# Patient Record
Sex: FEMALE | Race: BLACK OR AFRICAN AMERICAN | Employment: OTHER
[De-identification: names, ages, dates, MRNs, and addresses within clinical notes are randomized per-mention and may not be internally consistent; named-entity substitution may affect disease eponyms.]

---

## 2017-01-17 ENCOUNTER — OFFICE VISIT (OUTPATIENT)
Dept: INTERNAL MEDICINE | Facility: CLINIC | Age: 62
End: 2017-01-17

## 2017-01-17 VITALS
DIASTOLIC BLOOD PRESSURE: 90 MMHG | RESPIRATION RATE: 12 BRPM | SYSTOLIC BLOOD PRESSURE: 139 MMHG | HEIGHT: 67 IN | HEART RATE: 116 BPM | WEIGHT: 98.8 LBS | BODY MASS INDEX: 15.51 KG/M2 | OXYGEN SATURATION: 97 %

## 2017-01-17 DIAGNOSIS — E11.9 TYPE 2 DIABETES MELLITUS WITHOUT COMPLICATION, WITH LONG-TERM CURRENT USE OF INSULIN (HCC): Primary | ICD-10-CM

## 2017-01-17 DIAGNOSIS — Z12.39 BREAST CANCER SCREENING: ICD-10-CM

## 2017-01-17 DIAGNOSIS — Z12.31 ENCOUNTER FOR SCREENING MAMMOGRAM FOR MALIGNANT NEOPLASM OF BREAST: ICD-10-CM

## 2017-01-17 DIAGNOSIS — Z79.4 TYPE 2 DIABETES MELLITUS WITHOUT COMPLICATION, WITH LONG-TERM CURRENT USE OF INSULIN (HCC): Primary | ICD-10-CM

## 2017-01-17 DIAGNOSIS — Z00.00 HEALTH CARE MAINTENANCE: ICD-10-CM

## 2017-01-17 DIAGNOSIS — K86.0 ALCOHOL-INDUCED CHRONIC PANCREATITIS (HCC): ICD-10-CM

## 2017-01-17 DIAGNOSIS — K86.89 PANCREATIC INSUFFICIENCY: ICD-10-CM

## 2017-01-17 LAB — HBA1C MFR BLD: 10.7 %

## 2017-01-17 PROCEDURE — 99203 OFFICE O/P NEW LOW 30 MIN: CPT | Performed by: INTERNAL MEDICINE

## 2017-01-17 PROCEDURE — 83036 HEMOGLOBIN GLYCOSYLATED A1C: CPT | Performed by: INTERNAL MEDICINE

## 2017-01-17 RX ORDER — MAGNESIUM OXIDE 400 MG/1
400 TABLET ORAL DAILY
Qty: 30 TABLET | Refills: 1 | Status: CANCELLED | OUTPATIENT
Start: 2017-01-17

## 2017-01-17 RX ORDER — MIRTAZAPINE 30 MG/1
15 TABLET, FILM COATED ORAL NIGHTLY
Qty: 30 TABLET | Refills: 5 | Status: SHIPPED | OUTPATIENT
Start: 2017-01-17 | End: 2017-06-08 | Stop reason: SDUPTHER

## 2017-01-17 RX ORDER — CITALOPRAM 20 MG/1
20 TABLET ORAL DAILY
Qty: 30 TABLET | Refills: 1 | Status: CANCELLED | OUTPATIENT
Start: 2017-01-17

## 2017-01-17 RX ORDER — GLIMEPIRIDE 2 MG/1
2 TABLET ORAL DAILY
Qty: 30 TABLET | Refills: 5 | Status: ON HOLD | OUTPATIENT
Start: 2017-01-17 | End: 2017-01-20 | Stop reason: HOSPADM

## 2017-01-17 RX ORDER — ALBUTEROL SULFATE 90 UG/1
2 AEROSOL, METERED RESPIRATORY (INHALATION) EVERY 6 HOURS PRN
Qty: 1 INHALER | Refills: 5 | Status: SHIPPED | OUTPATIENT
Start: 2017-01-17 | End: 2017-11-03 | Stop reason: SDUPTHER

## 2017-01-17 RX ORDER — POTASSIUM CHLORIDE 750 MG/1
10 CAPSULE, EXTENDED RELEASE ORAL 2 TIMES DAILY
Qty: 30 CAPSULE | Refills: 5 | Status: ON HOLD | OUTPATIENT
Start: 2017-01-17 | End: 2017-01-20 | Stop reason: HOSPADM

## 2017-01-17 RX ORDER — TIZANIDINE 2 MG/1
2 TABLET ORAL EVERY 6 HOURS PRN
Qty: 30 TABLET | Refills: 0 | Status: CANCELLED | OUTPATIENT
Start: 2017-01-17

## 2017-01-17 RX ORDER — OMEPRAZOLE 20 MG/1
20 CAPSULE, DELAYED RELEASE ORAL 2 TIMES DAILY
Qty: 30 CAPSULE | Refills: 5 | Status: SHIPPED | OUTPATIENT
Start: 2017-01-17 | End: 2017-06-08 | Stop reason: SDUPTHER

## 2017-01-17 RX ORDER — SODIUM BICARBONATE 650 MG/1
650 TABLET ORAL 3 TIMES DAILY
Qty: 90 TABLET | Refills: 5 | Status: SHIPPED | OUTPATIENT
Start: 2017-01-17 | End: 2017-09-12 | Stop reason: SDUPTHER

## 2017-01-17 ASSESSMENT — ENCOUNTER SYMPTOMS
DIFFICULTY BREATHING: 1
ABDOMINAL PAIN: 1

## 2017-01-17 ASSESSMENT — COPD QUESTIONNAIRES: COPD: 1

## 2017-01-23 ENCOUNTER — TELEPHONE (OUTPATIENT)
Dept: INTERNAL MEDICINE | Facility: CLINIC | Age: 62
End: 2017-01-23

## 2017-01-24 ENCOUNTER — TELEPHONE (OUTPATIENT)
Dept: INTERNAL MEDICINE | Facility: CLINIC | Age: 62
End: 2017-01-24

## 2017-01-30 ENCOUNTER — OFFICE VISIT (OUTPATIENT)
Dept: INTERNAL MEDICINE | Facility: CLINIC | Age: 62
End: 2017-01-30

## 2017-01-30 VITALS
SYSTOLIC BLOOD PRESSURE: 122 MMHG | DIASTOLIC BLOOD PRESSURE: 81 MMHG | WEIGHT: 102.4 LBS | OXYGEN SATURATION: 98 % | RESPIRATION RATE: 12 BRPM | HEIGHT: 67 IN | HEART RATE: 101 BPM | BODY MASS INDEX: 16.07 KG/M2

## 2017-01-30 DIAGNOSIS — Z79.4 TYPE 2 DIABETES MELLITUS WITHOUT COMPLICATION, WITH LONG-TERM CURRENT USE OF INSULIN (HCC): Primary | ICD-10-CM

## 2017-01-30 DIAGNOSIS — M54.2 CHRONIC NECK PAIN: ICD-10-CM

## 2017-01-30 DIAGNOSIS — G89.29 CHRONIC NECK PAIN: ICD-10-CM

## 2017-01-30 DIAGNOSIS — E11.9 TYPE 2 DIABETES MELLITUS WITHOUT COMPLICATION, WITH LONG-TERM CURRENT USE OF INSULIN (HCC): Primary | ICD-10-CM

## 2017-01-30 DIAGNOSIS — G40.909 NONINTRACTABLE EPILEPSY WITHOUT STATUS EPILEPTICUS, UNSPECIFIED EPILEPSY TYPE (HCC): ICD-10-CM

## 2017-01-30 LAB — GLUCOSE BLD-MCNC: 147 MG/DL

## 2017-01-30 PROCEDURE — 99214 OFFICE O/P EST MOD 30 MIN: CPT | Performed by: INTERNAL MEDICINE

## 2017-01-30 PROCEDURE — 82962 GLUCOSE BLOOD TEST: CPT | Performed by: INTERNAL MEDICINE

## 2017-01-30 ASSESSMENT — PATIENT HEALTH QUESTIONNAIRE - PHQ9
SUM OF ALL RESPONSES TO PHQ QUESTIONS 1-9: 2
1. LITTLE INTEREST OR PLEASURE IN DOING THINGS: 1
2. FEELING DOWN, DEPRESSED OR HOPELESS: 1
SUM OF ALL RESPONSES TO PHQ9 QUESTIONS 1 & 2: 2

## 2017-02-09 ENCOUNTER — OFFICE VISIT (OUTPATIENT)
Dept: NEUROLOGY | Facility: CLINIC | Age: 62
End: 2017-02-09

## 2017-02-09 VITALS
SYSTOLIC BLOOD PRESSURE: 102 MMHG | DIASTOLIC BLOOD PRESSURE: 78 MMHG | WEIGHT: 102 LBS | HEART RATE: 76 BPM | BODY MASS INDEX: 16.01 KG/M2 | HEIGHT: 67 IN

## 2017-02-09 DIAGNOSIS — E11.42 TYPE 2 DIABETES MELLITUS WITH DIABETIC POLYNEUROPATHY, WITH LONG-TERM CURRENT USE OF INSULIN (HCC): ICD-10-CM

## 2017-02-09 DIAGNOSIS — Z79.4 TYPE 2 DIABETES MELLITUS WITH DIABETIC POLYNEUROPATHY, WITH LONG-TERM CURRENT USE OF INSULIN (HCC): ICD-10-CM

## 2017-02-09 DIAGNOSIS — G57.93 NEUROPATHIC PAIN OF BOTH LEGS: Primary | ICD-10-CM

## 2017-02-09 DIAGNOSIS — G40.909 SEIZURE DISORDER, SECONDARY (HCC): ICD-10-CM

## 2017-02-09 PROCEDURE — 99214 OFFICE O/P EST MOD 30 MIN: CPT | Performed by: PSYCHIATRY & NEUROLOGY

## 2017-02-09 RX ORDER — GABAPENTIN 100 MG/1
CAPSULE ORAL
Qty: 60 CAPSULE | Refills: 1 | Status: SHIPPED | OUTPATIENT
Start: 2017-02-09 | End: 2017-04-06 | Stop reason: SDUPTHER

## 2017-02-20 ENCOUNTER — OFFICE VISIT (OUTPATIENT)
Dept: GASTROENTEROLOGY | Facility: CLINIC | Age: 62
End: 2017-02-20

## 2017-02-20 VITALS
DIASTOLIC BLOOD PRESSURE: 82 MMHG | TEMPERATURE: 97.3 F | BODY MASS INDEX: 17.27 KG/M2 | HEIGHT: 67 IN | RESPIRATION RATE: 14 BRPM | HEART RATE: 89 BPM | SYSTOLIC BLOOD PRESSURE: 153 MMHG | OXYGEN SATURATION: 97 % | WEIGHT: 110 LBS

## 2017-02-20 DIAGNOSIS — R97.8 ELEVATED CA 19-9 LEVEL: ICD-10-CM

## 2017-02-20 DIAGNOSIS — K86.89 PANCREATIC CALCIFICATION: ICD-10-CM

## 2017-02-20 DIAGNOSIS — R10.84 GENERALIZED ABDOMINAL PAIN: ICD-10-CM

## 2017-02-20 DIAGNOSIS — K86.89 PANCREATIC INSUFFICIENCY: ICD-10-CM

## 2017-02-20 DIAGNOSIS — K86.0 ALCOHOL-INDUCED CHRONIC PANCREATITIS (HCC): Primary | ICD-10-CM

## 2017-02-20 DIAGNOSIS — R79.89 ELEVATED LFTS: ICD-10-CM

## 2017-02-20 PROCEDURE — 99214 OFFICE O/P EST MOD 30 MIN: CPT | Performed by: INTERNAL MEDICINE

## 2017-02-20 ASSESSMENT — ENCOUNTER SYMPTOMS
NAUSEA: 0
COUGH: 1
VOMITING: 0
ABDOMINAL PAIN: 0
RECTAL PAIN: 0
ANAL BLEEDING: 0
CONSTIPATION: 0
WHEEZING: 1
DIARRHEA: 0
BLOOD IN STOOL: 0
ALLERGIC/IMMUNOLOGIC NEGATIVE: 1
BACK PAIN: 1
ABDOMINAL DISTENTION: 1
SINUS PRESSURE: 1

## 2017-03-06 RX ORDER — LEVETIRACETAM 500 MG/1
500 TABLET ORAL 2 TIMES DAILY
Qty: 60 TABLET | Refills: 5 | Status: SHIPPED | OUTPATIENT
Start: 2017-03-06 | End: 2018-05-15 | Stop reason: ALTCHOICE

## 2017-04-06 DIAGNOSIS — E11.42 TYPE 2 DIABETES MELLITUS WITH DIABETIC POLYNEUROPATHY, WITH LONG-TERM CURRENT USE OF INSULIN (HCC): ICD-10-CM

## 2017-04-06 DIAGNOSIS — Z79.4 TYPE 2 DIABETES MELLITUS WITH DIABETIC POLYNEUROPATHY, WITH LONG-TERM CURRENT USE OF INSULIN (HCC): ICD-10-CM

## 2017-04-06 DIAGNOSIS — G57.93 NEUROPATHIC PAIN OF BOTH LEGS: ICD-10-CM

## 2017-04-06 RX ORDER — GABAPENTIN 100 MG/1
CAPSULE ORAL
Qty: 60 CAPSULE | Refills: 1 | Status: SHIPPED | OUTPATIENT
Start: 2017-04-06 | End: 2017-06-01 | Stop reason: DRUGHIGH

## 2017-04-12 ENCOUNTER — TELEPHONE (OUTPATIENT)
Dept: GASTROENTEROLOGY | Age: 62
End: 2017-04-12

## 2017-04-27 ENCOUNTER — OFFICE VISIT (OUTPATIENT)
Dept: INTERNAL MEDICINE | Age: 62
End: 2017-04-27
Payer: MEDICARE

## 2017-04-27 VITALS
OXYGEN SATURATION: 99 % | HEIGHT: 67 IN | DIASTOLIC BLOOD PRESSURE: 89 MMHG | SYSTOLIC BLOOD PRESSURE: 136 MMHG | WEIGHT: 104.2 LBS | BODY MASS INDEX: 16.35 KG/M2 | RESPIRATION RATE: 12 BRPM | HEART RATE: 95 BPM

## 2017-04-27 DIAGNOSIS — Z12.31 ENCOUNTER FOR SCREENING MAMMOGRAM FOR MALIGNANT NEOPLASM OF BREAST: ICD-10-CM

## 2017-04-27 DIAGNOSIS — M25.531 BILATERAL WRIST PAIN: ICD-10-CM

## 2017-04-27 DIAGNOSIS — Z79.4 TYPE 2 DIABETES MELLITUS WITHOUT COMPLICATION, WITH LONG-TERM CURRENT USE OF INSULIN (HCC): Primary | ICD-10-CM

## 2017-04-27 DIAGNOSIS — K86.0 ALCOHOL-INDUCED CHRONIC PANCREATITIS (HCC): ICD-10-CM

## 2017-04-27 DIAGNOSIS — Z12.39 BREAST CANCER SCREENING: ICD-10-CM

## 2017-04-27 DIAGNOSIS — G56.03 BILATERAL CARPAL TUNNEL SYNDROME: ICD-10-CM

## 2017-04-27 DIAGNOSIS — M25.532 BILATERAL WRIST PAIN: ICD-10-CM

## 2017-04-27 DIAGNOSIS — E11.9 TYPE 2 DIABETES MELLITUS WITHOUT COMPLICATION, WITH LONG-TERM CURRENT USE OF INSULIN (HCC): Primary | ICD-10-CM

## 2017-04-27 DIAGNOSIS — Z23 NEED FOR PNEUMOCOCCAL VACCINE: ICD-10-CM

## 2017-04-27 PROBLEM — I10 ESSENTIAL (PRIMARY) HYPERTENSION: Status: ACTIVE | Noted: 2017-02-08

## 2017-04-27 PROCEDURE — 99214 OFFICE O/P EST MOD 30 MIN: CPT | Performed by: INTERNAL MEDICINE

## 2017-04-27 PROCEDURE — 90670 PCV13 VACCINE IM: CPT | Performed by: INTERNAL MEDICINE

## 2017-04-27 PROCEDURE — 83036 HEMOGLOBIN GLYCOSYLATED A1C: CPT | Performed by: INTERNAL MEDICINE

## 2017-04-27 PROCEDURE — G0009 ADMIN PNEUMOCOCCAL VACCINE: HCPCS | Performed by: INTERNAL MEDICINE

## 2017-04-27 RX ORDER — AZITHROMYCIN 250 MG/1
TABLET, FILM COATED ORAL
Qty: 1 PACKET | Refills: 0 | Status: SHIPPED | OUTPATIENT
Start: 2017-04-27 | End: 2017-06-01 | Stop reason: ALTCHOICE

## 2017-04-27 ASSESSMENT — ENCOUNTER SYMPTOMS
ABDOMINAL PAIN: 1
SINUS COMPLAINT: 1
SINUS PRESSURE: 1
COUGH: 1
HEARTBURN: 1

## 2017-04-27 ASSESSMENT — PATIENT HEALTH QUESTIONNAIRE - PHQ9
SUM OF ALL RESPONSES TO PHQ9 QUESTIONS 1 & 2: 0
2. FEELING DOWN, DEPRESSED OR HOPELESS: 0
1. LITTLE INTEREST OR PLEASURE IN DOING THINGS: 0
SUM OF ALL RESPONSES TO PHQ QUESTIONS 1-9: 0

## 2017-04-28 DIAGNOSIS — G57.93 NEUROPATHIC PAIN OF BOTH LEGS: ICD-10-CM

## 2017-04-28 DIAGNOSIS — G89.29 CHRONIC NECK PAIN: Primary | ICD-10-CM

## 2017-04-28 DIAGNOSIS — M54.2 CHRONIC NECK PAIN: Primary | ICD-10-CM

## 2017-04-28 LAB — HBA1C MFR BLD: 9.1 %

## 2017-05-03 ENCOUNTER — OFFICE VISIT (OUTPATIENT)
Dept: NEUROLOGY | Age: 62
End: 2017-05-03
Payer: MEDICARE

## 2017-05-03 ENCOUNTER — HOSPITAL ENCOUNTER (OUTPATIENT)
Dept: GENERAL RADIOLOGY | Age: 62
Discharge: HOME | End: 2017-05-03
Payer: MEDICARE

## 2017-05-03 ENCOUNTER — HOSPITAL ENCOUNTER (OUTPATIENT)
Age: 62
Discharge: HOME | End: 2017-05-03
Payer: MEDICARE

## 2017-05-03 VITALS
HEIGHT: 67 IN | DIASTOLIC BLOOD PRESSURE: 62 MMHG | BODY MASS INDEX: 17.42 KG/M2 | HEART RATE: 100 BPM | WEIGHT: 111 LBS | SYSTOLIC BLOOD PRESSURE: 96 MMHG

## 2017-05-03 DIAGNOSIS — M25.532 BILATERAL WRIST PAIN: ICD-10-CM

## 2017-05-03 DIAGNOSIS — Z79.4 TYPE 2 DIABETES MELLITUS WITH DIABETIC POLYNEUROPATHY, WITH LONG-TERM CURRENT USE OF INSULIN (HCC): ICD-10-CM

## 2017-05-03 DIAGNOSIS — M25.531 BILATERAL WRIST PAIN: ICD-10-CM

## 2017-05-03 DIAGNOSIS — M79.2 NEUROPATHIC PAIN: Primary | ICD-10-CM

## 2017-05-03 DIAGNOSIS — E11.42 TYPE 2 DIABETES MELLITUS WITH DIABETIC POLYNEUROPATHY, WITH LONG-TERM CURRENT USE OF INSULIN (HCC): ICD-10-CM

## 2017-05-03 PROCEDURE — 99214 OFFICE O/P EST MOD 30 MIN: CPT | Performed by: PSYCHIATRY & NEUROLOGY

## 2017-05-03 PROCEDURE — 73110 X-RAY EXAM OF WRIST: CPT

## 2017-05-03 RX ORDER — GABAPENTIN 300 MG/1
CAPSULE ORAL
Qty: 60 CAPSULE | Refills: 0 | Status: SHIPPED | OUTPATIENT
Start: 2017-05-03 | End: 2017-06-08 | Stop reason: SDUPTHER

## 2017-05-08 ENCOUNTER — HOSPITAL ENCOUNTER (OUTPATIENT)
Age: 62
Discharge: HOME | End: 2017-05-08
Payer: MEDICARE

## 2017-05-08 DIAGNOSIS — R97.8 ELEVATED CA 19-9 LEVEL: ICD-10-CM

## 2017-05-08 DIAGNOSIS — K86.89 PANCREATIC INSUFFICIENCY: ICD-10-CM

## 2017-05-08 DIAGNOSIS — E11.9 TYPE 2 DIABETES MELLITUS WITHOUT COMPLICATION, WITH LONG-TERM CURRENT USE OF INSULIN (HCC): ICD-10-CM

## 2017-05-08 DIAGNOSIS — Z79.4 TYPE 2 DIABETES MELLITUS WITHOUT COMPLICATION, WITH LONG-TERM CURRENT USE OF INSULIN (HCC): ICD-10-CM

## 2017-05-08 DIAGNOSIS — R79.89 ELEVATED LFTS: ICD-10-CM

## 2017-05-08 DIAGNOSIS — K86.89 PANCREATIC CALCIFICATION: ICD-10-CM

## 2017-05-08 LAB
ALBUMIN SERPL-MCNC: 4.6 G/DL (ref 3.5–5.2)
ALBUMIN/GLOBULIN RATIO: 1.3 (ref 1–2.5)
ALP BLD-CCNC: 117 U/L (ref 35–104)
ALT SERPL-CCNC: 12 U/L (ref 5–33)
AMYLASE: 54 U/L (ref 28–100)
AST SERPL-CCNC: 22 U/L
BILIRUB SERPL-MCNC: 0.49 MG/DL (ref 0.3–1.2)
BILIRUBIN DIRECT: 0.13 MG/DL
BILIRUBIN, INDIRECT: 0.36 MG/DL (ref 0–1)
CA 19-9: 1 U/ML (ref 0–35)
CARCINOEMBRYONIC ANTIGEN: 7.5 NG/ML
CHOLESTEROL/HDL RATIO: 2
CHOLESTEROL: 174 MG/DL
CREATININE URINE: 155.8 MG/DL (ref 28–217)
GLOBULIN: ABNORMAL G/DL (ref 1.5–3.8)
HDLC SERPL-MCNC: 87 MG/DL
LDL CHOLESTEROL: 68 MG/DL (ref 0–130)
LIPASE: 7 U/L (ref 13–60)
MICROALBUMIN/CREAT 24H UR: <12 MG/L
MICROALBUMIN/CREAT UR-RTO: 8 MCG/MG CREAT
TOTAL PROTEIN: 8.2 G/DL (ref 6.4–8.3)
TRIGL SERPL-MCNC: 94 MG/DL
VLDLC SERPL CALC-MCNC: NORMAL MG/DL (ref 1–30)

## 2017-05-08 PROCEDURE — 82043 UR ALBUMIN QUANTITATIVE: CPT

## 2017-05-08 PROCEDURE — 80061 LIPID PANEL: CPT

## 2017-05-08 PROCEDURE — 82150 ASSAY OF AMYLASE: CPT

## 2017-05-08 PROCEDURE — 80076 HEPATIC FUNCTION PANEL: CPT

## 2017-05-08 PROCEDURE — 82378 CARCINOEMBRYONIC ANTIGEN: CPT

## 2017-05-08 PROCEDURE — 82570 ASSAY OF URINE CREATININE: CPT

## 2017-05-08 PROCEDURE — 36415 COLL VENOUS BLD VENIPUNCTURE: CPT

## 2017-05-08 PROCEDURE — 83690 ASSAY OF LIPASE: CPT

## 2017-05-08 PROCEDURE — 86301 IMMUNOASSAY TUMOR CA 19-9: CPT

## 2017-05-17 ENCOUNTER — OFFICE VISIT (OUTPATIENT)
Dept: GASTROENTEROLOGY | Age: 62
End: 2017-05-17
Payer: MEDICARE

## 2017-05-17 VITALS
RESPIRATION RATE: 14 BRPM | WEIGHT: 108 LBS | SYSTOLIC BLOOD PRESSURE: 96 MMHG | OXYGEN SATURATION: 99 % | TEMPERATURE: 97.4 F | BODY MASS INDEX: 16.95 KG/M2 | DIASTOLIC BLOOD PRESSURE: 63 MMHG | HEART RATE: 81 BPM | HEIGHT: 67 IN

## 2017-05-17 DIAGNOSIS — R97.8 ELEVATED CA 19-9 LEVEL: ICD-10-CM

## 2017-05-17 DIAGNOSIS — R97.0 ELEVATED CEA: ICD-10-CM

## 2017-05-17 DIAGNOSIS — R63.4 WEIGHT LOSS: ICD-10-CM

## 2017-05-17 DIAGNOSIS — K86.0 ALCOHOL-INDUCED CHRONIC PANCREATITIS (HCC): Primary | ICD-10-CM

## 2017-05-17 PROCEDURE — 99214 OFFICE O/P EST MOD 30 MIN: CPT | Performed by: INTERNAL MEDICINE

## 2017-05-17 RX ORDER — GLIMEPIRIDE 2 MG/1
TABLET ORAL
COMMUNITY
Start: 2017-04-24 | End: 2017-06-08 | Stop reason: SDUPTHER

## 2017-05-17 ASSESSMENT — ENCOUNTER SYMPTOMS
EYES NEGATIVE: 1
DIARRHEA: 0
ANAL BLEEDING: 0
RECTAL PAIN: 0
SINUS PRESSURE: 1
CONSTIPATION: 0
WHEEZING: 1
BLOOD IN STOOL: 0
ABDOMINAL PAIN: 0
ALLERGIC/IMMUNOLOGIC NEGATIVE: 1
VOMITING: 0
NAUSEA: 1
ABDOMINAL DISTENTION: 0

## 2017-05-22 ENCOUNTER — TELEPHONE (OUTPATIENT)
Dept: INTERNAL MEDICINE | Age: 62
End: 2017-05-22

## 2017-05-22 DIAGNOSIS — G57.93 NEUROPATHIC PAIN OF BOTH LEGS: ICD-10-CM

## 2017-05-22 DIAGNOSIS — M54.2 CHRONIC NECK PAIN: Primary | ICD-10-CM

## 2017-05-22 DIAGNOSIS — G89.29 CHRONIC NECK PAIN: Primary | ICD-10-CM

## 2017-06-01 ENCOUNTER — HOSPITAL ENCOUNTER (OUTPATIENT)
Dept: PAIN MANAGEMENT | Age: 62
Discharge: HOME | End: 2017-06-01
Payer: MEDICARE

## 2017-06-01 VITALS
WEIGHT: 108 LBS | DIASTOLIC BLOOD PRESSURE: 78 MMHG | TEMPERATURE: 97.8 F | BODY MASS INDEX: 16.95 KG/M2 | RESPIRATION RATE: 18 BRPM | HEIGHT: 67 IN | HEART RATE: 78 BPM | SYSTOLIC BLOOD PRESSURE: 122 MMHG

## 2017-06-01 DIAGNOSIS — M54.2 CERVICALGIA: ICD-10-CM

## 2017-06-01 DIAGNOSIS — M50.30 DEGENERATION OF CERVICAL INTERVERTEBRAL DISC: Primary | ICD-10-CM

## 2017-06-01 PROCEDURE — G0463 HOSPITAL OUTPT CLINIC VISIT: HCPCS

## 2017-06-01 PROCEDURE — 99203 OFFICE O/P NEW LOW 30 MIN: CPT

## 2017-06-01 ASSESSMENT — ENCOUNTER SYMPTOMS
UNUSUAL HAIR DISTRIBUTION: 0
HEMOPTYSIS: 0
SUSPICIOUS LESIONS: 0
SPUTUM PRODUCTION: 0
JAUNDICE: 0
STRIDOR: 0
EYE DISCHARGE: 0
BOWEL INCONTINENCE: 0

## 2017-06-08 DIAGNOSIS — E11.42 TYPE 2 DIABETES MELLITUS WITH DIABETIC POLYNEUROPATHY, WITH LONG-TERM CURRENT USE OF INSULIN (HCC): ICD-10-CM

## 2017-06-08 DIAGNOSIS — M79.2 NEUROPATHIC PAIN: ICD-10-CM

## 2017-06-08 DIAGNOSIS — Z79.4 TYPE 2 DIABETES MELLITUS WITH DIABETIC POLYNEUROPATHY, WITH LONG-TERM CURRENT USE OF INSULIN (HCC): ICD-10-CM

## 2017-06-09 ENCOUNTER — HOSPITAL ENCOUNTER (OUTPATIENT)
Dept: MAMMOGRAPHY | Age: 62
Discharge: HOME | End: 2017-06-09
Payer: MEDICARE

## 2017-06-09 DIAGNOSIS — Z12.31 ENCOUNTER FOR SCREENING MAMMOGRAM FOR MALIGNANT NEOPLASM OF BREAST: ICD-10-CM

## 2017-06-09 DIAGNOSIS — Z12.39 BREAST CANCER SCREENING: ICD-10-CM

## 2017-06-09 PROCEDURE — G0202 SCR MAMMO BI INCL CAD: HCPCS

## 2017-06-12 DIAGNOSIS — Z79.4 TYPE 2 DIABETES MELLITUS WITH DIABETIC POLYNEUROPATHY, WITH LONG-TERM CURRENT USE OF INSULIN (HCC): ICD-10-CM

## 2017-06-12 DIAGNOSIS — E11.42 TYPE 2 DIABETES MELLITUS WITH DIABETIC POLYNEUROPATHY, WITH LONG-TERM CURRENT USE OF INSULIN (HCC): ICD-10-CM

## 2017-06-12 DIAGNOSIS — M79.2 NEUROPATHIC PAIN: ICD-10-CM

## 2017-06-12 RX ORDER — OMEPRAZOLE 20 MG/1
20 CAPSULE, DELAYED RELEASE ORAL 2 TIMES DAILY
Qty: 90 CAPSULE | Refills: 3 | Status: SHIPPED | OUTPATIENT
Start: 2017-06-12 | End: 2018-02-02 | Stop reason: SDUPTHER

## 2017-06-12 RX ORDER — GLIMEPIRIDE 2 MG/1
2 TABLET ORAL
Qty: 90 TABLET | Refills: 3 | Status: SHIPPED | OUTPATIENT
Start: 2017-06-12 | End: 2018-07-10 | Stop reason: SDUPTHER

## 2017-06-12 RX ORDER — GABAPENTIN 300 MG/1
CAPSULE ORAL
Qty: 60 CAPSULE | Refills: 0 | Status: SHIPPED | OUTPATIENT
Start: 2017-06-12 | End: 2017-06-12 | Stop reason: SDUPTHER

## 2017-06-12 RX ORDER — MIRTAZAPINE 30 MG/1
15 TABLET, FILM COATED ORAL NIGHTLY
Qty: 90 TABLET | Refills: 3 | Status: SHIPPED | OUTPATIENT
Start: 2017-06-12 | End: 2018-07-10 | Stop reason: SDUPTHER

## 2017-06-12 RX ORDER — OMEPRAZOLE 20 MG/1
20 CAPSULE, DELAYED RELEASE ORAL 2 TIMES DAILY
Qty: 30 CAPSULE | Refills: 5 | Status: SHIPPED | OUTPATIENT
Start: 2017-06-12 | End: 2017-06-12 | Stop reason: SDUPTHER

## 2017-06-12 RX ORDER — GABAPENTIN 300 MG/1
CAPSULE ORAL
Qty: 120 CAPSULE | Refills: 3 | Status: SHIPPED | OUTPATIENT
Start: 2017-06-12 | End: 2017-11-01 | Stop reason: SDUPTHER

## 2017-06-12 RX ORDER — MIRTAZAPINE 30 MG/1
15 TABLET, FILM COATED ORAL NIGHTLY
Qty: 30 TABLET | Refills: 5 | Status: SHIPPED | OUTPATIENT
Start: 2017-06-12 | End: 2017-06-12 | Stop reason: SDUPTHER

## 2017-06-12 RX ORDER — GLIMEPIRIDE 2 MG/1
2 TABLET ORAL
Qty: 30 TABLET | Refills: 5 | Status: SHIPPED | OUTPATIENT
Start: 2017-06-12 | End: 2017-06-12 | Stop reason: SDUPTHER

## 2017-06-15 ENCOUNTER — HOSPITAL ENCOUNTER (OUTPATIENT)
Dept: MRI IMAGING | Age: 62
Discharge: HOME | End: 2017-06-15
Payer: MEDICARE

## 2017-06-15 DIAGNOSIS — M54.2 CERVICALGIA: ICD-10-CM

## 2017-06-15 PROCEDURE — 72141 MRI NECK SPINE W/O DYE: CPT

## 2017-06-21 ENCOUNTER — TELEPHONE (OUTPATIENT)
Dept: GASTROENTEROLOGY | Age: 62
End: 2017-06-21

## 2017-06-23 RX ORDER — SODIUM, POTASSIUM,MAG SULFATES 17.5-3.13G
SOLUTION, RECONSTITUTED, ORAL ORAL
Qty: 1 BOTTLE | Refills: 0 | Status: ON HOLD | OUTPATIENT
Start: 2017-06-23 | End: 2017-06-30 | Stop reason: ALTCHOICE

## 2017-06-30 ENCOUNTER — HOSPITAL ENCOUNTER (OUTPATIENT)
Age: 62
Setting detail: OUTPATIENT SURGERY
Discharge: HOME | End: 2017-06-30
Attending: INTERNAL MEDICINE | Admitting: INTERNAL MEDICINE
Payer: MEDICARE

## 2017-06-30 VITALS
RESPIRATION RATE: 18 BRPM | WEIGHT: 109 LBS | TEMPERATURE: 98.8 F | SYSTOLIC BLOOD PRESSURE: 139 MMHG | DIASTOLIC BLOOD PRESSURE: 82 MMHG | HEART RATE: 80 BPM | BODY MASS INDEX: 17.11 KG/M2 | OXYGEN SATURATION: 98 % | HEIGHT: 67 IN

## 2017-06-30 PROCEDURE — 99153 MOD SED SAME PHYS/QHP EA: CPT | Performed by: INTERNAL MEDICINE

## 2017-06-30 PROCEDURE — 99152 MOD SED SAME PHYS/QHP 5/>YRS: CPT | Performed by: INTERNAL MEDICINE

## 2017-06-30 PROCEDURE — 2580000003 HC RX 258: Performed by: INTERNAL MEDICINE

## 2017-06-30 PROCEDURE — 7100000011 HC PHASE II RECOVERY - ADDTL 15 MIN: Performed by: INTERNAL MEDICINE

## 2017-06-30 PROCEDURE — 3609010400 HC COLONOSCOPY POLYPECTOMY HOT BIOPSY: Performed by: INTERNAL MEDICINE

## 2017-06-30 PROCEDURE — 88305 TISSUE EXAM BY PATHOLOGIST: CPT

## 2017-06-30 PROCEDURE — 6360000002 HC RX W HCPCS: Performed by: INTERNAL MEDICINE

## 2017-06-30 PROCEDURE — 45385 COLONOSCOPY W/LESION REMOVAL: CPT | Performed by: INTERNAL MEDICINE

## 2017-06-30 PROCEDURE — 7100000010 HC PHASE II RECOVERY - FIRST 15 MIN: Performed by: INTERNAL MEDICINE

## 2017-06-30 RX ORDER — MEPERIDINE HYDROCHLORIDE 50 MG/ML
INJECTION INTRAMUSCULAR; INTRAVENOUS; SUBCUTANEOUS PRN
Status: DISCONTINUED | OUTPATIENT
Start: 2017-06-30 | End: 2017-06-30 | Stop reason: HOSPADM

## 2017-06-30 RX ORDER — SODIUM CHLORIDE, SODIUM LACTATE, POTASSIUM CHLORIDE, CALCIUM CHLORIDE 600; 310; 30; 20 MG/100ML; MG/100ML; MG/100ML; MG/100ML
INJECTION, SOLUTION INTRAVENOUS ONCE
Status: COMPLETED | OUTPATIENT
Start: 2017-06-30 | End: 2017-06-30

## 2017-06-30 RX ORDER — MIDAZOLAM HYDROCHLORIDE 1 MG/ML
INJECTION INTRAMUSCULAR; INTRAVENOUS PRN
Status: DISCONTINUED | OUTPATIENT
Start: 2017-06-30 | End: 2017-06-30 | Stop reason: HOSPADM

## 2017-06-30 RX ADMIN — SODIUM CHLORIDE, POTASSIUM CHLORIDE, SODIUM LACTATE AND CALCIUM CHLORIDE: 600; 310; 30; 20 INJECTION, SOLUTION INTRAVENOUS at 07:13

## 2017-06-30 ASSESSMENT — PAIN - FUNCTIONAL ASSESSMENT: PAIN_FUNCTIONAL_ASSESSMENT: 0-10

## 2017-06-30 ASSESSMENT — PAIN SCALES - GENERAL: PAINLEVEL_OUTOF10: 0

## 2017-07-03 LAB — SURGICAL PATHOLOGY REPORT: NORMAL

## 2017-08-01 DIAGNOSIS — R97.0 ELEVATED CEA: ICD-10-CM

## 2017-08-01 DIAGNOSIS — R63.4 WEIGHT LOSS: ICD-10-CM

## 2017-08-04 ENCOUNTER — OFFICE VISIT (OUTPATIENT)
Dept: INTERNAL MEDICINE | Age: 62
End: 2017-08-04
Payer: MEDICARE

## 2017-08-04 VITALS
HEART RATE: 76 BPM | BODY MASS INDEX: 16.64 KG/M2 | SYSTOLIC BLOOD PRESSURE: 132 MMHG | WEIGHT: 106 LBS | DIASTOLIC BLOOD PRESSURE: 91 MMHG | HEIGHT: 67 IN

## 2017-08-04 DIAGNOSIS — E11.9 TYPE 2 DIABETES MELLITUS WITHOUT COMPLICATION, WITH LONG-TERM CURRENT USE OF INSULIN (HCC): ICD-10-CM

## 2017-08-04 DIAGNOSIS — Z79.4 TYPE 2 DIABETES MELLITUS WITHOUT COMPLICATION, WITH LONG-TERM CURRENT USE OF INSULIN (HCC): ICD-10-CM

## 2017-08-04 DIAGNOSIS — G56.03 BILATERAL CARPAL TUNNEL SYNDROME: ICD-10-CM

## 2017-08-04 DIAGNOSIS — K86.0 ALCOHOL-INDUCED CHRONIC PANCREATITIS (HCC): Primary | ICD-10-CM

## 2017-08-04 PROCEDURE — 99214 OFFICE O/P EST MOD 30 MIN: CPT | Performed by: INTERNAL MEDICINE

## 2017-08-04 ASSESSMENT — ENCOUNTER SYMPTOMS: ABDOMINAL PAIN: 1

## 2017-08-08 ENCOUNTER — HOSPITAL ENCOUNTER (EMERGENCY)
Age: 62
Discharge: HOME | End: 2017-08-08
Attending: EMERGENCY MEDICINE
Payer: MEDICARE

## 2017-08-08 VITALS
OXYGEN SATURATION: 100 % | RESPIRATION RATE: 16 BRPM | HEART RATE: 98 BPM | TEMPERATURE: 98.3 F | HEIGHT: 67 IN | BODY MASS INDEX: 16.48 KG/M2 | SYSTOLIC BLOOD PRESSURE: 119 MMHG | DIASTOLIC BLOOD PRESSURE: 89 MMHG | WEIGHT: 105 LBS

## 2017-08-08 DIAGNOSIS — M25.531 BILATERAL WRIST PAIN: ICD-10-CM

## 2017-08-08 DIAGNOSIS — M54.40 ACUTE LEFT-SIDED LOW BACK PAIN WITH SCIATICA, SCIATICA LATERALITY UNSPECIFIED: Primary | ICD-10-CM

## 2017-08-08 DIAGNOSIS — M25.532 BILATERAL WRIST PAIN: ICD-10-CM

## 2017-08-08 PROCEDURE — 99283 EMERGENCY DEPT VISIT LOW MDM: CPT

## 2017-08-08 RX ORDER — CYCLOBENZAPRINE HCL 5 MG
5 TABLET ORAL 2 TIMES DAILY PRN
Qty: 10 TABLET | Refills: 0 | Status: SHIPPED | OUTPATIENT
Start: 2017-08-08 | End: 2017-08-18

## 2017-08-08 RX ORDER — ACETAMINOPHEN AND CODEINE PHOSPHATE 300; 30 MG/1; MG/1
1 TABLET ORAL 3 TIMES DAILY PRN
Qty: 10 TABLET | Refills: 0 | Status: SHIPPED | OUTPATIENT
Start: 2017-08-08 | End: 2017-08-21

## 2017-08-08 RX ORDER — PREDNISONE 20 MG/1
20 TABLET ORAL DAILY
Qty: 5 TABLET | Refills: 0 | Status: SHIPPED | OUTPATIENT
Start: 2017-08-08 | End: 2017-08-13

## 2017-08-08 ASSESSMENT — PAIN DESCRIPTION - DESCRIPTORS
DESCRIPTORS_2: SHARP
DESCRIPTORS_3: SHARP
DESCRIPTORS: DISCOMFORT

## 2017-08-08 ASSESSMENT — ENCOUNTER SYMPTOMS
ABDOMINAL PAIN: 0
BACK PAIN: 1
BOWEL INCONTINENCE: 0
ABDOMINAL SWELLING: 0

## 2017-08-08 ASSESSMENT — PAIN DESCRIPTION - ORIENTATION
ORIENTATION_3: LEFT;RIGHT
ORIENTATION_2: LEFT;RIGHT
ORIENTATION: LEFT;LOWER

## 2017-08-08 ASSESSMENT — PAIN DESCRIPTION - INTENSITY
RATING_2: 7
RATING_3: 7

## 2017-08-08 ASSESSMENT — PAIN DESCRIPTION - PAIN TYPE: TYPE: ACUTE PAIN

## 2017-08-08 ASSESSMENT — PAIN DESCRIPTION - LOCATION
LOCATION: BACK
LOCATION_2: WRIST
LOCATION_3: HAND

## 2017-08-09 ENCOUNTER — OFFICE VISIT (OUTPATIENT)
Dept: INTERNAL MEDICINE | Age: 62
End: 2017-08-09
Payer: MEDICARE

## 2017-08-09 VITALS
HEART RATE: 81 BPM | BODY MASS INDEX: 16.92 KG/M2 | WEIGHT: 108 LBS | DIASTOLIC BLOOD PRESSURE: 84 MMHG | OXYGEN SATURATION: 98 % | SYSTOLIC BLOOD PRESSURE: 129 MMHG

## 2017-08-09 DIAGNOSIS — Z23 NEED FOR PROPHYLACTIC VACCINATION AND INOCULATION AGAINST VARICELLA: ICD-10-CM

## 2017-08-09 DIAGNOSIS — E11.9 TYPE 2 DIABETES MELLITUS WITHOUT COMPLICATION, WITH LONG-TERM CURRENT USE OF INSULIN (HCC): ICD-10-CM

## 2017-08-09 DIAGNOSIS — Z79.4 TYPE 2 DIABETES MELLITUS WITHOUT COMPLICATION, WITH LONG-TERM CURRENT USE OF INSULIN (HCC): ICD-10-CM

## 2017-08-09 DIAGNOSIS — M54.50 ACUTE LEFT-SIDED LOW BACK PAIN WITHOUT SCIATICA: Primary | ICD-10-CM

## 2017-08-09 LAB — HBA1C MFR BLD: 7.3 %

## 2017-08-09 PROCEDURE — 99214 OFFICE O/P EST MOD 30 MIN: CPT | Performed by: INTERNAL MEDICINE

## 2017-08-09 PROCEDURE — 83036 HEMOGLOBIN GLYCOSYLATED A1C: CPT | Performed by: INTERNAL MEDICINE

## 2017-08-09 ASSESSMENT — ENCOUNTER SYMPTOMS: BACK PAIN: 1

## 2017-08-21 ENCOUNTER — HOSPITAL ENCOUNTER (OUTPATIENT)
Age: 62
Discharge: HOME | End: 2017-08-21
Payer: MEDICARE

## 2017-08-21 ENCOUNTER — OFFICE VISIT (OUTPATIENT)
Dept: GASTROENTEROLOGY | Age: 62
End: 2017-08-21
Payer: MEDICARE

## 2017-08-21 VITALS
OXYGEN SATURATION: 97 % | RESPIRATION RATE: 14 BRPM | SYSTOLIC BLOOD PRESSURE: 125 MMHG | HEIGHT: 67 IN | TEMPERATURE: 97.3 F | BODY MASS INDEX: 16.64 KG/M2 | WEIGHT: 106 LBS | DIASTOLIC BLOOD PRESSURE: 81 MMHG | HEART RATE: 114 BPM

## 2017-08-21 DIAGNOSIS — R97.0 ELEVATED CEA: ICD-10-CM

## 2017-08-21 DIAGNOSIS — D36.9 ADENOMATOUS POLYPS: ICD-10-CM

## 2017-08-21 DIAGNOSIS — K86.0 ALCOHOL-INDUCED CHRONIC PANCREATITIS (HCC): Primary | ICD-10-CM

## 2017-08-21 LAB — CARCINOEMBRYONIC ANTIGEN: 7.2 NG/ML

## 2017-08-21 PROCEDURE — 82378 CARCINOEMBRYONIC ANTIGEN: CPT

## 2017-08-21 PROCEDURE — 36415 COLL VENOUS BLD VENIPUNCTURE: CPT

## 2017-08-21 PROCEDURE — 99214 OFFICE O/P EST MOD 30 MIN: CPT | Performed by: INTERNAL MEDICINE

## 2017-08-21 ASSESSMENT — ENCOUNTER SYMPTOMS
CONSTIPATION: 0
NAUSEA: 0
SINUS PRESSURE: 1
ALLERGIC/IMMUNOLOGIC NEGATIVE: 1
ABDOMINAL DISTENTION: 0
WHEEZING: 0
RECTAL PAIN: 0
EYES NEGATIVE: 1
RESPIRATORY NEGATIVE: 1
ANAL BLEEDING: 0
DIARRHEA: 1
ABDOMINAL PAIN: 0
BLOOD IN STOOL: 0
VOMITING: 0

## 2017-09-12 RX ORDER — SODIUM BICARBONATE 650 MG/1
650 TABLET ORAL 3 TIMES DAILY
Qty: 90 TABLET | Refills: 3 | Status: SHIPPED | OUTPATIENT
Start: 2017-09-12 | End: 2017-11-03 | Stop reason: SDUPTHER

## 2017-09-27 ENCOUNTER — HOSPITAL ENCOUNTER (OUTPATIENT)
Dept: NEUROLOGY | Age: 62
Discharge: HOME | End: 2017-09-27
Payer: MEDICARE

## 2017-09-27 PROCEDURE — 95909 NRV CNDJ TST 5-6 STUDIES: CPT | Performed by: PHYSICAL MEDICINE & REHABILITATION

## 2017-09-27 PROCEDURE — 95886 MUSC TEST DONE W/N TEST COMP: CPT | Performed by: PHYSICAL MEDICINE & REHABILITATION

## 2017-11-01 DIAGNOSIS — Z79.4 TYPE 2 DIABETES MELLITUS WITH DIABETIC POLYNEUROPATHY, WITH LONG-TERM CURRENT USE OF INSULIN (HCC): ICD-10-CM

## 2017-11-01 DIAGNOSIS — M79.2 NEUROPATHIC PAIN: ICD-10-CM

## 2017-11-01 DIAGNOSIS — E11.42 TYPE 2 DIABETES MELLITUS WITH DIABETIC POLYNEUROPATHY, WITH LONG-TERM CURRENT USE OF INSULIN (HCC): ICD-10-CM

## 2017-11-01 RX ORDER — GABAPENTIN 300 MG/1
CAPSULE ORAL
Qty: 180 CAPSULE | Refills: 1 | Status: SHIPPED | OUTPATIENT
Start: 2017-11-01 | End: 2017-11-13 | Stop reason: SDUPTHER

## 2017-11-01 NOTE — TELEPHONE ENCOUNTER
Health Maintenance   Topic Date Due    Diabetic foot exam  12/30/1966    Diabetic retinal exam  12/30/1966    DTaP/Tdap/Td vaccine (1 - Tdap) 12/30/1975    Pneumococcal med risk (1 of 1 - PPSV23) 12/30/1975    Zostavax vaccine  12/30/2016    Flu vaccine (1) 09/01/2017    Cervical cancer screen  01/17/2018 (Originally 12/30/1977)    HIV screen  01/17/2018 (Originally 12/30/1971)    Diabetic microalbuminuria test  05/08/2018    Lipid screen  05/08/2018    Diabetic hemoglobin A1C test  08/09/2018    Breast cancer screen  06/09/2019    Colon cancer screen colonoscopy  06/30/2020    Hepatitis C screen  Completed             (applicable per patient's age: Cancer Screenings, Depression Screening, Fall Risk Screening, Immunizations)    Hemoglobin A1C (%)   Date Value   08/09/2017 7.3   04/28/2017 9.1   01/17/2017 10.7     Microalb/Crt.  Ratio (mcg/mg creat)   Date Value   05/08/2017 8     LDL Cholesterol (mg/dL)   Date Value   05/08/2017 68     AST (U/L)   Date Value   05/08/2017 22     ALT (U/L)   Date Value   05/08/2017 12     BUN (mg/dL)   Date Value   01/19/2017 10      (goal A1C is < 7)   (goal LDL is <100) need 30-50% reduction from baseline     BP Readings from Last 3 Encounters:   08/21/17 125/81   08/09/17 129/84   08/08/17 119/89    (goal /80)      All Future Testing planned in [facility]:  Lab Frequency Next Occurrence   Lipid Panel Once 01/05/2018   ESTE[facility] Digital Screen Bilateral Once 01/12/2018   Comprehensive Metabolic Panel Once 15/43/7873   Lipase Once 01/17/2017   CBC Auto Differential Once 01/17/2017   EMG Once 11/24/2017       Next Visit Date:  Future Appointments  Date Time Provider Teresa Shoemaker   11/3/2017 9:30 AM Patrick Junior MD [facility]   11/13/2017 9:40 AM Rubin Vicente CNP [facility]   2/22/2018 2:15 PM Arianna Mcdermott MD [facility]            Patient Active Problem List:     Intractable epilepsy ([facility]     Hypoglycemia     Diabetes mellitus ([facility] Depression     Osteoporosis     Partial epilepsy with impairment of consciousness, intractable (HCC)     Affective disorder ([facility]     Epilepsy ([facility]     Uncontrolled type 2 DM with hyperosmolar nonketotic hyperglycemia (HCC)     Generalized abdominal pain     Pancreatic calcification     Uncontrolled diabetes mellitus (HCC)     Pancreatic insufficiency     Pain of upper abdomen     Alcohol-induced chronic pancreatitis (HCC)     Protein-calorie malnutrition, severe (HCC)     Weight loss     Polysubstance abuse     Unresponsive episode     Nonintractable epilepsy without status epilepticus ([facility] [address]     Chronic neck pain     Neuropathic pain of both legs     Seizure disorder, secondary ([facility] [address]     Essential (primary) hypertension     Type 2 diabetes mellitus without complication, with long-term current use of insulin (HCC)     Neuropathic pain     Elevated CEA     Acute left-sided low back pain without sciatica     Adenomatous polyps

## 2017-11-03 ENCOUNTER — OFFICE VISIT (OUTPATIENT)
Dept: INTERNAL MEDICINE | Age: 62
End: 2017-11-03
Payer: MEDICARE

## 2017-11-03 VITALS
SYSTOLIC BLOOD PRESSURE: 128 MMHG | HEART RATE: 98 BPM | BODY MASS INDEX: 16.13 KG/M2 | WEIGHT: 102.8 LBS | RESPIRATION RATE: 12 BRPM | DIASTOLIC BLOOD PRESSURE: 81 MMHG | HEIGHT: 67 IN

## 2017-11-03 DIAGNOSIS — E11.9 TYPE 2 DIABETES MELLITUS WITHOUT COMPLICATION, WITH LONG-TERM CURRENT USE OF INSULIN (HCC): Primary | ICD-10-CM

## 2017-11-03 DIAGNOSIS — Z23 NEED FOR PROPHYLACTIC VACCINATION AND INOCULATION AGAINST INFLUENZA: ICD-10-CM

## 2017-11-03 DIAGNOSIS — M25.531 BILATERAL WRIST PAIN: ICD-10-CM

## 2017-11-03 DIAGNOSIS — Z79.4 TYPE 2 DIABETES MELLITUS WITHOUT COMPLICATION, WITH LONG-TERM CURRENT USE OF INSULIN (HCC): Primary | ICD-10-CM

## 2017-11-03 DIAGNOSIS — M25.532 BILATERAL WRIST PAIN: ICD-10-CM

## 2017-11-03 LAB — HBA1C MFR BLD: 7.9 %

## 2017-11-03 PROCEDURE — G8418 CALC BMI BLW LOW PARAM F/U: HCPCS | Performed by: INTERNAL MEDICINE

## 2017-11-03 PROCEDURE — G8427 DOCREV CUR MEDS BY ELIG CLIN: HCPCS | Performed by: INTERNAL MEDICINE

## 2017-11-03 PROCEDURE — 90686 IIV4 VACC NO PRSV 0.5 ML IM: CPT | Performed by: INTERNAL MEDICINE

## 2017-11-03 PROCEDURE — G0008 ADMIN INFLUENZA VIRUS VAC: HCPCS | Performed by: INTERNAL MEDICINE

## 2017-11-03 PROCEDURE — 3014F SCREEN MAMMO DOC REV: CPT | Performed by: INTERNAL MEDICINE

## 2017-11-03 PROCEDURE — 99214 OFFICE O/P EST MOD 30 MIN: CPT | Performed by: INTERNAL MEDICINE

## 2017-11-03 PROCEDURE — 4004F PT TOBACCO SCREEN RCVD TLK: CPT | Performed by: INTERNAL MEDICINE

## 2017-11-03 PROCEDURE — 83036 HEMOGLOBIN GLYCOSYLATED A1C: CPT | Performed by: INTERNAL MEDICINE

## 2017-11-03 PROCEDURE — 3017F COLORECTAL CA SCREEN DOC REV: CPT | Performed by: INTERNAL MEDICINE

## 2017-11-03 PROCEDURE — G8484 FLU IMMUNIZE NO ADMIN: HCPCS | Performed by: INTERNAL MEDICINE

## 2017-11-03 PROCEDURE — 3045F PR MOST RECENT HEMOGLOBIN A1C LEVEL 7.0-9.0%: CPT | Performed by: INTERNAL MEDICINE

## 2017-11-03 RX ORDER — SODIUM BICARBONATE 650 MG/1
650 TABLET ORAL 3 TIMES DAILY
Qty: 90 TABLET | Refills: 3 | Status: SHIPPED | OUTPATIENT
Start: 2017-11-03 | End: 2018-07-10 | Stop reason: SDUPTHER

## 2017-11-03 RX ORDER — ALBUTEROL SULFATE 90 UG/1
2 AEROSOL, METERED RESPIRATORY (INHALATION) EVERY 6 HOURS PRN
Qty: 1 INHALER | Refills: 5 | Status: SHIPPED | OUTPATIENT
Start: 2017-11-03 | End: 2018-07-10 | Stop reason: SDUPTHER

## 2017-11-03 ASSESSMENT — ENCOUNTER SYMPTOMS
EYES NEGATIVE: 1
ALLERGIC/IMMUNOLOGIC NEGATIVE: 1

## 2017-11-03 NOTE — PROGRESS NOTES
[facility] PHYSICIANS  [facility] [place] [address] [address]  [address]   [address] [address]  Dept: [phone]  Dept Fax: [phone]    Lito Griffin is a 62 y.o. female who presents today for   Chief Complaint   Patient presents with    Diabetes    Wrist Pain     Bilateral wrist pain x's 6 months     Discuss Medications     PT wants anora inhaler     Results     X-Ray 5/3/17    and follow up of chronic medical problems:   Patient Active Problem List   Diagnosis    Intractable epilepsy ([address]    Hypoglycemia    Diabetes mellitus ([address]    Depression    Osteoporosis    Partial epilepsy with impairment of consciousness, intractable ([address]    Affective disorder ([address]    Epilepsy ([address]    Uncontrolled type 2 DM with hyperosmolar nonketotic hyperglycemia ([address]    Generalized abdominal pain    Pancreatic calcification    Uncontrolled diabetes mellitus ([address]    Pancreatic insufficiency    Pain of upper abdomen    Alcohol-induced chronic pancreatitis (HCC)    Protein-calorie malnutrition, severe (HCC)    Weight loss    Polysubstance abuse    Unresponsive episode    Nonintractable epilepsy without status epilepticus ([address]    Chronic neck pain    Neuropathic pain of both legs    Seizure disorder, secondary ([address]    Essential (primary) hypertension    Type 2 diabetes mellitus without complication, with long-term current use of insulin (HCC)    Neuropathic pain    Elevated CEA    Acute left-sided low back pain without sciatica    Adenomatous polyps    Bilateral wrist pain   .     Past Medical History:   Diagnosis Date    Cataracts, bilateral     COPD (chronic obstructive pulmonary disease) (HCC)     CTS (carpal tunnel syndrome) left    Fibromyalgia     Generalized abdominal pain 6/3/2016    Glaucoma     Hypertension     Osteoarthritis     Osteoporosis     Pancreatic calcification 6/3/2016    Pancreatitis     Protein-calorie malnutrition, severe ([address] 6/1/2016    Seizures ([facility]     Tremor     Type II or unspecified type diabetes mellitus without mention of complication, not stated as uncontrolled     Uncontrolled type 2 DM with hyperosmolar nonketotic hyperglycemia ([facility] 6/3/2016       Past Surgical History:   Procedure Laterality Date    COLONOSCOPY  08/30/2016    very poor prep, unable to complete    COLONOSCOPY  06/30/2017    ADENOMATOUS POLYP.     CYST REMOVAL Left     ovary twice    NECK SURGERY      pt unsure of exact date & what the procedure was ? 2015 @ [facility]    [place] COLSC FLX W/REMOVAL LESION BY HOT BX FORCEPS N/A 6/30/2017    COLONOSCOPY POLYPECTOMY HOT BIOPSY performed by Dionna Negro MD at [address]  08/30/2016    gastritis, MILD CHRONIC GASTRITIS WITH FOCAL ANTRAL EROSION AND ASSOCIATED ACUTE INFLAMMATION. INTESTINAL METAPLASIA PRESENT, NEGATIVE FOR DYSPLASIA.          Family History   Problem Relation Age of Onset    Brain Cancer Father     Diabetes Sister     Other Sister      epilepsy       Social History   Substance Use Topics    Smoking status: Current Some Day Smoker     Packs/day: 0.25    Smokeless tobacco: Never Used      Comment: electronic cigerette    Alcohol use No      Current Outpatient Prescriptions   Medication Sig Dispense Refill    sodium bicarbonate 650 MG tablet Take 1 tablet by mouth 3 times daily 90 tablet 3    albuterol sulfate HFA (PROAIR HFA) 108 (90 Base) MCG/ACT inhaler Inhale 2 puffs into the lungs every 6 hours as needed for Wheezing 1 Inhaler 5    umeclidinium-vilanterol (ANORO ELLIPTA) 62.5-25 MCG/INH AEPB inhaler Inhale 1 puff into the lungs daily 60 each 2    gabapentin (NEURONTIN) 300 MG capsule TAKE 1 CAPSULE BY MOUTH  TWICE DAILY 180 capsule 1    Pancrelipase, Lip-Prot-Amyl, (CREON) 44933 units CPEP Take 1 tablet by mouth 4 times daily (after meals and at bedtime) 120 capsule 5    glimepiride (AMARYL) 2 MG tablet Take 1 tablet by mouth every morning (before understanding. Speedy Cmaeron will continue current medications, diet and exercise. Orders Placed This Encounter   Medications    sodium bicarbonate 650 MG tablet     Sig: Take 1 tablet by mouth 3 times daily     Dispense:  90 tablet     Refill:  3    albuterol sulfate HFA (PROAIR HFA) 108 (90 Base) MCG/ACT inhaler     Sig: Inhale 2 puffs into the lungs every 6 hours as needed for Wheezing     Dispense:  1 Inhaler     Refill:  5    umeclidinium-vilanterol (ANORO ELLIPTA) 62.5-25 MCG/INH AEPB inhaler     Sig: Inhale 1 puff into the lungs daily     Dispense:  60 each     Refill:  2          Completed Refills               Requested Prescriptions     Signed Prescriptions Disp Refills    sodium bicarbonate 650 MG tablet 90 tablet 3     Sig: Take 1 tablet by mouth 3 times daily    albuterol sulfate HFA (PROAIR HFA) 108 (90 Base) MCG/ACT inhaler 1 Inhaler 5     Sig: Inhale 2 puffs into the lungs every 6 hours as needed for Wheezing    umeclidinium-vilanterol (ANORO ELLIPTA) 62.5-25 MCG/INH AEPB inhaler 60 each 2     Sig: Inhale 1 puff into the lungs daily       4. Patient given educational materials - see patient instructions    5. Was a self-tracking handout given in paper form or via [employer]bird[employer]art? No  If yes, see orders or list here. Orders Placed This Encounter   Procedures    INFLUENZA, QUADV, 3 YRS AND OLDER, IM, PF, PREFILL SYR OR SDV, 0.5ML (Vincent Gift, PF)    Jorge Rogers DO, Orthoped[facility] [employer]     Referral Priority:   Routine     Referral Type:   Consult for Advice and Opinion     Referral Reason:   Specialty Services Required     Referred to Provider:   Juan Jose Hirsch DO     Requested Specialty:   Orthopedic Surgery     Number of Visits Requested:   1    POCT glycosylated hemoglobin (Hb A1C)    [place] IMMUNIZ ADMIN,1 SINGLE/COMB VAC/TOXOID       Return in about 3 months (around 2/3/2018). Patient agreed with treatment plan.     Electronically signed by Audar Conner MD on 11/3/2017 at 10:25 AM

## 2017-11-03 NOTE — PATIENT INSTRUCTIONS
Patient Education        Stopping Smoking: Care Instructions  Your Care Instructions  Cigarette smokers crave the nicotine in cigarettes. Giving it up is much harder than simply changing a habit. Your body has to stop craving the nicotine. It is hard to quit, but you can do it. There are many tools that people use to quit smoking. You may find that combining tools works best for you. There are several steps to quitting. First you get ready to quit. Then you get support to help you. After that, you learn new skills and behaviors to become a nonsmoker. For many people, a necessary step is getting and using medicine. Your doctor will help you set up the plan that best meets your needs. You may want to attend a smoking cessation program to help you quit smoking. When you choose a program, look for one that has proven success. Ask your doctor for ideas. You will greatly increase your chances of success if you take medicine as well as get counseling or join a cessation program.  Some of the changes you feel when you first quit tobacco are uncomfortable. Your body will miss the nicotine at first, and you may feel short-tempered and grumpy. You may have trouble sleeping or concentrating. Medicine can help you deal with these symptoms. You may struggle with changing your smoking habits and rituals. The last step is the tricky one: Be prepared for the smoking urge to continue for a time. This is a lot to deal with, but keep at it. You will feel better. Follow-up care is a key part of your treatment and safety. Be sure to make and go to all appointments, and call your doctor if you are having problems. Its also a good idea to know your test results and keep a list of the medicines you take. How can you care for yourself at home? · Ask your family, friends, and coworkers for support. You have a better chance of quitting if you have help and support.   · Join a support group, such as Nicotine Anonymous, for people who are trying to quit smoking. · Consider signing up for a smoking cessation program, such as the American Lung Association's Freedom from Smoking program.  · Set a quit date. Pick your date carefully so that it is not right in the middle of a big deadline or stressful time. Once you quit, do not even take a puff. Get rid of all ashtrays and lighters after your last cigarette. Clean your house and your clothes so that they do not smell of smoke. · Learn how to be a nonsmoker. Think about ways you can avoid those things that make you reach for a cigarette. ¨ Avoid situations that put you at greatest risk for smoking. For some people, it is hard to have a drink with friends without smoking. For others, they might skip a coffee break with coworkers who smoke. ¨ Change your daily routine. Take a different route to work or eat a meal in a different place. · Cut down on stress. Calm yourself or release tension by doing an activity you enjoy, such as reading a book, taking a hot bath, or gardening. · Talk to your doctor or pharmacist about nicotine replacement therapy, which replaces the nicotine in your body. You still get nicotine but you do not use tobacco. Nicotine replacement products help you slowly reduce the amount of nicotine you need. These products come in several forms, many of them available over-the-counter:  ¨ Nicotine patches  ¨ Nicotine gum and lozenges  ¨ Nicotine inhaler  · Ask your doctor about bupropion (Wellbutrin) or varenicline (Chantix), which are prescription medicines. They do not contain nicotine. They help you by reducing withdrawal symptoms, such as stress and anxiety. · Some people find hypnosis, acupuncture, and massage helpful for ending the smoking habit. · Eat a healthy diet and get regular exercise. Having healthy habits will help your body move past its craving for nicotine. · Be prepared to keep trying. Most people are not successful the first few times they try to quit.  Do not get 2 days. Follow-up care is a key part of your treatment and safety. Be sure to make and go to all appointments, and call your doctor if you are having problems. It's also a good idea to know your test results and keep a list of the medicines you take. Who should get the flu vaccine? Everyone age 7 months or older should get a flu vaccine each year. It lowers the chance of getting and spreading the flu. The vaccine is very important for people who are at high risk for getting other health problems from the flu. This includes:  · Anyone 48years of age or older. · People who live in a long-term care center, such as a nursing home. · All children 6 months through 25years of age. · Adults and children 6 months and older who have long-term heart or lung problems, such as asthma. · Adults and children 6 months and older who needed medical care or were in a hospital during the past year because of diabetes, chronic kidney disease, or a weak immune system (including HIV or AIDS). · Women who will be pregnant during the flu season. · People who have any condition that can make it hard to breathe or swallow (such as a brain injury or muscle disorders). · People who can give the flu to others who are at high risk for problems from the flu. This includes all health care workers and close contacts of people age 72 or older. Who should not get the flu vaccine? The person who gives the vaccine may tell you not to get it if you:  · Have a severe allergy to eggs or any part of the vaccine. · Have had a severe reaction to a flu vaccine in the past.  · Have had Guillain-Barré syndrome (GBS). · Are sick with a fever. (Get the vaccine when symptoms are gone.)  How can you care for yourself at home? · If you or your child has a sore arm or a slight fever after the shot, take an over-the-counter pain medicine, such as acetaminophen (Tylenol) or ibuprofen (Advil, Motrin). Read and follow all instructions on the label.  Do not give aspirin to anyone younger than 20. It has been linked to Reye syndrome, a serious illness. · Do not take two or more pain medicines at the same time unless the doctor told you to. Many pain medicines have acetaminophen, which is Tylenol. Too much acetaminophen (Tylenol) can be harmful. When should you call for help? Call 911 anytime you think you may need emergency care. For example, call if after getting the flu vaccine:  · You have symptoms of a severe reaction to the flu vaccine. Symptoms of a severe reaction may include:  ¨ Severe difficulty breathing. ¨ Sudden raised, red areas (hives) all over your body. ¨ Severe lightheadedness. Call your doctor now or seek immediate medical care if after getting the flu vaccine:  · You think you are having a reaction to the flu vaccine, such as a new fever. Watch closely for changes in your health, and be sure to contact your doctor if you have any problems. Where can you learn more? Go to [email or web address]. org and sign in to your MedPro account. Enter Y974 in the [employer] box to learn more about \"Influenza (Flu) Vaccine: Care Instructions. \"     If you do not have an account, please click on the \"Sign Up Now\" link. Current as of: August 1, 2016  Content Version: 11.3  © 1701-0854 [employer]. Care instructions adapted under license by [facility] ([facility]). If you have questions about a medical condition or this instruction, always ask your healthcare professional. [place] [address] any warranty or liability for your use of this information.

## 2017-11-06 ENCOUNTER — TELEPHONE (OUTPATIENT)
Dept: ORTHOPEDIC SURGERY | Age: 62
End: 2017-11-06

## 2017-11-13 ENCOUNTER — OFFICE VISIT (OUTPATIENT)
Dept: NEUROLOGY | Age: 62
End: 2017-11-13
Payer: MEDICARE

## 2017-11-13 VITALS
WEIGHT: 110.8 LBS | SYSTOLIC BLOOD PRESSURE: 92 MMHG | HEIGHT: 67 IN | HEART RATE: 93 BPM | BODY MASS INDEX: 17.39 KG/M2 | DIASTOLIC BLOOD PRESSURE: 56 MMHG

## 2017-11-13 DIAGNOSIS — E11.42 TYPE 2 DIABETES MELLITUS WITH DIABETIC POLYNEUROPATHY, WITH LONG-TERM CURRENT USE OF INSULIN (HCC): ICD-10-CM

## 2017-11-13 DIAGNOSIS — M79.2 NEUROPATHIC PAIN: Primary | ICD-10-CM

## 2017-11-13 DIAGNOSIS — Z79.4 TYPE 2 DIABETES MELLITUS WITH DIABETIC POLYNEUROPATHY, WITH LONG-TERM CURRENT USE OF INSULIN (HCC): ICD-10-CM

## 2017-11-13 DIAGNOSIS — G40.909 SEIZURE DISORDER, SECONDARY (HCC): ICD-10-CM

## 2017-11-13 PROCEDURE — G8418 CALC BMI BLW LOW PARAM F/U: HCPCS | Performed by: NURSE PRACTITIONER

## 2017-11-13 PROCEDURE — G8484 FLU IMMUNIZE NO ADMIN: HCPCS | Performed by: NURSE PRACTITIONER

## 2017-11-13 PROCEDURE — 3045F PR MOST RECENT HEMOGLOBIN A1C LEVEL 7.0-9.0%: CPT | Performed by: NURSE PRACTITIONER

## 2017-11-13 PROCEDURE — 99214 OFFICE O/P EST MOD 30 MIN: CPT | Performed by: NURSE PRACTITIONER

## 2017-11-13 PROCEDURE — 3014F SCREEN MAMMO DOC REV: CPT | Performed by: NURSE PRACTITIONER

## 2017-11-13 PROCEDURE — G8427 DOCREV CUR MEDS BY ELIG CLIN: HCPCS | Performed by: NURSE PRACTITIONER

## 2017-11-13 PROCEDURE — 3017F COLORECTAL CA SCREEN DOC REV: CPT | Performed by: NURSE PRACTITIONER

## 2017-11-13 PROCEDURE — 4004F PT TOBACCO SCREEN RCVD TLK: CPT | Performed by: NURSE PRACTITIONER

## 2017-11-13 RX ORDER — GABAPENTIN 300 MG/1
CAPSULE ORAL
Qty: 270 CAPSULE | Refills: 2 | Status: SHIPPED | OUTPATIENT
Start: 2017-11-13 | End: 2018-07-10 | Stop reason: SDUPTHER

## 2017-11-13 NOTE — PROGRESS NOTES
facility-administered medications for this visit. PHYSICAL EXAMINATION       There were no vitals filed for this visit. .                                                                                                    General Appearance:  Alert, cooperative, no signs of distress, appears stated age   Head:  Normocephalic, no signs of trauma   Eyes:  Conjunctiva/corneas clear;  eyelids intact   Ears:  Normal external ear and canals   Nose: Nares normal, mucosa normal, no drainage    Throat: Lips and tongue normal; teeth normal;  gums normal   Neck: Supple, intact flexion, extension and rotation;   trachea midline;  no adenopathy;   thyroid: not enlarged;   no carotid pulse abnormality   Back:   Symmetric, no curvature, ROM adequate   Lungs:   Respirations unlabored   Heart:  Regular rate and rhythm           Extremities: Extremities normal, no cyanosis, no edema   Pulses: Symmetric over head and neck   Skin: Skin color, texture normal, no rashes, no lesions                                             NEUROLOGIC EXAMINATION    Neurologic Exam     Mental Status   Oriented to person, place, and time. Attention: normal.   Speech: speech is normal   Level of consciousness: alert  Normal comprehension. Cranial Nerves     CN II   Visual fields full to confrontation. CN III, IV, VI   Pupils are equal, round, and reactive to light. Extraocular motions are normal.     CN V   Facial sensation intact. CN VII   Facial expression full, symmetric. CN VIII   CN VIII normal.     CN IX, X   CN IX normal.     CN XI   CN XI normal.     CN XII   CN XII normal.     Motor Exam   Muscle bulk: normal  Overall muscle tone: normal  Right arm pronator drift: absent    Strength   Strength 5/5 throughout.      Sensory Exam   Light touch normal.   Vibration normal.   Pinprick normal.     Gait, Coordination, and Reflexes     Gait  Gait: normal    Coordination   Finger to nose coordination: normal    Tremor   Resting tremor: absent    Reflexes   Right brachioradialis: 2+  Left brachioradialis: 2+  Right biceps: 2+  Left biceps: 2+  Right triceps: 2+  Left triceps: 2+  Right patellar: 2+  Left patellar: 2+  Right achilles: 2+  Left achilles: 2+  Right plantar: normal  Left plantar: normal            ASSESSMENT/PLAN:       In summary, your patient, Ulysses Quintana exhibits the following, with associated plan:    1. Painful diabetic peripheral neuropathy  1. Gabapentin will be increased to 300 mg 3 times daily  2. She will follow-up with Dr. Windy Solares in 6 months  2. Possible seizure versus hypoglycemia  1. Will discuss patient's symptoms with  and determine if we should re-add Keppra at this time.   2. Will consider EEG            Signed: Lilian Chung, CNP

## 2017-11-14 ENCOUNTER — TELEPHONE (OUTPATIENT)
Dept: NEUROLOGY | Age: 62
End: 2017-11-14

## 2017-11-14 NOTE — TELEPHONE ENCOUNTER
Discussed patient's recent seizure-like event with Dr. Virgilio Shirley, who suspects that these are related to hypoglycemic events. She will remain on the increased dose of gabapentin at 300 mg TID. If she has another event, she should take her blood sugar as directed by Dr. Virgilio Shirley in the past.  Patient verbalized understanding.

## 2017-11-20 ENCOUNTER — OFFICE VISIT (OUTPATIENT)
Dept: ORTHOPEDIC SURGERY | Age: 62
End: 2017-11-20
Payer: MEDICARE

## 2017-11-20 VITALS — HEIGHT: 67 IN | WEIGHT: 110.89 LBS | BODY MASS INDEX: 17.4 KG/M2

## 2017-11-20 DIAGNOSIS — M65.4 DE QUERVAIN'S TENOSYNOVITIS, BILATERAL: Primary | ICD-10-CM

## 2017-11-20 PROCEDURE — G8418 CALC BMI BLW LOW PARAM F/U: HCPCS | Performed by: STUDENT IN AN ORGANIZED HEALTH CARE EDUCATION/TRAINING PROGRAM

## 2017-11-20 PROCEDURE — 20550 NJX 1 TENDON SHEATH/LIGAMENT: CPT | Performed by: STUDENT IN AN ORGANIZED HEALTH CARE EDUCATION/TRAINING PROGRAM

## 2017-11-20 PROCEDURE — 3014F SCREEN MAMMO DOC REV: CPT | Performed by: STUDENT IN AN ORGANIZED HEALTH CARE EDUCATION/TRAINING PROGRAM

## 2017-11-20 PROCEDURE — 99204 OFFICE O/P NEW MOD 45 MIN: CPT | Performed by: STUDENT IN AN ORGANIZED HEALTH CARE EDUCATION/TRAINING PROGRAM

## 2017-11-20 PROCEDURE — 3017F COLORECTAL CA SCREEN DOC REV: CPT | Performed by: STUDENT IN AN ORGANIZED HEALTH CARE EDUCATION/TRAINING PROGRAM

## 2017-11-20 PROCEDURE — G8484 FLU IMMUNIZE NO ADMIN: HCPCS | Performed by: STUDENT IN AN ORGANIZED HEALTH CARE EDUCATION/TRAINING PROGRAM

## 2017-11-20 PROCEDURE — G8427 DOCREV CUR MEDS BY ELIG CLIN: HCPCS | Performed by: STUDENT IN AN ORGANIZED HEALTH CARE EDUCATION/TRAINING PROGRAM

## 2017-11-20 PROCEDURE — 4004F PT TOBACCO SCREEN RCVD TLK: CPT | Performed by: STUDENT IN AN ORGANIZED HEALTH CARE EDUCATION/TRAINING PROGRAM

## 2017-11-20 RX ORDER — LIDOCAINE HYDROCHLORIDE 10 MG/ML
0.5 INJECTION, SOLUTION INFILTRATION; PERINEURAL ONCE
Status: COMPLETED | OUTPATIENT
Start: 2017-11-20 | End: 2017-11-21

## 2017-11-20 RX ORDER — BETAMETHASONE SODIUM PHOSPHATE AND BETAMETHASONE ACETATE 3; 3 MG/ML; MG/ML
3 INJECTION, SUSPENSION INTRA-ARTICULAR; INTRALESIONAL; INTRAMUSCULAR; SOFT TISSUE ONCE
Status: COMPLETED | OUTPATIENT
Start: 2017-11-20 | End: 2017-11-21

## 2017-11-20 ASSESSMENT — ENCOUNTER SYMPTOMS
BACK PAIN: 0
ABDOMINAL PAIN: 0
COLOR CHANGE: 0
CHEST TIGHTNESS: 0
SHORTNESS OF BREATH: 0

## 2017-11-20 NOTE — PROGRESS NOTES
[facility]  [facility]  [address] [place] [address] [address] [facility] [address]  Dept: [phone]    Ambulatory Orthopedic New Patient Visit      CHIEF COMPLAINT:    Chief Complaint   Patient presents with    Hand Pain     bilateral       HISTORY OF PRESENT ILLNESS:      The patient is a 62 y.o. female who is being seen for consultation and evaluation of Bilateral radial sided wrist pain. Patient notes that she's been having this pain for the past 6 weeks. She denies any particular injury. She denies any change in her activities. Patient notes the pain is worse when trying to lift objects or turn doorknobs. She notes that the pain migrates proximally up the radial aspect of the wrist. Pain is worsened by ulnar deviation of the wrist.  Patient states that she has tried ice with minimal relief. She denies any prior injections or anti-inflammatories. She denies any splinting or therapy prior to today's visit. Patient denies any numbness or tingling but does admit to some swelling over the radial aspect of the 1st extensor compartment.      Past Medical History:    Past Medical History:   Diagnosis Date    Cataracts, bilateral     COPD (chronic obstructive pulmonary disease) (HCC)     CTS (carpal tunnel syndrome) left    Fibromyalgia     Generalized abdominal pain 6/3/2016    Glaucoma     Hypertension     Osteoarthritis     Osteoporosis     Pancreatic calcification 6/3/2016    Pancreatitis     Protein-calorie malnutrition, severe ([address] 6/1/2016    Seizures (HCC)     Tremor     Type II or unspecified type diabetes mellitus without mention of complication, not stated as uncontrolled     Uncontrolled type 2 DM with hyperosmolar nonketotic hyperglycemia ([address] 6/3/2016       Past Surgical History:    Past Surgical History:   Procedure Laterality Date    COLONOSCOPY  08/30/2016    very poor prep, unable to complete    COLONOSCOPY  06/30/2017    ADENOMATOUS POLYP.     CYST REMOVAL Left

## 2017-11-21 RX ADMIN — BETAMETHASONE SODIUM PHOSPHATE AND BETAMETHASONE ACETATE 3 MG: 3; 3 INJECTION, SUSPENSION INTRA-ARTICULAR; INTRALESIONAL; INTRAMUSCULAR; SOFT TISSUE at 15:59

## 2017-11-21 RX ADMIN — BETAMETHASONE SODIUM PHOSPHATE AND BETAMETHASONE ACETATE 3 MG: 3; 3 INJECTION, SUSPENSION INTRA-ARTICULAR; INTRALESIONAL; INTRAMUSCULAR; SOFT TISSUE at 15:58

## 2017-11-21 RX ADMIN — LIDOCAINE HYDROCHLORIDE 0.5 ML: 10 INJECTION, SOLUTION INFILTRATION; PERINEURAL at 16:00

## 2018-01-08 ENCOUNTER — OFFICE VISIT (OUTPATIENT)
Dept: ORTHOPEDIC SURGERY | Age: 63
End: 2018-01-08
Payer: MEDICARE

## 2018-01-08 VITALS — HEIGHT: 67 IN | WEIGHT: 105 LBS | BODY MASS INDEX: 16.48 KG/M2

## 2018-01-08 DIAGNOSIS — M65.4 DE QUERVAIN'S TENOSYNOVITIS, BILATERAL: Primary | ICD-10-CM

## 2018-01-08 PROCEDURE — 99213 OFFICE O/P EST LOW 20 MIN: CPT | Performed by: STUDENT IN AN ORGANIZED HEALTH CARE EDUCATION/TRAINING PROGRAM

## 2018-01-08 NOTE — PROGRESS NOTES
[address] [place] [address]  Dept: [phone]  Dept Fax: [phone]        Ambulatory Follow Up    Subjective:   Eufemia Rodriguez is a 65y.o. year old female who presents to our office today for routine followup regarding her   1. De Quervain's tenosynovitis, bilateral    .    Chief Complaint   Patient presents with    Wrist Pain     bilateral       HPI  Patient is a 26-year-old left American female presenting to the office for follow-up regarding her bilateral de Quervain's tenosynovitis. She was initially seen on November 20, 2017 for an acute exacerbation of her de Quervain's. At this time she was provided with bilateral thumb spica splints, as well as received bilateral cortisone steroid injections into the 1st dorsal compartment. Patient notes that after the injection she noted immediate relief, has been wearing the splints consistently since her 1st office visit. She notes that after the injection and use of the splints, she has been able to use her thumbs more and is able to lift more weight compared to when she had an acute exacerbation. Patient denies fevers, chills, nausea, vomiting, chest pain, shortness of breath, numbness or tingling in the affected thumbs. Review of Systems    I have reviewed the CC, HPI, ROS, PMH, FHX, Social History. I agree with the documentation provided by other staff, residents, and/or medical students and have reviewed their documentation prior to providing my signature indicating agreement. Objective :   General: Eufemia Rodriguez is a 65 y.o. female who is alert and oriented and sitting comfortably in our office. Ortho Exam  RUE: Right thumb demonstrates mild tenderness to palpation of the 1st dorsal compartment. Patient is able to actively flex and extend the thumb. No erythema, ecchymosis, or swelling noted about the thumb. Radial/median/ulnar/AIN/PI and motor nerves intact grossly.  Superficial

## 2018-01-09 ENCOUNTER — OFFICE VISIT (OUTPATIENT)
Dept: INTERNAL MEDICINE | Age: 63
End: 2018-01-09
Payer: MEDICARE

## 2018-01-09 VITALS
DIASTOLIC BLOOD PRESSURE: 68 MMHG | HEIGHT: 67 IN | HEART RATE: 79 BPM | OXYGEN SATURATION: 98 % | RESPIRATION RATE: 12 BRPM | SYSTOLIC BLOOD PRESSURE: 124 MMHG | WEIGHT: 105 LBS | BODY MASS INDEX: 16.48 KG/M2

## 2018-01-09 DIAGNOSIS — R10.32 LLQ ABDOMINAL PAIN: Primary | ICD-10-CM

## 2018-01-09 PROCEDURE — 99214 OFFICE O/P EST MOD 30 MIN: CPT | Performed by: INTERNAL MEDICINE

## 2018-01-09 RX ORDER — TIZANIDINE 4 MG/1
4 TABLET ORAL 3 TIMES DAILY PRN
Refills: 0 | COMMUNITY
Start: 2017-12-29 | End: 2019-05-15 | Stop reason: ALTCHOICE

## 2018-01-09 ASSESSMENT — ENCOUNTER SYMPTOMS
ALLERGIC/IMMUNOLOGIC NEGATIVE: 1
ABDOMINAL PAIN: 1
EYES NEGATIVE: 1
BACK PAIN: 1
FLATUS: 1
RESPIRATORY NEGATIVE: 1

## 2018-01-09 NOTE — PROGRESS NOTES
[facility] INTERNAL MEDICINE [address] [facility] [address]  Dept: [phone]  Dept Fax: [phone]    Melissa Carrion is a 65 y.o. female who presents today for   Chief Complaint   Patient presents with    Gas     x's 2 weeks     and follow up of chronic medical problems:   Patient Active Problem List   Diagnosis    Intractable epilepsy ([address]    Hypoglycemia    Diabetes mellitus ([address]    Depression    Osteoporosis    Partial epilepsy with impairment of consciousness, intractable ([address]    Affective disorder ([address]    Epilepsy ([address]    Uncontrolled type 2 DM with hyperosmolar nonketotic hyperglycemia ([address]    Generalized abdominal pain    Pancreatic calcification    Uncontrolled diabetes mellitus ([address]    Pancreatic insufficiency    Pain of upper abdomen    Alcohol-induced chronic pancreatitis ([address]    Protein-calorie malnutrition, severe ([address]    Weight loss    Polysubstance abuse    Unresponsive episode    Nonintractable epilepsy without status epilepticus ([address]    Chronic neck pain    Neuropathic pain of both legs    Seizure disorder, secondary ([address]    Essential (primary) hypertension    Type 2 diabetes mellitus without complication, with long-term current use of insulin (HCC)    Neuropathic pain    Elevated CEA    Acute left-sided low back pain without sciatica    Adenomatous polyps    Bilateral wrist pain   .     Past Medical History:   Diagnosis Date    Cataracts, bilateral     COPD (chronic obstructive pulmonary disease) (HCC)     CTS (carpal tunnel syndrome) left    Fibromyalgia     Generalized abdominal pain 6/3/2016    Glaucoma     Hypertension     Osteoarthritis     Osteoporosis     Pancreatic calcification 6/3/2016    Pancreatitis     Protein-calorie malnutrition, severe ([address] 6/1/2016    Seizures (HCC)     Tremor     Type II or unspecified type diabetes mellitus without mention of complication, not stated as uncontrolled     Uncontrolled type 2 DM with hyperosmolar nonketotic hyperglycemia ([facility] 6/3/2016       Past Surgical History:   Procedure Laterality Date    COLONOSCOPY  08/30/2016    very poor prep, unable to complete    COLONOSCOPY  06/30/2017    ADENOMATOUS POLYP.     CYST REMOVAL Left     ovary twice    NECK SURGERY      pt unsure of exact date & what the procedure was ? 2015 @ [facility]    UT COLSC FLX W/REMOVAL LESION BY HOT BX FORCEPS N/A 6/30/2017    COLONOSCOPY POLYPECTOMY HOT BIOPSY performed by Vj Lacey MD at [address]  08/30/2016    gastritis, MILD CHRONIC GASTRITIS WITH FOCAL ANTRAL EROSION AND ASSOCIATED ACUTE INFLAMMATION. INTESTINAL METAPLASIA PRESENT, NEGATIVE FOR DYSPLASIA.          Family History   Problem Relation Age of Onset    Brain Cancer Father     Diabetes Sister     Other Sister      epilepsy       Social History   Substance Use Topics    Smoking status: Current Some Day Smoker     Packs/day: 0.25    Smokeless tobacco: Never Used    Alcohol use Yes      Comment: occassionally      Current Outpatient Prescriptions   Medication Sig Dispense Refill    tiZANidine (ZANAFLEX) 4 MG tablet Take 4 mg by mouth 3 times daily as needed  0    gabapentin (NEURONTIN) 300 MG capsule TAKE 1 CAPSULE BY MOUTH  THREE TIMES DAILY 270 capsule 2    sodium bicarbonate 650 MG tablet Take 1 tablet by mouth 3 times daily 90 tablet 3    albuterol sulfate HFA (PROAIR HFA) 108 (90 Base) MCG/ACT inhaler Inhale 2 puffs into the lungs every 6 hours as needed for Wheezing 1 Inhaler 5    umeclidinium-vilanterol (ANORO ELLIPTA) 62.5-25 MCG/INH AEPB inhaler Inhale 1 puff into the lungs daily 60 each 2    glimepiride (AMARYL) 2 MG tablet Take 1 tablet by mouth every morning (before breakfast) 90 tablet 3    metFORMIN (GLUCOPHAGE) 500 MG tablet Take 1 tablet by mouth daily 90 tablet 3    mirtazapine (REMERON) 30 MG tablet Take 0.5 tablets by mouth nightly 90 tablet Associated symptoms comments: Flatus. Significant associated medical issues include alcohol abuse (chronic pancreatitis). Abdominal Pain   This is a recurrent problem. The current episode started 1 to 4 weeks ago. The onset quality is undetermined. The problem occurs constantly. The pain is located in the LLQ. The pain is moderate. The quality of the pain is sharp and cramping. The abdominal pain does not radiate. Associated symptoms include flatus. The pain is aggravated by eating. The pain is relieved by nothing. She has tried nothing for the symptoms. ROS:     Review of Systems   Constitutional: Negative. HENT: Negative. Eyes: Negative. Respiratory: Negative. Cardiovascular: Negative. Gastrointestinal: Positive for abdominal pain and flatus. Endocrine: Negative. Genitourinary: Negative. Musculoskeletal: Positive for back pain. Skin: Negative. Allergic/Immunologic: Negative. Neurological: Negative. Hematological: Negative. Psychiatric/Behavioral: Negative. All other systems reviewed and are negative. Objective:     Physical Exam   Constitutional: She is oriented to person, place, and time. She appears well-developed and well-nourished. She appears cachectic. HENT:   Head: Normocephalic and atraumatic. Neck: Neck supple. Cardiovascular: Normal rate and regular rhythm. Pulmonary/Chest: Effort normal and breath sounds normal.   Abdominal: Soft. She exhibits no distension. Bowel sounds are decreased. There is tenderness. There is no rebound and no guarding. Musculoskeletal: She exhibits no edema. Neurological: She is alert and oriented to person, place, and time. Skin: Skin is warm and dry. Psychiatric: She has a normal mood and affect. Her behavior is normal.   Vitals reviewed. /68   Pulse 79   Resp 12   Ht 5' 7\" (1.702 m)   Wt 105 lb (47.6 kg)   SpO2 98%   Breastfeeding? No   BMI 16.45 kg/m²     Assessment:      1.  LLQ

## 2018-02-02 RX ORDER — OMEPRAZOLE 20 MG/1
CAPSULE, DELAYED RELEASE ORAL
Qty: 180 CAPSULE | Refills: 1 | Status: SHIPPED | OUTPATIENT
Start: 2018-02-02 | End: 2018-07-10 | Stop reason: SDUPTHER

## 2018-03-21 ENCOUNTER — OFFICE VISIT (OUTPATIENT)
Dept: GASTROENTEROLOGY | Age: 63
End: 2018-03-21
Payer: MEDICARE

## 2018-03-21 VITALS
OXYGEN SATURATION: 95 % | DIASTOLIC BLOOD PRESSURE: 64 MMHG | RESPIRATION RATE: 14 BRPM | WEIGHT: 97.4 LBS | HEART RATE: 115 BPM | HEIGHT: 67 IN | SYSTOLIC BLOOD PRESSURE: 98 MMHG | BODY MASS INDEX: 15.29 KG/M2

## 2018-03-21 DIAGNOSIS — R63.4 WEIGHT LOSS: ICD-10-CM

## 2018-03-21 DIAGNOSIS — R97.8 ELEVATED CA 19-9 LEVEL: ICD-10-CM

## 2018-03-21 DIAGNOSIS — R97.0 ELEVATED CEA: ICD-10-CM

## 2018-03-21 DIAGNOSIS — K86.0 ALCOHOL-INDUCED CHRONIC PANCREATITIS (HCC): Primary | ICD-10-CM

## 2018-03-21 PROCEDURE — 99214 OFFICE O/P EST MOD 30 MIN: CPT | Performed by: INTERNAL MEDICINE

## 2018-03-21 ASSESSMENT — ENCOUNTER SYMPTOMS
ANAL BLEEDING: 0
SINUS PRESSURE: 1
NAUSEA: 1
CONSTIPATION: 0
RESPIRATORY NEGATIVE: 1
ALLERGIC/IMMUNOLOGIC NEGATIVE: 1
WHEEZING: 0
ABDOMINAL DISTENTION: 0
BLOOD IN STOOL: 0
RECTAL PAIN: 0
DIARRHEA: 1
VOMITING: 1
ABDOMINAL PAIN: 0
EYES NEGATIVE: 1

## 2018-03-21 NOTE — PROGRESS NOTES
Subjective:      Patient ID: Johnnie Goltz is a 65 y.o. female. HPI  Dr. Socorro Brooks MD our mutual patient Johnnie Goltz was seen  for   1. Alcohol-induced chronic pancreatitis (HCC)    2. Elevated CEA    3. Elevated CA 19-9 level    4. Weight loss     . Patient is here for follow up,Pancreatitis (pt has been out of medication, she states it is very expensive. she has been having some issues lately with nausea and vomiting )  doing well   Creon working very well with it no diarrhea and pain is gone   Colon 6/17 : polyps   Ran out of Creon was vomiting and had pain ,lost wt 8 lbs   Now all better     Pt had a hx of Elevated alk phos,Diarrhea,Elevated tumor markers ca 19-9 and CEA , for which I wanted to r/o pancreatic malignancy , MRI was ordered and was abnormal for which I sent her for EUS . DR Ramez Medellin did the EUS at [facility], no tumor, only chronic pancreatitis with atrophy and PD stones, no CBC issues . Past Medical, Family, and Social History reviewed and does contribute to the patient presenting condition. patient\"s PMH/PSH,SH,PSYCH hx, MEDs, ALLERGIES, and ROS was all reviewed and updated ion the appropriate sections    Review of Systems   Constitutional: Positive for diaphoresis. HENT: Positive for postnasal drip and sinus pressure. Negative for congestion. Eyes: Negative. Respiratory: Negative. Negative for wheezing. Cardiovascular: Positive for chest pain. Negative for palpitations and leg swelling. Gastrointestinal: Positive for diarrhea, nausea and vomiting. Negative for abdominal distention, abdominal pain, anal bleeding, blood in stool, constipation and rectal pain. Endocrine: Positive for heat intolerance. Genitourinary: Negative. Musculoskeletal: Positive for gait problem (unsteady gait, feels off balance) and myalgias. Skin: Negative. Allergic/Immunologic: Negative. Neurological: Positive for dizziness and light-headedness.    Hematological: Bruises/bleeds easily. Psychiatric/Behavioral: Positive for sleep disturbance. The patient is nervous/anxious. Objective:   Physical Exam   Constitutional: She is oriented to person, place, and time. She appears well-developed and well-nourished. No distress. HENT:   Head: Normocephalic. Mouth/Throat: No oropharyngeal exudate. Eyes: Pupils are equal, round, and reactive to light. No scleral icterus. Neck: Normal range of motion. Neck supple. No JVD present. No tracheal deviation present. No thyromegaly present. Cardiovascular: Normal rate, regular rhythm, normal heart sounds and intact distal pulses. No murmur heard. Pulmonary/Chest: Effort normal and breath sounds normal. No respiratory distress. She has no wheezes. Abdominal: Soft. Bowel sounds are normal. She exhibits no distension. There is no tenderness. There is no rebound and no guarding. No ascites   Musculoskeletal: Normal range of motion. She exhibits no edema. Neurological: She is alert and oriented to person, place, and time. She has normal reflexes. Skin: Skin is warm. No rash noted. She is not diaphoretic. No erythema. No pallor. She is not diaphoretic   Psychiatric: She has a normal mood and affect. Her behavior is normal. Judgment and thought content normal.   Nursing note and vitals reviewed. Assessment:      1. Alcohol-induced chronic pancreatitis (HCC)     2. Elevated CEA  CEA    Cancer Antigen 19-9   3. Elevated CA 19-9 level  CEA    Cancer Antigen 19-9   4.  Weight loss  CEA    Cancer Antigen 19-9           Plan:      Samples of Creon, as above EUS negative , if tumor markers are higher will re image the pancreas      creon prescription

## 2018-05-11 ENCOUNTER — HOSPITAL ENCOUNTER (OUTPATIENT)
Age: 63
Setting detail: SPECIMEN
Discharge: HOME | End: 2018-05-11
Payer: MEDICARE

## 2018-05-11 DIAGNOSIS — R97.0 ELEVATED CEA: ICD-10-CM

## 2018-05-11 DIAGNOSIS — R97.8 ELEVATED CA 19-9 LEVEL: ICD-10-CM

## 2018-05-11 DIAGNOSIS — R63.4 WEIGHT LOSS: ICD-10-CM

## 2018-05-11 LAB
CA 19-9: 1 U/ML (ref 0–35)
CARCINOEMBRYONIC ANTIGEN: 9 NG/ML

## 2018-05-11 PROCEDURE — 36415 COLL VENOUS BLD VENIPUNCTURE: CPT

## 2018-05-11 PROCEDURE — 82378 CARCINOEMBRYONIC ANTIGEN: CPT

## 2018-05-11 PROCEDURE — 86301 IMMUNOASSAY TUMOR CA 19-9: CPT

## 2018-05-15 ENCOUNTER — OFFICE VISIT (OUTPATIENT)
Dept: NEUROLOGY | Age: 63
End: 2018-05-15
Payer: MEDICARE

## 2018-05-15 VITALS
DIASTOLIC BLOOD PRESSURE: 79 MMHG | HEART RATE: 96 BPM | WEIGHT: 100.6 LBS | BODY MASS INDEX: 15.79 KG/M2 | SYSTOLIC BLOOD PRESSURE: 122 MMHG | HEIGHT: 67 IN

## 2018-05-15 DIAGNOSIS — G57.93 NEUROPATHIC PAIN OF BOTH LEGS: Primary | ICD-10-CM

## 2018-05-15 DIAGNOSIS — R56.9 SEIZURE-LIKE ACTIVITY (HCC): ICD-10-CM

## 2018-05-15 PROBLEM — G40.909 NONINTRACTABLE EPILEPSY WITHOUT STATUS EPILEPTICUS (HCC): Status: RESOLVED | Noted: 2017-01-30 | Resolved: 2018-05-15

## 2018-05-15 PROCEDURE — 99214 OFFICE O/P EST MOD 30 MIN: CPT | Performed by: NURSE PRACTITIONER

## 2018-05-16 ENCOUNTER — OFFICE VISIT (OUTPATIENT)
Dept: GASTROENTEROLOGY | Age: 63
End: 2018-05-16
Payer: MEDICARE

## 2018-05-16 VITALS
BODY MASS INDEX: 15.34 KG/M2 | HEIGHT: 67 IN | RESPIRATION RATE: 14 BRPM | DIASTOLIC BLOOD PRESSURE: 70 MMHG | OXYGEN SATURATION: 98 % | WEIGHT: 97.7 LBS | SYSTOLIC BLOOD PRESSURE: 102 MMHG | HEART RATE: 102 BPM

## 2018-05-16 DIAGNOSIS — K86.89 PANCREATIC CALCIFICATION: ICD-10-CM

## 2018-05-16 DIAGNOSIS — K86.0 ALCOHOL-INDUCED CHRONIC PANCREATITIS (HCC): ICD-10-CM

## 2018-05-16 DIAGNOSIS — R97.0 ELEVATED CEA: Primary | ICD-10-CM

## 2018-05-16 DIAGNOSIS — R97.8 ELEVATED CA 19-9 LEVEL: ICD-10-CM

## 2018-05-16 DIAGNOSIS — K86.89 PANCREATIC INSUFFICIENCY: ICD-10-CM

## 2018-05-16 PROCEDURE — 99214 OFFICE O/P EST MOD 30 MIN: CPT | Performed by: INTERNAL MEDICINE

## 2018-05-16 ASSESSMENT — ENCOUNTER SYMPTOMS
SINUS PRESSURE: 0
DIARRHEA: 0
CONSTIPATION: 0
BLOOD IN STOOL: 0
RESPIRATORY NEGATIVE: 1
ABDOMINAL DISTENTION: 0
VOMITING: 0
EYES NEGATIVE: 1
ANAL BLEEDING: 0
ABDOMINAL PAIN: 0
NAUSEA: 0
ALLERGIC/IMMUNOLOGIC NEGATIVE: 1
RECTAL PAIN: 0
WHEEZING: 0
GASTROINTESTINAL NEGATIVE: 1

## 2018-05-25 ENCOUNTER — ANESTHESIA EVENT (OUTPATIENT)
Dept: OPERATING ROOM | Age: 63
End: 2018-05-25
Payer: MEDICARE

## 2018-05-29 ENCOUNTER — ANESTHESIA (OUTPATIENT)
Dept: OPERATING ROOM | Age: 63
End: 2018-05-29
Payer: MEDICARE

## 2018-05-29 ENCOUNTER — HOSPITAL ENCOUNTER (OUTPATIENT)
Age: 63
Setting detail: OUTPATIENT SURGERY
Discharge: HOME | End: 2018-05-29
Attending: INTERNAL MEDICINE | Admitting: INTERNAL MEDICINE
Payer: MEDICARE

## 2018-05-29 VITALS
SYSTOLIC BLOOD PRESSURE: 126 MMHG | BODY MASS INDEX: 15.4 KG/M2 | TEMPERATURE: 97.5 F | HEIGHT: 67 IN | DIASTOLIC BLOOD PRESSURE: 81 MMHG | WEIGHT: 98.11 LBS | HEART RATE: 77 BPM | RESPIRATION RATE: 17 BRPM | OXYGEN SATURATION: 100 %

## 2018-05-29 VITALS — OXYGEN SATURATION: 100 % | SYSTOLIC BLOOD PRESSURE: 81 MMHG | DIASTOLIC BLOOD PRESSURE: 51 MMHG

## 2018-05-29 LAB
GLUCOSE BLD-MCNC: 118 MG/DL (ref 65–105)
GLUCOSE BLD-MCNC: 96 MG/DL (ref 65–105)

## 2018-05-29 PROCEDURE — 2500000003 HC RX 250 WO HCPCS: Performed by: NURSE ANESTHETIST, CERTIFIED REGISTERED

## 2018-05-29 PROCEDURE — 3700000000 HC ANESTHESIA ATTENDED CARE: Performed by: INTERNAL MEDICINE

## 2018-05-29 PROCEDURE — 2580000003 HC RX 258: Performed by: ANESTHESIOLOGY

## 2018-05-29 PROCEDURE — 45380 COLONOSCOPY AND BIOPSY: CPT | Performed by: INTERNAL MEDICINE

## 2018-05-29 PROCEDURE — 88305 TISSUE EXAM BY PATHOLOGIST: CPT

## 2018-05-29 PROCEDURE — 3609027000 HC COLONOSCOPY: Performed by: INTERNAL MEDICINE

## 2018-05-29 PROCEDURE — 6360000002 HC RX W HCPCS: Performed by: NURSE ANESTHETIST, CERTIFIED REGISTERED

## 2018-05-29 PROCEDURE — 7100000010 HC PHASE II RECOVERY - FIRST 15 MIN: Performed by: INTERNAL MEDICINE

## 2018-05-29 PROCEDURE — 2580000003 HC RX 258: Performed by: NURSE ANESTHETIST, CERTIFIED REGISTERED

## 2018-05-29 PROCEDURE — 82947 ASSAY GLUCOSE BLOOD QUANT: CPT

## 2018-05-29 PROCEDURE — 3700000001 HC ADD 15 MINUTES (ANESTHESIA): Performed by: INTERNAL MEDICINE

## 2018-05-29 PROCEDURE — 7100000011 HC PHASE II RECOVERY - ADDTL 15 MIN: Performed by: INTERNAL MEDICINE

## 2018-05-29 RX ORDER — FENTANYL CITRATE 50 UG/ML
50 INJECTION, SOLUTION INTRAMUSCULAR; INTRAVENOUS EVERY 5 MIN PRN
Status: DISCONTINUED | OUTPATIENT
Start: 2018-05-29 | End: 2018-05-29 | Stop reason: HOSPADM

## 2018-05-29 RX ORDER — FENTANYL CITRATE 50 UG/ML
25 INJECTION, SOLUTION INTRAMUSCULAR; INTRAVENOUS EVERY 5 MIN PRN
Status: DISCONTINUED | OUTPATIENT
Start: 2018-05-29 | End: 2018-05-29 | Stop reason: HOSPADM

## 2018-05-29 RX ORDER — ONDANSETRON 2 MG/ML
4 INJECTION INTRAMUSCULAR; INTRAVENOUS
Status: DISCONTINUED | OUTPATIENT
Start: 2018-05-29 | End: 2018-05-29 | Stop reason: HOSPADM

## 2018-05-29 RX ORDER — SODIUM CHLORIDE 0.9 % (FLUSH) 0.9 %
10 SYRINGE (ML) INJECTION EVERY 12 HOURS SCHEDULED
Status: DISCONTINUED | OUTPATIENT
Start: 2018-05-29 | End: 2018-05-29 | Stop reason: HOSPADM

## 2018-05-29 RX ORDER — SODIUM CHLORIDE 0.9 % (FLUSH) 0.9 %
10 SYRINGE (ML) INJECTION PRN
Status: DISCONTINUED | OUTPATIENT
Start: 2018-05-29 | End: 2018-05-29 | Stop reason: HOSPADM

## 2018-05-29 RX ORDER — SODIUM CHLORIDE, SODIUM LACTATE, POTASSIUM CHLORIDE, CALCIUM CHLORIDE 600; 310; 30; 20 MG/100ML; MG/100ML; MG/100ML; MG/100ML
INJECTION, SOLUTION INTRAVENOUS CONTINUOUS PRN
Status: DISCONTINUED | OUTPATIENT
Start: 2018-05-29 | End: 2018-05-29 | Stop reason: SDUPTHER

## 2018-05-29 RX ORDER — PROPOFOL 10 MG/ML
INJECTION, EMULSION INTRAVENOUS PRN
Status: DISCONTINUED | OUTPATIENT
Start: 2018-05-29 | End: 2018-05-29 | Stop reason: SDUPTHER

## 2018-05-29 RX ORDER — SODIUM CHLORIDE 9 MG/ML
INJECTION, SOLUTION INTRAVENOUS CONTINUOUS
Status: DISCONTINUED | OUTPATIENT
Start: 2018-05-29 | End: 2018-05-29

## 2018-05-29 RX ORDER — LIDOCAINE HYDROCHLORIDE 10 MG/ML
INJECTION, SOLUTION INFILTRATION; PERINEURAL PRN
Status: DISCONTINUED | OUTPATIENT
Start: 2018-05-29 | End: 2018-05-29 | Stop reason: SDUPTHER

## 2018-05-29 RX ORDER — SODIUM CHLORIDE, SODIUM LACTATE, POTASSIUM CHLORIDE, CALCIUM CHLORIDE 600; 310; 30; 20 MG/100ML; MG/100ML; MG/100ML; MG/100ML
INJECTION, SOLUTION INTRAVENOUS CONTINUOUS
Status: DISCONTINUED | OUTPATIENT
Start: 2018-05-29 | End: 2018-05-29 | Stop reason: HOSPADM

## 2018-05-29 RX ORDER — LIDOCAINE HYDROCHLORIDE 10 MG/ML
1 INJECTION, SOLUTION EPIDURAL; INFILTRATION; INTRACAUDAL; PERINEURAL
Status: DISCONTINUED | OUTPATIENT
Start: 2018-05-29 | End: 2018-05-29 | Stop reason: HOSPADM

## 2018-05-29 RX ADMIN — PROPOFOL 10 MG: 10 INJECTION, EMULSION INTRAVENOUS at 10:51

## 2018-05-29 RX ADMIN — SODIUM CHLORIDE, POTASSIUM CHLORIDE, SODIUM LACTATE AND CALCIUM CHLORIDE: 600; 310; 30; 20 INJECTION, SOLUTION INTRAVENOUS at 11:05

## 2018-05-29 RX ADMIN — PROPOFOL 10 MG: 10 INJECTION, EMULSION INTRAVENOUS at 10:48

## 2018-05-29 RX ADMIN — SODIUM CHLORIDE, POTASSIUM CHLORIDE, SODIUM LACTATE AND CALCIUM CHLORIDE: 600; 310; 30; 20 INJECTION, SOLUTION INTRAVENOUS at 09:49

## 2018-05-29 RX ADMIN — PROPOFOL 10 MG: 10 INJECTION, EMULSION INTRAVENOUS at 10:29

## 2018-05-29 RX ADMIN — PROPOFOL 10 MG: 10 INJECTION, EMULSION INTRAVENOUS at 10:46

## 2018-05-29 RX ADMIN — SODIUM CHLORIDE, POTASSIUM CHLORIDE, SODIUM LACTATE AND CALCIUM CHLORIDE: 600; 310; 30; 20 INJECTION, SOLUTION INTRAVENOUS at 09:39

## 2018-05-29 RX ADMIN — LIDOCAINE HYDROCHLORIDE 100 MG: 10 INJECTION, SOLUTION INFILTRATION; PERINEURAL at 10:22

## 2018-05-29 RX ADMIN — PROPOFOL 50 MG: 10 INJECTION, EMULSION INTRAVENOUS at 10:23

## 2018-05-29 RX ADMIN — PROPOFOL 10 MG: 10 INJECTION, EMULSION INTRAVENOUS at 10:43

## 2018-05-29 RX ADMIN — PROPOFOL 20 MG: 10 INJECTION, EMULSION INTRAVENOUS at 10:28

## 2018-05-29 RX ADMIN — PROPOFOL 30 MG: 10 INJECTION, EMULSION INTRAVENOUS at 10:41

## 2018-05-29 RX ADMIN — PROPOFOL 10 MG: 10 INJECTION, EMULSION INTRAVENOUS at 11:00

## 2018-05-29 RX ADMIN — PROPOFOL 10 MG: 10 INJECTION, EMULSION INTRAVENOUS at 10:36

## 2018-05-29 RX ADMIN — PROPOFOL 10 MG: 10 INJECTION, EMULSION INTRAVENOUS at 10:55

## 2018-05-29 RX ADMIN — PROPOFOL 10 MG: 10 INJECTION, EMULSION INTRAVENOUS at 10:57

## 2018-05-29 RX ADMIN — PROPOFOL 100 MG: 10 INJECTION, EMULSION INTRAVENOUS at 10:22

## 2018-05-29 RX ADMIN — PROPOFOL 10 MG: 10 INJECTION, EMULSION INTRAVENOUS at 10:30

## 2018-05-29 ASSESSMENT — PULMONARY FUNCTION TESTS
PIF_VALUE: 1
PIF_VALUE: 0
PIF_VALUE: 1
PIF_VALUE: 0
PIF_VALUE: 1
PIF_VALUE: 1
PIF_VALUE: 0
PIF_VALUE: 1

## 2018-05-29 ASSESSMENT — PAIN SCALES - GENERAL
PAINLEVEL_OUTOF10: 0

## 2018-05-29 ASSESSMENT — PAIN - FUNCTIONAL ASSESSMENT: PAIN_FUNCTIONAL_ASSESSMENT: 0-10

## 2018-05-29 ASSESSMENT — LIFESTYLE VARIABLES: SMOKING_STATUS: 1

## 2018-05-30 LAB — SURGICAL PATHOLOGY REPORT: NORMAL

## 2018-07-10 ENCOUNTER — OFFICE VISIT (OUTPATIENT)
Dept: INTERNAL MEDICINE | Age: 63
End: 2018-07-10
Payer: MEDICARE

## 2018-07-10 VITALS
HEIGHT: 67 IN | WEIGHT: 98 LBS | DIASTOLIC BLOOD PRESSURE: 68 MMHG | SYSTOLIC BLOOD PRESSURE: 108 MMHG | BODY MASS INDEX: 15.38 KG/M2 | OXYGEN SATURATION: 98 % | HEART RATE: 100 BPM

## 2018-07-10 DIAGNOSIS — M79.2 NEUROPATHIC PAIN: ICD-10-CM

## 2018-07-10 DIAGNOSIS — E11.00 UNCONTROLLED TYPE 2 DM WITH HYPEROSMOLAR NONKETOTIC HYPERGLYCEMIA (HCC): ICD-10-CM

## 2018-07-10 DIAGNOSIS — F39 AFFECTIVE DISORDER (HCC): ICD-10-CM

## 2018-07-10 DIAGNOSIS — E11.42 TYPE 2 DIABETES MELLITUS WITH DIABETIC POLYNEUROPATHY, WITH LONG-TERM CURRENT USE OF INSULIN (HCC): Primary | ICD-10-CM

## 2018-07-10 DIAGNOSIS — Z79.4 TYPE 2 DIABETES MELLITUS WITH DIABETIC POLYNEUROPATHY, WITH LONG-TERM CURRENT USE OF INSULIN (HCC): Primary | ICD-10-CM

## 2018-07-10 DIAGNOSIS — M25.511 RIGHT SHOULDER PAIN, UNSPECIFIED CHRONICITY: ICD-10-CM

## 2018-07-10 LAB — HBA1C MFR BLD: 8.8 %

## 2018-07-10 PROCEDURE — 83036 HEMOGLOBIN GLYCOSYLATED A1C: CPT | Performed by: INTERNAL MEDICINE

## 2018-07-10 PROCEDURE — 99214 OFFICE O/P EST MOD 30 MIN: CPT | Performed by: INTERNAL MEDICINE

## 2018-07-10 RX ORDER — OMEPRAZOLE 20 MG/1
CAPSULE, DELAYED RELEASE ORAL
Qty: 180 CAPSULE | Refills: 1 | Status: SHIPPED | OUTPATIENT
Start: 2018-07-10 | End: 2018-08-07 | Stop reason: SDUPTHER

## 2018-07-10 RX ORDER — SODIUM BICARBONATE 650 MG/1
650 TABLET ORAL 3 TIMES DAILY
Qty: 90 TABLET | Refills: 3 | Status: SHIPPED | OUTPATIENT
Start: 2018-07-10 | End: 2018-08-07 | Stop reason: SDUPTHER

## 2018-07-10 RX ORDER — MIRTAZAPINE 30 MG/1
15 TABLET, FILM COATED ORAL NIGHTLY
Qty: 90 TABLET | Refills: 3 | Status: SHIPPED | OUTPATIENT
Start: 2018-07-10 | End: 2018-08-07 | Stop reason: SDUPTHER

## 2018-07-10 RX ORDER — INSULIN GLARGINE 100 [IU]/ML
7 INJECTION, SOLUTION SUBCUTANEOUS EVERY MORNING
Qty: 1 VIAL | Refills: 3 | Status: SHIPPED | OUTPATIENT
Start: 2018-07-10 | End: 2018-08-07 | Stop reason: SDUPTHER

## 2018-07-10 RX ORDER — GLIMEPIRIDE 2 MG/1
2 TABLET ORAL
Qty: 90 TABLET | Refills: 3 | Status: SHIPPED | OUTPATIENT
Start: 2018-07-10 | End: 2018-08-07 | Stop reason: SDUPTHER

## 2018-07-10 RX ORDER — ALBUTEROL SULFATE 90 UG/1
2 AEROSOL, METERED RESPIRATORY (INHALATION) EVERY 6 HOURS PRN
Qty: 1 INHALER | Refills: 5 | Status: SHIPPED | OUTPATIENT
Start: 2018-07-10 | End: 2018-08-07 | Stop reason: SDUPTHER

## 2018-07-10 RX ORDER — GABAPENTIN 300 MG/1
CAPSULE ORAL
Qty: 270 CAPSULE | Refills: 2 | Status: SHIPPED | OUTPATIENT
Start: 2018-07-10 | End: 2018-08-07 | Stop reason: SDUPTHER

## 2018-07-10 ASSESSMENT — ENCOUNTER SYMPTOMS
SHORTNESS OF BREATH: 1
EYES NEGATIVE: 1

## 2018-07-10 ASSESSMENT — PATIENT HEALTH QUESTIONNAIRE - PHQ9
SUM OF ALL RESPONSES TO PHQ QUESTIONS 1-9: 0
1. LITTLE INTEREST OR PLEASURE IN DOING THINGS: 0
2. FEELING DOWN, DEPRESSED OR HOPELESS: 0
SUM OF ALL RESPONSES TO PHQ9 QUESTIONS 1 & 2: 0

## 2018-07-10 NOTE — PATIENT INSTRUCTIONS
trying to quit smoking. · Consider signing up for a smoking cessation program, such as the American Lung Association's Freedom from Smoking program.  · Get text messaging support. Go to the website at www.smokefree. gov to sign up for the [facility] program.  · Set a quit date. Pick your date carefully so that it is not right in the middle of a big deadline or stressful time. Once you quit, do not even take a puff. Get rid of all ashtrays and lighters after your last cigarette. Clean your house and your clothes so that they do not smell of smoke. · Learn how to be a nonsmoker. Think about ways you can avoid those things that make you reach for a cigarette. ¨ Avoid situations that put you at greatest risk for smoking. For some people, it is hard to have a drink with friends without smoking. For others, they might skip a coffee break with coworkers who smoke. ¨ Change your daily routine. Take a different route to work or eat a meal in a different place. · Cut down on stress. Calm yourself or release tension by doing an activity you enjoy, such as reading a book, taking a hot bath, or gardening. · Talk to your doctor or pharmacist about nicotine replacement therapy, which replaces the nicotine in your body. You still get nicotine but you do not use tobacco. Nicotine replacement products help you slowly reduce the amount of nicotine you need. These products come in several forms, many of them available over-the-counter:  ¨ Nicotine patches  ¨ Nicotine gum and lozenges  ¨ Nicotine inhaler  · Ask your doctor about bupropion (Wellbutrin) or varenicline (Chantix), which are prescription medicines. They do not contain nicotine. They help you by reducing withdrawal symptoms, such as stress and anxiety. · Some people find hypnosis, acupuncture, and massage helpful for ending the smoking habit. · Eat a healthy diet and get regular exercise.  Having healthy habits will help your body move past its craving for nicotine. · Be prepared to keep trying. Most people are not successful the first few times they try to quit. Do not get mad at yourself if you smoke again. Make a list of things you learned and think about when you want to try again, such as next week, next month, or next year. Where can you learn more? Go to https://chpepiceweb.ZipZap. org and sign in to your [employer] account. Enter N330 in the [employer]Care box to learn more about \"Stopping Smoking: Care Instructions. \"     If you do not have an account, please click on the \"Sign Up Now\" link. Current as of: November 29, 2017  Content Version: 11.6  © 4509-6244 [employer], [employer]. Care instructions adapted under license by [facility] ([facility]). If you have questions about a medical condition or this instruction, always ask your healthcare professional. [place] [address] any warranty or liability for your use of this information. Patient Education        Deciding About Using Medicines To Quit Smoking  Deciding About Using Medicines To Quit Smoking  What are the medicines you can use? Your doctor may prescribe varenicline (Chantix) or bupropion (Zyban). These medicines can help you cope with cravings for tobacco. They are pills that don't contain nicotine. You also can use nicotine replacement products. These do contain nicotine. There are many types. · Gum and lozenges slowly release nicotine into your mouth. · Patches stick to your skin. They slowly release nicotine into your bloodstream.  · An inhaler has a hdz that contains nicotine. You breathe in a puff of nicotine vapor through your mouth and throat. · Nasal spray releases a mist that contains nicotine. What are key points about this decision? · Using medicines can double your chances of quitting smoking. They can ease cravings and withdrawal symptoms. · Getting counseling along with using medicine can raise your chances of quitting even more.   · If you

## 2018-07-10 NOTE — PROGRESS NOTES
Procedure Laterality Date    COLONOSCOPY  08/30/2016    very poor prep, unable to complete    COLONOSCOPY  06/30/2017    ADENOMATOUS POLYP.  COLONOSCOPY  05/29/2018    polypectomy x1    CYST REMOVAL Left     ovary twice    NECK SURGERY      pt unsure of exact date & what the procedure was ? 2015 @ [facility]    LA COLON CA SCRN NOT  [address] IND N/A 5/29/2018    COLONOSCOPY WITH POLYP REMOVEL performed by Itzel Olson MD at [address] N/A 6/30/2017    COLONOSCOPY POLYPECTOMY HOT BIOPSY performed by Itzel Olson MD at [address] [facility] [address]  08/30/2016    gastritis, MILD CHRONIC GASTRITIS WITH FOCAL ANTRAL EROSION AND ASSOCIATED ACUTE INFLAMMATION. INTESTINAL METAPLASIA PRESENT, NEGATIVE FOR DYSPLASIA. Family History   Problem Relation Age of Onset    Brain Cancer Father     Diabetes Sister     Other Sister         epilepsy       Social History   Substance Use Topics    Smoking status: Current Some Day Smoker     Packs/day: 0.25     Years: 40.00    Smokeless tobacco: Never Used    Alcohol use Yes      Comment: occassionally      Current Outpatient Prescriptions   Medication Sig Dispense Refill    umeclidinium-vilanterol (ANORO ELLIPTA) 62.5-25 MCG/INH AEPB inhaler Inhale 1 puff into the lungs daily 60 each 2    albuterol sulfate HFA (PROAIR HFA) 108 (90 Base) MCG/ACT inhaler Inhale 2 puffs into the lungs every 6 hours as needed for Wheezing 1 Inhaler 5    gabapentin (NEURONTIN) 300 MG capsule TAKE 1 CAPSULE BY MOUTH  THREE TIMES DAILY.  270 capsule 2    insulin glargine (LANTUS) 100 UNIT/ML injection vial Inject 7 Units into the skin every morning 1 vial 3    glimepiride (AMARYL) 2 MG tablet Take 1 tablet by mouth every morning (before breakfast) 90 tablet 3    metFORMIN (GLUCOPHAGE) 500 MG tablet Take 1 tablet by mouth 2 times daily (with meals) 180 tablet 3    mirtazapine (REMERON) 30 MG tablet Take 0.5 tablets by mouth nightly 90 tablet 3    sodium bicarbonate 650 MG tablet Take 1 tablet by mouth 3 times daily 90 tablet 3    omeprazole (PRILOSEC) 20 MG delayed release capsule TAKE 1 CAPSULE BY MOUTH TWO TIMES DAILY 180 capsule 1    tiZANidine (ZANAFLEX) 4 MG tablet Take 4 mg by mouth 3 times daily as needed  0    Pancrelipase, Lip-Prot-Amyl, (CREON) 97892 units CPEP Take 2 tablets by mouth 4 times daily (after meals and at bedtime) 240 capsule 3     No current facility-administered medications for this visit. Allergies   Allergen Reactions    Peanuts [Peanut Oil] Anaphylaxis    Vicodin [Hydrocodone-Acetaminophen] Hives    Tramadol     Tylenol With Codeine #3 [Acetaminophen-Codeine]     Ibuprofen Itching       Health Maintenance   Topic Date Due    HIV screen  12/30/1971    Pneumococcal med risk (1 of 1 - PPSV23) 12/30/1975    Cervical cancer screen  12/30/1977    Shingles Vaccine (1 of 2 - 2 Dose Series) 12/30/2006    Potassium monitoring  01/19/2018    Creatinine monitoring  01/19/2018    Diabetic microalbuminuria test  05/08/2018    Lipid screen  05/08/2018    Diabetic foot exam  11/03/2018 (Originally 12/30/1966)    Diabetic retinal exam  11/03/2018 (Originally 12/30/1966)    DTaP/Tdap/Td vaccine (1 - Tdap) 11/03/2018 (Originally 12/30/1975)    Flu vaccine (1) 09/01/2018    A1C test (Diabetic or Prediabetic)  11/03/2018    Breast cancer screen  06/09/2019    Colon cancer screen colonoscopy  05/29/2021    Hepatitis C screen  Completed       Controlled Substances Monitoring:      HPI:     Diabetes   She presents for her follow-up diabetic visit. She has type 2 diabetes mellitus. Her disease course has been stable. Associated symptoms include chest pain, fatigue and weakness. There are no diabetic complications. Risk factors for coronary artery disease include diabetes mellitus, dyslipidemia, hypertension and tobacco exposure.  Current diabetic treatment includes oral agent (monotherapy) and insulin injections. She is compliant with treatment most of the time. She is following a diabetic diet. Home blood sugar record trend: A1C 8.8% An ACE inhibitor/angiotensin II receptor blocker is being taken. Mental Health Problem   The primary symptoms include dysphoric mood, disorganized speech and somatic symptoms. Primary symptoms comment: Affective disorder H/O polysubstance abuse. The current episode started more than 1 month ago. This is a chronic problem. The somatic symptoms began more than 1 month ago. The somatic symptoms have been unchanged since their onset. The symptoms are severe. Somatic symptoms include fatigue. The degree of incapacity that she is experiencing as a consequence of her illness is severe. Additional symptoms of the illness include fatigue. She does not admit to suicidal ideas. She does not contemplate harming herself. Risk factors that are present for mental illness include a history of mental illness and substance abuse. Hand Pain    The incident occurred more than 1 week ago (numbness and tingling in hand comes and goes). There was no injury mechanism. The pain is present in the right hand (tingling causes the shoulder to hurt). Quality: tingling. The pain does not radiate. Associated symptoms include chest pain and numbness. Requesting prescription for boost. Denies poor appetite, and feels she doesn't get adequate protein although describes high meat diet. Encourage her to have labs done that were ordered in January. Will decide nee for boost from there. ROS:     Review of Systems   Constitutional: Positive for fatigue. Eyes: Negative. Respiratory: Positive for shortness of breath. Cardiovascular: Positive for chest pain. Endocrine: Negative. Musculoskeletal: Negative. Neurological: Positive for weakness and numbness. Psychiatric/Behavioral: Positive for dysphoric mood. All other systems reviewed and are negative.       Objective:     Physical Exam   Constitutional: She is oriented to person, place, and time. She appears well-developed and well-nourished. HENT:   Head: Normocephalic and atraumatic. Neck: Neck supple. Cardiovascular: Normal rate and regular rhythm. Pulmonary/Chest: Effort normal and breath sounds normal.   Abdominal: Soft. Musculoskeletal: She exhibits no edema. Right shoulder: She exhibits normal range of motion, no tenderness and no bony tenderness. Right hand: She exhibits normal range of motion, no tenderness and no bony tenderness. Decreased sensation noted. Neurological: She is alert and oriented to person, place, and time. Skin: Skin is warm and dry. Psychiatric: She has a normal mood and affect. Her behavior is normal.   Vitals reviewed.

## 2018-08-07 DIAGNOSIS — E11.42 TYPE 2 DIABETES MELLITUS WITH DIABETIC POLYNEUROPATHY, WITH LONG-TERM CURRENT USE OF INSULIN (HCC): ICD-10-CM

## 2018-08-07 DIAGNOSIS — M79.2 NEUROPATHIC PAIN: ICD-10-CM

## 2018-08-07 DIAGNOSIS — Z79.4 TYPE 2 DIABETES MELLITUS WITH DIABETIC POLYNEUROPATHY, WITH LONG-TERM CURRENT USE OF INSULIN (HCC): ICD-10-CM

## 2018-08-07 RX ORDER — SODIUM BICARBONATE 650 MG/1
650 TABLET ORAL 3 TIMES DAILY
Qty: 90 TABLET | Refills: 3 | Status: SHIPPED | OUTPATIENT
Start: 2018-08-07 | End: 2018-08-20 | Stop reason: SDUPTHER

## 2018-08-07 RX ORDER — MIRTAZAPINE 30 MG/1
15 TABLET, FILM COATED ORAL NIGHTLY
Qty: 90 TABLET | Refills: 3 | Status: SHIPPED | OUTPATIENT
Start: 2018-08-07 | End: 2018-08-20 | Stop reason: SDUPTHER

## 2018-08-07 RX ORDER — ALBUTEROL SULFATE 90 UG/1
2 AEROSOL, METERED RESPIRATORY (INHALATION) EVERY 6 HOURS PRN
Qty: 1 INHALER | Refills: 5 | Status: SHIPPED | OUTPATIENT
Start: 2018-08-07 | End: 2018-08-20 | Stop reason: SDUPTHER

## 2018-08-07 RX ORDER — GLIMEPIRIDE 2 MG/1
2 TABLET ORAL
Qty: 90 TABLET | Refills: 3 | Status: SHIPPED | OUTPATIENT
Start: 2018-08-07 | End: 2018-08-20 | Stop reason: SDUPTHER

## 2018-08-07 RX ORDER — GABAPENTIN 300 MG/1
CAPSULE ORAL
Qty: 270 CAPSULE | Refills: 2 | Status: SHIPPED | OUTPATIENT
Start: 2018-08-07 | End: 2019-02-13 | Stop reason: SDUPTHER

## 2018-08-07 RX ORDER — INSULIN GLARGINE 100 [IU]/ML
7 INJECTION, SOLUTION SUBCUTANEOUS EVERY MORNING
Qty: 1 VIAL | Refills: 3 | Status: SHIPPED | OUTPATIENT
Start: 2018-08-07 | End: 2018-08-20 | Stop reason: SDUPTHER

## 2018-08-07 RX ORDER — OMEPRAZOLE 20 MG/1
CAPSULE, DELAYED RELEASE ORAL
Qty: 180 CAPSULE | Refills: 1 | Status: SHIPPED | OUTPATIENT
Start: 2018-08-07 | End: 2018-08-20 | Stop reason: SDUPTHER

## 2018-08-20 RX ORDER — SODIUM BICARBONATE 650 MG/1
650 TABLET ORAL 3 TIMES DAILY
Qty: 90 TABLET | Refills: 3 | Status: SHIPPED | OUTPATIENT
Start: 2018-08-20 | End: 2018-10-10 | Stop reason: SDUPTHER

## 2018-08-20 RX ORDER — OMEPRAZOLE 20 MG/1
CAPSULE, DELAYED RELEASE ORAL
Qty: 180 CAPSULE | Refills: 1 | Status: SHIPPED | OUTPATIENT
Start: 2018-08-20 | End: 2020-01-10 | Stop reason: SDUPTHER

## 2018-08-20 RX ORDER — ALBUTEROL SULFATE 90 UG/1
2 AEROSOL, METERED RESPIRATORY (INHALATION) EVERY 6 HOURS PRN
Qty: 1 INHALER | Refills: 5 | Status: SHIPPED | OUTPATIENT
Start: 2018-08-20 | End: 2018-10-10 | Stop reason: SDUPTHER

## 2018-08-20 RX ORDER — GLIMEPIRIDE 2 MG/1
2 TABLET ORAL
Qty: 90 TABLET | Refills: 3 | Status: SHIPPED | OUTPATIENT
Start: 2018-08-20 | End: 2019-05-15 | Stop reason: SDUPTHER

## 2018-08-20 RX ORDER — INSULIN GLARGINE 100 [IU]/ML
7 INJECTION, SOLUTION SUBCUTANEOUS EVERY MORNING
Qty: 1 VIAL | Refills: 3 | Status: SHIPPED | OUTPATIENT
Start: 2018-08-20 | End: 2020-02-19

## 2018-08-20 RX ORDER — MIRTAZAPINE 30 MG/1
15 TABLET, FILM COATED ORAL NIGHTLY
Qty: 90 TABLET | Refills: 3 | Status: SHIPPED | OUTPATIENT
Start: 2018-08-20 | End: 2020-01-10 | Stop reason: SDUPTHER

## 2018-09-18 ENCOUNTER — OFFICE VISIT (OUTPATIENT)
Dept: GASTROENTEROLOGY | Age: 63
End: 2018-09-18
Payer: MEDICARE

## 2018-09-18 VITALS
SYSTOLIC BLOOD PRESSURE: 125 MMHG | HEIGHT: 67 IN | OXYGEN SATURATION: 99 % | WEIGHT: 95.6 LBS | BODY MASS INDEX: 15 KG/M2 | HEART RATE: 97 BPM | DIASTOLIC BLOOD PRESSURE: 84 MMHG

## 2018-09-18 DIAGNOSIS — R10.84 GENERALIZED ABDOMINAL PAIN: ICD-10-CM

## 2018-09-18 DIAGNOSIS — R79.89 ELEVATED LFTS: ICD-10-CM

## 2018-09-18 DIAGNOSIS — R97.8 ELEVATED CA 19-9 LEVEL: ICD-10-CM

## 2018-09-18 DIAGNOSIS — K86.0 ALCOHOL-INDUCED CHRONIC PANCREATITIS (HCC): Primary | ICD-10-CM

## 2018-09-18 DIAGNOSIS — D36.9 ADENOMATOUS POLYPS: ICD-10-CM

## 2018-09-18 DIAGNOSIS — R97.0 ELEVATED CEA: ICD-10-CM

## 2018-09-18 PROCEDURE — 99214 OFFICE O/P EST MOD 30 MIN: CPT | Performed by: INTERNAL MEDICINE

## 2018-09-18 ASSESSMENT — ENCOUNTER SYMPTOMS
EYES NEGATIVE: 1
COUGH: 1
CONSTIPATION: 0
VOMITING: 0
RECTAL PAIN: 0
ABDOMINAL PAIN: 0
ABDOMINAL DISTENTION: 0
SHORTNESS OF BREATH: 1
BLOOD IN STOOL: 0
NAUSEA: 0
DIARRHEA: 1
ANAL BLEEDING: 0
SINUS PRESSURE: 1

## 2018-09-18 NOTE — PROGRESS NOTES
Abdomen W WO Contrast Mrcp   5. Elevated CA 19-9 level     6. Elevated CEA  CEA    MRI Abdomen W WO Contrast Mrcp           Plan:      Continue with the pancreatic enzymes. Repeat colonoscopy in 3 years.   Will repeat the CEA and will get an MRI of the abdomen        Joe Hernández MD

## 2018-09-21 ENCOUNTER — HOSPITAL ENCOUNTER (OUTPATIENT)
Age: 63
Discharge: HOME | End: 2018-09-21
Payer: MEDICARE

## 2018-09-21 ENCOUNTER — HOSPITAL ENCOUNTER (OUTPATIENT)
Age: 63
Discharge: HOME | End: 2018-09-23
Payer: MEDICARE

## 2018-09-21 ENCOUNTER — HOSPITAL ENCOUNTER (OUTPATIENT)
Dept: GENERAL RADIOLOGY | Age: 63
Discharge: HOME | End: 2018-09-23
Payer: MEDICARE

## 2018-09-21 DIAGNOSIS — R97.0 ELEVATED CEA: ICD-10-CM

## 2018-09-21 DIAGNOSIS — R10.32 LLQ ABDOMINAL PAIN: ICD-10-CM

## 2018-09-21 DIAGNOSIS — M25.511 RIGHT SHOULDER PAIN, UNSPECIFIED CHRONICITY: ICD-10-CM

## 2018-09-21 LAB
ABSOLUTE EOS #: 0.14 K/UL (ref 0–0.44)
ABSOLUTE IMMATURE GRANULOCYTE: <0.03 K/UL (ref 0–0.3)
ABSOLUTE LYMPH #: 2.77 K/UL (ref 1.1–3.7)
ABSOLUTE MONO #: 0.5 K/UL (ref 0.1–1.2)
ALBUMIN SERPL-MCNC: 4.4 G/DL (ref 3.5–5.2)
ALBUMIN/GLOBULIN RATIO: 1.6 (ref 1–2.5)
ALP BLD-CCNC: 87 U/L (ref 35–104)
ALT SERPL-CCNC: 13 U/L (ref 5–33)
ANION GAP SERPL CALCULATED.3IONS-SCNC: 16 MMOL/L (ref 9–17)
AST SERPL-CCNC: 22 U/L
BASOPHILS # BLD: 1 % (ref 0–2)
BASOPHILS ABSOLUTE: 0.04 K/UL (ref 0–0.2)
BILIRUB SERPL-MCNC: 0.33 MG/DL (ref 0.3–1.2)
BILIRUBIN URINE: NEGATIVE
BUN BLDV-MCNC: 14 MG/DL (ref 8–23)
BUN/CREAT BLD: ABNORMAL (ref 9–20)
CALCIUM SERPL-MCNC: 10.1 MG/DL (ref 8.6–10.4)
CARCINOEMBRYONIC ANTIGEN: 7.9 NG/ML
CHLORIDE BLD-SCNC: 101 MMOL/L (ref 98–107)
CO2: 25 MMOL/L (ref 20–31)
COLOR: YELLOW
COMMENT UA: NORMAL
CREAT SERPL-MCNC: 0.7 MG/DL (ref 0.5–0.9)
DIFFERENTIAL TYPE: ABNORMAL
EOSINOPHILS RELATIVE PERCENT: 2 % (ref 1–4)
GFR AFRICAN AMERICAN: >60 ML/MIN
GFR NON-AFRICAN AMERICAN: >60 ML/MIN
GFR SERPL CREATININE-BSD FRML MDRD: ABNORMAL ML/MIN/{1.73_M2}
GFR SERPL CREATININE-BSD FRML MDRD: ABNORMAL ML/MIN/{1.73_M2}
GLUCOSE BLD-MCNC: 126 MG/DL (ref 70–99)
GLUCOSE URINE: NEGATIVE
HCT VFR BLD CALC: 35.1 % (ref 36.3–47.1)
HEMOGLOBIN: 11.7 G/DL (ref 11.9–15.1)
IMMATURE GRANULOCYTES: 0 %
KETONES, URINE: NEGATIVE
LEUKOCYTE ESTERASE, URINE: NEGATIVE
LIPASE: 7 U/L (ref 13–60)
LYMPHOCYTES # BLD: 41 % (ref 24–43)
MCH RBC QN AUTO: 33.9 PG (ref 25.2–33.5)
MCHC RBC AUTO-ENTMCNC: 33.3 G/DL (ref 28.4–34.8)
MCV RBC AUTO: 101.7 FL (ref 82.6–102.9)
MONOCYTES # BLD: 7 % (ref 3–12)
NITRITE, URINE: NEGATIVE
NRBC AUTOMATED: 0 PER 100 WBC
PDW BLD-RTO: 14.3 % (ref 11.8–14.4)
PH UA: 5.5 (ref 5–8)
PLATELET # BLD: 180 K/UL (ref 138–453)
PLATELET ESTIMATE: ABNORMAL
PMV BLD AUTO: 10.6 FL (ref 8.1–13.5)
POTASSIUM SERPL-SCNC: 5.2 MMOL/L (ref 3.7–5.3)
PROTEIN UA: NEGATIVE
RBC # BLD: 3.45 M/UL (ref 3.95–5.11)
RBC # BLD: ABNORMAL 10*6/UL
SEG NEUTROPHILS: 49 % (ref 36–65)
SEGMENTED NEUTROPHILS ABSOLUTE COUNT: 3.34 K/UL (ref 1.5–8.1)
SODIUM BLD-SCNC: 142 MMOL/L (ref 135–144)
SPECIFIC GRAVITY UA: 1.01 (ref 1–1.03)
TOTAL PROTEIN: 7.2 G/DL (ref 6.4–8.3)
TURBIDITY: CLEAR
URINE HGB: NEGATIVE
UROBILINOGEN, URINE: NORMAL
WBC # BLD: 6.8 K/UL (ref 3.5–11.3)
WBC # BLD: ABNORMAL 10*3/UL

## 2018-09-21 PROCEDURE — 85025 COMPLETE CBC W/AUTO DIFF WBC: CPT

## 2018-09-21 PROCEDURE — 82378 CARCINOEMBRYONIC ANTIGEN: CPT

## 2018-09-21 PROCEDURE — 73030 X-RAY EXAM OF SHOULDER: CPT

## 2018-09-21 PROCEDURE — 80053 COMPREHEN METABOLIC PANEL: CPT

## 2018-09-21 PROCEDURE — 83690 ASSAY OF LIPASE: CPT

## 2018-09-21 PROCEDURE — 81003 URINALYSIS AUTO W/O SCOPE: CPT

## 2018-09-21 PROCEDURE — 36415 COLL VENOUS BLD VENIPUNCTURE: CPT

## 2018-09-29 ENCOUNTER — HOSPITAL ENCOUNTER (OUTPATIENT)
Dept: MRI IMAGING | Age: 63
Discharge: HOME | End: 2018-10-01
Payer: MEDICARE

## 2018-09-29 DIAGNOSIS — R97.0 ELEVATED CEA: ICD-10-CM

## 2018-09-29 DIAGNOSIS — K86.0 ALCOHOL-INDUCED CHRONIC PANCREATITIS (HCC): ICD-10-CM

## 2018-09-29 DIAGNOSIS — R79.89 ABNORMAL LFTS: ICD-10-CM

## 2018-09-29 DIAGNOSIS — R79.89 ELEVATED LFTS: ICD-10-CM

## 2018-09-29 PROCEDURE — 74183 MRI ABD W/O CNTR FLWD CNTR: CPT

## 2018-09-29 PROCEDURE — 6360000004 HC RX CONTRAST MEDICATION: Performed by: INTERNAL MEDICINE

## 2018-09-29 PROCEDURE — 76376 3D RENDER W/INTRP POSTPROCES: CPT

## 2018-09-29 PROCEDURE — A9579 GAD-BASE MR CONTRAST NOS,1ML: HCPCS | Performed by: INTERNAL MEDICINE

## 2018-09-29 RX ORDER — SODIUM CHLORIDE 0.9 % (FLUSH) 0.9 %
10 SYRINGE (ML) INJECTION PRN
Status: DISCONTINUED | OUTPATIENT
Start: 2018-09-29 | End: 2018-10-02 | Stop reason: HOSPADM

## 2018-09-29 RX ADMIN — GADOTERIDOL 8 ML: 279.3 INJECTION, SOLUTION INTRAVENOUS at 11:56

## 2018-10-10 ENCOUNTER — OFFICE VISIT (OUTPATIENT)
Dept: INTERNAL MEDICINE | Age: 63
End: 2018-10-10
Payer: MEDICARE

## 2018-10-10 VITALS
DIASTOLIC BLOOD PRESSURE: 73 MMHG | HEART RATE: 105 BPM | WEIGHT: 94 LBS | SYSTOLIC BLOOD PRESSURE: 105 MMHG | BODY MASS INDEX: 14.72 KG/M2

## 2018-10-10 DIAGNOSIS — Z13.220 SCREENING FOR HYPERLIPIDEMIA: ICD-10-CM

## 2018-10-10 DIAGNOSIS — E11.9 TYPE 2 DIABETES MELLITUS WITHOUT COMPLICATION, WITH LONG-TERM CURRENT USE OF INSULIN (HCC): Primary | ICD-10-CM

## 2018-10-10 DIAGNOSIS — Z23 FLU VACCINE NEED: ICD-10-CM

## 2018-10-10 DIAGNOSIS — Z23 NEED FOR PROPHYLACTIC VACCINATION AND INOCULATION AGAINST VARICELLA: ICD-10-CM

## 2018-10-10 DIAGNOSIS — Z23 NEED FOR PROPHYLACTIC VACCINATION AGAINST STREPTOCOCCUS PNEUMONIAE (PNEUMOCOCCUS): ICD-10-CM

## 2018-10-10 DIAGNOSIS — Z79.4 TYPE 2 DIABETES MELLITUS WITHOUT COMPLICATION, WITH LONG-TERM CURRENT USE OF INSULIN (HCC): Primary | ICD-10-CM

## 2018-10-10 PROCEDURE — G0008 ADMIN INFLUENZA VIRUS VAC: HCPCS | Performed by: INTERNAL MEDICINE

## 2018-10-10 PROCEDURE — 99214 OFFICE O/P EST MOD 30 MIN: CPT | Performed by: INTERNAL MEDICINE

## 2018-10-10 PROCEDURE — G0009 ADMIN PNEUMOCOCCAL VACCINE: HCPCS | Performed by: INTERNAL MEDICINE

## 2018-10-10 PROCEDURE — 90688 IIV4 VACCINE SPLT 0.5 ML IM: CPT | Performed by: INTERNAL MEDICINE

## 2018-10-10 PROCEDURE — 90732 PPSV23 VACC 2 YRS+ SUBQ/IM: CPT | Performed by: INTERNAL MEDICINE

## 2018-10-10 RX ORDER — ALBUTEROL SULFATE 90 UG/1
2 AEROSOL, METERED RESPIRATORY (INHALATION) EVERY 6 HOURS PRN
Qty: 1 INHALER | Refills: 5 | Status: SHIPPED | OUTPATIENT
Start: 2018-10-10 | End: 2020-01-10 | Stop reason: SDUPTHER

## 2018-10-10 RX ORDER — SODIUM BICARBONATE 650 MG/1
650 TABLET ORAL 3 TIMES DAILY
Qty: 90 TABLET | Refills: 3 | Status: SHIPPED | OUTPATIENT
Start: 2018-10-10 | End: 2020-01-14

## 2018-10-10 RX ORDER — AZITHROMYCIN 250 MG/1
TABLET, FILM COATED ORAL
Qty: 1 PACKET | Refills: 0 | Status: SHIPPED | OUTPATIENT
Start: 2018-10-10 | End: 2018-11-13

## 2018-10-10 ASSESSMENT — ENCOUNTER SYMPTOMS
ALLERGIC/IMMUNOLOGIC NEGATIVE: 1
ABDOMINAL PAIN: 1
COUGH: 1
EYES NEGATIVE: 1

## 2018-10-10 NOTE — PROGRESS NOTES
7 Units into the skin every morning 1 vial 3    metFORMIN (GLUCOPHAGE) 500 MG tablet Take 1 tablet by mouth 2 times daily (with meals) 180 tablet 3    mirtazapine (REMERON) 30 MG tablet Take 0.5 tablets by mouth nightly 90 tablet 3    omeprazole (PRILOSEC) 20 MG delayed release capsule TAKE 1 CAPSULE BY MOUTH TWO TIMES DAILY 180 capsule 1    gabapentin (NEURONTIN) 300 MG capsule TAKE 1 CAPSULE BY MOUTH  THREE TIMES DAILY. 270 capsule 2    Pancrelipase, Lip-Prot-Amyl, (CREON) 37510 units CPEP Take 2 tablets by mouth 4 times daily (after meals and at bedtime) 240 capsule 3    tiZANidine (ZANAFLEX) 4 MG tablet Take 4 mg by mouth 3 times daily as needed  0     No current facility-administered medications for this visit. Allergies   Allergen Reactions    Peanuts [Peanut Oil] Anaphylaxis    Vicodin [Hydrocodone-Acetaminophen] Hives    Tramadol     Tylenol With Codeine #3 [Acetaminophen-Codeine]     Ibuprofen Itching       Health Maintenance   Topic Date Due    HIV screen  12/30/1971    Pneumococcal med risk (1 of 1 - PPSV23) 12/30/1975    Cervical cancer screen  12/30/1977    Shingles Vaccine (1 of 2 - 2 Dose Series) 12/30/2006    Diabetic microalbuminuria test  05/08/2018    Lipid screen  05/08/2018    Flu vaccine (1) 09/01/2018    Diabetic foot exam  11/03/2018 (Originally 12/30/1966)    Diabetic retinal exam  11/03/2018 (Originally 12/30/1966)    DTaP/Tdap/Td vaccine (1 - Tdap) 11/03/2018 (Originally 12/30/1975)    Breast cancer screen  06/09/2019    A1C test (Diabetic or Prediabetic)  07/10/2019    Potassium monitoring  09/21/2019    Creatinine monitoring  09/21/2019    Colon cancer screen colonoscopy  05/29/2021    Hepatitis C screen  Completed       Controlled Substances Monitoring:      HPI:     Diabetes   She presents for her follow-up diabetic visit. She has type 2 diabetes mellitus. Her disease course has been fluctuating. There are no diabetic complications.  Risk factors for

## 2018-11-13 ENCOUNTER — OFFICE VISIT (OUTPATIENT)
Dept: GASTROENTEROLOGY | Age: 63
End: 2018-11-13
Payer: MEDICARE

## 2018-11-13 VITALS
OXYGEN SATURATION: 98 % | SYSTOLIC BLOOD PRESSURE: 121 MMHG | WEIGHT: 107 LBS | BODY MASS INDEX: 16.76 KG/M2 | HEART RATE: 96 BPM | DIASTOLIC BLOOD PRESSURE: 82 MMHG

## 2018-11-13 DIAGNOSIS — K86.0 ALCOHOL-INDUCED CHRONIC PANCREATITIS (HCC): Primary | ICD-10-CM

## 2018-11-13 DIAGNOSIS — R97.8 ELEVATED CA 19-9 LEVEL: ICD-10-CM

## 2018-11-13 DIAGNOSIS — K59.09 OTHER CONSTIPATION: ICD-10-CM

## 2018-11-13 DIAGNOSIS — D36.9 ADENOMATOUS POLYPS: ICD-10-CM

## 2018-11-13 DIAGNOSIS — R97.0 ELEVATED CEA: ICD-10-CM

## 2018-11-13 DIAGNOSIS — K86.89 PANCREATIC CALCIFICATION: ICD-10-CM

## 2018-11-13 PROCEDURE — 99214 OFFICE O/P EST MOD 30 MIN: CPT | Performed by: INTERNAL MEDICINE

## 2018-11-13 ASSESSMENT — ENCOUNTER SYMPTOMS
DIARRHEA: 0
RECTAL PAIN: 0
BLOOD IN STOOL: 0
ABDOMINAL DISTENTION: 0
VOMITING: 0
ALLERGIC/IMMUNOLOGIC NEGATIVE: 1
COUGH: 1
CONSTIPATION: 0
NAUSEA: 0
GASTROINTESTINAL NEGATIVE: 1
ANAL BLEEDING: 0
ABDOMINAL PAIN: 0
EYES NEGATIVE: 1

## 2018-11-20 ENCOUNTER — OFFICE VISIT (OUTPATIENT)
Dept: NEUROLOGY | Age: 63
End: 2018-11-20
Payer: MEDICARE

## 2018-11-20 VITALS
SYSTOLIC BLOOD PRESSURE: 130 MMHG | WEIGHT: 96.4 LBS | BODY MASS INDEX: 15.13 KG/M2 | HEIGHT: 67 IN | DIASTOLIC BLOOD PRESSURE: 91 MMHG | HEART RATE: 106 BPM

## 2018-11-20 DIAGNOSIS — G57.93 NEUROPATHIC PAIN OF BOTH LEGS: Primary | ICD-10-CM

## 2018-11-20 DIAGNOSIS — G40.909 SEIZURE DISORDER, SECONDARY (HCC): ICD-10-CM

## 2018-11-20 PROCEDURE — 99214 OFFICE O/P EST MOD 30 MIN: CPT | Performed by: NURSE PRACTITIONER

## 2018-11-20 NOTE — PROGRESS NOTES
64682 units CPEP Take 2 tablets by mouth 4 times daily (after meals and at bedtime) 240 capsule 3    tiZANidine (ZANAFLEX) 4 MG tablet Take 4 mg by mouth 3 times daily as needed  0     No current facility-administered medications for this visit. PHYSICAL EXAMINATION       BP (!) 130/91 (Site: Right Upper Arm, Position: Sitting)   Pulse 106   Ht 5' 7\" (1.702 m)   Wt 96 lb 6.4 oz (43.7 kg)   BMI 15.10 kg/m²                                             . General Appearance:  Alert, cooperative, no signs of distress, appears stated age   Head:  Normocephalic, no signs of trauma   Eyes:  Conjunctiva/corneas clear;  eyelids intact   Ears:  Normal external ear and canals   Nose: Nares normal, mucosa normal, no drainage    Throat: Lips and tongue normal; teeth normal;  gums normal   Neck: Supple, intact flexion, extension and rotation;   trachea midline;  no adenopathy;   thyroid: not enlarged;   no carotid pulse abnormality   Back:   Symmetric, no curvature, ROM adequate   Lungs:   Respirations unlabored   Heart:  Regular rate and rhythm           Extremities: Extremities normal, no cyanosis, no edema, 2 areas on the right lower leg that appear like small bruises   Pulses: Symmetric over head and neck   Skin: Skin color, texture normal, no rashes, no lesions                                             NEUROLOGIC EXAMINATION    Neurologic Exam  Mental Status   Oriented to person, place, and time. Attention: normal.   Speech: speech is normal   Level of consciousness: alert  Normal comprehension.      Cranial Nerves      CN II   Visual fields full to confrontation.      CN III, IV, VI   Pupils are equal, round, and reactive to light.   Extraocular motions are normal.      CN V   Facial sensation intact.      CN VII   Facial expression full, symmetric.      CN VIII   CN VIII normal.

## 2019-02-13 ENCOUNTER — OFFICE VISIT (OUTPATIENT)
Dept: PRIMARY CARE CLINIC | Age: 64
End: 2019-02-13
Payer: MEDICARE

## 2019-02-13 VITALS
HEART RATE: 109 BPM | TEMPERATURE: 97.5 F | DIASTOLIC BLOOD PRESSURE: 67 MMHG | OXYGEN SATURATION: 99 % | BODY MASS INDEX: 14.41 KG/M2 | WEIGHT: 92 LBS | SYSTOLIC BLOOD PRESSURE: 96 MMHG

## 2019-02-13 DIAGNOSIS — M79.2 NEUROPATHIC PAIN: ICD-10-CM

## 2019-02-13 DIAGNOSIS — E11.9 TYPE 2 DIABETES MELLITUS WITHOUT COMPLICATION, WITH LONG-TERM CURRENT USE OF INSULIN (HCC): ICD-10-CM

## 2019-02-13 DIAGNOSIS — Z79.4 TYPE 2 DIABETES MELLITUS WITHOUT COMPLICATION, WITH LONG-TERM CURRENT USE OF INSULIN (HCC): ICD-10-CM

## 2019-02-13 DIAGNOSIS — Z11.4 ENCOUNTER FOR SCREENING FOR HIV: ICD-10-CM

## 2019-02-13 DIAGNOSIS — E11.42 TYPE 2 DIABETES MELLITUS WITH DIABETIC POLYNEUROPATHY, WITH LONG-TERM CURRENT USE OF INSULIN (HCC): ICD-10-CM

## 2019-02-13 DIAGNOSIS — Z79.4 TYPE 2 DIABETES MELLITUS WITH DIABETIC POLYNEUROPATHY, WITH LONG-TERM CURRENT USE OF INSULIN (HCC): ICD-10-CM

## 2019-02-13 DIAGNOSIS — R63.6 SEVERELY UNDERWEIGHT ADULT: ICD-10-CM

## 2019-02-13 DIAGNOSIS — Z13.220 SCREENING FOR HYPERLIPIDEMIA: Primary | ICD-10-CM

## 2019-02-13 DIAGNOSIS — I10 ESSENTIAL (PRIMARY) HYPERTENSION: ICD-10-CM

## 2019-02-13 LAB — HBA1C MFR BLD: 7.5 %

## 2019-02-13 PROCEDURE — 83036 HEMOGLOBIN GLYCOSYLATED A1C: CPT | Performed by: NURSE PRACTITIONER

## 2019-02-13 PROCEDURE — 99214 OFFICE O/P EST MOD 30 MIN: CPT | Performed by: NURSE PRACTITIONER

## 2019-02-13 RX ORDER — GABAPENTIN 400 MG/1
CAPSULE ORAL
Qty: 90 CAPSULE | Refills: 2 | Status: SHIPPED | OUTPATIENT
Start: 2019-02-13 | End: 2019-03-19 | Stop reason: SDUPTHER

## 2019-02-13 RX ORDER — LACTOSE-REDUCED FOOD 0.05 G-1.5
1 LIQUID (ML) ORAL
Qty: 90 BOTTLE | Refills: 2 | Status: SHIPPED | OUTPATIENT
Start: 2019-02-13 | End: 2021-06-09

## 2019-02-13 ASSESSMENT — ENCOUNTER SYMPTOMS
BLOOD IN STOOL: 0
VOMITING: 0
NAUSEA: 0
COUGH: 0
ABDOMINAL PAIN: 0

## 2019-03-06 ENCOUNTER — TELEPHONE (OUTPATIENT)
Dept: PRIMARY CARE CLINIC | Age: 64
End: 2019-03-06

## 2019-03-11 ENCOUNTER — HOSPITAL ENCOUNTER (OUTPATIENT)
Age: 64
Discharge: HOME | End: 2019-03-11
Payer: MEDICARE

## 2019-03-11 DIAGNOSIS — E11.9 TYPE 2 DIABETES MELLITUS WITHOUT COMPLICATION, WITH LONG-TERM CURRENT USE OF INSULIN (HCC): ICD-10-CM

## 2019-03-11 DIAGNOSIS — Z79.4 TYPE 2 DIABETES MELLITUS WITHOUT COMPLICATION, WITH LONG-TERM CURRENT USE OF INSULIN (HCC): ICD-10-CM

## 2019-03-11 DIAGNOSIS — Z13.220 SCREENING FOR HYPERLIPIDEMIA: ICD-10-CM

## 2019-03-11 DIAGNOSIS — Z11.4 ENCOUNTER FOR SCREENING FOR HIV: ICD-10-CM

## 2019-03-11 DIAGNOSIS — K59.09 OTHER CONSTIPATION: ICD-10-CM

## 2019-03-11 LAB
CHOLESTEROL, FASTING: 156 MG/DL
CHOLESTEROL/HDL RATIO: 1.9
CREATININE URINE: 157.2 MG/DL (ref 28–217)
HDLC SERPL-MCNC: 84 MG/DL
HIV AG/AB: NONREACTIVE
LDL CHOLESTEROL: 45 MG/DL (ref 0–130)
MICROALBUMIN/CREAT 24H UR: <12 MG/L
MICROALBUMIN/CREAT UR-RTO: NORMAL MCG/MG CREAT
TRIGLYCERIDE, FASTING: 133 MG/DL
TSH SERPL DL<=0.05 MIU/L-ACNC: 0.84 MIU/L (ref 0.3–5)
VLDLC SERPL CALC-MCNC: NORMAL MG/DL (ref 1–30)

## 2019-03-11 PROCEDURE — 82043 UR ALBUMIN QUANTITATIVE: CPT

## 2019-03-11 PROCEDURE — 87389 HIV-1 AG W/HIV-1&-2 AB AG IA: CPT

## 2019-03-11 PROCEDURE — 82570 ASSAY OF URINE CREATININE: CPT

## 2019-03-11 PROCEDURE — 84443 ASSAY THYROID STIM HORMONE: CPT

## 2019-03-11 PROCEDURE — 80061 LIPID PANEL: CPT

## 2019-03-11 PROCEDURE — 36415 COLL VENOUS BLD VENIPUNCTURE: CPT

## 2019-03-13 ENCOUNTER — TELEPHONE (OUTPATIENT)
Dept: GASTROENTEROLOGY | Age: 64
End: 2019-03-13

## 2019-03-19 ENCOUNTER — OFFICE VISIT (OUTPATIENT)
Dept: GASTROENTEROLOGY | Age: 64
End: 2019-03-19
Payer: MEDICARE

## 2019-03-19 ENCOUNTER — TELEPHONE (OUTPATIENT)
Dept: PRIMARY CARE CLINIC | Age: 64
End: 2019-03-19

## 2019-03-19 VITALS
SYSTOLIC BLOOD PRESSURE: 118 MMHG | WEIGHT: 96 LBS | DIASTOLIC BLOOD PRESSURE: 71 MMHG | BODY MASS INDEX: 15.04 KG/M2 | HEART RATE: 103 BPM

## 2019-03-19 DIAGNOSIS — R97.8 ELEVATED CA 19-9 LEVEL: ICD-10-CM

## 2019-03-19 DIAGNOSIS — K86.0 ALCOHOL-INDUCED CHRONIC PANCREATITIS (HCC): Primary | ICD-10-CM

## 2019-03-19 DIAGNOSIS — Z79.4 TYPE 2 DIABETES MELLITUS WITH DIABETIC POLYNEUROPATHY, WITH LONG-TERM CURRENT USE OF INSULIN (HCC): ICD-10-CM

## 2019-03-19 DIAGNOSIS — E11.42 TYPE 2 DIABETES MELLITUS WITH DIABETIC POLYNEUROPATHY, WITH LONG-TERM CURRENT USE OF INSULIN (HCC): ICD-10-CM

## 2019-03-19 DIAGNOSIS — R79.89 ELEVATED LFTS: ICD-10-CM

## 2019-03-19 DIAGNOSIS — D36.9 ADENOMATOUS POLYPS: ICD-10-CM

## 2019-03-19 DIAGNOSIS — M79.2 NEUROPATHIC PAIN: ICD-10-CM

## 2019-03-19 DIAGNOSIS — R63.4 WEIGHT LOSS: ICD-10-CM

## 2019-03-19 DIAGNOSIS — R97.0 ELEVATED CEA: ICD-10-CM

## 2019-03-19 PROCEDURE — 99214 OFFICE O/P EST MOD 30 MIN: CPT | Performed by: INTERNAL MEDICINE

## 2019-03-19 RX ORDER — GABAPENTIN 400 MG/1
CAPSULE ORAL
Qty: 90 CAPSULE | Refills: 2 | Status: SHIPPED | OUTPATIENT
Start: 2019-03-19 | End: 2019-05-15 | Stop reason: SDUPTHER

## 2019-03-19 ASSESSMENT — ENCOUNTER SYMPTOMS
ALLERGIC/IMMUNOLOGIC NEGATIVE: 1
EYES NEGATIVE: 1
VOMITING: 1
ANAL BLEEDING: 1
WHEEZING: 1

## 2019-04-09 ENCOUNTER — TELEPHONE (OUTPATIENT)
Dept: PRIMARY CARE CLINIC | Age: 64
End: 2019-04-09

## 2019-04-09 DIAGNOSIS — J44.9 CHRONIC OBSTRUCTIVE PULMONARY DISEASE, UNSPECIFIED COPD TYPE (HCC): Primary | ICD-10-CM

## 2019-04-09 NOTE — TELEPHONE ENCOUNTER
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 Patria Sharp from the [facility] [employer] states the letter needs to state that pt was not on Ellipta medication for COPD and that she does not have COPD. When completed call Patria Sharp at [phone] to pick letter up.

## 2019-04-10 NOTE — TELEPHONE ENCOUNTER
I cannot write such a letter at this time. She was prescribed that inhaler for over 1 year (multiple refills requested) and there is a listed diagnosis of COPD in her medical history. What I can do is order testing called Pulmonary function testing that test for COPD.  If that is negative I can confirm for them she mike snot have COPD

## 2019-04-24 RX ORDER — MIRTAZAPINE 30 MG/1
TABLET, FILM COATED ORAL
Qty: 30 TABLET | Refills: 5 | Status: SHIPPED | OUTPATIENT
Start: 2019-04-24 | End: 2019-05-15 | Stop reason: SDUPTHER

## 2019-04-24 NOTE — TELEPHONE ENCOUNTER
Health Maintenance   Topic Date Due    Diabetic retinal exam  12/30/1966    DTaP/Tdap/Td vaccine (1 - Tdap) 12/30/1975    Cervical cancer screen  12/30/1977    Shingles Vaccine (1 of 2) 12/30/2006    Breast cancer screen  06/09/2019    Potassium monitoring  09/21/2019    Creatinine monitoring  09/21/2019    Diabetic foot exam  02/13/2020    A1C test (Diabetic or Prediabetic)  02/13/2020    Diabetic microalbuminuria test  03/11/2020    Lipid screen  03/11/2020    Colon cancer screen colonoscopy  05/29/2021    Flu vaccine  Completed    Pneumococcal 0-64 years Vaccine  Completed    Hepatitis C screen  Completed    HIV screen  Completed             (applicable per patient's age: Cancer Screenings, Depression Screening, Fall Risk Screening, Immunizations)    Hemoglobin A1C (%)   Date Value   02/13/2019 7.5   07/10/2018 8.8   11/03/2017 7.9     Microalb/Crt.  Ratio (mcg/mg creat)   Date Value   03/11/2019 CANNOT BE CALCULATED     LDL Cholesterol (mg/dL)   Date Value   03/11/2019 45     AST (U/L)   Date Value   09/21/2018 22     ALT (U/L)   Date Value   09/21/2018 13     BUN (mg/dL)   Date Value   09/21/2018 14      (goal A1C is < 7)   (goal LDL is <100) need 30-50% reduction from baseline     BP Readings from Last 3 Encounters:   03/19/19 118/71   02/13/19 96/67   11/20/18 (!) 130/91    (goal /80)      All Future Testing planned in [facility]:  Lab Frequency Next Occurrence   CEA Once 06/19/2019       Next Visit Date:  Future Appointments   Date Time Provider Teresa Shoemaker   5/15/2019 11:00 AM DONTRELL Panchal CNP [facility]   5/20/2019  2:40 PM Louisa Najjar, APRN - CNP [facility]            Patient Active Problem List:     Intractable epilepsy ([facility] [address]     Hypoglycemia     Depression     Osteoporosis     Affective disorder ([facility] [address]     Uncontrolled type 2 DM with hyperosmolar nonketotic hyperglycemia (HCC)     Generalized abdominal pain     Pancreatic calcification Pancreatic insufficiency     Pain of upper abdomen     Alcohol-induced chronic pancreatitis (HCC)     Weight loss     Polysubstance abuse (HCC)     Unresponsive episode     Chronic neck pain     Neuropathic pain of both legs     Seizure disorder, secondary ([address]     Essential (primary) hypertension     Type 2 diabetes mellitus without complication, with long-term current use of insulin (HCC)     Neuropathic pain     Elevated CEA     Acute left-sided low back pain without sciatica     Adenomatous polyps     Bilateral wrist pain     Seizure-like activity (HCC)     Elevated CA 19-9 level

## 2019-05-15 ENCOUNTER — OFFICE VISIT (OUTPATIENT)
Dept: PRIMARY CARE CLINIC | Age: 64
End: 2019-05-15
Payer: MEDICARE

## 2019-05-15 ENCOUNTER — HOSPITAL ENCOUNTER (OUTPATIENT)
Age: 64
Discharge: HOME | End: 2019-05-17
Payer: MEDICARE

## 2019-05-15 ENCOUNTER — HOSPITAL ENCOUNTER (OUTPATIENT)
Dept: GENERAL RADIOLOGY | Age: 64
Discharge: HOME | End: 2019-05-17
Payer: MEDICARE

## 2019-05-15 VITALS
TEMPERATURE: 98.2 F | DIASTOLIC BLOOD PRESSURE: 79 MMHG | WEIGHT: 95 LBS | BODY MASS INDEX: 14.88 KG/M2 | OXYGEN SATURATION: 97 % | SYSTOLIC BLOOD PRESSURE: 116 MMHG | HEART RATE: 106 BPM

## 2019-05-15 DIAGNOSIS — E11.42 TYPE 2 DIABETES MELLITUS WITH DIABETIC POLYNEUROPATHY, WITH LONG-TERM CURRENT USE OF INSULIN (HCC): ICD-10-CM

## 2019-05-15 DIAGNOSIS — K86.89 PANCREATIC INSUFFICIENCY: Primary | ICD-10-CM

## 2019-05-15 DIAGNOSIS — J44.9 CHRONIC OBSTRUCTIVE PULMONARY DISEASE, UNSPECIFIED COPD TYPE (HCC): ICD-10-CM

## 2019-05-15 DIAGNOSIS — Z79.4 TYPE 2 DIABETES MELLITUS WITH DIABETIC POLYNEUROPATHY, WITH LONG-TERM CURRENT USE OF INSULIN (HCC): ICD-10-CM

## 2019-05-15 DIAGNOSIS — J18.9 PNEUMONIA OF LEFT UPPER LOBE DUE TO INFECTIOUS ORGANISM: ICD-10-CM

## 2019-05-15 DIAGNOSIS — M79.2 NEUROPATHIC PAIN: ICD-10-CM

## 2019-05-15 PROCEDURE — 71046 X-RAY EXAM CHEST 2 VIEWS: CPT

## 2019-05-15 PROCEDURE — 99213 OFFICE O/P EST LOW 20 MIN: CPT | Performed by: NURSE PRACTITIONER

## 2019-05-15 RX ORDER — GABAPENTIN 400 MG/1
CAPSULE ORAL
Qty: 90 CAPSULE | Refills: 2 | Status: SHIPPED | OUTPATIENT
Start: 2019-05-15 | End: 2020-01-10 | Stop reason: SDUPTHER

## 2019-05-15 RX ORDER — LANCETS 33 GAUGE
EACH MISCELLANEOUS
COMMUNITY
Start: 2019-04-09 | End: 2020-04-06 | Stop reason: SDUPTHER

## 2019-05-15 RX ORDER — TIZANIDINE 4 MG/1
4 TABLET ORAL 3 TIMES DAILY PRN
Refills: 0 | Status: CANCELLED | OUTPATIENT
Start: 2019-05-15

## 2019-05-15 RX ORDER — AZITHROMYCIN 250 MG/1
250 TABLET, FILM COATED ORAL SEE ADMIN INSTRUCTIONS
Qty: 6 TABLET | Refills: 0 | Status: SHIPPED | OUTPATIENT
Start: 2019-05-15 | End: 2019-05-20

## 2019-05-15 RX ORDER — GLIMEPIRIDE 2 MG/1
2 TABLET ORAL
Qty: 90 TABLET | Refills: 3 | Status: SHIPPED | OUTPATIENT
Start: 2019-05-15 | End: 2020-01-10 | Stop reason: SDUPTHER

## 2019-05-15 RX ORDER — LANCETS 30 GAUGE
EACH MISCELLANEOUS
COMMUNITY
Start: 2019-04-30 | End: 2020-04-06 | Stop reason: SDUPTHER

## 2019-05-15 ASSESSMENT — ENCOUNTER SYMPTOMS
RHINORRHEA: 0
SHORTNESS OF BREATH: 0
WHEEZING: 1
COUGH: 1
HEMOPTYSIS: 0

## 2019-05-15 NOTE — PROGRESS NOTES
[facility]  [address] [place] [id number]  Dept: [phone]  Dept Fax: [phone]    5/15/2019     Bertin Cantu (:  1955)is a 59 y.o. female, here for evaluation of the following medical concerns:   Chief Complaint   Patient presents with    3 Month Follow-Up     diabetes    Cough     pt states that she has a cough with congestion but is not bringing anything up when she coughs. Not taking insulin, feels she doesn't need it    Does have legs pains, feels like bugs or on fire  Does not think she received her 400 mg dosing    Struggles daily with her appetite and weight, eats 2 times a day  Taking  Glucerna 3-4 times a day, cannot afford on her own  Smokes THC to boost her appetite      Diabetes   She presents for her follow-up diabetic visit. She has type 2 diabetes mellitus. Her disease course has been stable. Pertinent negatives for hypoglycemia include no dizziness. Pertinent negatives for diabetes include no chest pain, no fatigue, no polydipsia, no polyphagia, no polyuria and no weakness. Diabetic complications include peripheral neuropathy. Current diabetic treatment includes oral agent (dual therapy). Her weight is stable. When asked about meal planning, she reported none. She rarely participates in exercise. There is no change in her home blood glucose trend. Her breakfast blood glucose range is generally 110-130 mg/dl. An ACE inhibitor/angiotensin II receptor blocker is not being taken. She does not see a podiatrist.Eye exam is not current. Cough   This is a new problem. The current episode started in the past 7 days. The problem has been gradually worsening. The cough is non-productive. Associated symptoms include chills, nasal congestion and wheezing. Pertinent negatives include no chest pain, fever, hemoptysis, rhinorrhea or shortness of breath.      .    Review of Systems   Constitutional: Positive for chills. Negative for fatigue and fever. HENT: Negative for rhinorrhea. Respiratory: Positive for cough and wheezing. Negative for hemoptysis and shortness of breath. Cardiovascular: Negative for chest pain. Endocrine: Negative for polydipsia, polyphagia and polyuria. Neurological: Negative for dizziness and weakness. Prior to Visit Medications    Medication Sig Taking? Authorizing Provider   COMFORT EZ PEN NEEDLES 32G X 6 MM MISC  Yes Historical Provider, MD Juan Alpers 30G [address] [facility]  Yes Historical Provider, MD   glimepiride (AMARYL) 2 MG tablet Take 1 tablet by mouth every morning (before breakfast) Yes DONTRELL Goyal CNP   gabapentin (NEURONTIN) 400 MG capsule TAKE 1 CAPSULE BY MOUTH  THREE TIMES DAILY Yes DONTRELL Goyal CNP   azithromycin (ZITHROMAX) 250 MG tablet Take 1 tablet by mouth See Admin Instructions for 5 days 500mg on day 1 followed by 250mg on days 2 - 5 Yes DONTRELL Goyal CNP   Nutritional Supplements ([address] [facility] [facility] [address]) LIQD Take 1 Bottle by mouth 3 times daily (with meals) Yes DONTRELL Goyal CNP   albuterol sulfate HFA (PROAIR HFA) 108 (90 Base) MCG/ACT inhaler Inhale 2 puffs into the lungs every 6 hours as needed for Wheezing Yes Gladys Moore MD   sodium bicarbonate 650 MG tablet Take 1 tablet by mouth 3 times daily Yes Gladys Moore MD   metFORMIN (GLUCOPHAGE) 500 MG tablet Take 1 tablet by mouth 2 times daily (with meals) Yes Gladys Moore MD   mirtazapine (REMERON) 30 MG tablet Take 0.5 tablets by mouth nightly Yes Gladys Moore MD   omeprazole (PRILOSEC) 20 MG delayed release capsule TAKE 1 CAPSULE BY MOUTH TWO TIMES DAILY Yes Gladys Moore MD   Pancrelipase, Lip-Prot-Amyl, (CREON) 10403 units CPEP Take 2 tablets by mouth 4 times daily (after meals and at bedtime) Yes Clemencia Sheriff MD   zoster recombinant adjuvanted vaccine (SHINGRIX) 50 MCG SUSR injection 50 MCG IM then repeat 2-6 months.   Gladys Moore MD insulin glargine (LANTUS) 100 UNIT/ML injection vial Inject 7 Units into the skin every morning  Gentry Finch MD        Social History     Tobacco Use    Smoking status: Current Every Day Smoker     Packs/day: 0.25     Years: 40.00     Pack years: 10.00    Smokeless tobacco: Former User   Substance Use Topics    Alcohol use: No        Vitals:    05/15/19 1058   BP: 116/79   Site: Left Upper Arm   Position: Sitting   Cuff Size: Medium Adult   Pulse: 106   Temp: 98.2 °F (36.8 °C)   TempSrc: Oral   SpO2: 97%   Weight: 95 lb (43.1 kg)     Estimated body mass index is 14.88 kg/m² as calculated from the following:    Height as of 11/20/18: 5' 7\" (1.702 m). Weight as of this encounter: 95 lb (43.1 kg). DIAGNOSTIC FINDINGS:  CBC:  Lab Results   Component Value Date    WBC 6.8 09/21/2018    HGB 11.7 09/21/2018     09/21/2018     05/01/2012       BMP:    Lab Results   Component Value Date     09/21/2018    K 5.2 09/21/2018     09/21/2018    CO2 25 09/21/2018    BUN 14 09/21/2018    CREATININE 0.70 09/21/2018    GLUCOSE 126 09/21/2018    GLUCOSE 159 05/01/2012       HEMOGLOBIN A1C:   Lab Results   Component Value Date    LABA1C 7.5 02/13/2019       FASTING LIPID PANEL:  Lab Results   Component Value Date    CHOL 174 05/08/2017    HDL 84 03/11/2019    TRIG 94 05/08/2017       Physical Exam   Constitutional: She is oriented to person, place, and time. She appears well-developed. Non-toxic appearance. She does not have a sickly appearance. She does not appear ill. HENT:   Head: Normocephalic. Neck: Normal range of motion. Neck supple. Cardiovascular: Normal rate and regular rhythm. Pulmonary/Chest: Effort normal. She has no wheezes. She has rhonchi in the left upper field and the left middle field. Abdominal: Soft. Bowel sounds are normal. There is no tenderness. Musculoskeletal: She exhibits no edema. Neurological: She is alert and oriented to person, place, and time. Skin: Skin is warm and dry. Bilateral feet: Normal sensation with microfilament testing in all points. Pedal pulses +2. No open lesions. Psychiatric: She has a normal mood and affect. Her behavior is normal. Judgment normal.   Vitals reviewed. ASSESSMENT     Diagnosis Orders   1. Pancreatic insufficiency     2. Neuropathic pain  gabapentin (NEURONTIN) 400 MG capsule   3. Type 2 diabetes mellitus with diabetic polyneuropathy, with long-term current use of insulin ([facility])  glimepiride (AMARYL) 2 MG tablet    gabapentin (NEURONTIN) 400 MG capsule   4. Body mass index (BMI) less than 16.5     5. Pneumonia of left upper lobe due to infectious organism ([facility] [address]  azithromycin (ZITHROMAX) 250 MG tablet    XR CHEST STANDARD (2 VW)   6. Chronic obstructive pulmonary disease, unspecified COPD type ([facility])  XR CHEST STANDARD (2 VW)    Full PFT Study With Bronchodilator          PLAN:  Orders Placed This Encounter   Medications    glimepiride (AMARYL) 2 MG tablet     Sig: Take 1 tablet by mouth every morning (before breakfast)     Dispense:  90 tablet     Refill:  3    gabapentin (NEURONTIN) 400 MG capsule     Sig: TAKE 1 CAPSULE BY MOUTH  THREE TIMES DAILY     Dispense:  90 capsule     Refill:  2    azithromycin (ZITHROMAX) 250 MG tablet     Sig: Take 1 tablet by mouth See Admin Instructions for 5 days 500mg on day 1 followed by 250mg on days 2 - 5     Dispense:  6 tablet     Refill:  0         1. Resend Gabapentin prescription  2. S/S concerning with pneumonia, crackles only in left lung fields. CXR and Zpack ordered  3. PFT re-ordered as as she is not sure about COPD diagnosis and needs verification for her insurance company  4. Weight stable    FOLLOW UP AND INSTRUCTIONS:  Return in about 1 month (around 6/12/2019) for PFT follow up, f/u pneumonia.     · Elvira Nina received counseling on the following healthy behaviors:nutrition and medication adherence    · Discussed use, benefit, and side effects of prescribed medications. Barriers to medication compliance addressed. All patient questions answered. Pt verbalized understanding of all instructions given. · Patient given educational materials - see patient instructions      · Patient advised to contact scheduling offices for any referrals or imaging orders  placed today if they have not been contacted in 48 hours. Return in about 1 month (around 6/12/2019) for PFT follow up, f/u pneumonia. An electronic signature was used to authenticate this note. --DONTRELL Goyal CNP on 5/15/2019 at 12:37 PM  Visit Information    Have you changed or started any medications since your last visit including any over-the-counter medicines, vitamins, or herbal medicines? no   Are you having any side effects from any of your medications? -  no  Have you stopped taking any of your medications? Is so, why? -  Yes due to refills    Have you seen any other physician or provider since your last visit? No  Have you had any other diagnostic tests since your last visit? No  Have you been seen in the emergency room and/or had an admission to a hospital since we last saw you? No  Have you had your routine dental cleaning in the past 6 months? no    Have you activated your [employer] account? If not, what are your barriers?  Yes     Patient Care Team:  DONTRELL Goyal CNP as PCP - General (Family Medicine)  DONTRELL Goyal CNP as PCP - S Attributed Provider  Clemencia Sheriff MD as Consulting Physician (Gastroenterology)    Medical History Review  Past Medical, Family, and Social History reviewed and does not contribute to the patient presenting condition    Health Maintenance   Topic Date Due    Diabetic retinal exam  12/30/1966    DTaP/Tdap/Td vaccine (1 - Tdap) 12/30/1975    Cervical cancer screen  12/30/1977    Shingles Vaccine (1 of 2) 12/30/2006    Breast cancer screen  06/09/2019    Potassium monitoring  09/21/2019    Creatinine monitoring  09/21/2019    Diabetic foot exam  02/13/2020    A1C test (Diabetic or Prediabetic)  02/13/2020    Diabetic microalbuminuria test  03/11/2020    Lipid screen  03/11/2020    Colon cancer screen colonoscopy  05/29/2021    Flu vaccine  Completed    Pneumococcal 0-64 years Vaccine  Completed    Hepatitis C screen  Completed    HIV screen  Completed

## 2019-06-05 ENCOUNTER — OFFICE VISIT (OUTPATIENT)
Dept: PRIMARY CARE CLINIC | Age: 64
End: 2019-06-05
Payer: MEDICARE

## 2019-06-05 ENCOUNTER — HOSPITAL ENCOUNTER (OUTPATIENT)
Age: 64
Setting detail: SPECIMEN
Discharge: HOME | End: 2019-06-05
Payer: MEDICARE

## 2019-06-05 VITALS
SYSTOLIC BLOOD PRESSURE: 108 MMHG | DIASTOLIC BLOOD PRESSURE: 67 MMHG | OXYGEN SATURATION: 98 % | HEART RATE: 93 BPM | TEMPERATURE: 96.6 F | WEIGHT: 95 LBS | BODY MASS INDEX: 14.88 KG/M2

## 2019-06-05 DIAGNOSIS — R35.0 URINARY FREQUENCY: ICD-10-CM

## 2019-06-05 DIAGNOSIS — R10.9 LEFT FLANK PAIN: ICD-10-CM

## 2019-06-05 DIAGNOSIS — R10.9 LEFT FLANK PAIN: Primary | ICD-10-CM

## 2019-06-05 DIAGNOSIS — N12 PYELONEPHRITIS: ICD-10-CM

## 2019-06-05 LAB
BILIRUBIN, POC: NEGATIVE
BLOOD URINE, POC: NEGATIVE
CLARITY, POC: CLEAR
COLOR, POC: YELLOW
GLUCOSE URINE, POC: ABNORMAL
KETONES, POC: NEGATIVE
LEUKOCYTE EST, POC: NEGATIVE
NITRITE, POC: NEGATIVE
PH, POC: 6
PROTEIN, POC: NEGATIVE
SPECIFIC GRAVITY, POC: 1.03
UROBILINOGEN, POC: 0.2

## 2019-06-05 PROCEDURE — 81003 URINALYSIS AUTO W/O SCOPE: CPT | Performed by: NURSE PRACTITIONER

## 2019-06-05 PROCEDURE — 99213 OFFICE O/P EST LOW 20 MIN: CPT | Performed by: NURSE PRACTITIONER

## 2019-06-05 RX ORDER — CIPROFLOXACIN 500 MG/1
500 TABLET, FILM COATED ORAL 2 TIMES DAILY
Qty: 20 TABLET | Refills: 0 | Status: SHIPPED | OUTPATIENT
Start: 2019-06-05 | End: 2019-06-15

## 2019-06-05 ASSESSMENT — ENCOUNTER SYMPTOMS
VOMITING: 0
NAUSEA: 0

## 2019-06-05 NOTE — PATIENT INSTRUCTIONS
Raffaele Martin,    This is June 17, 2019 from 11:30am-12:30pm. DONTRELL Coronado CNP has asked me to invite you to become a member of our [place] that is dedicated to improving our practice. We value the opinions of our patients at [facility]. That is why our Patient & Family Advisory [place] pulls together patients, family members, caregivers, office care team members and the providers to discuss improving the health care services provided by the practice as well as the patient and family health care experience. The [place] is doing this by identify opportunities for improvement through:    - Gathering and providing feedback on the practice environment   - Discussing operational and clinical workflows   - Improving staff and provider interactions    Patient & Family Advisors are expected to attend meetings once per quarter and the meetings will be held either in our office or another convenient location. Light refreshments or a meal will be provided for the meeting as well! If you are interested in being a part of this very important [place] that is dedicated to creating safe, reliable and quality healthcare, please call the office and any staff member can assist you with the details of our next [place] meeting!     Thank you for your consideration in being apart of our team,  [address] [place] [address] Team

## 2019-06-05 NOTE — PROGRESS NOTES
[facility]  [address] [place] [address]  Dept: [phone]  Dept Fax: [phone]    2019     Sarah Lagunas (:  1955)is a 59 y.o. female, here for evaluation of the following medical concerns:   Chief Complaint   Patient presents with    Flank Pain     left side pain       Pain starts on her left back and wraps around her front  NO abd pains    Completed Z-pack last month  No further chills or chest pains  Is scheduled for PFT's on     Urinary Tract Infection    This is a new problem. The current episode started yesterday. The problem has been unchanged. The patient is experiencing no pain. There has been no fever. Associated symptoms include flank pain, frequency and urgency. Pertinent negatives include no discharge, hematuria, hesitancy, nausea or vomiting. There is no history of kidney stones. .    Review of Systems   Gastrointestinal: Negative for nausea and vomiting. Genitourinary: Positive for flank pain, frequency and urgency. Negative for hematuria and hesitancy. Prior to Visit Medications    Medication Sig Taking?  Authorizing Provider   COMFORT EZ PEN NEEDLES 32G X 6 MM MISC  Yes Historical Provider, MD Georgina Estrella 30G [address] [facility]  Yes Historical Provider, MD   glimepiride (AMARYL) 2 MG tablet Take 1 tablet by mouth every morning (before breakfast) Yes DONTRELL Coppola CNP   gabapentin (NEURONTIN) 400 MG capsule TAKE 1 CAPSULE BY MOUTH  THREE TIMES DAILY Yes DONTRELL Coppola CNP   Nutritional Supplements (GLUCERNA HUNGER SMART SHAKE) LIQD Take 1 Bottle by mouth 3 times daily (with meals) Yes DONTRELL Coppola CNP   albuterol sulfate HFA (PROAIR HFA) 108 (90 Base) MCG/ACT inhaler Inhale 2 puffs into the lungs every 6 hours as needed for Wheezing Yes Zoltan Mota MD   sodium bicarbonate 650 MG tablet Take 1 tablet by mouth 3 times daily Yes Zoltan Mota MD metFORMIN (GLUCOPHAGE) 500 MG tablet Take 1 tablet by mouth 2 times daily (with meals) Yes Derrell Sunshine MD   mirtazapine (REMERON) 30 MG tablet Take 0.5 tablets by mouth nightly Yes Derrell Sunshine MD   zoster recombinant adjuvanted vaccine (SHINGRIX) 50 MCG SUSR injection 50 MCG IM then repeat 2-6 months. Derrell Sunshine MD   insulin glargine (LANTUS) 100 UNIT/ML injection vial Inject 7 Units into the skin every morning  Derrell Sunshine MD   omeprazole (PRILOSEC) 20 MG delayed release capsule TAKE 1 CAPSULE BY MOUTH TWO TIMES DAILY  Derrell Sunshine MD   Pancrelipase, Lip-Prot-Amyl, (CREON) 22661 units CPEP Take 2 tablets by mouth 4 times daily (after meals and at bedtime)  Arianna Madison MD        Social History     Tobacco Use    Smoking status: Current Every Day Smoker     Packs/day: 0.25     Years: 40.00     Pack years: 10.00    Smokeless tobacco: Former User   Substance Use Topics    Alcohol use: No        Vitals:    06/05/19 1432   BP: 108/67   Site: Left Upper Arm   Position: Sitting   Cuff Size: Medium Adult   Pulse: 93   Temp: 96.6 °F (35.9 °C)   TempSrc: Oral   SpO2: 98%   Weight: 95 lb (43.1 kg)     Estimated body mass index is 14.88 kg/m² as calculated from the following:    Height as of 11/20/18: 5' 7\" (1.702 m). Weight as of this encounter: 95 lb (43.1 kg).     DIAGNOSTIC FINDINGS:  CBC:  Lab Results   Component Value Date    WBC 6.8 09/21/2018    HGB 11.7 09/21/2018     09/21/2018     05/01/2012       BMP:    Lab Results   Component Value Date     09/21/2018    K 5.2 09/21/2018     09/21/2018    CO2 25 09/21/2018    BUN 14 09/21/2018    CREATININE 0.70 09/21/2018    GLUCOSE 126 09/21/2018    GLUCOSE 159 05/01/2012       HEMOGLOBIN A1C:   Lab Results   Component Value Date    LABA1C 7.5 02/13/2019       FASTING LIPID PANEL:  Lab Results   Component Value Date    CHOL 174 05/08/2017    HDL 84 03/11/2019    TRIG 94 05/08/2017       Physical Exam   Constitutional: started any medications since your last visit including any over-the-counter medicines, vitamins, or herbal medicines? no   Are you having any side effects from any of your medications? -  no  Have you stopped taking any of your medications? Is so, why? -  yes    Have you seen any other physician or provider since your last visit? No  Have you had any other diagnostic tests since your last visit? Yes - Records Requested  Have you been seen in the emergency room and/or had an admission to a hospital since we last saw you? No  Have you had your routine dental cleaning in the past 6 months? no    Have you activated your [employer] account? If not, what are your barriers?  Yes     Patient Care Team:  Ed DONTRELL Gamboa CNP as PCP - General (Family Medicine)  Ed DONTRELL Gamboa CNP as PCP [facility]  Emilee Dickey MD as Consulting Physician (Gastroenterology)    Medical History Review  Past Medical, Family, and Social History reviewed and does not contribute to the patient presenting condition    Health Maintenance   Topic Date Due    Diabetic retinal exam  12/30/1966    DTaP/Tdap/Td vaccine (1 - Tdap) 12/30/1975    Cervical cancer screen  12/30/1977    Shingles Vaccine (1 of 2) 12/30/2006    Breast cancer screen  06/09/2019    Potassium monitoring  09/21/2019    Creatinine monitoring  09/21/2019    Diabetic foot exam  02/13/2020    A1C test (Diabetic or Prediabetic)  02/13/2020    Diabetic microalbuminuria test  03/11/2020    Lipid screen  03/11/2020    Colon cancer screen colonoscopy  05/29/2021    Flu vaccine  Completed    Pneumococcal 0-64 years Vaccine  Completed    Hepatitis C screen  Completed    HIV screen  Completed

## 2019-06-06 LAB
CULTURE: NORMAL
Lab: NORMAL
SPECIMEN DESCRIPTION: NORMAL

## 2019-06-25 ENCOUNTER — HOSPITAL ENCOUNTER (OUTPATIENT)
Dept: NEUROLOGY | Age: 64
Discharge: HOME | End: 2019-06-25
Payer: MEDICARE

## 2019-06-25 DIAGNOSIS — J44.9 CHRONIC OBSTRUCTIVE PULMONARY DISEASE, UNSPECIFIED COPD TYPE (HCC): ICD-10-CM

## 2019-06-25 PROCEDURE — 94060 EVALUATION OF WHEEZING: CPT

## 2019-06-25 PROCEDURE — 94664 DEMO&/EVAL PT USE INHALER: CPT

## 2019-06-25 PROCEDURE — 94727 GAS DIL/WSHOT DETER LNG VOL: CPT

## 2019-06-25 PROCEDURE — 94729 DIFFUSING CAPACITY: CPT

## 2019-06-25 PROCEDURE — 6370000000 HC RX 637 (ALT 250 FOR IP): Performed by: NURSE PRACTITIONER

## 2019-06-25 RX ORDER — ALBUTEROL SULFATE 90 UG/1
2 AEROSOL, METERED RESPIRATORY (INHALATION) ONCE
Status: COMPLETED | OUTPATIENT
Start: 2019-06-25 | End: 2019-06-25

## 2019-06-25 RX ADMIN — ALBUTEROL SULFATE 2 PUFF: 90 AEROSOL, METERED RESPIRATORY (INHALATION) at 13:24

## 2019-06-28 LAB
DLCO %PRED: 40 %
DLCO PRED: NORMAL ML/MIN/MMHG
DLCO/VA %PRED: NORMAL %
DLCO/VA PRED: NORMAL ML/MIN/MMHG
DLCO/VA: NORMAL ML/MIN/MMHG
DLCO: NORMAL ML/MIN/MMHG
EXPIRATORY TIME-POST: NORMAL SEC
EXPIRATORY TIME: NORMAL SEC
FEF 25-75% %CHNG: NORMAL
FEF 25-75% %PRED-POST: NORMAL %
FEF 25-75% %PRED-PRE: NORMAL L/SEC
FEF 25-75% PRED: NORMAL L/SEC
FEF 25-75%-POST: NORMAL L/SEC
FEF 25-75%-PRE: NORMAL L/SEC
FEV1 %PRED-POST: 61 %
FEV1 %PRED-PRE: 57 %
FEV1 PRED: NORMAL L
FEV1-POST: NORMAL L
FEV1-PRE: NORMAL L
FEV1/FVC %PRED-POST: NORMAL %
FEV1/FVC %PRED-PRE: NORMAL %
FEV1/FVC PRED: NORMAL %
FEV1/FVC-POST: 77 %
FEV1/FVC-PRE: 79 %
FVC %PRED-POST: 78 L
FVC %PRED-PRE: 71 %
FVC PRED: NORMAL L
FVC-POST: NORMAL L
FVC-PRE: NORMAL L
GAW %PRED: NORMAL %
GAW PRED: NORMAL L/S/CMH2O
GAW: NORMAL L/S/CMH2O
IC %PRED: NORMAL %
IC PRED: NORMAL L
IC: NORMAL L
MEP: NORMAL
MIP: NORMAL
MVV %PRED-PRE: NORMAL %
MVV PRED: NORMAL L/MIN
MVV-PRE: NORMAL L/MIN
PEF %PRED-POST: NORMAL %
PEF %PRED-PRE: NORMAL L/SEC
PEF PRED: NORMAL L/SEC
PEF%CHNG: NORMAL
PEF-POST: NORMAL L/SEC
PEF-PRE: NORMAL L/SEC
RAW %PRED: NORMAL %
RAW PRED: NORMAL CMH2O/L/S
RAW: NORMAL CMH2O/L/S
RV %PRED: 92 %
RV PRED: NORMAL L
RV: NORMAL L
SVC %PRED: NORMAL %
SVC PRED: NORMAL L
SVC: NORMAL L
TLC %PRED: 80 %
TLC PRED: NORMAL L
TLC: NORMAL L
VA %PRED: NORMAL %
VA PRED: NORMAL L
VA: NORMAL L
VTG %PRED: NORMAL %
VTG PRED: NORMAL L
VTG: NORMAL L

## 2019-06-28 ASSESSMENT — PULMONARY FUNCTION TESTS
FVC_PERCENT_PREDICTED_POST: 78
FEV1/FVC_PRE: 79
FEV1_PERCENT_PREDICTED_POST: 61
FVC_PERCENT_PREDICTED_PRE: 71
FEV1_PERCENT_PREDICTED_PRE: 57
FEV1/FVC_POST: 77

## 2019-07-01 RX ORDER — BUDESONIDE AND FORMOTEROL FUMARATE DIHYDRATE 80; 4.5 UG/1; UG/1
2 AEROSOL RESPIRATORY (INHALATION) 2 TIMES DAILY
Qty: 2 INHALER | Refills: 0 | Status: SHIPPED | OUTPATIENT
Start: 2019-07-01 | End: 2020-01-10 | Stop reason: SDUPTHER

## 2019-07-01 NOTE — PROCEDURES
[id number] [facility] [address]                [address] [place] [address]                               PULMONARY FUNCTION    PATIENT NAME: Denny Quesada                  :        1955  MED REC NO:   [id number]                             ROOM:  ACCOUNT NO:   [de-identified]                           ADMIT DATE: 2019  PROVIDER:     Semaj Zavala    DATE OF PROCEDURE:  2019    TYPE OF STUDY:  Complete PFT. RESULTS:  The patient had an FEV1 of 1.32, which is moderate-to-severely  reduced at 57% of predicted. FVC was 2.11, which is mildly reduced at  71% of predicted. FEV1/FVC was reduced at 62. The mid flows were  severely reduced at 29% of predicted. Total lung capacity was  borderline at 80% of predicted. The residual volume was normal at 92%  of predicted. Diffusion was severely reduced at 40% of predicted. Following bronchodilators, there was no significant change in the flows. IMPRESSION:  Moderate-to-severe obstructive pulmonary disease with air  trapping and severe diffusion impairment. There was no significant  change in the flows following bronchodilators. Clinical correlation is  recommended.         Omar Comer    D: 2019 13:18:43       T: 2019 21:49:37     [email or web address]  Job#: [id number]     Doc#: [id number]    CC:

## 2019-08-06 RX ORDER — PANCRELIPASE 36000; 180000; 114000 [USP'U]/1; [USP'U]/1; [USP'U]/1
CAPSULE, DELAYED RELEASE PELLETS ORAL
Qty: 240 CAPSULE | Refills: 3 | Status: SHIPPED | OUTPATIENT
Start: 2019-08-06 | End: 2020-04-06 | Stop reason: SDUPTHER

## 2019-11-21 ENCOUNTER — TELEPHONE (OUTPATIENT)
Dept: GASTROENTEROLOGY | Age: 64
End: 2019-11-21

## 2019-12-16 ENCOUNTER — OFFICE VISIT (OUTPATIENT)
Dept: PRIMARY CARE CLINIC | Age: 64
End: 2019-12-16
Payer: MEDICARE

## 2019-12-16 VITALS
BODY MASS INDEX: 13.65 KG/M2 | OXYGEN SATURATION: 99 % | HEIGHT: 67 IN | TEMPERATURE: 97 F | SYSTOLIC BLOOD PRESSURE: 124 MMHG | DIASTOLIC BLOOD PRESSURE: 74 MMHG | WEIGHT: 87 LBS | HEART RATE: 98 BPM

## 2019-12-16 DIAGNOSIS — R68.89 FLU-LIKE SYMPTOMS: ICD-10-CM

## 2019-12-16 DIAGNOSIS — K86.0 ALCOHOL-INDUCED CHRONIC PANCREATITIS (HCC): ICD-10-CM

## 2019-12-16 DIAGNOSIS — R63.6 SEVERELY UNDERWEIGHT ADULT: ICD-10-CM

## 2019-12-16 DIAGNOSIS — R42 DIZZINESS: Primary | ICD-10-CM

## 2019-12-16 PROCEDURE — 99213 OFFICE O/P EST LOW 20 MIN: CPT | Performed by: NURSE PRACTITIONER

## 2019-12-16 ASSESSMENT — ENCOUNTER SYMPTOMS
NAUSEA: 1
RHINORRHEA: 0
DIARRHEA: 1
SHORTNESS OF BREATH: 0
COUGH: 1
VOMITING: 1
CRAMPS: 1
SORE THROAT: 0

## 2019-12-18 ENCOUNTER — HOSPITAL ENCOUNTER (OUTPATIENT)
Age: 64
Discharge: HOME | End: 2019-12-18
Payer: MEDICARE

## 2019-12-18 DIAGNOSIS — R42 DIZZINESS: ICD-10-CM

## 2019-12-18 DIAGNOSIS — R97.0 ELEVATED CEA: ICD-10-CM

## 2019-12-18 DIAGNOSIS — R63.6 SEVERELY UNDERWEIGHT ADULT: ICD-10-CM

## 2019-12-18 LAB
ABSOLUTE EOS #: 0.17 K/UL (ref 0–0.44)
ABSOLUTE IMMATURE GRANULOCYTE: <0.03 K/UL (ref 0–0.3)
ABSOLUTE LYMPH #: 1.77 K/UL (ref 1.1–3.7)
ABSOLUTE MONO #: 0.59 K/UL (ref 0.1–1.2)
ALBUMIN SERPL-MCNC: 3.9 G/DL (ref 3.5–5.2)
ALBUMIN/GLOBULIN RATIO: 1.3 (ref 1–2.5)
ALP BLD-CCNC: 99 U/L (ref 35–104)
ALT SERPL-CCNC: 13 U/L (ref 5–33)
ANION GAP SERPL CALCULATED.3IONS-SCNC: 11 MMOL/L (ref 9–17)
AST SERPL-CCNC: 32 U/L
BASOPHILS # BLD: 1 % (ref 0–2)
BASOPHILS ABSOLUTE: 0.04 K/UL (ref 0–0.2)
BILIRUB SERPL-MCNC: 0.32 MG/DL (ref 0.3–1.2)
BUN BLDV-MCNC: 6 MG/DL (ref 8–23)
BUN/CREAT BLD: ABNORMAL (ref 9–20)
CALCIUM SERPL-MCNC: 9.3 MG/DL (ref 8.6–10.4)
CARCINOEMBRYONIC ANTIGEN: 8.4 NG/ML
CHLORIDE BLD-SCNC: 99 MMOL/L (ref 98–107)
CO2: 29 MMOL/L (ref 20–31)
CREAT SERPL-MCNC: 0.65 MG/DL (ref 0.5–0.9)
DIFFERENTIAL TYPE: ABNORMAL
EOSINOPHILS RELATIVE PERCENT: 3 % (ref 1–4)
GFR AFRICAN AMERICAN: >60 ML/MIN
GFR NON-AFRICAN AMERICAN: >60 ML/MIN
GFR SERPL CREATININE-BSD FRML MDRD: ABNORMAL ML/MIN/{1.73_M2}
GFR SERPL CREATININE-BSD FRML MDRD: ABNORMAL ML/MIN/{1.73_M2}
GLUCOSE BLD-MCNC: 385 MG/DL (ref 70–99)
HCT VFR BLD CALC: 38.3 % (ref 36.3–47.1)
HEMOGLOBIN: 12.7 G/DL (ref 11.9–15.1)
IMMATURE GRANULOCYTES: 0 %
LYMPHOCYTES # BLD: 32 % (ref 24–43)
MCH RBC QN AUTO: 33.5 PG (ref 25.2–33.5)
MCHC RBC AUTO-ENTMCNC: 33.2 G/DL (ref 28.4–34.8)
MCV RBC AUTO: 101.1 FL (ref 82.6–102.9)
MONOCYTES # BLD: 11 % (ref 3–12)
NRBC AUTOMATED: 0 PER 100 WBC
PDW BLD-RTO: 13.8 % (ref 11.8–14.4)
PLATELET # BLD: 212 K/UL (ref 138–453)
PLATELET ESTIMATE: ABNORMAL
PMV BLD AUTO: 10.7 FL (ref 8.1–13.5)
POTASSIUM SERPL-SCNC: 4.5 MMOL/L (ref 3.7–5.3)
RBC # BLD: 3.79 M/UL (ref 3.95–5.11)
RBC # BLD: ABNORMAL 10*6/UL
SEG NEUTROPHILS: 53 % (ref 36–65)
SEGMENTED NEUTROPHILS ABSOLUTE COUNT: 2.97 K/UL (ref 1.5–8.1)
SODIUM BLD-SCNC: 139 MMOL/L (ref 135–144)
TOTAL PROTEIN: 6.9 G/DL (ref 6.4–8.3)
WBC # BLD: 5.6 K/UL (ref 3.5–11.3)
WBC # BLD: ABNORMAL 10*3/UL

## 2019-12-18 PROCEDURE — 80053 COMPREHEN METABOLIC PANEL: CPT

## 2019-12-18 PROCEDURE — 36415 COLL VENOUS BLD VENIPUNCTURE: CPT

## 2019-12-18 PROCEDURE — 82378 CARCINOEMBRYONIC ANTIGEN: CPT

## 2019-12-18 PROCEDURE — 85025 COMPLETE CBC W/AUTO DIFF WBC: CPT

## 2020-01-02 ENCOUNTER — OFFICE VISIT (OUTPATIENT)
Dept: PRIMARY CARE CLINIC | Age: 65
End: 2020-01-02
Payer: COMMERCIAL

## 2020-01-02 VITALS
WEIGHT: 89 LBS | DIASTOLIC BLOOD PRESSURE: 79 MMHG | HEART RATE: 97 BPM | BODY MASS INDEX: 13.94 KG/M2 | OXYGEN SATURATION: 96 % | SYSTOLIC BLOOD PRESSURE: 116 MMHG | TEMPERATURE: 97.2 F

## 2020-01-02 PROCEDURE — 90686 IIV4 VACC NO PRSV 0.5 ML IM: CPT | Performed by: NURSE PRACTITIONER

## 2020-01-02 PROCEDURE — G0008 ADMIN INFLUENZA VIRUS VAC: HCPCS | Performed by: NURSE PRACTITIONER

## 2020-01-02 PROCEDURE — 99213 OFFICE O/P EST LOW 20 MIN: CPT | Performed by: NURSE PRACTITIONER

## 2020-01-02 ASSESSMENT — ENCOUNTER SYMPTOMS
RHINORRHEA: 0
NAUSEA: 0
SHORTNESS OF BREATH: 0
VOMITING: 0
DIARRHEA: 0
SORE THROAT: 0
COUGH: 0

## 2020-01-02 NOTE — PROGRESS NOTES
calculated from the following:    Height as of 12/16/19: 5' 7\" (1.702 m). Weight as of this encounter: 89 lb (40.4 kg). DIAGNOSTIC FINDINGS:  CBC:  Lab Results   Component Value Date    WBC 5.6 12/18/2019    HGB 12.7 12/18/2019     12/18/2019     05/01/2012       BMP:    Lab Results   Component Value Date     12/18/2019    K 4.5 12/18/2019    CL 99 12/18/2019    CO2 29 12/18/2019    BUN 6 12/18/2019    CREATININE 0.65 12/18/2019    GLUCOSE 385 12/18/2019    GLUCOSE 159 05/01/2012       HEMOGLOBIN A1C:   Lab Results   Component Value Date    LABA1C 7.5 02/13/2019       FASTING LIPID PANEL:  Lab Results   Component Value Date    CHOL 174 05/08/2017    HDL 84 03/11/2019    TRIG 94 05/08/2017       Physical Exam  Vitals signs and nursing note reviewed. Constitutional:       General: She is not in acute distress. Appearance: She is well-developed and underweight. She is not toxic-appearing. HENT:      Head: Normocephalic. Eyes:      General: No scleral icterus. Pupils: Pupils are equal, round, and reactive to light. Neck:      Musculoskeletal: Normal range of motion and neck supple. Cardiovascular:      Rate and Rhythm: Normal rate and regular rhythm. Pulmonary:      Effort: Pulmonary effort is normal.      Breath sounds: Normal breath sounds. Abdominal:      General: Abdomen is flat. Bowel sounds are normal.      Palpations: Abdomen is soft. Tenderness: There is no tenderness. Skin:     General: Skin is warm and dry. Neurological:      Mental Status: She is alert and oriented to person, place, and time. Coordination: Coordination normal.   Psychiatric:         Behavior: Behavior normal. Behavior is cooperative. Thought Content: Thought content normal.         Judgment: Judgment normal.         ASSESSMENT     Diagnosis Orders   1. Body mass index (BMI) less than 16.5     2.  Encounter for immunization   INFLUENZA, QUADV, 3 YRS AND OLDER, IM PF, PREFILL activated your MyChart account? If not, what are your barriers?  Yes     Patient Care Team:  DONTRELL Dominguez CNP as PCP - General (Family Medicine)  DONTRELL Dominguez CNP as PCP - [facility]  Rosemarie Robles MD as Consulting Physician (Gastroenterology)    Medical History Review  Past Medical, Family, and Social History reviewed and does not contribute to the patient presenting condition    Health Maintenance   Topic Date Due    Diabetic retinal exam  12/30/1966    Cervical cancer screen  12/30/1977    Breast cancer screen  06/09/2019    Annual Wellness Visit (AWV)  06/21/2019    Shingles Vaccine (1 of 2) 12/16/2020 (Originally 12/30/2006)    Diabetic foot exam  02/13/2020    A1C test (Diabetic or Prediabetic)  02/13/2020    Diabetic microalbuminuria test  03/11/2020    Lipid screen  03/11/2020    Potassium monitoring  12/18/2020    Creatinine monitoring  12/18/2020    Colon cancer screen colonoscopy  05/29/2021    DTaP/Tdap/Td vaccine (2 - Td) 10/05/2025    Flu vaccine  Completed    Pneumococcal 0-64 years Vaccine  Completed    Hepatitis C screen  Completed    HIV screen  Completed

## 2020-01-10 ENCOUNTER — OFFICE VISIT (OUTPATIENT)
Dept: PRIMARY CARE CLINIC | Age: 65
End: 2020-01-10
Payer: COMMERCIAL

## 2020-01-10 VITALS
DIASTOLIC BLOOD PRESSURE: 84 MMHG | HEART RATE: 101 BPM | BODY MASS INDEX: 14.72 KG/M2 | OXYGEN SATURATION: 99 % | SYSTOLIC BLOOD PRESSURE: 163 MMHG | WEIGHT: 94 LBS

## 2020-01-10 PROCEDURE — 99202 OFFICE O/P NEW SF 15 MIN: CPT | Performed by: NURSE PRACTITIONER

## 2020-01-10 RX ORDER — GABAPENTIN 400 MG/1
400 CAPSULE ORAL 3 TIMES DAILY
Qty: 90 CAPSULE | Refills: 0 | Status: SHIPPED | OUTPATIENT
Start: 2020-01-10 | End: 2020-02-07

## 2020-01-10 RX ORDER — BUDESONIDE AND FORMOTEROL FUMARATE DIHYDRATE 80; 4.5 UG/1; UG/1
2 AEROSOL RESPIRATORY (INHALATION) 2 TIMES DAILY
Qty: 2 INHALER | Refills: 0 | Status: SHIPPED | OUTPATIENT
Start: 2020-01-10 | End: 2020-04-06 | Stop reason: SDUPTHER

## 2020-01-10 RX ORDER — MIRTAZAPINE 30 MG/1
15 TABLET, FILM COATED ORAL NIGHTLY
Qty: 90 TABLET | Refills: 3 | Status: SHIPPED | OUTPATIENT
Start: 2020-01-10 | End: 2020-04-06 | Stop reason: SDUPTHER

## 2020-01-10 RX ORDER — ALBUTEROL SULFATE 90 UG/1
2 AEROSOL, METERED RESPIRATORY (INHALATION) EVERY 6 HOURS PRN
Qty: 1 INHALER | Refills: 1 | Status: SHIPPED | OUTPATIENT
Start: 2020-01-10 | End: 2020-04-06 | Stop reason: SDUPTHER

## 2020-01-10 RX ORDER — OMEPRAZOLE 20 MG/1
20 CAPSULE, DELAYED RELEASE ORAL 2 TIMES DAILY
Qty: 180 CAPSULE | Refills: 0 | Status: SHIPPED | OUTPATIENT
Start: 2020-01-10 | End: 2020-02-19

## 2020-01-10 RX ORDER — GLIMEPIRIDE 2 MG/1
2 TABLET ORAL
Qty: 90 TABLET | Refills: 0 | Status: SHIPPED | OUTPATIENT
Start: 2020-01-10 | End: 2020-04-06 | Stop reason: SDUPTHER

## 2020-01-10 ASSESSMENT — ENCOUNTER SYMPTOMS
SORE THROAT: 0
COUGH: 0
NAUSEA: 0
SINUS PAIN: 0
SHORTNESS OF BREATH: 0
VOMITING: 0
ABDOMINAL PAIN: 0
DIARRHEA: 0

## 2020-01-10 NOTE — PROGRESS NOTES
 omeprazole (PRILOSEC) 20 MG delayed release capsule Take 1 capsule by mouth 2 times daily TAKE 1 CAPSULE BY MOUTH TWO TIMES DAILY 180 capsule 0    mirtazapine (REMERON) 30 MG tablet Take 0.5 tablets by mouth nightly 90 tablet 3    gabapentin (NEURONTIN) 400 MG capsule Take 1 capsule by mouth 3 times daily for 30 days. TAKE 1 CAPSULE BY MOUTH  THREE TIMES DAILY 90 capsule 0    albuterol sulfate HFA (PROAIR HFA) 108 (90 Base) MCG/ACT inhaler Inhale 2 puffs into the lungs every 6 hours as needed for Wheezing 1 Inhaler 1    budesonide-formoterol (SYMBICORT) 80-4.5 MCG/ACT AERO Inhale 2 puffs into the lungs 2 times daily 2 Inhaler 0    CREON 19954 units CPEP delayed release capsule take 2 capsules by mouth four times a day after meals and at bedtime 240 capsule 3    COMFORT EZ PEN NEEDLES 32G X 6 MM MISC       AQUALANCE LANCETS 30G MISC       Nutritional Supplements (GLUCERNA HUNGER SMART SHAKE) LIQD Take 1 Bottle by mouth 3 times daily (with meals) 90 Bottle 2    sodium bicarbonate 650 MG tablet Take 1 tablet by mouth 3 times daily 90 tablet 3    insulin glargine (LANTUS) 100 UNIT/ML injection vial Inject 7 Units into the skin every morning (Patient not taking: Reported on 12/16/2019) 1 vial 3     No current facility-administered medications for this visit. Allergies   Allergen Reactions    Peanuts [Peanut Oil] Anaphylaxis    Vicodin [Hydrocodone-Acetaminophen] Hives    Tramadol     Tylenol With Codeine #3 [Acetaminophen-Codeine]     Ibuprofen Itching       Subjective:      Review of Systems   Constitutional: Negative for chills and fever. HENT: Negative for ear pain, sinus pain and sore throat. Respiratory: Negative for cough and shortness of breath. Cardiovascular: Negative for chest pain and palpitations. Gastrointestinal: Negative for abdominal pain, diarrhea, nausea and vomiting. Neurological: Negative for dizziness and headaches.    All other systems reviewed and are negative. Objective:     Physical Exam  Vitals signs and nursing note reviewed. Constitutional:       Appearance: Normal appearance. Cardiovascular:      Rate and Rhythm: Normal rate. Pulmonary:      Effort: Pulmonary effort is normal.      Breath sounds: Normal breath sounds. Skin:     General: Skin is warm and dry. Neurological:      General: No focal deficit present. Mental Status: She is alert and oriented to person, place, and time. BP (!) 163/84   Pulse 101   Wt 94 lb (42.6 kg)   SpO2 99%   Breastfeeding?  No   BMI 14.72 kg/m²   Lab Review   Hospital Outpatient Visit on 12/18/2019   Component Date Value    WBC 12/18/2019 5.6     RBC 12/18/2019 3.79*    Hemoglobin 12/18/2019 12.7     Hematocrit 12/18/2019 38.3     MCV 12/18/2019 101.1     MCH 12/18/2019 33.5     MCHC 12/18/2019 33.2     RDW 12/18/2019 13.8     Platelets 09/70/1875 212     MPV 12/18/2019 10.7     NRBC Automated 12/18/2019 0.0     Differential Type 12/18/2019 NOT REPORTED     Seg Neutrophils 12/18/2019 53     Lymphocytes 12/18/2019 32     Monocytes 12/18/2019 11     Eosinophils % 12/18/2019 3     Basophils 12/18/2019 1     Immature Granulocytes 12/18/2019 0     Segs Absolute 12/18/2019 2.97     Absolute Lymph # 12/18/2019 1.77     Absolute Mono # 12/18/2019 0.59     Absolute Eos # 12/18/2019 0.17     Basophils Absolute 12/18/2019 0.04     Absolute Immature Granul* 12/18/2019 <0.03     WBC Morphology 12/18/2019 NOT REPORTED     RBC Morphology 12/18/2019 NOT REPORTED     Platelet Estimate 84/43/2823 NOT REPORTED     Glucose 12/18/2019 385*    BUN 12/18/2019 6*    CREATININE 12/18/2019 0.65     Bun/Cre Ratio 12/18/2019 NOT REPORTED     Calcium 12/18/2019 9.3     Sodium 12/18/2019 139     Potassium 12/18/2019 4.5     Chloride 12/18/2019 99     CO2 12/18/2019 29     Anion Gap 12/18/2019 11     Alkaline Phosphatase 12/18/2019 99     ALT 12/18/2019 13     AST 12/18/2019 32*    Total Bilirubin 12/18/2019 0.32     Total Protein 12/18/2019 6.9     Alb 12/18/2019 3.9     Albumin/Globulin Ratio 12/18/2019 1.3     GFR Non- 12/18/2019 >60     GFR  12/18/2019 >60     GFR Comment 12/18/2019          GFR Staging 12/18/2019  [facility] Outpatient Visit on 12/18/2019   Component Date Value    CEA 12/18/2019 8.4*       Assessment:       Diagnosis Orders   1. History of COPD  albuterol sulfate HFA (PROAIR HFA) 108 (90 Base) MCG/ACT inhaler    budesonide-formoterol (SYMBICORT) 80-4.5 MCG/ACT AERO   2. Type 2 diabetes mellitus with diabetic polyneuropathy, with long-term current use of insulin (HCC)  glimepiride (AMARYL) 2 MG tablet    metFORMIN (GLUCOPHAGE) 500 MG tablet    gabapentin (NEURONTIN) 400 MG capsule   3. Neuropathic pain  gabapentin (NEURONTIN) 400 MG capsule   4. Encounter for medication refill     5. Restless leg syndrome  mirtazapine (REMERON) 30 MG tablet   6. Family history of GERD     7. Hx of gastroesophageal reflux (GERD)  omeprazole (PRILOSEC) 20 MG delayed release capsule       Plan:      Return if symptoms worsen or fail to improve. Orders Placed This Encounter   Medications    glimepiride (AMARYL) 2 MG tablet     Sig: Take 1 tablet by mouth every morning (before breakfast)     Dispense:  90 tablet     Refill:  0    metFORMIN (GLUCOPHAGE) 500 MG tablet     Sig: Take 1 tablet by mouth 2 times daily (with meals)     Dispense:  180 tablet     Refill:  0    omeprazole (PRILOSEC) 20 MG delayed release capsule     Sig: Take 1 capsule by mouth 2 times daily TAKE 1 CAPSULE BY MOUTH TWO TIMES DAILY     Dispense:  180 capsule     Refill:  0    mirtazapine (REMERON) 30 MG tablet     Sig: Take 0.5 tablets by mouth nightly     Dispense:  90 tablet     Refill:  3    gabapentin (NEURONTIN) 400 MG capsule     Sig: Take 1 capsule by mouth 3 times daily for 30 days.  TAKE 1 CAPSULE BY MOUTH  THREE TIMES DAILY     Dispense:  90 capsule Refill:  0    albuterol sulfate HFA (PROAIR HFA) 108 (90 Base) MCG/ACT inhaler     Sig: Inhale 2 puffs into the lungs every 6 hours as needed for Wheezing     Dispense:  1 Inhaler     Refill:  1    budesonide-formoterol (SYMBICORT) 80-4.5 MCG/ACT AERO     Sig: Inhale 2 puffs into the lungs 2 times daily     Dispense:  2 Inhaler     Refill:  0     Refill sent to specified pharmacy. Discussed gabapentin short-term refill given controlled substance. Return with any new or worsening symptoms. Follow-up with PCP for further medication refills/management       Patient given educational materials - see patient instructions. Discussed use, benefit, and side effects of prescribed medications. All patientquestions answered. Pt voiced understanding. This note was transcribed using dictation with Dragon services. Efforts were made to correct any errors but some words may be misinterpreted.      Electronically signed by DONTRELL Santillan CNP on 1/10/2020at 5:36 PM

## 2020-01-10 NOTE — PATIENT INSTRUCTIONS
Patient Education        Noninsulin Medicines for Type 2 Diabetes: Care Instructions  Your Care Instructions    There are different types of noninsulin medicines for diabetes. Each works in a different way. But they all help you control your blood sugar. Some types help your body make insulin to lower your blood sugar. Others lower how much insulin your body needs. Some can slow how fast your body digests sugars. And some can remove extra glucose through your urine. · Alpha-glucosidase inhibitors. These keep starches from breaking down. This means that they lower the amount of glucose absorbed when you eat. They don't help your body make more insulin. So they will not cause low blood sugar unless you use them with other medicines for diabetes. They include acarbose and miglitol. · DPP-4 inhibitors. These help your body raise the level of insulin after you eat. They also help your body make less of a hormone that raises blood sugar. They include linagliptin, saxagliptin, and sitagliptin. · Incretin hormones (GLP-1 receptor agonists). Your body makes a protein that can raise your insulin level. It also can lower your blood sugar and make you less hungry. You can get shots of hormones that work the same way. They include exenatide and liraglutide. · Meglitinides. These help your body release insulin. They also help slow how your body digests sugars. So they can keep your blood sugar from rising too fast after you eat. They include nateglinide and repaglinide. · Metformin. This lowers how much glucose your liver makes. And it helps you respond better to insulin. It also lowers the amount of stored sugar that your liver releases when you are not eating. · SGLT2 inhibitors. These help to remove extra glucose through your urine. They may also help some people lose weight. They include canagliflozin, dapagliflozin, and empagliflozin. · Sulfonylureas. These help your body release more insulin.  Some work for [employer] hours. They can cause low blood sugar if you don't eat as you planned. They include glipizide and glyburide. · Thiazolidinediones. These reduce the amount of blood glucose. They also help you respond better to insulin. They include pioglitazone and rosiglitazone. You may need to take more than one medicine for diabetes. Two or more medicines may work better to lower your blood sugar level than just one does. Follow-up care is a key part of your treatment and safety. Be sure to make and go to all appointments, and call your doctor if you are having problems. It's also a good idea to know your test results and keep a list of the medicines you take. How can you care for yourself at home? · Eat a healthy diet. Get some exercise each day. This may help you to reduce how much medicine you need. · Do not take other prescription or over-the-counter medicines, vitamins, herbal products, or supplements without talking to your doctor first. Some medicines for type 2 diabetes can cause problems with other medicines or supplements. · Tell your doctor if you plan to get pregnant. Some of these drugs are not safe for pregnant women. · Be safe with medicines. Take your medicines exactly as prescribed. Meglitinides and sulfonylureas can cause your blood sugar to drop very low. Call your doctor if you think you are having a problem with your medicine. · Check your blood sugar often. You can use a glucose monitor. Keeping track can help you know how certain foods, activities, and medicines affect your blood sugar. And it can help you keep your blood sugar from getting so low that it's not safe. When should you call for help? Call 911 anytime you think you may need emergency care.  For example, call if:    · You passed out (lost consciousness).     · You are confused or cannot think clearly.     · Your blood sugar is very high or very low.    Watch closely for changes in your health, and be sure to contact your doctor if:    · Your blood sugar stays outside the level your doctor set for you.     · You have any problems. Where can you learn more? Go to [email or web address] and sign in to your [employer] account. Enter H153 in the [employer] box to learn more about \"Noninsulin Medicines for Type 2 Diabetes: Care Instructions. \"     If you do not have an account, please click on the \"Sign Up Now\" link. Current as of: April 16, 2019  Content Version: 12.3  © 1410-9048 [employer], Incorporated. Care instructions adapted under license by [facility] ([facility]). If you have questions about a medical condition or this instruction, always ask your healthcare professional. [address] any warranty or liability for your use of this information.

## 2020-01-24 ENCOUNTER — TELEPHONE (OUTPATIENT)
Dept: GASTROENTEROLOGY | Age: 65
End: 2020-01-24

## 2020-02-19 ENCOUNTER — HOSPITAL ENCOUNTER (EMERGENCY)
Age: 65
Discharge: HOME | End: 2020-02-19
Attending: EMERGENCY MEDICINE
Payer: COMMERCIAL

## 2020-02-19 ENCOUNTER — APPOINTMENT (OUTPATIENT)
Dept: CT IMAGING | Age: 65
End: 2020-02-19
Payer: COMMERCIAL

## 2020-02-19 VITALS
RESPIRATION RATE: 18 BRPM | TEMPERATURE: 98.1 F | HEIGHT: 67 IN | SYSTOLIC BLOOD PRESSURE: 142 MMHG | BODY MASS INDEX: 15.03 KG/M2 | DIASTOLIC BLOOD PRESSURE: 85 MMHG | WEIGHT: 95.8 LBS | OXYGEN SATURATION: 91 % | HEART RATE: 107 BPM

## 2020-02-19 PROCEDURE — 6360000002 HC RX W HCPCS: Performed by: EMERGENCY MEDICINE

## 2020-02-19 PROCEDURE — 70450 CT HEAD/BRAIN W/O DYE: CPT

## 2020-02-19 PROCEDURE — 96372 THER/PROPH/DIAG INJ SC/IM: CPT

## 2020-02-19 PROCEDURE — 99284 EMERGENCY DEPT VISIT MOD MDM: CPT

## 2020-02-19 RX ORDER — KETOROLAC TROMETHAMINE 30 MG/ML
30 INJECTION, SOLUTION INTRAMUSCULAR; INTRAVENOUS ONCE
Status: COMPLETED | OUTPATIENT
Start: 2020-02-19 | End: 2020-02-19

## 2020-02-19 RX ORDER — CYCLOBENZAPRINE HCL 10 MG
10 TABLET ORAL 3 TIMES DAILY PRN
Qty: 21 TABLET | Refills: 0 | Status: SHIPPED | OUTPATIENT
Start: 2020-02-19 | End: 2020-02-29

## 2020-02-19 RX ORDER — ORPHENADRINE CITRATE 30 MG/ML
60 INJECTION INTRAMUSCULAR; INTRAVENOUS ONCE
Status: COMPLETED | OUTPATIENT
Start: 2020-02-19 | End: 2020-02-19

## 2020-02-19 RX ORDER — ACETAMINOPHEN 325 MG/1
650 TABLET ORAL EVERY 6 HOURS PRN
Qty: 120 TABLET | Refills: 3 | Status: SHIPPED | OUTPATIENT
Start: 2020-02-19 | End: 2021-06-09

## 2020-02-19 RX ADMIN — KETOROLAC TROMETHAMINE 30 MG: 30 INJECTION, SOLUTION INTRAMUSCULAR at 08:48

## 2020-02-19 RX ADMIN — ORPHENADRINE CITRATE 60 MG: 30 INJECTION INTRAMUSCULAR; INTRAVENOUS at 08:49

## 2020-02-19 ASSESSMENT — ENCOUNTER SYMPTOMS
DIARRHEA: 0
SHORTNESS OF BREATH: 0
EYE REDNESS: 0
COLOR CHANGE: 0
VOMITING: 0
SORE THROAT: 0
RHINORRHEA: 0
NAUSEA: 0
EYE DISCHARGE: 0
COUGH: 0

## 2020-02-19 ASSESSMENT — PAIN SCALES - GENERAL
PAINLEVEL_OUTOF10: 10
PAINLEVEL_OUTOF10: 10

## 2020-02-19 NOTE — ED PROVIDER NOTES
times daily (with meals)    MIRTAZAPINE (REMERON) 30 MG TABLET    Take 0.5 tablets by mouth nightly    NUTRITIONAL SUPPLEMENTS (GLUCERNA HUNGER SMART SHAKE) LIQD    Take 1 Bottle by mouth 3 times daily (with meals)    SODIUM BICARBONATE 650 MG TABLET    take 1 tablet by mouth three times a day     ALLERGIES     is allergic to peanuts [peanut oil]; vicodin [hydrocodone-acetaminophen]; tramadol; tylenol with codeine #3 [acetaminophen-codeine]; and ibuprofen. FAMILY HISTORY     She indicated that her father is . She indicated that the status of her sister is unknown. SOCIAL HISTORY       Social History     Tobacco Use    Smoking status: Current Every Day Smoker     Packs/day: 0.25     Years: 40.00     Pack years: 10.00    Smokeless tobacco: Former User   Substance Use Topics    Alcohol use: No    Drug use: Yes     Types: Marijuana     PHYSICAL EXAM     INITIAL VITALS: BP (!) 142/85   Pulse 107   Temp 98.1 °F (36.7 °C)   Resp 18   Ht 5' 7\" (1.702 m)   Wt 95 lb 12.8 oz (43.5 kg)   SpO2 91%   BMI 15.00 kg/m²    Physical Exam  Constitutional:       Appearance: Normal appearance. She is well-developed. She is not ill-appearing or toxic-appearing. HENT:      Head: Normocephalic and atraumatic. Eyes:      Conjunctiva/sclera: Conjunctivae normal.      Pupils: Pupils are equal, round, and reactive to light. Neck:      Musculoskeletal: Normal range of motion and neck supple. Trachea: Trachea normal.   Cardiovascular:      Rate and Rhythm: Normal rate and regular rhythm. Heart sounds: S1 normal and S2 normal. No murmur. Pulmonary:      Effort: Pulmonary effort is normal. No accessory muscle usage or respiratory distress. Breath sounds: Normal breath sounds. Chest:      Chest wall: No deformity or tenderness. Abdominal:      General: Bowel sounds are normal. There is no distension or abdominal bruit. Palpations: Abdomen is not rigid. Tenderness:  There is no abdominal tenderness. There is no guarding or rebound. Skin:     General: Skin is warm. Findings: No rash. Neurological:      Mental Status: She is alert and oriented to person, place, and time. GCS: GCS eye subscore is 4. GCS verbal subscore is 5. GCS motor subscore is 6. Cranial Nerves: Cranial nerves are intact. Sensory: Sensation is intact. Motor: Motor function is intact. Coordination: Coordination is intact. Psychiatric:         Speech: Speech normal.         MEDICAL DECISION MAKIN-year-old female presents with complaints of a headache, ongoing over the past few days it appears to be a tension headache with the pain starting in the posterior neck and coming up over top of the head. She has no focal neurologic deficits. We will obtain a CT. The patient also complains of some swelling to the right posterior auricular area, I do not appreciate an enlarged lymph node. The patient has no other lymphadenopathy. CRITICAL CARE:       PROCEDURES:    Procedures    DIAGNOSTIC RESULTS   EKG:All EKG's are interpreted by the Emergency Department Physician who either signs or Co-signs this chart in the absence of a cardiologist.        RADIOLOGY:All plain film, CT, MRI, and formal ultrasound images (except ED bedside ultrasound) are read by the radiologist, see reports below, unless otherwisenoted in MDM or here. CT Head WO Contrast   Final Result   Negative CT brain with no acute intracranial abnormality. LABS: All lab results were reviewed by myself, and all abnormals are listed below.   Labs Reviewed - No data to display    EMERGENCY DEPARTMENTCOURSE:         Vitals:    Vitals:    20 0743 20 0744   BP:  (!) 142/85   Pulse:  107   Resp:  18   Temp:  98.1 °F (36.7 °C)   SpO2:  91%   Weight: 95 lb 12.8 oz (43.5 kg)    Height: 5' 7\" (1.702 m)        The patient was given the following medications while in the emergency department:  Orders Placed This Encounter Medications    ketorolac (TORADOL) injection 30 mg    orphenadrine (NORFLEX) injection 60 mg    cyclobenzaprine (FLEXERIL) 10 MG tablet     Sig: Take 1 tablet by mouth 3 times daily as needed for Muscle spasms     Dispense:  21 tablet     Refill:  0    acetaminophen (TYLENOL) 325 MG tablet     Sig: Take 2 tablets by mouth every 6 hours as needed for Pain     Dispense:  120 tablet     Refill:  3     CONSULTS:  None    FINAL IMPRESSION      1.  Acute nonintractable headache, unspecified headache type          DISPOSITION/PLAN   DISPOSITION Decision To Discharge 02/19/2020 10:14:16 AM      PATIENT REFERRED TO:  DONTRELL Krishna CNP  [address]  [address]  [phone]    Schedule an appointment as soon as possible for a visit in 2 days      DISCHARGE MEDICATIONS:  New Prescriptions    ACETAMINOPHEN (TYLENOL) 325 MG TABLET    Take 2 tablets by mouth every 6 hours as needed for Pain    CYCLOBENZAPRINE (FLEXERIL) 10 MG TABLET    Take 1 tablet by mouth 3 times daily as needed for Muscle spasms     Kinza Thurston MD  Attending Emergency Physician                    Kinza Thurston MD  02/19/20 1016

## 2020-02-25 ENCOUNTER — OFFICE VISIT (OUTPATIENT)
Dept: PRIMARY CARE CLINIC | Age: 65
End: 2020-02-25
Payer: COMMERCIAL

## 2020-02-25 VITALS
DIASTOLIC BLOOD PRESSURE: 72 MMHG | BODY MASS INDEX: 15.35 KG/M2 | TEMPERATURE: 97.2 F | WEIGHT: 98 LBS | HEART RATE: 106 BPM | SYSTOLIC BLOOD PRESSURE: 96 MMHG | OXYGEN SATURATION: 97 %

## 2020-02-25 LAB — HBA1C MFR BLD: 7.5 %

## 2020-02-25 PROCEDURE — 83036 HEMOGLOBIN GLYCOSYLATED A1C: CPT | Performed by: NURSE PRACTITIONER

## 2020-02-25 PROCEDURE — 3051F HG A1C>EQUAL 7.0%<8.0%: CPT | Performed by: NURSE PRACTITIONER

## 2020-02-25 PROCEDURE — 99213 OFFICE O/P EST LOW 20 MIN: CPT | Performed by: NURSE PRACTITIONER

## 2020-02-25 RX ORDER — PENICILLIN V POTASSIUM 500 MG/1
500 TABLET ORAL 4 TIMES DAILY
Qty: 40 TABLET | Refills: 0 | Status: SHIPPED | OUTPATIENT
Start: 2020-02-25 | End: 2020-04-14 | Stop reason: SDUPTHER

## 2020-02-25 ASSESSMENT — ENCOUNTER SYMPTOMS
PHOTOPHOBIA: 1
SINUS PRESSURE: 0
VOMITING: 0
SCALP TENDERNESS: 0
VISUAL CHANGE: 0
SORE THROAT: 0
NAUSEA: 0
FACIAL SWEATING: 0
BLURRED VISION: 0
EYE PAIN: 0

## 2020-02-25 NOTE — PROGRESS NOTES
[address] [facility]  [facility] [place] [address]  Dept: [phone]  Dept Fax: [phone]    Patient Care Team:  DONTRELL Enamorado CNP as PCP - General (Family Medicine)  DONTRELL Enamorado CNP as PCP - [facility] Empaneled Provider  Torie Kumar MD as Consulting Physician (Gastroenterology)    2020     Sienna Trinh (:  1955)is a 62 y.o. female, here for evaluation of the following medical concerns:   Chief Complaint   Patient presents with    ED Follow-up    Headache       Tavo Haas was in the emergency room 1 week ago 1 week with a right sided headache and swollen lymph node behind her right ear. C/O chills, no measured fever. No ear pain, no ear discharge  No cough, runny nose or sore throat  No trouble swallowing        Headache    This is a new problem. The current episode started 1 to 4 weeks ago. The problem occurs constantly. The problem has been unchanged. The pain is located in the right unilateral region. The pain radiates to the right neck. The pain quality is not similar to prior headaches. The quality of the pain is described as throbbing, thunderclap and sharp (hit's me like lightening). The pain is at a severity of 9/10. Associated symptoms include neck pain, numbness (right side of face) and photophobia. Pertinent negatives include no blurred vision, dizziness, eye pain, facial sweating, fever, loss of balance, nausea, phonophobia, scalp tenderness, seizures, sinus pressure, sore throat, visual change or vomiting. Nothing (bending over) aggravates the symptoms. She has tried acetaminophen for the symptoms. There is no history of cluster headaches, migraine headaches, migraines in the family, pseudotumor cerebri, recent head traumas or TMJ. .    Review of Systems   Constitutional: Negative for fever. HENT: Negative for sinus pressure and sore throat. Eyes: Positive for photophobia.  Negative for blurred vision and pain. Gastrointestinal: Negative for nausea and vomiting. Musculoskeletal: Positive for neck pain. Neurological: Positive for numbness (right side of face) and headaches. Negative for dizziness, seizures and loss of balance. Prior to Visit Medications    Medication Sig Taking?  Authorizing Provider   penicillin v potassium (VEETID) 500 MG tablet Take 1 tablet by mouth 4 times daily for 10 days Yes DONTRELL Dominguez CNP   cyclobenzaprine (FLEXERIL) 10 MG tablet Take 1 tablet by mouth 3 times daily as needed for Muscle spasms Yes Tawanda Singer MD   gabapentin (NEURONTIN) 400 MG capsule take 1 capsule by mouth three times a day Yes DONTRELL Dominguez CNP   sodium bicarbonate 650 MG tablet take 1 tablet by mouth three times a day Yes DONTRELL Dominguez CNP   glimepiride (AMARYL) 2 MG tablet Take 1 tablet by mouth every morning (before breakfast) Yes DONTRELL Martinez CNP   metFORMIN (GLUCOPHAGE) 500 MG tablet Take 1 tablet by mouth 2 times daily (with meals) Yes DONTRELL Martinez CNP   mirtazapine (REMERON) 30 MG tablet Take 0.5 tablets by mouth nightly Yes DONTRELL Martinez CNP   albuterol sulfate HFA (PROAIR HFA) 108 (90 Base) MCG/ACT inhaler Inhale 2 puffs into the lungs every 6 hours as needed for Wheezing Yes DONTRELL Martinez CNP   CREON 31512 units CPEP delayed release capsule take 2 capsules by mouth four times a day after meals and at bedtime Yes Arianna Madison MD   COMFORT EZ PEN NEEDLES 32G X 6 MM MISC  Yes Historical Provider, MD   AQUALANCE LANCETS 30G [address] [facility]  Yes Historical Provider, MD   Nutritional Supplements (GLUCERNA HUNGER SMART SHAKE) LIQD Take 1 Bottle by mouth 3 times daily (with meals) Yes DONTRELL Dominguez CNP   acetaminophen (TYLENOL) 325 MG tablet Take 2 tablets by mouth every 6 hours as needed for Pain  Patient not taking: Reported on 2/25/2020  Tawanda Singer MD   budesonide-formoterol (SYMBICORT) 80-4.5 MCG/ACT AERO Inhale 2 puffs into the lungs 2 times daily  Juan Beard, APRN - CNP        Social History     Tobacco Use    Smoking status: Current Every Day Smoker     Packs/day: 0.25     Years: 40.00     Pack years: 10.00    Smokeless tobacco: Former User   Substance Use Topics    Alcohol use: No        Vitals:    02/25/20 1351   BP: 96/72   Site: Left Upper Arm   Position: Sitting   Cuff Size: Medium Adult   Pulse: 106   Temp: 97.2 °F (36.2 °C)   TempSrc: Oral   SpO2: 97%   Weight: 98 lb (44.5 kg)     Estimated body mass index is 15.35 kg/m² as calculated from the following:    Height as of 2/19/20: 5' 7\" (1.702 m). Weight as of this encounter: 98 lb (44.5 kg). DIAGNOSTIC FINDINGS:  CBC:  Lab Results   Component Value Date    WBC 5.6 12/18/2019    HGB 12.7 12/18/2019     12/18/2019     05/01/2012       BMP:    Lab Results   Component Value Date     12/18/2019    K 4.5 12/18/2019    CL 99 12/18/2019    CO2 29 12/18/2019    BUN 6 12/18/2019    CREATININE 0.65 12/18/2019    GLUCOSE 385 12/18/2019    GLUCOSE 159 05/01/2012       HEMOGLOBIN A1C:   Lab Results   Component Value Date    LABA1C 7.5 02/25/2020       FASTING LIPID PANEL:  Lab Results   Component Value Date    CHOL 174 05/08/2017    HDL 84 03/11/2019    TRIG 94 05/08/2017       Physical Exam  Constitutional:       General: She is not in acute distress. Appearance: She is ill-appearing. She is not toxic-appearing. HENT:      Mouth/Throat:      Mouth: Mucous membranes are moist.      Dentition: Abnormal dentition. Dental tenderness and dental caries present. No dental abscesses. Neck:      Musculoskeletal: Normal range of motion and neck supple. Lymphadenopathy:      Cervical:      Right cervical: No superficial, deep or posterior cervical adenopathy. Neurological:      Mental Status: She is alert. ASSESSMENT     Diagnosis Orders   1. Dental infection  penicillin v potassium (VEETID) 500 MG tablet   2. Encounter for screening mammogram for breast cancer  [facility] Digital Screen Bilateral [[id number]]   3. Type 2 diabetes mellitus with diabetic polyneuropathy, with long-term current use of insulin (HCC)  POCT glycosylated hemoglobin (Hb A1C)          PLAN:  Orders Placed This Encounter   Medications    penicillin v potassium (VEETID) 500 MG tablet     Sig: Take 1 tablet by mouth 4 times daily for 10 days     Dispense:  40 tablet     Refill:  0         1. Exquisite pain to her right upper gumline during exam, suspect right facial pain is dental in nature. Penicillin ordered. Patient advised to follow-up with dentist.  2. A1c is at baseline, patient advised to keep routine follow-up appointment in May. 3. CT of head was completed in ER, normal.    FOLLOW UP AND INSTRUCTIONS:  Return Keep May appt. · Angelique Peoples received counseling on the following healthy behaviors:medication adherence    · Discussed use, benefit, and side effects of prescribed medications. Barriers to  medication compliance addressed. All patient questions answered. Pt  verbalized understanding of all instructions given. · Patient given educational materials - see patient instructions      · Patient advised to contact scheduling offices for any referrals or imaging orders  placed today if they have not been contacted in 48 hours. Return Keep May appt. An electronic signature was used to authenticate this note. --DONTRELL Drake - CNP on 2/25/2020 at 3:38 PM  Visit Information    Have you changed or started any medications since your last visit including any over-the-counter medicines, vitamins, or herbal medicines? yes  Are you having any side effects from any of your medications? -  no  Have you stopped taking any of your medications? Is so, why? -  no    Have you seen any other physician or provider since your last visit? No  Have you had any other diagnostic tests since your last visit?  Yes - Records Requested  Have you been seen in the emergency room and/or had an admission to a hospital since we last saw you? Yes - Records Requested  Have you had your routine dental cleaning in the past 6 months? no    Have you activated your [employer] account? If not, what are your barriers?  Yes     Patient Care Team:  DONTRELL Vargas CNP as PCP - General (Family Medicine)  DONTRELL Vargas CNP as PCP - [facility] Empaneled Provider  Celi Mars MD as Consulting Physician (Gastroenterology)    Medical History Review  Past Medical, Family, and Social History reviewed and does not contribute to the patient presenting condition    Health Maintenance   Topic Date Due    Diabetic retinal exam  12/30/1966    Cervical cancer screen  12/30/1977    Breast cancer screen  06/09/2019    Annual Wellness Visit (AWV)  06/21/2019    Diabetic foot exam  02/13/2020    A1C test (Diabetic or Prediabetic)  02/13/2020    Shingles Vaccine (1 of 2) 12/16/2020 (Originally 12/30/2006)    Hepatitis B vaccine (1 of 3 - Risk 3-dose series) 02/25/2021 (Originally 12/30/1975)    Diabetic microalbuminuria test  03/11/2020    Lipid screen  03/11/2020    Potassium monitoring  12/18/2020    Creatinine monitoring  12/18/2020    Colon cancer screen colonoscopy  05/29/2021    DTaP/Tdap/Td vaccine (2 - Td) 10/05/2025    Flu vaccine  Completed    Pneumococcal 0-64 years Vaccine  Completed    Hepatitis C screen  Completed    HIV screen  Completed    Hepatitis A vaccine  Aged Out    Hib vaccine  Aged Out    Meningococcal (ACWY) vaccine  Aged Out

## 2020-04-06 RX ORDER — ALBUTEROL SULFATE 90 UG/1
2 AEROSOL, METERED RESPIRATORY (INHALATION) EVERY 6 HOURS PRN
Qty: 1 INHALER | Refills: 1 | Status: SHIPPED | OUTPATIENT
Start: 2020-04-06 | End: 2020-05-15

## 2020-04-06 RX ORDER — GLIMEPIRIDE 2 MG/1
2 TABLET ORAL
Qty: 90 TABLET | Refills: 0 | Status: SHIPPED | OUTPATIENT
Start: 2020-04-06 | End: 2020-06-08

## 2020-04-06 RX ORDER — PANCRELIPASE 36000; 180000; 114000 [USP'U]/1; [USP'U]/1; [USP'U]/1
CAPSULE, DELAYED RELEASE PELLETS ORAL
Qty: 240 CAPSULE | Refills: 1 | Status: SHIPPED | OUTPATIENT
Start: 2020-04-06 | End: 2020-04-15 | Stop reason: SDUPTHER

## 2020-04-06 RX ORDER — GABAPENTIN 400 MG/1
400 CAPSULE ORAL 3 TIMES DAILY
Qty: 90 CAPSULE | Refills: 1 | Status: SHIPPED | OUTPATIENT
Start: 2020-04-06 | End: 2020-10-05 | Stop reason: SDUPTHER

## 2020-04-06 RX ORDER — LANCETS 30 GAUGE
EACH MISCELLANEOUS
Qty: 200 EACH | Refills: 2 | Status: SHIPPED | OUTPATIENT
Start: 2020-04-06

## 2020-04-06 RX ORDER — MIRTAZAPINE 30 MG/1
15 TABLET, FILM COATED ORAL NIGHTLY
Qty: 90 TABLET | Refills: 3 | Status: SHIPPED | OUTPATIENT
Start: 2020-04-06 | End: 2021-03-05 | Stop reason: SDUPTHER

## 2020-04-06 RX ORDER — SODIUM BICARBONATE 650 MG/1
TABLET ORAL
Qty: 90 TABLET | Refills: 1 | Status: SHIPPED | OUTPATIENT
Start: 2020-04-06 | End: 2020-10-05 | Stop reason: SDUPTHER

## 2020-04-06 RX ORDER — LANCETS 33 GAUGE
EACH MISCELLANEOUS
Qty: 200 EACH | Refills: 2 | Status: SHIPPED | OUTPATIENT
Start: 2020-04-06 | End: 2020-04-10 | Stop reason: SDUPTHER

## 2020-04-06 RX ORDER — BUDESONIDE AND FORMOTEROL FUMARATE DIHYDRATE 80; 4.5 UG/1; UG/1
2 AEROSOL RESPIRATORY (INHALATION) 2 TIMES DAILY
Qty: 2 INHALER | Refills: 0 | Status: SHIPPED | OUTPATIENT
Start: 2020-04-06 | End: 2020-10-05 | Stop reason: SDUPTHER

## 2020-04-13 RX ORDER — LANCETS 33 GAUGE
EACH MISCELLANEOUS
Qty: 200 EACH | Refills: 2 | Status: SHIPPED | OUTPATIENT
Start: 2020-04-13 | End: 2022-05-27 | Stop reason: ALTCHOICE

## 2020-04-14 ENCOUNTER — VIRTUAL VISIT (OUTPATIENT)
Dept: PRIMARY CARE CLINIC | Age: 65
End: 2020-04-14
Payer: MEDICARE

## 2020-04-14 ENCOUNTER — TELEPHONE (OUTPATIENT)
Dept: FAMILY MEDICINE CLINIC | Age: 65
End: 2020-04-14

## 2020-04-14 PROCEDURE — 3017F COLORECTAL CA SCREEN DOC REV: CPT | Performed by: NURSE PRACTITIONER

## 2020-04-14 PROCEDURE — G8419 CALC BMI OUT NRM PARAM NOF/U: HCPCS | Performed by: NURSE PRACTITIONER

## 2020-04-14 PROCEDURE — G8427 DOCREV CUR MEDS BY ELIG CLIN: HCPCS | Performed by: NURSE PRACTITIONER

## 2020-04-14 PROCEDURE — 4004F PT TOBACCO SCREEN RCVD TLK: CPT | Performed by: NURSE PRACTITIONER

## 2020-04-14 PROCEDURE — 99441 PR PHYS/QHP TELEPHONE EVALUATION 5-10 MIN: CPT | Performed by: NURSE PRACTITIONER

## 2020-04-14 RX ORDER — PENICILLIN V POTASSIUM 500 MG/1
500 TABLET ORAL 4 TIMES DAILY
Qty: 40 TABLET | Refills: 0 | Status: SHIPPED | OUTPATIENT
Start: 2020-04-14 | End: 2020-04-24

## 2020-04-14 ASSESSMENT — ENCOUNTER SYMPTOMS
NAUSEA: 0
VISUAL CHANGE: 0
SORE THROAT: 0
PHOTOPHOBIA: 1
FACIAL SWEATING: 0
SCALP TENDERNESS: 0
EYE PAIN: 0
VOMITING: 0
SINUS PRESSURE: 0
BLURRED VISION: 0

## 2020-04-15 RX ORDER — PANCRELIPASE 36000; 180000; 114000 [USP'U]/1; [USP'U]/1; [USP'U]/1
CAPSULE, DELAYED RELEASE PELLETS ORAL
Qty: 240 CAPSULE | Refills: 1 | Status: SHIPPED | OUTPATIENT
Start: 2020-04-15 | End: 2020-10-05 | Stop reason: SDUPTHER

## 2020-06-08 RX ORDER — GLIMEPIRIDE 2 MG/1
TABLET ORAL
Qty: 90 TABLET | Refills: 0 | Status: SHIPPED | OUTPATIENT
Start: 2020-06-08 | End: 2020-07-14 | Stop reason: SDUPTHER

## 2020-06-22 ENCOUNTER — TELEPHONE (OUTPATIENT)
Dept: GASTROENTEROLOGY | Age: 65
End: 2020-06-22

## 2020-06-22 NOTE — TELEPHONE ENCOUNTER
Called patient to confirm appointment for 6/23/20 3:45 pm at [facility]. Ins, id, co-pay, mask, no more than 5 minutes early, and due to covid-19 alone. Writer thanked and call ended.

## 2020-06-23 ENCOUNTER — OFFICE VISIT (OUTPATIENT)
Dept: GASTROENTEROLOGY | Age: 65
End: 2020-06-23
Payer: MEDICARE

## 2020-06-23 VITALS — WEIGHT: 100 LBS | HEIGHT: 67 IN | TEMPERATURE: 96.8 F | RESPIRATION RATE: 14 BRPM | BODY MASS INDEX: 15.7 KG/M2

## 2020-06-23 PROCEDURE — 3017F COLORECTAL CA SCREEN DOC REV: CPT | Performed by: INTERNAL MEDICINE

## 2020-06-23 PROCEDURE — 99214 OFFICE O/P EST MOD 30 MIN: CPT | Performed by: INTERNAL MEDICINE

## 2020-06-23 PROCEDURE — G8419 CALC BMI OUT NRM PARAM NOF/U: HCPCS | Performed by: INTERNAL MEDICINE

## 2020-06-23 PROCEDURE — G8428 CUR MEDS NOT DOCUMENT: HCPCS | Performed by: INTERNAL MEDICINE

## 2020-06-23 PROCEDURE — 4004F PT TOBACCO SCREEN RCVD TLK: CPT | Performed by: INTERNAL MEDICINE

## 2020-06-23 ASSESSMENT — ENCOUNTER SYMPTOMS
COUGH: 0
CONSTIPATION: 0
ABDOMINAL PAIN: 0
RECTAL PAIN: 0
VOMITING: 0
ABDOMINAL DISTENTION: 0
ANAL BLEEDING: 0
NAUSEA: 0
WHEEZING: 0
CHOKING: 0
DIARRHEA: 1
BLOOD IN STOOL: 0
TROUBLE SWALLOWING: 0

## 2020-06-23 NOTE — PROGRESS NOTES
Not on file     Active member of club or organization: Not on file     Attends meetings of clubs or organizations: Not on file     Relationship status: Not on file    Intimate partner violence     Fear of current or ex partner: Not on file     Emotionally abused: Not on file     Physically abused: Not on file     Forced sexual activity: Not on file   Other Topics Concern    Not on file   Social History Narrative    Not on file       REVIEW OF SYSTEMS: A 12-point review of systemswas obtained and pertinent positives and negatives were enumerated above in the history of present illness. All other reviewed systems / symptoms were negative. Review of Systems   Constitutional: Positive for appetite change, fatigue and unexpected weight change. HENT: Negative for trouble swallowing. Respiratory: Negative for cough, choking and wheezing. Cardiovascular: Positive for leg swelling. Negative for chest pain and palpitations. Gastrointestinal: Positive for diarrhea (when no meds). Negative for abdominal distention, abdominal pain, anal bleeding, blood in stool, constipation, nausea, rectal pain and vomiting. Genitourinary: Negative for difficulty urinating. Allergic/Immunologic: Negative for environmental allergies and food allergies. Neurological: Negative for dizziness, weakness, light-headedness, numbness and headaches. Hematological: Does not bruise/bleed easily. Psychiatric/Behavioral: Negative for sleep disturbance. The patient is not nervous/anxious.             LABORATORY DATA: Reviewed  Lab Results   Component Value Date    WBC 5.6 12/18/2019    HGB 12.7 12/18/2019    HCT 38.3 12/18/2019    .1 12/18/2019     12/18/2019     12/18/2019    K 4.5 12/18/2019    CL 99 12/18/2019    CO2 29 12/18/2019    BUN 6 (L) 12/18/2019    CREATININE 0.65 12/18/2019    LABPROT 7.4 05/01/2012    LABALBU 3.9 12/18/2019    BILITOT 0.32 12/18/2019    ALKPHOS 99 12/18/2019    AST 32 (H) 12/18/2019

## 2020-06-29 ENCOUNTER — TELEPHONE (OUTPATIENT)
Dept: PRIMARY CARE CLINIC | Age: 65
End: 2020-06-29

## 2020-06-29 RX ORDER — GLUCOSAMINE HCL/CHONDROITIN SU 500-400 MG
1 CAPSULE ORAL DAILY
Qty: 300 STRIP | Refills: 5 | Status: SHIPPED | OUTPATIENT
Start: 2020-06-29 | End: 2020-07-15 | Stop reason: SDUPTHER

## 2020-06-29 RX ORDER — LANCETS 30 GAUGE
1 EACH MISCELLANEOUS DAILY
Qty: 300 EACH | Refills: 5 | Status: SHIPPED | OUTPATIENT
Start: 2020-06-29 | End: 2020-07-15 | Stop reason: SDUPTHER

## 2020-06-29 RX ORDER — UBIQUINOL 100 MG
CAPSULE ORAL
Qty: 200 EACH | Refills: 3 | Status: SHIPPED | OUTPATIENT
Start: 2020-06-29 | End: 2021-03-05 | Stop reason: SDUPTHER

## 2020-06-29 NOTE — TELEPHONE ENCOUNTER
I sent the diabetic testing supplies. However I have never prescribed her and cannot see in her chart where she has been prescribed the other 2 medications in the last 12 months.   She would need an appointment as they would be new medication
MRI Abdomen W WO Contrast Mrcp Once 09/21/2020       Next Visit Date:  Future Appointments   Date Time Provider Teresa Taylori   7/28/2020  2:45 PM MD [facility]   12/1/2020  9:30 AM [facility] MRI RM 1 [facility]   12/8/2020  3:30 PM Tessy Banegas MD [facility]            Patient Active Problem List:     Intractable epilepsy ([address]     Hypoglycemia     Depression     Osteoporosis     Affective disorder ([address]     Uncontrolled type 2 DM with hyperosmolar nonketotic hyperglycemia (HCC)     Generalized abdominal pain     Pancreatic calcification     Pancreatic insufficiency     Pain of upper abdomen     Alcohol-induced chronic pancreatitis (HCC)     Weight loss     Polysubstance abuse (HCC)     Unresponsive episode     Chronic neck pain     Neuropathic pain of both legs     Seizure disorder, secondary ([address]     Essential (primary) hypertension     Type 2 diabetes mellitus without complication, with long-term current use of insulin (HCC)     Neuropathic pain     Elevated CEA     Acute left-sided low back pain without sciatica     Adenomatous polyps     Bilateral wrist pain     Seizure-like activity (HCC)     Elevated CA 19-9 level     Body mass index (BMI) less than 16.5

## 2020-07-15 ENCOUNTER — TELEPHONE (OUTPATIENT)
Dept: PRIMARY CARE CLINIC | Age: 65
End: 2020-07-15

## 2020-07-15 RX ORDER — OMEPRAZOLE 20 MG/1
20 CAPSULE, DELAYED RELEASE ORAL 2 TIMES DAILY
Qty: 180 CAPSULE | Refills: 0 | Status: SHIPPED | OUTPATIENT
Start: 2020-07-15 | End: 2020-10-05 | Stop reason: SDUPTHER

## 2020-07-15 RX ORDER — GLIMEPIRIDE 2 MG/1
TABLET ORAL
Qty: 90 TABLET | Refills: 0 | Status: SHIPPED | OUTPATIENT
Start: 2020-07-15 | End: 2020-10-05 | Stop reason: SDUPTHER

## 2020-07-15 RX ORDER — GLUCOSAMINE HCL/CHONDROITIN SU 500-400 MG
1 CAPSULE ORAL DAILY
Qty: 300 STRIP | Refills: 5 | Status: SHIPPED | OUTPATIENT
Start: 2020-07-15 | End: 2021-03-05 | Stop reason: SDUPTHER

## 2020-07-15 RX ORDER — LANCETS 30 GAUGE
1 EACH MISCELLANEOUS DAILY
Qty: 300 EACH | Refills: 5 | Status: SHIPPED | OUTPATIENT
Start: 2020-07-15 | End: 2021-03-05 | Stop reason: SDUPTHER

## 2020-07-15 NOTE — TELEPHONE ENCOUNTER
[facility] called, stated they did not receive orders for glucose meter, lancets or test strips that was sent on 6/29/20. They are asking if you could refax orders to [phone].  Please advise

## 2020-08-10 ENCOUNTER — TELEPHONE (OUTPATIENT)
Dept: PRIMARY CARE CLINIC | Age: 65
End: 2020-08-10

## 2020-10-05 ENCOUNTER — OFFICE VISIT (OUTPATIENT)
Dept: PRIMARY CARE CLINIC | Age: 65
End: 2020-10-05
Payer: MEDICARE

## 2020-10-05 VITALS
WEIGHT: 95 LBS | TEMPERATURE: 97.3 F | DIASTOLIC BLOOD PRESSURE: 92 MMHG | HEART RATE: 108 BPM | SYSTOLIC BLOOD PRESSURE: 132 MMHG | OXYGEN SATURATION: 98 % | BODY MASS INDEX: 14.88 KG/M2

## 2020-10-05 PROCEDURE — 90686 IIV4 VACC NO PRSV 0.5 ML IM: CPT | Performed by: NURSE PRACTITIONER

## 2020-10-05 PROCEDURE — 99214 OFFICE O/P EST MOD 30 MIN: CPT | Performed by: NURSE PRACTITIONER

## 2020-10-05 PROCEDURE — 3051F HG A1C>EQUAL 7.0%<8.0%: CPT | Performed by: NURSE PRACTITIONER

## 2020-10-05 PROCEDURE — G0008 ADMIN INFLUENZA VIRUS VAC: HCPCS | Performed by: NURSE PRACTITIONER

## 2020-10-05 RX ORDER — BUDESONIDE AND FORMOTEROL FUMARATE DIHYDRATE 80; 4.5 UG/1; UG/1
2 AEROSOL RESPIRATORY (INHALATION) 2 TIMES DAILY
Qty: 2 INHALER | Refills: 0 | Status: SHIPPED | OUTPATIENT
Start: 2020-10-05 | End: 2021-03-05 | Stop reason: SDUPTHER

## 2020-10-05 RX ORDER — OMEPRAZOLE 20 MG/1
20 CAPSULE, DELAYED RELEASE ORAL 2 TIMES DAILY
Qty: 180 CAPSULE | Refills: 0 | Status: SHIPPED | OUTPATIENT
Start: 2020-10-05 | End: 2021-03-05 | Stop reason: SDUPTHER

## 2020-10-05 RX ORDER — PANCRELIPASE 36000; 180000; 114000 [USP'U]/1; [USP'U]/1; [USP'U]/1
CAPSULE, DELAYED RELEASE PELLETS ORAL
Qty: 240 CAPSULE | Refills: 1 | Status: SHIPPED | OUTPATIENT
Start: 2020-10-05 | End: 2021-03-30 | Stop reason: SDUPTHER

## 2020-10-05 RX ORDER — GABAPENTIN 400 MG/1
400 CAPSULE ORAL 3 TIMES DAILY
Qty: 90 CAPSULE | Refills: 1 | Status: SHIPPED | OUTPATIENT
Start: 2020-10-05 | End: 2021-03-05 | Stop reason: SDUPTHER

## 2020-10-05 RX ORDER — GLIMEPIRIDE 2 MG/1
TABLET ORAL
Qty: 90 TABLET | Refills: 0 | Status: SHIPPED | OUTPATIENT
Start: 2020-10-05 | End: 2021-03-05 | Stop reason: SDUPTHER

## 2020-10-05 RX ORDER — SODIUM BICARBONATE 650 MG/1
TABLET ORAL
Qty: 90 TABLET | Refills: 1 | Status: SHIPPED | OUTPATIENT
Start: 2020-10-05 | End: 2021-04-22 | Stop reason: SDUPTHER

## 2020-10-05 ASSESSMENT — ENCOUNTER SYMPTOMS
DIARRHEA: 0
SORE THROAT: 0
RHINORRHEA: 0
NAUSEA: 0
SHORTNESS OF BREATH: 0
COUGH: 0
VOMITING: 0

## 2020-12-07 ENCOUNTER — TELEPHONE (OUTPATIENT)
Dept: GASTROENTEROLOGY | Age: 65
End: 2020-12-07

## 2020-12-07 NOTE — TELEPHONE ENCOUNTER
Called patient to confirm appointment for 12/8/20 and the patient stated that she did not have her MRI done yet. Number for out-patient scheduling given [phone]. Rescheduled for 1/5/21 2:30 @ [facility]. Writer thanked and call ended.

## 2020-12-28 ENCOUNTER — HOSPITAL ENCOUNTER (OUTPATIENT)
Age: 65
Discharge: HOME | End: 2020-12-28
Payer: MEDICARE

## 2020-12-28 ENCOUNTER — HOSPITAL ENCOUNTER (OUTPATIENT)
Dept: MRI IMAGING | Age: 65
Discharge: HOME | End: 2020-12-30
Payer: MEDICARE

## 2020-12-28 LAB
ALBUMIN SERPL-MCNC: 4 G/DL (ref 3.5–5.2)
ALBUMIN/GLOBULIN RATIO: 1.3 (ref 1–2.5)
ALP BLD-CCNC: 123 U/L (ref 35–104)
ALT SERPL-CCNC: 10 U/L (ref 5–33)
ANION GAP SERPL CALCULATED.3IONS-SCNC: 18 MMOL/L (ref 9–17)
AST SERPL-CCNC: 30 U/L
BILIRUB SERPL-MCNC: 0.45 MG/DL (ref 0.3–1.2)
BUN BLDV-MCNC: 7 MG/DL (ref 8–23)
BUN/CREAT BLD: ABNORMAL (ref 9–20)
CALCIUM SERPL-MCNC: 9.8 MG/DL (ref 8.6–10.4)
CHLORIDE BLD-SCNC: 99 MMOL/L (ref 98–107)
CHOLESTEROL, FASTING: 135 MG/DL
CHOLESTEROL/HDL RATIO: 1.3
CO2: 18 MMOL/L (ref 20–31)
CREAT SERPL-MCNC: 0.63 MG/DL (ref 0.5–0.9)
ESTIMATED AVERAGE GLUCOSE: 169 MG/DL
GFR AFRICAN AMERICAN: >60 ML/MIN
GFR NON-AFRICAN AMERICAN: >60 ML/MIN
GFR NON-AFRICAN AMERICAN: >60 ML/MIN
GFR SERPL CREATININE-BSD FRML MDRD: >60 ML/MIN
GFR SERPL CREATININE-BSD FRML MDRD: ABNORMAL ML/MIN/{1.73_M2}
GFR SERPL CREATININE-BSD FRML MDRD: ABNORMAL ML/MIN/{1.73_M2}
GFR SERPL CREATININE-BSD FRML MDRD: NORMAL ML/MIN/{1.73_M2}
GLUCOSE BLD-MCNC: 202 MG/DL (ref 70–99)
HBA1C MFR BLD: 7.5 % (ref 4–6)
HDLC SERPL-MCNC: 102 MG/DL
LDL CHOLESTEROL: 19 MG/DL (ref 0–130)
POC CREATININE: 0.71 MG/DL (ref 0.51–1.19)
POTASSIUM SERPL-SCNC: 5.5 MMOL/L (ref 3.7–5.3)
SODIUM BLD-SCNC: 135 MMOL/L (ref 135–144)
TOTAL PROTEIN: 7 G/DL (ref 6.4–8.3)
TRIGLYCERIDE, FASTING: 72 MG/DL
VLDLC SERPL CALC-MCNC: NORMAL MG/DL (ref 1–30)

## 2020-12-28 PROCEDURE — 80053 COMPREHEN METABOLIC PANEL: CPT

## 2020-12-28 PROCEDURE — A9576 INJ PROHANCE MULTIPACK: HCPCS | Performed by: INTERNAL MEDICINE

## 2020-12-28 PROCEDURE — 80061 LIPID PANEL: CPT

## 2020-12-28 PROCEDURE — 82565 ASSAY OF CREATININE: CPT

## 2020-12-28 PROCEDURE — 36415 COLL VENOUS BLD VENIPUNCTURE: CPT

## 2020-12-28 PROCEDURE — 83036 HEMOGLOBIN GLYCOSYLATED A1C: CPT

## 2020-12-28 PROCEDURE — 6360000004 HC RX CONTRAST MEDICATION: Performed by: INTERNAL MEDICINE

## 2020-12-28 PROCEDURE — 74183 MRI ABD W/O CNTR FLWD CNTR: CPT

## 2020-12-28 RX ORDER — SODIUM CHLORIDE 0.9 % (FLUSH) 0.9 %
10 SYRINGE (ML) INJECTION ONCE
Status: DISCONTINUED | OUTPATIENT
Start: 2020-12-28 | End: 2020-12-31 | Stop reason: HOSPADM

## 2020-12-28 RX ORDER — 0.9 % SODIUM CHLORIDE 0.9 %
30 INTRAVENOUS SOLUTION INTRAVENOUS ONCE
Status: DISCONTINUED | OUTPATIENT
Start: 2020-12-28 | End: 2020-12-31 | Stop reason: HOSPADM

## 2020-12-28 RX ADMIN — GADOTERIDOL 8 ML: 279.3 INJECTION, SOLUTION INTRAVENOUS at 09:53

## 2021-01-04 ENCOUNTER — TELEPHONE (OUTPATIENT)
Dept: GASTROENTEROLOGY | Age: 66
End: 2021-01-04

## 2021-01-04 NOTE — TELEPHONE ENCOUNTER
1-4-21 LVM for patient to call the office to cf appointment for 1-5-21, [facility], ins, id, co-pay, mask, no more than 5 minutes early, and alone-jt

## 2021-01-04 NOTE — TELEPHONE ENCOUNTER
Writer called [facility] [id number] to inquire on why MRI is still pending. She states patient was called to come in for additional part of test and she has not called. Suze Davila called patient and informed her. Ext was given for radiology. Patient confirmed once scheduled, she would call to reschedule appt.

## 2021-02-02 ENCOUNTER — HOSPITAL ENCOUNTER (OUTPATIENT)
Dept: MRI IMAGING | Age: 66
Discharge: HOME | End: 2021-02-04
Payer: MEDICARE

## 2021-02-02 DIAGNOSIS — K86.1 CHRONIC PANCREATITIS, UNSPECIFIED PANCREATITIS TYPE (HCC): ICD-10-CM

## 2021-02-02 PROCEDURE — 6360000004 HC RX CONTRAST MEDICATION: Performed by: INTERNAL MEDICINE

## 2021-02-02 PROCEDURE — A9576 INJ PROHANCE MULTIPACK: HCPCS | Performed by: INTERNAL MEDICINE

## 2021-02-02 PROCEDURE — 76937 US GUIDE VASCULAR ACCESS: CPT

## 2021-02-02 PROCEDURE — 74182 MRI ABDOMEN W/CONTRAST: CPT

## 2021-02-02 RX ORDER — SODIUM CHLORIDE 0.9 % (FLUSH) 0.9 %
10 SYRINGE (ML) INJECTION ONCE
Status: DISCONTINUED | OUTPATIENT
Start: 2021-02-02 | End: 2021-02-05 | Stop reason: HOSPADM

## 2021-02-02 RX ORDER — 0.9 % SODIUM CHLORIDE 0.9 %
30 INTRAVENOUS SOLUTION INTRAVENOUS ONCE
Status: DISCONTINUED | OUTPATIENT
Start: 2021-02-02 | End: 2021-02-05 | Stop reason: HOSPADM

## 2021-02-02 RX ADMIN — GADOTERIDOL 8 ML: 279.3 INJECTION, SOLUTION INTRAVENOUS at 09:48

## 2021-03-04 ENCOUNTER — TELEPHONE (OUTPATIENT)
Dept: PRIMARY CARE CLINIC | Age: 66
End: 2021-03-04

## 2021-03-04 DIAGNOSIS — Z87.19 HX OF GASTROESOPHAGEAL REFLUX (GERD): ICD-10-CM

## 2021-03-04 DIAGNOSIS — K86.89 PANCREATIC INSUFFICIENCY: ICD-10-CM

## 2021-03-04 DIAGNOSIS — Z87.09 HISTORY OF COPD: ICD-10-CM

## 2021-03-04 DIAGNOSIS — M79.2 NEUROPATHIC PAIN: ICD-10-CM

## 2021-03-04 DIAGNOSIS — G25.81 RESTLESS LEG SYNDROME: ICD-10-CM

## 2021-03-04 DIAGNOSIS — E11.42 TYPE 2 DIABETES MELLITUS WITH DIABETIC POLYNEUROPATHY, WITH LONG-TERM CURRENT USE OF INSULIN (HCC): ICD-10-CM

## 2021-03-04 DIAGNOSIS — Z79.4 TYPE 2 DIABETES MELLITUS WITH DIABETIC POLYNEUROPATHY, WITH LONG-TERM CURRENT USE OF INSULIN (HCC): ICD-10-CM

## 2021-03-04 NOTE — TELEPHONE ENCOUNTER
Pt said she now has [facility] and wanted to go ahead and have all of her medication sent to them so they can start filling them. She wanted to make sure that Symbicort was included in those prescriptions.

## 2021-03-05 RX ORDER — UBIQUINOL 100 MG
CAPSULE ORAL
Qty: 200 EACH | Refills: 3 | Status: SHIPPED | OUTPATIENT
Start: 2021-03-05 | End: 2021-10-28 | Stop reason: SDUPTHER

## 2021-03-05 RX ORDER — GABAPENTIN 400 MG/1
400 CAPSULE ORAL 3 TIMES DAILY
Qty: 270 CAPSULE | Refills: 1 | Status: SHIPPED | OUTPATIENT
Start: 2021-03-05 | End: 2021-10-13 | Stop reason: SDUPTHER

## 2021-03-05 RX ORDER — LANCETS 30 GAUGE
1 EACH MISCELLANEOUS DAILY
Qty: 300 EACH | Refills: 5 | Status: SHIPPED | OUTPATIENT
Start: 2021-03-05 | End: 2021-10-28 | Stop reason: SDUPTHER

## 2021-03-05 RX ORDER — BUDESONIDE AND FORMOTEROL FUMARATE DIHYDRATE 80; 4.5 UG/1; UG/1
2 AEROSOL RESPIRATORY (INHALATION) 2 TIMES DAILY
Qty: 2 INHALER | Refills: 0 | Status: SHIPPED | OUTPATIENT
Start: 2021-03-05 | End: 2021-04-12 | Stop reason: SDUPTHER

## 2021-03-05 RX ORDER — OMEPRAZOLE 20 MG/1
20 CAPSULE, DELAYED RELEASE ORAL 2 TIMES DAILY
Qty: 180 CAPSULE | Refills: 0 | Status: SHIPPED | OUTPATIENT
Start: 2021-03-05 | End: 2021-04-09 | Stop reason: SDUPTHER

## 2021-03-05 RX ORDER — GLUCOSAMINE HCL/CHONDROITIN SU 500-400 MG
1 CAPSULE ORAL DAILY
Qty: 300 STRIP | Refills: 5 | Status: SHIPPED | OUTPATIENT
Start: 2021-03-05 | End: 2021-10-28 | Stop reason: SDUPTHER

## 2021-03-05 RX ORDER — GLIMEPIRIDE 2 MG/1
TABLET ORAL
Qty: 90 TABLET | Refills: 0 | Status: SHIPPED | OUTPATIENT
Start: 2021-03-05 | End: 2021-05-03 | Stop reason: SDUPTHER

## 2021-03-05 RX ORDER — MIRTAZAPINE 30 MG/1
15 TABLET, FILM COATED ORAL NIGHTLY
Qty: 90 TABLET | Refills: 3 | Status: SHIPPED | OUTPATIENT
Start: 2021-03-05 | End: 2021-10-28 | Stop reason: SDUPTHER

## 2021-03-05 RX ORDER — ALBUTEROL SULFATE 90 UG/1
AEROSOL, METERED RESPIRATORY (INHALATION)
Qty: 17 G | Refills: 1 | Status: SHIPPED | OUTPATIENT
Start: 2021-03-05 | End: 2021-06-28 | Stop reason: SDUPTHER

## 2021-03-29 DIAGNOSIS — K86.89 PANCREATIC INSUFFICIENCY: ICD-10-CM

## 2021-03-29 NOTE — TELEPHONE ENCOUNTER
Health Maintenance   Topic Date Due    Diabetic retinal exam  Never done    COVID-19 Vaccine (1) Never done    Cervical cancer screen  Never done    Shingles Vaccine (1 of 2) Never done    Breast cancer screen  06/09/2019    Diabetic foot exam  02/13/2020    Diabetic microalbuminuria test  03/11/2020    Annual Wellness Visit (AWV)  Never done    Colon cancer screen colonoscopy  05/29/2021    A1C test (Diabetic or Prediabetic)  12/28/2021    Lipid screen  12/28/2021    Potassium monitoring  12/28/2021    Creatinine monitoring  12/28/2021    DTaP/Tdap/Td vaccine (2 - Td) 10/05/2025    Flu vaccine  Completed    Pneumococcal 0-64 years Vaccine  Completed    Hepatitis C screen  Completed    HIV screen  Completed    Hepatitis A vaccine  Aged Out    Hib vaccine  Aged Out    Meningococcal (ACWY) vaccine  Aged Out             (applicable per patient's age: Cancer Screenings, Depression Screening, Fall Risk Screening, Immunizations)    Hemoglobin A1C (%)   Date Value   12/28/2020 7.5 (H)   02/25/2020 7.5   02/13/2019 7.5     Microalb/Crt.  Ratio (mcg/mg creat)   Date Value   03/11/2019 CANNOT BE CALCULATED     LDL Cholesterol (mg/dL)   Date Value   12/28/2020 19     AST (U/L)   Date Value   12/28/2020 30     ALT (U/L)   Date Value   12/28/2020 10     BUN (mg/dL)   Date Value   12/28/2020 7 (L)      (goal A1C is < 7)   (goal LDL is <100) need 30-50% reduction from baseline     BP Readings from Last 3 Encounters:   10/05/20 (!) 132/92   02/25/20 96/72   02/19/20 (!) 142/85    (goal /80)      All Future Testing planned in [facility]:  Lab Frequency Next Occurrence       Next Visit Date:  Future Appointments   Date Time Provider Teresa Shoemaker   4/6/2021  9:45 AM DONTRELL Contreras - CNP [facility]   4/13/2021  2:30 PM Anjelica Payne MD [facility]            Patient Active Problem List:     Intractable epilepsy ([address]     Hypoglycemia     Depression     Osteoporosis     Affective disorder ([facility]     Uncontrolled type 2 DM with hyperosmolar nonketotic hyperglycemia (HCC)     Generalized abdominal pain     Pancreatic calcification     Pancreatic insufficiency     Pain of upper abdomen     Alcohol-induced chronic pancreatitis (HCC)     Weight loss     Polysubstance abuse (HCC)     Unresponsive episode     Chronic neck pain     Neuropathic pain of both legs     Seizure disorder, secondary ([facility]     Essential (primary) hypertension     Type 2 diabetes mellitus without complication, with long-term current use of insulin (HCC)     Neuropathic pain     Elevated CEA     Acute left-sided low back pain without sciatica     Adenomatous polyps     Bilateral wrist pain     Seizure-like activity (HCC)     Elevated CA 19-9 level     Body mass index (BMI) less than 16.5

## 2021-03-30 RX ORDER — PANCRELIPASE 36000; 180000; 114000 [USP'U]/1; [USP'U]/1; [USP'U]/1
CAPSULE, DELAYED RELEASE PELLETS ORAL
Qty: 240 CAPSULE | Refills: 1 | Status: SHIPPED | OUTPATIENT
Start: 2021-03-30 | End: 2021-04-09 | Stop reason: SDUPTHER

## 2021-04-09 ENCOUNTER — OFFICE VISIT (OUTPATIENT)
Dept: PRIMARY CARE CLINIC | Age: 66
End: 2021-04-09
Payer: MEDICARE

## 2021-04-09 VITALS
SYSTOLIC BLOOD PRESSURE: 131 MMHG | BODY MASS INDEX: 14.32 KG/M2 | HEART RATE: 111 BPM | WEIGHT: 91.4 LBS | DIASTOLIC BLOOD PRESSURE: 77 MMHG | TEMPERATURE: 97.2 F | OXYGEN SATURATION: 99 %

## 2021-04-09 DIAGNOSIS — Z87.19 HX OF GASTROESOPHAGEAL REFLUX (GERD): ICD-10-CM

## 2021-04-09 DIAGNOSIS — K86.89 PANCREATIC INSUFFICIENCY: ICD-10-CM

## 2021-04-09 DIAGNOSIS — Z79.4 TYPE 2 DIABETES MELLITUS WITH DIABETIC POLYNEUROPATHY, WITH LONG-TERM CURRENT USE OF INSULIN (HCC): Primary | ICD-10-CM

## 2021-04-09 DIAGNOSIS — Z12.31 BREAST CANCER SCREENING BY MAMMOGRAM: ICD-10-CM

## 2021-04-09 DIAGNOSIS — E11.42 TYPE 2 DIABETES MELLITUS WITH DIABETIC POLYNEUROPATHY, WITH LONG-TERM CURRENT USE OF INSULIN (HCC): Primary | ICD-10-CM

## 2021-04-09 LAB — HBA1C MFR BLD: 9.2 %

## 2021-04-09 PROCEDURE — 99213 OFFICE O/P EST LOW 20 MIN: CPT | Performed by: NURSE PRACTITIONER

## 2021-04-09 PROCEDURE — 83036 HEMOGLOBIN GLYCOSYLATED A1C: CPT | Performed by: NURSE PRACTITIONER

## 2021-04-09 RX ORDER — OMEPRAZOLE 20 MG/1
20 CAPSULE, DELAYED RELEASE ORAL 2 TIMES DAILY
Qty: 180 CAPSULE | Refills: 0 | Status: SHIPPED | OUTPATIENT
Start: 2021-04-09 | End: 2021-04-22

## 2021-04-09 RX ORDER — PANCRELIPASE 36000; 180000; 114000 [USP'U]/1; [USP'U]/1; [USP'U]/1
CAPSULE, DELAYED RELEASE PELLETS ORAL
Qty: 240 CAPSULE | Refills: 1 | Status: SHIPPED | OUTPATIENT
Start: 2021-04-09 | End: 2021-04-13 | Stop reason: SDUPTHER

## 2021-04-09 RX ORDER — METFORMIN HYDROCHLORIDE 500 MG/1
TABLET, EXTENDED RELEASE ORAL
Qty: 90 TABLET | Refills: 3 | Status: SHIPPED | OUTPATIENT
Start: 2021-04-09 | End: 2021-06-08 | Stop reason: SDUPTHER

## 2021-04-09 SDOH — ECONOMIC STABILITY: FOOD INSECURITY: WITHIN THE PAST 12 MONTHS, YOU WORRIED THAT YOUR FOOD WOULD RUN OUT BEFORE YOU GOT MONEY TO BUY MORE.: NEVER TRUE

## 2021-04-09 SDOH — ECONOMIC STABILITY: INCOME INSECURITY: HOW HARD IS IT FOR YOU TO PAY FOR THE VERY BASICS LIKE FOOD, HOUSING, MEDICAL CARE, AND HEATING?: NOT HARD AT ALL

## 2021-04-09 SDOH — ECONOMIC STABILITY: FOOD INSECURITY: WITHIN THE PAST 12 MONTHS, THE FOOD YOU BOUGHT JUST DIDN'T LAST AND YOU DIDN'T HAVE MONEY TO GET MORE.: NEVER TRUE

## 2021-04-09 ASSESSMENT — ENCOUNTER SYMPTOMS
SORE THROAT: 0
DIARRHEA: 0
SHORTNESS OF BREATH: 0
VOMITING: 0
COUGH: 0
NAUSEA: 0
RHINORRHEA: 0

## 2021-04-09 NOTE — PROGRESS NOTES
[facility]  [id number] [facility] [address]  Dept: [phone]  Dept Fax: [phone]    Patient Care Team:  DONTRELL Kelley CNP as PCP - General (Family Medicine)  DONTRELL Kelley CNP as PCP - [facility] Empaneled Provider  Valentina Beaver MD as Consulting Physician (Gastroenterology)    2021     John Hale (:  1955)is a 60 y.o. female, here for evaluation of the following medical concerns:   Chief Complaint   Patient presents with    Medication Refill     having [facility]       Evens Irwin is here for routine follow-up. Today we discussed her pancreatic insufficiency, hypertension, and diabetes. She had a follow-up with a gastroenterologist and an MRI, her next visit is at 2021 with the GI physician. Feeling much better  Using Remeron, not not every night. Less restless. Does not check sugars often , but feeling good. Not dizzy or lightheaded      Diabetes  She has type 2 diabetes mellitus. Her disease course has been stable. There are no hypoglycemic associated symptoms. Pertinent negatives for hypoglycemia include no dizziness. Pertinent negatives for diabetes include no chest pain, no fatigue, no foot paresthesias, no foot ulcerations and no weakness. There are no hypoglycemic complications. Symptoms are stable. Current diabetic treatment includes oral agent (dual therapy). She is following a diabetic diet. (100-150) An ACE inhibitor/angiotensin II receptor blocker is not being taken. .    Review of Systems   Constitutional: Negative for chills, diaphoresis, fatigue, fever and unexpected weight change. HENT: Negative for congestion, rhinorrhea and sore throat. Respiratory: Negative for cough and shortness of breath. Cardiovascular: Negative for chest pain. Gastrointestinal: Negative for diarrhea, nausea and vomiting. Genitourinary: Negative for difficulty urinating, dysuria and hematuria. Musculoskeletal: Negative for arthralgias and myalgias. Neurological: Negative for dizziness, weakness and light-headedness. Prior to Visit Medications    Medication Sig Taking? Authorizing Provider   lipase-protease-amylase (CREON) 26815 units CPEP delayed release capsule take 2 capsules by mouth four times a day after meals and at bedtime Yes DONTRELL Ashton CNP   omeprazole (PRILOSEC) 20 MG delayed release capsule Take 1 capsule by mouth 2 times daily TAKE 1 CAPSULE BY MOUTH TWO TIMES DAILY Yes DONTRELL Ashton CNP   metFORMIN (GLUCOPHAGE-XR) 500 MG extended release tablet Take 2 tablets by mouth daily (with breakfast) AND 1 tablet Daily with supper. Yes DONTRELL Ashton CNP   glimepiride (AMARYL) 2 MG tablet TAKE 1 TABLET BY MOUTH IN  THE MORNING BEFORE  BREAKFAST Yes DONTRELL Ashton CNP   gabapentin (NEURONTIN) 400 MG capsule Take 1 capsule by mouth 3 times daily for 90 days. Yes DONTRELL Ashton CNP   budesonide-formoterol (SYMBICORT) 80-4.5 MCG/ACT AERO Inhale 2 puffs into the lungs 2 times daily Yes DONTRELL Ashton CNP   Lancets MISC 1 each by Does not apply route daily Yes DONTRELL Ashton CNP   blood glucose monitor strips 1 strip by Other route daily Test 1 times a day & as needed for symptoms of irregular blood glucose.  Yes DONTRELL Ashotn CNP   Alcohol Swabs (ALCOHOL PREP) 70 % PADS Daily as needed Yes DONTRELL Ashton CNP   albuterol sulfate  (90 Base) MCG/ACT inhaler USE 2 INHALATIONS BY MOUTH  EVERY 6 HOURS AS NEEDED FOR WHEEZING Yes DONTRELL Ashton CNP   mirtazapine (REMERON) 30 MG tablet Take 0.5 tablets by mouth nightly Yes DONTRELL Ashton CNP   sodium bicarbonate 650 MG tablet take 1 tablet by mouth three times a day Yes DONTRELL Ashton CNP   Blood Gluc Meter Disp-Strips (BLOOD GLUCOSE METER DISPOSABLE) OXANA Daily Right Ear: External ear normal.      Left Ear: External ear normal.   Eyes:      General: No scleral icterus. Pupils: Pupils are equal, round, and reactive to light. Neck:      Musculoskeletal: Normal range of motion and neck supple. Cardiovascular:      Rate and Rhythm: Normal rate and regular rhythm. Pulmonary:      Effort: Pulmonary effort is normal.      Breath sounds: Normal breath sounds. Abdominal:      General: Abdomen is flat. Bowel sounds are normal.      Palpations: Abdomen is soft. Tenderness: There is no abdominal tenderness. Skin:     General: Skin is warm and dry. Neurological:      Mental Status: She is alert and oriented to person, place, and time. Coordination: Coordination normal.   Psychiatric:         Behavior: Behavior normal. Behavior is cooperative. Thought Content: Thought content normal.         Judgment: Judgment normal.         ASSESSMENT     Diagnosis Orders   1. Type 2 diabetes mellitus with diabetic polyneuropathy, with long-term current use of insulin (HCC)  POCT glycosylated hemoglobin (Hb A1C)    metFORMIN (GLUCOPHAGE-XR) 500 MG extended release tablet   2. Pancreatic insufficiency  lipase-protease-amylase (CREON) 91714 units CPEP delayed release capsule   3. Hx of gastroesophageal reflux (GERD)  omeprazole (PRILOSEC) 20 MG delayed release capsule   4.  Breast cancer screening by mammogram  [facility] DIGITAL SCREEN W OR WO CAD BILATERAL          PLAN:  Orders Placed This Encounter   Medications    lipase-protease-amylase (CREON) 20334 units CPEP delayed release capsule     Sig: take 2 capsules by mouth four times a day after meals and at bedtime     Dispense:  240 capsule     Refill:  1    omeprazole (PRILOSEC) 20 MG delayed release capsule     Sig: Take 1 capsule by mouth 2 times daily TAKE 1 CAPSULE BY MOUTH TWO TIMES DAILY     Dispense:  180 capsule     Refill:  0    metFORMIN (GLUCOPHAGE-XR) 500 MG extended release tablet     Sig: Take 2 tablets by mouth daily (with breakfast) AND 1 tablet Daily with supper. Dispense:  90 tablet     Refill:  3         1. Patient weight remains stable, still struggle with postprandial diarrhea. Reviewed MRI results with patient, no pancreatic masses positive for chronic pancreatitis. 2. A1c up two points today at 9.2. Patient admits she is not following diabetic diet or checking sugars often. Encourage self-monitoring and will increase Metformin to 1000 in the a.m. and 500 in the p.m. Switch to extended release as patient already suffers from diarrhea. Patient is not a candidate for GLP-1 due to pancreatitis status. May consider Farxiga or Jardiance if not improving. 3. Patient overdue for cervical cancer screening and breast cancer screening, mammogram ordered. Will schedule Pap in office in the next 2 months    FOLLOW UP AND INSTRUCTIONS:  Return in about 2 months (around 6/9/2021) for PAP. · Paxton Fernández received counseling on the following healthy behaviors:nutrition and medication adherence    · Discussed use, benefit, and side effects of prescribed medications. Barriers to  medication compliance addressed. All patient questions answered. Pt  verbalized understanding of all instructions given. · Patient given educational materials - see patient instructions      · Patient advised to contact scheduling offices for any referrals or imaging orders  placed today if they have not been contacted in 48 hours. Return in about 2 months (around 6/9/2021) for PAP. An electronic signature was used to authenticate this note.     --DONTRELL Hernandez - CNP on 4/9/2021 at 9:49 AM

## 2021-04-09 NOTE — PROGRESS NOTES
Visit Information    Have you changed or started any medications since your last visit including any over-the-counter medicines, vitamins, or herbal medicines? yes - vitamin C super C , magnesium  , B12   Are you having any side effects from any of your medications? -  no  Have you stopped taking any of your medications? Is so, why? -  no    Have you seen any other physician or provider since your last visit? No  Have you had any other diagnostic tests since your last visit? No  Have you been seen in the emergency room and/or had an admission to a hospital since we last saw you? No  Have you had your routine dental cleaning in the past 6 months? no    Have you activated your [employer] account? If not, what are your barriers?  Yes     Patient Care Team:  DONTRELL Ferguson CNP as PCP - General (Family Medicine)  DONTRELL Ferguson CNP as PCP - [facility] Provider  Karl Tom MD as Consulting Physician (Gastroenterology)    Medical History Review  Past Medical, Family, and Social History reviewed and does not contribute to the patient presenting condition    Health Maintenance   Topic Date Due    Diabetic retinal exam  Never done    Cervical cancer screen  Never done    Shingles Vaccine (1 of 2) Never done    Breast cancer screen  06/09/2019    Diabetic foot exam  02/13/2020    Diabetic microalbuminuria test  03/11/2020    Annual Wellness Visit (AWV)  Never done    Colon cancer screen colonoscopy  05/29/2021    A1C test (Diabetic or Prediabetic)  12/28/2021    Lipid screen  12/28/2021    Potassium monitoring  12/28/2021    Creatinine monitoring  12/28/2021    DTaP/Tdap/Td vaccine (2 - Td) 10/05/2025    Flu vaccine  Completed    Pneumococcal 0-64 years Vaccine  Completed    COVID-19 Vaccine  Completed    Hepatitis C screen  Completed    HIV screen  Completed    Hepatitis A vaccine  Aged Out    Hib vaccine  Aged Out    Meningococcal (ACWY) vaccine  Aged Out

## 2021-04-12 DIAGNOSIS — Z87.09 HISTORY OF COPD: ICD-10-CM

## 2021-04-12 RX ORDER — DILTIAZEM HYDROCHLORIDE 60 MG/1
2 TABLET, FILM COATED ORAL 2 TIMES DAILY
Qty: 3 INHALER | Refills: 1 | Status: SHIPPED | OUTPATIENT
Start: 2021-04-12 | End: 2021-10-13 | Stop reason: SDUPTHER

## 2021-04-12 NOTE — TELEPHONE ENCOUNTER
[employer] medicare called stating they would only cover the brand name of Symbicort.   Please resend to pharmacy

## 2021-04-13 ENCOUNTER — OFFICE VISIT (OUTPATIENT)
Dept: GASTROENTEROLOGY | Age: 66
End: 2021-04-13

## 2021-04-13 VITALS
RESPIRATION RATE: 18 BRPM | TEMPERATURE: 97.2 F | SYSTOLIC BLOOD PRESSURE: 139 MMHG | WEIGHT: 93 LBS | HEART RATE: 106 BPM | DIASTOLIC BLOOD PRESSURE: 86 MMHG | BODY MASS INDEX: 14.6 KG/M2 | HEIGHT: 67 IN

## 2021-04-13 DIAGNOSIS — R63.4 WEIGHT LOSS: ICD-10-CM

## 2021-04-13 DIAGNOSIS — R97.8 ELEVATED CA 19-9 LEVEL: ICD-10-CM

## 2021-04-13 DIAGNOSIS — K86.89 PANCREATIC INSUFFICIENCY: ICD-10-CM

## 2021-04-13 DIAGNOSIS — R97.0 ELEVATED CEA: ICD-10-CM

## 2021-04-13 DIAGNOSIS — K86.0 ALCOHOL-INDUCED CHRONIC PANCREATITIS (HCC): ICD-10-CM

## 2021-04-13 DIAGNOSIS — Z86.010 HX OF COLONIC POLYPS: Primary | ICD-10-CM

## 2021-04-13 PROCEDURE — 99999 PR OFFICE/OUTPT VISIT,PROCEDURE ONLY: CPT | Performed by: INTERNAL MEDICINE

## 2021-04-13 RX ORDER — PANCRELIPASE 36000; 180000; 114000 [USP'U]/1; [USP'U]/1; [USP'U]/1
CAPSULE, DELAYED RELEASE PELLETS ORAL
Qty: 240 CAPSULE | Refills: 1 | Status: SHIPPED | OUTPATIENT
Start: 2021-04-13 | End: 2021-10-13 | Stop reason: SDUPTHER

## 2021-04-13 ASSESSMENT — ENCOUNTER SYMPTOMS
ABDOMINAL PAIN: 0
RECTAL PAIN: 0
ABDOMINAL DISTENTION: 0
CHOKING: 0
DIARRHEA: 0
BLOOD IN STOOL: 0
COUGH: 0
WHEEZING: 0
NAUSEA: 0
ANAL BLEEDING: 0
TROUBLE SWALLOWING: 0
VOMITING: 0
CONSTIPATION: 0

## 2021-04-13 NOTE — PROGRESS NOTES
GI CLINIC FOLLOW UP    INTERVAL HISTORY:   No referring provider defined for this encounter. Chief Complaint   Patient presents with    Pancreatitis     Patient is f/u on MRI and pancreatitis.  Other     Patient c/o blisters on her tailbone that are painful. HISTORY OF PRESENT ILLNESS: Pierre Gutierrez is a 60 y.o. female , referred for evaluation of*pancreatitis,pancreatic insufficiency, elevated tumor markers  Not gaining wt         Here for f/u with above    CEA was slightly still high and still  CA-19-9 completely normal  Had colonoscopy  We did an MRI MRCP on her which showed sequela of chronic pancreatitis, questionable side  branches I PMN, both findings are similar to the imaging from 2016   we have her on [employer] , insurance giving her hard time on getting it   Last vist we ordered MRI     Impression   1. No acute upper abdominal abnormality. 2. Coarse calcifications throughout the pancreas are seen to better advantage   on prior CT.  Findings are compatible with chronic pancreatitis.  No   suspicious pancreatic mass.             No N/v   no abd pain   no melena/hematemsis/hematochezia  No dysphagia/odynophagia   no wt loss   no diarrhea /contipation    ;abs same , always slight elevation ALP rest of LFTs normal    does smokes marijuana   [id number] : polyps, poor prep due to repeat     Past Medical,Family, and Social History reviewed and does contribute to the patient presentingcondition. Patient's PMH/PSH,SH,PSYCH Hx, MEDs, ALLERGIES, and ROS were all reviewed and updated in the appropriate sections.     PAST MEDICAL HISTORY:  Past Medical History:   Diagnosis Date    Cataracts, bilateral     COPD (chronic obstructive pulmonary disease) (HCC)     CTS (carpal tunnel syndrome) left    Fibromyalgia     Generalized abdominal pain 6/3/2016    Glaucoma     Hypertension     Osteoarthritis     Osteoporosis     Pancreatic calcification 6/3/2016    Pancreatitis     Protein-calorie malnutrition, severe ([facility] [address] 6/1/2016    Seizures (HCC)     Tremor     Type II or unspecified type diabetes mellitus without mention of complication, not stated as uncontrolled     Uncontrolled type 2 DM with hyperosmolar nonketotic hyperglycemia ([facility] [address] 6/3/2016       Past Surgical History:   Procedure Laterality Date    COLONOSCOPY  08/30/2016    very poor prep, unable to complete    COLONOSCOPY  06/30/2017    ADENOMATOUS POLYP.  COLONOSCOPY  05/29/2018    polypectomy x1    CYST REMOVAL Left     ovary twice    NECK SURGERY      pt unsure of exact date & what the procedure was ? 2015 [facility]    ID COLON CA SCRN NOT  [address] IND N/A 5/29/2018    COLONOSCOPY WITH POLYP REMOVEL performed by Kris Churchill MD at [address] N/A 6/30/2017    COLONOSCOPY POLYPECTOMY HOT BIOPSY performed by rKis Churchill MD at [address]  08/30/2016    gastritis, MILD CHRONIC GASTRITIS WITH FOCAL ANTRAL EROSION AND ASSOCIATED ACUTE INFLAMMATION. INTESTINAL METAPLASIA PRESENT, NEGATIVE FOR DYSPLASIA.          CURRENT MEDICATIONS:    Current Outpatient Medications:     SYMBICORT 80-4.5 MCG/ACT AERO, Inhale 2 puffs into the lungs 2 times daily, Disp: 3 Inhaler, Rfl: 1    lipase-protease-amylase (CREON) 59161 units CPEP delayed release capsule, take 2 capsules by mouth four times a day after meals and at bedtime, Disp: 240 capsule, Rfl: 1    omeprazole (PRILOSEC) 20 MG delayed release capsule, Take 1 capsule by mouth 2 times daily TAKE 1 CAPSULE BY MOUTH TWO TIMES DAILY, Disp: 180 capsule, Rfl: 0    metFORMIN (GLUCOPHAGE-XR) 500 MG extended release tablet, Take 2 tablets by mouth daily (with breakfast) AND 1 tablet Daily with supper., Disp: 90 tablet, Rfl: 3    glimepiride (AMARYL) 2 MG tablet, TAKE 1 TABLET BY MOUTH IN  THE MORNING BEFORE  BREAKFAST, Disp: 90 tablet, Rfl: 0    gabapentin (NEURONTIN) 400 MG capsule, Take 1 capsule by mouth 3 times daily for 90 days. , Disp: 270 capsule, Rfl: 1    Alcohol Swabs (ALCOHOL PREP) 70 % PADS, Daily as needed, Disp: 200 each, Rfl: 3    albuterol sulfate  (90 Base) MCG/ACT inhaler, USE 2 INHALATIONS BY MOUTH  EVERY 6 HOURS AS NEEDED FOR WHEEZING, Disp: 17 g, Rfl: 1    mirtazapine (REMERON) 30 MG tablet, Take 0.5 tablets by mouth nightly, Disp: 90 tablet, Rfl: 3    sodium bicarbonate 650 MG tablet, take 1 tablet by mouth three times a day, Disp: 90 tablet, Rfl: 1    Blood Gluc Meter Disp-Strips (BLOOD GLUCOSE METER DISPOSABLE) OXANA, Daily and as needed, Disp: 1 Device, Rfl: 0    COMFORT EZ PEN NEEDLES 32G X 6 MM MISC, BID, Disp: 200 each, Rfl: 2    AquaLance Lancets 30G MISC, Daily and as needed for hypoglycemia, Disp: 200 each, Rfl: 2    acetaminophen (TYLENOL) 325 MG tablet, Take 2 tablets by mouth every 6 hours as needed for Pain, Disp: 120 tablet, Rfl: 3    Nutritional Supplements (GLUCERNA HUNGER SMART SHAKE) LIQD, Take 1 Bottle by mouth 3 times daily (with meals), Disp: 90 Bottle, Rfl: 2    Lancets MISC, 1 each by Does not apply route daily, Disp: 300 each, Rfl: 5    blood glucose monitor strips, 1 strip by Other route daily Test 1 times a day & as needed for symptoms of irregular blood glucose., Disp: 300 strip, Rfl: 5    ALLERGIES:   Allergies   Allergen Reactions    Peanuts [Peanut Oil] Anaphylaxis    Vicodin [Hydrocodone-Acetaminophen] Hives    Tramadol     Tylenol With Codeine #3 [Acetaminophen-Codeine]     Ibuprofen Itching       FAMILY HISTORY:       Problem Relation Age of Onset    Brain Cancer Father     Diabetes Sister     Other Sister         epilepsy         SOCIAL HISTORY:   Social History     Socioeconomic History    Marital status:      Spouse name: Not on file    Number of children: Not on file    Years of education: Not on file    Highest education level: Not on file   Occupational History    Not on file   Social Needs    Financial resource strain: Not hard at all   [place] insecurity     Worry: Never true     Inability: Never true    Transportation needs     Medical: No     Non-medical: No   Tobacco Use    Smoking status: Current Every Day Smoker     Packs/day: 0.25     Years: 40.00     Pack years: 10.00    Smokeless tobacco: Former User   Substance and Sexual Activity    Alcohol use: No    Drug use: Yes     Types: Marijuana    Sexual activity: Not on file     Comment: last use end 10/2014   Lifestyle    Physical activity     Days per week: Not on file     Minutes per session: Not on file    Stress: Not on file   Relationships    Social connections     Talks on phone: Not on file     Gets together: Not on file     Attends [employer] service: Not on file     Active member of club or organization: Not on file     Attends meetings of clubs or organizations: Not on file     Relationship status: Not on file    Intimate partner violence     Fear of current or ex partner: Not on file     Emotionally abused: Not on file     Physically abused: Not on file     Forced sexual activity: Not on file   Other Topics Concern    Not on file   Social History Narrative    Not on file       REVIEW OF SYSTEMS: A 12-point review of systemswas obtained and pertinent positives and negatives were enumerated above in the history of present illness. All other reviewed systems / symptoms were negative. Review of Systems   Constitutional: Positive for fatigue. Negative for appetite change and unexpected weight change. HENT: Negative for trouble swallowing. Respiratory: Negative for cough, choking and wheezing. Cardiovascular: Positive for leg swelling. Negative for chest pain and palpitations. Gastrointestinal: Negative for abdominal distention, abdominal pain, anal bleeding, blood in stool, constipation, diarrhea, nausea, rectal pain and vomiting. Genitourinary: Negative for difficulty urinating.    Allergic/Immunologic: Negative for environmental allergies and food allergies. Neurological: Negative for dizziness, weakness, light-headedness, numbness and headaches. Hematological: Does not bruise/bleed easily. Psychiatric/Behavioral: Negative for sleep disturbance. The patient is not nervous/anxious. LABORATORY DATA: Reviewed  Lab Results   Component Value Date    WBC 5.6 12/18/2019    HGB 12.7 12/18/2019    HCT 38.3 12/18/2019    .1 12/18/2019     12/18/2019     12/28/2020    K 5.5 (H) 12/28/2020    CL 99 12/28/2020    CO2 18 (L) 12/28/2020    BUN 7 (L) 12/28/2020    CREATININE 0.63 12/28/2020    LABPROT 7.4 05/01/2012    LABALBU 4.0 12/28/2020    BILITOT 0.45 12/28/2020    ALKPHOS 123 (H) 12/28/2020    AST 30 12/28/2020    ALT 10 12/28/2020    INR 1.1 04/02/2013         Lab Results   Component Value Date    RBC 3.79 (L) 12/18/2019    HGB 12.7 12/18/2019    .1 12/18/2019    MCH 33.5 12/18/2019    MCHC 33.2 12/18/2019    RDW 13.8 12/18/2019    MPV 10.7 12/18/2019    BASOPCT 1 12/18/2019    LYMPHSABS 1.77 12/18/2019    MONOSABS 0.59 12/18/2019    NEUTROABS 2.97 12/18/2019    EOSABS 0.17 12/18/2019    BASOSABS 0.04 12/18/2019         DIAGNOSTIC TESTING:     No results found. PHYSICAL EXAMINATION: Vital signs reviewed per the nursing documentation. /86   Pulse 106   Temp 97.2 °F (36.2 °C)   Resp 18   Ht 5' 7\" (1.702 m)   Wt 93 lb (42.2 kg)   BMI 14.57 kg/m²   Body mass index is 14.57 kg/m². Physical Exam  Vitals signs and nursing note reviewed. Constitutional:       General: She is not in acute distress. Appearance: She is well-developed. She is not diaphoretic. HENT:      Head: Normocephalic. Mouth/Throat:      Pharynx: No oropharyngeal exudate. Eyes:      General: No scleral icterus. Pupils: Pupils are equal, round, and reactive to light. Neck:      Musculoskeletal: Normal range of motion and neck supple. Thyroid: No thyromegaly. Vascular: No JVD.       Trachea: No tracheal deviation. Cardiovascular:      Rate and Rhythm: Normal rate and regular rhythm. Heart sounds: Normal heart sounds. No murmur. Pulmonary:      Effort: Pulmonary effort is normal. No respiratory distress. Breath sounds: Normal breath sounds. No wheezing. Abdominal:      General: Bowel sounds are normal. There is no distension. Palpations: Abdomen is soft. Tenderness: There is no abdominal tenderness. There is no guarding or rebound. Comments: No ascites   Musculoskeletal: Normal range of motion. Skin:     General: Skin is warm. Coloration: Skin is not pale. Findings: No erythema or rash. Comments: She is not diaphoretic   Neurological:      Mental Status: She is alert and oriented to person, place, and time. Deep Tendon Reflexes: Reflexes are normal and symmetric. Psychiatric:         Behavior: Behavior normal.         Thought Content: Thought content normal.         Judgment: Judgment normal.           IMPRESSION: Ms. CEJA is a 60 y.o. female with      Diagnosis Orders   1. Hx of colonic polyps  COLONOSCOPY W/ OR W/O BIOPSY   2. Elevated CEA     3. Elevated CA 19-9 level  CEA   4. Alcohol-induced chronic pancreatitis (HCC)  Cancer Antigen 19-9   5. Pancreatic insufficiency     6. Weight loss  COLONOSCOPY W/ OR W/O BIOPSY     We will repeat the tumor markers, patient is due for colonoscopy we will schedule   We will continue to monitor     diet/life style/natural hx /complication of the dx were all explained in details   Past medical, past surgical, social history, psychiatric history, medications or allergies, all reviewed and  updated    Thank you for allowing me to participate in the care of Ms. CEJA. For any further questions please do not hesitate to contact me. I have reviewed and agree with the ROS entered by the MA/RN. Note is dictated utilizing voice recognition software. Unfortunately this leads to occasional typographical errors. Please contact our office if you have any questions.       Frederick Kimble MD  [facility]  O: #[phone]

## 2021-04-14 ENCOUNTER — TELEPHONE (OUTPATIENT)
Dept: PRIMARY CARE CLINIC | Age: 66
End: 2021-04-14

## 2021-04-14 RX ORDER — POLYETHYLENE GLYCOL 3350 17 G/17G
POWDER, FOR SOLUTION ORAL
Qty: 238 G | Refills: 0 | Status: SHIPPED | OUTPATIENT
Start: 2021-04-14 | End: 2021-06-09

## 2021-04-14 NOTE — TELEPHONE ENCOUNTER
Patient called stated she is still having an issue with [facility] trying to get her Omeprazole. She stated they told her they are not going to fill it until someone from here calls them with the correct information. Patient could not tell me exactly what they needed. Please advise      Health Maintenance   Topic Date Due    Diabetic retinal exam  Never done    Cervical cancer screen  Never done    Shingles Vaccine (1 of 2) Never done    Breast cancer screen  06/09/2019    Diabetic foot exam  02/13/2020    Diabetic microalbuminuria test  03/11/2020    Annual Wellness Visit (AWV)  Never done    Colon cancer screen colonoscopy  05/29/2021    A1C test (Diabetic or Prediabetic)  07/09/2021    Lipid screen  12/28/2021    Potassium monitoring  12/28/2021    Creatinine monitoring  12/28/2021    DTaP/Tdap/Td vaccine (2 - Td) 10/05/2025    Flu vaccine  Completed    Pneumococcal 0-64 years Vaccine  Completed    COVID-19 Vaccine  Completed    Hepatitis C screen  Completed    HIV screen  Completed    Hepatitis A vaccine  Aged Out    Hib vaccine  Aged Out    Meningococcal (ACWY) vaccine  Aged Out             (applicable per patient's age: Cancer Screenings, Depression Screening, Fall Risk Screening, Immunizations)    Hemoglobin A1C (%)   Date Value   04/09/2021 9.2   12/28/2020 7.5 (H)   02/25/2020 7.5     Microalb/Crt.  Ratio (mcg/mg creat)   Date Value   03/11/2019 CANNOT BE CALCULATED     LDL Cholesterol (mg/dL)   Date Value   12/28/2020 19     AST (U/L)   Date Value   12/28/2020 30     ALT (U/L)   Date Value   12/28/2020 10     BUN (mg/dL)   Date Value   12/28/2020 7 (L)      (goal A1C is < 7)   (goal LDL is <100) need 30-50% reduction from baseline     BP Readings from Last 3 Encounters:   04/13/21 139/86   04/09/21 131/77   10/05/20 (!) 132/92    (goal /80)      All Future Testing planned in [facility]:  Lab Frequency Next Occurrence   [facility] DIGITAL SCREEN W OR WO CAD BILATERAL Once 04/09/2021   COLONOSCOPY W/ OR W/O BIOPSY Once 04/13/2021   CEA Once 07/14/2021   Cancer Antigen 19-9 Once 07/14/2021       Next Visit Date:  Future Appointments   Date Time Provider Teresa Shoemaker   5/3/2021  3:20 PM SCHEDULE, [facility] COVID SCREENING [facility]   6/2/2021  8:30 AM [facility] SCR MAMMO RM 2 [facility] MAMMO [facility]   6/9/2021 10:15 AM Cinthya Nichole, APRN - CNP [facility]   10/19/2021  1:30 PM Arianna Madison MD [facility]            Patient Active Problem List:     Intractable epilepsy ([address]     Hypoglycemia     Depression     Osteoporosis     Affective disorder ([address]     Uncontrolled type 2 DM with hyperosmolar nonketotic hyperglycemia (HCC)     Generalized abdominal pain     Pancreatic calcification     Pancreatic insufficiency     Pain of upper abdomen     Alcohol-induced chronic pancreatitis (HCC)     Weight loss     Polysubstance abuse (HCC)     Unresponsive episode     Chronic neck pain     Neuropathic pain of both legs     Seizure disorder, secondary ([address]     Essential (primary) hypertension     Type 2 diabetes mellitus without complication, with long-term current use of insulin (HCC)     Neuropathic pain     Elevated CEA     Acute left-sided low back pain without sciatica     Adenomatous polyps     Bilateral wrist pain     Seizure-like activity (HCC)     Elevated CA 19-9 level     Body mass index (BMI) less than 16.5

## 2021-04-15 ENCOUNTER — TELEPHONE (OUTPATIENT)
Dept: GASTROENTEROLOGY | Age: 66
End: 2021-04-15

## 2021-04-15 NOTE — TELEPHONE ENCOUNTER
Writer left msg on pt's vm informing pt she has been sched for covid testing at [facility] 5/3/21 @ 3:20pm.  Left msg instructing pt where to report.

## 2021-04-15 NOTE — TELEPHONE ENCOUNTER
Please notify Chrystal Gaytan we have made attempts to contact her [facility] mail order provider and are unable to get through to speak with anybody regarding their concerns with prescriptions.

## 2021-04-15 NOTE — TELEPHONE ENCOUNTER
Left message on [employer]. Faxed request to pharmacy to contact office regarding needed info for Rx.

## 2021-04-21 ENCOUNTER — TELEPHONE (OUTPATIENT)
Dept: GASTROENTEROLOGY | Age: 66
End: 2021-04-21

## 2021-04-21 ENCOUNTER — OFFICE VISIT (OUTPATIENT)
Dept: PRIMARY CARE CLINIC | Age: 66
End: 2021-04-21
Payer: MEDICARE

## 2021-04-21 ENCOUNTER — TELEPHONE (OUTPATIENT)
Dept: PRIMARY CARE CLINIC | Age: 66
End: 2021-04-21

## 2021-04-21 VITALS
SYSTOLIC BLOOD PRESSURE: 99 MMHG | OXYGEN SATURATION: 100 % | HEART RATE: 114 BPM | DIASTOLIC BLOOD PRESSURE: 76 MMHG | TEMPERATURE: 97.3 F

## 2021-04-21 DIAGNOSIS — B02.9 HERPES ZOSTER WITHOUT COMPLICATION: Primary | ICD-10-CM

## 2021-04-21 DIAGNOSIS — K86.89 PANCREATIC INSUFFICIENCY: ICD-10-CM

## 2021-04-21 PROCEDURE — 99213 OFFICE O/P EST LOW 20 MIN: CPT | Performed by: INTERNAL MEDICINE

## 2021-04-21 RX ORDER — VALACYCLOVIR HYDROCHLORIDE 500 MG/1
500 TABLET, FILM COATED ORAL 2 TIMES DAILY
Qty: 14 TABLET | Refills: 0 | Status: ON HOLD | OUTPATIENT
Start: 2021-04-21 | End: 2021-05-07

## 2021-04-21 NOTE — PROGRESS NOTES
[facility]  [facility]  [address] [place] [id number]  Dept: [phone]  Dept Fax: [phone]    Arelis Burciaga is a 60 y.o. female who presents to the urgent care today for her medicalconditions/complaints as noted below. Arelis Burciaga is c/o of Herpes Zoster (tail bone)      HPI:     Rash  This is a new (pain like sharp stabs) problem. The current episode started 1 to 4 weeks ago (10 days ago). The problem has been waxing and waning since onset. The affected locations include the left buttock and right buttock. The rash is characterized by blistering. She was exposed to nothing. Past treatments include nothing. Past Medical History:   Diagnosis Date    Cataracts, bilateral     COPD (chronic obstructive pulmonary disease) (HCC)     CTS (carpal tunnel syndrome) left    Fibromyalgia     Generalized abdominal pain 6/3/2016    Glaucoma     Hypertension     Osteoarthritis     Osteoporosis     Pancreatic calcification 6/3/2016    Pancreatitis     Protein-calorie malnutrition, severe ([address] 6/1/2016    Seizures ([facility]     Tremor     Type II or unspecified type diabetes mellitus without mention of complication, not stated as uncontrolled     Uncontrolled type 2 DM with hyperosmolar nonketotic hyperglycemia (HCC) 6/3/2016        Current Outpatient Medications   Medication Sig Dispense Refill    valACYclovir (VALTREX) 500 MG tablet Take 1 tablet by mouth 2 times daily 14 tablet 0    bisacodyl (DULCOLAX) 5 MG EC tablet TAKE 4 TABS AT 10 AM THE DAY PRIOR TO COLONOSCOPY 4 tablet 0    SYMBICORT 80-4.5 MCG/ACT AERO Inhale 2 puffs into the lungs 2 times daily 3 Inhaler 1    omeprazole (PRILOSEC) 20 MG delayed release capsule Take 1 capsule by mouth 2 times daily TAKE 1 CAPSULE BY MOUTH TWO TIMES DAILY 180 capsule 0    metFORMIN (GLUCOPHAGE-XR) 500 MG extended release tablet Take 2 tablets by mouth daily (with breakfast) AND 1 tablet Daily with supper.  80 tablet 3    glimepiride (AMARYL) 2 MG tablet TAKE 1 TABLET BY MOUTH IN  THE MORNING BEFORE  BREAKFAST 90 tablet 0    gabapentin (NEURONTIN) 400 MG capsule Take 1 capsule by mouth 3 times daily for 90 days. 270 capsule 1    Alcohol Swabs (ALCOHOL PREP) 70 % PADS Daily as needed 200 each 3    albuterol sulfate  (90 Base) MCG/ACT inhaler USE 2 INHALATIONS BY MOUTH  EVERY 6 HOURS AS NEEDED FOR WHEEZING 17 g 1    mirtazapine (REMERON) 30 MG tablet Take 0.5 tablets by mouth nightly 90 tablet 3    sodium bicarbonate 650 MG tablet take 1 tablet by mouth three times a day 90 tablet 1    Blood Gluc Meter Disp-Strips (BLOOD GLUCOSE METER DISPOSABLE) OXANA Daily and as needed 1 Device 0    COMFORT EZ PEN NEEDLES 32G X 6 MM MISC  each 2    AquaLance Lancets 30G MISC Daily and as needed for hypoglycemia 200 each 2    magnesium citrate solution Take 296 mLs by mouth once for 1 dose 296 mL 0    polyethylene glycol (GLYCOLAX) 17 GM/SCOOP powder Use as directed by following your patient instructions given by office. (Patient not taking: Reported on 4/21/2021) 238 g 0    lipase-protease-amylase (CREON) 27182 units CPEP delayed release capsule take 2 capsules by mouth four times a day after meals and at bedtime 240 capsule 1    Lancets MISC 1 each by Does not apply route daily 300 each 5    blood glucose monitor strips 1 strip by Other route daily Test 1 times a day & as needed for symptoms of irregular blood glucose. 300 strip 5    acetaminophen (TYLENOL) 325 MG tablet Take 2 tablets by mouth every 6 hours as needed for Pain (Patient not taking: Reported on 4/21/2021) 120 tablet 3    Nutritional Supplements (GLUCERNA HUNGER SMART SHAKE) LIQD Take 1 Bottle by mouth 3 times daily (with meals) (Patient not taking: Reported on 4/21/2021) 90 Bottle 2     No current facility-administered medications for this visit.       Allergies   Allergen Reactions    Peanuts [Peanut Oil] Anaphylaxis    Vicodin No orders of the defined types were placed in this encounter. Patient given educational materials - see patient instructions. Discussed use, benefit, and side effects of prescribed medications. All patientquestions answered. Pt voiced understanding.     Electronically signed by Zenobia Moreno MD on 4/21/2021at 4:47 PM

## 2021-04-21 NOTE — TELEPHONE ENCOUNTER
Contacted pt. Informed she needs an appt or can be seen in walk-in. Pt states she will come to walk-in.

## 2021-04-21 NOTE — TELEPHONE ENCOUNTER
Patient called the office stating that Dr. Princess Zelaya had told her to stop smoking marijuana. She was using it to help her eat. Has not been smoking for about 2 weeks but, she is diabetic and only eating 1 meal a day since she has stopped. This is causing her to lose weight. Asking if we can order her something so that she can eat without having to smoke. The marijuana pills did not help.     Gurjit Page [phone]

## 2021-04-21 NOTE — PROGRESS NOTES
Visit Information    Have you changed or started any medications since your last visit including any over-the-counter medicines, vitamins, or herbal medicines? no   Are you having any side effects from any of your medications? -  no  Have you stopped taking any of your medications? Is so, why? -  no    Have you seen any other physician or provider since your last visit? No  Have you had any other diagnostic tests since your last visit? No  Have you been seen in the emergency room and/or had an admission to a hospital since we last saw you? No  Have you had your routine dental cleaning in the past 6 months? no    Have you activated your [employer] account? If not, what are your barriers?  Yes     Patient Care Team:  DONTRELL Benton CNP as PCP - General (Family Medicine)  DONTRELL Benton CNP as PCP - [facility]  Carmenza Serna MD as Consulting Physician (Gastroenterology)    Medical History Review  Past Medical, Family, and Social History reviewed and does not contribute to the patient presenting condition    Health Maintenance   Topic Date Due    Diabetic retinal exam  Never done    Cervical cancer screen  Never done    Shingles Vaccine (1 of 2) Never done    Breast cancer screen  06/09/2019    Diabetic foot exam  02/13/2020    Diabetic microalbuminuria test  03/11/2020    Annual Wellness Visit (AWV)  Never done    Colon cancer screen colonoscopy  05/29/2021    A1C test (Diabetic or Prediabetic)  07/09/2021    Lipid screen  12/28/2021    Potassium monitoring  12/28/2021    Creatinine monitoring  12/28/2021    DTaP/Tdap/Td vaccine (2 - Td) 10/05/2025    Flu vaccine  Completed    Pneumococcal 0-64 years Vaccine  Completed    COVID-19 Vaccine  Completed    Hepatitis C screen  Completed    HIV screen  Completed    Hepatitis A vaccine  Aged Out    Hib vaccine  Aged Out    Meningococcal (ACWY) vaccine  Aged Out

## 2021-04-22 DIAGNOSIS — Z87.19 HX OF GASTROESOPHAGEAL REFLUX (GERD): ICD-10-CM

## 2021-04-22 DIAGNOSIS — K86.89 PANCREATIC INSUFFICIENCY: Primary | ICD-10-CM

## 2021-04-22 RX ORDER — SODIUM BICARBONATE 650 MG/1
TABLET ORAL
Qty: 90 TABLET | Refills: 1 | Status: SHIPPED | OUTPATIENT
Start: 2021-04-22 | End: 2021-10-28 | Stop reason: SDUPTHER

## 2021-04-22 RX ORDER — OMEPRAZOLE 20 MG/1
20 CAPSULE, DELAYED RELEASE ORAL DAILY
Qty: 90 CAPSULE | Refills: 1 | Status: SHIPPED | OUTPATIENT
Start: 2021-04-22 | End: 2021-10-28 | Stop reason: SDUPTHER

## 2021-04-27 ENCOUNTER — TELEPHONE (OUTPATIENT)
Dept: PRIMARY CARE CLINIC | Age: 66
End: 2021-04-27

## 2021-04-27 RX ORDER — MEGESTROL ACETATE 20 MG/1
20 TABLET ORAL DAILY
Qty: 30 TABLET | Refills: 3 | Status: SHIPPED | OUTPATIENT
Start: 2021-04-27 | End: 2021-04-28 | Stop reason: SDUPTHER

## 2021-04-28 RX ORDER — MEGESTROL ACETATE 20 MG/1
20 TABLET ORAL DAILY
Qty: 30 TABLET | Refills: 3 | Status: SHIPPED | OUTPATIENT
Start: 2021-04-28 | End: 2021-12-13

## 2021-04-28 NOTE — TELEPHONE ENCOUNTER
Dr Rosa Espinosa sent medication to pharmacy. Patient requests medication be sent to [facility], [address]. Script sent with auth from Dr Rosa Espinosa.

## 2021-05-03 ENCOUNTER — HOSPITAL ENCOUNTER (OUTPATIENT)
Dept: LAB | Age: 66
Setting detail: SPECIMEN
Discharge: HOME | End: 2021-05-03
Payer: MEDICARE

## 2021-05-03 DIAGNOSIS — E11.42 TYPE 2 DIABETES MELLITUS WITH DIABETIC POLYNEUROPATHY, WITH LONG-TERM CURRENT USE OF INSULIN (HCC): ICD-10-CM

## 2021-05-03 DIAGNOSIS — Z01.818 PREOP TESTING: Primary | ICD-10-CM

## 2021-05-03 DIAGNOSIS — Z79.4 TYPE 2 DIABETES MELLITUS WITH DIABETIC POLYNEUROPATHY, WITH LONG-TERM CURRENT USE OF INSULIN (HCC): ICD-10-CM

## 2021-05-03 PROCEDURE — U0005 INFEC AGEN DETEC AMPLI PROBE: HCPCS

## 2021-05-03 PROCEDURE — U0003 INFECTIOUS AGENT DETECTION BY NUCLEIC ACID (DNA OR RNA); SEVERE ACUTE RESPIRATORY SYNDROME CORONAVIRUS 2 (SARS-COV-2) (CORONAVIRUS DISEASE [COVID-19]), AMPLIFIED PROBE TECHNIQUE, MAKING USE OF HIGH THROUGHPUT TECHNOLOGIES AS DESCRIBED BY CMS-2020-01-R: HCPCS

## 2021-05-03 RX ORDER — GLIMEPIRIDE 2 MG/1
TABLET ORAL
Qty: 90 TABLET | Refills: 0 | Status: SHIPPED | OUTPATIENT
Start: 2021-05-03 | End: 2021-10-13 | Stop reason: SDUPTHER

## 2021-05-04 LAB
SARS-COV-2: NORMAL
SARS-COV-2: NOT DETECTED
SOURCE: NORMAL

## 2021-05-07 ENCOUNTER — ANESTHESIA EVENT (OUTPATIENT)
Dept: OPERATING ROOM | Age: 66
End: 2021-05-07
Payer: MEDICARE

## 2021-05-07 ENCOUNTER — HOSPITAL ENCOUNTER (OUTPATIENT)
Age: 66
Setting detail: OUTPATIENT SURGERY
Discharge: HOME | End: 2021-05-07
Attending: INTERNAL MEDICINE | Admitting: INTERNAL MEDICINE
Payer: MEDICARE

## 2021-05-07 ENCOUNTER — ANESTHESIA (OUTPATIENT)
Dept: OPERATING ROOM | Age: 66
End: 2021-05-07
Payer: MEDICARE

## 2021-05-07 VITALS
OXYGEN SATURATION: 100 % | WEIGHT: 94.8 LBS | HEART RATE: 66 BPM | HEIGHT: 67 IN | RESPIRATION RATE: 18 BRPM | DIASTOLIC BLOOD PRESSURE: 67 MMHG | SYSTOLIC BLOOD PRESSURE: 128 MMHG | BODY MASS INDEX: 14.88 KG/M2 | TEMPERATURE: 97.7 F

## 2021-05-07 VITALS
OXYGEN SATURATION: 100 % | RESPIRATION RATE: 22 BRPM | SYSTOLIC BLOOD PRESSURE: 108 MMHG | DIASTOLIC BLOOD PRESSURE: 65 MMHG

## 2021-05-07 LAB
GLUCOSE BLD-MCNC: 113 MG/DL (ref 65–105)
GLUCOSE BLD-MCNC: 179 MG/DL (ref 65–105)

## 2021-05-07 PROCEDURE — 88305 TISSUE EXAM BY PATHOLOGIST: CPT

## 2021-05-07 PROCEDURE — 6360000002 HC RX W HCPCS: Performed by: NURSE ANESTHETIST, CERTIFIED REGISTERED

## 2021-05-07 PROCEDURE — 2580000003 HC RX 258: Performed by: ANESTHESIOLOGY

## 2021-05-07 PROCEDURE — 7100000011 HC PHASE II RECOVERY - ADDTL 15 MIN: Performed by: INTERNAL MEDICINE

## 2021-05-07 PROCEDURE — 82947 ASSAY GLUCOSE BLOOD QUANT: CPT

## 2021-05-07 PROCEDURE — 2500000003 HC RX 250 WO HCPCS: Performed by: NURSE ANESTHETIST, CERTIFIED REGISTERED

## 2021-05-07 PROCEDURE — 7100000010 HC PHASE II RECOVERY - FIRST 15 MIN: Performed by: INTERNAL MEDICINE

## 2021-05-07 PROCEDURE — 3609010300 HC COLONOSCOPY W/BIOPSY SINGLE/MULTIPLE: Performed by: INTERNAL MEDICINE

## 2021-05-07 PROCEDURE — 3700000000 HC ANESTHESIA ATTENDED CARE: Performed by: INTERNAL MEDICINE

## 2021-05-07 PROCEDURE — 2709999900 HC NON-CHARGEABLE SUPPLY: Performed by: INTERNAL MEDICINE

## 2021-05-07 PROCEDURE — 3700000001 HC ADD 15 MINUTES (ANESTHESIA): Performed by: INTERNAL MEDICINE

## 2021-05-07 PROCEDURE — 45385 COLONOSCOPY W/LESION REMOVAL: CPT | Performed by: INTERNAL MEDICINE

## 2021-05-07 RX ORDER — LIDOCAINE HYDROCHLORIDE 20 MG/ML
INJECTION, SOLUTION INFILTRATION; PERINEURAL PRN
Status: DISCONTINUED | OUTPATIENT
Start: 2021-05-07 | End: 2021-05-07 | Stop reason: SDUPTHER

## 2021-05-07 RX ORDER — LIDOCAINE HYDROCHLORIDE 10 MG/ML
1 INJECTION, SOLUTION EPIDURAL; INFILTRATION; INTRACAUDAL; PERINEURAL
Status: DISCONTINUED | OUTPATIENT
Start: 2021-05-07 | End: 2021-05-07 | Stop reason: HOSPADM

## 2021-05-07 RX ORDER — SODIUM CHLORIDE, SODIUM LACTATE, POTASSIUM CHLORIDE, CALCIUM CHLORIDE 600; 310; 30; 20 MG/100ML; MG/100ML; MG/100ML; MG/100ML
INJECTION, SOLUTION INTRAVENOUS CONTINUOUS
Status: DISCONTINUED | OUTPATIENT
Start: 2021-05-07 | End: 2021-05-07 | Stop reason: HOSPADM

## 2021-05-07 RX ORDER — PROPOFOL 10 MG/ML
INJECTION, EMULSION INTRAVENOUS PRN
Status: DISCONTINUED | OUTPATIENT
Start: 2021-05-07 | End: 2021-05-07 | Stop reason: SDUPTHER

## 2021-05-07 RX ADMIN — SODIUM CHLORIDE, POTASSIUM CHLORIDE, SODIUM LACTATE AND CALCIUM CHLORIDE: 600; 310; 30; 20 INJECTION, SOLUTION INTRAVENOUS at 14:20

## 2021-05-07 RX ADMIN — PROPOFOL 20 MG: 10 INJECTION, EMULSION INTRAVENOUS at 14:27

## 2021-05-07 RX ADMIN — PROPOFOL 20 MG: 10 INJECTION, EMULSION INTRAVENOUS at 14:45

## 2021-05-07 RX ADMIN — PROPOFOL 20 MG: 10 INJECTION, EMULSION INTRAVENOUS at 14:25

## 2021-05-07 RX ADMIN — LIDOCAINE HYDROCHLORIDE 40 MG: 20 INJECTION, SOLUTION INFILTRATION; PERINEURAL at 14:23

## 2021-05-07 RX ADMIN — SODIUM CHLORIDE, POTASSIUM CHLORIDE, SODIUM LACTATE AND CALCIUM CHLORIDE: 600; 310; 30; 20 INJECTION, SOLUTION INTRAVENOUS at 12:21

## 2021-05-07 RX ADMIN — PROPOFOL 20 MG: 10 INJECTION, EMULSION INTRAVENOUS at 14:38

## 2021-05-07 RX ADMIN — PROPOFOL 20 MG: 10 INJECTION, EMULSION INTRAVENOUS at 14:30

## 2021-05-07 RX ADMIN — PROPOFOL 20 MG: 10 INJECTION, EMULSION INTRAVENOUS at 14:32

## 2021-05-07 RX ADMIN — PROPOFOL 20 MG: 10 INJECTION, EMULSION INTRAVENOUS at 14:42

## 2021-05-07 RX ADMIN — PROPOFOL 20 MG: 10 INJECTION, EMULSION INTRAVENOUS at 14:35

## 2021-05-07 RX ADMIN — PROPOFOL 20 MG: 10 INJECTION, EMULSION INTRAVENOUS at 14:41

## 2021-05-07 RX ADMIN — PROPOFOL 40 MG: 10 INJECTION, EMULSION INTRAVENOUS at 14:23

## 2021-05-07 ASSESSMENT — LIFESTYLE VARIABLES: SMOKING_STATUS: 1

## 2021-05-07 ASSESSMENT — PULMONARY FUNCTION TESTS
PIF_VALUE: 1

## 2021-05-07 ASSESSMENT — PAIN SCALES - GENERAL: PAINLEVEL_OUTOF10: 0

## 2021-05-07 NOTE — ANESTHESIA POSTPROCEDURE EVALUATION
Department of Anesthesiology  Postprocedure Note    Patient: Niru Chavez  MRN: [id number]  YOB: 1955  Date of evaluation: 5/7/2021  Time:  3:05 PM     Procedure Summary     Date: 05/07/21 Room / Location: [place] [address] / [facility] - INPATIENT    Anesthesia Start: 6927 Anesthesia Stop: 3167    Procedure: COLONOSCOPY WITH polypectomy (N/A ) Diagnosis: (DX POLYPS, WT LOSS)    Surgeons: Tisha Oliveira MD Responsible Provider: Ame Estrella MD    Anesthesia Type: MAC ASA Status: 3          Anesthesia Type: MAC    Angelita Phase I:      Angelita Phase II:      Last vitals: Reviewed and per EMR flowsheets.        Anesthesia Post Evaluation    Patient location during evaluation: PACU  Patient participation: complete - patient participated  Level of consciousness: awake and alert  Airway patency: patent  Nausea & Vomiting: no vomiting and no nausea  Complications: no  Cardiovascular status: hemodynamically stable  Respiratory status: acceptable  Hydration status: stable

## 2021-05-07 NOTE — OP NOTE
Findings:  Terminal ileum: normal    Cecum/Ascending colon: normal    Transverse colon: normal    Descending/Sigmoid colon: abnormal: 1 cm sessile polyp snared  Very redundant colon with diverticulosis and a lot of twisting we have to spend some time to navigate the scope but we were successful to pass that area and reach the cecum very freely    Rectum/Anus: examined in normal and retroflexed positions and was abnormal: Internal and external hemorrhoids    Withdrawal Time was (minutes): 10    The colon was decompressed and the scope was removed. The patient tolerated the procedure well. Recommendations/Plan:   1. Lifestyle and dietary modifications as discussed  2. F/U Biopsies  3. F/U In OfficeYes  4. Discussed with the family  5.  Repeat colonoscopy [id number]    Electronically signed by Loree Babcock MD  on 5/7/2021 at 2:51 PM

## 2021-05-07 NOTE — H&P
2 capsules by mouth four times a day after meals and at bedtime 240 capsule 1    SYMBICORT 80-4.5 MCG/ACT AERO Inhale 2 puffs into the lungs 2 times daily 3 Inhaler 1    metFORMIN (GLUCOPHAGE-XR) 500 MG extended release tablet Take 2 tablets by mouth daily (with breakfast) AND 1 tablet Daily with supper. 90 tablet 3    gabapentin (NEURONTIN) 400 MG capsule Take 1 capsule by mouth 3 times daily for 90 days. 270 capsule 1    Lancets MISC 1 each by Does not apply route daily 300 each 5    blood glucose monitor strips 1 strip by Other route daily Test 1 times a day & as needed for symptoms of irregular blood glucose. 300 strip 5    Alcohol Swabs (ALCOHOL PREP) 70 % PADS Daily as needed 200 each 3    albuterol sulfate  (90 Base) MCG/ACT inhaler USE 2 INHALATIONS BY MOUTH  EVERY 6 HOURS AS NEEDED FOR WHEEZING 17 g 1    mirtazapine (REMERON) 30 MG tablet Take 0.5 tablets by mouth nightly 90 tablet 3    Blood Gluc Meter Disp-Strips (BLOOD GLUCOSE METER DISPOSABLE) OXANA Daily and as needed 1 Device 0    COMFORT EZ PEN NEEDLES 32G X 6 MM MISC  each 2    AquaLance Lancets 30G MISC Daily and as needed for hypoglycemia 200 each 2    acetaminophen (TYLENOL) 325 MG tablet Take 2 tablets by mouth every 6 hours as needed for Pain (Patient not taking: Reported on 4/21/2021) 120 tablet 3    Nutritional Supplements (GLUCERNA HUNGER SMART SHAKE) LIQD Take 1 Bottle by mouth 3 times daily (with meals) (Patient not taking: Reported on 4/21/2021) 90 Bottle 2            Prior to Admission medications    Medication Sig Start Date End Date Taking?  Authorizing Provider   glimepiride (AMARYL) 2 MG tablet TAKE 1 TABLET BY MOUTH IN  THE MORNING BEFORE  BREAKFAST 5/3/21   DONTRELL Sheppard CNP   megestrol (MEGACE) 20 MG tablet Take 1 tablet by mouth daily 4/28/21   Arianna Madison MD   sodium bicarbonate 650 MG tablet take 1 tablet by mouth three times a day 4/22/21   DONTRELL Sheppard CNP   omeprazole ([address]) 20 MG delayed release capsule Take 1 capsule by mouth Daily TAKE 1 CAPSULE BY MOUTH TWO TIMES DAILY 4/22/21 7/21/21  DONTRELL Morris CNP   valACYclovir (VALTREX) 500 MG tablet Take 1 tablet by mouth 2 times daily 4/21/21   Chan Herrera MD   magnesium citrate solution Take 296 mLs by mouth once for 1 dose 4/14/21 4/14/21  Arianna Madison MD   polyethylene glycol (GLYCOLAX) 17 GM/SCOOP powder Use as directed by following your patient instructions given by office. Patient not taking: Reported on 4/21/2021 4/14/21   Arianna Madison MD   bisacodyl (DULCOLAX) 5 MG EC tablet TAKE 4 TABS AT 10 AM THE DAY PRIOR TO COLONOSCOPY 4/14/21   Arianna Madison MD   lipase-protease-amylase (CREON) 42534 units CPEP delayed release capsule take 2 capsules by mouth four times a day after meals and at bedtime 4/13/21   Juni Alba MD   SYMBICORT 80-4.5 MCG/ACT AERO Inhale 2 puffs into the lungs 2 times daily 4/12/21 5/12/21  DONTRELL Morris CNP   metFORMIN (GLUCOPHAGE-XR) 500 MG extended release tablet Take 2 tablets by mouth daily (with breakfast) AND 1 tablet Daily with supper. 4/9/21   DONTRELL Morris CNP   gabapentin (NEURONTIN) 400 MG capsule Take 1 capsule by mouth 3 times daily for 90 days. 3/5/21 6/3/21  DONTRELL Morris CNP   Lancets MISC 1 each by Does not apply route daily 3/5/21 4/9/21  DONTRELL Morris CNP   blood glucose monitor strips 1 strip by Other route daily Test 1 times a day & as needed for symptoms of irregular blood glucose.  3/5/21 4/9/21  DONTRELL Morris CNP   Alcohol Swabs (ALCOHOL PREP) 70 % PADS Daily as needed 3/5/21   DONTRELL Morris CNP   albuterol sulfate  (90 Base) MCG/ACT inhaler USE 2 INHALATIONS BY MOUTH  EVERY 6 HOURS AS NEEDED FOR WHEEZING 3/5/21   DONTRELL Morris CNP   mirtazapine (REMERON) 30 MG tablet Take 0.5 tablets by mouth nightly 3/5/21   Devin Pascual APRN - CNP   Blood Gluc Meter Disp-Strips (BLOOD GLUCOSE METER DISPOSABLE) OXANA Daily and as needed 7/15/20   DONTRELL Lee CNP   COMFORT EZ PEN NEEDLES 32G X 6 MM MISC BID 4/13/20   DONTRELL Lee CNP   AquaLance Lancets 30G MISC Daily and as needed for hypoglycemia 4/6/20   DONTRELL Lee CNP   acetaminophen (TYLENOL) 325 MG tablet Take 2 tablets by mouth every 6 hours as needed for Pain  Patient not taking: Reported on 4/21/2021 2/19/20   Tanmay Justice MD   Nutritional Supplements ([address]) LIQD Take 1 Bottle by mouth 3 times daily (with meals)  Patient not taking: Reported on 4/21/2021 2/13/19   DONTRELL Lee CNP       This is a 60 y.o. female who is pleasant, cooperative, alert and oriented x3, in no acute distress. Heart: Heart sounds are normal.  Regular rate and rhythm without murmur, gallop or rub. Lungs:clear to auscultation without wheezes or rales    Abdomen: soft, nontender, nondistended, no masses or organomegaly. Active bowel sounds present . Labs:  No results for input(s): HGB, HCT, WBC, MCV, PLATELET, NA, K, CL, CO2, BUN, CREATININE, GLUCOSE, INR, PROTIME, APTT, AST, ALT, LABALBU, HCG in the last 720 hours. Elizabeth Prabhakar  DONTRELL  Electronically signed 5/7/2021 at 11:34 Santana Carter MD   Physician   Specialty:  Gastroenterology   Progress Notes   Signed   Encounter Date:  4/13/2021         Related encounter: Office Visit from 4/13/2021 in [address] [place] [address] all  [x]Manual[x]Template[x]Copied    Added by:  Rula Ceron RN[x]Arianna Zaman MD    []Nilda for details          GI CLINIC FOLLOW UP     INTERVAL HISTORY:   No referring provider defined for this encounter. Chief Complaint   Patient presents with    Pancreatitis       Patient is f/u on MRI and pancreatitis.  Other       Patient c/o blisters on her tailbone that are painful.                HISTORY OF PRESENT ILLNESS: Judson Diaz is a 60 y.o. female , referred for evaluation of*pancreatitis,pancreatic insufficiency, elevated tumor markers  Not gaining wt            Here for f/u with above    CEA was slightly still high and still  CA-19-9 completely normal  Had colonoscopy  We did an MRI MRCP on her which showed sequela of chronic pancreatitis, questionable side  branches I PMN, both findings are similar to the imaging from 2016   we have her on Creon , insurance giving her hard time on getting it   Last vist we ordered MRI      Impression   1. No acute upper abdominal abnormality. 2. Coarse calcifications throughout the pancreas are seen to better advantage   on prior CT. Findings are compatible with chronic pancreatitis. No   suspicious pancreatic mass. No N/v   no abd pain   no melena/hematemsis/hematochezia  No dysphagia/odynophagia   no wt loss   no diarrhea /contipation     ;abs same , always slight elevation ALP rest of LFTs normal    does smokes marijuana   [id number] : polyps, poor prep due to repeat      Past Medical,Family, and Social History reviewed and does contribute to the patient presentingcondition. Patient's PMH/PSH,SH,PSYCH Hx, MEDs, ALLERGIES, and ROS were all reviewed and updated in the appropriate sections.      PAST MEDICAL HISTORY:  Past Medical History        Past Medical History:   Diagnosis Date    Cataracts, bilateral      COPD (chronic obstructive pulmonary disease) (HCC)      CTS (carpal tunnel syndrome) left    Fibromyalgia      Generalized abdominal pain 6/3/2016    Glaucoma      Hypertension      Osteoarthritis      Osteoporosis      Pancreatic calcification 6/3/2016    Pancreatitis      Protein-calorie malnutrition, severe ([address] 6/1/2016    Seizures (HCC)      Tremor      Type II or unspecified type diabetes mellitus without mention of complication, not stated as uncontrolled      Uncontrolled type 2 DM with hyperosmolar nonketotic hyperglycemia ([address] 6/3/2016 Past Surgical History         Past Surgical History:   Procedure Laterality Date    COLONOSCOPY   08/30/2016     very poor prep, unable to complete    COLONOSCOPY   06/30/2017     ADENOMATOUS POLYP.  COLONOSCOPY   05/29/2018     polypectomy x1    CYST REMOVAL Left       ovary twice    NECK SURGERY         pt unsure of exact date & what the procedure was ? 2015 @ [facility]    CT COLON CA SCRN NOT  [address] IND N/A 5/29/2018     COLONOSCOPY WITH POLYP REMOVEL performed by Frank Penn MD at [address] N/A 6/30/2017     COLONOSCOPY POLYPECTOMY HOT BIOPSY performed by Frank Penn MD at [address]   08/30/2016     gastritis, MILD CHRONIC GASTRITIS WITH FOCAL ANTRAL EROSION AND ASSOCIATED ACUTE INFLAMMATION. INTESTINAL METAPLASIA PRESENT, NEGATIVE FOR DYSPLASIA. CURRENT MEDICATIONS:    Current Medication      Current Outpatient Medications:     SYMBICORT 80-4.5 MCG/ACT AERO, Inhale 2 puffs into the lungs 2 times daily, Disp: 3 Inhaler, Rfl: 1    lipase-protease-amylase (CREON) 56872 units CPEP delayed release capsule, take 2 capsules by mouth four times a day after meals and at bedtime, Disp: 240 capsule, Rfl: 1    omeprazole (PRILOSEC) 20 MG delayed release capsule, Take 1 capsule by mouth 2 times daily TAKE 1 CAPSULE BY MOUTH TWO TIMES DAILY, Disp: 180 capsule, Rfl: 0    metFORMIN (GLUCOPHAGE-XR) 500 MG extended release tablet, Take 2 tablets by mouth daily (with breakfast) AND 1 tablet Daily with supper., Disp: 90 tablet, Rfl: 3    glimepiride (AMARYL) 2 MG tablet, TAKE 1 TABLET BY MOUTH IN  THE MORNING BEFORE  BREAKFAST, Disp: 90 tablet, Rfl: 0    gabapentin (NEURONTIN) 400 MG capsule, Take 1 capsule by mouth 3 times daily for 90 days. , Disp: 270 capsule, Rfl: 1    Alcohol Swabs (ALCOHOL PREP) 70 % PADS, Daily as needed, Disp: 200 each, Rfl: 3    albuterol sulfate  (90 Base) MCG/ACT inhaler, USE 2 INHALATIONS BY MOUTH  EVERY 6 HOURS AS NEEDED FOR WHEEZING, Disp: 17 g, Rfl: 1    mirtazapine (REMERON) 30 MG tablet, Take 0.5 tablets by mouth nightly, Disp: 90 tablet, Rfl: 3    sodium bicarbonate 650 MG tablet, take 1 tablet by mouth three times a day, Disp: 90 tablet, Rfl: 1    Blood Gluc Meter Disp-Strips (BLOOD GLUCOSE METER DISPOSABLE) OXANA, Daily and as needed, Disp: 1 Device, Rfl: 0    COMFORT EZ PEN NEEDLES 32G X 6 MM MISC, BID, Disp: 200 each, Rfl: 2    AquaLance Lancets 30G MISC, Daily and as needed for hypoglycemia, Disp: 200 each, Rfl: 2    acetaminophen (TYLENOL) 325 MG tablet, Take 2 tablets by mouth every 6 hours as needed for Pain, Disp: 120 tablet, Rfl: 3    Nutritional Supplements (GLUCERNA HUNGER SMART SHAKE) LIQD, Take 1 Bottle by mouth 3 times daily (with meals), Disp: 90 Bottle, Rfl: 2    Lancets MISC, 1 each by Does not apply route daily, Disp: 300 each, Rfl: 5    blood glucose monitor strips, 1 strip by Other route daily Test 1 times a day & as needed for symptoms of irregular blood glucose., Disp: 300 strip, Rfl: 5        ALLERGIES:        Allergies   Allergen Reactions    Peanuts [Peanut Oil] Anaphylaxis    Vicodin [Hydrocodone-Acetaminophen] Hives    Tramadol      Tylenol With Codeine #3 [Acetaminophen-Codeine]      Ibuprofen Itching         FAMILY HISTORY:   Family History             Problem Relation Age of Onset    Brain Cancer Father      Diabetes Sister      Other Sister           epilepsy               SOCIAL HISTORY:   Social History               Socioeconomic History    Marital status:        Spouse name: Not on file    Number of children: Not on file    Years of education: Not on file    Highest education level: Not on file   Occupational History    Not on file   Social Needs    Financial resource strain: Not hard at all   [employer] insecurity       Worry: Never true       Inability: Never true   [employer] needs       Medical: No Non-medical: No   Tobacco Use    Smoking status: Current Every Day Smoker       Packs/day: 0.25       Years: 40.00       Pack years: 10.00    Smokeless tobacco: Former User   Substance and Sexual Activity    Alcohol use: No    Drug use: Yes       Types: Marijuana    Sexual activity: Not on file       Comment: last use end 10/2014   Lifestyle    Physical activity       Days per week: Not on file       Minutes per session: Not on file    Stress: Not on file   Relationships    Social connections       Talks on phone: Not on file       Gets together: Not on file       Attends Taoism service: Not on file       Active member of club or organization: Not on file       Attends meetings of clubs or organizations: Not on file       Relationship status: Not on file    Intimate partner violence       Fear of current or ex partner: Not on file       Emotionally abused: Not on file       Physically abused: Not on file       Forced sexual activity: Not on file   Other Topics Concern    Not on file   Social History Narrative    Not on file            REVIEW OF SYSTEMS: A 12-point review of systemswas obtained and pertinent positives and negatives were enumerated above in the history of present illness. All other reviewed systems / symptoms were negative. Review of Systems   Constitutional: Positive for fatigue. Negative for appetite change and unexpected weight change. HENT: Negative for trouble swallowing. Respiratory: Negative for cough, choking and wheezing. Cardiovascular: Positive for leg swelling. Negative for chest pain and palpitations. Gastrointestinal: Negative for abdominal distention, abdominal pain, anal bleeding, blood in stool, constipation, diarrhea, nausea, rectal pain and vomiting. Genitourinary: Negative for difficulty urinating. Allergic/Immunologic: Negative for environmental allergies and food allergies.    Neurological: Negative for dizziness, weakness, light-headedness, Rate and Rhythm: Normal rate and regular rhythm. Heart sounds: Normal heart sounds. No murmur. Pulmonary:      Effort: Pulmonary effort is normal. No respiratory distress. Breath sounds: Normal breath sounds. No wheezing. Abdominal:      General: Bowel sounds are normal. There is no distension. Palpations: Abdomen is soft. Tenderness: There is no abdominal tenderness. There is no guarding or rebound. Comments: No ascites   Musculoskeletal: Normal range of motion. Skin:     General: Skin is warm. Coloration: Skin is not pale. Findings: No erythema or rash. Comments: She is not diaphoretic   Neurological:      Mental Status: She is alert and oriented to person, place, and time. Deep Tendon Reflexes: Reflexes are normal and symmetric. Psychiatric:         Behavior: Behavior normal.         Thought Content: Thought content normal.         Judgment: Judgment normal.               IMPRESSION: Ms. [facility] MEREDITH is a 60 y.o. female with        Diagnosis Orders   1. Hx of colonic polyps  COLONOSCOPY W/ OR W/O BIOPSY   2. Elevated CEA      3. Elevated CA 19-9 level  CEA   4. Alcohol-induced chronic pancreatitis (HCC)  Cancer Antigen 19-9   5. Pancreatic insufficiency      6. Weight loss  COLONOSCOPY W/ OR W/O BIOPSY      We will repeat the tumor markers, patient is due for colonoscopy we will schedule   We will continue to monitor      diet/life style/natural hx /complication of the dx were all explained in details   Past medical, past surgical, social history, psychiatric history, medications or allergies, all reviewed and  updated     Thank you for allowing me to participate in the care of Ms. [facility] MEREDITH. For any further questions please do not hesitate to contact me. I have reviewed and agree with the ROS entered by the MA/RN. Note is dictated utilizing voice recognition software. Unfortunately this leads to occasional typographical errors.  Please contact our office if you have any questions.         Loree Babcock MD  [facility]  O: #[phone]                  Revision History

## 2021-05-07 NOTE — ANESTHESIA PRE PROCEDURE
Department of Anesthesiology  Preprocedure Note       Name:  Rowena Dougherty   Age:  60 y.o.  :  1955                                          MRN:  [id number]         Date:  2021      Surgeon: Kathy Walton):  Juni Alba MD    Procedure: Procedure(s):  COLONOSCOPY DIAGNOSTIC    Medications prior to admission:   Prior to Admission medications    Medication Sig Start Date End Date Taking? Authorizing Provider   glimepiride (AMARYL) 2 MG tablet TAKE 1 TABLET BY MOUTH IN  THE MORNING BEFORE  BREAKFAST 5/3/21  Yes DONTRELL Morris CNP   megestrol (MEGACE) 20 MG tablet Take 1 tablet by mouth daily 21  Yes Juni Alba MD   sodium bicarbonate 650 MG tablet take 1 tablet by mouth three times a day 21  Yes DONTRELL Morris CNP   omeprazole (PRILOSEC) 20 MG delayed release capsule Take 1 capsule by mouth Daily TAKE 1 CAPSULE BY MOUTH TWO TIMES DAILY 21 Yes DONTRELL Morris CNP   magnesium citrate solution Take 296 mLs by mouth once for 1 dose 21 Yes Arianna Madison MD   bisacodyl (DULCOLAX) 5 MG EC tablet TAKE 4 TABS AT 10 AM THE DAY PRIOR TO COLONOSCOPY 21  Yes Juni Alba MD   lipase-protease-amylase (CREON) 33682 units CPEP delayed release capsule take 2 capsules by mouth four times a day after meals and at bedtime 21  Yes Arianna Madison MD   SYMBICORT 80-4.5 MCG/ACT AERO Inhale 2 puffs into the lungs 2 times daily 21 Yes DONTRELL Morris CNP   metFORMIN (GLUCOPHAGE-XR) 500 MG extended release tablet Take 2 tablets by mouth daily (with breakfast) AND 1 tablet Daily with supper. 21  Yes DONTRELL Morris CNP   gabapentin (NEURONTIN) 400 MG capsule Take 1 capsule by mouth 3 times daily for 90 days.  3/5/21 6/3/21 Yes DONTRELL Morris CNP   mirtazapine (REMERON) 30 MG tablet Take 0.5 tablets by mouth nightly 3/5/21  Yes Devin Fuss, APRN - CNP   polyethylene glycol [facility]) 17 GM/SCOOP powder Use as directed by following your patient instructions given by office. Patient not taking: Reported on 4/21/2021 4/14/21   Kodi Mello MD   Lancets MISC 1 each by Does not apply route daily 3/5/21 4/9/21  [facility], DONTRELL Marte CNP   blood glucose monitor strips 1 strip by Other route daily Test 1 times a day & as needed for symptoms of irregular blood glucose. 3/5/21 4/9/21  [facility], APRN - CNP   Alcohol Swabs (ALCOHOL PREP) 70 % PADS Daily as needed 3/5/21   [facility], DONTRELL Marte CNP   albuterol sulfate  (90 Base) MCG/ACT inhaler USE 2 INHALATIONS BY MOUTH  EVERY 6 HOURS AS NEEDED FOR WHEEZING 3/5/21   [facility], APRN - CNP   Blood Gluc Meter Disp-Strips (BLOOD GLUCOSE METER DISPOSABLE) OXANA Daily and as needed 7/15/20   [facility], DONTRELL Marte CNP   COMFORT EZ PEN NEEDLES 32G X 6 MM MISC BID 4/13/20   [facility], DONTRELL Marte CNP   AquaLance Lancets 30G MISC Daily and as needed for hypoglycemia 4/6/20   [facility], DONTRELL Marte CNP   acetaminophen (TYLENOL) 325 MG tablet Take 2 tablets by mouth every 6 hours as needed for Pain  Patient not taking: Reported on 4/21/2021 2/19/20   Ton Lemon MD   Nutritional Supplements (GLUCERNA HUNGER SMART SHAKE) LIQD Take 1 Bottle by mouth 3 times daily (with meals)  Patient not taking: Reported on 4/21/2021 2/13/19   [facility], APRN - CNP       Current medications:    Current Facility-Administered Medications   Medication Dose Route Frequency Provider Last Rate Last Admin    lactated ringers infusion   Intravenous Continuous Jake Rose MD        lidocaine PF 1 % injection 1 mL  1 mL Intradermal Once PRN Jake Rose MD           Allergies:     Allergies   Allergen Reactions    Peanuts [Peanut Oil] Anaphylaxis    Vicodin [Hydrocodone-Acetaminophen] Hives    Tramadol     Tylenol With Codeine #3 [Acetaminophen-Codeine]     Ibuprofen Itching       Problem List:    Patient Active Problem List   Diagnosis Code    [facility] G42.060    Date     12/28/2020    K 5.5 12/28/2020    CL 99 12/28/2020    CO2 18 12/28/2020    BUN 7 12/28/2020    CREATININE 0.63 12/28/2020    GFRAA >60 12/28/2020    LABGLOM >60 12/28/2020    GLUCOSE 202 12/28/2020    GLUCOSE 159 05/01/2012    PROT 7.0 12/28/2020    CALCIUM 9.8 12/28/2020    BILITOT 0.45 12/28/2020    ALKPHOS 123 12/28/2020    AST 30 12/28/2020    ALT 10 12/28/2020       POC Tests: No results for input(s): POCGLU, POCNA, POCK, POCCL, POCBUN, POCHEMO, POCHCT in the last 72 hours.     Coags:   Lab Results   Component Value Date    PROTIME 11.5 04/02/2013    PROTIME 13.1 05/01/2012    INR 1.1 04/02/2013    APTT 22.3 04/02/2013       HCG (If Applicable): No results found for: PREGTESTUR, PREGSERUM, HCG, HCGQUANT     ABGs:   Lab Results   Component Value Date    PHART 7.43 08/29/2012    PO2ART 97 08/29/2012    GSB7FTL 41 08/29/2012    BVK7QRN 26.7 08/29/2012    J5OJLCDT 97.6 08/29/2012        Type & Screen (If Applicable):  No results found for: LABABO, LABRH    Drug/Infectious Status (If Applicable):  Lab Results   Component Value Date    HEPCAB NONREACTIVE 06/03/2016       COVID-19 Screening (If Applicable):   Lab Results   Component Value Date    COVID19 Not Detected 05/03/2021           Anesthesia Evaluation  Patient summary reviewed and Nursing notes reviewed no history of anesthetic complications:   Airway: Mallampati: II  TM distance: >3 FB   Neck ROM: full  Mouth opening: > = 3 FB Dental: normal exam         Pulmonary:normal exam  breath sounds clear to auscultation  (+) COPD (pt denies any inh use or issues):  current smoker    (-) sleep apnea                           Cardiovascular:    (+) hypertension:,     (-) past MI, CAD, CABG/stent, dysrhythmias and  CHF    ECG reviewed  Rhythm: regular  Rate: normal                    Neuro/Psych:   (+) seizures (remote per pt, unclear hx, no current med req):, neuromuscular disease (fibromyalgia, neuropathy, chronic neck and lbp):, depression/anxiety

## 2021-05-11 LAB — SURGICAL PATHOLOGY REPORT: NORMAL

## 2021-05-25 ENCOUNTER — TELEPHONE (OUTPATIENT)
Dept: PHARMACY | Facility: CLINIC | Age: 66
End: 2021-05-25

## 2021-05-25 NOTE — TELEPHONE ENCOUNTER
[facility] HEALTH CLINICAL PHARMACY: STATIN THERAPY REVIEW  Identified statin use in persons with diabetes care gap per [employer]. Records dated: 5/7/21. Last Office Visit: 4/21/21    Patient also appears to be taking: glimepiride and metformin. Patient not found in Outcomes MTM    [address] [place] [id number]    Per Insurance Records through 5/7/21 (YTD [place] = filled only once):   Glimepiride last filled on 3/29/21 for 90 day supply. Next refill due: 6/27/21    Lab Results   Component Value Date    LABA1C 9.2 04/09/2021    LABA1C 7.5 (H) 12/28/2020    LABA1C 7.5 02/25/2020     NOTE A1c >9%    STATIN GAP IDENTIFIED    Per chart review, patient has never been prescribed statin therapy. LDL in 2020 = 19. LDL in 2019 = 45. Lab Results   Component Value Date    CHOL 174 05/08/2017    TRIG 94 05/08/2017     12/28/2020    LDLCHOLESTEROL 19 12/28/2020     ALT   Date Value Ref Range Status   12/28/2020 10 5 - 33 U/L Final     Comment:     SPECIMEN SLIGHTLY HEMOLYZED, RESULTS MAY BE ADVERSELY AFFECTED. AST   Date Value Ref Range Status   12/28/2020 30 <32 U/L Final     Comment:     SPECIMEN SLIGHTLY HEMOLYZED, RESULTS MAY BE ADVERSELY AFFECTED. The ASCVD Risk score (Mariah Quarles., et al., 2013) failed to calculate for the following reasons: The valid HDL cholesterol range is 20 to 100 mg/dL     Hyperlipidemia Goal: Patient is not prescribed any-intensity statin therapy. 2019 ADA Guidelines Age:   [facility]  >/= [address]years old:   o No history of ASCVD or 10-year ASCVD risk < 20% - moderate-intensity statin is recommended.   o History of ASCVD or 10-year ASCVD risk > 20% - high-intensity statin is recommended. PLAN  Patient not a candidate for statin therapy at this time. LDL < 70 since 2017. Will sign off.      Cesar Minor, PharmD, [facility]  Direct: [phone]  Department, toll free [phone], option [address] Only     CPA in place:  No   Gap Closed?: No    Total # of Interventions Recommended: 0   Total # of Interventions Accepted: 0   Time Spent (min): 5

## 2021-06-02 ENCOUNTER — HOSPITAL ENCOUNTER (OUTPATIENT)
Dept: MAMMOGRAPHY | Age: 66
Discharge: HOME | End: 2021-06-04
Payer: MEDICARE

## 2021-06-02 ENCOUNTER — HOSPITAL ENCOUNTER (OUTPATIENT)
Age: 66
Discharge: HOME | End: 2021-06-02
Payer: MEDICARE

## 2021-06-02 DIAGNOSIS — R97.8 ELEVATED CA 19-9 LEVEL: ICD-10-CM

## 2021-06-02 DIAGNOSIS — K86.0 ALCOHOL-INDUCED CHRONIC PANCREATITIS (HCC): ICD-10-CM

## 2021-06-02 DIAGNOSIS — Z12.31 BREAST CANCER SCREENING BY MAMMOGRAM: ICD-10-CM

## 2021-06-02 LAB
CA 19-9: 1 U/ML (ref 0–35)
CARCINOEMBRYONIC ANTIGEN: 6.9 NG/ML

## 2021-06-02 PROCEDURE — 82378 CARCINOEMBRYONIC ANTIGEN: CPT

## 2021-06-02 PROCEDURE — 86301 IMMUNOASSAY TUMOR CA 19-9: CPT

## 2021-06-02 PROCEDURE — 36415 COLL VENOUS BLD VENIPUNCTURE: CPT

## 2021-06-02 PROCEDURE — 77063 BREAST TOMOSYNTHESIS BI: CPT

## 2021-06-07 ENCOUNTER — TELEPHONE (OUTPATIENT)
Dept: PRIMARY CARE CLINIC | Age: 66
End: 2021-06-07

## 2021-06-08 DIAGNOSIS — Z79.4 TYPE 2 DIABETES MELLITUS WITH DIABETIC POLYNEUROPATHY, WITH LONG-TERM CURRENT USE OF INSULIN (HCC): ICD-10-CM

## 2021-06-08 DIAGNOSIS — E11.42 TYPE 2 DIABETES MELLITUS WITH DIABETIC POLYNEUROPATHY, WITH LONG-TERM CURRENT USE OF INSULIN (HCC): ICD-10-CM

## 2021-06-08 RX ORDER — METFORMIN HYDROCHLORIDE 500 MG/1
TABLET, EXTENDED RELEASE ORAL
Qty: 90 TABLET | Refills: 3 | Status: SHIPPED | OUTPATIENT
Start: 2021-06-08 | End: 2021-10-13 | Stop reason: SDUPTHER

## 2021-06-09 ENCOUNTER — OFFICE VISIT (OUTPATIENT)
Dept: PRIMARY CARE CLINIC | Age: 66
End: 2021-06-09
Payer: MEDICARE

## 2021-06-09 ENCOUNTER — HOSPITAL ENCOUNTER (OUTPATIENT)
Age: 66
Setting detail: SPECIMEN
Discharge: HOME | End: 2021-06-09
Payer: MEDICARE

## 2021-06-09 VITALS
OXYGEN SATURATION: 98 % | BODY MASS INDEX: 14.94 KG/M2 | DIASTOLIC BLOOD PRESSURE: 74 MMHG | HEART RATE: 99 BPM | TEMPERATURE: 97 F | WEIGHT: 95.4 LBS | SYSTOLIC BLOOD PRESSURE: 112 MMHG

## 2021-06-09 DIAGNOSIS — Z12.4 ENCOUNTER FOR PAPANICOLAOU SMEAR FOR CERVICAL CANCER SCREENING: Primary | ICD-10-CM

## 2021-06-09 DIAGNOSIS — F17.200 CURRENT SMOKER: ICD-10-CM

## 2021-06-09 DIAGNOSIS — Z11.3 SCREEN FOR STD (SEXUALLY TRANSMITTED DISEASE): ICD-10-CM

## 2021-06-09 DIAGNOSIS — Z78.0 POST-MENOPAUSAL: ICD-10-CM

## 2021-06-09 DIAGNOSIS — N89.8 VAGINAL DISCHARGE: ICD-10-CM

## 2021-06-09 DIAGNOSIS — E11.65 UNCONTROLLED TYPE 2 DIABETES MELLITUS WITH HYPERGLYCEMIA (HCC): ICD-10-CM

## 2021-06-09 DIAGNOSIS — Z79.4 TYPE 2 DIABETES MELLITUS WITH DIABETIC POLYNEUROPATHY, WITH LONG-TERM CURRENT USE OF INSULIN (HCC): ICD-10-CM

## 2021-06-09 DIAGNOSIS — E11.42 TYPE 2 DIABETES MELLITUS WITH DIABETIC POLYNEUROPATHY, WITH LONG-TERM CURRENT USE OF INSULIN (HCC): ICD-10-CM

## 2021-06-09 PROCEDURE — 99396 PREV VISIT EST AGE 40-64: CPT | Performed by: NURSE PRACTITIONER

## 2021-06-09 NOTE — PROGRESS NOTES
No follow-ups on file. An electronic signature was used to authenticate this note. --Isaiah Rodriguez MA on 6/9/2021 at 10:51 AM  Visit Information    Have you changed or started any medications since your last visit including any over-the-counter medicines, vitamins, or herbal medicines? no   Are you having any side effects from any of your medications? -  no  Have you stopped taking any of your medications? Is so, why? -  no    Have you seen any other physician or provider since your last visit? Yes - Records Obtained  Have you had any other diagnostic tests since your last visit? No  Have you been seen in the emergency room and/or had an admission to a hospital since we last saw you? No  Have you had your routine dental cleaning in the past 6 months? no    Have you activated your [employer] account? If not, what are your barriers?  Yes     Patient Care Team:  DONTRELL Ghosh CNP as PCP - General (Family Medicine)  [facility]ONTRELL [facility]osh [facility]  Lissy Melendez MD as Consulting Physician (Gastroenterology)    Medical History Review  Past Medical, Family, and Social History reviewed and does not contribute to the patient presenting condition    Health Maintenance   Topic Date Due    Diabetic retinal exam  Never done    Cervical cancer screen  Never done    Shingles Vaccine (1 of 2) Never done    Diabetic foot exam  02/13/2020    Diabetic microalbuminuria test  03/11/2020    Annual Wellness Visit (AWV)  Never done    A1C test (Diabetic or Prediabetic)  07/09/2021    Lipid screen  12/28/2021    Potassium monitoring  12/28/2021    Creatinine monitoring  12/28/2021    Breast cancer screen  06/02/2023    Pneumococcal 0-64 years Vaccine (2 of 2) 10/10/2023    Colon cancer screen colonoscopy  05/07/2024    DTaP/Tdap/Td vaccine (2 - Td or Tdap) 10/05/2025    Flu vaccine  Completed    COVID-19 Vaccine  Completed    Hepatitis C screen  Completed    HIV screen Completed    Hepatitis A vaccine  Aged Out    Hib vaccine  Aged Out    Meningococcal (ACWY) vaccine  Aged Out

## 2021-06-09 NOTE — PROGRESS NOTES
[facility]  [id number] [facility] [address]  Dept: [phone]  Dept Fax: [phone]    Subjective:     CHIEF COMPLAINT:     Chief Complaint   Patient presents with   Esequiel Brand presents today for routine gynecological exam.  She states is been more than 10 years since her last exam.  No known previous abnormal Paps. She is currently sexually active, with her spouse. Last menstrual period about 14 years ago. No vaginal complaints or concerns today, denies discharge, itching, or pelvic pain. Following with GI due to history of pancreatitis. Had colonoscopy last month. Admits she knows she has a large hemorrhoid        [address]:   Date of last mammogram: 5/2021   Date of last DEXA scan: None  Date of last colonoscopy: April 2021    History of Gestational Diabetes: No     If Yes, Glucose screening ordered:No    Family history of Breast, Ovarian, Colon or Uterine Cancer: Colon         Objective:   GYNECOLOGIC HISTORY:   Menarche: 13    LMP: Roughly 14 years ago    Menopause: yes, denies current hot flashes    Sexually active: Yes    STD history: No    Hormone Replacement: no    Birth control method :No  Permanent Sterilization: No    SOCIAL HISTORY:  Seat Belt Use: Yes  Domestic Violence: No    Counseling: No  Regular exercise: Yes   Counseling: Yes  Diet discussed: Yes    REVIEW OF SYSTEMS:    1. Constitutional: No fever, chills or malaise. No weight change or fatigue  2. Head and eyes: No headache, dizziness or trauma. No visual changes  3. ENT: No hearing loss, tinnitus, sinus or taste problems  4. Hematologic: No lymphoma, Von Willebrand's, Hemophilia or bruising  5. Psychological: No depression, suicidal thoughts, crying  or anxiety  6. Breast: No skin changes, masses, mastalgia, discharge  7. Respiratory:  No SOB, cough, wheezing  8.  Cardiovascular: No chest pain with exertion, palpitations, syncope, or edema  9. Gastrointestinal:  No heartburn, N/V, bloody stools, pain  10. Genito-urinary: No dysuria, hematuria, dyspareunia, abnormal bleeding  11. Musculoskeletal: No arthralgia, gout, muscle weakness  12. Neurological: No CVA, migraines, seizures, syncope, numbness  13. Dermatologic: No rashes, itching, hives  14. Lymphatic: No adenopathy    Physical Exam:     Constitutional:   Blood pressure 112/74, pulse 99, temperature 97 °F (36.1 °C), temperature source Temporal, weight 95 lb 6.4 oz (43.3 kg), SpO2 98 %, not currently breastfeeding. General Appearance: This is a well-developed, underweight and well-groomed female    Skin:  Inspection of the skin revealed no rashes or lesions    Neck and EENT:  The neck was supple with full range of motion and no masses. The thyroid was not enlarged and had no masses. Normal external ears are present  Nares are patent    Respiratory: The lungs were clear to auscultation bilaterally. There were no rales, rhonchi or wheezes. There was good respiratory effort. Cardiovascular: The heart was in a regular rhythm and rate was normal.  No murmur or extra sounds were noted. Breast:  The breasts are normal size and symmetrical.  There are no skin changes with position changes. The nipples are without deviations or discharge. No masses were palpated. No axillary lymphadenopathy is present. Abdomen: The abdomen is soft and non-tender. There was no guarding, rebound or rigidity. The bladder was without fullness or tenderness. No hernias were appreciated. No CVA tenderness present    Pelvic: The external genitalia has a normal appearance without masses or lesions. BUS is normal.  The vagina is pink and well rugated. The cervix is without lesions with no CMT. Scant bloody discharge present in vaginal vault. Pap was obtained without difficulty. The uterus is normal size, shape and consistency.   The adnexa are without masses or tenderness. Large hemorrhoid present to rectum. Psychiatric:  Alert, oriented to time, place, person and situation. There are no mood or affect changes. Assesment:     Routine annual gynecological exam      Plan:     1. Return for annual exam.  Pap per current recommendations. 2.  Mammogram: Up-to-date, no abnormalities. 3.  DEXA scan: Current smoker, ordered  4. Colon Ca screening: Up-to-date. Family history, due back every 3 to 5 years  5. Routine health maintenance: Reviewed, patient is due for diabetic labs. Orders placed      Patient was seen with total face to face time of 25 minutes. More than 50% of this visit was counseling and education regarding    Diagnosis Orders   1. Encounter for Papanicolaou smear for cervical cancer screening  PAP SMEAR   2. Post-menopausal  DEXA VERTEBRAL FRACTURE ASSESSMENT   3. Current smoker  DEXA VERTEBRAL FRACTURE ASSESSMENT   4. Screen for STD (sexually transmitted disease)  C.trachomatis N.gonorrhoeae DNA, Thin Prep   5. Vaginal discharge  VAGINITIS DNA PROBE   6. Type 2 diabetes mellitus with diabetic polyneuropathy, with long-term current use of insulin (HCC)  Microalbumin, Ur   7.  Uncontrolled type 2 diabetes mellitus with hyperglycemia ([facility])  Hemoglobin A1C

## 2021-06-10 LAB
CHLAMYDIA BY THIN PREP: NEGATIVE
DIRECT EXAM: NORMAL
Lab: NORMAL
N. GONORRHOEAE DNA, THIN PREP: NEGATIVE
SPECIMEN DESCRIPTION: NORMAL
SPECIMEN DESCRIPTION: NORMAL

## 2021-06-11 LAB
HPV SAMPLE: NORMAL
HPV, GENOTYPE 16: NOT DETECTED
HPV, GENOTYPE 18: NOT DETECTED
HPV, HIGH RISK OTHER: NOT DETECTED
HPV, INTERPRETATION: NORMAL
SPECIMEN DESCRIPTION: NORMAL

## 2021-06-14 LAB — CYTOLOGY REPORT: NORMAL

## 2021-06-25 DIAGNOSIS — Z87.09 HISTORY OF COPD: ICD-10-CM

## 2021-06-25 NOTE — TELEPHONE ENCOUNTER
Health Maintenance   Topic Date Due    Diabetic retinal exam  Never done    Shingles Vaccine (1 of 2) Never done    Diabetic foot exam  02/13/2020    Diabetic microalbuminuria test  03/11/2020    Annual Wellness Visit (AWV)  Never done    A1C test (Diabetic or Prediabetic)  07/09/2021    Lipid screen  12/28/2021    Potassium monitoring  12/28/2021    Creatinine monitoring  12/28/2021    Breast cancer screen  06/02/2023    Pneumococcal 0-64 years Vaccine (2 of 2) 10/10/2023    Colon cancer screen colonoscopy  05/07/2024    DTaP/Tdap/Td vaccine (2 - Td or Tdap) 10/05/2025    Cervical cancer screen  06/09/2026    Flu vaccine  Completed    COVID-19 Vaccine  Completed    Hepatitis C screen  Completed    HIV screen  Completed    Hepatitis A vaccine  Aged Out    Hib vaccine  Aged Out    Meningococcal (ACWY) vaccine  Aged Out             (applicable per patient's age: Cancer Screenings, Depression Screening, Fall Risk Screening, Immunizations)    Hemoglobin A1C (%)   Date Value   04/09/2021 9.2   12/28/2020 7.5 (H)   02/25/2020 7.5     Microalb/Crt.  Ratio (mcg/mg creat)   Date Value   03/11/2019 CANNOT BE CALCULATED     LDL Cholesterol (mg/dL)   Date Value   12/28/2020 19     AST (U/L)   Date Value   12/28/2020 30     ALT (U/L)   Date Value   12/28/2020 10     BUN (mg/dL)   Date Value   12/28/2020 7 (L)      (goal A1C is < 7)   (goal LDL is <100) need 30-50% reduction from baseline     BP Readings from Last 3 Encounters:   06/09/21 112/74   05/07/21 128/67   05/07/21 108/65    (goal /80)      All Future Testing planned in [facility]:  Lab Frequency Next Occurrence   COLONOSCOPY W/ OR W/O BIOPSY Once 04/13/2021   DEXA VERTEBRAL FRACTURE ASSESSMENT Once 09/24/2021   PAP SMEAR Once 06/09/2021   Hemoglobin A1C Once 09/17/2021   Microalbumin, Ur Once 09/17/2021       Next Visit Date:  Future Appointments   Date Time Provider Teresa Shoemaker   8/25/2021  2:00 PM MD candelario Pandya 9/9/2021 10:15 AM DONTRELL Ross CNP [facility]            Patient Active Problem List:     Intractable epilepsy ([facility] [address]     Hypoglycemia     Depression     Osteoporosis     Affective disorder ([address]     Uncontrolled type 2 DM with hyperosmolar nonketotic hyperglycemia (HCC)     Generalized abdominal pain     Pancreatic calcification     Pancreatic insufficiency     Pain of upper abdomen     Alcohol-induced chronic pancreatitis (HCC)     Weight loss     Polysubstance abuse (HCC)     Unresponsive episode     Chronic neck pain     Neuropathic pain of both legs     Seizure disorder, secondary ([address]     Essential (primary) hypertension     Type 2 diabetes mellitus without complication, with long-term current use of insulin (HCC)     Neuropathic pain     Elevated CEA     Acute left-sided low back pain without sciatica     Adenomatous polyps     Bilateral wrist pain     Seizure-like activity (HCC)     Elevated CA 19-9 level     Body mass index (BMI) less than 16.5

## 2021-06-28 RX ORDER — ALBUTEROL SULFATE 90 UG/1
AEROSOL, METERED RESPIRATORY (INHALATION)
Qty: 17 G | Refills: 1 | Status: SHIPPED | OUTPATIENT
Start: 2021-06-28 | End: 2021-10-28 | Stop reason: SDUPTHER

## 2021-08-26 ENCOUNTER — TELEPHONE (OUTPATIENT)
Dept: GASTROENTEROLOGY | Age: 66
End: 2021-08-26

## 2021-08-26 NOTE — TELEPHONE ENCOUNTER
Pt called in and thought her appt was for today, emmier adv appt was 8/25/21 and it was marked as a no show. Pt asked what that means. Writer adv that after so many no shows file is sent for review and a possible dismissal could happen. Writer adv that she is still able to be scheduled but just wanted to inform her. Pt decline to reschedule at this time.

## 2021-08-30 ENCOUNTER — TELEPHONE (OUTPATIENT)
Dept: GASTROENTEROLOGY | Age: 66
End: 2021-08-30

## 2021-09-29 ENCOUNTER — APPOINTMENT (OUTPATIENT)
Dept: CT IMAGING | Age: 66
End: 2021-09-29
Payer: MEDICARE

## 2021-09-29 ENCOUNTER — HOSPITAL ENCOUNTER (EMERGENCY)
Age: 66
Discharge: HOME | End: 2021-09-29
Attending: EMERGENCY MEDICINE
Payer: MEDICARE

## 2021-09-29 ENCOUNTER — NURSE TRIAGE (OUTPATIENT)
Dept: OTHER | Facility: CLINIC | Age: 66
End: 2021-09-29

## 2021-09-29 VITALS
WEIGHT: 90 LBS | OXYGEN SATURATION: 97 % | DIASTOLIC BLOOD PRESSURE: 86 MMHG | RESPIRATION RATE: 20 BRPM | TEMPERATURE: 97.5 F | BODY MASS INDEX: 14.12 KG/M2 | SYSTOLIC BLOOD PRESSURE: 158 MMHG | HEIGHT: 67 IN | HEART RATE: 103 BPM

## 2021-09-29 DIAGNOSIS — N39.0 URINARY TRACT INFECTION WITHOUT HEMATURIA, SITE UNSPECIFIED: Primary | ICD-10-CM

## 2021-09-29 LAB
-: ABNORMAL
ABSOLUTE EOS #: 0.1 K/UL (ref 0–0.44)
ABSOLUTE IMMATURE GRANULOCYTE: <0.03 K/UL (ref 0–0.3)
ABSOLUTE LYMPH #: 1.52 K/UL (ref 1.1–3.7)
ABSOLUTE MONO #: 0.57 K/UL (ref 0.1–1.2)
ALBUMIN SERPL-MCNC: 3.8 G/DL (ref 3.5–5.2)
ALBUMIN/GLOBULIN RATIO: 1.2 (ref 1–2.5)
ALP BLD-CCNC: 149 U/L (ref 35–104)
ALT SERPL-CCNC: 5 U/L (ref 5–33)
AMORPHOUS: ABNORMAL
ANION GAP SERPL CALCULATED.3IONS-SCNC: 12 MMOL/L (ref 9–17)
AST SERPL-CCNC: 11 U/L
BACTERIA: ABNORMAL
BASOPHILS # BLD: 0 % (ref 0–2)
BASOPHILS ABSOLUTE: <0.03 K/UL (ref 0–0.2)
BILIRUB SERPL-MCNC: 0.26 MG/DL (ref 0.3–1.2)
BILIRUBIN URINE: NEGATIVE
BUN BLDV-MCNC: 4 MG/DL (ref 8–23)
BUN/CREAT BLD: ABNORMAL (ref 9–20)
CALCIUM SERPL-MCNC: 9.3 MG/DL (ref 8.6–10.4)
CASTS UA: ABNORMAL /LPF (ref 0–8)
CHLORIDE BLD-SCNC: 95 MMOL/L (ref 98–107)
CO2: 25 MMOL/L (ref 20–31)
COLOR: YELLOW
CREAT SERPL-MCNC: 0.58 MG/DL (ref 0.5–0.9)
CRYSTALS, UA: ABNORMAL /HPF
DIFFERENTIAL TYPE: ABNORMAL
EOSINOPHILS RELATIVE PERCENT: 2 % (ref 1–4)
EPITHELIAL CELLS UA: ABNORMAL /HPF (ref 0–5)
GFR AFRICAN AMERICAN: >60 ML/MIN
GFR NON-AFRICAN AMERICAN: >60 ML/MIN
GFR SERPL CREATININE-BSD FRML MDRD: ABNORMAL ML/MIN/{1.73_M2}
GFR SERPL CREATININE-BSD FRML MDRD: ABNORMAL ML/MIN/{1.73_M2}
GLUCOSE BLD-MCNC: 374 MG/DL (ref 70–99)
GLUCOSE URINE: ABNORMAL
HCG QUALITATIVE: NEGATIVE
HCT VFR BLD CALC: 36.7 % (ref 36.3–47.1)
HEMOGLOBIN: 12.6 G/DL (ref 11.9–15.1)
IMMATURE GRANULOCYTES: 0 %
KETONES, URINE: NEGATIVE
LEUKOCYTE ESTERASE, URINE: ABNORMAL
LIPASE: 35 U/L (ref 13–60)
LYMPHOCYTES # BLD: 23 % (ref 24–43)
MCH RBC QN AUTO: 31.7 PG (ref 25.2–33.5)
MCHC RBC AUTO-ENTMCNC: 34.3 G/DL (ref 28.4–34.8)
MCV RBC AUTO: 92.4 FL (ref 82.6–102.9)
MONOCYTES # BLD: 9 % (ref 3–12)
MUCUS: ABNORMAL
NITRITE, URINE: NEGATIVE
NRBC AUTOMATED: 0 PER 100 WBC
OTHER OBSERVATIONS UA: ABNORMAL
PDW BLD-RTO: 13.4 % (ref 11.8–14.4)
PH UA: 6.5 (ref 5–8)
PLATELET # BLD: ABNORMAL K/UL (ref 138–453)
PLATELET ESTIMATE: ABNORMAL
PLATELET, FLUORESCENCE: 66 K/UL (ref 138–453)
PLATELET, IMMATURE FRACTION: 2.4 % (ref 1.1–10.3)
PMV BLD AUTO: ABNORMAL FL (ref 8.1–13.5)
POTASSIUM SERPL-SCNC: 3.9 MMOL/L (ref 3.7–5.3)
PROTEIN UA: NEGATIVE
RBC # BLD: 3.97 M/UL (ref 3.95–5.11)
RBC # BLD: ABNORMAL 10*6/UL
RBC UA: ABNORMAL /HPF (ref 0–4)
RENAL EPITHELIAL, UA: ABNORMAL /HPF
SEG NEUTROPHILS: 67 % (ref 36–65)
SEGMENTED NEUTROPHILS ABSOLUTE COUNT: 4.44 K/UL (ref 1.5–8.1)
SODIUM BLD-SCNC: 132 MMOL/L (ref 135–144)
SPECIFIC GRAVITY UA: 1.02 (ref 1–1.03)
TOTAL PROTEIN: 7.1 G/DL (ref 6.4–8.3)
TRICHOMONAS: ABNORMAL
TURBIDITY: CLEAR
URINE HGB: ABNORMAL
UROBILINOGEN, URINE: NORMAL
WBC # BLD: 6.7 K/UL (ref 3.5–11.3)
WBC # BLD: ABNORMAL 10*3/UL
WBC UA: ABNORMAL /HPF (ref 0–5)
YEAST: ABNORMAL

## 2021-09-29 PROCEDURE — 2580000003 HC RX 258: Performed by: STUDENT IN AN ORGANIZED HEALTH CARE EDUCATION/TRAINING PROGRAM

## 2021-09-29 PROCEDURE — 81001 URINALYSIS AUTO W/SCOPE: CPT

## 2021-09-29 PROCEDURE — 87086 URINE CULTURE/COLONY COUNT: CPT

## 2021-09-29 PROCEDURE — 83690 ASSAY OF LIPASE: CPT

## 2021-09-29 PROCEDURE — 99283 EMERGENCY DEPT VISIT LOW MDM: CPT

## 2021-09-29 PROCEDURE — 96375 TX/PRO/DX INJ NEW DRUG ADDON: CPT

## 2021-09-29 PROCEDURE — 80053 COMPREHEN METABOLIC PANEL: CPT

## 2021-09-29 PROCEDURE — 87186 SC STD MICRODIL/AGAR DIL: CPT

## 2021-09-29 PROCEDURE — 85055 RETICULATED PLATELET ASSAY: CPT

## 2021-09-29 PROCEDURE — 96365 THER/PROPH/DIAG IV INF INIT: CPT

## 2021-09-29 PROCEDURE — 85025 COMPLETE CBC W/AUTO DIFF WBC: CPT

## 2021-09-29 PROCEDURE — 2500000003 HC RX 250 WO HCPCS: Performed by: STUDENT IN AN ORGANIZED HEALTH CARE EDUCATION/TRAINING PROGRAM

## 2021-09-29 PROCEDURE — 74176 CT ABD & PELVIS W/O CONTRAST: CPT

## 2021-09-29 PROCEDURE — 6360000002 HC RX W HCPCS: Performed by: STUDENT IN AN ORGANIZED HEALTH CARE EDUCATION/TRAINING PROGRAM

## 2021-09-29 PROCEDURE — 96372 THER/PROPH/DIAG INJ SC/IM: CPT

## 2021-09-29 PROCEDURE — 87088 URINE BACTERIA CULTURE: CPT

## 2021-09-29 PROCEDURE — 6370000000 HC RX 637 (ALT 250 FOR IP): Performed by: STUDENT IN AN ORGANIZED HEALTH CARE EDUCATION/TRAINING PROGRAM

## 2021-09-29 PROCEDURE — 84703 CHORIONIC GONADOTROPIN ASSAY: CPT

## 2021-09-29 RX ORDER — MAGNESIUM HYDROXIDE/ALUMINUM HYDROXICE/SIMETHICONE 120; 1200; 1200 MG/30ML; MG/30ML; MG/30ML
30 SUSPENSION ORAL ONCE
Status: COMPLETED | OUTPATIENT
Start: 2021-09-29 | End: 2021-09-29

## 2021-09-29 RX ORDER — CEPHALEXIN 500 MG/1
500 CAPSULE ORAL 2 TIMES DAILY
Qty: 20 CAPSULE | Refills: 0 | Status: SHIPPED | OUTPATIENT
Start: 2021-09-29 | End: 2021-10-09

## 2021-09-29 RX ORDER — SODIUM CHLORIDE, SODIUM LACTATE, POTASSIUM CHLORIDE, AND CALCIUM CHLORIDE .6; .31; .03; .02 G/100ML; G/100ML; G/100ML; G/100ML
1000 INJECTION, SOLUTION INTRAVENOUS ONCE
Status: COMPLETED | OUTPATIENT
Start: 2021-09-29 | End: 2021-09-29

## 2021-09-29 RX ADMIN — ALUMINUM HYDROXIDE, MAGNESIUM HYDROXIDE, AND SIMETHICONE 30 ML: 200; 200; 20 SUSPENSION ORAL at 18:44

## 2021-09-29 RX ADMIN — CEFTRIAXONE SODIUM 1000 MG: 1 INJECTION, POWDER, FOR SOLUTION INTRAMUSCULAR; INTRAVENOUS at 22:29

## 2021-09-29 RX ADMIN — FAMOTIDINE 20 MG: 10 INJECTION, SOLUTION INTRAVENOUS at 18:46

## 2021-09-29 RX ADMIN — SODIUM CHLORIDE, POTASSIUM CHLORIDE, SODIUM LACTATE AND CALCIUM CHLORIDE 1000 ML: 600; 310; 30; 20 INJECTION, SOLUTION INTRAVENOUS at 22:31

## 2021-09-29 RX ADMIN — Medication 4 UNITS: at 22:34

## 2021-09-29 ASSESSMENT — PAIN DESCRIPTION - LOCATION
LOCATION: ABDOMEN
LOCATION: BACK

## 2021-09-29 ASSESSMENT — PAIN DESCRIPTION - ORIENTATION: ORIENTATION: RIGHT

## 2021-09-29 ASSESSMENT — PAIN SCALES - GENERAL
PAINLEVEL_OUTOF10: 7
PAINLEVEL_OUTOF10: 7

## 2021-09-29 ASSESSMENT — ENCOUNTER SYMPTOMS
VOMITING: 1
ABDOMINAL PAIN: 1
SHORTNESS OF BREATH: 0
NAUSEA: 1
BACK PAIN: 1
COUGH: 0
CONSTIPATION: 1

## 2021-09-29 ASSESSMENT — PAIN DESCRIPTION - PAIN TYPE: TYPE: ACUTE PAIN

## 2021-09-29 ASSESSMENT — PAIN DESCRIPTION - DESCRIPTORS
DESCRIPTORS: SHARP;SHOOTING
DESCRIPTORS: SHARP;SHOOTING

## 2021-09-29 ASSESSMENT — PAIN DESCRIPTION - FREQUENCY
FREQUENCY: CONTINUOUS
FREQUENCY: CONTINUOUS

## 2021-09-29 NOTE — TELEPHONE ENCOUNTER
\"Have you ever had this type of abdominal pain before? \" If so, ask: \"When was the last time? \" and \"What happened that time? \"       No    8. CAUSE: \"What do you think is causing the abdominal pain? \"      Unknown    9. RELIEVING/AGGRAVATING FACTORS: \"What makes it better or worse? \" (e.g., movement, antacids, bowel movement)      Sleeping it is worse, can't sleep    10. OTHER SYMPTOMS: \"Has there been any vomiting, diarrhea, constipation, or urine problems? \"        Vomiting and diarrhea, constipation but now relieved    11. PREGNANCY: \"Is there any chance you are pregnant? \" \"When was your last menstrual period? \"        N/a    Protocols used: ABDOMINAL PAIN - [facility]

## 2021-09-29 NOTE — ED PROVIDER NOTES
[facility]  Emergency Department  Faculty Attestation     I performed a history and physical examination of the patient and discussed management with the resident. I reviewed the residents note and agree with the documented findings and plan of care. Any areas of disagreement are noted on the chart. I was personally present for the key portions of any procedures. I have documented in the chart those procedures where I was not present during the key portions. I have reviewed the emergency nurses triage note. I agree with the chief complaint, past medical history, past surgical history, allergies, medications, social and family history as documented unless otherwise noted below. For Physician Assistant/ Nurse Practitioner cases/documentation I have personally evaluated this patient and have completed at least one if not all key elements of the E/M (history, physical exam, and MDM). Additional findings are as noted. Primary Care Physician:  Ardia Gottron, APRN - CNP    Screenings:  [unfilled]    CHIEF COMPLAINT       Chief Complaint   Patient presents with    Abdominal Pain     right side abdominal pain. RECENT VITALS:   Temp: 97.5 °F (36.4 °C),  Pulse: 103, Resp: 20, BP: (!) 158/86    LABS:  Labs Reviewed   CULTURE, URINE   CBC WITH AUTO DIFFERENTIAL   COMPREHENSIVE METABOLIC PANEL   LIPASE   HCG, SERUM, QUALITATIVE   URINALYSIS WITH MICROSCOPIC       Radiology  No orders to display         Attending Physician Additional  Notes    Patient has right flank pain for the past 3 days relatively constant nature but worse with meals. She has nausea but no vomiting. No diarrhea. She does have urinary frequency but no dysuria or hematuria. She does actually have radiation of pain in the right upper quadrant. History of pancreatitis no history of gallstones or kidney stones that she is aware of. No Covid symptoms.   On exam she states pain score is 10/10, appears comfortable, tachycardic, hypertensive, afebrile. Lungs are clear. Abdomen is benign. There is minimal right upper quadrant tenderness but negative Foster sign. There is right CVA tenderness. Impression is flank pain consider Sergo versus stone, consider gallbladder disease but unlikely cholecystitis. Plan is IV access, fluids, analgesics, antiemetics, consider bedside ultrasound, anticipate CT noncontrast abdomen, reassess. Patient appears comfortable enough for discharge on oral medications. Gabino Núñez.  Ole Silvestre MD, [facility]  Attending Emergency  Physician               Karla Perez MD  09/29/21 [id number]

## 2021-09-29 NOTE — FLOWSHEET NOTE
SPIRITUAL CARE PROGRESS NOTE    Spiritual Assessment: Patient appeared to be approaching and coping with what is happening. Patient appeared to welcome  presence and engaged in conversation. Intervention:  listened and validated concerns and facilitated prayer. Patient did not elaborate on jyoti/spirituality but did state she missed Voodoo on Sunday. Patient values Voodoo community. Outcome: Patient expressed gratitude for prayer and visit. Chaplains will remain available to offer spiritual and emotional support as needed. Electronically signed by Erin Izaguirre on 9/29/2021 at 6:33 PM       09/29/21 1832   Encounter Summary   Services provided to: Patient   Referral/Consult From: [facility] Family members   Continue Visiting   (9/29/21)   Complexity of Encounter Low   Length of Encounter 15 minutes   Spiritual Assessment Completed Yes   Routine   Type Initial   Assessment Approachable;Coping; Hopeful   Intervention Nurtured hope;Prayer;Sustaining presence/ Ministry of presence   Outcome Expressed gratitude

## 2021-09-29 NOTE — ED PROVIDER NOTES
[facility]  ED  Emergency Department Encounter  EmergencyMedicine Resident     Pt Name:Gloria Chu  MRN: [id number]  [place] 1955  Date of evaluation: 9/29/21  PCP:  Buelah Lesches, APRN - CNP    This patient was evaluated in the Emergency Department for symptoms described in the history of present illness. The patient was evaluated in the context of the global COVID-19 pandemic, which necessitated consideration that the patient might be at risk for infection with the SARS-CoV-2 virus that causes COVID-19. Institutional protocols and algorithms that pertain to the evaluation of patients at risk for COVID-19 are in a state of rapid change based on information released by regulatory bodies including the CDC and federal and state organizations. These policies and algorithms were followed during the patient's care in the ED. CHIEF COMPLAINT       Chief Complaint   Patient presents with    Abdominal Pain     right side abdominal pain. HISTORY OF PRESENT ILLNESS  (Location/Symptom, Timing/Onset, Context/Setting, Quality, Duration, Modifying Factors, Severity.)      Sherren Patee is a 60 y.o. female who presents with sided abdominal pain X 1 week. Patient reports that she had a progressive onset of this right-sided back pain that radiates forward into her abdomen. It is sharp and intermittently gets worse. This has been progressively worsening since its onset and is becoming constant. There is no changes in eating and she has noticed several episodes of nonbloody nonbilious emesis. She was also severely constipated which improved after she manually disimpacted herself. She has had a bowel movement today and is continuing to pass gas. She also has been able to eat today as well. She has been taking her 's diclofenac which has not improved her symptoms either. She denies any urinary symptoms.     PAST MEDICAL / SURGICAL / SOCIAL / FAMILY HISTORY      has a past medical history of Cataracts, bilateral, COPD (chronic obstructive pulmonary disease) (HCC), CTS (carpal tunnel syndrome), Fibromyalgia, Generalized abdominal pain, Glaucoma, Hypertension, Osteoarthritis, Osteoporosis, Pancreatic calcification, Pancreatitis, Protein-calorie malnutrition, severe ([address] Seizures ([address] Tremor, Type II or unspecified type diabetes mellitus without mention of complication, not stated as uncontrolled, and Uncontrolled type 2 DM with hyperosmolar nonketotic hyperglycemia ([address] has a past surgical history that includes cyst removal (Left); Upper gastrointestinal endoscopy (08/30/2016); Neck surgery; pr colsc flx w/removal lesion by hot bx forceps (N/A, 6/30/2017); Colonoscopy (08/30/2016); Colonoscopy (06/30/2017); Colonoscopy (05/29/2018); pr colon ca scrn not hi rsk ind (N/A, 5/29/2018); Colonoscopy (05/07/2021); and Colonoscopy (N/A, 5/7/2021).     Social History     Socioeconomic History    Marital status:      Spouse name: Not on file    Number of children: Not on file    Years of education: Not on file    Highest education level: Not on file   Occupational History    Not on file   Tobacco Use    Smoking status: Current Every Day Smoker     Packs/day: 0.25     Years: 40.00     Pack years: 10.00    Smokeless tobacco: Former User   Vaping Use    Vaping Use: Never used   Substance and Sexual Activity    Alcohol use: No    Drug use: Yes     Types: Marijuana    Sexual activity: Not on file     Comment: last use end 10/2014   Other Topics Concern    Not on file   Social History Narrative    Not on file     Social Determinants of Health     Financial Resource Strain: Low Risk     Difficulty of Paying Living Expenses: Not hard at all   Food Insecurity: No Food Insecurity    Worried About [address] in the Last Year: Never true    Shakir of Food in the Last Year: Never true   Transportation Needs: No Transportation Needs    Lack of Transportation (Medical): No    Lack of Transportation (Non-Medical): No   Physical Activity:     Days of Exercise per Week:     Minutes of Exercise per Session:    Stress:     Feeling of Stress :    Social Connections:     Frequency of Communication with Friends and Family:     Frequency of Social Gatherings with Friends and Family:     Attends [employer] Services:     Active Member of Clubs or Organizations:     Attends Club or Organization Meetings:     Marital Status:    Intimate Partner Violence:     Fear of Current or Ex-Partner:     Emotionally Abused:     Physically Abused:     Sexually Abused:        Family History   Problem Relation Age of Onset    Brain Cancer Father     Diabetes Sister     Other Sister         epilepsy       Allergies:  Peanuts [peanut oil], Vicodin [hydrocodone-acetaminophen], Tramadol, Tylenol with codeine #3 [acetaminophen-codeine], and Ibuprofen    Home Medications:  Prior to Admission medications    Medication Sig Start Date End Date Taking? Authorizing Provider   cephALEXin (KEFLEX) 500 MG capsule Take 1 capsule by mouth 2 times daily for 10 days 9/29/21 10/9/21 Yes Rajwinder Hazel,    albuterol sulfate  (90 Base) MCG/ACT inhaler USE 2 INHALATIONS BY MOUTH  EVERY 6 HOURS AS NEEDED FOR WHEEZING 6/28/21   DONTRELL Prasad CNP   Multiple Vitamin (MULTI-VITAMIN DAILY PO) Take by mouth    Historical Provider, MD   ZINC PO Take by mouth    Historical Provider, MD   metFORMIN (GLUCOPHAGE-XR) 500 MG extended release tablet Take 2 tablets by mouth daily (with breakfast) AND 1 tablet Daily with supper.  6/8/21   DONTRELL Prasad CNP   glimepiride (AMARYL) 2 MG tablet TAKE 1 TABLET BY MOUTH IN  THE MORNING BEFORE  BREAKFAST 5/3/21   DONTRELL Prasad CNP   megestrol (MEGACE) 20 MG tablet Take 1 tablet by mouth daily 4/28/21   Arianna Madison MD   sodium bicarbonate 650 MG tablet take 1 tablet by mouth three times a day 4/22/21   DONTRELL Prasad CNP   omeprazole (PRILOSEC) 20 MG delayed release capsule Take 1 capsule by mouth Daily TAKE 1 CAPSULE BY MOUTH TWO TIMES DAILY 4/22/21 7/21/21  DONTRELL Dixon CNP   magnesium citrate solution Take 296 mLs by mouth once for 1 dose 4/14/21 6/9/21  Arianna Madison MD   bisacodyl (DULCOLAX) 5 MG EC tablet TAKE 4 TABS AT 10 AM THE DAY PRIOR TO COLONOSCOPY 4/14/21   Arianna Madison MD   lipase-protease-amylase (CREON) 45270 units CPEP delayed release capsule take 2 capsules by mouth four times a day after meals and at bedtime 4/13/21   Guanakito Dickinson MD   SYMBICORT 80-4.5 MCG/ACT AERO Inhale 2 puffs into the lungs 2 times daily 4/12/21 6/9/21  DONTRELL Dixon CNP   gabapentin (NEURONTIN) 400 MG capsule Take 1 capsule by mouth 3 times daily for 90 days. 3/5/21 6/3/21  DONTRELL Dixon CNP   Lancets MISC 1 each by Does not apply route daily 3/5/21 6/9/21  DONTRELL Dixon CNP   blood glucose monitor strips 1 strip by Other route daily Test 1 times a day & as needed for symptoms of irregular blood glucose. 3/5/21 6/9/21  DONTRELL Dixon CNP   Alcohol Swabs (ALCOHOL PREP) 70 % PADS Daily as needed 3/5/21   DONTRELL Dixon CNP   mirtazapine (REMERON) 30 MG tablet Take 0.5 tablets by mouth nightly 3/5/21   DONTRELL Dixon CNP   Blood Gluc Meter Disp-Strips (BLOOD GLUCOSE METER DISPOSABLE) OXANA Daily and as needed 7/15/20   DONTRELL Dixon CNP   COMFORT EZ PEN NEEDLES 32G X 6 MM MISC BID 4/13/20   DONTRELL Dixon CNP   Aqua[employer]ance Lancets 30G MISC Daily and as needed for hypoglycemia 4/6/20   DONTRELL Dixon CNP       REVIEW OF SYSTEMS    (2-9 systems for level 4, 10 or more for level 5)      Review of Systems   Constitutional: Negative for fever. Respiratory: Negative for cough and shortness of breath. Cardiovascular: Negative for chest pain. Gastrointestinal: Positive for abdominal pain, constipation, nausea and vomiting. Genitourinary: Positive for flank pain. Negative for difficulty urinating, dysuria and hematuria. Musculoskeletal: Positive for back pain. Skin: Negative for rash. Allergic/Immunologic:        Multiple allergy medications   Neurological: Negative for headaches. Psychiatric/Behavioral: Negative for confusion. PHYSICAL EXAM   (up to 7 for level 4, 8 or more for level 5)      INITIAL VITALS:   BP (!) 158/86   Pulse 103   Temp 97.5 °F (36.4 °C) (Oral)   Resp 20   Ht 5' 7\" (1.702 m)   Wt 90 lb (40.8 kg)   SpO2 97%   BMI 14.10 kg/m²     Physical Exam  Constitutional:       Comments: Cachectic appearing   Eyes:      General: No scleral icterus. Cardiovascular:      Rate and Rhythm: Tachycardia present. Pulmonary:      Effort: Pulmonary effort is normal. No respiratory distress. Breath sounds: Normal breath sounds. No wheezing or rales. Abdominal:      General: Abdomen is flat. A surgical scar is present. There is no distension. Palpations: Abdomen is soft. Tenderness: There is abdominal tenderness in the right upper quadrant. There is right CVA tenderness. There is no left CVA tenderness, guarding or rebound. Negative signs include Foster's sign. Hernia: There is no hernia in the umbilical area. Skin:     Capillary Refill: Capillary refill takes less than 2 seconds. Neurological:      General: No focal deficit present. Mental Status: She is alert.    Psychiatric:         Mood and Affect: Mood normal.         DIFFERENTIAL  DIAGNOSIS     PLAN (LABS / IMAGING / EKG):  Orders Placed This Encounter   Procedures    Culture, Urine    CT ABDOMEN PELVIS WO CONTRAST Additional Contrast? None    CBC Auto Differential    Comprehensive Metabolic Panel    Lipase    HCG Qualitative, Serum    Urinalysis with microscopic    Immature Platelet Fraction       MEDICATIONS ORDERED:  Orders Placed This Encounter   Medications    famotidine (PEPCID) injection 20 mg    aluminum & magnesium hydroxide-simethicone (MAALOX) 684-330-94 MG/5ML suspension 30 mL    lactated ringers bolus    cefTRIAXone (ROCEPHIN) 1000 mg IVPB in 50 mL D5W minibag     Order Specific Question:   Antimicrobial Indications     Answer:   Urinary Tract Infection    insulin regular (HUMULIN R;NOVOLIN R) injection 4 Units    cephALEXin (KEFLEX) 500 MG capsule     Sig: Take 1 capsule by mouth 2 times daily for 10 days     Dispense:  20 capsule     Refill:  0       DDX: nephrolithiasis, urinary tract infection, GERD, cholecystitis, constipation    DIAGNOSTIC RESULTS / EMERGENCY DEPARTMENT COURSE / MDM   LAB RESULTS:  Results for orders placed or performed during the hospital encounter of 09/29/21   CBC Auto Differential   Result Value Ref Range    WBC 6.7 3.5 - 11.3 k/uL    RBC 3.97 3.95 - 5.11 m/uL    Hemoglobin 12.6 11.9 - 15.1 g/dL    Hematocrit 36.7 36.3 - 47.1 %    MCV 92.4 82.6 - 102.9 fL    MCH 31.7 25.2 - 33.5 pg    MCHC 34.3 28.4 - 34.8 g/dL    RDW 13.4 11.8 - 14.4 %    Platelets See Reflexed IPF Result 138 - 453 k/uL    MPV NOT REPORTED 8.1 - 13.5 fL    NRBC Automated 0.0 0.0 per 100 WBC    Differential Type NOT REPORTED     WBC Morphology NOT REPORTED     RBC Morphology NOT REPORTED     Platelet Estimate NOT REPORTED     Seg Neutrophils 67 (H) 36 - 65 %    Lymphocytes 23 (L) 24 - 43 %    Monocytes 9 3 - 12 %    Eosinophils % 2 1 - 4 %    Basophils 0 0 - 2 %    Immature Granulocytes 0 0 %    Segs Absolute 4.44 1.50 - 8.10 k/uL    Absolute Lymph # 1.52 1.10 - 3.70 k/uL    Absolute Mono # 0.57 0.10 - 1.20 k/uL    Absolute Eos # 0.10 0.00 - 0.44 k/uL    Basophils Absolute <0.03 0.00 - 0.20 k/uL    Absolute Immature Granulocyte <0.03 0.00 - 0.30 k/uL   Comprehensive Metabolic Panel   Result Value Ref Range    Glucose 374 (H) 70 - 99 mg/dL    BUN 4 (L) 8 - 23 mg/dL    CREATININE 0.58 0.50 - 0.90 mg/dL    Bun/Cre Ratio NOT REPORTED 9 - 20    Calcium 9.3 8.6 - 10.4 mg/dL    Sodium 132 (L) 135 - 144 mmol/L    Potassium 3.9 3.7 - 5.3 mmol/L    Chloride 95 (L) 98 - 107 mmol/L    CO2 25 20 - 31 mmol/L    Anion Gap 12 9 - 17 mmol/L    Alkaline Phosphatase 149 (H) 35 - 104 U/L    ALT 5 5 - 33 U/L    AST 11 <32 U/L    Total Bilirubin 0.26 (L) 0.3 - 1.2 mg/dL    Total Protein 7.1 6.4 - 8.3 g/dL    Albumin 3.8 3.5 - 5.2 g/dL    Albumin/Globulin Ratio 1.2 1.0 - 2.5    GFR Non-African American >60 >60 mL/min    GFR African American >60 >60 mL/min    GFR Comment          GFR Staging NOT REPORTED    Lipase   Result Value Ref Range    Lipase 35 13 - 60 U/L   HCG Qualitative, Serum   Result Value Ref Range    hCG Qual NEGATIVE NEGATIVE   Urinalysis with microscopic   Result Value Ref Range    Color, UA Yellow Yellow    Turbidity UA Clear Clear    Glucose, Ur 3+ (A) NEGATIVE    Bilirubin Urine NEGATIVE NEGATIVE    Ketones, Urine NEGATIVE NEGATIVE    Specific Gravity, UA 1.022 1.005 - 1.030    Urine Hgb SMALL (A) NEGATIVE    pH, UA 6.5 5.0 - 8.0    Protein, UA NEGATIVE NEGATIVE    Urobilinogen, Urine Normal Normal    Nitrite, Urine NEGATIVE NEGATIVE    Leukocyte Esterase, Urine TRACE (A) NEGATIVE    -          WBC, UA 20 TO 50 0 - 5 /HPF    RBC, UA 2 TO 5 0 - 4 /HPF    Casts UA NOT REPORTED 0 - 8 /LPF    Crystals, UA NOT REPORTED None /HPF    Epithelial Cells UA None 0 - 5 /HPF    Renal Epithelial, UA NOT REPORTED 0 /HPF    Bacteria, UA MODERATE (A) None    Mucus, UA NOT REPORTED None    Trichomonas, UA NOT REPORTED None    Amorphous, UA NOT REPORTED None    Other Observations UA NOT REPORTED NOT REQ.     Yeast, UA NOT REPORTED None   Immature Platelet Fraction   Result Value Ref Range    Platelet, Immature Fraction 2.4 1.1 - 10.3 %    Platelet, Fluorescence 66 (L) 138 - 453 k/uL       IMPRESSION: UA positive for infectious process    RADIOLOGY:  CT ABDOMEN PELVIS WO CONTRAST Additional Contrast? None    Result Date: 9/29/2021  EXAMINATION: CT OF THE ABDOMEN AND PELVIS WITHOUT CONTRAST 9/29/2021 8:35 pm TECHNIQUE: CT of the abdomen and pelvis was performed without the administration of intravenous contrast. Multiplanar reformatted images are provided for review. Dose modulation, iterative reconstruction, and/or weight based adjustment of the mA/kV was utilized to reduce the radiation dose to as low as reasonably achievable. COMPARISON: May 12, 2016 CT abdomen and pelvis and MR abdomen February 2, 2021 HISTORY: ORDERING SYSTEM PROVIDED HISTORY: concern for nephrolithiasis R CVAT TECHNOLOGIST PROVIDED HISTORY: concern for nephrolithiasis R CVAT Decision Support Exception - unselect if not a suspected or confirmed emergency medical condition->Emergency Medical Condition (MA) Reason for Exam: concern for nephrolithiasis R CVAT FINDINGS: Lower Chest: Clear. Organs: The abdominal wall appears normal. The liver, spleen,, and adrenals appear normal.  Coarse calcifications throughout the pancreas. Gallbladder normal. Kidneys appear normal. The bladder appears normal. GI/Bowel: The stomach,small bowel, and colon appear normal. Appendix is not identified. Pelvis: Uterus normal. Peritoneum/Retroperitoneum: The abdominal aorta and iliac arteries are normal in caliber. There is no pathologic adenopathy. Bones/Soft Tissues: Normal     Chronic pancreatitis. No acute disease but sensitivity limited without IV and oral contrast.      EMERGENCY DEPARTMENT COURSE:  ED Course as of Sep 29 2258   Wed Sep 29, 2021   2010 Lipase elevated from baseline    [ML]   2154 The liver, spleen,, and adrenals appear normal.  Coarse calcifications  throughout the pancreas. Gallbladder normal.     Kidneys appear normal.       [ML]   2154 Bacteria, UA(!): MODERATE [ML]      ED Course User Index  [ML] Tolu Plummer DO     Findings were discussed with the patient and she was treated with 1 g of ceftriaxone and prescribed 10 days of Keflex for urinary tract infection. FINAL IMPRESSION      1.  Urinary tract infection without hematuria, site unspecified          DISPOSITION / PLAN     DISPOSITION Decision To Discharge 09/29/2021 10:56:09 PM      PATIENT REFERRED TO:  Rebecca Box, APRN - CNP  [facility]  [phone]    In 1 week  to monitor symptoms      DISCHARGE MEDICATIONS:  New Prescriptions    CEPHALEXIN (KEFLEX) 500 MG CAPSULE    Take 1 capsule by mouth 2 times daily for 10 days       Katiana Beaver DO  Emergency Medicine Resident    (Please note that portions of thisnote were completed with a voice recognition program.  Efforts were made to edit the dictations but occasionally words are mis-transcribed.)       Omar Garrett DO  Resident  09/29/21 [id number]

## 2021-09-29 NOTE — ED NOTES
Pt ambulated to [facility]. NAD noted.   Steady gait, ambulation tolerated well     Sindy York RN  09/29/21 1941

## 2021-09-29 NOTE — ED NOTES
Pt presents to ED with c/o right flank pain and abdominal pain. Pt stated her pain is 10/10. Denies any pain during urination or vaginal discharge. Denies any nausea or vomiting. Denies any other issues at this time. Will continue to monitor.      Aileen Rico RN  09/29/21 [id number]

## 2021-10-01 LAB
CULTURE: ABNORMAL
Lab: ABNORMAL
SPECIMEN DESCRIPTION: ABNORMAL

## 2021-10-04 NOTE — PROGRESS NOTES
Reviewed patient's urine culture - culture positive for E. coli. Patient was discharged on cephalexin 500 mg BID x 10 days, and culture is sensitive to prescribed medication. Antibiotic prescribed at discharge is appropriate - no changes made to antibiotic regimen.      Tio Villalobos, PharmD  10/4/2021  10:59 AM

## 2021-10-08 ENCOUNTER — TELEPHONE (OUTPATIENT)
Dept: FAMILY MEDICINE CLINIC | Age: 66
End: 2021-10-08

## 2021-10-08 NOTE — TELEPHONE ENCOUNTER
Kevan Diego was contacted as a part of [employer]. A voicemail message was left reminding the patient to schedule an AWV with their PCP.

## 2021-10-12 NOTE — PROGRESS NOTES
Post-Discharge Transitional Care Management Services or Hospital Follow Up      Jillian Carson   YOB: 1955    Date of Office Visit:  10/13/2021  Date of Hospital Admission: 9/29/21  Date of Hospital Discharge: 9/29/21  Readmission Risk Score(high >=14%.  Medium >=10%):No data recorded    Care management risk score Rising risk (score 2-5) and Complex Care (Scores >=6): 2     Non face to face  following discharge, date last encounter closed (first attempt may have been earlier): *No documented post hospital discharge outreach found in the last 14 days *No documented post hospital discharge outreach found in the last 14 days    Call initiated 2 business days of discharge: *No response recorded in the last 14 days     Patient Active Problem List   Diagnosis    Intractable epilepsy ([address]    Hypoglycemia    Depression    Osteoporosis    Affective disorder ([address]    Uncontrolled type 2 DM with hyperosmolar nonketotic hyperglycemia ([address]    Generalized abdominal pain    Pancreatic calcification    Pancreatic insufficiency    Pain of upper abdomen    Alcohol-induced chronic pancreatitis ([address]    Weight loss    Polysubstance abuse ([address]    Unresponsive episode    Chronic neck pain    Neuropathic pain of both legs    Seizure disorder, secondary ([address]    Essential (primary) hypertension    Type 2 diabetes mellitus without complication, with long-term current use of insulin (HCC)    Neuropathic pain    Elevated CEA    Acute left-sided low back pain without sciatica    Adenomatous polyps    Bilateral wrist pain    Seizure-like activity (HCC)    Elevated CA 19-9 level    Body mass index (BMI) less than 16.5       Allergies   Allergen Reactions    Peanuts [Peanut Oil] Anaphylaxis    Vicodin [Hydrocodone-Acetaminophen] Hives    Tramadol     Tylenol With Codeine #3 [Acetaminophen-Codeine]     Ibuprofen Itching       Medications listed as ordered at the time of discharge from [facility]   Home Medication Instructions DAKOTAH:    Printed on:10/13/21 2018   Medication Information                      albuterol sulfate  (90 Base) MCG/ACT inhaler  USE 2 INHALATIONS BY MOUTH  EVERY 6 HOURS AS NEEDED FOR WHEEZING             Alcohol Swabs (ALCOHOL PREP) 70 % PADS  Daily as needed             AquaLance Lancets 30G MISC  Daily and as needed for hypoglycemia             bisacodyl (DULCOLAX) 5 MG EC tablet  TAKE 4 TABS AT 10 AM THE DAY PRIOR TO COLONOSCOPY             Blood Gluc Meter Disp-Strips (BLOOD GLUCOSE METER DISPOSABLE) OXANA  Daily and as needed             blood glucose monitor strips  1 strip by Other route daily Test 1 times a day & as needed for symptoms of irregular blood glucose. COMFORT EZ PEN NEEDLES 32G X 6 MM MISC  BID             gabapentin (NEURONTIN) 400 MG capsule  Take 1 capsule by mouth 3 times daily for 90 days. glimepiride (AMARYL) 2 MG tablet  Take 1 tablet by mouth 2 times daily (before meals) TAKE 1 TABLET BY MOUTH IN  THE MORNING BEFORE  BREAKFAST             Lancets MISC  1 each by Does not apply route daily             lipase-protease-amylase (CREON) 80601-859573 units CPEP delayed release capsule  take 2 capsules by mouth four times a day after meals and at bedtime             magnesium citrate solution  Take 296 mLs by mouth once for 1 dose             megestrol (MEGACE) 20 MG tablet  Take 1 tablet by mouth daily             metFORMIN (GLUCOPHAGE-XR) 500 MG extended release tablet  Take 2 tablets by mouth daily (with breakfast) AND 1 tablet Daily with supper.              mirtazapine (REMERON) 30 MG tablet  Take 0.5 tablets by mouth nightly             Multiple Vitamin (MULTI-VITAMIN DAILY PO)  Take by mouth             omeprazole (PRILOSEC) 20 MG delayed release capsule  Take 1 capsule by mouth Daily TAKE 1 CAPSULE BY MOUTH TWO TIMES DAILY             sodium bicarbonate 650 MG tablet  take 1 tablet by mouth three times a day             SYMBICORT 80-4.5 MCG/ACT AERO  Inhale 2 puffs into the lungs 2 times daily             ZINC PO  Take by mouth                   Medications marked \"taking\" at this time  Outpatient Medications Marked as Taking for the 10/13/21 encounter (Office Visit) with DONTRELL Negro - CNP   Medication Sig Dispense Refill    gabapentin (NEURONTIN) 400 MG capsule Take 1 capsule by mouth 3 times daily for 90 days. 270 capsule 2    SYMBICORT 80-4.5 MCG/ACT AERO Inhale 2 puffs into the lungs 2 times daily 3 each 2    lipase-protease-amylase (CREON) 32671-723339 units CPEP delayed release capsule take 2 capsules by mouth four times a day after meals and at bedtime 240 capsule 1    metFORMIN (GLUCOPHAGE-XR) 500 MG extended release tablet Take 2 tablets by mouth daily (with breakfast) AND 1 tablet Daily with supper. 270 tablet 2    glimepiride (AMARYL) 2 MG tablet Take 1 tablet by mouth 2 times daily (before meals) TAKE 1 TABLET BY MOUTH IN  THE MORNING BEFORE  BREAKFAST 180 tablet 1    albuterol sulfate  (90 Base) MCG/ACT inhaler USE 2 INHALATIONS BY MOUTH  EVERY 6 HOURS AS NEEDED FOR WHEEZING 17 g 1    Multiple Vitamin (MULTI-VITAMIN DAILY PO) Take by mouth      ZINC PO Take by mouth      megestrol (MEGACE) 20 MG tablet Take 1 tablet by mouth daily 30 tablet 3    omeprazole (PRILOSEC) 20 MG delayed release capsule Take 1 capsule by mouth Daily TAKE 1 CAPSULE BY MOUTH TWO TIMES DAILY 90 capsule 1    magnesium citrate solution Take 296 mLs by mouth once for 1 dose 296 mL 0    bisacodyl (DULCOLAX) 5 MG EC tablet TAKE 4 TABS AT 10 AM THE DAY PRIOR TO COLONOSCOPY 4 tablet 0    Lancets MISC 1 each by Does not apply route daily 300 each 5    blood glucose monitor strips 1 strip by Other route daily Test 1 times a day & as needed for symptoms of irregular blood glucose.  300 strip 5    Alcohol Swabs (ALCOHOL PREP) 70 % PADS Daily as needed 200 each 3    mirtazapine (REMERON) 30 MG tablet Take 0.5 tablets by mouth nightly 90 tablet 3    Blood Gluc Meter Disp-Strips (BLOOD GLUCOSE METER DISPOSABLE) OXANA Daily and as needed 1 Device 0    COMFORT EZ PEN NEEDLES 32G X 6 MM MISC  each 2    AquaLance Lancets 30G MISC Daily and as needed for hypoglycemia 200 each 2        Medications patient taking as of now reconciled against medications ordered at time of hospital discharge: Yes    Chief Complaint   Patient presents with    Follow-Up from Hospital     UTI   [facility] Maintenance     Flu Vaccine       Since ER visit, symptoms resolved. Feels much better, no back or abd pains  No urianry difficulties  Breathing well, no chest pains or shortness of breath. She is requesting refills of her medication today, including her inhalers and diabetic medication. Denies any hypoglycemic episodes. Admits she is not following a diabetic diet very well. She does see GI, Dr. Norma Hunter, but missed her last visit in August.  Is taking Megace and mirtazapine for appetite loss, but does not feel either really stimulates her appetite. It has been does help her sleep she takes a half a tablet at bedtime, whole tablet makes her feel bad throughout the day. Inpatient course: Discharge summary reviewed- see chart. Interval history/Current status: Resolved    Review of Systems   Constitutional: Negative for chills, diaphoresis, fatigue, fever and unexpected weight change. HENT: Negative for congestion, rhinorrhea, sore throat and trouble swallowing. Respiratory: Negative for cough, chest tightness, shortness of breath and wheezing. Cardiovascular: Negative for chest pain. Gastrointestinal: Negative for abdominal pain, blood in stool, diarrhea, nausea and vomiting. Genitourinary: Negative for decreased urine volume, difficulty urinating, dysuria, flank pain, frequency, hematuria, pelvic pain and vaginal bleeding. Musculoskeletal: Negative for arthralgias and myalgias. Skin: Negative for wound. Neurological: Negative for dizziness, syncope, weakness and light-headedness. Vitals:    10/13/21 1040   BP: 121/71   Site: Left Upper Arm   Position: Sitting   Pulse: 101   Temp: 97.2 °F (36.2 °C)   TempSrc: Temporal   SpO2: 98%   Weight: 89 lb 9.6 oz (40.6 kg)     Body mass index is 14.03 kg/m². Wt Readings from Last 3 Encounters:   10/13/21 89 lb 9.6 oz (40.6 kg)   09/29/21 90 lb (40.8 kg)   06/09/21 95 lb 6.4 oz (43.3 kg)     BP Readings from Last 3 Encounters:   10/13/21 121/71   09/29/21 (!) 158/86   06/09/21 112/74       Physical Exam  Vitals and nursing note reviewed. Constitutional:       General: She is not in acute distress. Appearance: She is well-developed and underweight. She is not toxic-appearing. HENT:      Head: Normocephalic. Right Ear: External ear normal.      Left Ear: External ear normal.   Eyes:      General: No scleral icterus. Pupils: Pupils are equal, round, and reactive to light. Cardiovascular:      Rate and Rhythm: Normal rate and regular rhythm. Pulmonary:      Effort: Pulmonary effort is normal.      Breath sounds: Normal breath sounds. Abdominal:      General: Abdomen is flat. Bowel sounds are normal.      Palpations: Abdomen is soft. Tenderness: There is no abdominal tenderness. There is no right CVA tenderness or left CVA tenderness. Musculoskeletal:      Cervical back: Normal range of motion and neck supple. Skin:     General: Skin is warm and dry. Neurological:      Mental Status: She is alert and oriented to person, place, and time. Coordination: Coordination normal.   Psychiatric:         Behavior: Behavior normal. Behavior is cooperative. Thought Content: Thought content normal.         Judgment: Judgment normal.             Assessment/Plan:  1. Acute cystitis without hematuria  Resolved  - [place] DISCHARGE MEDS RECONCILED W/ CURRENT OUTPATIENT MED LIST    2.  Type 2 diabetes mellitus with diabetic polyneuropathy, with long-term current use of insulin (HCC)  A1c increased today at 10.5. Amaryl dosing adjusted to twice daily, may need to consider insulin at follow-up if not improving.  - POCT glycosylated hemoglobin (Hb A1C)  - gabapentin (NEURONTIN) 400 MG capsule; Take 1 capsule by mouth 3 times daily for 90 days. Dispense: 270 capsule; Refill: 2  - metFORMIN (GLUCOPHAGE-XR) 500 MG extended release tablet; Take 2 tablets by mouth daily (with breakfast) AND 1 tablet Daily with supper. Dispense: 270 tablet; Refill: 2  - glimepiride (AMARYL) 2 MG tablet; Take 1 tablet by mouth 2 times daily (before meals) TAKE 1 TABLET BY MOUTH IN  THE MORNING BEFORE  BREAKFAST  Dispense: 180 tablet; Refill: 1    3. Influenza vaccination administered at current visit  - INFLUENZA, MDCK QUADV, 2 YRS AND OLDER, IM, PF, PREFILL SYR OR SDV, 0.5ML (FLUCELVAX QUADV, PF)    4. Neuropathic pain  - gabapentin (NEURONTIN) 400 MG capsule; Take 1 capsule by mouth 3 times daily for 90 days. Dispense: 270 capsule; Refill: 2    5. History of COPD  - SYMBICORT 80-4.5 MCG/ACT AERO; Inhale 2 puffs into the lungs 2 times daily  Dispense: 3 each; Refill: 2    6. Pancreatic insufficiency  Encourage follow-up with GI, given phone number to call and reschedule. - lipase-protease-amylase (CREON) [id number] units CPEP delayed release capsule; take 2 capsules by mouth four times a day after meals and at bedtime  Dispense: 240 capsule; Refill: 1    UA in ER positive for blood, bacteria. Started on Keflex x10 days.     Medical Decision Making: low complexity declines

## 2021-10-13 ENCOUNTER — OFFICE VISIT (OUTPATIENT)
Dept: PRIMARY CARE CLINIC | Age: 66
End: 2021-10-13
Payer: MEDICARE

## 2021-10-13 VITALS
BODY MASS INDEX: 14.03 KG/M2 | HEART RATE: 101 BPM | SYSTOLIC BLOOD PRESSURE: 121 MMHG | OXYGEN SATURATION: 98 % | WEIGHT: 89.6 LBS | TEMPERATURE: 97.2 F | DIASTOLIC BLOOD PRESSURE: 71 MMHG

## 2021-10-13 DIAGNOSIS — Z79.4 TYPE 2 DIABETES MELLITUS WITH DIABETIC POLYNEUROPATHY, WITH LONG-TERM CURRENT USE OF INSULIN (HCC): ICD-10-CM

## 2021-10-13 DIAGNOSIS — M79.2 NEUROPATHIC PAIN: ICD-10-CM

## 2021-10-13 DIAGNOSIS — N30.00 ACUTE CYSTITIS WITHOUT HEMATURIA: Primary | ICD-10-CM

## 2021-10-13 DIAGNOSIS — E11.42 TYPE 2 DIABETES MELLITUS WITH DIABETIC POLYNEUROPATHY, WITH LONG-TERM CURRENT USE OF INSULIN (HCC): ICD-10-CM

## 2021-10-13 DIAGNOSIS — K86.89 PANCREATIC INSUFFICIENCY: ICD-10-CM

## 2021-10-13 DIAGNOSIS — Z87.09 HISTORY OF COPD: ICD-10-CM

## 2021-10-13 DIAGNOSIS — Z23 INFLUENZA VACCINATION ADMINISTERED AT CURRENT VISIT: ICD-10-CM

## 2021-10-13 LAB — HBA1C MFR BLD: 10.5 %

## 2021-10-13 PROCEDURE — 1111F DSCHRG MED/CURRENT MED MERGE: CPT | Performed by: NURSE PRACTITIONER

## 2021-10-13 PROCEDURE — G0008 ADMIN INFLUENZA VIRUS VAC: HCPCS | Performed by: NURSE PRACTITIONER

## 2021-10-13 PROCEDURE — 99214 OFFICE O/P EST MOD 30 MIN: CPT | Performed by: NURSE PRACTITIONER

## 2021-10-13 PROCEDURE — 90674 CCIIV4 VAC NO PRSV 0.5 ML IM: CPT | Performed by: NURSE PRACTITIONER

## 2021-10-13 PROCEDURE — 83036 HEMOGLOBIN GLYCOSYLATED A1C: CPT | Performed by: NURSE PRACTITIONER

## 2021-10-13 RX ORDER — DILTIAZEM HYDROCHLORIDE 60 MG/1
2 TABLET, FILM COATED ORAL 2 TIMES DAILY
Qty: 3 EACH | Refills: 2 | Status: SHIPPED | OUTPATIENT
Start: 2021-10-13 | End: 2022-02-25

## 2021-10-13 RX ORDER — METFORMIN HYDROCHLORIDE 500 MG/1
TABLET, EXTENDED RELEASE ORAL
Qty: 270 TABLET | Refills: 2 | Status: SHIPPED | OUTPATIENT
Start: 2021-10-13 | End: 2021-10-28 | Stop reason: SDUPTHER

## 2021-10-13 RX ORDER — GLIMEPIRIDE 2 MG/1
2 TABLET ORAL
Qty: 180 TABLET | Refills: 1 | Status: SHIPPED | OUTPATIENT
Start: 2021-10-13 | End: 2021-10-28 | Stop reason: SDUPTHER

## 2021-10-13 RX ORDER — PANCRELIPASE 36000; 180000; 114000 [USP'U]/1; [USP'U]/1; [USP'U]/1
CAPSULE, DELAYED RELEASE PELLETS ORAL
Qty: 240 CAPSULE | Refills: 1 | Status: SHIPPED | OUTPATIENT
Start: 2021-10-13

## 2021-10-13 RX ORDER — GABAPENTIN 400 MG/1
400 CAPSULE ORAL 3 TIMES DAILY
Qty: 270 CAPSULE | Refills: 2 | Status: ON HOLD | OUTPATIENT
Start: 2021-10-13 | End: 2022-02-11 | Stop reason: HOSPADM

## 2021-10-13 ASSESSMENT — ENCOUNTER SYMPTOMS
TROUBLE SWALLOWING: 0
ABDOMINAL PAIN: 0
DIARRHEA: 0
SORE THROAT: 0
BLOOD IN STOOL: 0
RHINORRHEA: 0
CHEST TIGHTNESS: 0
WHEEZING: 0
SHORTNESS OF BREATH: 0
VOMITING: 0
COUGH: 0
NAUSEA: 0

## 2021-10-13 NOTE — PATIENT INSTRUCTIONS
[facility]   [address] [address]  Phone: [phone]  Fax: [phone]

## 2021-10-13 NOTE — PROGRESS NOTES
Visit Information    Have you changed or started any medications since your last visit including any over-the-counter medicines, vitamins, or herbal medicines? no   Are you having any side effects from any of your medications? -  no  Have you stopped taking any of your medications? Is so, why? -  no    Have you seen any other physician or provider since your last visit? Yes - Records Obtained  Have you had any other diagnostic tests since your last visit? Yes - Records Obtained  Have you been seen in the emergency room and/or had an admission to a hospital since we last saw you? Yes - Records Obtained  Have you had your routine dental cleaning in the past 6 months? no    Have you activated your [employer] account? If not, what are your barriers?  Yes     Patient Care Team:  Buelah Lesches, APRN - CNP as PCP - General (Family Medicine)  Buelah Lesches, APRN - CNP as PCP - [facility]  Jaelyn Naqvi MD as Consulting Physician (Gastroenterology)    Medical History Review  Past Medical, Family, and Social History reviewed and does not contribute to the patient presenting condition    Health Maintenance   Topic Date Due    Diabetic retinal exam  Never done    Shingles Vaccine (1 of 2) Never done    Diabetic foot exam  02/13/2020    Diabetic microalbuminuria test  03/11/2020    Annual Wellness Visit (AWV)  Never done    A1C test (Diabetic or Prediabetic)  07/09/2021    Flu vaccine (1) 09/01/2021    Lipid screen  12/28/2021    Potassium monitoring  09/29/2022    Creatinine monitoring  09/29/2022    Breast cancer screen  06/02/2023    Pneumococcal 0-64 years Vaccine (2 of 2 - PPSV23) 10/10/2023    Colon cancer screen colonoscopy  05/07/2024    DTaP/Tdap/Td vaccine (2 - Td or Tdap) 10/05/2025    Cervical cancer screen  06/09/2026    COVID-19 Vaccine  Completed    Hepatitis C screen  Completed    HIV screen  Completed    Hepatitis A vaccine  Aged Out    Hib vaccine  Aged Out    Meningococcal (ACWY) vaccine  Aged Out

## 2021-10-26 ENCOUNTER — TELEPHONE (OUTPATIENT)
Dept: PHARMACY | Facility: CLINIC | Age: 66
End: 2021-10-26

## 2021-10-27 NOTE — TELEPHONE ENCOUNTER
For Pharmacy [address] in place:  No   Recommendation Provided To: Patient/Caregiver: 1 via Telephone   Intervention Detail: Adherence Monitorin   Gap Closed?: Yes    Intervention Accepted By: Patient/Caregiver: 1   Time Spent (min): 10

## 2021-10-28 DIAGNOSIS — G25.81 RESTLESS LEG SYNDROME: ICD-10-CM

## 2021-10-28 DIAGNOSIS — K86.89 PANCREATIC INSUFFICIENCY: ICD-10-CM

## 2021-10-28 DIAGNOSIS — Z87.19 HX OF GASTROESOPHAGEAL REFLUX (GERD): ICD-10-CM

## 2021-10-28 DIAGNOSIS — E11.42 TYPE 2 DIABETES MELLITUS WITH DIABETIC POLYNEUROPATHY, WITH LONG-TERM CURRENT USE OF INSULIN (HCC): ICD-10-CM

## 2021-10-28 DIAGNOSIS — Z79.4 TYPE 2 DIABETES MELLITUS WITH DIABETIC POLYNEUROPATHY, WITH LONG-TERM CURRENT USE OF INSULIN (HCC): ICD-10-CM

## 2021-10-28 DIAGNOSIS — Z87.09 HISTORY OF COPD: ICD-10-CM

## 2021-10-28 RX ORDER — SODIUM BICARBONATE 650 MG/1
TABLET ORAL
Qty: 90 TABLET | Refills: 1 | Status: ON HOLD | OUTPATIENT
Start: 2021-10-28 | End: 2022-02-11

## 2021-10-28 RX ORDER — OMEPRAZOLE 20 MG/1
20 CAPSULE, DELAYED RELEASE ORAL DAILY
Qty: 90 CAPSULE | Refills: 1 | Status: SHIPPED | OUTPATIENT
Start: 2021-10-28 | End: 2022-01-06

## 2021-10-28 RX ORDER — GLIMEPIRIDE 2 MG/1
2 TABLET ORAL
Qty: 180 TABLET | Refills: 1 | Status: SHIPPED | OUTPATIENT
Start: 2021-10-28 | End: 2021-11-02 | Stop reason: SDUPTHER

## 2021-10-28 RX ORDER — GLUCOSAMINE HCL/CHONDROITIN SU 500-400 MG
1 CAPSULE ORAL DAILY
Qty: 300 STRIP | Refills: 5 | Status: SHIPPED | OUTPATIENT
Start: 2021-10-28 | End: 2022-02-25 | Stop reason: SDUPTHER

## 2021-10-28 RX ORDER — ALBUTEROL SULFATE 90 UG/1
AEROSOL, METERED RESPIRATORY (INHALATION)
Qty: 17 G | Refills: 1 | Status: SHIPPED | OUTPATIENT
Start: 2021-10-28 | End: 2022-06-21

## 2021-10-28 RX ORDER — UBIQUINOL 100 MG
CAPSULE ORAL
Qty: 200 EACH | Refills: 3 | Status: SHIPPED | OUTPATIENT
Start: 2021-10-28

## 2021-10-28 RX ORDER — LANCETS 30 GAUGE
1 EACH MISCELLANEOUS DAILY
Qty: 300 EACH | Refills: 5 | Status: ON HOLD | OUTPATIENT
Start: 2021-10-28 | End: 2022-02-11 | Stop reason: HOSPADM

## 2021-10-28 RX ORDER — MIRTAZAPINE 30 MG/1
15 TABLET, FILM COATED ORAL NIGHTLY
Qty: 90 TABLET | Refills: 3 | Status: SHIPPED | OUTPATIENT
Start: 2021-10-28 | End: 2022-05-31 | Stop reason: ALTCHOICE

## 2021-10-28 RX ORDER — METFORMIN HYDROCHLORIDE 500 MG/1
TABLET, EXTENDED RELEASE ORAL
Qty: 270 TABLET | Refills: 2 | Status: SHIPPED | OUTPATIENT
Start: 2021-10-28

## 2021-11-02 DIAGNOSIS — Z79.4 TYPE 2 DIABETES MELLITUS WITH DIABETIC POLYNEUROPATHY, WITH LONG-TERM CURRENT USE OF INSULIN (HCC): ICD-10-CM

## 2021-11-02 DIAGNOSIS — E11.42 TYPE 2 DIABETES MELLITUS WITH DIABETIC POLYNEUROPATHY, WITH LONG-TERM CURRENT USE OF INSULIN (HCC): ICD-10-CM

## 2021-11-02 NOTE — TELEPHONE ENCOUNTER
Thomas Garcia, APRN - CNP, patient needs updated Rx for glimepiride BID  · Appears glimepiride dose was increased at 10/13/21 OV from 1 tab daily to 1 tab BID (A1c over 10%)  · Sig unclear with reorder and pharmacy seemingly filled for once daily  · Spoke with patient and confirmed she is taking BID  · Rx for glimepiride BID pended for your signature/modification as appropriate    LOV: 10/13/21  Next: 1/3/22    Thank you,  Kendall Mcfarland, PharmD, [employer]  Department, toll free: [phone]   ===================================================================================  POPULATION HEALTH CLINICAL PHARMACY REVIEW: ADHERENCE REVIEW  Identified care gap per [facility]; fills at [address]: Diabetes adherence    Last Visit: 10/13/21    Patient found in Outcomes MT and is currently eligible for TIP    ASSESSMENT  DIABETES ADHERENCE    Per Insurance Records through 10/24/21 (2020 [place] = n/a; YTD [place] = 78%; Potential Fail Date: 11/2/21): Metformin 500 mg tab last filled on 6/2/21 for 14 day supply. Next refill due: 9/9/21    Current DM regimen per med list:  Medication Sig    glimepiride (AMARYL) 2 MG tablet Take 1 tablet by mouth 2 times daily (before meals) TAKE 1 TABLET BY MOUTH IN  THE MORNING BEFORE  BREAKFAST  - dose increase to BID at 10/13/21 OV due to high A1c    metFORMIN (GLUCOPHAGE-XR) 500 MG extended release tablet Take 2 tablets by mouth daily (with breakfast) AND 1 tablet Daily with supper. Per 10/13/21 OV:   2. Type 2 diabetes mellitus with diabetic polyneuropathy, with long-term current use of insulin ([facility])  A1c increased today at 10.5. Amaryl dosing adjusted to twice daily, may need to consider insulin at follow-up if not improving. Per Reconciled Dispense Report: (see below, does NOT match [employer] claims)  METFORMIN  MG TAB 06/13/2021 30 90 each JEWELS CAMPOS [facility] . Jaylin Copping Jaylin Copping    METFORMIN  MG TAB 06/02/2021 14 42 each JEWELS CAMPOS [facility] . Kerline Adrian METFORMIN  MG TAB 05/03/2021 30 90 each [facility] Kerline Adrian METFORMIN    TAB 500MG ER 04/10/2021 30 90 [place] .. METFORMIN    TAB 500MG 03/29/2021 90 180 Device JEWELS CAMPOS [facility] PRESCRIPTION . .. METFORMIN    TAB 500MG 03/05/2021 90 180 Device [facility]IC[facility]elsonS,[facility] PRESCRIPTION . .. GLIMEPIRIDE 2 MG TABS 10/28/2021 14 14 each [facility] . .. GLIMEPIRIDE 2 MG TABS 10/27/2021 30 30 each [facility]AMP[facility] . .. GLIMEPIRIDE 2 MG TABS 06/24/2021 30 30 each [facility] . .. GLIMEPIRIDE 2 MG TABS 06/07/2021 30 30 each [facility]AMP[facility] . .. GLIMEPIRIDE  TAB 2MG 03/29/2021 90 90 Device [facility] . .. GLIMEPIRIDE  TAB 2MG 03/05/2021 90 90 [facility] . Kerline Adrian Per [employer]:  Metformin 500 mg ER tab:  - 4/10/21 x 30 ds ([facility]), 5/3/21 x 30 ds ([facility]), 6/2/21 x 14 ds ([facility])  - reversed claim 6/13/21 x 30 ds  Glimepiride 2 mg tab:  - 10/28/21 x 14 ds  - reversed claims 3/29/21 x 90 ds ([facility]); 6/7/21, 6/24/21, 10/27/21 x 30 ds ([facility])    Noted Rxs sent to [facility] 10/28/21. Appears dispensed for glimepiride 1 tab daily and metformin not sent? Per [facility]:   Metformin last sent on today for 14 day supply. States pt called them today and said she needed it so they are sending out a bottle with 14 day supply. Should be in next [facility]  Glimepiride last sent on 10/28/21 for 14 day supply with Rx 1 tab per day. Next [facility] should be processed in about 14 days.     Lab Results   Component Value Date    LABA1C 10.5 10/13/2021    LABA1C 9.2 04/09/2021    LABA1C 7.5 (H) 12/28/2020     NOTE A1c >9%    SUPD - LDL less than 70 mg/dL (see 5/25/21 pharmacy encounter)    Lab Results   Component Value Date    CHOL 174 05/08/2017    TRIG 94 05/08/2017     12/28/2020    LDLCHOLESTEROL 19 12/28/2020     ALT   Date Value Ref Range Status   09/29/2021 5 5 - 33 U/L Final     AST   Date Value Ref Range Status   09/29/2021 11 <32 U/L Final     The ASCVD Risk score (Reyes Mcdermott., et al., 2013) failed to calculate for the following reasons: The valid HDL cholesterol range is 20 to 100 mg/dL     PLAN  The following are interventions that have been identified:   - Patient overdue refilling metformin and active on home medication list. [facility] sending today after speaking with patient but noted gap in fill history  - Need to confirm if patient is taking glimepiride differently than prescribed and need to obtain an updated order if so - appears dose increased to 1 tab BID at 10/13/21 OV (A1c over 10%) but that [facility] recently filled for once daily and pt reported only taking once daily      Spoke with patient. Confirms metformin 2 tabs in morning 1 in evening. Had surplus and is out now so called [facility] today to have them refill. Denies regular missed doses and confirms using 3 tabs per day. Asked about glimepiride, pt saying today that she is taking 2 tabs per day - 1 in AM and 1 in PM. No issues noted. Discussed appears [facility] may only have for once daily and will get updated Rx from DONTRELL Prasad CNP. Tried to ask about SMBGs but phone disconnected a few times.     Future Appointments   Date Time Provider Teresa Shoemaker   12/9/2021  2:00 PM STA DEXA RM STAZ MAMMO [facility] Radiolog   1/13/2022  1:45 PM DONTRELL Prasad CNP [facility]       Gisela Torrez, PharmD, [facility]  [facility], toll free: [phone]

## 2021-11-03 RX ORDER — GLIMEPIRIDE 2 MG/1
2 TABLET ORAL
Qty: 180 TABLET | Refills: 1 | Status: SHIPPED | OUTPATIENT
Start: 2021-11-03 | End: 2022-07-21

## 2021-11-05 NOTE — TELEPHONE ENCOUNTER
Noted Rx signed by provider - thank you! Updated TIP in Outcomes - pt had already initiated fill of metformin.     For Pharmacy Admin Tracking Only     CPA in place:  No   Recommendation Provided To: Provider: 1 via Note to Provider and Patient/Caregiver: 1 via Telephone   Intervention Detail: Adherence Monitorin and Dose Adjustment: 1, reason: Improve Adherence   Gap Closed?: Yes    Intervention Accepted By: Provider: 1 and Patient/Caregiver: 1   Time Spent (min): 30

## 2021-12-10 DIAGNOSIS — R63.4 WEIGHT LOSS: Primary | ICD-10-CM

## 2021-12-13 RX ORDER — MEGESTROL ACETATE 20 MG/1
20 TABLET ORAL DAILY
Qty: 30 TABLET | Refills: 3 | Status: SHIPPED | OUTPATIENT
Start: 2021-12-13 | End: 2022-04-18

## 2022-01-06 DIAGNOSIS — Z87.19 HX OF GASTROESOPHAGEAL REFLUX (GERD): ICD-10-CM

## 2022-01-06 DIAGNOSIS — K86.89 PANCREATIC INSUFFICIENCY: ICD-10-CM

## 2022-01-06 RX ORDER — OMEPRAZOLE 20 MG/1
CAPSULE, DELAYED RELEASE ORAL
Qty: 60 CAPSULE | Refills: 1 | Status: SHIPPED | OUTPATIENT
Start: 2022-01-06 | End: 2022-03-14

## 2022-01-06 NOTE — TELEPHONE ENCOUNTER
Health Maintenance   Topic Date Due    Depression Monitoring  Never done    Diabetic retinal exam  Never done    Shingles Vaccine (1 of 2) Never done    DEXA (modify frequency per FRAX score)  Never done    Diabetic foot exam  02/13/2020    Diabetic microalbuminuria test  03/11/2020    Annual Wellness Visit (AWV)  Never done    COVID-19 Vaccine (3 - Booster for Moderna series) 10/08/2021    Lipid screen  12/28/2021    A1C test (Diabetic or Prediabetic)  01/13/2022    Potassium monitoring  09/29/2022    Creatinine monitoring  09/29/2022    Breast cancer screen  06/02/2023    Pneumococcal 65+ years Vaccine (2 of 2 - PPSV23) 10/10/2023    Colon cancer screen colonoscopy  05/07/2024    DTaP/Tdap/Td vaccine (2 - Td or Tdap) 10/05/2025    Cervical cancer screen  06/09/2026    Flu vaccine  Completed    Hepatitis C screen  Completed    HIV screen  Completed    Hepatitis A vaccine  Aged Out    Hib vaccine  Aged Out    Meningococcal (ACWY) vaccine  Aged Out             (applicable per patient's age: Cancer Screenings, Depression Screening, Fall Risk Screening, Immunizations)    Hemoglobin A1C (%)   Date Value   10/13/2021 10.5   04/09/2021 9.2   12/28/2020 7.5 (H)     Microalb/Crt.  Ratio (mcg/mg creat)   Date Value   03/11/2019 CANNOT BE CALCULATED     LDL Cholesterol (mg/dL)   Date Value   12/28/2020 19     AST (U/L)   Date Value   09/29/2021 11     ALT (U/L)   Date Value   09/29/2021 5     BUN (mg/dL)   Date Value   09/29/2021 4 (L)      (goal A1C is < 7)   (goal LDL is <100) need 30-50% reduction from baseline     BP Readings from Last 3 Encounters:   10/13/21 121/71   09/29/21 (!) 158/86   06/09/21 112/74    (goal /80)      All Future Testing planned in [facility]:  Lab Frequency Next Occurrence   DEXA VERTEBRAL FRACTURE ASSESSMENT Once 09/24/2021   Hemoglobin A1C Once 11/27/2021       Next Visit Date:  Future Appointments   Date Time Provider Teresa Shoemaker   1/13/2022  1:45 PM Jere Tsang Ric Justice, APRN - CNP [facility]   1/18/2022  1:00 PM STA DEXA RM STAZ MAMMO [facility] Radiolog   2/8/2022  1:30 PM Giovanni Loera MD grtlk exc [facility]            Patient Active Problem List:     Intractable epilepsy ([address]     Hypoglycemia     Depression     Osteoporosis     Affective disorder ([address]     Uncontrolled type 2 DM with hyperosmolar nonketotic hyperglycemia (HCC)     Generalized abdominal pain     Pancreatic calcification     Pancreatic insufficiency     Pain of upper abdomen     Alcohol-induced chronic pancreatitis (HCC)     Weight loss     Polysubstance abuse (HCC)     Unresponsive episode     Chronic neck pain     Neuropathic pain of both legs     Seizure disorder, secondary ([address]     Essential (primary) hypertension     Type 2 diabetes mellitus without complication, with long-term current use of insulin (HCC)     Neuropathic pain     Elevated CEA     Acute left-sided low back pain without sciatica     Adenomatous polyps     Bilateral wrist pain     Seizure-like activity (HCC)     Elevated CA 19-9 level     Body mass index (BMI) less than 16.5

## 2022-01-18 ENCOUNTER — HOSPITAL ENCOUNTER (OUTPATIENT)
Dept: MAMMOGRAPHY | Age: 67
Discharge: HOME | End: 2022-01-20
Payer: MEDICARE

## 2022-01-18 DIAGNOSIS — Z78.0 POST-MENOPAUSAL: ICD-10-CM

## 2022-01-18 DIAGNOSIS — F17.200 CURRENT SMOKER: ICD-10-CM

## 2022-01-18 PROCEDURE — 77080 DXA BONE DENSITY AXIAL: CPT

## 2022-01-20 ENCOUNTER — OFFICE VISIT (OUTPATIENT)
Dept: PRIMARY CARE CLINIC | Age: 67
End: 2022-01-20
Payer: MEDICARE

## 2022-01-20 ENCOUNTER — TELEPHONE (OUTPATIENT)
Dept: PRIMARY CARE CLINIC | Age: 67
End: 2022-01-20

## 2022-01-20 VITALS — OXYGEN SATURATION: 100 % | SYSTOLIC BLOOD PRESSURE: 157 MMHG | DIASTOLIC BLOOD PRESSURE: 88 MMHG | TEMPERATURE: 97 F

## 2022-01-20 DIAGNOSIS — R23.3 PETECHIAE: Primary | ICD-10-CM

## 2022-01-20 DIAGNOSIS — R60.0 PEDAL EDEMA: ICD-10-CM

## 2022-01-20 DIAGNOSIS — I11.0 HYPERTENSIVE HEART DISEASE WITH HEART FAILURE (HCC): ICD-10-CM

## 2022-01-20 PROCEDURE — 99213 OFFICE O/P EST LOW 20 MIN: CPT | Performed by: INTERNAL MEDICINE

## 2022-01-20 RX ORDER — FUROSEMIDE 20 MG/1
20 TABLET ORAL DAILY
Qty: 30 TABLET | Refills: 0 | Status: ON HOLD | OUTPATIENT
Start: 2022-01-20 | End: 2022-02-11 | Stop reason: HOSPADM

## 2022-01-20 NOTE — TELEPHONE ENCOUNTER
Pt was seen at 9:45am in walk-in clinic. Message was sent at 1:25pm. Pt no longer in office to verify.

## 2022-01-20 NOTE — TELEPHONE ENCOUNTER
----- Message from [facility] sent at 1/20/2022  1:25 PM EST -----  Subject: Message to Provider    QUESTIONS  Information for Provider? Pt's  is trying to contact his wife. She   was in for appt. He is requesting she call if she is still in office. ---------------------------------------------------------------------------  --------------  Tommie Correa INFO  What is the best way for the office to contact you? OK to leave message on   voicemail  Preferred Call Back Phone Number? [phone]  ---------------------------------------------------------------------------  --------------  SCRIPT ANSWERS  Relationship to Patient?  Self

## 2022-01-20 NOTE — PROGRESS NOTES
[facility]  [address] [address]  Dept: [phone]  Dept Fax: [phone]    Ludger Hamman is a 73 y.o. female who presents to the urgent care today for her medicalconditions/complaints as noted below. Ludger Hamman is c/o of Swelling (in both ankels and legs for a while ) and Other (red spots on ankles )      HPI:     Leg Pain   The incident occurred 5 to 7 days ago (swelling in ankles and feet). There was no injury mechanism. The pain is present in the left ankle and right ankle. The patient is experiencing no pain. She has tried nothing for the symptoms.        Past Medical History:   Diagnosis Date    Cataracts, bilateral     COPD (chronic obstructive pulmonary disease) (HCC)     CTS (carpal tunnel syndrome) left    Fibromyalgia     Generalized abdominal pain 6/3/2016    Glaucoma     Hypertension     Osteoarthritis     Osteoporosis     Pancreatic calcification 6/3/2016    Pancreatitis     Protein-calorie malnutrition, severe ([address] 6/1/2016    Seizures ([facility] [address]     Tremor     Type II or unspecified type diabetes mellitus without mention of complication, not stated as uncontrolled     Uncontrolled type 2 DM with hyperosmolar nonketotic hyperglycemia (HCC) 6/3/2016        Current Outpatient Medications   Medication Sig Dispense Refill    furosemide (LASIX) 20 MG tablet Take 1 tablet by mouth daily 30 tablet 0    omeprazole (PRILOSEC) 20 MG delayed release capsule TAKE ONE (1) CAPSULE BY MOUTH TWICE DAILY 60 capsule 1    megestrol (MEGACE) 20 MG tablet TAKE 1 TABLET BY MOUTH DAILY 30 tablet 3    glimepiride (AMARYL) 2 MG tablet Take 1 tablet by mouth 2 times daily (before meals) 180 tablet 1    albuterol sulfate  (90 Base) MCG/ACT inhaler USE 2 INHALATIONS BY MOUTH  EVERY 6 HOURS AS NEEDED FOR WHEEZING 17 g 1    Alcohol Swabs (ALCOHOL PREP) 70 % PADS Daily as needed 200 each 3    metFORMIN (GLUCOPHAGE-XR) 500 MG extended release tablet Take 2 tablets by mouth daily (with breakfast) AND 1 tablet Daily with supper. 270 tablet 2    mirtazapine (REMERON) 30 MG tablet Take 0.5 tablets by mouth nightly 90 tablet 3    Blood Gluc Meter Disp-Strips (BLOOD GLUCOSE METER DISPOSABLE) OXANA Daily and as needed 1 each 0    sodium bicarbonate 650 MG tablet take 1 tablet by mouth three times a day 90 tablet 1    lipase-protease-amylase (CREON) 30390-861051 units CPEP delayed release capsule take 2 capsules by mouth four times a day after meals and at bedtime 240 capsule 1    Multiple Vitamin (MULTI-VITAMIN DAILY PO) Take by mouth      ZINC PO Take by mouth      bisacodyl (DULCOLAX) 5 MG EC tablet TAKE 4 TABS AT 10 AM THE DAY PRIOR TO COLONOSCOPY 4 tablet 0    COMFORT EZ PEN NEEDLES 32G X 6 MM MISC  each 2    AquaLance Lancets 30G MISC Daily and as needed for hypoglycemia 200 each 2    Lancets MISC 1 each by Does not apply route daily 300 each 5    blood glucose monitor strips 1 strip by Other route daily Test 1 times a day & as needed for symptoms of irregular blood glucose. 300 strip 5    gabapentin (NEURONTIN) 400 MG capsule Take 1 capsule by mouth 3 times daily for 90 days. 270 capsule 2    SYMBICORT 80-4.5 MCG/ACT AERO Inhale 2 puffs into the lungs 2 times daily 3 each 2    magnesium citrate solution Take 296 mLs by mouth once for 1 dose 296 mL 0     No current facility-administered medications for this visit.      Allergies   Allergen Reactions    Peanuts [Peanut Oil] Anaphylaxis    Vicodin [Hydrocodone-Acetaminophen] Hives    Tramadol     Tylenol With Codeine #3 [Acetaminophen-Codeine]     Ibuprofen Itching       Health Maintenance   Topic Date Due    COVID-19 Vaccine (1) Never done    Depression Monitoring  Never done    Diabetic retinal exam  Never done    Shingles Vaccine (1 of 2) Never done    Diabetic foot exam  02/13/2020    Diabetic microalbuminuria test  03/11/2020    Annual Wellness Visit (AWV)  Never done    Lipid screen  12/28/2021    A1C test (Diabetic or Prediabetic)  01/13/2022    Potassium monitoring  09/29/2022    Creatinine monitoring  09/29/2022    Breast cancer screen  06/02/2023    Pneumococcal 65+ years Vaccine (2 of 2 - PPSV23) 10/10/2023    Colon cancer screen colonoscopy  05/07/2024    DTaP/Tdap/Td vaccine (2 - Td or Tdap) 10/05/2025    Cervical cancer screen  06/09/2026    DEXA (modify frequency per FRAX score)  Completed    Flu vaccine  Completed    Hepatitis C screen  Completed    HIV screen  Completed    Hepatitis A vaccine  Aged Out    Hib vaccine  Aged Out    Meningococcal (ACWY) vaccine  Aged Out       Subjective:      Review of Systems   All other systems reviewed and are negative. Objective:     Physical Exam  Vitals reviewed. Constitutional:       Appearance: Normal appearance. HENT:      Head: Normocephalic and atraumatic. Musculoskeletal:      Right lower leg: Edema present. Left lower leg: Edema present. Skin:     General: Skin is warm and dry. Findings: Bruising and petechiae present. Neurological:      General: No focal deficit present. Mental Status: She is alert and oriented to person, place, and time. Psychiatric:         Mood and Affect: Mood normal.         Behavior: Behavior normal.       BP (!) 157/88   Temp 97 °F (36.1 °C) (Temporal)   SpO2 100%       Assessment:       Diagnosis Orders   1. Petechiae  Hepatic Function Panel    Protime-INR    CBC Auto Differential    APTT    furosemide (LASIX) 20 MG tablet   2. Pedal edema  Brain Natriuretic Peptide   3. Hypertensive heart disease with heart failure [facility])   Brain Natriuretic Peptide       Plan:      No follow-ups on file.     Orders Placed This Encounter   Medications    furosemide (LASIX) 20 MG tablet     Sig: Take 1 tablet by mouth daily     Dispense:  30 tablet     Refill:  0     Orders Placed This Encounter   Procedures    Hepatic Function Panel     Standing Status:   Future     Standing Expiration Date:   1/20/2023    Protime-INR     Standing Status:   Future     Standing Expiration Date:   1/20/2023     Order Specific Question:   Daily Coumadin Dose? Answer:   none    CBC Auto Differential     Standing Status:   Future     Standing Expiration Date:   1/20/2023    APTT     Standing Status:   Future     Standing Expiration Date:   1/20/2023     Order Specific Question:   Daily Heparin Dose? Answer:   none    Brain Natriuretic Peptide     Standing Status:   Future     Standing Expiration Date:   1/20/2023            Patient given educational materials - see patient instructions. Discussed use, benefit, and side effects of prescribed medications. All patientquestions answered. Pt voiced understanding.     Electronically signed by Lani De Paz MD on 1/20/2022at 10:11 AM

## 2022-01-21 ENCOUNTER — OFFICE VISIT (OUTPATIENT)
Dept: PRIMARY CARE CLINIC | Age: 67
End: 2022-01-21
Payer: MEDICARE

## 2022-01-21 ENCOUNTER — HOSPITAL ENCOUNTER (OUTPATIENT)
Age: 67
Discharge: HOME | End: 2022-01-21
Payer: MEDICARE

## 2022-01-21 VITALS
SYSTOLIC BLOOD PRESSURE: 137 MMHG | BODY MASS INDEX: 14.41 KG/M2 | OXYGEN SATURATION: 99 % | TEMPERATURE: 97 F | HEART RATE: 107 BPM | DIASTOLIC BLOOD PRESSURE: 94 MMHG | WEIGHT: 92 LBS

## 2022-01-21 DIAGNOSIS — Z79.4 TYPE 2 DIABETES MELLITUS WITH DIABETIC POLYNEUROPATHY, WITH LONG-TERM CURRENT USE OF INSULIN (HCC): Primary | ICD-10-CM

## 2022-01-21 DIAGNOSIS — R60.0 PEDAL EDEMA: ICD-10-CM

## 2022-01-21 DIAGNOSIS — F41.9 ANXIETY: ICD-10-CM

## 2022-01-21 DIAGNOSIS — I11.0 HYPERTENSIVE HEART DISEASE WITH HEART FAILURE (HCC): ICD-10-CM

## 2022-01-21 DIAGNOSIS — E11.42 TYPE 2 DIABETES MELLITUS WITH DIABETIC POLYNEUROPATHY, WITH LONG-TERM CURRENT USE OF INSULIN (HCC): Primary | ICD-10-CM

## 2022-01-21 DIAGNOSIS — R23.3 PETECHIAE: ICD-10-CM

## 2022-01-21 DIAGNOSIS — M79.2 NEUROPATHIC PAIN: ICD-10-CM

## 2022-01-21 LAB
ABSOLUTE EOS #: 0.24 K/UL (ref 0–0.44)
ABSOLUTE IMMATURE GRANULOCYTE: <0.03 K/UL (ref 0–0.3)
ABSOLUTE LYMPH #: 1.62 K/UL (ref 1.1–3.7)
ABSOLUTE MONO #: 0.39 K/UL (ref 0.1–1.2)
ALBUMIN SERPL-MCNC: 4.2 G/DL (ref 3.5–5.2)
ALBUMIN/GLOBULIN RATIO: 1.4 (ref 1–2.5)
ALP BLD-CCNC: 99 U/L (ref 35–104)
ALT SERPL-CCNC: 9 U/L (ref 5–33)
AST SERPL-CCNC: 16 U/L
BASOPHILS # BLD: 1 % (ref 0–2)
BASOPHILS ABSOLUTE: 0.05 K/UL (ref 0–0.2)
BILIRUB SERPL-MCNC: 0.48 MG/DL (ref 0.3–1.2)
BILIRUBIN DIRECT: 0.16 MG/DL
BILIRUBIN, INDIRECT: 0.32 MG/DL (ref 0–1)
BNP INTERPRETATION: ABNORMAL
DIFFERENTIAL TYPE: ABNORMAL
EOSINOPHILS RELATIVE PERCENT: 4 % (ref 1–4)
GLOBULIN: NORMAL G/DL (ref 1.5–3.8)
HBA1C MFR BLD: 11.6 %
HCT VFR BLD CALC: 32.3 % (ref 36.3–47.1)
HEMOGLOBIN: 11 G/DL (ref 11.9–15.1)
IMMATURE GRANULOCYTES: 0 %
INR BLD: 1.1
LYMPHOCYTES # BLD: 27 % (ref 24–43)
MCH RBC QN AUTO: 32.6 PG (ref 25.2–33.5)
MCHC RBC AUTO-ENTMCNC: 34.1 G/DL (ref 28.4–34.8)
MCV RBC AUTO: 95.8 FL (ref 82.6–102.9)
MONOCYTES # BLD: 7 % (ref 3–12)
NRBC AUTOMATED: 0 PER 100 WBC
PARTIAL THROMBOPLASTIN TIME: 23.5 SEC (ref 20.5–30.5)
PDW BLD-RTO: 13.4 % (ref 11.8–14.4)
PLATELET # BLD: 208 K/UL (ref 138–453)
PLATELET ESTIMATE: ABNORMAL
PMV BLD AUTO: 11.1 FL (ref 8.1–13.5)
PRO-BNP: 358 PG/ML
PROTHROMBIN TIME: 11.6 SEC (ref 9.1–12.3)
RBC # BLD: 3.37 M/UL (ref 3.95–5.11)
RBC # BLD: ABNORMAL 10*6/UL
SEG NEUTROPHILS: 61 % (ref 36–65)
SEGMENTED NEUTROPHILS ABSOLUTE COUNT: 3.65 K/UL (ref 1.5–8.1)
TOTAL PROTEIN: 7.1 G/DL (ref 6.4–8.3)
WBC # BLD: 6 K/UL (ref 3.5–11.3)
WBC # BLD: ABNORMAL 10*3/UL

## 2022-01-21 PROCEDURE — 83036 HEMOGLOBIN GLYCOSYLATED A1C: CPT | Performed by: NURSE PRACTITIONER

## 2022-01-21 PROCEDURE — 80076 HEPATIC FUNCTION PANEL: CPT

## 2022-01-21 PROCEDURE — 83880 ASSAY OF NATRIURETIC PEPTIDE: CPT

## 2022-01-21 PROCEDURE — 99213 OFFICE O/P EST LOW 20 MIN: CPT | Performed by: NURSE PRACTITIONER

## 2022-01-21 PROCEDURE — 85610 PROTHROMBIN TIME: CPT

## 2022-01-21 PROCEDURE — 85730 THROMBOPLASTIN TIME PARTIAL: CPT

## 2022-01-21 PROCEDURE — 85025 COMPLETE CBC W/AUTO DIFF WBC: CPT

## 2022-01-21 PROCEDURE — 36415 COLL VENOUS BLD VENIPUNCTURE: CPT

## 2022-01-21 ASSESSMENT — PATIENT HEALTH QUESTIONNAIRE - PHQ9
SUM OF ALL RESPONSES TO PHQ9 QUESTIONS 1 & 2: 3
4. FEELING TIRED OR HAVING LITTLE ENERGY: 2
3. TROUBLE FALLING OR STAYING ASLEEP: 0
6. FEELING BAD ABOUT YOURSELF - OR THAT YOU ARE A FAILURE OR HAVE LET YOURSELF OR YOUR FAMILY DOWN: 0
SUM OF ALL RESPONSES TO PHQ QUESTIONS 1-9: 0
10. IF YOU CHECKED OFF ANY PROBLEMS, HOW DIFFICULT HAVE THESE PROBLEMS MADE IT FOR YOU TO DO YOUR WORK, TAKE CARE OF THINGS AT HOME, OR GET ALONG WITH OTHER PEOPLE: 0
3. TROUBLE FALLING OR STAYING ASLEEP: 3
6. FEELING BAD ABOUT YOURSELF - OR THAT YOU ARE A FAILURE OR HAVE LET YOURSELF OR YOUR FAMILY DOWN: 0
1. LITTLE INTEREST OR PLEASURE IN DOING THINGS: 1
SUM OF ALL RESPONSES TO PHQ QUESTIONS 1-9: 14
SUM OF ALL RESPONSES TO PHQ QUESTIONS 1-9: 0
2. FEELING DOWN, DEPRESSED OR HOPELESS: 0
10. IF YOU CHECKED OFF ANY PROBLEMS, HOW DIFFICULT HAVE THESE PROBLEMS MADE IT FOR YOU TO DO YOUR WORK, TAKE CARE OF THINGS AT HOME, OR GET ALONG WITH OTHER PEOPLE: 1
5. POOR APPETITE OR OVEREATING: 2
2. FEELING DOWN, DEPRESSED OR HOPELESS: 2
9. THOUGHTS THAT YOU WOULD BE BETTER OFF DEAD, OR OF HURTING YOURSELF: 0
4. FEELING TIRED OR HAVING LITTLE ENERGY: 0
SUM OF ALL RESPONSES TO PHQ QUESTIONS 1-9: 0
7. TROUBLE CONCENTRATING ON THINGS, SUCH AS READING THE NEWSPAPER OR WATCHING TELEVISION: 0
SUM OF ALL RESPONSES TO PHQ QUESTIONS 1-9: 14
8. MOVING OR SPEAKING SO SLOWLY THAT OTHER PEOPLE COULD HAVE NOTICED. OR THE OPPOSITE, BEING SO FIGETY OR RESTLESS THAT YOU HAVE BEEN MOVING AROUND A LOT MORE THAN USUAL: 0
SUM OF ALL RESPONSES TO PHQ QUESTIONS 1-9: 14
7. TROUBLE CONCENTRATING ON THINGS, SUCH AS READING THE NEWSPAPER OR WATCHING TELEVISION: 2
SUM OF ALL RESPONSES TO PHQ QUESTIONS 1-9: 0
8. MOVING OR SPEAKING SO SLOWLY THAT OTHER PEOPLE COULD HAVE NOTICED. OR THE OPPOSITE, BEING SO FIGETY OR RESTLESS THAT YOU HAVE BEEN MOVING AROUND A LOT MORE THAN USUAL: 2
SUM OF ALL RESPONSES TO PHQ QUESTIONS 1-9: 14
9. THOUGHTS THAT YOU WOULD BE BETTER OFF DEAD, OR OF HURTING YOURSELF: 0
5. POOR APPETITE OR OVEREATING: 0

## 2022-01-21 ASSESSMENT — ENCOUNTER SYMPTOMS
WHEEZING: 0
COUGH: 0
TROUBLE SWALLOWING: 0
NAUSEA: 0
DIARRHEA: 0
SHORTNESS OF BREATH: 0
BLOOD IN STOOL: 0
VOMITING: 0
RHINORRHEA: 0
ABDOMINAL PAIN: 0
CHEST TIGHTNESS: 0
SORE THROAT: 0

## 2022-01-21 ASSESSMENT — COLUMBIA-SUICIDE SEVERITY RATING SCALE - C-SSRS
6. HAVE YOU EVER DONE ANYTHING, STARTED TO DO ANYTHING, OR PREPARED TO DO ANYTHING TO END YOUR LIFE?: NO
1. WITHIN THE PAST MONTH, HAVE YOU WISHED YOU WERE DEAD OR WISHED YOU COULD GO TO SLEEP AND NOT WAKE UP?: NO
2. HAVE YOU ACTUALLY HAD ANY THOUGHTS OF KILLING YOURSELF?: NO

## 2022-01-21 NOTE — PROGRESS NOTES
Visit Information    Have you changed or started any medications since your last visit including any over-the-counter medicines, vitamins, or herbal medicines? no   Are you having any side effects from any of your medications? -  no  Have you stopped taking any of your medications? Is so, why? -  no    Have you seen any other physician or provider since your last visit? No  Have you had any other diagnostic tests since your last visit? No  Have you been seen in the emergency room and/or had an admission to a hospital since we last saw you? No  Have you had your routine dental cleaning in the past 6 months? no    Have you activated your [employer] account? If not, what are your barriers?  Yes     Patient Care Team:  DONTRELL Kelley CNP as PCP - General (Family Medicine)  DONTRELL Kelley CNP as PCP [facility]  Valentina Beaver MD as Consulting Physician (Gastroenterology)    Medical History Review  Past Medical, Family, and Social History reviewed and does not contribute to the patient presenting condition    Health Maintenance   Topic Date Due    COVID-19 Vaccine (1) Never done    Depression Monitoring  Never done    Diabetic retinal exam  Never done    Shingles Vaccine (1 of 2) Never done    Diabetic foot exam  02/13/2020    Diabetic microalbuminuria test  03/11/2020    Annual Wellness Visit (AWV)  Never done    Lipid screen  12/28/2021    A1C test (Diabetic or Prediabetic)  01/13/2022    Potassium monitoring  09/29/2022    Creatinine monitoring  09/29/2022    Breast cancer screen  06/02/2023    Pneumococcal 65+ years Vaccine (2 of 2 - PPSV23) 10/10/2023    Colon cancer screen colonoscopy  05/07/2024    DTaP/Tdap/Td vaccine (2 - Td or Tdap) 10/05/2025    Cervical cancer screen  06/09/2026    DEXA (modify frequency per FRAX score)  Completed    Flu vaccine  Completed    Hepatitis C screen  Completed    HIV screen  Completed    Hepatitis A vaccine  Aged Out    Hib vaccine Aged Out    Meningococcal (ACWY) vaccine  Aged Out
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
## 2022-01-23 ENCOUNTER — HOSPITAL ENCOUNTER (EMERGENCY)
Age: 67
Discharge: HOME | End: 2022-01-23
Attending: STUDENT IN AN ORGANIZED HEALTH CARE EDUCATION/TRAINING PROGRAM
Payer: MEDICARE

## 2022-01-23 ENCOUNTER — APPOINTMENT (OUTPATIENT)
Dept: GENERAL RADIOLOGY | Age: 67
End: 2022-01-23
Payer: MEDICARE

## 2022-01-23 VITALS
HEIGHT: 67 IN | DIASTOLIC BLOOD PRESSURE: 70 MMHG | WEIGHT: 90 LBS | OXYGEN SATURATION: 100 % | RESPIRATION RATE: 16 BRPM | TEMPERATURE: 98 F | SYSTOLIC BLOOD PRESSURE: 115 MMHG | BODY MASS INDEX: 14.12 KG/M2 | HEART RATE: 108 BPM

## 2022-01-23 DIAGNOSIS — L50.9 URTICARIA: Primary | ICD-10-CM

## 2022-01-23 DIAGNOSIS — S80.10XA CONTUSION OF MULTIPLE SITES OF LOWER EXTREMITY, UNSPECIFIED LATERALITY, INITIAL ENCOUNTER: ICD-10-CM

## 2022-01-23 PROCEDURE — 73502 X-RAY EXAM HIP UNI 2-3 VIEWS: CPT

## 2022-01-23 PROCEDURE — 99283 EMERGENCY DEPT VISIT LOW MDM: CPT

## 2022-01-23 PROCEDURE — 73552 X-RAY EXAM OF FEMUR 2/>: CPT

## 2022-01-23 PROCEDURE — 96372 THER/PROPH/DIAG INJ SC/IM: CPT

## 2022-01-23 PROCEDURE — 6370000000 HC RX 637 (ALT 250 FOR IP): Performed by: PHYSICIAN ASSISTANT

## 2022-01-23 PROCEDURE — 73610 X-RAY EXAM OF ANKLE: CPT

## 2022-01-23 PROCEDURE — 6360000002 HC RX W HCPCS: Performed by: PHYSICIAN ASSISTANT

## 2022-01-23 PROCEDURE — 73562 X-RAY EXAM OF KNEE 3: CPT

## 2022-01-23 RX ORDER — KETOROLAC TROMETHAMINE 30 MG/ML
30 INJECTION, SOLUTION INTRAMUSCULAR; INTRAVENOUS ONCE
Status: COMPLETED | OUTPATIENT
Start: 2022-01-23 | End: 2022-01-23

## 2022-01-23 RX ORDER — DEXAMETHASONE SODIUM PHOSPHATE 10 MG/ML
10 INJECTION, SOLUTION INTRAMUSCULAR; INTRAVENOUS ONCE
Status: COMPLETED | OUTPATIENT
Start: 2022-01-23 | End: 2022-01-23

## 2022-01-23 RX ORDER — NAPROXEN 500 MG/1
500 TABLET ORAL 2 TIMES DAILY WITH MEALS
Qty: 20 TABLET | Refills: 0 | Status: ON HOLD | OUTPATIENT
Start: 2022-01-23 | End: 2022-02-11 | Stop reason: HOSPADM

## 2022-01-23 RX ORDER — HYDROXYZINE HYDROCHLORIDE 25 MG/1
25 TABLET, FILM COATED ORAL EVERY 6 HOURS PRN
Qty: 20 TABLET | Refills: 0 | Status: SHIPPED | OUTPATIENT
Start: 2022-01-23 | End: 2022-02-02

## 2022-01-23 RX ORDER — PREDNISONE 20 MG/1
20 TABLET ORAL 2 TIMES DAILY
Qty: 10 TABLET | Refills: 0 | Status: SHIPPED | OUTPATIENT
Start: 2022-01-23 | End: 2022-01-28

## 2022-01-23 RX ADMIN — KETOROLAC TROMETHAMINE 30 MG: 30 INJECTION, SOLUTION INTRAMUSCULAR at 19:36

## 2022-01-23 RX ADMIN — DIPHENHYDRAMINE HYDROCHLORIDE 50 MG: 12.5 LIQUID ORAL at 19:37

## 2022-01-23 RX ADMIN — DEXAMETHASONE SODIUM PHOSPHATE 10 MG: 10 INJECTION, SOLUTION INTRAMUSCULAR; INTRAVENOUS at 19:37

## 2022-01-23 ASSESSMENT — PAIN SCALES - GENERAL
PAINLEVEL_OUTOF10: 10
PAINLEVEL_OUTOF10: 10

## 2022-01-23 ASSESSMENT — PAIN DESCRIPTION - LOCATION: LOCATION: LEG;FOOT

## 2022-01-23 ASSESSMENT — PAIN DESCRIPTION - ORIENTATION: ORIENTATION: LEFT;RIGHT

## 2022-01-23 ASSESSMENT — PAIN DESCRIPTION - DESCRIPTORS: DESCRIPTORS: STABBING;BURNING;CONSTANT

## 2022-01-23 ASSESSMENT — PAIN DESCRIPTION - FREQUENCY: FREQUENCY: CONTINUOUS

## 2022-01-24 NOTE — ED PROVIDER NOTES
[address] [place] [address] ED  e[facility]      Pt Name: Bess Go  MRN: [id number]  [place] 1955  Date ofevaluation: 1/23/2022  Provider: Edilberto Perez Dr       Chief Complaint   Patient presents with    Leg Injury     onset 1/12 bilat legs attempting to get  in a [employer]     Swelling     bilat legs/feet         HISTORY OF PRESENT ILLNESS  (Location/Symptom, Timing/Onset, Context/Setting, Quality, Duration, Modifying Factors, Severity.)   Bess Go is a 73 y.o. female who presents to the emergency department with bilateral leg pain and swelling. Patient reports going to physical therapy from accident on January 2. States that she tripped and fell while getting in the [employer] to go to physical therapy around a week and a half ago. Since then has had extensive bruising and swelling to her lower extremities. Also has some itching and hives. Over the last few days. No chest pain shortness of breath. No nausea vomiting. No fevers or chills. Pain worse with movement relieved with rest.      Nursing Notes were reviewed.     ALLERGIES     Peanuts [peanut oil], Vicodin [hydrocodone-acetaminophen], Tramadol, Tylenol with codeine #3 [acetaminophen-codeine], and Ibuprofen    CURRENT MEDICATIONS       Discharge Medication List as of 1/23/2022  8:22 PM      CONTINUE these medications which have NOT CHANGED    Details   furosemide (LASIX) 20 MG tablet Take 1 tablet by mouth daily, Disp-30 tablet, R-0Normal      omeprazole (PRILOSEC) 20 MG delayed release capsule TAKE ONE (1) CAPSULE BY MOUTH TWICE DAILY, Disp-60 capsule, R-1Normal      megestrol (MEGACE) 20 MG tablet TAKE 1 TABLET BY MOUTH DAILY, Disp-30 tablet, R-3Normal      glimepiride (AMARYL) 2 MG tablet Take 1 tablet by mouth 2 times daily (before meals), Disp-180 tablet, R-1Please note dose increase from once daily to twice dailyNormal      albuterol sulfate  (90 Base) MCG/ACT inhaler USE 2 INHALATIONS BY MOUTH  EVERY 6 HOURS AS NEEDED FOR WHEEZING, Disp-17 g, R-1Normal      metFORMIN (GLUCOPHAGE-XR) 500 MG extended release tablet Take 2 tablets by mouth daily (with breakfast) AND 1 tablet Daily with supper., Disp-270 tablet, R-2Normal      mirtazapine (REMERON) 30 MG tablet Take 0.5 tablets by mouth nightly, Disp-90 tablet, R-3Normal      sodium bicarbonate 650 MG tablet take 1 tablet by mouth three times a day, Disp-90 tablet, R-1Normal      gabapentin (NEURONTIN) 400 MG capsule Take 1 capsule by mouth 3 times daily for 90 days. , Disp-270 capsule, R-2Normal      SYMBICORT 80-4.5 MCG/ACT AERO Inhale 2 puffs into the lungs 2 times daily, Disp-3 each, R-2, DAWNormal      lipase-protease-amylase (CREON) 58808-055456 units CPEP delayed release capsule take 2 capsules by mouth four times a day after meals and at bedtime, Disp-240 capsule, R-1Normal      Multiple Vitamin (MULTI-VITAMIN DAILY PO) Take by mouthHistorical Med      ZINC PO Take by mouthHistorical Med      Alcohol Swabs (ALCOHOL PREP) 70 % PADS Disp-200 each, R-3, NormalDaily as needed      Blood Gluc Meter Disp-Strips (BLOOD GLUCOSE METER DISPOSABLE) OXANA Disp-1 each, R-0, NormalDaily and as needed      blood glucose monitor strips 1 strip by Other route daily Test 1 times a day & as needed for symptoms of irregular blood glucose., Other, DAILY Starting u 10/28/2021, Until Sat 11/27/2021, For 30 days, Disp-300 strip, R-5, Normal      magnesium citrate solution Take 296 mLs by mouth once for 1 dose, Disp-296 mL, R-0Normal      bisacodyl (DULCOLAX) 5 MG EC tablet TAKE 4 TABS AT 10 AM THE DAY PRIOR TO COLONOSCOPY, Disp-4 tablet, R-0Normal      COMFORT EZ PEN NEEDLES 32G X 6 MM MISC BID, Disp-200 each, R-2, DAWNormal      AquaLance Lancets 30G MISC Daily and as needed for hypoglycemia, Disp-200 each, R-2Normal             PAST MEDICAL HISTORY         Diagnosis Date    Cataracts, bilateral     COPD (chronic obstructive pulmonary disease) ([facility] [address] OFSYSTEMS    (2-9 systems for level 4, 10 or more for level 5)   Review of Systems    Except as noted above the remainder of the review of systems was reviewed and negative. PHYSICAL EXAM    (up to 7 for level 4, 8 or more for level 5)     ED Triage Vitals [01/23/22 1855]   BP Temp Temp Source Pulse Resp SpO2 Height Weight   115/70 98 °F (36.7 °C) Oral 108 16 100 % 5' 7\" (1.702 m) 90 lb (40.8 kg)      Physical Exam  Constitutional:       Appearance: She is well-developed. HENT:      Head: Normocephalic and atraumatic. Cardiovascular:      Rate and Rhythm: Normal rate and regular rhythm. Pulmonary:      Effort: Pulmonary effort is normal.      Breath sounds: Normal breath sounds. Abdominal:      Palpations: Abdomen is soft. Musculoskeletal:         General: Normal range of motion. Cervical back: Normal range of motion and neck supple. Legs:    Skin:     General: Skin is warm. Findings: No rash. Neurological:      Mental Status: She is alert and oriented to person, place, and time. Psychiatric:         Behavior: Behavior normal.                 DIAGNOSTIC RESULTS     EKG: All EKG's are interpreted by the Emergency Department Physician who either signs or Co-signs this chart in the absence of a cardiologist.        RADIOLOGY:   Non-plain film images such as CT, Ultrasound and MRI are read by the radiologist. Plain radiographic images arevisualized and preliminarily interpreted by the emergency physician with the below findings:        Interpretation per the Radiologist below, if available at thetime of this note:          ED BEDSIDE ULTRASOUND:   Performed by ED Physician - none    LABS:  Labs Reviewed - No data to display    All other labs were within normal range or not returned as of this dictation.     EMERGENCY DEPARTMENT COURSE and DIFFERENTIAL DIAGNOSIS/MDM:   Vitals:    Vitals:    01/23/22 1855   BP: 115/70   Pulse: 108   Resp: 16   Temp: 98 °F (36.7 °C)   TempSrc: Oral   SpO2: 100%   Weight: 90 lb (40.8 kg)   Height: 5' 7\" (1.702 m)     Hives improved after benadryl and decadron. Will DC home symptomatic management. Imaging negative. No fx. No calf tenderness of pain bilat. Negative hellen's sign bilat    CONSULTS:  None    PROCEDURES:  Procedures        FINAL IMPRESSION      1. Urticaria    2.  Contusion of multiple sites of lower extremity, unspecified laterality, initial encounter          DISPOSITION/PLAN   DISPOSITION Decision To Discharge 01/23/2022 08:20:20 PM      PATIENTREFERRED TO:   DONTRELL Barajas [address] [facility]  [phone]    In 3 days        DISCHARGE MEDICATIONS:     Discharge Medication List as of 1/23/2022  8:22 PM      START taking these medications    Details   naproxen (NAPROSYN) 500 MG tablet Take 1 tablet by mouth 2 times daily (with meals), Disp-20 tablet, R-0Print      hydrOXYzine (ATARAX) 25 MG tablet Take 1 tablet by mouth every 6 hours as needed for Itching, Disp-20 tablet, R-0Print      predniSONE (DELTASONE) 20 MG tablet Take 1 tablet by mouth 2 times daily for 5 days, Disp-10 tablet, R-0Print                 (Please note that portions of this note were completed with a voice recognition program.  Efforts were made to edit thedictations but occasionally words are mis-transcribed.)    MAYRA Barrera PA-C  01/23/22 1213

## 2022-02-09 ENCOUNTER — TELEPHONE (OUTPATIENT)
Dept: GASTROENTEROLOGY | Age: 67
End: 2022-02-09

## 2022-02-09 ENCOUNTER — HOSPITAL ENCOUNTER (INPATIENT)
Age: 67
LOS: 1 days | Discharge: HOME | DRG: 637 | End: 2022-02-11
Attending: EMERGENCY MEDICINE | Admitting: STUDENT IN AN ORGANIZED HEALTH CARE EDUCATION/TRAINING PROGRAM
Payer: MEDICARE

## 2022-02-09 ENCOUNTER — APPOINTMENT (OUTPATIENT)
Dept: GENERAL RADIOLOGY | Age: 67
DRG: 637 | End: 2022-02-09
Payer: MEDICARE

## 2022-02-09 DIAGNOSIS — M54.50 ACUTE LEFT-SIDED LOW BACK PAIN WITHOUT SCIATICA: ICD-10-CM

## 2022-02-09 DIAGNOSIS — E43 SEVERE MALNUTRITION (HCC): ICD-10-CM

## 2022-02-09 DIAGNOSIS — M54.2 CHRONIC NECK PAIN: ICD-10-CM

## 2022-02-09 DIAGNOSIS — R79.89 PSEUDOHYPONATREMIA: ICD-10-CM

## 2022-02-09 DIAGNOSIS — K86.89 PANCREATIC INSUFFICIENCY: ICD-10-CM

## 2022-02-09 DIAGNOSIS — E87.0 HYPERNATREMIA: Primary | ICD-10-CM

## 2022-02-09 DIAGNOSIS — K05.00 ACUTE GINGIVITIS: ICD-10-CM

## 2022-02-09 DIAGNOSIS — G89.29 CHRONIC NECK PAIN: ICD-10-CM

## 2022-02-09 DIAGNOSIS — Z79.4 TYPE 2 DIABETES MELLITUS WITHOUT COMPLICATION, WITH LONG-TERM CURRENT USE OF INSULIN (HCC): ICD-10-CM

## 2022-02-09 DIAGNOSIS — E11.9 TYPE 2 DIABETES MELLITUS WITHOUT COMPLICATION, WITH LONG-TERM CURRENT USE OF INSULIN (HCC): ICD-10-CM

## 2022-02-09 LAB
ABSOLUTE EOS #: 0.1 K/UL (ref 0–0.44)
ABSOLUTE IMMATURE GRANULOCYTE: 0.02 K/UL (ref 0–0.3)
ABSOLUTE LYMPH #: 1.1 K/UL (ref 1.1–3.7)
ABSOLUTE MONO #: 0.61 K/UL (ref 0.1–1.2)
BASOPHILS # BLD: 0 % (ref 0–2)
BASOPHILS ABSOLUTE: 0.03 K/UL (ref 0–0.2)
BNP INTERPRETATION: NORMAL
D-DIMER QUANTITATIVE: 0.83 MG/L FEU (ref 0–0.59)
DIFFERENTIAL TYPE: ABNORMAL
EOSINOPHILS RELATIVE PERCENT: 1 % (ref 1–4)
HCT VFR BLD CALC: 33.7 % (ref 36.3–47.1)
HEMOGLOBIN: 11.4 G/DL (ref 11.9–15.1)
IMMATURE GRANULOCYTES: 0 %
LYMPHOCYTES # BLD: 14 % (ref 24–43)
MCH RBC QN AUTO: 32.6 PG (ref 25.2–33.5)
MCHC RBC AUTO-ENTMCNC: 33.8 G/DL (ref 28.4–34.8)
MCV RBC AUTO: 96.3 FL (ref 82.6–102.9)
MONOCYTES # BLD: 8 % (ref 3–12)
NRBC AUTOMATED: 0 PER 100 WBC
PDW BLD-RTO: 12.5 % (ref 11.8–14.4)
PLATELET # BLD: 230 K/UL (ref 138–453)
PLATELET ESTIMATE: ABNORMAL
PMV BLD AUTO: 11.1 FL (ref 8.1–13.5)
PRO-BNP: 176 PG/ML
RBC # BLD: 3.5 M/UL (ref 3.95–5.11)
RBC # BLD: ABNORMAL 10*6/UL
SEG NEUTROPHILS: 77 % (ref 36–65)
SEGMENTED NEUTROPHILS ABSOLUTE COUNT: 5.91 K/UL (ref 1.5–8.1)
TROPONIN INTERP: NORMAL
TROPONIN T: NORMAL NG/ML
TROPONIN, HIGH SENSITIVITY: 11 NG/L (ref 0–14)
WBC # BLD: 7.8 K/UL (ref 3.5–11.3)
WBC # BLD: ABNORMAL 10*3/UL

## 2022-02-09 PROCEDURE — 71045 X-RAY EXAM CHEST 1 VIEW: CPT

## 2022-02-09 PROCEDURE — 84484 ASSAY OF TROPONIN QUANT: CPT

## 2022-02-09 PROCEDURE — 85379 FIBRIN DEGRADATION QUANT: CPT

## 2022-02-09 PROCEDURE — 96361 HYDRATE IV INFUSION ADD-ON: CPT

## 2022-02-09 PROCEDURE — 2580000003 HC RX 258: Performed by: EMERGENCY MEDICINE

## 2022-02-09 PROCEDURE — 80053 COMPREHEN METABOLIC PANEL: CPT

## 2022-02-09 PROCEDURE — 83880 ASSAY OF NATRIURETIC PEPTIDE: CPT

## 2022-02-09 PROCEDURE — 96374 THER/PROPH/DIAG INJ IV PUSH: CPT

## 2022-02-09 PROCEDURE — 96375 TX/PRO/DX INJ NEW DRUG ADDON: CPT

## 2022-02-09 PROCEDURE — 85025 COMPLETE CBC W/AUTO DIFF WBC: CPT

## 2022-02-09 PROCEDURE — 36415 COLL VENOUS BLD VENIPUNCTURE: CPT

## 2022-02-09 PROCEDURE — 82010 KETONE BODYS QUAN: CPT

## 2022-02-09 PROCEDURE — 93005 ELECTROCARDIOGRAM TRACING: CPT | Performed by: EMERGENCY MEDICINE

## 2022-02-09 PROCEDURE — 99284 EMERGENCY DEPT VISIT MOD MDM: CPT

## 2022-02-09 RX ORDER — 0.9 % SODIUM CHLORIDE 0.9 %
1000 INTRAVENOUS SOLUTION INTRAVENOUS ONCE
Status: COMPLETED | OUTPATIENT
Start: 2022-02-09 | End: 2022-02-10

## 2022-02-09 RX ADMIN — SODIUM CHLORIDE 1000 ML: 9 INJECTION, SOLUTION INTRAVENOUS at 23:11

## 2022-02-09 ASSESSMENT — PAIN SCALES - GENERAL: PAINLEVEL_OUTOF10: 10

## 2022-02-09 ASSESSMENT — PAIN DESCRIPTION - PAIN TYPE: TYPE: ACUTE PAIN

## 2022-02-10 ENCOUNTER — APPOINTMENT (OUTPATIENT)
Dept: CT IMAGING | Age: 67
DRG: 637 | End: 2022-02-10
Payer: MEDICARE

## 2022-02-10 DIAGNOSIS — K86.89 PANCREATIC INSUFFICIENCY: ICD-10-CM

## 2022-02-10 PROBLEM — R73.9 HYPERGLYCEMIA: Status: ACTIVE | Noted: 2022-02-10

## 2022-02-10 PROBLEM — E83.42 HYPOMAGNESEMIA: Status: ACTIVE | Noted: 2022-02-10

## 2022-02-10 PROBLEM — R79.89 PSEUDOHYPONATREMIA: Status: ACTIVE | Noted: 2022-02-10

## 2022-02-10 PROBLEM — E43 SEVERE MALNUTRITION (HCC): Status: ACTIVE | Noted: 2022-02-10

## 2022-02-10 PROBLEM — E87.6 HYPOKALEMIA: Status: ACTIVE | Noted: 2022-02-10

## 2022-02-10 PROBLEM — E83.51 HYPOCALCEMIA: Status: ACTIVE | Noted: 2022-02-10

## 2022-02-10 PROBLEM — Z91.199 NONCOMPLIANCE: Status: ACTIVE | Noted: 2022-02-10

## 2022-02-10 LAB
ALBUMIN SERPL-MCNC: 3.7 G/DL (ref 3.5–5.2)
ALBUMIN/GLOBULIN RATIO: ABNORMAL (ref 1–2.5)
ALLEN TEST: ABNORMAL
ALP BLD-CCNC: 106 U/L (ref 35–104)
ALT SERPL-CCNC: 11 U/L (ref 5–33)
ANION GAP SERPL CALCULATED.3IONS-SCNC: 12 MMOL/L (ref 9–17)
ANION GAP SERPL CALCULATED.3IONS-SCNC: 14 MMOL/L (ref 9–17)
ANION GAP SERPL CALCULATED.3IONS-SCNC: 15 MMOL/L (ref 9–17)
AST SERPL-CCNC: 13 U/L
BETA-HYDROXYBUTYRATE: 0.11 MMOL/L (ref 0.02–0.27)
BILIRUB SERPL-MCNC: 0.31 MG/DL (ref 0.3–1.2)
BUN BLDV-MCNC: 6 MG/DL (ref 8–23)
BUN BLDV-MCNC: 7 MG/DL (ref 8–23)
BUN BLDV-MCNC: 9 MG/DL (ref 8–23)
BUN/CREAT BLD: 10 (ref 9–20)
BUN/CREAT BLD: 8 (ref 9–20)
BUN/CREAT BLD: 8 (ref 9–20)
CALCIUM IONIZED: 1.3 MMOL/L (ref 1.13–1.33)
CALCIUM SERPL-MCNC: 6.1 MG/DL (ref 8.6–10.4)
CALCIUM SERPL-MCNC: 8.9 MG/DL (ref 8.6–10.4)
CALCIUM SERPL-MCNC: 8.9 MG/DL (ref 8.6–10.4)
CHLORIDE BLD-SCNC: 107 MMOL/L (ref 98–107)
CHLORIDE BLD-SCNC: 80 MMOL/L (ref 98–107)
CHLORIDE BLD-SCNC: 92 MMOL/L (ref 98–107)
CO2: 17 MMOL/L (ref 20–31)
CO2: 23 MMOL/L (ref 20–31)
CO2: 24 MMOL/L (ref 20–31)
CREAT SERPL-MCNC: 0.6 MG/DL (ref 0.5–0.9)
CREAT SERPL-MCNC: 0.85 MG/DL (ref 0.5–0.9)
CREAT SERPL-MCNC: 1.08 MG/DL (ref 0.5–0.9)
EKG ATRIAL RATE: 92 BPM
EKG P AXIS: 88 DEGREES
EKG P-R INTERVAL: 164 MS
EKG Q-T INTERVAL: 360 MS
EKG QRS DURATION: 74 MS
EKG QTC CALCULATION (BAZETT): 445 MS
EKG R AXIS: 81 DEGREES
EKG T AXIS: 81 DEGREES
EKG VENTRICULAR RATE: 92 BPM
FIO2: ABNORMAL
GFR AFRICAN AMERICAN: >60 ML/MIN
GFR NON-AFRICAN AMERICAN: 51 ML/MIN
GFR NON-AFRICAN AMERICAN: >60 ML/MIN
GFR NON-AFRICAN AMERICAN: >60 ML/MIN
GFR SERPL CREATININE-BSD FRML MDRD: ABNORMAL ML/MIN/{1.73_M2}
GLUCOSE BLD-MCNC: 100 MG/DL (ref 65–105)
GLUCOSE BLD-MCNC: 1077 MG/DL (ref 70–99)
GLUCOSE BLD-MCNC: 133 MG/DL (ref 65–105)
GLUCOSE BLD-MCNC: 181 MG/DL (ref 65–105)
GLUCOSE BLD-MCNC: 277 MG/DL (ref 70–99)
GLUCOSE BLD-MCNC: 277 MG/DL (ref 70–99)
GLUCOSE BLD-MCNC: 323 MG/DL (ref 65–105)
GLUCOSE BLD-MCNC: 39 MG/DL (ref 65–105)
GLUCOSE BLD-MCNC: 398 MG/DL (ref 65–105)
GLUCOSE BLD-MCNC: 536 MG/DL (ref 65–105)
GLUCOSE BLD-MCNC: 574 MG/DL (ref 65–105)
GLUCOSE BLD-MCNC: >600 MG/DL (ref 65–105)
HCO3 VENOUS: 27.7 MMOL/L (ref 22–29)
MAGNESIUM: 1.3 MG/DL (ref 1.6–2.6)
MAGNESIUM: 3 MG/DL (ref 1.6–2.6)
MODE: ABNORMAL
NEGATIVE BASE EXCESS, VEN: ABNORMAL (ref 0–2)
O2 DEVICE/FLOW/%: ABNORMAL
O2 SAT, VEN: 70 % (ref 60–85)
OSMOLALITY URINE: 502 MOSM/KG (ref 300–1170)
PATIENT TEMP: ABNORMAL
PCO2, VEN: 55.3 MM HG (ref 41–51)
PH VENOUS: 7.31 (ref 7.32–7.43)
PO2, VEN: 40.6 MM HG (ref 30–50)
POC PCO2 TEMP: ABNORMAL MM HG
POC PH TEMP: ABNORMAL
POC PO2 TEMP: ABNORMAL MM HG
POSITIVE BASE EXCESS, VEN: 0 (ref 0–3)
POTASSIUM SERPL-SCNC: 2.2 MMOL/L (ref 3.7–5.3)
POTASSIUM SERPL-SCNC: 3.5 MMOL/L (ref 3.7–5.3)
POTASSIUM SERPL-SCNC: 4.2 MMOL/L (ref 3.7–5.3)
SAMPLE SITE: ABNORMAL
SERUM OSMOLALITY: 288 MOSM/KG (ref 282–298)
SODIUM BLD-SCNC: 118 MMOL/L (ref 135–144)
SODIUM BLD-SCNC: 130 MMOL/L (ref 135–144)
SODIUM BLD-SCNC: 136 MMOL/L (ref 135–144)
TOTAL CO2, VENOUS: ABNORMAL MMOL/L (ref 23–30)
TOTAL PROTEIN: 6.7 G/DL (ref 6.4–8.3)

## 2022-02-10 PROCEDURE — 2580000003 HC RX 258: Performed by: EMERGENCY MEDICINE

## 2022-02-10 PROCEDURE — 97162 PT EVAL MOD COMPLEX 30 MIN: CPT

## 2022-02-10 PROCEDURE — 6370000000 HC RX 637 (ALT 250 FOR IP): Performed by: EMERGENCY MEDICINE

## 2022-02-10 PROCEDURE — 2060000000 HC ICU INTERMEDIATE R&B

## 2022-02-10 PROCEDURE — 6370000000 HC RX 637 (ALT 250 FOR IP): Performed by: NURSE PRACTITIONER

## 2022-02-10 PROCEDURE — 80048 BASIC METABOLIC PNL TOTAL CA: CPT

## 2022-02-10 PROCEDURE — 82947 ASSAY GLUCOSE BLOOD QUANT: CPT

## 2022-02-10 PROCEDURE — 36415 COLL VENOUS BLD VENIPUNCTURE: CPT

## 2022-02-10 PROCEDURE — 96366 THER/PROPH/DIAG IV INF ADDON: CPT

## 2022-02-10 PROCEDURE — G0378 HOSPITAL OBSERVATION PER HR: HCPCS

## 2022-02-10 PROCEDURE — 96372 THER/PROPH/DIAG INJ SC/IM: CPT

## 2022-02-10 PROCEDURE — 83735 ASSAY OF MAGNESIUM: CPT

## 2022-02-10 PROCEDURE — 82803 BLOOD GASES ANY COMBINATION: CPT

## 2022-02-10 PROCEDURE — 6370000000 HC RX 637 (ALT 250 FOR IP): Performed by: STUDENT IN AN ORGANIZED HEALTH CARE EDUCATION/TRAINING PROGRAM

## 2022-02-10 PROCEDURE — 96365 THER/PROPH/DIAG IV INF INIT: CPT

## 2022-02-10 PROCEDURE — 93010 ELECTROCARDIOGRAM REPORT: CPT | Performed by: INTERNAL MEDICINE

## 2022-02-10 PROCEDURE — 2580000003 HC RX 258: Performed by: NURSE PRACTITIONER

## 2022-02-10 PROCEDURE — 71260 CT THORAX DX C+: CPT

## 2022-02-10 PROCEDURE — 94761 N-INVAS EAR/PLS OXIMETRY MLT: CPT

## 2022-02-10 PROCEDURE — 97165 OT EVAL LOW COMPLEX 30 MIN: CPT

## 2022-02-10 PROCEDURE — 6360000002 HC RX W HCPCS: Performed by: NURSE PRACTITIONER

## 2022-02-10 PROCEDURE — A4216 STERILE WATER/SALINE, 10 ML: HCPCS | Performed by: NURSE PRACTITIONER

## 2022-02-10 PROCEDURE — 82805 BLOOD GASES W/O2 SATURATION: CPT

## 2022-02-10 PROCEDURE — 97530 THERAPEUTIC ACTIVITIES: CPT

## 2022-02-10 PROCEDURE — 6360000002 HC RX W HCPCS: Performed by: STUDENT IN AN ORGANIZED HEALTH CARE EDUCATION/TRAINING PROGRAM

## 2022-02-10 PROCEDURE — 6360000004 HC RX CONTRAST MEDICATION: Performed by: EMERGENCY MEDICINE

## 2022-02-10 PROCEDURE — C9113 INJ PANTOPRAZOLE SODIUM, VIA: HCPCS | Performed by: NURSE PRACTITIONER

## 2022-02-10 PROCEDURE — 99222 1ST HOSP IP/OBS MODERATE 55: CPT | Performed by: STUDENT IN AN ORGANIZED HEALTH CARE EDUCATION/TRAINING PROGRAM

## 2022-02-10 PROCEDURE — 83930 ASSAY OF BLOOD OSMOLALITY: CPT

## 2022-02-10 PROCEDURE — 97116 GAIT TRAINING THERAPY: CPT

## 2022-02-10 PROCEDURE — 83935 ASSAY OF URINE OSMOLALITY: CPT

## 2022-02-10 PROCEDURE — 96375 TX/PRO/DX INJ NEW DRUG ADDON: CPT

## 2022-02-10 PROCEDURE — 96361 HYDRATE IV INFUSION ADD-ON: CPT

## 2022-02-10 PROCEDURE — 94640 AIRWAY INHALATION TREATMENT: CPT

## 2022-02-10 PROCEDURE — 82330 ASSAY OF CALCIUM: CPT

## 2022-02-10 RX ORDER — POTASSIUM CHLORIDE 7.45 MG/ML
10 INJECTION INTRAVENOUS PRN
Status: DISCONTINUED | OUTPATIENT
Start: 2022-02-10 | End: 2022-02-11 | Stop reason: HOSPADM

## 2022-02-10 RX ORDER — ONDANSETRON 4 MG/1
4 TABLET, ORALLY DISINTEGRATING ORAL EVERY 8 HOURS PRN
Status: DISCONTINUED | OUTPATIENT
Start: 2022-02-10 | End: 2022-02-11 | Stop reason: HOSPADM

## 2022-02-10 RX ORDER — METFORMIN HYDROCHLORIDE 500 MG/1
1000 TABLET, EXTENDED RELEASE ORAL 2 TIMES DAILY WITH MEALS
Status: DISCONTINUED | OUTPATIENT
Start: 2022-02-10 | End: 2022-02-11 | Stop reason: HOSPADM

## 2022-02-10 RX ORDER — MEGESTROL ACETATE 20 MG/1
1 TABLET ORAL DAILY
Status: CANCELLED | OUTPATIENT
Start: 2022-02-10

## 2022-02-10 RX ORDER — BUDESONIDE AND FORMOTEROL FUMARATE DIHYDRATE 80; 4.5 UG/1; UG/1
2 AEROSOL RESPIRATORY (INHALATION) 2 TIMES DAILY
Status: DISCONTINUED | OUTPATIENT
Start: 2022-02-10 | End: 2022-02-11 | Stop reason: HOSPADM

## 2022-02-10 RX ORDER — ACETAMINOPHEN 325 MG/1
650 TABLET ORAL EVERY 6 HOURS PRN
Status: DISCONTINUED | OUTPATIENT
Start: 2022-02-10 | End: 2022-02-11 | Stop reason: HOSPADM

## 2022-02-10 RX ORDER — NICOTINE POLACRILEX 4 MG
15 LOZENGE BUCCAL PRN
Status: DISCONTINUED | OUTPATIENT
Start: 2022-02-10 | End: 2022-02-10 | Stop reason: SDUPTHER

## 2022-02-10 RX ORDER — SODIUM CHLORIDE 9 MG/ML
1000 INJECTION, SOLUTION INTRAVENOUS CONTINUOUS
Status: DISCONTINUED | OUTPATIENT
Start: 2022-02-10 | End: 2022-02-10

## 2022-02-10 RX ORDER — DEXTROSE MONOHYDRATE 50 MG/ML
100 INJECTION, SOLUTION INTRAVENOUS PRN
Status: DISCONTINUED | OUTPATIENT
Start: 2022-02-10 | End: 2022-02-10 | Stop reason: SDUPTHER

## 2022-02-10 RX ORDER — PANTOPRAZOLE SODIUM 40 MG/10ML
40 INJECTION, POWDER, LYOPHILIZED, FOR SOLUTION INTRAVENOUS DAILY
Status: DISCONTINUED | OUTPATIENT
Start: 2022-02-10 | End: 2022-02-10

## 2022-02-10 RX ORDER — DEXTROSE MONOHYDRATE 50 MG/ML
100 INJECTION, SOLUTION INTRAVENOUS PRN
Status: DISCONTINUED | OUTPATIENT
Start: 2022-02-10 | End: 2022-02-11 | Stop reason: HOSPADM

## 2022-02-10 RX ORDER — SODIUM CHLORIDE 9 MG/ML
25 INJECTION, SOLUTION INTRAVENOUS PRN
Status: DISCONTINUED | OUTPATIENT
Start: 2022-02-10 | End: 2022-02-11 | Stop reason: HOSPADM

## 2022-02-10 RX ORDER — 0.9 % SODIUM CHLORIDE 0.9 %
1000 INTRAVENOUS SOLUTION INTRAVENOUS ONCE
Status: DISCONTINUED | OUTPATIENT
Start: 2022-02-10 | End: 2022-02-11 | Stop reason: HOSPADM

## 2022-02-10 RX ORDER — MAGNESIUM SULFATE IN WATER 40 MG/ML
4000 INJECTION, SOLUTION INTRAVENOUS
Status: DISPENSED | OUTPATIENT
Start: 2022-02-10 | End: 2022-02-10

## 2022-02-10 RX ORDER — SODIUM CHLORIDE 0.9 % (FLUSH) 0.9 %
10 SYRINGE (ML) INJECTION PRN
Status: DISCONTINUED | OUTPATIENT
Start: 2022-02-10 | End: 2022-02-11 | Stop reason: HOSPADM

## 2022-02-10 RX ORDER — FLUCONAZOLE 100 MG/1
100 TABLET ORAL DAILY
Status: DISCONTINUED | OUTPATIENT
Start: 2022-02-10 | End: 2022-02-11 | Stop reason: HOSPADM

## 2022-02-10 RX ORDER — 0.9 % SODIUM CHLORIDE 0.9 %
80 INTRAVENOUS SOLUTION INTRAVENOUS ONCE
Status: DISCONTINUED | OUTPATIENT
Start: 2022-02-10 | End: 2022-02-11 | Stop reason: HOSPADM

## 2022-02-10 RX ORDER — ONDANSETRON 2 MG/ML
4 INJECTION INTRAMUSCULAR; INTRAVENOUS EVERY 6 HOURS PRN
Status: DISCONTINUED | OUTPATIENT
Start: 2022-02-10 | End: 2022-02-11 | Stop reason: HOSPADM

## 2022-02-10 RX ORDER — SODIUM BICARBONATE 650 MG/1
650 TABLET ORAL 3 TIMES DAILY
Status: DISCONTINUED | OUTPATIENT
Start: 2022-02-10 | End: 2022-02-11 | Stop reason: HOSPADM

## 2022-02-10 RX ORDER — POLYETHYLENE GLYCOL 3350 17 G/17G
17 POWDER, FOR SOLUTION ORAL DAILY PRN
Status: DISCONTINUED | OUTPATIENT
Start: 2022-02-10 | End: 2022-02-11 | Stop reason: HOSPADM

## 2022-02-10 RX ORDER — POTASSIUM CHLORIDE 20 MEQ/1
40 TABLET, EXTENDED RELEASE ORAL ONCE
Status: COMPLETED | OUTPATIENT
Start: 2022-02-10 | End: 2022-02-10

## 2022-02-10 RX ORDER — SODIUM CHLORIDE 9 MG/ML
10 INJECTION INTRAVENOUS DAILY
Status: DISCONTINUED | OUTPATIENT
Start: 2022-02-10 | End: 2022-02-10

## 2022-02-10 RX ORDER — MEGESTROL ACETATE 40 MG/1
20 TABLET ORAL DAILY
Status: DISCONTINUED | OUTPATIENT
Start: 2022-02-10 | End: 2022-02-11 | Stop reason: HOSPADM

## 2022-02-10 RX ORDER — MAGNESIUM SULFATE 1 G/100ML
1000 INJECTION INTRAVENOUS PRN
Status: DISCONTINUED | OUTPATIENT
Start: 2022-02-10 | End: 2022-02-11 | Stop reason: HOSPADM

## 2022-02-10 RX ORDER — DEXTROSE MONOHYDRATE 25 G/50ML
12.5 INJECTION, SOLUTION INTRAVENOUS PRN
Status: DISCONTINUED | OUTPATIENT
Start: 2022-02-10 | End: 2022-02-10 | Stop reason: ALTCHOICE

## 2022-02-10 RX ORDER — GLIPIZIDE 5 MG/1
5 TABLET ORAL
Status: DISCONTINUED | OUTPATIENT
Start: 2022-02-10 | End: 2022-02-11 | Stop reason: HOSPADM

## 2022-02-10 RX ORDER — SODIUM CHLORIDE 9 MG/ML
INJECTION, SOLUTION INTRAVENOUS CONTINUOUS
Status: DISCONTINUED | OUTPATIENT
Start: 2022-02-10 | End: 2022-02-11 | Stop reason: HOSPADM

## 2022-02-10 RX ORDER — NICOTINE 21 MG/24HR
1 PATCH, TRANSDERMAL 24 HOURS TRANSDERMAL DAILY
Status: DISCONTINUED | OUTPATIENT
Start: 2022-02-10 | End: 2022-02-11 | Stop reason: HOSPADM

## 2022-02-10 RX ORDER — 0.9 % SODIUM CHLORIDE 0.9 %
1000 INTRAVENOUS SOLUTION INTRAVENOUS ONCE
Status: DISCONTINUED | OUTPATIENT
Start: 2022-02-10 | End: 2022-02-10

## 2022-02-10 RX ORDER — DEXTROSE MONOHYDRATE 25 G/50ML
12.5 INJECTION, SOLUTION INTRAVENOUS PRN
Status: DISCONTINUED | OUTPATIENT
Start: 2022-02-10 | End: 2022-02-10 | Stop reason: SDUPTHER

## 2022-02-10 RX ORDER — FUROSEMIDE 20 MG/1
20 TABLET ORAL DAILY
Status: DISCONTINUED | OUTPATIENT
Start: 2022-02-10 | End: 2022-02-11 | Stop reason: HOSPADM

## 2022-02-10 RX ORDER — PANTOPRAZOLE SODIUM 40 MG/1
40 TABLET, DELAYED RELEASE ORAL
Status: DISCONTINUED | OUTPATIENT
Start: 2022-02-11 | End: 2022-02-11 | Stop reason: HOSPADM

## 2022-02-10 RX ORDER — POTASSIUM CHLORIDE 20 MEQ/1
40 TABLET, EXTENDED RELEASE ORAL PRN
Status: DISCONTINUED | OUTPATIENT
Start: 2022-02-10 | End: 2022-02-11 | Stop reason: HOSPADM

## 2022-02-10 RX ORDER — NICOTINE POLACRILEX 4 MG
15 LOZENGE BUCCAL PRN
Status: DISCONTINUED | OUTPATIENT
Start: 2022-02-10 | End: 2022-02-11 | Stop reason: HOSPADM

## 2022-02-10 RX ORDER — ACETAMINOPHEN 650 MG/1
650 SUPPOSITORY RECTAL EVERY 6 HOURS PRN
Status: DISCONTINUED | OUTPATIENT
Start: 2022-02-10 | End: 2022-02-11 | Stop reason: HOSPADM

## 2022-02-10 RX ORDER — ALBUTEROL SULFATE 90 UG/1
2 AEROSOL, METERED RESPIRATORY (INHALATION) EVERY 6 HOURS PRN
Status: DISCONTINUED | OUTPATIENT
Start: 2022-02-10 | End: 2022-02-11 | Stop reason: HOSPADM

## 2022-02-10 RX ORDER — SODIUM CHLORIDE 0.9 % (FLUSH) 0.9 %
5-40 SYRINGE (ML) INJECTION EVERY 12 HOURS SCHEDULED
Status: DISCONTINUED | OUTPATIENT
Start: 2022-02-10 | End: 2022-02-11 | Stop reason: HOSPADM

## 2022-02-10 RX ORDER — PANTOPRAZOLE SODIUM 40 MG/1
40 TABLET, DELAYED RELEASE ORAL
Status: DISCONTINUED | OUTPATIENT
Start: 2022-02-10 | End: 2022-02-10

## 2022-02-10 RX ORDER — MIRTAZAPINE 15 MG/1
15 TABLET, FILM COATED ORAL NIGHTLY
Status: DISCONTINUED | OUTPATIENT
Start: 2022-02-10 | End: 2022-02-11 | Stop reason: HOSPADM

## 2022-02-10 RX ADMIN — Medication: at 00:55

## 2022-02-10 RX ADMIN — INSULIN LISPRO 15 UNITS: 100 INJECTION, SOLUTION INTRAVENOUS; SUBCUTANEOUS at 04:37

## 2022-02-10 RX ADMIN — SODIUM BICARBONATE 650 MG: 650 TABLET ORAL at 13:18

## 2022-02-10 RX ADMIN — SODIUM CHLORIDE 10 ML: 9 INJECTION, SOLUTION INTRAMUSCULAR; INTRAVENOUS; SUBCUTANEOUS at 08:47

## 2022-02-10 RX ADMIN — DEXTROSE 15 G: 15 GEL ORAL at 06:34

## 2022-02-10 RX ADMIN — SODIUM BICARBONATE 650 MG: 650 TABLET ORAL at 21:43

## 2022-02-10 RX ADMIN — MAGNESIUM SULFATE HEPTAHYDRATE 4000 MG: 40 INJECTION, SOLUTION INTRAVENOUS at 08:38

## 2022-02-10 RX ADMIN — PANCRELIPASE 72000 UNITS: 24000; 76000; 120000 CAPSULE, DELAYED RELEASE PELLETS ORAL at 11:41

## 2022-02-10 RX ADMIN — PANTOPRAZOLE SODIUM 40 MG: 40 INJECTION, POWDER, FOR SOLUTION INTRAVENOUS at 08:39

## 2022-02-10 RX ADMIN — FLUCONAZOLE 100 MG: 100 TABLET ORAL at 13:18

## 2022-02-10 RX ADMIN — INSULIN LISPRO 6 UNITS: 100 INJECTION, SOLUTION INTRAVENOUS; SUBCUTANEOUS at 16:27

## 2022-02-10 RX ADMIN — MIRTAZAPINE 15 MG: 15 TABLET, FILM COATED ORAL at 21:43

## 2022-02-10 RX ADMIN — BUDESONIDE AND FORMOTEROL FUMARATE DIHYDRATE 2 PUFF: 80; 4.5 AEROSOL RESPIRATORY (INHALATION) at 08:12

## 2022-02-10 RX ADMIN — SODIUM CHLORIDE 1000 ML: 9 INJECTION, SOLUTION INTRAVENOUS at 00:36

## 2022-02-10 RX ADMIN — INSULIN LISPRO 3 UNITS: 100 INJECTION, SOLUTION INTRAVENOUS; SUBCUTANEOUS at 21:43

## 2022-02-10 RX ADMIN — DIPHENHYDRAMINE HYDROCHLORIDE 5 ML: 12.5 LIQUID ORAL at 00:55

## 2022-02-10 RX ADMIN — PANCRELIPASE 72000 UNITS: 24000; 76000; 120000 CAPSULE, DELAYED RELEASE PELLETS ORAL at 16:27

## 2022-02-10 RX ADMIN — POTASSIUM CHLORIDE 40 MEQ: 20 TABLET, EXTENDED RELEASE ORAL at 04:37

## 2022-02-10 RX ADMIN — INSULIN HUMAN 10 UNITS: 100 INJECTION, SOLUTION PARENTERAL at 00:12

## 2022-02-10 RX ADMIN — ENOXAPARIN SODIUM 30 MG: 30 INJECTION SUBCUTANEOUS at 08:53

## 2022-02-10 RX ADMIN — Medication: at 11:38

## 2022-02-10 RX ADMIN — SODIUM CHLORIDE, PRESERVATIVE FREE 10 ML: 5 INJECTION INTRAVENOUS at 00:23

## 2022-02-10 RX ADMIN — IOPAMIDOL 75 ML: 755 INJECTION, SOLUTION INTRAVENOUS at 00:23

## 2022-02-10 RX ADMIN — METFORMIN HYDROCHLORIDE 1000 MG: 500 TABLET, EXTENDED RELEASE ORAL at 16:27

## 2022-02-10 RX ADMIN — Medication 80 ML: at 00:23

## 2022-02-10 ASSESSMENT — PAIN SCALES - GENERAL: PAINLEVEL_OUTOF10: 0

## 2022-02-10 NOTE — PROGRESS NOTES
Physical Therapy    Facility/Department: [facility] PROGRESSIVE CARE  Initial Assessment    NAME: Julian Thacker  : 1955  MRN: [id number]    Date of Service: 2/10/2022    Discharge Recommendations:  Outpatient PT       HPI (per chart): Julian Thacker is a 73 y.o. Non- / non  female who presents with Shortness of Breath (pt speaking in full sentences) and Mouth Injury (states \"burned mouth on [place] on  and it is still burning and her teeth are falling out. \") and is admitted to the hospital for the management of Hyperglycemia. 73year old female, former EtoH abuse recently quit for more than 1 months, prior substance abuse, insulin dependant diabetes presents after feeling unwell and not taking her medications for 2 weeks. Per emr patient burnt her mouth from chili at [facility]. Assessment   Body structures, Functions, Activity limitations: Decreased functional mobility ; Decreased safe awareness;Decreased balance;Decreased coordination  Assessment: Patient demo decreased gait and balance, reports a history of dizziness and balance problems. Patient will benefit from skilled PT to improve gait and balance. Educated patient on area out patient vestibular rehab if dizziness persists after in-patient stay. [facility] verbalized good undersanding. Prognosis: Good  Decision Making: Medium Complexity  PT Education: Goals;PT Role;Plan of Care;General Safety; Disease Specific Education  Patient Education: Educated on vestibular OP programs, educated on safety with gait, verbalized good understanding. REQUIRES PT FOLLOW UP: Yes  Activity Tolerance  Activity Tolerance: Patient Tolerated treatment well       Patient Diagnosis(es): The primary encounter diagnosis was Hypernatremia. Diagnoses of Pseudohyponatremia and Acute gingivitis were also pertinent to this visit.      has a past medical history of Cataracts, bilateral, COPD (chronic obstructive pulmonary disease) ([facility] [address] CTS (carpal tunnel syndrome), Fibromyalgia, Generalized abdominal pain, Glaucoma, Hypertension, Osteoarthritis, Osteoporosis, Pancreatic calcification, Pancreatitis, Protein-calorie malnutrition, severe ([address] Seizures ([address] Tremor, Type II or unspecified type diabetes mellitus without mention of complication, not stated as uncontrolled, and Uncontrolled type 2 DM with hyperosmolar nonketotic hyperglycemia ([address] has a past surgical history that includes cyst removal (Left); Upper gastrointestinal endoscopy (08/30/2016); Neck surgery; pr colsc flx w/removal lesion by hot bx forceps (N/A, 6/30/2017); Colonoscopy (08/30/2016); Colonoscopy (06/30/2017); Colonoscopy (05/29/2018); pr colon ca scrn not hi rsk ind (N/A, 5/29/2018); Colonoscopy (05/07/2021); and Colonoscopy (N/A, 5/7/2021). Restrictions  Restrictions/Precautions  Restrictions/Precautions: General Precautions,Fall Risk  Required Braces or Orthoses?: No  Position Activity Restriction  Other position/activity restrictions: IV  Vision/Hearing  Vision: Impaired  Vision Exceptions: Wears glasses at all times  Hearing: Within functional limits     Subjective  General  Chart Reviewed: Yes  Patient assessed for rehabilitation services?: Yes  Additional Pertinent Hx: HX of substance abuse, Hx of Etoh abuse (quit 1 month ago), IDDM, COPD, Fibromyalgia, Glaucome, Seizures,  Response To Previous Treatment: Not applicable  Family / Caregiver Present: No  Diagnosis: Hypernatremia, Burned mouth 2/2/22 and has not been eating, drinking, checking blood sugars, or taking meds since  Follows Commands: Within Functional Limits  General Comment  Comments: Elaina Ribera RN reports patient medically appropriate for PT. Subjective  Subjective: Patient pleasant and agreeable to PT. Patient reports feeling mildly dizzy /unsteady due to not having been up much the last week. Patient reports a history of dizziness  And thinks might be because she used to smoked marijuana.  Patient also report chronic visual problems (sees things that aren't there, such as floaters and bugs). Pain Screening  Patient Currently in Pain: No     Pre Treatment Pain Screening  Intervention List: Patient able to continue with treatment    Orientation  Orientation  Overall Orientation Status: Within Normal Limits  Social/Functional History  Social/Functional History  Lives With: Spouse  Type of Home: House  Home Layout: Two level,Bed/Bath upstairs  Home Access: Stairs to enter with rails  Entrance Stairs - Number of Steps: 5  Entrance Stairs - Rails: Right  Bathroom Shower/Tub: Tub/Shower unit,Walk-in shower  Bathroom Toilet: Standard  Bathroom Equipment: Grab bars in shower,Shower chair  Home Equipment: Cane,Rolling walker  ADL Assistance: Independent  Homemaking Assistance: Independent  Ambulation Assistance: Independent  Transfer Assistance: Independent  Active : Yes  Occupation: On disability  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 History of being dizzy when she wakes up. Cognition   Cognition  Arousal/Alertness: Appropriate responses to stimuli  Following Commands: Follows all commands without difficulty  Attention Span: Appears intact  Memory: Appears intact  Safety Judgement: Decreased awareness of need for safety  Problem Solving: Decreased awareness of errors;Assistance required to generate solutions;Assistance required to identify errors made;Assistance required to correct errors made;Assistance required to implement solutions  Insights: Decreased awareness of deficits  Initiation: Does not require cues  Sequencing: Does not require cues    Objective     Observation/Palpation  Posture: Good  Observation: Patient appears to be resting comfortably in bed.     AROM RLE (degrees)  RLE AROM: WNL  AROM LLE (degrees)  LLE AROM : WNL  Strength RLE  Comment: 5/5  Strength LLE  Comment: 5/5  Tone RLE  RLE Tone: Normotonic  Tone LLE  LLE Tone: Normotonic  Motor Control  Gross Motor?: WFL  Sensation  Overall Sensation Status: WNL  Bed mobility  Rolling to Left: Independent  Rolling to Right: Independent  Supine to Sit: Independent  Sit to Supine: Independent  Scooting: Independent  Transfers  Sit to Stand: Independent  Stand to sit: Independent  Bed to Chair: Independent  Stand Pivot Transfers: Independent  Ambulation  Ambulation?: Yes  Ambulation 1  Surface: level tile  Device: No Device  Assistance: Stand by assistance  Quality of Gait: Patient with decreased corrdination with ambulation that improved over time and distance. Initally demo increased JOSE and lacked heel strike/ toe, improved by end of gait. Gait Deviations: Increased JOSE; Decreased step length;Decreased step height;Shuffles  Distance: 40 feet     Balance  Sitting - Static: Good  Sitting - Dynamic: Good  Standing - Static: Fair;+  Standing - Dynamic: Fair;+        Plan   Plan  Times per week: 1x/d, 5-6 d/wk  Current Treatment Recommendations: Strengthening,Balance Training,Functional Mobility Training,Stair training,Gait Training,Endurance Training,Patient/Caregiver Education & [employer] Exercise Program,Safety Education & Training  Safety Devices  Type of devices: All fall risk precautions in place,Gait belt,Nurse notified,Call light within reach,Left in bed,Bed alarm in place      OutComes Score    AM-PAC Score  AM-PAC Inpatient Mobility Raw Score : 22 (02/10/22 1236)  AM-PAC Inpatient T-Scale Score : 53.28 (02/10/22 1236)  Mobility Inpatient CMS 0-100% Score: 20.91 (02/10/22 1236)  Mobility Inpatient CMS G-Code Modifier : CJ (02/10/22 1236)          Goals  Short term goals  Time Frame for Short term goals: 12 visits  Short term goal 1: Patient will be amb 200 feet indep. Short term goal 2: Patient will negotiate 5 stairs indep. Short term goal 3: Patient will have good standing balance.   Short term goal 4: Patient will be indep with HEP. Short term goal 5: Patient will tolerate 30 minutes of ther-ex and ther-act.   Patient Goals   Patient goals : improve balance       Therapy Time   Individual Concurrent Group Co-treatment   Time In 0948         Time Out 1014         Minutes 26         Timed Code Treatment Minutes: [address], PT

## 2022-02-10 NOTE — PROGRESS NOTES
Occupational Therapy   Occupational Therapy Initial Assessment  Date: 2/10/2022   Patient Name: Orlando Castillo  MRN: [id number]     : 1955    Date of Service: 2/10/2022    Discharge Recommendations:  Home with assist PRN       RN reports patient is medically stable for therapy treatment this date. Chart reviewed prior to treatment and patient is agreeable for therapy. All lines intact and patient positioned comfortably at end of treatment. All patient needs addressed prior to ending therapy session. Assessment   Performance deficits / Impairments: Decreased functional mobility ; Decreased ADL status; Decreased safe awareness  Prognosis: Good  Decision Making: Low Complexity  OT Education: OT Role;Transfer Training;Plan of Care;Energy Conservation  Patient Education: OT POC, pacing, call light use/fall prevention  No Skilled OT: At baseline function; Safe to return home; Independent with ADL's  REQUIRES OT FOLLOW UP: No  Activity Tolerance  Activity Tolerance: Patient Tolerated treatment well  Safety Devices  Safety Devices in place: Yes  Type of devices: All fall risk precautions in place; Left in bed;Call light within reach;Nurse notified           Patient Diagnosis(es): The primary encounter diagnosis was Hypernatremia. Diagnoses of Pseudohyponatremia and Acute gingivitis were also pertinent to this visit. has a past medical history of Cataracts, bilateral, COPD (chronic obstructive pulmonary disease) (HCC), CTS (carpal tunnel syndrome), Fibromyalgia, Generalized abdominal pain, Glaucoma, Hypertension, Osteoarthritis, Osteoporosis, Pancreatic calcification, Pancreatitis, Protein-calorie malnutrition, severe ([address] Seizures ([address] Tremor, Type II or unspecified type diabetes mellitus without mention of complication, not stated as uncontrolled, and Uncontrolled type 2 DM with hyperosmolar nonketotic hyperglycemia ([address] has a past surgical history that includes cyst removal (Left);  Upper gastrointestinal endoscopy (08/30/2016); Neck surgery; pr colsc flx w/removal lesion by hot bx forceps (N/A, 6/30/2017); Colonoscopy (08/30/2016); Colonoscopy (06/30/2017); Colonoscopy (05/29/2018); pr colon ca scrn not hi rsk ind (N/A, 5/29/2018); Colonoscopy (05/07/2021); and Colonoscopy (N/A, 5/7/2021). Per H&P: Sg Whitman is a 73 y.o. Non- / non  female who presents with Shortness of Breath (pt speaking in full sentences) and Mouth Injury (states \"burned mouth on [place]ili on 2/2 and it is still burning and her teeth are falling out. \")   and is admitted to the hospital for the management of Hyperglycemia.     73year old female, former EtoH abuse recently quit for more than 1 months, prior substance abuse, insulin dependant diabetes presents after feeling unwell and not taking her medications for 2 weeks. Per emr patient burnt her mouth from chili at [facility].       Restrictions  Restrictions/Precautions  Restrictions/Precautions: General Precautions,Fall Risk  Required Braces or Orthoses?: No  Position Activity Restriction  Other position/activity restrictions: IV    Subjective   General  Chart Reviewed: Yes  Patient assessed for rehabilitation services?: Yes  Family / Caregiver Present: No  Subjective  Subjective: \"I have triple vision, sometimes I see things that arent there\"  General Comment  Comments: pt agreeable to OT eval  Vital Signs  Temp: 98.2 °F (36.8 °C)  Pulse: 83  Resp: 16  BP: 104/70  MAP (mmHg): 82  Oxygen Therapy  SpO2: 100 %  Social/Functional History  Social/Functional History  Lives With: Spouse  Type of Home: House  Home Layout: Two level,Bed/Bath upstairs  Home Access: Stairs to enter with rails  Entrance Stairs - Number of Steps: 5  Entrance Stairs - Rails: Right  Bathroom Shower/Tub: Tub/Shower unit,Walk-in shower  Bathroom Toilet: Standard  Bathroom Equipment: Grab bars in shower,Shower chair  Home Equipment: Cane,Rolling walker  ADL Assistance: Independent  Homemaking Assistance: Independent  Ambulation Assistance: Independent  Transfer Assistance: Independent  Active : Yes  Occupation: On disability  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 History of being dizzy when she wakes up. Objective   Vision: Impaired  Vision Exceptions: Wears glasses at all times  Hearing: Within functional limits    Orientation  Overall Orientation Status: Within Functional Limits  Observation/Palpation  Posture: Good  Observation: Patient appears to be resting comfortably in bed. Balance  Sitting Balance: Supervision  Standing Balance: Contact guard assistance  Standing Balance  Time: ~3 min  Activity: functional mob  Functional Mobility  Functional - Mobility Device: No device  Activity: Other (from bed, to door, back to bed)  Assist Level: Contact guard assistance  Functional Mobility Comments: Pt slightly unstedy d/t dizziness. CGA for safety.  Min VCs for pacing and slowing down movements  ADL  Feeding: Modified independent   Grooming: Supervision  UE Bathing: Supervision  LE Bathing: Supervision  UE Dressing: Supervision  LE Dressing: Supervision (liya B socks)  Toileting: Supervision  Additional Comments: SUP d/t hospital environment  Tone RUE  RUE Tone: Normotonic  Tone LUE  LUE Tone: Normotonic  Coordination  Movements Are Fluid And Coordinated: No  Coordination and Movement description: Tremors     Bed mobility  Rolling to Left: Independent  Rolling to Right: Independent  Supine to Sit: Independent  Sit to Supine: Independent  Scooting: Independent  Transfers  Sit to stand: Contact guard assistance  Stand to sit: Contact guard assistance  Transfer Comments: CGA for safety d/t dizziness     Cognition  Overall Cognitive Status: Exceptions  Arousal/Alertness: Appropriate responses to stimuli  Following Commands: Follows all commands without difficulty  Attention Span: Appears intact  Memory: Appears intact  Safety Judgement: Decreased awareness of need for safety  Problem Solving: Decreased awareness of errors;Assistance required to generate solutions;Assistance required to identify errors made;Assistance required to correct errors made;Assistance required to implement solutions  Insights: Decreased awareness of deficits  Initiation: Does not require cues  Sequencing: Does not require cues        Sensation  Overall Sensation Status: WFL        LUE AROM (degrees)  LUE AROM : WFL  Left Hand AROM (degrees)  Left Hand AROM: WFL  RUE AROM (degrees)  RUE AROM : WFL  Right Hand AROM (degrees)  Right Hand AROM: WFL  LUE Strength  Gross LUE Strength: WFL  RUE Strength  Gross RUE Strength: WFL                   Plan   Plan  Times per week: eval + 1 tx  Current Treatment Recommendations: Self-Care / ADL,Functional Mobility Training      AM-PAC Score        AM-[facility] Inpatient Daily Activity Raw Score: 24 (02/10/22 1236)  AM-PAC Inpatient ADL T-Scale Score : 57.54 (02/10/22 1236)  ADL Inpatient CMS 0-100% Score: 0 (02/10/22 1236)  ADL Inpatient CMS G-Code Modifier : [facility] (02/10/22 1236)    Goals  Short term goals  Time Frame for Short term goals: by discharge, pt to demo  Short term goal 1: I/MI for total body ADLs with AE as needed and Good safety  Short term goal 2: I/MI for ADL transfers and functional mob with AD as needed and Good safety  Short term goal 3: pt to be IND with EC/WS, fall prevention tech, with handouts as needed  Patient Goals   Patient goals : to go home       Therapy Time   Individual Concurrent Group Co-treatment   Time In 0948         Time Out 1009 (+10 chart review)         Minutes 31          tx time: 21 min        Upon writer exit, call light within reach, pt retired to bed. All lines intact and patient positioned comfortably. All patient needs addressed prior to ending therapy session.  Chart reviewed prior to treatment and patient is agreeable for therapy. RN reports patient is medically stable for therapy treatment this date.     Elenita Hung OTR/L

## 2022-02-10 NOTE — CONSULTS
Department of Internal Medicine  Nephrology Aparna Dean MD   Consult Note    Reason for consultation: Management of hyponatremia. Consulting physician: Italo Miles MD.    History of present illness: This is a 73 y.o. female with a significant past medical history of Seizure disorder, chronic pancreatitis, chronic obstructive pulmonary disease, systemic hypertension and type 2 diabetes mellitus [diagnosed in 2005 and on insulin], who presented with complaints of burning sensation in the mouth as well as generalized weakness and dyspnea. Blood glucose at presentation was greater than 1000 mg/dL with serum sodium 118 mmol/L. She had run out off her insulin for at least 3 weeks. She reports that she burned her mouth on a hot bowl of chili at [employer] on 2/10/2022. Since that time she has not been eating or drinking and not checking her glucose or taking her insulin. She did not have anion gap acidosis and she was treated as nonketotic hyperglycemia and started on IV fluid 0.9 normal saline at 100 mL/h. Today, she is awake and alert and does not have dyspnea at rest.  She had episode of hypoglycemia with blood glucose down to 30 mg/dL this morning but repeat check after giving glucose was up to 130 mg/dL. She denies nausea, vomiting or diarrhea. Other laboratory studies were remarkable for normal renal function. Serum sodium this morning is up to 130 mmol/L. She does not have any acute neurologic symptoms.     Peanuts [peanut oil], Vicodin [hydrocodone-acetaminophen], Tramadol, Tylenol with codeine #3 [acetaminophen-codeine], and Ibuprofen    Past Medical History:   Diagnosis Date    Cataracts, bilateral     COPD (chronic obstructive pulmonary disease) (HCC)     CTS (carpal tunnel syndrome) left    Fibromyalgia     Generalized abdominal pain 6/3/2016    Glaucoma     Hypertension     Osteoarthritis     Osteoporosis     Pancreatic calcification 6/3/2016    Pancreatitis Telephone Encounter by Alissa Merino CMA at 11/19/18 02:37 PM     Author:  Alissa Merino CMA Service:  (none) Author Type:  Certified Medical Assistant     Filed:  11/19/18 02:38 PM Encounter Date:  11/19/2018 Status:  Signed     :  Alissa Merino CMA (Certified Medical Assistant)            Unable to leave message, mailbox is full.[LF1.1M]      Revision History        User Key Date/Time User Provider Type Action    > LF1.1 11/19/18 02:38 PM Alissa Merino CMA Certified Medical Assistant Sign    M - Manual              Protein-calorie malnutrition, severe ([facility] [address] [address] 6/1/2016    Seizures (HCC)     Tremor     Type II or unspecified type diabetes mellitus without mention of complication, not stated as uncontrolled     Uncontrolled type 2 DM with hyperosmolar nonketotic hyperglycemia (HCC) 6/3/2016     Scheduled Meds:   sodium chloride  80 mL IntraVENous Once    [Held by provider] furosemide  20 mg Oral Daily    lipase-protease-amylase  35,000 Units Oral TID WC    [Held by provider] metFORMIN  1,000 mg Oral BID WC    sodium bicarbonate  650 mg Oral TID    budesonide-formoterol  2 puff Inhalation BID    mirtazapine  15 mg Oral Nightly    [Held by provider] glipiZIDE  5 mg Oral BID AC    pantoprazole  40 mg IntraVENous Daily    And    sodium chloride (PF)  10 mL IntraVENous Daily    sodium chloride flush  5-40 mL IntraVENous 2 times per day    enoxaparin  30 mg SubCUTAneous Daily    megestrol  20 mg Oral Daily    insulin lispro  0-12 Units SubCUTAneous Q4H    sodium chloride  1,000 mL IntraVENous Once    potassium bicarb-citric acid  40 mEq Oral Q1H    magnesium sulfate  4,000 mg IntraVENous Q2H     Continuous Infusions:   dextrose      sodium chloride      sodium chloride       PRN Meds:.sodium chloride flush, benzocaine, magic (miracle) mouthwash, albuterol sulfate HFA, glucose, glucagon (rDNA), dextrose, sodium chloride flush, sodium chloride, potassium chloride **OR** potassium alternative oral replacement **OR** potassium chloride, magnesium sulfate, ondansetron **OR** ondansetron, polyethylene glycol, acetaminophen **OR** acetaminophen, potassium chloride    Family History   Problem Relation Age of Onset    Brain Cancer Father     Diabetes Sister     Other Sister         epilepsy     Social History     Socioeconomic History    Marital status:      Spouse name: None    Number of children: None    Years of education: None    Highest education level: None   Occupational History    None   Tobacco Use    Smoking status: Current Every Day Smoker     Packs/day: 0.25     Years: 40.00     Pack years: 10.00    Smokeless tobacco: Former User   Vaping Use    Vaping Use: Never used   Substance and Sexual Activity    Alcohol use: No    Drug use: Yes     Types: Marijuana [employer])    Sexual activity: None     Comment: last use end 10/2014   Other Topics Concern    None   Social History Narrative    None     Social Determinants of Health     Financial Resource Strain: Low Risk     Difficulty of Paying Living Expenses: Not hard at all   Food Insecurity: No Food Insecurity    Worried About Running Out of Food in the Last Year: Never true    Shakir of Food in the Last Year: Never true   Transportation Needs: No Transportation Needs    Lack of Transportation (Medical): No    Lack of Transportation (Non-Medical):  No   Physical Activity:     Days of Exercise per Week: Not on file    Minutes of Exercise per Session: Not on file   Stress:     Feeling of Stress : Not on file   Social Connections:     Frequency of Communication with Friends and Family: Not on file    Frequency of Social Gatherings with Friends and Family: Not on file    Attends Latter-day Services: Not on file    Active Member of [address] [place] [address] or Organizations: Not on file    Attends Club or Organization Meetings: Not on file    Marital Status: Not on file   Intimate Partner Violence:     Fear of Current or Ex-Partner: Not on file    Emotionally Abused: Not on file    Physically Abused: Not on file    Sexually Abused: Not on file   Housing Stability:     Unable to Pay for Housing in the Last Year: Not on file    Number of [place] in the Last Year: Not on file    Unstable Housing in the Last Year: Not on file     Review of systems: CNS - no headache or dizziness; Cardiac - no chest pain; Respiratory - +ve Exertional shortness of breath; Gastrointestinal - No nausea, vomiting or diarrhea; Musculoskeletal - general body aches; Skin/Integument - no rashes. Physical Exam:    VITALS:  /75   Pulse 88   Temp 98.1 °F (36.7 °C)   Resp 16   Ht 5' 7\" (1.702 m)   Wt 90 lb (40.8 kg)   SpO2 99%   BMI 14.10 kg/m²   24HR INTAKE/OUTPUT:  No intake or output data in the 24 hours ending 02/10/22 1034    Constitutional: alert, appears stated age and cooperative    Skin: Skin color, texture, turgor normal. No rashes or lesions    Head: Normocephalic, without obvious abnormality, atraumatic     Cardiovascular/Edema: regular rate and rhythm, S1, S2 normal, no murmur, click, rub or gallop    Respiratory: Lungs: clear to auscultation bilaterally    Abdomen: soft, non-tender; bowel sounds normal; no masses,  no organomegaly    Back: symmetric, no curvature. ROM normal. No CVA tenderness.     Extremities: extremities normal, atraumatic, no cyanosis or edema    Neuro:  Grossly normal      CBC:   Recent Labs     02/09/22  2320   WBC 7.8   HGB 11.4*        BMP:    Recent Labs     02/09/22  2320 02/10/22  0143 02/10/22  0230   * 136 130*   K 4.2 2.2* 3.5*   CL 80* 107 92*   CO2 24 17* 23   BUN 9 6* 7*   CREATININE 1.08* 0.60 0.85   GLUCOSE 1,077* 277* 277*       Lab Results   Component Value Date    NITRU NEGATIVE 09/29/2021    COLORU Yellow 09/29/2021    PHUR 6.5 09/29/2021    WBCUA 20 TO 50 09/29/2021    RBCUA 2 TO 5 09/29/2021    MUCUS NOT REPORTED 09/29/2021    TRICHOMONAS NOT REPORTED 09/29/2021    YEAST NOT REPORTED 09/29/2021    BACTERIA MODERATE 09/29/2021    CLARITYU clear 06/05/2019    SPECGRAV 1.022 09/29/2021    LEUKOCYTESUR TRACE 09/29/2021    UROBILINOGEN Normal 09/29/2021    BILIRUBINUR NEGATIVE 09/29/2021    BILIRUBINUR negative 06/05/2019    BILIRUBINUR NEGATIVE 05/02/2012    BLOODU negative 06/05/2019    GLUCOSEU 3+ 09/29/2021    GLUCOSEU NEGATIVE 05/02/2012    KETUA NEGATIVE 09/29/2021    AMORPHOUS NOT REPORTED 09/29/2021     Urine Creatinine:     Lab Results   Component Value Date    LABCREA 157.2 03/11/2019 IMPRESSION/RECOMMENDATIONS:      1. Hyponatremia - likely due to fluid translocation complicating severe hyperglycemia. Corrected sodium at presentation was 134 mmol/L. Serum sodium improved significantly with correction of hyperglycemia. Low risk for central pontine myelinolysis. Plan: Continue IV fluid 0.9 normal saline at 100 mL/h. Check serum and urine osmolality. TSH and uric acid. 2.  Hypokalemia - secondary to insulin. Replace per sliding scale. 3.  Shortness of breath - CT angiogram showed no evidence of pulmonary embolism but there was apparent circumferential wall thickening of the entire thoracic esophagus due to incomplete distention, esophagitis or chronic reflux as well as evidence of COPD. 4.  Systemic hypertension - blood pressure control is adequate. Prognosis is guarded. Thank you very much for the courtesy of this consultation.     Cristina Overton MD [facility]  Attending Nephrologist  2/10/2022 8:02 AM

## 2022-02-10 NOTE — RT PROTOCOL NOTE
RT Inhaler-Nebulizer Bronchodilator Protocol Note    There is a bronchodilator order in the chart from a provider indicating to follow the RT Bronchodilator Protocol and there is an Initiate RT Inhaler-Nebulizer Bronchodilator Protocol order as well (see protocol at bottom of note). CXR Findings:  XR CHEST PORTABLE    Result Date: 2/9/2022  Large lung volumes, without evidence of acute cardiopulmonary disease       The findings from the last RT Protocol Assessment were as follows:   History Pulmonary Disease: Smoker 15 pack years or more  Respiratory Pattern: Regular pattern and RR 12-20 bpm  Breath Sounds: Clear breath sounds  Cough: Strong, spontaneous, non-productive  Indication for Bronchodilator Therapy:    Bronchodilator Assessment Score: 1    Aerosolized bronchodilator medication orders have been revised according to the RT Inhaler-Nebulizer Bronchodilator Protocol below. Respiratory Therapist to perform RT Therapy Protocol Assessment initially then follow the protocol. Repeat RT Therapy Protocol Assessment PRN for score 0-3 or on second treatment, BID, and PRN for scores above 3. No Indications  adjust the frequency to every 6 hours PRN wheezing or bronchospasm, if no treatments needed after 48 hours then discontinue using Per Protocol order mode. If indication present, adjust the RT bronchodilator orders based on the Bronchodilator Assessment Score as indicated below. Use Inhaler orders unless patient has one or more of the following: on home nebulizer, not able to hold breath for 10 seconds, is not alert and oriented, cannot activate and use MDI correctly, or respiratory rate 25 breaths per minute or more, then use the equivalent nebulizer order(s) with same Frequency and PRN reasons based on the score. If a patient is on this medication at home then do not decrease Frequency below that used at home.     0-3  enter or revise RT bronchodilator order(s) to equivalent RT Bronchodilator

## 2022-02-10 NOTE — ED PROVIDER NOTES
EMERGENCY DEPARTMENT ENCOUNTER    Pt Name: Thurnell Favre  MRN: [id number]  [place] 1955  Date of evaluation: 2/10/22  CHIEF COMPLAINT       Chief Complaint   Patient presents with    Shortness of Breath     pt speaking in full sentences    Mouth Injury     states \"burned mouth on [employer] on 2/2 and it is still burning and her teeth are falling out. \"     HISTORY OF PRESENT ILLNESS   Patient is a 70-year-old female with PMH of insulin-dependent diabetes, COPD and hypertension who presents to the ED complaining of mouth pain and shortness of breath. She reports that she burned her mouth on a hot bowl of chili at [employer] on 2/10/2022. Since that time she has not been eating or drinking and not checking her glucose or taking her insulin. She also reports exertional shortness of breath. No other issues at this time. ROS:  No fevers, cough, chest pain, abdominal pain, nausea, vomiting, changes in urine or stool. REVIEW OF SYSTEMS     Review of Systems   All other systems reviewed and are negative. PASTMEDICAL HISTORY     Past Medical History:   Diagnosis Date    Cataracts, bilateral     COPD (chronic obstructive pulmonary disease) (HCC)     CTS (carpal tunnel syndrome) left    Fibromyalgia     Generalized abdominal pain 6/3/2016    Glaucoma     Hypertension     Osteoarthritis     Osteoporosis     Pancreatic calcification 6/3/2016    Pancreatitis     Protein-calorie malnutrition, severe ([address] 6/1/2016    Seizures (HCC)     Tremor     Type II or unspecified type diabetes mellitus without mention of complication, not stated as uncontrolled     Uncontrolled type 2 DM with hyperosmolar nonketotic hyperglycemia ([address] 6/3/2016     SURGICAL HISTORY       Past Surgical History:   Procedure Laterality Date    COLONOSCOPY  08/30/2016    very poor prep, unable to complete    COLONOSCOPY  06/30/2017    ADENOMATOUS POLYP.      COLONOSCOPY  05/29/2018    polypectomy x1    COLONOSCOPY 05/07/2021    COLONOSCOPY N/A 5/7/2021    COLONOSCOPY WITH polypectomy performed by Momo Faustin MD at [address] Left     ovary twice    NECK SURGERY      pt unsure of exact date & what the procedure was ? 2015 [facility]    [place] COLON CA SCRN NOT  [address] IND N/A 5/29/2018    COLONOSCOPY WITH POLYP REMOVEL performed by Momo Faustin MD at [address] N/A 6/30/2017    COLONOSCOPY POLYPECTOMY HOT BIOPSY performed by Momo Faustin MD at [address]  08/30/2016    gastritis, MILD CHRONIC GASTRITIS WITH FOCAL ANTRAL EROSION AND ASSOCIATED ACUTE INFLAMMATION. INTESTINAL METAPLASIA PRESENT, NEGATIVE FOR DYSPLASIA. CURRENT MEDICATIONS       Previous Medications    ALBUTEROL SULFATE  (90 BASE) MCG/ACT INHALER    USE 2 INHALATIONS BY MOUTH  EVERY 6 HOURS AS NEEDED FOR WHEEZING    ALCOHOL SWABS (ALCOHOL PREP) 70 % PADS    Daily as needed    AQUALANCE LANCETS 30G MISC    Daily and as needed for hypoglycemia    BISACODYL (DULCOLAX) 5 MG EC TABLET    TAKE 4 TABS AT 10 AM THE DAY PRIOR TO COLONOSCOPY    BLOOD GLUC METER DISP-STRIPS (BLOOD GLUCOSE METER DISPOSABLE) OXANA    Daily and as needed    BLOOD GLUCOSE MONITOR STRIPS    1 strip by Other route daily Test 1 times a day & as needed for symptoms of irregular blood glucose. COMFORT EZ PEN NEEDLES 32G X 6 MM MISC    BID    FUROSEMIDE (LASIX) 20 MG TABLET    Take 1 tablet by mouth daily    GABAPENTIN (NEURONTIN) 400 MG CAPSULE    Take 1 capsule by mouth 3 times daily for 90 days.     GLIMEPIRIDE (AMARYL) 2 MG TABLET    Take 1 tablet by mouth 2 times daily (before meals)    LANCETS MISC    1 each by Does not apply route daily    LIPASE-PROTEASE-AMYLASE (CREON) [id number] UNITS CPEP DELAYED RELEASE CAPSULE    take 2 capsules by mouth four times a day after meals and at bedtime    MAGNESIUM CITRATE SOLUTION    Take 296 mLs by mouth once for 1 dose    MEGESTROL (MEGACE) 20 MG TABLET    TAKE 1 TABLET BY MOUTH DAILY    METFORMIN (GLUCOPHAGE-XR) 500 MG EXTENDED RELEASE TABLET    Take 2 tablets by mouth daily (with breakfast) AND 1 tablet Daily with supper. MIRTAZAPINE (REMERON) 30 MG TABLET    Take 0.5 tablets by mouth nightly    MULTIPLE VITAMIN (MULTI-VITAMIN DAILY PO)    Take by mouth    NAPROXEN (NAPROSYN) 500 MG TABLET    Take 1 tablet by mouth 2 times daily (with meals)    OMEPRAZOLE (PRILOSEC) 20 MG DELAYED RELEASE CAPSULE    TAKE ONE (1) CAPSULE BY MOUTH TWICE DAILY    SODIUM BICARBONATE 650 MG TABLET    take 1 tablet by mouth three times a day    SYMBICORT 80-4.5 MCG/ACT AERO    Inhale 2 puffs into the lungs 2 times daily    ZINC PO    Take by mouth     ALLERGIES     is allergic to peanuts [peanut oil], vicodin [hydrocodone-acetaminophen], tramadol, tylenol with codeine #3 [acetaminophen-codeine], and ibuprofen. FAMILY HISTORY     She indicated that her father is . She indicated that the status of her sister is unknown. SOCIAL HISTORY       Social History     Tobacco Use    Smoking status: Current Every Day Smoker     Packs/day: 0.25     Years: 40.00     Pack years: 10.00    Smokeless tobacco: Former User   Vaping Use    Vaping Use: Never used   Substance Use Topics    Alcohol use: No    Drug use: Yes     Types: Marijuana (Weed)     PHYSICAL EXAM     INITIAL VITALS: /72   Pulse 95   Temp 98.1 °F (36.7 °C) (Oral)   Resp 18   Ht 5' 7\" (1.702 m)   Wt 90 lb (40.8 kg)   SpO2 100%   BMI 14.10 kg/m²    Physical Exam  HENT:      Head: Normocephalic. Right Ear: External ear normal.      Left Ear: External ear normal.      Nose: Nose normal.      Mouth/Throat:      Dentition: Gingival swelling and gum lesions present. Eyes:      Conjunctiva/sclera: Conjunctivae normal.   Cardiovascular:      Rate and Rhythm: Normal rate and regular rhythm. Pulmonary:      Effort: Pulmonary effort is normal.      Breath sounds: Normal breath sounds.  No wheezing. Abdominal:      General: Abdomen is flat. Skin:     General: Skin is dry. Neurological:      Mental Status: She is alert. Mental status is at baseline. Psychiatric:         Mood and Affect: Mood normal.         Behavior: Behavior normal.         MEDICAL DECISION MAKING:   The patient is hemodynamically stable, afebrile, nontoxic-appearing. Physical exam notable for gingival swelling and lesions upper and lower gumline  Based on history and exam likely chronic gingivitis with viral URI. Low suspicion for PE.  ED plan for basic labs, D-dimer, chest x-ray, reassess. DIAGNOSTIC RESULTS   EKG:All EKG's are interpreted by the Emergency Department Physician who either signs or Co-signs this chart in the absence of a cardiologist.        RADIOLOGY:All plain film, CT, MRI, and formal ultrasound images (except ED bedside ultrasound) are read by the radiologist, see reports below, unless otherwisenoted in MDM or here. CT CHEST PULMONARY EMBOLISM W CONTRAST   Final Result   No evidence of pulmonary embolism. Apparent circumferential wall thickening of the entire thoracic esophagus,   due to incomplete distension, esophagitis, or chronic reflux. Infiltrative   processes are felt to be less likely but not entirely excluded. COPD. XR CHEST PORTABLE   Final Result   Large lung volumes, without evidence of acute cardiopulmonary disease           LABS: All lab results were reviewed by myself, and all abnormals are listed below.   Labs Reviewed   CBC WITH AUTO DIFFERENTIAL - Abnormal; Notable for the following components:       Result Value    RBC 3.50 (*)     Hemoglobin 11.4 (*)     Hematocrit 33.7 (*)     Seg Neutrophils 77 (*)     Lymphocytes 14 (*)     All other components within normal limits   COMPREHENSIVE METABOLIC PANEL W/ REFLEX TO MG FOR LOW K - Abnormal; Notable for the following components:    Glucose 1,077 (*)     CREATININE 1.08 (*)     Bun/Cre Ratio 8 (*)     Sodium 118 (*)     Chloride 80 (*)     Alkaline Phosphatase 106 (*)     GFR Non- 51 (*)     All other components within normal limits   D-DIMER, QUANTITATIVE - Abnormal; Notable for the following components:    D-Dimer, Quant 0.83 (*)     All other components within normal limits   BASIC METABOLIC PANEL W/ REFLEX TO MG FOR LOW K - Abnormal; Notable for the following components:    Glucose 277 (*)     BUN 6 (*)     Calcium 6.1 (*)     Potassium 2.2 (*)     CO2 17 (*)     All other components within normal limits   TROPONIN   BRAIN NATRIURETIC PEPTIDE   BETA-HYDROXYBUTYRATE   MAGNESIUM   BASIC METABOLIC PANEL       EMERGENCY DEPARTMENTCOURSE:   Patient remained stable in the ED. Labs:  Potassium 4.2  Creatinine 1.08  Glucose 1077  Troponin 11    Serum ketones 0.11, normal  No anion gap  Repeat glucose of 277 with K of 2.2 likely diluted sample, will recheck. D-dimer positive, follow-up CT negative for PE. Given Anbesol and Magic mouthwash for oral pain. Will admit for hyperglycemia and pseudohyponatremia.     Discussed case with Mahin Mendez, who accepts patient for admission to hospital.      Vitals:    Vitals:    02/09/22 2225 02/09/22 2227 02/10/22 0138   BP:  (!) 89/38 127/72   Pulse:  109 95   Resp: 16  18   Temp: 98.1 °F (36.7 °C)     TempSrc: Oral     SpO2:  96% 100%   Weight: 90 lb (40.8 kg)     Height: 5' 7\" (1.702 m)         The patient was given the following medications while in the emergency department:  Orders Placed This Encounter   Medications    0.9 % sodium chloride bolus    DISCONTD: 0.9 % sodium chloride bolus    insulin regular (HUMULIN R;NOVOLIN R) injection 10 Units    0.9 % sodium chloride bolus    sodium chloride flush 0.9 % injection 10 mL    iopamidol (ISOVUE-370) 76 % injection 75 mL    benzocaine (ORAJEL) 20 % mucosal gel    magic (miracle) mouthwash    0.9 % sodium chloride infusion    potassium chloride 10 mEq/100 mL IVPB (Peripheral Line)    potassium chloride (KLOR-CON M) extended release tablet 40 mEq     CONSULTS:  IP CONSULT TO NEPHROLOGY  IP CONSULT TO INTERNAL MEDICINE    FINAL IMPRESSION      1. Hypernatremia    2. Pseudohyponatremia    3. Acute gingivitis          DISPOSITION/PLAN   DISPOSITION Admitted 02/10/2022 02:35:32 AM      PATIENT REFERRED TO:  No follow-up provider specified.   DISCHARGE MEDICATIONS:  New Prescriptions    No medications on file     Lola Peraza MD  Attending Emergency Physician                    Clarisa Burris MD  02/10/22 0185

## 2022-02-10 NOTE — CARE COORDINATION
Case Management Initial Discharge Plan  [place],         Readmission Risk              Risk of Unplanned Readmission:  18             Met with:patient to discuss discharge plans. Information verified: address, contacts, phone number, , insurance Yes  PCP: DONTRELL Oviedo CNP  Date of last visit: 2022    Insurance Provider: [address] Medicare    Discharge Planning  Current Residence:  Private home  Living Arrangements:  Spouse/Significant Other   Home has 2 stories/5 outside steps. Bedroom and bathroom on second floor and pt states no problem with stairs. s  Support Systems:  Family Members  Current Services PTA:  none Agency: none  Patient able to perform ADL's:Independent  DME in home:  Cane, walker  DME used to aid ambulation prior to admission:   None but uses cane prn  DME used during admission:  none    Potential Assistance Needed:  N/A    Ph[facility]: [facility] [address] Medications:  No  Does patient want to participate in local refill/ meds to beds program?       Patient agreeable to home care: No  [place] of choice provided:  n/a      Type of Home Care Services:  None  Patient expects to be discharged to:       Prior SNF/Rehab Placement and Facility: none  Agreeable to SNF/Rehab: No  [place] of choice provided: n/a   Evaluation: n/a    Expected Discharge date:  22  Follow Up Appointment: Best Day/ Time: Monday PM    Transportation provider:   Transportation arrangements needed for discharge: No    Discharge Plan:   Met with patient to review plan of care  Pt lives with husb he works afternoon shift. Pt states she is indep and denies need for any home services. She is agreeable to outpt physical therapy and provided her with card of [facility] and she thinks she would use [facility].     Explained Dr would have to write an order for outpt physical therapy and then we can assist her with making an appt.  .  Sticky note to Dr for order for outpt PT    Pt denies need for any DME    PLAN  Home with assist from husb. No DME needs. Need order for outpt PT with emphasis on vestibular therapy.         Electronically signed by Consuelo Hong on 2/10/22 at 1:38 PM EST

## 2022-02-10 NOTE — FLOWSHEET NOTE
Patient was sitting up in bed (on phone, but ended conversation) as writer entered the room (no family members present). Patient engaged in conversation and stated she was doing well and not in much pain. Writer provided listening and spiritual support as the patient began to share her spiritual encounter. Patient stated she has turned her life over to God and wants to be a better person for the people around her as well as those she meets. Patient stated she still has purpose and wants to help people. Writer was able to provide words of encouragement and a prayer for continued inner peace and strength. The patient was thankful for the words of encouragement, visit, and prayer. Spiritual care will follow up as needed or requested. 02/10/22 1051   Encounter Summary   Services provided to: Patient   Referral/Consult From: [facility]   Support System Spouse; Family members   Place of [address] [place] [address]   Continue Visiting   (2/10/2022)   Complexity of Encounter Low   Length of Encounter 15 minutes   Spiritual Assessment Completed Yes   Routine   Type Initial   Assessment Calm; Approachable;Peaceful;Coping   Intervention Active listening;Prayer;Sustaining presence/ Ministry of presence; Discussed meaning/purpose;Discussed relationship with God;Discussed illness/injury and it's impact   Outcome Expressed gratitude;Engaged in conversation;Encouraged

## 2022-02-10 NOTE — PROGRESS NOTES
Comprehensive Nutrition Assessment    Type and Reason for Visit:  Positive Nutrition Screen,Consult (Unplanned weight loss, decreased appetite. Dietitian consult needed: weight loss. Consult: poor intake/ appetite 5 or more days)    Nutrition Recommendations/Plan:   1. Continue ADULT DIET; Dysphagia - Soft and Bite Sized  2. Start Ensure Clear 2x/day  3. Monitor p.o intakes and labs    Nutrition Assessment:  Patient admission is related to hypernatremia, hyperglycemia and acuate gingivitis. Patient reports she has been losing weight due to loss of appetite. Patient did not give specific time frame of weight loss. Patient has significant fat and muscle mass loss. Patient is a picky eater and does not like a lot of foods. Patient given ideas for snacks and meals. Handouts given for high calorie and high protein nutrition therapy. Patient has poor dentition and teeth are falling out. Will start Ensure Clear 2x/day. Malnutrition Assessment:  Malnutrition Status:  Severe malnutrition    Context:  Chronic Illness     Findings of the 6 clinical characteristics of malnutrition:  Energy Intake:  7 - 75% or less estimated energy requirements for 1 month or longer  Weight Loss:  Unable to assess     Body Fat Loss:  7 - Severe body fat loss Triceps,Fat Overlying Ribs   Muscle Mass Loss:  7 - Severe muscle mass loss Clavicles (pectoralis & deltoids),Temples (temporalis),Thigh (quadraceps)  Fluid Accumulation:  No significant fluid accumulation Extremities   Strength:  Not Performed    Estimated Daily Nutrient Needs:  Energy (kcal):  9774-0126 kcal (30-33 kcal/kg); Weight Used for Energy Requirements:  Current     Protein (g):  80-86 gm of protein (1.3-1.4 gm/kg); Weight Used for Protein Requirements:  Ideal          Nutrition Related Findings:  No edema. Active bowel sounds. Gingivitis- teeth falling out, poor dentition. Underweight        Current Nutrition Therapies:    ADULT DIET;  Dysphagia - Soft and Bite Sized  ADULT ORAL NUTRITION SUPPLEMENT; Breakfast, Dinner; Clear Liquid Oral Supplement    Anthropometric Measures:  · Height: 5' 7\" (170.2 cm)  · Current Body Weight: 90 lb (40.8 kg)   · Admission Body Weight: 90 lb (40.8 kg)    · Ideal Body Weight: 135 lbs; % Ideal Body Weight 66.7 %   · BMI: 14.1  · BMI Categories: Underweight (BMI less than 22) age over 72       Nutrition Diagnosis:   · Severe malnutrition related to inadequate protein-energy intake as evidenced by intake 0-25%,poor intake prior to admission,severe muscle loss,severe loss of subcutaneous fat,weight loss      Nutrition Interventions:   Food and/or Nutrient Delivery:  Continue Current Diet,Start Oral Nutrition Supplement  Nutrition Education/Counseling:  Education not indicated   Coordination of Nutrition Care:  Continue to monitor while inpatient    Goals:  PO intakes are greater than 75% at meals       Nutrition Monitoring and Evaluation:   Food/Nutrient Intake Outcomes:  Food and Nutrient Intake,Supplement Intake  Physical Signs/Symptoms Outcomes:  Biochemical Data,Fluid Status or Edema,Skin,Weight     Discharge Planning:    Continue current diet,Continue Oral Nutrition Supplement           Emeka BLANCHARDN, RDN, LDN  Lead Clinical Dietitian  RD Office Phone [phone]

## 2022-02-10 NOTE — PROGRESS NOTES
NP in to see patient and alerted writer that patient was drowsy. BS = 39. Patient alert and oriented. Two tubes of glucose gel and two glasses of apple juice administered. BS = 100 at recheck. NP on call notified.

## 2022-02-10 NOTE — H&P
[facility]  Office: [address], DO, Anahi Dominguez, DO, Jenny Bhatti, DO, Kelly Francis, DO, Brain Dancer, MD, Ranulfo Villareal MD, Sammie Treadwell MD, Sim Webber MD, Tacos Shultz MD, Burgess Brian MD, Shae Hearn MD, Obey Jacobsen, DO, Mary Cardona DO, Cecelia Perkins MD,  Rocky Ibarra DO, Fawn Rose MD, Stan Calhoun MD, Angie Soler MD, Mark Perez MD, Karla Hand MD, Alex Doll, [facility], [facility], [facility], Yang Gresham, CNP, Bradly Rivera, CNS, Royal Clifton, CNP, Darwin Yen, CNP, Cindy Allen, CNP, Madyson Sherman, CNP, Author Rae, [facility], Isabel Martel PA-C, Britt Hanna, [facility], Marco A Angel, [facility], Maria Luisa Chapman, CNP, Luiza Santo, CNP, Linnette Cruz, CNP, Brigette Miguel, CNP, Yazmin Lopez, CNP, Anuja Vela, [address]    HISTORY AND PHYSICAL EXAMINATION            Date:   2/10/2022  Patient name:  Ludger Hamman  Date of admission:  2/9/2022 10:35 PM  MRN:   [id number]  Account:  [de-identified]  YOB: 1955  PCP:    Kandis Brunner, APRN - CNP  Room:   1007/1007-02  Code Status:    Full Code    Chief Complaint:     Chief Complaint   Patient presents with    Shortness of Breath     pt speaking in full sentences    Mouth Injury     states \"burned mouth on [place] on 2/2 and it is still burning and her teeth are falling out. \"       History Obtained From:     patient, electronic medical record    History of Present Illness:     Ludger Hamman is a 73 y.o. Non- / non  female who presents with Shortness of Breath (pt speaking in full sentences) and Mouth Injury (states \"burned mouth on [place] on 2/2 and it is still burning and her teeth are falling out. \")   and is admitted to the hospital for the management of Hyperglycemia.     73year old female, former EtoH abuse recently quit for more than 1 months, prior substance abuse, insulin dependant diabetes presents after feeling unwell and not taking her medications for 2 weeks. Per emr patient burnt her mouth from chili at [employer]. Past Medical History:     Past Medical History:   Diagnosis Date    Cataracts, bilateral     COPD (chronic obstructive pulmonary disease) (HCC)     CTS (carpal tunnel syndrome) left    Fibromyalgia     Generalized abdominal pain 6/3/2016    Glaucoma     Hypertension     Osteoarthritis     Osteoporosis     Pancreatic calcification 6/3/2016    Pancreatitis     Protein-calorie malnutrition, severe ([address] 6/1/2016    Seizures (HCC)     Tremor     Type II or unspecified type diabetes mellitus without mention of complication, not stated as uncontrolled     Uncontrolled type 2 DM with hyperosmolar nonketotic hyperglycemia ([address] 6/3/2016        Past Surgical History:     Past Surgical History:   Procedure Laterality Date    COLONOSCOPY  08/30/2016    very poor prep, unable to complete    COLONOSCOPY  06/30/2017    ADENOMATOUS POLYP.  COLONOSCOPY  05/29/2018    polypectomy x1    COLONOSCOPY  05/07/2021    COLONOSCOPY N/A 5/7/2021    COLONOSCOPY WITH polypectomy performed by [facility]y White [facility] at [facility] Left     ovary twice    NECK SURGERY      pt unsure of exact date & what the procedure was ? 2015 [facility]    OR COLON CA SCRN NOT  [address] IND N/A 5/29/2018    COLONOSCOPY WITH POLYP REMOVEL performed by Corey White MD at [facility] [address] N/A 6/30/2017    COLONOSCOPY POLYPECTOMY HOT BIOPSY performed by Corey White MD at [place] [address]  08/30/2016    gastritis, MILD CHRONIC GASTRITIS WITH FOCAL ANTRAL EROSION AND ASSOCIATED ACUTE INFLAMMATION. INTESTINAL METAPLASIA PRESENT, NEGATIVE FOR DYSPLASIA. Medications Prior to Admission:     Prior to Admission medications    Medication Sig Start Date End Date Taking?  Authorizing Provider   naproxen (NAPROSYN) 500 MG tablet Take 1 tablet by mouth 2 times daily (with meals) 1/23/22  Yes Teresa Florez PA-C   furosemide (LASIX) 20 MG tablet Take 1 tablet by mouth daily 1/20/22  Yes Savanna Mosqueda MD   omeprazole (PRILOSEC) 20 MG delayed release capsule TAKE ONE (1) CAPSULE BY MOUTH TWICE DAILY 1/6/22  Yes DONTRELL Mckenzie CNP   megestrol (MEGACE) 20 MG tablet TAKE 1 TABLET BY MOUTH DAILY 12/13/21  Yes Arianna Madison MD   glimepiride (AMARYL) 2 MG tablet Take 1 tablet by mouth 2 times daily (before meals) 11/3/21  Yes DONTRELL Mckenzie CNP   albuterol sulfate  (90 Base) MCG/ACT inhaler USE 2 INHALATIONS BY MOUTH  EVERY 6 HOURS AS NEEDED FOR WHEEZING 10/28/21  Yes DONTRELL Mckenzie CNP   metFORMIN (GLUCOPHAGE-XR) 500 MG extended release tablet Take 2 tablets by mouth daily (with breakfast) AND 1 tablet Daily with supper.  10/28/21  Yes DONTRELL Mckenzie CNP   mirtazapine (REMERON) 30 MG tablet Take 0.5 tablets by mouth nightly 10/28/21  Yes DNOTRELL Mckenzie CNP   sodium bicarbonate 650 MG tablet take 1 tablet by mouth three times a day 10/28/21  Yes DONTRELL Mckenzie CNP   lipase-protease-amylase (CREON) 37097-410204 units CPEP delayed release capsule take 2 capsules by mouth four times a day after meals and at bedtime 10/13/21  Yes DONTRELL Mckenzie CNP   Multiple Vitamin (MULTI-VITAMIN DAILY PO) Take by mouth   Yes Historical Provider, MD   ZINC PO Take by mouth   Yes Historical Provider, MD   bisacodyl (DULCOLAX) 5 MG EC tablet TAKE 4 TABS AT 10 AM THE DAY PRIOR TO COLONOSCOPY 4/14/21  Yes Arianna Madison MD   Alcohol Swabs (ALCOHOL PREP) 70 % PADS Daily as needed 10/28/21   DONTRELL Mckenzie CNP   Lancets MISC 1 each by Does not apply route daily 10/28/21 11/27/21  DONTRELL Mckenzie CNP   Blood Gluc Meter Disp-Strips (BLOOD GLUCOSE METER DISPOSABLE) OXANA Daily and as needed 10/28/21   DONTRELL Mckenzie - CNP   blood glucose monitor strips 1 strip by Other route daily Test 1 times a day & as needed for symptoms of irregular blood glucose. 10/28/21 11/27/21  DONTRELL Ferguson CNP   gabapentin (NEURONTIN) 400 MG capsule Take 1 capsule by mouth 3 times daily for 90 days. 10/13/21 1/23/22  DONTRELL Ferguson - CNP   SYMBICORT 80-4.5 MCG/ACT AERO Inhale 2 puffs into the lungs 2 times daily 10/13/21 1/23/22  DONTRELL Ferguson - CNP   magnesium citrate solution Take 296 mLs by mouth once for 1 dose 4/14/21 10/13/21  Arianna Madison MD   COMFORT EZ PEN NEEDLES 32G X 6 MM MISC BID 4/13/20   DONTRELL Ferguson CNP   AquaLance Lancets 30G MISC Daily and as needed for hypoglycemia 4/6/20   DONTRELL Ferguson CNP        Allergies:     Peanuts [peanut oil], Vicodin [hydrocodone-acetaminophen], Tramadol, Tylenol with codeine #3 [acetaminophen-codeine], and Ibuprofen    Social History:     Tobacco:    reports that she has been smoking. She has a 10.00 pack-year smoking history. She has quit using smokeless tobacco.  Alcohol:      reports no history of alcohol use. Drug Use:  reports current drug use. Drug: Marijuana [place]). Family History:     Family History   Problem Relation Age of Onset   [facility] Brain Cancer Father     Diabetes Sister     Other Sister         epilepsy       Review of Systems:     Positive and Negative as described in HPI.     CONSTITUTIONAL:  negative for fevers, chills, sweats, fatigue, weight loss  HEENT:  negative for vision, hearing changes, runny nose, throat pain  RESPIRATORY:  negative for shortness of breath, cough, congestion, wheezing  CARDIOVASCULAR:  negative for chest pain, palpitations  GASTROINTESTINAL:  negative for nausea, vomiting, diarrhea, constipation, change in bowel habits, abdominal pain   GENITOURINARY:  negative for difficulty of urination, burning with urination, frequency   INTEGUMENT:  negative for rash, skin lesions, easy bruising   HEMATOLOGIC/LYMPHATIC:  negative for swelling/edema   ALLERGIC/IMMUNOLOGIC:  negative for urticaria , Atrial Rate 92 BPM    P-R Interval 164 ms    QRS Duration 74 ms    Q-T Interval 360 ms    QTc Calculation (Bazett) 445 ms    P Axis 88 degrees    R Axis 81 degrees    T Axis 81 degrees   CBC Auto Differential    Collection Time: 02/09/22 11:20 PM   Result Value Ref Range    WBC 7.8 3.5 - 11.3 k/uL    RBC 3.50 (L) 3.95 - 5.11 m/uL    Hemoglobin 11.4 (L) 11.9 - 15.1 g/dL    Hematocrit 33.7 (L) 36.3 - 47.1 %    MCV 96.3 82.6 - 102.9 fL    MCH 32.6 25.2 - 33.5 pg    MCHC 33.8 28.4 - 34.8 g/dL    RDW 12.5 11.8 - 14.4 %    Platelets 954 857 - 224 k/uL    MPV 11.1 8.1 - 13.5 fL    NRBC Automated 0.0 0.0 per 100 WBC    Differential Type NOT REPORTED     Seg Neutrophils 77 (H) 36 - 65 %    Lymphocytes 14 (L) 24 - 43 %    Monocytes 8 3 - 12 %    Eosinophils % 1 1 - 4 %    Basophils 0 0 - 2 %    Immature Granulocytes 0 0 %    Segs Absolute 5.91 1.50 - 8.10 k/uL    Absolute Lymph # 1.10 1.10 - 3.70 k/uL    Absolute Mono # 0.61 0.10 - 1.20 k/uL    Absolute Eos # 0.10 0.00 - 0.44 k/uL    Basophils Absolute 0.03 0.00 - 0.20 k/uL    Absolute Immature Granulocyte 0.02 0.00 - 0.30 k/uL    WBC Morphology NOT REPORTED     RBC Morphology NOT REPORTED     Platelet Estimate NOT REPORTED    Comprehensive Metabolic Panel w/ Reflex to MG    Collection Time: 02/09/22 11:20 PM   Result Value Ref Range    Glucose 1,077 (HH) 70 - 99 mg/dL    BUN 9 8 - 23 mg/dL    CREATININE 1.08 (H) 0.50 - 0.90 mg/dL    Bun/Cre Ratio 8 (L) 9 - 20    Calcium 8.9 8.6 - 10.4 mg/dL    Sodium 118 (LL) 135 - 144 mmol/L    Potassium 4.2 3.7 - 5.3 mmol/L    Chloride 80 (L) 98 - 107 mmol/L    CO2 24 20 - 31 mmol/L    Anion Gap 14 9 - 17 mmol/L    Alkaline Phosphatase 106 (H) 35 - 104 U/L    ALT 11 5 - 33 U/L    AST 13 <32 U/L    Total Bilirubin 0.31 0.3 - 1.2 mg/dL    Total Protein 6.7 6.4 - 8.3 g/dL    Albumin 3.7 3.5 - 5.2 g/dL    Albumin/Globulin Ratio NOT REPORTED 1.0 - 2.5    GFR Non- 51 (L) >60 mL/min    GFR African American >60 >60 mL/min    GFR Comment          GFR Staging NOT REPORTED    Troponin    Collection Time: 02/09/22 11:20 PM   Result Value Ref Range    Troponin, High Sensitivity 11 0 - 14 ng/L    Troponin T NOT REPORTED <0.03 ng/mL    Troponin Interp NOT REPORTED    Brain Natriuretic Peptide    Collection Time: 02/09/22 11:20 PM   Result Value Ref Range    Pro- <300 pg/mL    BNP Interpretation NOT REPORTED    D-Dimer Test    Collection Time: 02/09/22 11:20 PM   Result Value Ref Range    D-Dimer, Quant 0.83 (H) 0.00 - 0.59 mg/L FEU   Beta Hydroxybutyrate    Collection Time: 02/09/22 11:20 PM   Result Value Ref Range    Beta-Hydroxybutyrate 0.11 0.02 - 0.27 mmol/L   Basic Metabolic Panel w/ Reflex to MG    Collection Time: 02/10/22  1:43 AM   Result Value Ref Range    Glucose 277 (H) 70 - 99 mg/dL    BUN 6 (L) 8 - 23 mg/dL    CREATININE 0.60 0.50 - 0.90 mg/dL    Bun/Cre Ratio 10 9 - 20    Calcium 6.1 (L) 8.6 - 10.4 mg/dL    Sodium 136 135 - 144 mmol/L    Potassium 2.2 (LL) 3.7 - 5.3 mmol/L    Chloride 107 98 - 107 mmol/L    CO2 17 (L) 20 - 31 mmol/L    Anion Gap 12 9 - 17 mmol/L    GFR Non-African American >60 >60 mL/min    GFR African American >60 >60 mL/min    GFR Comment          GFR Staging NOT REPORTED    Magnesium    Collection Time: 02/10/22  1:43 AM   Result Value Ref Range    Magnesium 1.3 (L) 1.6 - 2.6 mg/dL   Basic Metabolic Prof    Collection Time: 02/10/22  2:30 AM   Result Value Ref Range    Glucose 277 (H) 70 - 99 mg/dL    BUN 7 (L) 8 - 23 mg/dL    CREATININE 0.85 0.50 - 0.90 mg/dL    Bun/Cre Ratio 8 (L) 9 - 20    Calcium 8.9 8.6 - 10.4 mg/dL    Sodium 130 (L) 135 - 144 mmol/L    Potassium 3.5 (L) 3.7 - 5.3 mmol/L    Chloride 92 (L) 98 - 107 mmol/L    CO2 23 20 - 31 mmol/L    Anion Gap 15 9 - 17 mmol/L    GFR Non-African American >60 >60 mL/min    GFR African American >60 >60 mL/min    GFR Comment          GFR Staging NOT REPORTED    POC Glucose Fingerstick    Collection Time: 02/10/22  4:07 AM   Result Value Ref Range    POC Glucose 398 (H) 65 - 105 mg/dL   Venous Blood Gas, POC    Collection Time: 02/10/22  5:18 AM   Result Value Ref Range    pH, Davin 7.307 (L) 7.320 - 7.430    pCO2, Davin 55.3 (H) 41.0 - 51.0 mm Hg    pO2, Davin 40.6 30.0 - 50.0 mm Hg    HCO3, Venous 27.7 22.0 - 29.0 mmol/L    Total CO2, Venous NOT REPORTED 23.0 - 30.0 mmol/L    Negative Base Excess, Davin NOT REPORTED 0.0 - 2.0    Positive Base Excess, Davin 0 0.0 - 3.0    O2 Sat, Davin 70 60.0 - 85.0 %    O2 Device/Flow/% NOT REPORTED     Arben Test NOT REPORTED     Sample Site NOT REPORTED     Mode NOT REPORTED     FIO2 NOT REPORTED     Pt Temp NOT REPORTED     POC pH Temp NOT REPORTED     POC pCO2 Temp NOT REPORTED mm Hg    POC pO2 Temp NOT REPORTED mm Hg   POC Glucose Fingerstick    Collection Time: 02/10/22  6:28 AM   Result Value Ref Range    POC Glucose 39 (LL) 65 - 105 mg/dL   Calcium, Ionized    Collection Time: 02/10/22  6:29 AM   Result Value Ref Range    Calcium, Ion 1.30 1.13 - 1.33 mmol/L   POC Glucose Fingerstick    Collection Time: 02/10/22  6:46 AM   Result Value Ref Range    POC Glucose 100 65 - 105 mg/dL   POC Glucose Fingerstick    Collection Time: 02/10/22  7:03 AM   Result Value Ref Range    POC Glucose 181 (H) 65 - 105 mg/dL   POC Glucose Fingerstick    Collection Time: 02/10/22  8:07 AM   Result Value Ref Range    POC Glucose 133 (H) 65 - 105 mg/dL       Imaging/Diagnostics:  XR CHEST PORTABLE    Result Date: 2/9/2022  Large lung volumes, without evidence of acute cardiopulmonary disease     CT CHEST PULMONARY EMBOLISM W CONTRAST    Result Date: 2/10/2022  No evidence of pulmonary embolism. Apparent circumferential wall thickening of the entire thoracic esophagus, due to incomplete distension, esophagitis, or chronic reflux. Infiltrative processes are felt to be less likely but not entirely excluded. COPD.        Assessment :      Hospital Problems           Last Modified POA    * (Principal) Hyperglycemia 2/10/2022 Yes    Pancreatic calcification (Chronic) 2/10/2022 Yes    Uncontrolled type 2 diabetes mellitus with hyperglycemia ([facility] 2/10/2022 Yes    Pancreatic insufficiency 2/10/2022 Yes    Alcohol-induced chronic pancreatitis ([facility] 2/10/2022 Yes    Body mass index (BMI) less than 16.5 2/10/2022 Yes    Pseudohyponatremia 2/10/2022 Yes    Hypokalemia 2/10/2022 Yes    Hypocalcemia 2/10/2022 Yes    Hypomagnesemia 2/10/2022 Yes          Plan:     Patient status inpatient in the Progressive Unit/Step down    Acute hyponatremia, that has now corrected. Appreciate nephrology recommendations. Currently improving  Diabetes with hyperglycemia due to noncompliacen with medications. Encourage PO intake. Metformin, hold other diabetes medicaiton due to poor intake  Gingivitis, magic mouthwash  Alcohol induced pancreatic insufficency. Creon  Dehydration, poor oral intake, continue IV fluids  Poor apetite, BMI <14, consult to dietary  Encourage smoking cessation  Depression. remeron    Consultations:   IP CONSULT TO NEPHROLOGY  IP CONSULT TO INTERNAL MEDICINE  IP CONSULT TO DIETITIAN     Patient is admitted as inpatient status because of co-morbidities listed above, severity of signs and symptoms as outlined, requirement for current medical therapies and most importantly because of direct risk to patient if care not provided in a hospital setting. Expected length of stay > 48 hours.     Cheryle Bumpers, MD  2/10/2022  11:53 AM    Copy sent to Dr. Vickie Dacosta, APRN - CNP

## 2022-02-10 NOTE — PLAN OF CARE
Nutrition Problem #1: Severe malnutrition  Intervention: Food and/or Nutrient Delivery: Continue Current Diet,Start Oral Nutrition Supplement  Nutritional Goals: PO intakes are greater than 75% at meals

## 2022-02-11 VITALS
TEMPERATURE: 97.7 F | WEIGHT: 90 LBS | SYSTOLIC BLOOD PRESSURE: 117 MMHG | HEART RATE: 99 BPM | DIASTOLIC BLOOD PRESSURE: 78 MMHG | HEIGHT: 67 IN | OXYGEN SATURATION: 100 % | RESPIRATION RATE: 16 BRPM | BODY MASS INDEX: 14.12 KG/M2

## 2022-02-11 LAB
ANION GAP SERPL CALCULATED.3IONS-SCNC: 9 MMOL/L (ref 9–17)
BUN BLDV-MCNC: 5 MG/DL (ref 8–23)
BUN/CREAT BLD: 7 (ref 9–20)
CALCIUM SERPL-MCNC: 8.5 MG/DL (ref 8.6–10.4)
CHLORIDE BLD-SCNC: 101 MMOL/L (ref 98–107)
CO2: 23 MMOL/L (ref 20–31)
CREAT SERPL-MCNC: 0.76 MG/DL (ref 0.5–0.9)
GFR AFRICAN AMERICAN: >60 ML/MIN
GFR NON-AFRICAN AMERICAN: >60 ML/MIN
GFR SERPL CREATININE-BSD FRML MDRD: ABNORMAL ML/MIN/{1.73_M2}
GFR SERPL CREATININE-BSD FRML MDRD: ABNORMAL ML/MIN/{1.73_M2}
GLUCOSE BLD-MCNC: 109 MG/DL (ref 65–105)
GLUCOSE BLD-MCNC: 304 MG/DL (ref 65–105)
GLUCOSE BLD-MCNC: 341 MG/DL (ref 65–105)
GLUCOSE BLD-MCNC: 378 MG/DL (ref 70–99)
HCT VFR BLD CALC: 30.5 % (ref 36.3–47.1)
HEMOGLOBIN: 10.5 G/DL (ref 11.9–15.1)
INR BLD: 1.1
MCH RBC QN AUTO: 31.9 PG (ref 25.2–33.5)
MCHC RBC AUTO-ENTMCNC: 34.4 G/DL (ref 28.4–34.8)
MCV RBC AUTO: 92.7 FL (ref 82.6–102.9)
NRBC AUTOMATED: 0 PER 100 WBC
PDW BLD-RTO: 12.1 % (ref 11.8–14.4)
PLATELET # BLD: 215 K/UL (ref 138–453)
PMV BLD AUTO: 11.3 FL (ref 8.1–13.5)
POTASSIUM SERPL-SCNC: 4.5 MMOL/L (ref 3.7–5.3)
PROTHROMBIN TIME: 13.9 SEC (ref 11.5–14.2)
RBC # BLD: 3.29 M/UL (ref 3.95–5.11)
SODIUM BLD-SCNC: 133 MMOL/L (ref 135–144)
WBC # BLD: 8.1 K/UL (ref 3.5–11.3)

## 2022-02-11 PROCEDURE — 96372 THER/PROPH/DIAG INJ SC/IM: CPT

## 2022-02-11 PROCEDURE — 6370000000 HC RX 637 (ALT 250 FOR IP): Performed by: STUDENT IN AN ORGANIZED HEALTH CARE EDUCATION/TRAINING PROGRAM

## 2022-02-11 PROCEDURE — 99232 SBSQ HOSP IP/OBS MODERATE 35: CPT | Performed by: STUDENT IN AN ORGANIZED HEALTH CARE EDUCATION/TRAINING PROGRAM

## 2022-02-11 PROCEDURE — 94640 AIRWAY INHALATION TREATMENT: CPT

## 2022-02-11 PROCEDURE — 6360000002 HC RX W HCPCS: Performed by: NURSE PRACTITIONER

## 2022-02-11 PROCEDURE — 85610 PROTHROMBIN TIME: CPT

## 2022-02-11 PROCEDURE — 2580000003 HC RX 258: Performed by: NURSE PRACTITIONER

## 2022-02-11 PROCEDURE — 36415 COLL VENOUS BLD VENIPUNCTURE: CPT

## 2022-02-11 PROCEDURE — 6370000000 HC RX 637 (ALT 250 FOR IP): Performed by: NURSE PRACTITIONER

## 2022-02-11 PROCEDURE — 85027 COMPLETE CBC AUTOMATED: CPT

## 2022-02-11 PROCEDURE — G0378 HOSPITAL OBSERVATION PER HR: HCPCS

## 2022-02-11 PROCEDURE — 94760 N-INVAS EAR/PLS OXIMETRY 1: CPT

## 2022-02-11 PROCEDURE — 80048 BASIC METABOLIC PNL TOTAL CA: CPT

## 2022-02-11 PROCEDURE — 82947 ASSAY GLUCOSE BLOOD QUANT: CPT

## 2022-02-11 RX ORDER — SODIUM BICARBONATE 650 MG/1
TABLET ORAL
Qty: 90 TABLET | Refills: 0 | Status: SHIPPED | OUTPATIENT
Start: 2022-02-11

## 2022-02-11 RX ORDER — FLUCONAZOLE 100 MG/1
100 TABLET ORAL DAILY
Qty: 6 TABLET | Refills: 0 | Status: SHIPPED | OUTPATIENT
Start: 2022-02-11 | End: 2022-02-17

## 2022-02-11 RX ORDER — NICOTINE 21 MG/24HR
1 PATCH, TRANSDERMAL 24 HOURS TRANSDERMAL DAILY
Qty: 30 PATCH | Refills: 3 | Status: SHIPPED | OUTPATIENT
Start: 2022-02-11

## 2022-02-11 RX ORDER — INSULIN LISPRO 100 [IU]/ML
5 INJECTION, SOLUTION INTRAVENOUS; SUBCUTANEOUS
Qty: 4.5 ML | Refills: 0 | Status: SHIPPED | OUTPATIENT
Start: 2022-02-11 | End: 2022-02-25 | Stop reason: ALTCHOICE

## 2022-02-11 RX ORDER — PEN NEEDLE, DIABETIC 31 G X1/4"
1 NEEDLE, DISPOSABLE MISCELLANEOUS DAILY
Qty: 100 EACH | Refills: 3 | Status: SHIPPED | OUTPATIENT
Start: 2022-02-11 | End: 2022-05-27 | Stop reason: ALTCHOICE

## 2022-02-11 RX ORDER — SODIUM BICARBONATE 650 MG/1
TABLET ORAL
Qty: 90 TABLET | Refills: 10 | Status: SHIPPED | OUTPATIENT
Start: 2022-02-11 | End: 2022-02-11 | Stop reason: SDUPTHER

## 2022-02-11 RX ADMIN — BUDESONIDE AND FORMOTEROL FUMARATE DIHYDRATE 2 PUFF: 80; 4.5 AEROSOL RESPIRATORY (INHALATION) at 08:10

## 2022-02-11 RX ADMIN — METFORMIN HYDROCHLORIDE 1000 MG: 500 TABLET, EXTENDED RELEASE ORAL at 08:49

## 2022-02-11 RX ADMIN — MEGESTROL ACETATE 20 MG: 40 TABLET ORAL at 08:53

## 2022-02-11 RX ADMIN — SODIUM CHLORIDE: 9 INJECTION, SOLUTION INTRAVENOUS at 04:41

## 2022-02-11 RX ADMIN — PANTOPRAZOLE SODIUM 40 MG: 40 TABLET, DELAYED RELEASE ORAL at 05:25

## 2022-02-11 RX ADMIN — DIPHENHYDRAMINE HYDROCHLORIDE 5 ML: 12.5 LIQUID ORAL at 08:55

## 2022-02-11 RX ADMIN — PANCRELIPASE 72000 UNITS: 24000; 76000; 120000 CAPSULE, DELAYED RELEASE PELLETS ORAL at 08:49

## 2022-02-11 RX ADMIN — SODIUM BICARBONATE 650 MG: 650 TABLET ORAL at 08:49

## 2022-02-11 RX ADMIN — INSULIN LISPRO 4 UNITS: 100 INJECTION, SOLUTION INTRAVENOUS; SUBCUTANEOUS at 05:25

## 2022-02-11 RX ADMIN — FLUCONAZOLE 100 MG: 100 TABLET ORAL at 08:49

## 2022-02-11 RX ADMIN — ENOXAPARIN SODIUM 30 MG: 30 INJECTION SUBCUTANEOUS at 08:50

## 2022-02-11 NOTE — PROGRESS NOTES
Physician Progress Note      PATIENTDub Pop  CSN #:                  [id number]  :                       1955  ADMIT DATE:       2022 10:35 PM  [address] [place] DATE:  RESPONDING  PROVIDER #:        Wei Contreras MD          QUERY TEXT:    Pt admitted with hyperglycemia. Pt noted to have elevated creatinine. If   possible, please document in the progress notes and discharge summary if you   are evaluating and/or treating any of the following: The medical record reflects the following:  Risk Factors: Dehydration  Clinical Indicators: Creatinine of 1.08 on 22, Creatinine of 0.6 on   2/10/22  Treatment: IVF, labs, monitoring    Thank you,  Jonathan Kahn RN    Defined by Kidney Disease Improving Global Outcomes (KDIGO) clinical practice   guideline for acute kidney injury:  -Increase in SCr by greater than or equal to 0.3 mg/dl within 48 hours; or  -Increase or decrease in SCr to greater than or equal to 1.5 times baseline,   which is known or presumed to have occurred within the prior 7 days; or  -Urine volume < 0.5ml/kg/h for 6 hours  Options provided:  -- Acute kidney failure  -- Acute kidney injury  -- Other - I will add my own diagnosis  -- Disagree - Not applicable / Not valid  -- Disagree - Clinically unable to determine / Unknown  -- Refer to Clinical Documentation Reviewer    PROVIDER RESPONSE TEXT:    This patient is in Acute kidney failure. Query created by:  Brit Soto on 2022 8:39 AM      Electronically signed by:  Wei Contreras MD 2022 9:45 AM

## 2022-02-11 NOTE — TELEPHONE ENCOUNTER
Health Maintenance   Topic Date Due    Diabetic retinal exam  Never done    Shingles Vaccine (1 of 2) Never done    Diabetic foot exam  02/13/2020    Diabetic microalbuminuria test  03/11/2020    Lipid screen  12/28/2021    Annual Wellness Visit (AWV)  Never done    A1C test (Diabetic or Prediabetic)  04/21/2022    Depression Monitoring  01/21/2023    Potassium monitoring  02/11/2023    Creatinine monitoring  02/11/2023    Breast cancer screen  06/02/2023    Pneumococcal 65+ years Vaccine (2 of 2 - PPSV23) 10/10/2023    DTaP/Tdap/Td vaccine (2 - Td or Tdap) 10/05/2025    Colon cancer screen colonoscopy  05/07/2026    Cervical cancer screen  06/09/2026    DEXA (modify frequency per FRAX score)  Completed    Flu vaccine  Completed    COVID-19 Vaccine  Completed    Hepatitis C screen  Completed    HIV screen  Completed    Hepatitis A vaccine  Aged Out    Hib vaccine  Aged Out    Meningococcal (ACWY) vaccine  Aged Out             (applicable per patient's age: Cancer Screenings, Depression Screening, Fall Risk Screening, Immunizations)    Hemoglobin A1C (%)   Date Value   01/21/2022 11.6   10/13/2021 10.5   04/09/2021 9.2     Microalb/Crt.  Ratio (mcg/mg creat)   Date Value   03/11/2019 CANNOT BE CALCULATED     LDL Cholesterol (mg/dL)   Date Value   12/28/2020 19     AST (U/L)   Date Value   02/09/2022 13     ALT (U/L)   Date Value   02/09/2022 11     BUN (mg/dL)   Date Value   02/11/2022 5 (L)      (goal A1C is < 7)   (goal LDL is <100) need 30-50% reduction from baseline     BP Readings from Last 3 Encounters:   02/11/22 117/78   01/23/22 115/70   01/21/22 (!) 137/94    (goal /80)      All Future Testing planned in [facility]:  Lab Frequency Next Occurrence   Hemoglobin A1C Once 11/27/2021   Microalbumin, Ur Once 02/20/2022   POCT Glucose Every 4 Hours (Lab)    HYPOGLYCEMIA TREATMENT: blood glucose less than 50 mg/dL and patient  ALERT and TOLERATING PO PRN    HYPOGLYCEMIA TREATMENT: blood glucose less than 70 mg/dL and patient ALERT and TOLERATING PO PRN    HYPOGLYCEMIA TREATMENT: blood glucose less than 70 mg/dL and patient NOT ALERT or NPO PRN    POCT Glucose PRN    Up with assistance PRN    Intake and output EVERY 8 HOURS    Initiate Oxygen Therapy Protocol DAILY (RT)    Pulse Oximetry Spot Check PRN    POCT Glucose 4 TIMES DAILY BEFORE MEALS & AT BEDTIME    HYPOGLYCEMIA TREATMENT: blood glucose less than 50 mg/dL and patient  ALERT and TOLERATING PO PRN    HYPOGLYCEMIA TREATMENT: blood glucose less than 70 mg/dL and patient ALERT and TOLERATING PO PRN    HYPOGLYCEMIA TREATMENT: blood glucose less than 70 mg/dL and patient NOT ALERT or NPO PRN    POCT Glucose PRN    Initiate RT Inhaler-Nebulizer Bronchodilator Protocol DAILY (RT)        Next Visit Date:  Future Appointments   Date Time Provider Teresa Shoemaker   2/24/2022  9:00 AM Soraya gold psych [facility]   5/31/2022  1:00 PM Martha Arenas MD [facility]            Patient Active Problem List:     Intractable epilepsy ([address]     Hypoglycemia     Depression     Osteoporosis     Affective disorder ([address]     Uncontrolled type 2 DM with hyperosmolar nonketotic hyperglycemia (HCC)     Generalized abdominal pain     Pancreatic calcification     Uncontrolled type 2 diabetes mellitus with hyperglycemia (HCC)     Pancreatic insufficiency     Pain of upper abdomen     Alcohol-induced chronic pancreatitis (HCC)     Weight loss     Polysubstance abuse (HCC)     Unresponsive episode     Chronic neck pain     Neuropathic pain of both legs     Seizure disorder, secondary ([address]     Essential (primary) hypertension     Type 2 diabetes mellitus without complication, with long-term current use of insulin (HCC)     Neuropathic pain     Elevated CEA     Acute left-sided low back pain without sciatica     Adenomatous polyps     Bilateral wrist pain     Seizure-like activity (HCC)     Elevated CA 19-9 level     Body mass index (BMI) less than 16.5 Pseudohyponatremia     Hyperglycemia     Hypokalemia     Hypocalcemia     Hypomagnesemia     Noncompliance     Severe malnutrition ([address]

## 2022-02-11 NOTE — PLAN OF CARE
Problem: Skin Integrity:  Goal: Will show no infection signs and symptoms  Description: Will show no infection signs and symptoms  2/10/2022 2315 by Kevin Sommer RN  Outcome: Ongoing     Problem: Falls - Risk of:  Goal: Will remain free from falls  Description: Will remain free from falls  2/10/2022 2315 by Kevin Sommer RN  Outcome: Ongoing     Problem: SAFETY  Goal: Free from accidental physical injury  2/10/2022 2315 by Kevin Sommer RN  Outcome: Ongoing     Problem: DAILY CARE  Goal: Daily care needs are met  2/10/2022 2315 by Kevin Sommer RN  Outcome: Ongoing     Problem: PAIN  Goal: Patient's pain/discomfort is manageable  2/10/2022 2315 by Kevin Sommer RN  Outcome: Ongoing     Problem: SKIN INTEGRITY  Goal: Skin integrity is maintained or improved  2/10/2022 2315 by Kevin Sommer RN  Outcome: Ongoing     Problem: KNOWLEDGE DEFICIT  Goal: Patient/S.O. demonstrates understanding of disease process, treatment plan, medications, and discharge instructions.   2/10/2022 2315 by Kevin Sommer RN  Outcome: Ongoing     Problem: DISCHARGE BARRIERS  Goal: Patient's continuum of care needs are met  2/10/2022 2315 by Kevin Sommer RN  Outcome: Ongoing
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
## 2022-02-11 NOTE — PROGRESS NOTES
Pt ambulatory during discharge. Pt taken out per wheelchair. Pt discharged via private auto with family member. Discharge paperwork and instructions given to patient. Patient acknowledges understanding. Scripts  faxed to pt. Pharmacy and explained. Follow up appointments reviewed. Discharge checklist completed       Any questions answered.

## 2022-02-11 NOTE — DISCHARGE INSTR - DIET

## 2022-02-11 NOTE — DISCHARGE SUMMARY
[facility]  Office: [address], DO, Xavier Vasquez, DO, Elidia Yaritza, DO, Hermes Hughes Blood, DO, Rachael Crump MD, Jo Ann Castro MD, Tigist Rubin MD, Alexis Wynn MD, Gopi Gresham MD, Mary Gillis MD, Hlolie Hector MD, Shyam Samayoa, DO, Rebecca Quintero, DO, Cordelia Barthel, MD,  Daniel Abad, DO, James Dobson MD, Leah Kaur MD, Radha Durán MD, Shreyas Colby MD, Choco Barragan MD, Lefty Cantu, [employer], [facility], CNP, Milan Yoo, CNP, Jovana Ordoñez, CNS, Marilyn Garcia, CNP, Augustine Crum, CNP, Federica Esquivel, CNP, Josiah Verdugo, CNP, Elizabeth Avalos, CNP, Ana Vann PA-C, Rocco Keys, [facility], Lonny Roach, [facility], Thien Pierson, CNP, Sreekanth Miller, CNP, Kizzy Herrera, CNP, Gladys iDaz, CNP, Heber Persaud, CNP, Lisa Jarvis, [facility]    Discharge Summary     Patient ID: Ronit Schumacher  :     MRN: [id number]     ACCOUNT:  [de-identified]   Patient's PCP: DONTRELL Leyva CNP  Admit Date: 2022   Discharge Date: 2022     Length of Stay: 1  Code Status:  Full Code  Admitting Physician: Cordelia Barthel, MD  Discharge Physician: Cordelia Barthel, MD     Active Discharge Diagnoses:     Hospital Problem Lists:  Principal Problem:    Hyperglycemia  Active Problems:    Pancreatic calcification    Uncontrolled type 2 diabetes mellitus with hyperglycemia (HCC)    Pancreatic insufficiency    Alcohol-induced chronic pancreatitis ([facility] [address]    Body mass index (BMI) less than 16.5    Pseudohyponatremia    Hypokalemia    Hypocalcemia    Hypomagnesemia    Noncompliance    Severe malnutrition ([facility] [facility]  Resolved Problems:    * No resolved hospital problems.  *      Admission Condition:  stable     Discharged Condition: stable    Hospital Stay:     Hospital Course:  Ronit Schumacher is a 73 y.o. female who was admitted for the management of  Hyperglycemia , presented to ER with Shortness of Breath (pt speaking in full sentences) and Mouth Injury (states \"burned mouth on [place] on 2/2 and it is still burning and her teeth are falling out. \")      73year old female, former EtoH abuse recently quit for more than 1 months, prior substance abuse, insulin dependant diabetes presents after feeling unwell and not taking her medications for 2 weeks. Per emr patient burnt her mouth from chili at [facility]  Nephrology was also consulted due to hyponatremia has been improving and weaned off of IV fluids and encouraging p.o. intake. Hyperglycemics were adjusted and discussed with patient to only take insulin if she has a meal.  Patient to follow-up with her PCP as outpatient and to find a dentist.    Significant therapeutic interventions: See above    Significant Diagnostic Studies:   Labs / Micro:  CBC:   Lab Results   Component Value Date    WBC 8.1 02/11/2022    RBC 3.29 02/11/2022    RBC 4.27 05/01/2012    HGB 10.5 02/11/2022    HCT 30.5 02/11/2022    MCV 92.7 02/11/2022    MCH 31.9 02/11/2022    MCHC 34.4 02/11/2022    RDW 12.1 02/11/2022     02/11/2022     05/01/2012     BMP:    Lab Results   Component Value Date    GLUCOSE 378 02/11/2022    GLUCOSE 159 05/01/2012     02/11/2022    K 4.5 02/11/2022     02/11/2022    CO2 23 02/11/2022    ANIONGAP 9 02/11/2022    BUN 5 02/11/2022    CREATININE 0.76 02/11/2022    BUNCRER 7 02/11/2022    CALCIUM 8.5 02/11/2022    LABGLOM >60 02/11/2022    GFRAA >60 02/11/2022    GFR      02/11/2022    GFR NOT REPORTED 02/11/2022        Radiology:  XR CHEST PORTABLE    Result Date: 2/9/2022  Large lung volumes, without evidence of acute cardiopulmonary disease     CT CHEST PULMONARY EMBOLISM W CONTRAST    Result Date: 2/10/2022  No evidence of pulmonary embolism. Apparent circumferential wall thickening of the entire thoracic esophagus, due to incomplete distension, esophagitis, or chronic reflux.   Infiltrative processes are felt to be less likely but not entirely excluded. COPD. Consultations:    Consults:     Final Specialist Recommendations/Findings:   IP CONSULT TO NEPHROLOGY  IP CONSULT TO INTERNAL MEDICINE  IP CONSULT TO DIETITIAN      The patient was seen and examined on day of discharge and this discharge summary is in conjunction with any daily progress note from day of discharge. Discharge plan:     Disposition: Home    Physician Follow Up:     Karri Lima, APRN - CNP  [address]  [phone]    Schedule an appointment as soon as possible for a visit in 1 week  follow up       Requiring Further Evaluation/Follow Up POST HOSPITALIZATION/Incidental Findings: Please follow-up with your PCP. Please follow-up with diabetes education. You are given fluconazole to help with swallowing better. Please continue to abstain from smoking and NicoDerm patches were prescribed. Diet: diabetic diet    Activity: As tolerated    Instructions to Patient:  Please follow-up with your PCP. Please follow-up with diabetes education. You are given fluconazole to help with swallowing better. Please continue to abstain from smoking and NicoDerm patches were prescribed. Discharge Medications:      Medication List      START taking these medications    fluconazole 100 MG tablet  Commonly known as: DIFLUCAN  Take 1 tablet by mouth daily for 6 doses     insulin lispro (1 Unit Dial) 100 UNIT/ML Sopn  Commonly known as: HumaLOG KwikPen  Inject 5 Units into the skin 3 times daily (before meals)     nicotine 21 MG/24HR  Commonly known as: NICODERM CQ  Place 1 patch onto the skin daily        CHANGE how you take these medications    AquaLance Lancets 30G Misc  Daily and as needed for hypoglycemia  What changed: Another medication with the same name was removed. Continue taking this medication, and follow the directions you see here.      * Comfort EZ Pen Needles 32G X 6 MM Misc  Generic drug: Insulin Pen Needle  BID  What changed: Another medication with the same name was added. Make sure you understand how and when to take each. * Meijer Pen Needles 31G X 6 MM Misc  Generic drug: Insulin Pen Needle  1 each by Does not apply route daily  What changed: You were already taking a medication with the same name, and this prescription was added. Make sure you understand how and when to take each.     sodium bicarbonate 650 MG tablet  TAKE 1 TABLET BY MOUTH THREE TIMES DAILY  What changed: additional instructions         * This list has 2 medication(s) that are the same as other medications prescribed for you. Read the directions carefully, and ask your doctor or other care provider to review them with you. CONTINUE taking these medications    albuterol sulfate  (90 Base) MCG/ACT inhaler  USE 2 INHALATIONS BY MOUTH  EVERY 6 HOURS AS NEEDED FOR WHEEZING     Alcohol Prep 70 % Pads  Daily as needed     BLOOD GLUCOSE METER DISPOSABLE Yesenia  Daily and as needed     blood glucose test strips  1 strip by Other route daily Test 1 times a day & as needed for symptoms of irregular blood glucose. Creon 98795-251875 units Cpep delayed release capsule  Generic drug: lipase-protease-amylase  take 2 capsules by mouth four times a day after meals and at bedtime     glimepiride 2 MG tablet  Commonly known as: AMARYL  Take 1 tablet by mouth 2 times daily (before meals)     megestrol 20 MG tablet  Commonly known as: MEGACE  TAKE 1 TABLET BY MOUTH DAILY     metFORMIN 500 MG extended release tablet  Commonly known as: GLUCOPHAGE-XR  Take 2 tablets by mouth daily (with breakfast) AND 1 tablet Daily with supper.      mirtazapine 30 MG tablet  Commonly known as: Remeron  Take 0.5 tablets by mouth nightly     MULTI-VITAMIN DAILY PO     omeprazole 20 MG delayed release capsule  Commonly known as: PRILOSEC  TAKE ONE (1) CAPSULE BY MOUTH TWICE DAILY     Symbicort 80-4.5 MCG/ACT Aero  Generic drug: budesonide-formoterol  Inhale 2 puffs into the lungs 2 times daily     ZINC PO        STOP taking these medications    bisacodyl 5 MG EC tablet  Commonly known as: DULCOLAX     furosemide 20 MG tablet  Commonly known as: LASIX     gabapentin 400 MG capsule  Commonly known as: NEURONTIN     magnesium citrate solution     naproxen 500 MG tablet  Commonly known as: Naprosyn           Where to Get Your Medications      These medications were sent to [address] [place] [id number]    Phone: [phone]   sodium bicarbonate 650 MG tablet     These medications were sent to [address], [address] [facility] [phone]  [address] [place], [place] [address] [address] [address]    Phone: [phone]   fluconazole 100 MG tablet  insulin lispro (1 Unit Dial) 100 UNIT/ML Sopn  [employer] Pen Needles 31G X 6 MM Misc  nicotine 21 MG/24HR         Discharge Procedure Orders   [facility]   Referral Priority: Routine Referral Type: Eval and Treat   Referral Reason: Specialty Services Required   Requested Specialty: Physical Therapy   Number of Visits Requested: [facility]   Referral Priority: Routine Referral Type: Eval and Treat   Referral Reason: Specialty Services Required   Number of Visits Requested: 1       Time Spent on discharge is  34 mins in patient examination, evaluation, counseling as well as medication reconciliation, prescriptions for required medications, discharge plan and follow up. Electronically signed by   Ramana Call MD  2/11/2022  12:26 PM      Thank you Dr. Martine Angelucci, APRN - CNP for the opportunity to be involved in this patient's care.

## 2022-02-14 ENCOUNTER — CARE COORDINATION (OUTPATIENT)
Dept: CASE MANAGEMENT | Age: 67
End: 2022-02-14

## 2022-02-14 NOTE — CARE COORDINATION
Ale[facility] Transitions Initial Follow Up Call    Call within 2 business days of discharge: Yes    Patient: Agustin Blum Patient : 1955   MRN: [id number]  Reason for Admission: [address]  Discharge Date: 22 RARS: Readmission Risk Score: 12.8 ( )      Last Discharge [facility]       Complaint Diagnosis Description Type Department Provider    22 Shortness of Breath; Mouth Injury Hypernatremia . .. ED to Hosp-Admission (Discharged) (ADMITTED) FITZ Trinh MD; Corie Jones ... Spoke with:  Attempted to contact patient for 24 hour initial transitional call. Unable to reach patient. Caller left a Hipaa compliant message introducing self, role, nature of the call and contact information for a return call.     Facility: [address] [place] [address]    Non-face-to-face services provided:  Obtained and reviewed discharge summary and/or continuity of care documents    Care Transitions 24 Hour Call    Care Transitions Interventions         Follow Up  Future Appointments   Date Time Provider Teresa Shoemaker   2022  9:00 AM Efrain gold psych [facility]   2022  1:00 PM Joan Valdez MD [facility] [address] LPN Care Transitions Nurse   [phone]

## 2022-02-15 ENCOUNTER — CARE COORDINATION (OUTPATIENT)
Dept: CASE MANAGEMENT | Age: 67
End: 2022-02-15

## 2022-02-15 NOTE — CARE COORDINATION
Ale[facility] Transitions Initial Follow Up Call    Call within 2 business days of discharge: Yes    Patient: Craolin Liu Patient : 1955   MRN: [id number]  Reason for Admission: hypernatremia  Hyperglycemia  Dyspnea, mouth burning  Discharge Date: 22 RARS: Readmission Risk Score: 12.8 ( )      Last Discharge [facility]       Complaint Diagnosis Description Type Department Provider    22 Shortness of Breath; Mouth Injury Hypernatremia . .. ED to Hosp-Admission (Discharged) (ADMITTED) ST[facility] PROG Harrietta Goldberg, MD; Adele Bustamante ... Spoke with: 2nd Attempt to contact patient for 24 Hour Transition Call - (Discharged from Hospital to Home  Patient was not reached. Left HIPAA Compliant message on voice mail for Patient that this is the Final Transition Call and to call their PCP for any problems or issues that may occur.     Facility: [facility]    Non-face-to-face services provided:  Obtained and reviewed discharge summary and/or continuity of care documents    Care Transitions 24 Hour Call    Care Transitions Interventions         Follow Up  Future Appointments   Date Time Provider Teresa Shoemaker   2022  9:00 AM Shante gold psych [facility]   2022  1:00 PM Cristobal Heimlich, MD [facility] [address] LPN Care Transitions Nurse   [phone]

## 2022-02-24 ENCOUNTER — TELEPHONE (OUTPATIENT)
Age: 67
End: 2022-02-24

## 2022-02-24 NOTE — TELEPHONE ENCOUNTER
.Contacted patient today in regards to missed appointment. Left voicemail. Was call completed? If not, reason: Call was completed    Anything the provider should be aware of (if yes, please route call)?  No    Reason for Missed Appointment: Unknown

## 2022-02-25 ENCOUNTER — OFFICE VISIT (OUTPATIENT)
Dept: PRIMARY CARE CLINIC | Age: 67
End: 2022-02-25
Payer: MEDICARE

## 2022-02-25 VITALS
WEIGHT: 87 LBS | TEMPERATURE: 97 F | SYSTOLIC BLOOD PRESSURE: 127 MMHG | HEART RATE: 113 BPM | OXYGEN SATURATION: 99 % | DIASTOLIC BLOOD PRESSURE: 80 MMHG | BODY MASS INDEX: 13.63 KG/M2

## 2022-02-25 DIAGNOSIS — M79.601 PARESTHESIA AND PAIN OF BOTH UPPER EXTREMITIES: ICD-10-CM

## 2022-02-25 DIAGNOSIS — D64.9 ANEMIA, UNSPECIFIED TYPE: ICD-10-CM

## 2022-02-25 DIAGNOSIS — E11.42 TYPE 2 DIABETES MELLITUS WITH DIABETIC POLYNEUROPATHY, WITH LONG-TERM CURRENT USE OF INSULIN (HCC): Primary | ICD-10-CM

## 2022-02-25 DIAGNOSIS — Z79.4 TYPE 2 DIABETES MELLITUS WITH DIABETIC POLYNEUROPATHY, WITH LONG-TERM CURRENT USE OF INSULIN (HCC): Primary | ICD-10-CM

## 2022-02-25 DIAGNOSIS — M79.602 PARESTHESIA AND PAIN OF BOTH UPPER EXTREMITIES: ICD-10-CM

## 2022-02-25 DIAGNOSIS — R20.2 PARESTHESIA AND PAIN OF BOTH UPPER EXTREMITIES: ICD-10-CM

## 2022-02-25 DIAGNOSIS — E11.9 TYPE 2 DIABETES MELLITUS WITHOUT COMPLICATION, WITHOUT LONG-TERM CURRENT USE OF INSULIN (HCC): ICD-10-CM

## 2022-02-25 DIAGNOSIS — E43 SEVERE MALNUTRITION (HCC): ICD-10-CM

## 2022-02-25 PROCEDURE — 1111F DSCHRG MED/CURRENT MED MERGE: CPT | Performed by: NURSE PRACTITIONER

## 2022-02-25 PROCEDURE — 99214 OFFICE O/P EST MOD 30 MIN: CPT | Performed by: NURSE PRACTITIONER

## 2022-02-25 RX ORDER — FERROUS SULFATE 325(65) MG
325 TABLET ORAL EVERY OTHER DAY
Qty: 30 TABLET | Refills: 1 | Status: SHIPPED | OUTPATIENT
Start: 2022-02-25

## 2022-02-25 RX ORDER — LANCETS 30 GAUGE
1 EACH MISCELLANEOUS 2 TIMES DAILY
Qty: 300 EACH | Refills: 1 | Status: SHIPPED | OUTPATIENT
Start: 2022-02-25

## 2022-02-25 RX ORDER — INSULIN GLARGINE 100 [IU]/ML
5 INJECTION, SOLUTION SUBCUTANEOUS NIGHTLY
Qty: 5 PEN | Refills: 0 | Status: SHIPPED | OUTPATIENT
Start: 2022-02-25 | End: 2022-05-27

## 2022-02-25 RX ORDER — GLUCOSAMINE HCL/CHONDROITIN SU 500-400 MG
1 CAPSULE ORAL DAILY
Qty: 300 STRIP | Refills: 5 | Status: SHIPPED | OUTPATIENT
Start: 2022-02-25 | End: 2022-04-04 | Stop reason: SDUPTHER

## 2022-02-25 RX ORDER — INSULIN ASPART 100 [IU]/ML
INJECTION, SOLUTION INTRAVENOUS; SUBCUTANEOUS
COMMUNITY
Start: 2022-02-11 | End: 2022-02-25 | Stop reason: ALTCHOICE

## 2022-02-25 ASSESSMENT — ENCOUNTER SYMPTOMS
NAUSEA: 0
CHOKING: 0
BLOOD IN STOOL: 0
ABDOMINAL PAIN: 0
SORE THROAT: 0
CHEST TIGHTNESS: 0
TROUBLE SWALLOWING: 0
DIARRHEA: 0
SHORTNESS OF BREATH: 1
COUGH: 0
RHINORRHEA: 0
WHEEZING: 0
VOMITING: 0

## 2022-02-25 NOTE — PROGRESS NOTES
Post-Discharge Transitional Care Follow Up      Shiv Breaux   YOB: 1955    Date of Office Visit:  2/25/2022  Date of Hospital Admission: 2/9/22  Date of Hospital Discharge: 2/11/22  Readmission Risk Score (high >=14%. Medium >=10%):Readmission Risk Score: 12.8 ( )      Care management risk score Rising risk (score 2-5) and Complex Care (Scores >=6): 3     Non face to face  following discharge, date last encounter closed (first attempt may have been earlier): 2/15/2022  1:14 PM     Call initiated 2 business days of discharge: Yes     Type 2 diabetes mellitus with diabetic polyneuropathy, with long-term current use of insulin ([facility] [address]  -     VT DISCHARGE MEDS RECONCILED W/ CURRENT OUTPATIENT MED LIST  -     Blood Gluc Meter Disp-Strips (BLOOD GLUCOSE METER DISPOSABLE) OXANA; Disp-1 each, R-0, NormalDaily and as needed  -     blood glucose monitor strips; 1 strip by Other route daily Test 1 times a day & as needed for symptoms of irregular blood glucose., Other, DAILY Starting Fri 2/25/2022, Until Sun 3/27/2022, For 30 days, Disp-300 strip, R-5, Normal  Type 2 diabetes mellitus without complication, without long-term current use of insulin (HCC)  -     insulin glargine (BASAGLAR KWIKPEN) 100 UNIT/ML injection pen; Inject 5 Units into the skin nightly, Disp-5 pen, R-0Normal  -     Blood Gluc Meter Disp-Strips (BLOOD GLUCOSE METER DISPOSABLE) OXANA; Disp-1 each, R-0, NormalDaily and as needed  -     blood glucose monitor strips; 1 strip by Other route daily Test 1 times a day & as needed for symptoms of irregular blood glucose., Other, DAILY Starting Fri 2/25/2022, Until Sun 3/27/2022, For 30 days, Disp-300 strip, R-5, Normal  -     Lancets MISC; 2 TIMES DAILY Starting Fri 2/25/2022, Disp-300 each, R-1, Normal  Anemia, unspecified type  -     ferrous sulfate (IRON 325) 325 (65 Fe) MG tablet;  Take 1 tablet by mouth every other day, Disp-30 tablet, R-1Normal  -     Vitamin B12; Future  -     Ferritin; Future  -     Reticulocytes; Future  Paresthesia and pain of both upper extremities  -     ferrous sulfate (IRON 325) 325 (65 Fe) MG tablet; Take 1 tablet by mouth every other day, Disp-30 tablet, R-1Normal  -     Vitamin B12; Future  -     TSH With Reflex Ft4; Future  Severe malnutrition (HCC)  -     ferrous sulfate (IRON 325) 325 (65 Fe) MG tablet; Take 1 tablet by mouth every other day, Disp-30 tablet, R-1Normal  -     Vitamin B12; Future      Medical Decision Making: low complexity  Return in about 3 months (around 5/25/2022) for Diabetes. On this date 2/25/2022 I have spent 25 minutes reviewing previous notes, test results and face to face with the patient discussing the diagnosis and importance of compliance with the treatment plan as well as documenting on the day of the visit. Subjective:   Smoking less since discharge, is using nicotine patches. Completed course of diflucan, swallowing better. Cannot find meter, not checking sugars    Was given meal time insulin, but eats one meal a day so she has rarely used the mealtime insulin      Inpatient course: Discharge summary reviewed- see chart. Hospital Course:  Agustin Blum is a 73 y.o. female who was admitted for the management of  Hyperglycemia , presented to ER with Shortness of Breath (pt speaking in full sentences) and Mouth Injury (states \"burned mouth on [place]ili on 2/2 and it is still burning and her teeth are falling out. \")        73year old female, former EtoH abuse recently quit for more than 1 months, prior substance abuse, insulin dependant diabetes presents after feeling unwell and not taking her medications for 2 weeks. Per emr patient burnt her mouth from chili at [facility] was also consulted due to hyponatremia has been improving and weaned off of IV fluids and encouraging p.o. intake.   Hyperglycemics were adjusted and discussed with patient to only take insulin if she has a meal.  Patient to follow-up with her PCP as outpatient and to find a dentist.    Interval history/Current status: resolved, stable    Patient Active Problem List   Diagnosis    Intractable epilepsy ([facility] [address]    Hypoglycemia    Depression    Osteoporosis    Affective disorder ([facility] [address] [facility]    Uncontrolled type 2 DM with hyperosmolar nonketotic hyperglycemia ([facility] [address] [facility]    Generalized abdominal pain    Pancreatic calcification    Uncontrolled type 2 diabetes mellitus with hyperglycemia (HCC)    Pancreatic insufficiency    Pain of upper abdomen    Alcohol-induced chronic pancreatitis (HCC)    Weight loss    Polysubstance abuse (HCC)    Unresponsive episode    Chronic neck pain    Neuropathic pain of both legs    Seizure disorder, secondary ([facility] [address]    Essential (primary) hypertension    Type 2 diabetes mellitus without complication, with long-term current use of insulin (HCC)    Neuropathic pain    Elevated CEA    Acute left-sided low back pain without sciatica    Adenomatous polyps    Bilateral wrist pain    Seizure-like activity (HCC)    Elevated CA 19-9 level    Body mass index (BMI) less than 16.5    Pseudohyponatremia    Hyperglycemia    Hypokalemia    Hypocalcemia    Hypomagnesemia    Noncompliance    Severe malnutrition (HCC)       Medications listed as ordered at the time of discharge from hospital      Medications marked \"taking\" at this time  Outpatient Medications Marked as Taking for the 2/25/22 encounter (Office Visit) with Dana Hernandez, APRN - CNP   Medication Sig Dispense Refill    insulin glargine (BASAGLAR KWIKPEN) 100 UNIT/ML injection pen Inject 5 Units into the skin nightly 5 pen 0    Blood Gluc Meter Disp-Strips (BLOOD GLUCOSE METER DISPOSABLE) OXANA Daily and as needed 1 each 0    blood glucose monitor strips 1 strip by Other route daily Test 1 times a day & as needed for symptoms of irregular blood glucose.  300 strip 5    Lancets MISC 1 each by Does not apply route 2 times daily 300 each 1    ferrous sulfate (IRON 325) 325 (65 Fe) MG tablet Take 1 tablet by mouth every other day 30 tablet 1    nicotine (NICODERM CQ) 21 MG/24HR Place 1 patch onto the skin daily 30 patch 3    Insulin Pen Needle (MEIJER PEN NEEDLES) 31G X 6 MM MISC 1 each by Does not apply route daily 100 each 3    sodium bicarbonate 650 MG tablet TAKE 1 TABLET BY MOUTH THREE TIMES DAILY 90 tablet 0    omeprazole (PRILOSEC) 20 MG delayed release capsule TAKE ONE (1) CAPSULE BY MOUTH TWICE DAILY 60 capsule 1    megestrol (MEGACE) 20 MG tablet TAKE 1 TABLET BY MOUTH DAILY 30 tablet 3    glimepiride (AMARYL) 2 MG tablet Take 1 tablet by mouth 2 times daily (before meals) 180 tablet 1    albuterol sulfate  (90 Base) MCG/ACT inhaler USE 2 INHALATIONS BY MOUTH  EVERY 6 HOURS AS NEEDED FOR WHEEZING 17 g 1    Alcohol Swabs (ALCOHOL PREP) 70 % PADS Daily as needed 200 each 3    metFORMIN (GLUCOPHAGE-XR) 500 MG extended release tablet Take 2 tablets by mouth daily (with breakfast) AND 1 tablet Daily with supper. 270 tablet 2    mirtazapine (REMERON) 30 MG tablet Take 0.5 tablets by mouth nightly 90 tablet 3    SYMBICORT 80-4.5 MCG/ACT AERO Inhale 2 puffs into the lungs 2 times daily 3 each 2    lipase-protease-amylase (CREON) 55250-121344 units CPEP delayed release capsule take 2 capsules by mouth four times a day after meals and at bedtime 240 capsule 1    Multiple Vitamin (MULTI-VITAMIN DAILY PO) Take by mouth      ZINC PO Take by mouth      COMFORT EZ PEN NEEDLES 32G X 6 MM MISC  each 2    AquaLance Lancets 30G MISC Daily and as needed for hypoglycemia 200 each 2      Lab Results   Component Value Date    LABA1C 11.6 01/21/2022     Lab Results   Component Value Date     12/28/2020         Medications patient taking as of now reconciled against medications ordered at time of hospital discharge: Yes    Review of Systems   Constitutional: Negative for chills, diaphoresis, fatigue, fever and unexpected weight change.    HENT: Negative for congestion, rhinorrhea, sore throat and trouble swallowing. Respiratory: Positive for shortness of breath. Negative for cough, choking, chest tightness and wheezing. Cardiovascular: Negative for chest pain. Gastrointestinal: Negative for abdominal pain, blood in stool, diarrhea, nausea and vomiting. Genitourinary: Negative for decreased urine volume, difficulty urinating, dysuria, flank pain, frequency, hematuria, pelvic pain and vaginal bleeding. Musculoskeletal: Negative for arthralgias and myalgias. Skin: Negative for wound. Neurological: Positive for dizziness. Negative for syncope, weakness and light-headedness. Objective:    /80   Pulse 113   Temp 97 °F (36.1 °C) (Temporal)   Wt 87 lb (39.5 kg)   SpO2 99%   BMI 13.63 kg/m²   Physical Exam  Vitals and nursing note reviewed. Constitutional:       General: She is not in acute distress. Appearance: She is well-developed and underweight. She is not toxic-appearing. HENT:      Head: Normocephalic. Right Ear: External ear normal.      Left Ear: External ear normal.   Eyes:      General: No scleral icterus. Pupils: Pupils are equal, round, and reactive to light. Cardiovascular:      Rate and Rhythm: Regular rhythm. Tachycardia present. Pulmonary:      Effort: Pulmonary effort is normal.      Breath sounds: Normal breath sounds. Abdominal:      General: Abdomen is flat. Bowel sounds are normal.      Palpations: Abdomen is soft. Tenderness: There is no abdominal tenderness. There is no right CVA tenderness or left CVA tenderness. Musculoskeletal:      Cervical back: Normal range of motion and neck supple. Skin:     General: Skin is warm and dry. Neurological:      Mental Status: She is alert and oriented to person, place, and time. Coordination: Coordination normal.   Psychiatric:         Behavior: Behavior normal. Behavior is cooperative. Thought Content:  Thought content normal.         Judgment: Judgment normal.         An electronic signature was used to authenticate this note.   --DONTRELL Lyle - CNP

## 2022-02-25 NOTE — PROGRESS NOTES
Impression: Age-related nuclear cataract, bilateral: H25.13 Bilateral. Plan: Discussed condition. Continue to monitor without treatment at this time. Visit Information    Have you changed or started any medications since your last visit including any over-the-counter medicines, vitamins, or herbal medicines? no   Are you having any side effects from any of your medications? -  no  Have you stopped taking any of your medications? Is so, why? -  no    Have you seen any other physician or provider since your last visit? No  Have you had any other diagnostic tests since your last visit? No  Have you been seen in the emergency room and/or had an admission to a hospital since we last saw you? Yes - Records Obtained  Have you had your routine dental cleaning in the past 6 months? no    Have you activated your [employer] account? If not, what are your barriers?  Yes     Patient Care Team:  DONTRELL Ghosh CNP as PCP - General (Family Medicine)  [facility]  Lissy Melendez MD as Consulting Physician (Gastroenterology)    Medical History Review  Past Medical, Family, and Social History reviewed and does not contribute to the patient presenting condition    Health Maintenance   Topic Date Due    Diabetic retinal exam  Never done    Shingles Vaccine (1 of 2) Never done    Diabetic foot exam  02/13/2020    Diabetic microalbuminuria test  03/11/2020    Lipid screen  12/28/2021    Annual Wellness Visit (AWV)  Never done    A1C test (Diabetic or Prediabetic)  04/21/2022    Depression Monitoring  01/21/2023    Potassium monitoring  02/11/2023    Creatinine monitoring  02/11/2023    Breast cancer screen  06/02/2023    Pneumococcal 65+ years Vaccine (2 of 2 - PPSV23) 10/10/2023    DTaP/Tdap/Td vaccine (2 - Td or Tdap) 10/05/2025    Colorectal Cancer Screen  05/07/2026    Cervical cancer screen  06/09/2026    DEXA (modify frequency per FRAX score)  Completed    Flu vaccine  Completed    COVID-19 Vaccine  Completed    Hepatitis C screen  Completed    HIV screen  Completed    Hepatitis A vaccine  Aged Out    Hib vaccine  Aged Out    Meningococcal (ACWY) vaccine  Aged Out

## 2022-02-28 ENCOUNTER — TELEPHONE (OUTPATIENT)
Dept: PRIMARY CARE CLINIC | Age: 67
End: 2022-02-28

## 2022-02-28 NOTE — TELEPHONE ENCOUNTER
Pt called in requesting Disability paperwork. Pt informed paperwork was completed and faxed. Pt voiced understanding. Will  at .

## 2022-03-11 DIAGNOSIS — K86.89 PANCREATIC INSUFFICIENCY: ICD-10-CM

## 2022-03-11 DIAGNOSIS — Z87.19 HX OF GASTROESOPHAGEAL REFLUX (GERD): ICD-10-CM

## 2022-03-13 NOTE — TELEPHONE ENCOUNTER
Health Maintenance   Topic Date Due    Diabetic retinal exam  Never done    Shingles Vaccine (1 of 2) Never done    Diabetic foot exam  02/13/2020    Diabetic microalbuminuria test  03/11/2020    Lipid screen  12/28/2021    Annual Wellness Visit (AWV)  Never done    A1C test (Diabetic or Prediabetic)  04/21/2022    Depression Monitoring  01/21/2023    Potassium monitoring  02/11/2023    Creatinine monitoring  02/11/2023    Breast cancer screen  06/02/2023    Pneumococcal 65+ years Vaccine (2 of 2 - PPSV23) 10/10/2023    DTaP/Tdap/Td vaccine (2 - Td or Tdap) 10/05/2025    Colorectal Cancer Screen  05/07/2026    Cervical cancer screen  06/09/2026    DEXA (modify frequency per FRAX score)  Completed    Flu vaccine  Completed    COVID-19 Vaccine  Completed    Hepatitis C screen  Completed    HIV screen  Completed    Hepatitis A vaccine  Aged Out    Hib vaccine  Aged Out    Meningococcal (ACWY) vaccine  Aged Out             (applicable per patient's age: Cancer Screenings, Depression Screening, Fall Risk Screening, Immunizations)    Hemoglobin A1C (%)   Date Value   01/21/2022 11.6   10/13/2021 10.5   04/09/2021 9.2     Microalb/Crt.  Ratio (mcg/mg creat)   Date Value   03/11/2019 CANNOT BE CALCULATED     LDL Cholesterol (mg/dL)   Date Value   12/28/2020 19     AST (U/L)   Date Value   02/09/2022 13     ALT (U/L)   Date Value   02/09/2022 11     BUN (mg/dL)   Date Value   02/11/2022 5 (L)      (goal A1C is < 7)   (goal LDL is <100) need 30-50% reduction from baseline     BP Readings from Last 3 Encounters:   02/25/22 127/80   02/11/22 117/78   01/23/22 115/70    (goal /80)      All Future Testing planned in [facility]:  Lab Frequency Next Occurrence   Hemoglobin A1C Once 11/27/2021   Microalbumin, Ur Once 04/28/2022   Vitamin B12 Once 06/07/2022   TSH With Reflex Ft4 Once 06/07/2022   Ferritin Once 06/07/2022   Reticulocytes Once 05/25/2022       Next Visit Date:  Future Appointments   Date Time Provider Teresa Shoemaker   3/15/2022  8:30 AM Art Estrada RN [facility]   3/31/2022  9:00 AM Ileana Arredondo stv psych [facility]   5/25/2022  2:00 PM DONTRELL Mckenzie - CNP [facility]   5/31/2022  1:00 PM Karishma Hadley MD [facility]            Patient Active Problem List:     Intractable epilepsy ([address]     Hypoglycemia     Depression     Osteoporosis     Affective disorder ([address]     Uncontrolled type 2 DM with hyperosmolar nonketotic hyperglycemia ([address]     Generalized abdominal pain     Pancreatic calcification     Uncontrolled type 2 diabetes mellitus with hyperglycemia (HCC)     Pancreatic insufficiency     Pain of upper abdomen     Alcohol-induced chronic pancreatitis (HCC)     Weight loss     Polysubstance abuse (HCC)     Unresponsive episode     Chronic neck pain     Neuropathic pain of both legs     Seizure disorder, secondary ([address]     Essential (primary) hypertension     Type 2 diabetes mellitus without complication, with long-term current use of insulin (HCC)     Neuropathic pain     Elevated CEA     Acute left-sided low back pain without sciatica     Adenomatous polyps     Bilateral wrist pain     Seizure-like activity (HCC)     Elevated CA 19-9 level     Body mass index (BMI) less than 16.5     Pseudohyponatremia     Hyperglycemia     Hypokalemia     Hypocalcemia     Hypomagnesemia     Noncompliance     Severe malnutrition ([address]

## 2022-03-14 RX ORDER — OMEPRAZOLE 20 MG/1
CAPSULE, DELAYED RELEASE ORAL
Qty: 60 CAPSULE | Refills: 1 | Status: SHIPPED | OUTPATIENT
Start: 2022-03-14 | End: 2022-07-21

## 2022-03-15 ENCOUNTER — HOSPITAL ENCOUNTER (OUTPATIENT)
Dept: DIABETES SERVICES | Age: 67
Setting detail: THERAPIES SERIES
Discharge: HOME | End: 2022-03-15
Payer: MEDICARE

## 2022-03-15 DIAGNOSIS — E11.00 UNCONTROLLED TYPE 2 DM WITH HYPEROSMOLAR NONKETOTIC HYPERGLYCEMIA (HCC): Primary | ICD-10-CM

## 2022-03-15 PROCEDURE — G0108 DIAB MANAGE TRN  PER INDIV: HCPCS

## 2022-03-15 SDOH — ECONOMIC STABILITY: FOOD INSECURITY: ADDITIONAL INFORMATION: YES

## 2022-03-15 ASSESSMENT — PROBLEM AREAS IN DIABETES QUESTIONNAIRE (PAID)
PAID-5 TOTAL SCORE: 15
FEELING THAT DIABETES IS TAKING UP TOO MUCH OF YOUR MENTAL AND PHYSICAL ENERGY EVERY DAY: 3
COPING WITH COMPLICATIONS OF DIABETES: 3
FEELING SCARED WHEN YOU THINK ABOUT LIVING WITH DIABETES: 3
WORRYING ABOUT THE FUTURE AND THE POSSIBILITY OF SERIOUS COMPLICATIONS: 3
FEELING DEPRESSED WHEN YOU THINK ABOUT LIVING WITH DIABETES: 3

## 2022-03-15 NOTE — PROGRESS NOTES
Diabetes Self- Management Education Program Assessment -   Also see Diabetic Screening  Patient, John Hale,  here for diabetes self-management education  visit/ assessment. Today's visit was in an individual setting.     MEDICAL HISTORY:  Past Medical History:   Diagnosis Date    Cataracts, bilateral     COPD (chronic obstructive pulmonary disease) (HCC)     CTS (carpal tunnel syndrome) left    Fibromyalgia     Generalized abdominal pain 6/3/2016    Glaucoma     Hypertension     Osteoarthritis     Osteoporosis     Pancreatic calcification 6/3/2016    Pancreatitis     Protein-calorie malnutrition, severe ([address] 6/1/2016    Seizures ([address]     Tremor     Type II or unspecified type diabetes mellitus without mention of complication, not stated as uncontrolled     Uncontrolled type 2 DM with hyperosmolar nonketotic hyperglycemia ([address] 6/3/2016     Family History   Problem Relation Age of Onset    Brain Cancer Father     Diabetes Sister     Other Sister         epilepsy     Peanuts [peanut oil], Vicodin [hydrocodone-acetaminophen], Tramadol, Tylenol with codeine #3 [acetaminophen-codeine], and Ibuprofen   Immunization History   Administered Date(s) Administered    COVID-19, Shavon Call, Primary or Immunocompromised, PF, 100mcg/0.5mL 03/11/2021, 04/08/2021, 12/09/2021    Influenza Vaccine, unspecified formulation 10/05/2015    Influenza Virus Vaccine 10/05/2015, 10/10/2018    Influenza, MDCK Quadv, IM, PF (Flucelvax 2 yrs and older) 10/13/2021    Influenza, Todd Gerardman, IM, (6 mo and older Fluzone, Flulaval, Fluarix and 3 yrs and older Afluria) 10/10/2018    Influenza, Quadv, IM, PF (6 mo and older Fluzone, Flulaval, Fluarix, and 3 yrs and older Afluria) 01/18/2017, 11/03/2017, 01/02/2020, 10/05/2020    Pneumococcal Conjugate 13-valent ([id number]) 04/27/2017    Pneumococcal Polysaccharide ([id number]) 04/02/2013, 10/10/2018    Tdap (Boostrix, Adacel) 10/05/2015     Current Medications  Current Outpatient Medications   Medication Sig Dispense Refill    omeprazole (PRILOSEC) 20 MG delayed release capsule TAKE ONE (1) CAPSULE BY MOUTH TWICE DAILY 60 capsule 1    insulin glargine (BASAGLAR KWIKPEN) 100 UNIT/ML injection pen Inject 5 Units into the skin nightly 5 pen 0    Blood Gluc Meter Disp-Strips (BLOOD GLUCOSE METER DISPOSABLE) OXANA Daily and as needed 1 each 0    blood glucose monitor strips 1 strip by Other route daily Test 1 times a day & as needed for symptoms of irregular blood glucose. 300 strip 5    Lancets MISC 1 each by Does not apply route 2 times daily 300 each 1    ferrous sulfate (IRON 325) 325 (65 Fe) MG tablet Take 1 tablet by mouth every other day 30 tablet 1    nicotine (NICODERM CQ) 21 MG/24HR Place 1 patch onto the skin daily 30 patch 3    Insulin Pen Needle (MEIJER PEN NEEDLES) 31G X 6 MM MISC 1 each by Does not apply route daily 100 each 3    sodium bicarbonate 650 MG tablet TAKE 1 TABLET BY MOUTH THREE TIMES DAILY 90 tablet 0    megestrol (MEGACE) 20 MG tablet TAKE 1 TABLET BY MOUTH DAILY 30 tablet 3    glimepiride (AMARYL) 2 MG tablet Take 1 tablet by mouth 2 times daily (before meals) 180 tablet 1    albuterol sulfate  (90 Base) MCG/ACT inhaler USE 2 INHALATIONS BY MOUTH  EVERY 6 HOURS AS NEEDED FOR WHEEZING 17 g 1    Alcohol Swabs (ALCOHOL PREP) 70 % PADS Daily as needed 200 each 3    metFORMIN (GLUCOPHAGE-XR) 500 MG extended release tablet Take 2 tablets by mouth daily (with breakfast) AND 1 tablet Daily with supper.  270 tablet 2    mirtazapine (REMERON) 30 MG tablet Take 0.5 tablets by mouth nightly 90 tablet 3    SYMBICORT 80-4.5 MCG/ACT AERO Inhale 2 puffs into the lungs 2 times daily 3 each 2    lipase-protease-amylase (CREON) 62143-383974 units CPEP delayed release capsule take 2 capsules by mouth four times a day after meals and at bedtime 240 capsule 1    Multiple Vitamin (MULTI-VITAMIN DAILY PO) Take by mouth      ZINC PO Take by mouth      COMFORT EZ PEN NEEDLES 32G X 6 MM MISC  each 2    AquaLance Lancets 30G MISC Daily and as needed for hypoglycemia 200 each 2     No current facility-administered medications for this encounter.   :     Comments:  Allergies: Allergies   Allergen Reactions    Peanuts [Peanut Oil] Anaphylaxis    Vicodin [Hydrocodone-Acetaminophen] Hives    Tramadol     Tylenol With Codeine #3 [Acetaminophen-Codeine]     Ibuprofen Itching         A1C blood level - at goal < 7%   Lab Results   Component Value Date    LABA1C 11.6 01/21/2022    LABA1C 10.5 10/13/2021    LABA1C 9.2 04/09/2021     Lab Results   Component Value Date    LABMICR CANNOT BE CALCULATED 03/11/2019    CREATININE 0.76 02/11/2022       Blood pressure ( 130/ 80)  Or less  BP Readings from Last 3 Encounters:   02/25/22 127/80   02/11/22 117/78   01/23/22 115/70        Cholesterol ( LDL under  100)   Lab Results   Component Value Date    LDLCHOLESTEROL 19 12/28/2020       Diabetes Self- Management Education Record    Participant Name: Daniel Johnson  Referring Provider: DONTRELL Bhat CNP   Assessment/Evaluation Ratings:  1=Needs Instruction   4=Demonstrates Understanding/Competency  2=Needs Review   NC=Not Covered    3=Comprehends Key Points  N/A=Not Applicable  Topics/Learning Objectives Pre-session Assess Date:    3/15/22   Instr. Date Reinforce Date Post- session Eval Comments   Diabetes disease process & Treatment process: Define diabetes & pre-diabetes; Identify own type of diabetes; role of the pancreas; signs/symptoms; diagnostic criteria; prevention & treatment options; contributing factors. 1    3/15/22rs - stated dm for sometime and also acute pancreatitis with in pt care 2/2022 - now trying to learn more and improve her health - worried about her liver, pancrease and kidney    Incorporating nutritional management into lifestyle: Describe effect of type, amount & timing of food on blood glucose;  Describe basic meal planning techniques & current nutrition guideline   1   - patient has been eating regular jello and ice cream often and 1 meal per day of solid food, drinking mostly water, but also tea with regular sugar and regular pepsi. Stated often having GI upset. Discussed need to try sugar free jello cups, pictures of recommended boxes and cups shared, need to change to diet pop and try splenda or non nutritive sweetener  in tea. Patient would like to gain weight. RD follow up is planned. What to eat - Food groups, When to eat - timing of meals and snacks, and How much to eat - portions control. 3/15/22rs  Wt loss and now trying to gain weight- - Of concern with discussion of medications - she stated she is not taking Creon as RX , only taking 1 pill in am due to cost -stated the cost for refill in over 600$ and she is now rationing what she has left in her pill bottle. Recommended patient call to her GI MD to discuss options, lower cost meds or samples. calories/ day   CHO choices/ meal   CHO choices/  day   grams of protein /day   gram of fat /day     Correctly read food labels & demonstrate CHO counting & portion control with personalized meal plan. Identify dining out strategies, & dietary sick day guidelines. 1       Incorporating physical activity into lifestyle:   Verbalize effect of exercise on blood glucose levels; benefits of regular exercise; safety considerations; contraindications; maintenance of activity. 1    3/15/22rs - some walking - not much activity - get cold very easily   Using medications safely:  Identify effects of diabetes medicines on blood glucose levels; List diabetes medication taken, action & side effects;    1    3/15/22rs   Discussed taking oral medications amaryl  2 mg twice per day - with Breakfast and Dinner and metformin 500mg 2 tabs in am with breakfast  and 1 tab in pm with dinner. Provided patient with am / pm pill sorter to help with this task.      Insulin / Injectable - Appropriate injection sites; proper storage; supplies needed; proper technique; safe needle disposal guidelines. 1    3/15/22rs -  she was taking basaglar 5 units with meals  ( about 2 meals per day)   - Clarified with patient rx is for once per day 5 units of basaglar - we set time for taking this medication daily at 3pm. Patient stated poor sleep patters and setting am or pm for taking basaglar has not worked out for her. Also clarified humalog with the meal time insulin and she is not to take this insulin at this time, but she may keep the pens in her refrigerator for storage. Monitoring blood glucose, interpreting and using results:  Identify recommended & personal blood glucose targets; importance of testing; testing supplies; HgbA1C target levels; Factors affecting blood glucose; Importance of logging blood glucose levels for pattern recognition; ketone testing; safe lancet disposal.   1    3/15/22rs - stated BG range  At home - 100 to 500    Prevention, detection & treatment of acute complications:  Identify symptoms of hyper & hypoglycemia, and prevention & treatment strategies. 1    High bg and lots of wt loss   Describe sick day guidelines & indications for  physician notification. Identify short term consequences of poor control. Disaster preparedness strategies    1       Prevention, detection & treatment of chronic complications:  Define the natural course of diabetes & describe the relationship of blood glucose levels to long term complications of diabetes. Identify preventative measures & standards of care. 1    Following with PCP and GI  Has not had foot exam - has eye exam appt [facility] for April 8 - all labs done with recent inpt adm     Developing strategies to address psychosocial issues:  Describe feelings about living with diabetes; Describe how stress, depression & anxiety affect blood glucose; Identify coping strategies; Identify support needed & support network available.  1    PAid 13 - recovering from poly substance use    Developing strategies to promote health/change behavior: Identify 7 self-care behaviors; Personal health risk factors; Benefits, challenges & strategies for behavioral change;    1      Expressed wanting to improve her health   Individualized goal selection. My goal , to help me improve my health, I will:   1. Taking medications- work on taking medications at regular times - avoid skipping insulin - take daily at 3 pm      2. Plan  Follow-up Appointments planned in 2 weeks as one on one session     Instruction Method: [x]Lecture/Discussion  []Power Point Presentation  []Handouts  []Return Demonstration    Education Materials/Equipment Provided (VIA Mail for phone visits)  :    [x]Self-Management - Initial assessment - Enrolment in to ADA  Where do I Begin, Living with Type 2 diabetes ADA home support program and  handout on diabetes education classes. -- 3/15/22rs   []Understanding Diabetes session       Book How to Thrive: A guide for Your Journey with Diabetes ADA booklet -pages 4, 14-19, 22-23        View: Understanding Type 2 Diabetes from Animated Diabetes Patient [email or web address]    [] Healthy Eating and Meal planning  How to Thrive: A guide for Your journey with Diabetes - ADA booklet - pages 20- 21 & 42-43  Handouts: Smarter snacking, Lowdown on low - carb diets, Supermarket savvy, Ready, set, start counting, Reading a nutrition fact label, 7 ways to size up your servings    []   Medications and Prevention of Complications      Book How to Thrive: A guide for Your Journey with Diabetes - ADA booklet -  pages 6-7, 12-13, 24-27 & 28 -35  Handouts: Know your numbers, Vaccinations, Type 2 diabetes and the role of GLP- 1, How to care for your teeth and gums, Caring for your feet, How to pick the right shoes  Individualized Diabetes report card     []  Diet Follow Up- CHO counting and  planning for sick days, holiday, vacations   How to Thrive: A guide for Your journey with Diabetes - ADA booklet  - pages 43- 37  Diabetes Food hub www. diabetesfoodhub.org   Handouts: Sick day rules, Dining out guide, Diabetes and alcohol, Traveling with diabetes, Diabetes disaster preparedness plan, Holidays and special occasions                                                            []  Self care and goal review -  3 month follow -    Handouts: AADE7 Self care behaviors work sheets and personal goal setting worksheet review, DSME support options on line     []Self-Management  Gestational - RN class -Resource materials sent out : care booklet - \" Gestational Diabetes Mellitus ( GDM) toolkit form [place] gestational diabetes postpartum care learning collaborative 2019. \"Simple Guidelines for meal planning with gestational diabetes. SMBG sheets to fax back to M weekly. BD  healthy injection site selection and rotation with 6 mm insulin syringe and 4 mm pen needle. Gestational diabetes handout from [facility] 2016. Did you have gestational diabetes when you were pregnant? Handout from [facility]  April 2014    []Self-Management Gestational - RD class - My Food Plan for Gestational diabetes    []Glucose Meter     []Insulin Kit     []Other      Encounter Type Date Start Time End Time Comments No Show Dates   Assessment 3/15/22rs     900   1000    x    Class 1 - Understanding diabetes         Class 2- Nutrition and diabetes          Class 3 - Preventing Complications        Class 4 -  In depth Nutrition and sick day care        Class 5 - 3 month follow up / goal reassessment        Gestational - RN         Gestational - RD        Individual MNT         Shared Med Appt         Yearly Follow-up        Meter Instrx      How to Measure Your Blood Sugar - [facility] Patient Education  [email or web address]    Insulin Instrx      []Pen  []Vial & Syringe   BD Diabetes Care: How to Inject Insulin with a Pen Needle  [email or web address] [email or web address]    Diabetes Care: How to Inject Insulin with a Syringe  [email or web address] [email or web address]       DSMS Support :   [] MNT      [] Annual update     [] Starting Fresh  adults living with diabetes or pre diabetes. [address] [facility] [phone] call for dates    []  Diabetes Group at  [place] [address] of [employer] - Free 6 week diabetes education support   classes - use web site interest form found at  [email or web address].pt - to enroll       []ADA  Where do I Begin, Living with Type 2 diabetes ADA home support program  Web site: diabetes. org/living    Call: 1800 DIABETES  e-mail: [email or web address]     []  Internet web sites - ADAWeb site: diabetes. org and diabetesfoodhub.org      Post Education Referrals:      [] [address] information sheet and [address] [facility] , [address]       [] Dental care - [facility]     [] [facility] ([facility]) link  phone number - for information and referral to [address] [facility]  Clinically  [address], WEIGHT MANAGEMENT        []Other  Naa Carreon, RN

## 2022-03-15 NOTE — LETTER
[facility] Di[facility] ED  [address] [facility]  Phone: [phone]         March 15, 2022    To :DONTRELL Benton - CNP    From: Pretty Stern RN    Patient: Martita Valdivia   YOB: 1955   Date of Visit: 3/15/22     An initial assessment to determine diabetes education needs was completed. Discussion of medication taking:  Patient stated she was taking basaglar 5 units with meals  ( about 2 meals per day)   - Clarified with patient rx is for once per day 5 units of basaglar - we set time for taking this medication daily at 3pm. Patient stated poor sleep patters and setting am or pm for taking basaglar has not worked out for her. Also clarified humalog with the meal time insulin and she is not to take this insulin at this time, but she may keep the pens in her refrigerator for storage.    - Discussed taking oral medications amaryl  2 mg twice per day - with Breakfast and Dinner and metformin 500mg 2 tabs in am with breakfast  and 1 tab in pm with dinner. Provided patient with am / pm pill sorter to help with this task. - Of concern with discussion of medications - she stated she is not taking Creon as RX , only taking 1 pill in am due to cost -stated the cost for refill in over 600$ and she is now rationing what she has left in her pill bottle. Recommended patient call to her GI MD to discuss options, lower cost meds or samples. Diet - patient has been eating regular jello and ice cream often and 1 meal per day of solid food, drinking mostly water, but also tea with regular sugar and regular pepsi. Stated often having GI upset. Discussed need to try sugar free jello cups, pictures of recommended boxes and cups shared, need to change to diet pop and try splenda or non nutritive sweetener  in tea. Patient would like to gain weight. RD follow up is planned.     Coping-   Part of our assessment is having the patient complete the PAID (Problem Areas in Diabetes Scale)-5 survey. This tool  measures diabetes-related emotional distress a patient may be feeling. Greta Bricene scored 15   A total score of >8 indicates possible diabetes related emotional distress, which warrants further assessment and a referral to mental health professional for psychological support and treatment. Education plan ongoing included:interpretation of lab results, blood sugar goals, complications of diabetes mellitus, hypoglycemia prevention and treatment, exercise, illness management, self-monitoring of blood glucose skills, nutrition, carbohydrate counting, site rotation, use of insulin pen and insulin adjustments. An ongoing plan was created to include: follow up one on one session in 2 weeks    Thank you for the opportunity to provide Diabetes Self Management Education to your patient.

## 2022-03-18 ENCOUNTER — TELEPHONE (OUTPATIENT)
Dept: PRIMARY CARE CLINIC | Age: 67
End: 2022-03-18

## 2022-03-18 ENCOUNTER — NURSE TRIAGE (OUTPATIENT)
Dept: OTHER | Facility: CLINIC | Age: 67
End: 2022-03-18

## 2022-03-18 DIAGNOSIS — Z79.4 TYPE 2 DIABETES MELLITUS WITH DIABETIC POLYNEUROPATHY, WITH LONG-TERM CURRENT USE OF INSULIN (HCC): Primary | ICD-10-CM

## 2022-03-18 DIAGNOSIS — E11.42 TYPE 2 DIABETES MELLITUS WITH DIABETIC POLYNEUROPATHY, WITH LONG-TERM CURRENT USE OF INSULIN (HCC): Primary | ICD-10-CM

## 2022-03-18 NOTE — TELEPHONE ENCOUNTER
Received call from Milton Valdovinos at [facility] with [employer]. Subjective: Caller states \"Few days blood sugar was 309 in the mornings. By the end of the day blood sugar have been high in the 500's-800's. I may need different medicine or some insulin. Most recent is 525. It has been going on for a year, then I have to go to the ED. It been running like this forever. It has never been under 200. I will get itching all over and my [place]. Having constant diarrhea. I feel normal. I was told I need a diabetic doctor. \"     Current Symptoms: High blood sugar. Onset: 1 year ago;     Associated Symptoms: NA    Pain Severity: 0/10; N/A; none    Temperature: Denies Fever    What has been tried: Metformin and Glipermide     LMP: NA Pregnant: NA    Recommended disposition: Go to ED/UCC Now (Or to Office with PCP Approval)    Care advice provided, patient verbalizes understanding; denies any other questions or concerns; instructed to call back for any new or worsening symptoms. Writer provided warm transfer to lasha  [facility] for 2nd level triage     Attention Provider: Thank you for allowing me to participate in the care of your patient. The patient was connected to triage in response to information provided to the ECC/PSC. Please do not respond through this encounter as the response is not directed to a shared pool.           Reason for Disposition   Blood glucose > 500 mg/dL (27.8 mmol/L)    Protocols used: DIABETES - HIGH BLOOD SUGAR-ADULT-OH

## 2022-03-19 NOTE — TELEPHONE ENCOUNTER
Please schedule sooner follow for diabetic management and provide patient with information for the referral to [facility]

## 2022-03-22 ENCOUNTER — HOSPITAL ENCOUNTER (OUTPATIENT)
Age: 67
Setting detail: SPECIMEN
Discharge: HOME | End: 2022-03-22
Payer: MEDICARE

## 2022-03-22 DIAGNOSIS — M79.602 PARESTHESIA AND PAIN OF BOTH UPPER EXTREMITIES: ICD-10-CM

## 2022-03-22 DIAGNOSIS — E43 SEVERE MALNUTRITION (HCC): ICD-10-CM

## 2022-03-22 DIAGNOSIS — M79.601 PARESTHESIA AND PAIN OF BOTH UPPER EXTREMITIES: ICD-10-CM

## 2022-03-22 DIAGNOSIS — R20.2 PARESTHESIA AND PAIN OF BOTH UPPER EXTREMITIES: ICD-10-CM

## 2022-03-22 DIAGNOSIS — D64.9 ANEMIA, UNSPECIFIED TYPE: ICD-10-CM

## 2022-03-22 LAB
ABSOLUTE RETIC #: 0.08 M/UL (ref 0.03–0.08)
FERRITIN: 115 UG/L (ref 13–150)
IMMATURE RETIC FRACT: 12.9 % (ref 2.7–18.3)
RETIC %: 2.1 % (ref 0.5–1.9)
RETIC HEMOGLOBIN: 38.1 PG (ref 28.2–35.7)
TSH SERPL DL<=0.05 MIU/L-ACNC: 1.23 MIU/L (ref 0.3–5)
VITAMIN B-12: 1192 PG/ML (ref 232–1245)

## 2022-03-22 PROCEDURE — 36415 COLL VENOUS BLD VENIPUNCTURE: CPT

## 2022-03-22 PROCEDURE — 85045 AUTOMATED RETICULOCYTE COUNT: CPT

## 2022-03-22 PROCEDURE — 82607 VITAMIN B-12: CPT

## 2022-03-22 PROCEDURE — 84443 ASSAY THYROID STIM HORMONE: CPT

## 2022-03-22 PROCEDURE — 82728 ASSAY OF FERRITIN: CPT

## 2022-03-29 ENCOUNTER — HOSPITAL ENCOUNTER (OUTPATIENT)
Dept: DIABETES SERVICES | Age: 67
Setting detail: THERAPIES SERIES
Discharge: HOME | End: 2022-03-29
Payer: MEDICARE

## 2022-03-29 PROCEDURE — G0108 DIAB MANAGE TRN  PER INDIV: HCPCS

## 2022-03-29 NOTE — LETTER
[facility]  [address]   Phone: [phone]         March 29, 2022    To :DONTRELL Ferguson CNP    From: Tia Hawkins RN    Patient: Thurnell Favre   YOB: 1955   Date of Visit: 3/29/22     Follow up care :  call to [facility] / referral to Dr Frank Mejia endo:  office had not received the referral - writer faxed to office , ref, demos, last notes and recent labs, patient given copy of referral and phone number so she can call in few days for follow up. Medication use:  Patient has stated having both lantus and basaglar pens at home - she stated she is taking a shot with EACH meal and has moved dose to 10 units, on her own. She is then taking 30 units per day. Also taking all oral meds daily at 3pm as this is when she can remember to take them - including Amaryl and metformin. Blood glucose trend:  With inulin use above - stated BG in 200's - 210's pre meals, this is trend down prior was up to 300- 500 range. Denies any low BG in last week. She is interested in getting CGM/ freestyle Amparo Dew if possible as her finer are getting very soar, stated checking 3 - 4 times per day. Advised patient to call you for medication plan clarification and blood glucose response/ trend    The following content has been reviewed since the last assessment: Balancing Diabetes (Meal planning, using BG results, identifying BG targets)     An ongoing plan was created to include:follow up in 1 - 2 weeks with RD for meal planning. Post Program Self Management Support Plans include:  Diabetes Care Appointments with MD    Thank you for the opportunity to provide Diabetes Self Management Education to your patient.

## 2022-03-29 NOTE — PROGRESS NOTES
Diabetes Self- Management Education Program Assessment -   Also see Diabetic Screening  Patient, Bess Go,  here for diabetes self-management education  visit/ assessment. Today's visit was in an individual setting.     MEDICAL HISTORY:  Past Medical History:   Diagnosis Date    Cataracts, bilateral     COPD (chronic obstructive pulmonary disease) (HCC)     CTS (carpal tunnel syndrome) left    Fibromyalgia     Generalized abdominal pain 6/3/2016    Glaucoma     Hypertension     Osteoarthritis     Osteoporosis     Pancreatic calcification 6/3/2016    Pancreatitis     Protein-calorie malnutrition, severe ([address] 6/1/2016    Seizures ([address]     Tremor     Type II or unspecified type diabetes mellitus without mention of complication, not stated as uncontrolled     Uncontrolled type 2 DM with hyperosmolar nonketotic hyperglycemia ([address] 6/3/2016     Family History   Problem Relation Age of Onset    Brain Cancer Father     Diabetes Sister     Other Sister         epilepsy     Peanuts [peanut oil], Vicodin [hydrocodone-acetaminophen], Tramadol, Tylenol with codeine #3 [acetaminophen-codeine], and Ibuprofen   Immunization History   Administered Date(s) Administered    COVID-19, Elfida Dinning, Primary or Immunocompromised, PF, 100mcg/0.5mL 03/11/2021, 04/08/2021, 12/09/2021    Influenza Vaccine, unspecified formulation 10/05/2015    Influenza Virus Vaccine 10/05/2015, 10/10/2018    Influenza, MDCK Quadv, IM, PF (Flucelvax 2 yrs and older) 10/13/2021    Influenza, Ric, IM, (6 mo and older Fluzone, Flulaval, Fluarix and 3 yrs and older Afluria) 10/10/2018    Influenza, Quadv, IM, PF (6 mo and older Fluzone, Flulaval, Fluarix, and 3 yrs and older Afluria) 01/18/2017, 11/03/2017, 01/02/2020, 10/05/2020    Pneumococcal Conjugate 13-valent ([id number]) 04/27/2017    Pneumococcal Polysaccharide ([id number]) 04/02/2013, 10/10/2018    Tdap (Boostrix, Adacel) 10/05/2015     Current Medications  Current Outpatient Medications   Medication Sig Dispense Refill    omeprazole (PRILOSEC) 20 MG delayed release capsule TAKE ONE (1) CAPSULE BY MOUTH TWICE DAILY 60 capsule 1    insulin glargine (BASAGLAR KWIKPEN) 100 UNIT/ML injection pen Inject 5 Units into the skin nightly 5 pen 0    Blood Gluc Meter Disp-Strips (BLOOD GLUCOSE METER DISPOSABLE) OXANA Daily and as needed 1 each 0    blood glucose monitor strips 1 strip by Other route daily Test 1 times a day & as needed for symptoms of irregular blood glucose. 300 strip 5    Lancets MISC 1 each by Does not apply route 2 times daily 300 each 1    ferrous sulfate (IRON 325) 325 (65 Fe) MG tablet Take 1 tablet by mouth every other day 30 tablet 1    nicotine (NICODERM CQ) 21 MG/24HR Place 1 patch onto the skin daily 30 patch 3    Insulin Pen Needle (MEIJER PEN NEEDLES) 31G X 6 MM MISC 1 each by Does not apply route daily 100 each 3    sodium bicarbonate 650 MG tablet TAKE 1 TABLET BY MOUTH THREE TIMES DAILY 90 tablet 0    megestrol (MEGACE) 20 MG tablet TAKE 1 TABLET BY MOUTH DAILY 30 tablet 3    glimepiride (AMARYL) 2 MG tablet Take 1 tablet by mouth 2 times daily (before meals) 180 tablet 1    albuterol sulfate  (90 Base) MCG/ACT inhaler USE 2 INHALATIONS BY MOUTH  EVERY 6 HOURS AS NEEDED FOR WHEEZING 17 g 1    Alcohol Swabs (ALCOHOL PREP) 70 % PADS Daily as needed 200 each 3    metFORMIN (GLUCOPHAGE-XR) 500 MG extended release tablet Take 2 tablets by mouth daily (with breakfast) AND 1 tablet Daily with supper.  270 tablet 2    mirtazapine (REMERON) 30 MG tablet Take 0.5 tablets by mouth nightly (Patient not taking: Reported on 3/15/2022) 90 tablet 3    SYMBICORT 80-4.5 MCG/ACT AERO Inhale 2 puffs into the lungs 2 times daily 3 each 2    lipase-protease-amylase (CREON) 11690-055322 units CPEP delayed release capsule take 2 capsules by mouth four times a day after meals and at bedtime 240 capsule 1    Multiple Vitamin (MULTI-VITAMIN DAILY PO) Take by mouth      ZINC PO Take by mouth      COMFORT EZ PEN NEEDLES 32G X 6 MM MISC  each 2    AquaLance Lancets 30G MISC Daily and as needed for hypoglycemia 200 each 2     No current facility-administered medications for this encounter.   :     Comments:  Allergies: Allergies   Allergen Reactions    Peanuts [Peanut Oil] Anaphylaxis    Vicodin [Hydrocodone-Acetaminophen] Hives    Tramadol     Tylenol With Codeine #3 [Acetaminophen-Codeine]     Ibuprofen Itching         A1C blood level - at goal < 7%   Lab Results   Component Value Date    LABA1C 11.6 01/21/2022    LABA1C 10.5 10/13/2021    LABA1C 9.2 04/09/2021     Lab Results   Component Value Date    LABMICR CANNOT BE CALCULATED 03/11/2019    CREATININE 0.76 02/11/2022       Blood pressure ( 130/ 80)  Or less  BP Readings from Last 3 Encounters:   02/25/22 127/80   02/11/22 117/78   01/23/22 115/70        Cholesterol ( LDL under  100)   Lab Results   Component Value Date    LDLCHOLESTEROL 19 12/28/2020       Diabetes Self- Management Education Record    Participant Name: Greta Ferraro  Referring Provider: DONTRELL Jackson CNP   Assessment/Evaluation Ratings:  1=Needs Instruction   4=Demonstrates Understanding/Competency  2=Needs Review   NC=Not Covered    3=Comprehends Key Points  N/A=Not Applicable  Topics/Learning Objectives Pre-session Assess Date:    3/15/22   Instr. Date Reinforce Date Post- session Eval Comments   Diabetes disease process & Treatment process: Define diabetes & pre-diabetes; Identify own type of diabetes; role of the pancreas; signs/symptoms; diagnostic criteria; prevention & treatment options; contributing factors.  1 3/29/22rs   3/15/22rs - stated dm for sometime and also acute pancreatitis with in pt care 2/2022 - now trying to learn more and improve her health - worried about her liver, pancrease and kidney    Incorporating nutritional management into lifestyle: Describe effect of type, amount & timing of food on blood glucose; Describe basic meal planning techniques & current nutrition guideline   1   - patient has been eating regular jello and ice cream often and 1 meal per day of solid food, drinking mostly water, but also tea with regular sugar and regular pepsi. Stated often having GI upset. Discussed need to try sugar free jello cups, pictures of recommended boxes and cups shared, need to change to diet pop and try splenda or non nutritive sweetener  in tea. Patient would like to gain weight. RD follow up is planned. What to eat - Food groups, When to eat - timing of meals and snacks, and How much to eat - portions control. 3/15/22rs  Wt loss and now trying to gain weight- - Of concern with discussion of medications - she stated she is not taking Creon as RX , only taking 1 pill in am due to cost -stated the cost for refill in over 600$ and she is now rationing what she has left in her pill bottle. Recommended patient call to her GI MD to discuss options, lower cost meds or samples. calories/ day   CHO choices/ meal   CHO choices/  day   grams of protein /day   gram of fat /day     3/29/22 very concerned about weight loss with diabetes - provided with supplement / sample of high protein meal replacement shakes today in vanilla and chocolate - patient stated she has used boost in past and liked shakes    Correctly read food labels & demonstrate CHO counting & portion control with personalized meal plan. Identify dining out strategies, & dietary sick day guidelines. 1       Incorporating physical activity into lifestyle:   Verbalize effect of exercise on blood glucose levels; benefits of regular exercise; safety considerations; contraindications; maintenance of activity. 1 3/29/22rs   3/15/22rs - some walking - not much activity - get cold very easily   Using medications safely:  Identify effects of diabetes medicines on blood glucose levels;  List diabetes medication taken, action & side effects;    1 3/15/22rs   Discussed taking oral medications amaryl  2 mg twice per day - with Breakfast and Dinner and metformin 500mg 2 tabs in am with breakfast  and 1 tab in pm with dinner. Provided patient with am / pm pill sorter to help with this task. Insulin / Injectable - Appropriate injection sites; proper storage; supplies needed; proper technique; safe needle disposal guidelines. 1    3/15/22rs -  e was taking basaglar 5 units with meals  ( about 2 meals per day)   - Clarified with patient rx is for once per day 5 units of basaglar - we set time for taking this medication daily at 3pm. Patient stated poor sleep patters and setting am or pm for taking basaglar has not worked out for her. Also clarified humalog with the meal time insulin and she is not to take this insulin at this time, but she may keep the pens in her refrigerator for storage. 3/29/220 - Medication use:  Patient has stated having both lantus and basaglar pens at home - she stated she is taking a shot with EACH meal and has moved dose to 10 units, on her own. She is then taking 30 units per day. Also taking all oral meds daily at 3pm as this is when she can remember to take them - including Amaryl and metformin. - explained long and short acting insulins - explained that lantus and basaglar are both the same medication - long acting insulin         Monitoring blood glucose, interpreting and using results:  Identify recommended & personal blood glucose targets; importance of testing; testing supplies; HgbA1C target levels; Factors affecting blood glucose; Importance of logging blood glucose levels for pattern recognition; ketone testing; safe lancet disposal.   1 3/29/22rs   3/15/22rs - stated BG range  At home - 100 to 500     Blood glucose trend:  With inulin use above - stated BG in 200's - 210's pre meals, this is trend down prior was up to 300- 500 range. Denies any low BG in last week.  She is interested in getting CGM/ freestyle [facility] if possible as her finer are getting very soar, stated checking 3 - 4 times per day   Prevention, detection & treatment of acute complications:  Identify symptoms of hyper & hypoglycemia, and prevention & treatment strategies. 1 3/29/22rs   High bg and lots of wt loss   Describe sick day guidelines & indications for  physician notification. Identify short term consequences of poor control. Disaster preparedness strategies    1       Prevention, detection & treatment of chronic complications:  Define the natural course of diabetes & describe the relationship of blood glucose levels to long term complications of diabetes. Identify preventative measures & standards of care. 1 3/29/22rs    Following with PCP and GI  Has not had foot exam - has eye exam appt [facility] for April 8 - all labs done with recent inpt adm    3/29/22rs Advised patient to call PCP soon  for medication plan clarification and blood glucose response   call to [facility] / referral to Dr Shira cardona:  office had not received the referral - writer faxed to office , ref, demos, last notes and recent labs, patient given copy of referral and phone number so she can call in few days for follow up.follow up call to [facility] on referral in about 1 week     Developing strategies to address psychosocial issues:  Describe feelings about living with diabetes; Describe how stress, depression & anxiety affect blood glucose; Identify coping strategies; Identify support needed & support network available. 1    PAid 15 - recovering from poly substance use    Developing strategies to promote health/change behavior: Identify 7 self-care behaviors; Personal health risk factors; Benefits, challenges & strategies for behavioral change;    1 3/29/22rs     Expressed wanting to improve her health   Individualized goal selection. My goal , to help me improve my health, I will:   1.  Taking medications- work on taking medications at regular times - avoid skipping insulin - take daily at 3 pm      2. Plan  Follow-up Appointments planned in 2 weeks as one on one session     Instruction Method: [x]Lecture/Discussion  []Power Point Presentation  []Handouts  []Return Demonstration    Education Materials/Equipment Provided (VIA Mail for phone visits)  :    [x]Self-Management - Initial assessment - Enrolment in to ADA  Where do I Begin, Living with Type 2 diabetes ADA home support program and  handout on diabetes education classes. -- 3/15/22rs   [x]Understanding Diabetes session       Book How to Thrive: A guide for Your Journey with Diabetes ADA booklet -pages 4, 14-19, 22-23        View: Understanding Type 2 Diabetes from Animated Diabetes Patient [email or web address]    [] Healthy Eating and Meal planning  How to Thrive: A guide for Your journey with Diabetes - [place] booklet - pages 20- 21 & 42-43  Handouts: Smarter snacking, Lowdown on low - carb diets, Supermarket savvy, Ready, set, start counting, Reading a nutrition fact label, 7 ways to size up your servings    []   Medications and Prevention of Complications      Book How to Thrive: A guide for Your Journey with Diabetes - [place] booklet -  pages 6-7, 12-13, 24-27 & 28 -35  Handouts: Know your numbers, Vaccinations, Type 2 diabetes and the role of GLP- 1, How to care for your teeth and gums, Caring for your feet, How to pick the right shoes  Individualized Diabetes report card     []  Diet Follow Up- CHO counting and  planning for sick days, holiday, vacations   How to Thrive: A guide for Your journey with Diabetes - ADA booklet  - pages 43- 37  Diabetes Food hub www. diabetesfoodhub.org   Handouts: Sick day rules, Dining out guide, Diabetes and alcohol, Traveling with diabetes, Diabetes disaster preparedness plan, Holidays and special occasions                                                            []  Self care and goal review -  3 month follow -    Handouts: AADE7 Self care behaviors work sheets and personal goal setting worksheet review, DSME support options on line     []Self-Management  Gestational - RN class -Resource materials sent out : care booklet - \" Gestational Diabetes Mellitus ( GDM) toolkit form [place] gestational diabetes postpartum care learning collaborative 2019. \"Simple Guidelines for meal planning with gestational diabetes. SMBG sheets to fax back to MFM weekly. BD  healthy injection site selection and rotation with 6 mm insulin syringe and 4 mm pen needle. Gestational diabetes handout from [facility] 2016. Did you have gestational diabetes when you were pregnant? Handout from [address] [place] [address]  April 2014    []Self-Management Gestational - RD class - My Food Plan for Gestational diabetes    []Glucose Meter     []Insulin Kit     []Other      Encounter Type Date Start Time End Time Comments No Show Dates   Assessment 3/15/22rs     900   1000    x    Class 1 - Understanding diabetes 3/29/22rs  910   x - call to [facility] / referral to Dr Rangel Quiroz - office had not received the referral - writer faxed to office , ref, demos, last notes and recent labs    Class 2- Nutrition and diabetes          Class 3 - Preventing Complications        Class 4 -  In depth Nutrition and sick day care        Class 5 - 3 month follow up / goal reassessment        Gestational - RN         Gestational - RD        Individual MNT         Shared Med Appt         Yearly Follow-up        Meter Instrx      How to Measure Your Blood Sugar - [facility] Patient Education  [email or web address] [email or web address]    Insulin Instrx      []Pen  []Vial & Syringe   BD Diabetes Care: How to Inject Insulin with a Pen Needle  [email or web address] [email or web address]    Diabetes Care: How to Inject Insulin with a Syringe  [email or web address] [email or web address]       DSMS Support :   [] MNT      [] Annual update     [] Starting Fresh  adults living with diabetes or pre diabetes.  [address] [place] [address] [phone] call for dates    []  Diabetes Group at  [facility] 79 6 week diabetes education support   classes - use web site interest form found at  [employer] - to enroll       []ADA  Where do I Begin, Living with Type 2 diabetes ADA home support program  Web site: diabetes. org/living    Call: 1800 DIABETES  e-mail: Estella@[email or web address] org     []  Internet web sites - ADAWeb site: diabetes. org and diabetesfoodhub.org      Post Education Referrals:      [] [facility] information sheet and [address], located within [facility] , 21       [] Dental care - [facility] of [facility]     [] [facility] link  phone number - for information and referral to [facility]  Clinically  [address] [facility], WEIGHT MANAGEMENT        []Other  Maribel Rosenthal RN

## 2022-03-31 ENCOUNTER — OFFICE VISIT (OUTPATIENT)
Age: 67
End: 2022-03-31
Payer: MEDICARE

## 2022-03-31 DIAGNOSIS — F32.1 MAJOR DEPRESSIVE DISORDER, SINGLE EPISODE, MODERATE (HCC): Primary | ICD-10-CM

## 2022-03-31 DIAGNOSIS — Z79.4 TYPE 2 DIABETES MELLITUS WITH DIABETIC POLYNEUROPATHY, WITH LONG-TERM CURRENT USE OF INSULIN (HCC): ICD-10-CM

## 2022-03-31 DIAGNOSIS — E11.42 TYPE 2 DIABETES MELLITUS WITH DIABETIC POLYNEUROPATHY, WITH LONG-TERM CURRENT USE OF INSULIN (HCC): ICD-10-CM

## 2022-03-31 DIAGNOSIS — E11.9 TYPE 2 DIABETES MELLITUS WITHOUT COMPLICATION, WITHOUT LONG-TERM CURRENT USE OF INSULIN (HCC): ICD-10-CM

## 2022-03-31 PROCEDURE — 90791 PSYCH DIAGNOSTIC EVALUATION: CPT | Performed by: PSYCHOLOGIST

## 2022-03-31 NOTE — TELEPHONE ENCOUNTER
Patient is requesting a med refill on [employer] and Blood glucmeter disp sent to pharmacy. Health Maintenance   Topic Date Due    Diabetic retinal exam  Never done    Shingles Vaccine (1 of 2) Never done    Diabetic foot exam  02/13/2020    Diabetic microalbuminuria test  03/11/2020    Lipid screen  12/28/2021    Annual Wellness Visit (AWV)  Never done    A1C test (Diabetic or Prediabetic)  04/21/2022    Depression Monitoring  01/21/2023    Potassium monitoring  02/11/2023    Creatinine monitoring  02/11/2023    Breast cancer screen  06/02/2023    Pneumococcal 65+ years Vaccine (2 of 2 - PPSV23) 10/10/2023    DTaP/Tdap/Td vaccine (2 - Td or Tdap) 10/05/2025    Colorectal Cancer Screen  05/07/2026    Cervical cancer screen  06/09/2026    DEXA (modify frequency per FRAX score)  Completed    Flu vaccine  Completed    COVID-19 Vaccine  Completed    Hepatitis C screen  Completed    HIV screen  Completed    Hepatitis A vaccine  Aged Out    Hib vaccine  Aged Out    Meningococcal (ACWY) vaccine  Aged Out             (applicable per patient's age: Cancer Screenings, Depression Screening, Fall Risk Screening, Immunizations)    Hemoglobin A1C (%)   Date Value   01/21/2022 11.6   10/13/2021 10.5   04/09/2021 9.2     Microalb/Crt.  Ratio (mcg/mg creat)   Date Value   03/11/2019 CANNOT BE CALCULATED     LDL Cholesterol (mg/dL)   Date Value   12/28/2020 19     AST (U/L)   Date Value   02/09/2022 13     ALT (U/L)   Date Value   02/09/2022 11     BUN (mg/dL)   Date Value   02/11/2022 5 (L)      (goal A1C is < 7)   (goal LDL is <100) need 30-50% reduction from baseline     BP Readings from Last 3 Encounters:   02/25/22 127/80   02/11/22 117/78   01/23/22 115/70    (goal /80)      All Future Testing planned in [facility]:  Lab Frequency Next Occurrence   Hemoglobin A1C Once 11/27/2021   Microalbumin, Ur Once 04/28/2022       Next Visit Date:  Future Appointments   Date Time Provider Department Center   4/19/2022 10:00 AM Roderick Barajas RD, LD [facility]   5/25/2022  2:00 PM DONTRELL Connolly - CNP [facility]   5/31/2022  1:00 PM Kalyan Conley MD [facility]            Patient Active Problem List:     Intractable epilepsy ([address]     Hypoglycemia     Depression     Osteoporosis     Affective disorder ([address]     Uncontrolled type 2 DM with hyperosmolar nonketotic hyperglycemia (HCC)     Generalized abdominal pain     Pancreatic calcification     Uncontrolled type 2 diabetes mellitus with hyperglycemia (HCC)     Pancreatic insufficiency     Pain of upper abdomen     Alcohol-induced chronic pancreatitis (HCC)     Weight loss     Polysubstance abuse (HCC)     Unresponsive episode     Chronic neck pain     Neuropathic pain of both legs     Seizure disorder, secondary ([address]     Essential (primary) hypertension     Type 2 diabetes mellitus without complication, with long-term current use of insulin (HCC)     Neuropathic pain     Elevated CEA     Acute left-sided low back pain without sciatica     Adenomatous polyps     Bilateral wrist pain     Seizure-like activity (HCC)     Elevated CA 19-9 level     Body mass index (BMI) less than 16.5     Pseudohyponatremia     Hyperglycemia     Hypokalemia     Hypocalcemia     Hypomagnesemia     Noncompliance     Severe malnutrition ([address]
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
## 2022-04-04 DIAGNOSIS — E11.42 TYPE 2 DIABETES MELLITUS WITH DIABETIC POLYNEUROPATHY, WITH LONG-TERM CURRENT USE OF INSULIN (HCC): Primary | ICD-10-CM

## 2022-04-04 DIAGNOSIS — Z79.4 TYPE 2 DIABETES MELLITUS WITH DIABETIC POLYNEUROPATHY, WITH LONG-TERM CURRENT USE OF INSULIN (HCC): Primary | ICD-10-CM

## 2022-04-04 RX ORDER — INSULIN LISPRO 100 [IU]/ML
5 INJECTION, SOLUTION INTRAVENOUS; SUBCUTANEOUS
Qty: 4.5 ML | Refills: 0 | Status: SHIPPED | OUTPATIENT
Start: 2022-04-04 | End: 2022-05-27 | Stop reason: SDUPTHER

## 2022-04-04 RX ORDER — GLUCOSAMINE HCL/CHONDROITIN SU 500-400 MG
1 CAPSULE ORAL DAILY
Qty: 300 STRIP | Refills: 5 | Status: SHIPPED | OUTPATIENT
Start: 2022-04-04 | End: 2022-07-21

## 2022-04-04 RX ORDER — INSULIN GLARGINE 100 [IU]/ML
5 INJECTION, SOLUTION SUBCUTANEOUS NIGHTLY
Qty: 5 PEN | Refills: 0 | OUTPATIENT
Start: 2022-04-04

## 2022-04-04 NOTE — TELEPHONE ENCOUNTER
Spoke with patient, states she has only received one Basaglar pen , will call the pharmacy to verify and give office call back.

## 2022-04-04 NOTE — TELEPHONE ENCOUNTER
Please let the patient know that Humalog pen was refilled, was most recent mealtime insulin pen prescribed. It appears she should not be due for more basal insulin. Please clarify how many Basaglar pens she received in the mail and current dose she is taking daily. I have prescribed 5, however most recent diabetic education notes state patient is taking 10 units once a day. At 10 units daily 1 pen should last a whole month.   Typically when the prescription is filled, patient should receive 1 box with 5 pen

## 2022-04-05 ENCOUNTER — TELEPHONE (OUTPATIENT)
Dept: PRIMARY CARE CLINIC | Age: 67
End: 2022-04-05

## 2022-04-05 NOTE — TELEPHONE ENCOUNTER
Script sent in Feb for needles to [facility]. Please call to verify. Schedule Gloria for DM appt. She is to bring ALL insulin pens to evaluate what she has and administration.

## 2022-04-05 NOTE — TELEPHONE ENCOUNTER
----- Message from Andres Magi sent at 4/1/2022  4:55 PM EDT -----  Subject: Medication Problem    QUESTIONS  Name of Medication? Insulin Pen Needle (MEIJER PEN NEEDLES) 31G X 6 MM   MISC  Patient-reported dosage and instructions? 1 each by Does not apply route   daily  What question or problem do you have with the medication? Patient would   like to be prescribed for a fast acting insulin. She's not getting blood   out of the site where she is using the injection pen. Preferred Pharmacy? [address] [place] [address], [address] [address] Melanie Prince [phone]  Pharmacy phone number (if available)? [phone]  Additional Information for Provider?   ---------------------------------------------------------------------------  --------------  [address]  What is the best way for the office to contact you? OK to leave message on   voicemail  Preferred Call Back Phone Number? [phone]  ---------------------------------------------------------------------------  --------------  SCRIPT ANSWERS  Relationship to Patient?  Self

## 2022-04-18 DIAGNOSIS — R63.4 WEIGHT LOSS: ICD-10-CM

## 2022-04-18 RX ORDER — MEGESTROL ACETATE 20 MG/1
20 TABLET ORAL DAILY
Qty: 30 TABLET | Refills: 10 | Status: SHIPPED | OUTPATIENT
Start: 2022-04-18

## 2022-04-21 ENCOUNTER — OFFICE VISIT (OUTPATIENT)
Age: 67
End: 2022-04-21
Payer: MEDICARE

## 2022-04-21 DIAGNOSIS — F32.1 MAJOR DEPRESSIVE DISORDER, SINGLE EPISODE, MODERATE (HCC): Primary | ICD-10-CM

## 2022-04-21 PROCEDURE — 90834 PSYTX W PT 45 MINUTES: CPT | Performed by: PSYCHOLOGIST

## 2022-04-21 NOTE — PROGRESS NOTES
Karon Li M.A. Psychology Doctoral Trainee    Supervising Clinical Psychologists:  Claude Norrie, Ph.D. Nate Cummings,  Ph.D. Ledy Marks      Visit Date: 4/21/2022   Time of appointment:  3:00-3:45 pm   Time spent with Patient: 45 minutes. This is patient's second appointment. Reason for Consult: Follow-up and Depression     Referring Provider/PCP:    No ref. provider found  Patricio Alaniz, APRN - CNP      Pt provided informed consent for the behavioral health program. Discussed with patient model of service to include the limits of confidentiality (i.e. abuse reporting, suicide intervention, etc.) and short-term intervention focused approach. Also discussed with patient that the service provider is a supervised clinician and is being supervised by Dr. Jacqulynn Nageotte or Dr. Ayleen Duenas. Pt indicated understanding. Odalis Brown is a 73 y.o. female who presents for follow up of depression. She reported low motivation and desire to do anything. Discussed exploring medication for depression symptoms. Pt stated she is open to trying psychotropic medication. Explained values clarification exercise and pt selected values important to her (e.g., acceptance, courage). Identified behaviors/activities the pt can do in accordance with her values. She identified community service, driving to the park, shopping, reading, writing poems as activities in line with her values. Previous Recommendations:   1) Follow up with patient in 3 weeks  2) Pt will contact [facility] to get more information on treatment options      MENTAL STATUS EXAM  Mood was sad with congruent affect. Suicidal ideation was denied. Homicidal ideation was denied. Hygiene was good . Dress was neat. Behavior was Within Normal Limits with No observation or self-report of difficulties ambulating. Attitude was Cooperative and Help-seeking.   Eye-contact was good.  Speech: rate - WNL, rhythm - WNL, volume - WNL. Verbalizations were goal directed and coherent. Thought processes were intact and goal-oriented without evidence of delusions, hallucinations, obsessions, or coral. Associations were characterized by intact cognitive processes. Pt was oriented to person, place, time, and general circumstances;  recent:  good and remote:  good. Insight and judgment were estimated to be good, AEB, a good  understanding of cyclical maladaptive patterns, and the ability to use insight to inform behavior change. ASSESSMENT  Hoda Walsh presented to the appointment today for evaluation and treatment of symptoms of depression. She is currently considered to be of no risk to herself or others. Pt continues to present with symptoms consistent with a diagnosis of MDD. Pt will likely continue to benefit from treatment focused on depression. Pt was in agreement with recommendations. PHQ Scores 1/21/2022 1/21/2022 7/10/2018 4/27/2017 1/30/2017   PHQ2 Score 3 - 0 0 2   PHQ9 Score 14 0 0 0 2     Interpretation of Total Score Depression Severity: 1-4 = Minimal depression, 5-9 = Mild depression, 10-14 = Moderate depression, 15-19 = Moderately severe depression, 20-27 = Severe depression    How often pt has had thoughts of death or hurting self (if PHQ positive for depression):       No flowsheet data found. Interpretation of EVENS-7 score: 5-9 = mild anxiety, 10-14 = moderate anxiety, 15+ = severe anxiety. Recommend referral to behavioral health for scores 10 or greater. DIAGNOSIS  Fredy Lozano was seen today for follow-up and depression.     Diagnoses and all orders for this visit:    Major depressive disorder, single episode, moderate ([facility] [address]          INTERVENTION  Discussed various factors related to the development and maintenance of  depression (including biological, cognitive, behavioral, and environmental factors), Discussed self-care (sleep, nutrition, rewarding activities, social support, exercise), Established rapport, [place]-setting to identify pt's primary goals for [facility] visit / overall health and Supportive techniques, Completed values clarification exercise, Discussed medication for depression symptoms     Pt was engaged throughout the visit and responded well to interventions.      PLAN  Follow up in 3 weeks      Electronically signed by Chepe Jaramillo on 4/21/22 at 3:21 PM EDT

## 2022-04-27 ENCOUNTER — HOSPITAL ENCOUNTER (OUTPATIENT)
Dept: DIABETES SERVICES | Age: 67
Setting detail: THERAPIES SERIES
Discharge: HOME | End: 2022-04-27
Payer: MEDICARE

## 2022-04-27 PROCEDURE — G0108 DIAB MANAGE TRN  PER INDIV: HCPCS

## 2022-04-27 NOTE — PROGRESS NOTES
Diabetes Self- Management Education Program Assessment -   Also see Diabetic Screening  Patient, Bev Stout,  here for diabetes self-management education  visit/ assessment. Today's visit was in an individual setting.     MEDICAL HISTORY:  Past Medical History:   Diagnosis Date    Cataracts, bilateral     COPD (chronic obstructive pulmonary disease) (HCC)     CTS (carpal tunnel syndrome) left    Fibromyalgia     Generalized abdominal pain 6/3/2016    Glaucoma     Hypertension     Osteoarthritis     Osteoporosis     Pancreatic calcification 6/3/2016    Pancreatitis     Protein-calorie malnutrition, severe ([address] 6/1/2016    Seizures ([address]     Tremor     Type II or unspecified type diabetes mellitus without mention of complication, not stated as uncontrolled     Uncontrolled type 2 DM with hyperosmolar nonketotic hyperglycemia ([address] 6/3/2016     Family History   Problem Relation Age of Onset    Brain Cancer Father     Diabetes Sister     Other Sister         epilepsy     Peanuts [peanut oil], Vicodin [hydrocodone-acetaminophen], Tramadol, Tylenol with codeine #3 [acetaminophen-codeine], and Ibuprofen   Immunization History   Administered Date(s) Administered    COVID-19, Aristeo Adame, Primary or Immunocompromised, PF, 100mcg/0.5mL 03/11/2021, 04/08/2021, 12/09/2021    Influenza Vaccine, unspecified formulation 10/05/2015    Influenza Virus Vaccine 10/05/2015, 10/10/2018    Influenza, MDCK Quadv, IM, PF (Flucelvax 2 yrs and older) 10/13/2021    Influenza, Flor Hurt, IM, (6 mo and older Fluzone, Flulaval, Fluarix and 3 yrs and older Afluria) 10/10/2018    Influenza, Quadv, IM, PF (6 mo and older Fluzone, Flulaval, Fluarix, and 3 yrs and older Afluria) 01/18/2017, 11/03/2017, 01/02/2020, 10/05/2020    Pneumococcal Conjugate 13-valent ([id number]) 04/27/2017    Pneumococcal Polysaccharide ([id number]) 04/02/2013, 10/10/2018    Tdap (Boostrix, Adacel) 10/05/2015     Current Medications  Current Outpatient Medications   Medication Sig Dispense Refill    megestrol (MEGACE) 20 MG tablet TAKE 1 TABLET BY MOUTH DAILY 30 tablet 10    insulin lispro, 1 Unit Dial, (HUMALOG KWIKPEN) 100 UNIT/ML SOPN Inject 5 Units into the skin 3 times daily (before meals) 4.5 mL 0    blood glucose monitor strips 1 strip by Other route daily Test 1 times a day & as needed for symptoms of irregular blood glucose. 300 strip 5    omeprazole (PRILOSEC) 20 MG delayed release capsule TAKE ONE (1) CAPSULE BY MOUTH TWICE DAILY 60 capsule 1    insulin glargine (BASAGLAR KWIKPEN) 100 UNIT/ML injection pen Inject 5 Units into the skin nightly 5 pen 0    Blood Gluc Meter Disp-Strips (BLOOD GLUCOSE METER DISPOSABLE) OXANA Daily and as needed 1 each 0    Lancets MISC 1 each by Does not apply route 2 times daily 300 each 1    ferrous sulfate (IRON 325) 325 (65 Fe) MG tablet Take 1 tablet by mouth every other day 30 tablet 1    nicotine (NICODERM CQ) 21 MG/24HR Place 1 patch onto the skin daily 30 patch 3    Insulin Pen Needle (MEIJER PEN NEEDLES) 31G X 6 MM MISC 1 each by Does not apply route daily 100 each 3    sodium bicarbonate 650 MG tablet TAKE 1 TABLET BY MOUTH THREE TIMES DAILY 90 tablet 0    glimepiride (AMARYL) 2 MG tablet Take 1 tablet by mouth 2 times daily (before meals) 180 tablet 1    albuterol sulfate  (90 Base) MCG/ACT inhaler USE 2 INHALATIONS BY MOUTH  EVERY 6 HOURS AS NEEDED FOR WHEEZING 17 g 1    Alcohol Swabs (ALCOHOL PREP) 70 % PADS Daily as needed 200 each 3    metFORMIN (GLUCOPHAGE-XR) 500 MG extended release tablet Take 2 tablets by mouth daily (with breakfast) AND 1 tablet Daily with supper.  270 tablet 2    mirtazapine (REMERON) 30 MG tablet Take 0.5 tablets by mouth nightly (Patient not taking: Reported on 3/15/2022) 90 tablet 3    SYMBICORT 80-4.5 MCG/ACT AERO Inhale 2 puffs into the lungs 2 times daily 3 each 2    lipase-protease-amylase (CREON) 66905-352468 units CPEP delayed release capsule take 2 capsules by mouth four times a day after meals and at bedtime 240 capsule 1    Multiple Vitamin (MULTI-VITAMIN DAILY PO) Take by mouth      ZINC PO Take by mouth      COMFORT EZ PEN NEEDLES 32G X 6 MM MISC  each 2    AquaLance Lancets 30G MISC Daily and as needed for hypoglycemia 200 each 2     No current facility-administered medications for this encounter.   :     Comments:  Allergies: Allergies   Allergen Reactions    Peanuts [Peanut Oil] Anaphylaxis    Vicodin [Hydrocodone-Acetaminophen] Hives    Tramadol     Tylenol With Codeine #3 [Acetaminophen-Codeine]     Ibuprofen Itching         A1C blood level - at goal < 7%   Lab Results   Component Value Date    LABA1C 11.6 01/21/2022    LABA1C 10.5 10/13/2021    LABA1C 9.2 04/09/2021     Lab Results   Component Value Date    LABMICR CANNOT BE CALCULATED 03/11/2019    CREATININE 0.76 02/11/2022       Blood pressure ( 130/ 80)  Or less  BP Readings from Last 3 Encounters:   02/25/22 127/80   02/11/22 117/78   01/23/22 115/70        Cholesterol ( LDL under  100)   Lab Results   Component Value Date    LDLCHOLESTEROL 19 12/28/2020       Diabetes Self- Management Education Record    Participant Name: Melinda Ferro  Referring Provider: DONTRELL Schmitt CNP   Assessment/Evaluation Ratings:  1=Needs Instruction   4=Demonstrates Understanding/Competency  2=Needs Review   NC=Not Covered    3=Comprehends Key Points  N/A=Not Applicable  Topics/Learning Objectives Pre-session Assess Date:    3/15/22   Instr. Date Reinforce Date Post- session Eval Comments   Diabetes disease process & Treatment process: Define diabetes & pre-diabetes; Identify own type of diabetes; role of the pancreas; signs/symptoms; diagnostic criteria; prevention & treatment options; contributing factors. 14/27/22 JW question if pt a type 1 and if endo will test antibodies.  3/29/22rs   3/15/22rs - stated dm for sometime and also acute pancreatitis with in pt care 2/2022 - now trying to learn more and improve her health - worried about her liver, pancrease and kidney    Incorporating nutritional management into lifestyle: Describe effect of type, amount & timing of food on blood glucose; Describe basic meal planning techniques & current nutrition guideline   1   - patient has been eating regular jello and ice cream often and 1 meal per day of solid food, drinking mostly water, but also tea with regular sugar and regular pepsi. Stated often having GI upset. Discussed need to try sugar free jello cups, pictures of recommended boxes and cups shared, need to change to diet pop and try splenda or non nutritive sweetener  in tea. Patient would like to gain weight. RD follow up is planned. 4/27/22 JW Pt showed hdt from RD about ways to gain weight. Discussed and said all good except regular sugar items listed (ie fruit packed in heavy syrup, sugar in drinks/foods, etc). Told pt main thing for gaining weight is getting the right meds to get better bs control. Improvement w bs will make a difference. Suggested 6 small meals or meal/snack, try higher protein shakes, pt does own cooking/shopping. States appetite still not good and megace doesn't help. What to eat - Food groups, When to eat - timing of meals and snacks, and How much to eat - portions control. 3/15/22rs  Wt loss and now trying to gain weight- - Of concern with discussion of medications - she stated she is not taking Creon as RX , only taking 1 pill in am due to cost -stated the cost for refill in over 600$ and she is now rationing what she has left in her pill bottle. Recommended patient call to her GI MD to discuss options, lower cost meds or samples.        calories/ day   3 CHO choices/ meal and 1-2 CHO/snack   CHO choices/  day   grams of protein /day   gram of fat /day     3/29/22 very concerned about weight loss with diabetes - provided with supplement / sample of high protein meal replacement shakes today in vanilla and chocolate - patient stated she has used boost in past and liked shakes    Correctly read food labels & demonstrate CHO counting & portion control with personalized meal plan. Identify dining out strategies, & dietary sick day guidelines. 1 4/27/22 KURT   Basic label reading. Pt asking about fats, s fats but suggested to focus on cho for now. (was thinking sugar.) Said sugar issue when it makes up all of the cho like in a regular pop. Incorporating physical activity into lifestyle:   Verbalize effect of exercise on blood glucose levels; benefits of regular exercise; safety considerations; contraindications; maintenance of activity. 1 3/29/22rs   3/15/22rs - some walking - not much activity - get cold very easily   Using medications safely:  Identify effects of diabetes medicines on blood glucose levels; List diabetes medication taken, action & side effects;    1    3/15/22rs   Discussed taking oral medications amaryl  2 mg twice per day - with Breakfast and Dinner and metformin 500mg 2 tabs in am with breakfast  and 1 tab in pm with dinner. Provided patient with am / pm pill sorter to help with this task. Insulin / Injectable - Appropriate injection sites; proper storage; supplies needed; proper technique; safe needle disposal guidelines. 1 4/27/22 JW had pt show me what she does with pens- needed a few reminders. 3/15/22rs -  e was taking basaglar 5 units with meals  ( about 2 meals per day)   - Clarified with patient rx is for once per day 5 units of basaglar - we set time for taking this medication daily at 3pm. Patient stated poor sleep patters and setting am or pm for taking basaglar has not worked out for her. Also clarified humalog with the meal time insulin and she is not to take this insulin at this time, but she may keep the pens in her refrigerator for storage.       3/29/220 - Medication use:  Patient has stated having both lantus and basaglar pens at home - she stated she is taking a shot with EACH meal and has moved dose to 10 units, on her own. She is then taking 30 units per day. Also taking all oral meds daily at 3pm as this is when she can remember to take them - including Amaryl and metformin. - explained long and short acting insulins - explained that lantus and basaglar are both the same medication - long acting insulin         Monitoring blood glucose, interpreting and using results:  Identify recommended & personal blood glucose targets; importance of testing; testing supplies; HgbA1C target levels; Factors affecting blood glucose; Importance of logging blood glucose levels for pattern recognition; ketone testing; safe lancet disposal.   1 3/29/22rs   3/15/22rs - stated BG range  At home - 100 to 500     Blood glucose trend:  With inulin use above - stated BG in 200's - 210's pre meals, this is trend down prior was up to 300- 500 range. Denies any low BG in last week. She is interested in getting CGM/ freestyle Sharnadine Bouche if possible as her finer are getting very soar, stated checking 3 - 4 times per day   Prevention, detection & treatment of acute complications:  Identify symptoms of hyper & hypoglycemia, and prevention & treatment strategies. 1 3/29/22rs   High bg and lots of wt loss   Describe sick day guidelines & indications for  physician notification. Identify short term consequences of poor control. Disaster preparedness strategies    1       Prevention, detection & treatment of chronic complications:  Define the natural course of diabetes & describe the relationship of blood glucose levels to long term complications of diabetes. Identify preventative measures & standards of care.    1 3/29/22rs    Following with PCP and GI  Has not had foot exam - has [facility] exam appt [facility] for April 8 - all labs done with recent inpt adm    3/29/22rs Advised patient to call PCP soon  for medication plan clarification and blood glucose response   call to [facility] / referral to Dr Zulema Garland endo:  office had not 7 ways to size up your servings4/27/22 JW    []   Medications and Prevention of Complications      Book How to Thrive: A guide for Your Journey with Diabetes - ADA booklet -  pages 6-7, 12-13, 24-27 & 28 -35  Handouts: Know your numbers, Vaccinations, Type 2 diabetes and the role of GLP- 1, How to care for your teeth and gums, Caring for your feet, How to pick the right shoes  Individualized Diabetes report card     []  Diet Follow Up- CHO counting and  planning for sick days, holiday, vacations   How to Thrive: A guide for Your journey with Diabetes - ADA booklet  - pages 43- 37  Diabetes Food hub www. diabetesfoodhub.org   Handouts: Sick day rules, Dining out guide, Diabetes and alcohol, Traveling with diabetes, Diabetes disaster preparedness plan, Holidays and special occasions                                                            []  Self care and goal review -  3 month follow -    Handouts: AADE7 Self care behaviors work sheets and personal goal setting worksheet review, DSME support options on line     []Self-Management  Gestational - RN class -Resource materials sent out : care booklet - \" Gestational Diabetes Mellitus ( GDM) toolkit form [place] gestational diabetes postpartum care learning collaborative 2019. \"Simple Guidelines for meal planning with gestational diabetes. SMBG sheets to fax back to MFM weekly. BD  healthy injection site selection and rotation with 6 mm insulin syringe and 4 mm pen needle. Gestational diabetes handout from [facility] 2016. Did you have gestational diabetes when you were pregnant?  Handout from [facility]  April 2014    []Self-Management Gestational - RD class - My Food Plan for Gestational diabetes    []Glucose Meter     []Insulin Kit     []Other      Encounter Type Date Start Time End Time Comments No Show Dates   Assessment 3/15/22rs     900   1000    x    Class 1 - Understanding diabetes 3/29/22rs  910   x - call to [facility] / referral to Dr Levon Boston - office had not received the referral - writer faxed to office , ref, demos, last notes and recent labs    Class 2- Nutrition and diabetes   4/27/22 JW 10:15 11:15 X pt to see endo tomorrow    Class 3 - Preventing Complications        Class 4 -  In depth Nutrition and sick day care        Class 5 - 3 month follow up / goal reassessment        Gestational - RN         Gestational - RD        Individual MNT         Shared Med Appt         Yearly Follow-up        Meter Instrx      How to Measure Your Blood Sugar - [address] Patient Education  [email or web address]    Insulin Instrx      []Pen  []Vial & Syringe   BD Diabetes Care: How to Inject Insulin with a Pen Needle  [email or web address] [email or web address]    Diabetes Care: How to Inject Insulin with a Syringe  [email or web address]       DSMS Support :   [] MNT      [] Annual update     [] Starting Fresh  adults living with diabetes or pre diabetes. [address] [place] [address] [phone] call for dates    []  Diabetes Group at  [address] c[place]ron - Free 6 week diabetes education support   classes - use web site interest form found at  [employer].pt - to enroll       []ADA  Where do I Begin, Living with Type 2 diabetes ADA home support program  Web site: diabetes. org/living    Call: 1800 DIABETES  e-mail: Monse@[employer]. org     []  Internet web sites - ADAWeb site: diabetes. org and diabetesfoodhub.org      Post Education Referrals:      [] [address] information sheet and [address] [facility] , [facility]       [] Dental care - Dental care of [facility]     [] [facility] link  phone number - for information and referral to [address], WOUND, WEIGHT MANAGEMENT        []Other  Alea Linder, RD, LD

## 2022-05-12 ENCOUNTER — OFFICE VISIT (OUTPATIENT)
Age: 67
End: 2022-05-12
Payer: MEDICARE

## 2022-05-12 DIAGNOSIS — F32.1 MAJOR DEPRESSIVE DISORDER, SINGLE EPISODE, MODERATE (HCC): Primary | ICD-10-CM

## 2022-05-12 PROCEDURE — 90834 PSYTX W PT 45 MINUTES: CPT | Performed by: PSYCHOLOGIST

## 2022-05-12 NOTE — PROGRESS NOTES
Meeta Martinez M.A. Psychology Doctoral Trainee    Supervising Clinical Psychologists:  Regis Sheth, Ph.D. Sheree Crabtree,  Ph.D. Kylee [facility] [address]      Visit Date: 5/12/2022   Time of appointment:  3:00-3:45 pm   Time spent with Patient: 45 minutes. This is patient's third appointment. Reason for Consult: Follow-up and Depression     Referring Provider/PCP:    No ref. provider found  DONTRELL Llanos CNP      Pt provided informed consent for the behavioral health program. Discussed with patient model of service to include the limits of confidentiality (i.e. abuse reporting, suicide intervention, etc.) and short-term intervention focused approach. Also discussed with patient that the service provider is a supervised clinician and is being supervised by Dr. Jose Luis Randolph or Dr. Nyasia Ott. Pt indicated understanding. Daysi Reyes is a 73 y.o. female who presents for follow up of depression. She reported she is the \"same\" since her last [facility] appointment. She report lack of motivation to do anything and anhedonia. Used imagery and writing to express her frustration and lack of motivation. Pt wrote a poem regarding her current situation. Discussed behavioral activation for depression and how behaviors come first and then feelings will follow. Emphasized importance of connecting behaviors with her values. Pt acknowledged her understanding. Discussed options for therapy. Pt stated she would like to stay at [facility]     Previous Recommendations: Follow up in 3 weeks      MENTAL STATUS EXAM  Mood was sad with congruent affect. Suicidal ideation was denied. Homicidal ideation was denied. Hygiene was good . Dress was neat. Behavior was Within Normal Limits with No observation or self-report of difficulties ambulating. Attitude was Cooperative and Help-seeking. Eye-contact was good.   Speech: rate - WNL, rhythm - WNL, volume - WNL.  Verbalizations were goal directed and coherent. Thought processes were intact and goal-oriented without evidence of delusions, hallucinations, obsessions, or coral. Associations were characterized by intact cognitive processes. Pt was oriented to person, place, time, and general circumstances;  recent:  good and remote:  good. Insight and judgment were estimated to be good, AEB, a good  understanding of cyclical maladaptive patterns, and the ability to use insight to inform behavior change. ASSESSMENT  Halie Adair presented to the appointment today for evaluation and treatment of symptoms of depression. She is currently considered to be of no risk to herself or others. Pt continues to present with symptoms consistent with a diagnosis of MDD. Pt will likely continue to benefit from treatment focused on depression. Pt was in agreement with recommendations. PHQ Scores 1/21/2022 1/21/2022 7/10/2018 4/27/2017 1/30/2017   PHQ2 Score 3 - 0 0 2   PHQ9 Score 14 0 0 0 2     Interpretation of Total Score Depression Severity: 1-4 = Minimal depression, 5-9 = Mild depression, 10-14 = Moderate depression, 15-19 = Moderately severe depression, 20-27 = Severe depression    How often pt has had thoughts of death or hurting self (if PHQ positive for depression):       No flowsheet data found. Interpretation of EVENS-7 score: 5-9 = mild anxiety, 10-14 = moderate anxiety, 15+ = severe anxiety. Recommend referral to behavioral health for scores 10 or greater. DIAGNOSIS  Raisa Sandoval was seen today for follow-up and depression.     Diagnoses and all orders for this visit:    Major depressive disorder, single episode, moderate ([facility] [address] [facility]          INTERVENTION  Discussed various factors related to the development and maintenance of  depression (including biological, cognitive, behavioral, and environmental factors), Discussed and set plan for behavioral activation, Discussed self-care (sleep, nutrition, rewarding activities, social support, exercise), Established rapport, [place]-setting to identify pt's primary goals for [facility] visit / overall health, Supportive techniques, Emphasized self-care as important for managing overall health and Identified relevant behavioral strategies for targeting low motivation and anhedonia  including behavioral activation    Pt was engaged throughout the visit and responded well to interventions. PLAN  Plan to follow up with next [facility] provider at [address] [place] [address]. Pt will wait to receive call to schedule with incoming provider.         Electronically signed by Mona Deleon on 5/12/22 at 3:48 PM EDT

## 2022-05-27 ENCOUNTER — TELEPHONE (OUTPATIENT)
Dept: DIABETES SERVICES | Age: 67
End: 2022-05-27

## 2022-05-27 ENCOUNTER — HOSPITAL ENCOUNTER (OUTPATIENT)
Dept: DIABETES SERVICES | Age: 67
Setting detail: THERAPIES SERIES
Discharge: HOME | End: 2022-05-27
Payer: MEDICARE

## 2022-05-27 DIAGNOSIS — E11.42 TYPE 2 DIABETES MELLITUS WITH DIABETIC POLYNEUROPATHY, WITH LONG-TERM CURRENT USE OF INSULIN (HCC): ICD-10-CM

## 2022-05-27 DIAGNOSIS — E46 MALNUTRITION, UNSPECIFIED TYPE (HCC): Primary | ICD-10-CM

## 2022-05-27 DIAGNOSIS — Z79.4 TYPE 2 DIABETES MELLITUS WITH DIABETIC POLYNEUROPATHY, WITH LONG-TERM CURRENT USE OF INSULIN (HCC): ICD-10-CM

## 2022-05-27 DIAGNOSIS — E11.9 TYPE 2 DIABETES MELLITUS WITHOUT COMPLICATION, WITHOUT LONG-TERM CURRENT USE OF INSULIN (HCC): ICD-10-CM

## 2022-05-27 DIAGNOSIS — K86.89 PANCREATIC INSUFFICIENCY: ICD-10-CM

## 2022-05-27 PROCEDURE — G0108 DIAB MANAGE TRN  PER INDIV: HCPCS

## 2022-05-27 RX ORDER — FLASH GLUCOSE SCANNING READER
1 EACH MISCELLANEOUS 4 TIMES DAILY
Qty: 3 EACH | Refills: 2 | Status: SHIPPED | OUTPATIENT
Start: 2022-05-27 | End: 2022-08-25

## 2022-05-27 RX ORDER — INSULIN LISPRO 100 [IU]/ML
5 INJECTION, SOLUTION INTRAVENOUS; SUBCUTANEOUS
Qty: 13.5 ML | Refills: 0 | Status: SHIPPED | OUTPATIENT
Start: 2022-05-27 | End: 2022-05-31 | Stop reason: SDUPTHER

## 2022-05-27 RX ORDER — INSULIN GLARGINE 100 [IU]/ML
15 INJECTION, SOLUTION SUBCUTANEOUS NIGHTLY
Qty: 5 PEN | Refills: 0 | Status: ON HOLD | OUTPATIENT
Start: 2022-05-27 | End: 2022-07-21 | Stop reason: SDUPTHER

## 2022-05-27 RX ORDER — FLASH GLUCOSE SENSOR
1 KIT MISCELLANEOUS WEEKLY
Qty: 6 EACH | Refills: 3 | Status: SHIPPED | OUTPATIENT
Start: 2022-05-27 | End: 2022-08-25

## 2022-05-27 NOTE — PROGRESS NOTES
Diabetes Self- Management Education Program Assessment -   Also see Diabetic Screening  Patient, Kaden Frank,  here for diabetes self-management education  visit/ assessment. Today's visit was in an individual setting.     MEDICAL HISTORY:  Past Medical History:   Diagnosis Date    Cataracts, bilateral     COPD (chronic obstructive pulmonary disease) (HCC)     CTS (carpal tunnel syndrome) left    Fibromyalgia     Generalized abdominal pain 6/3/2016    Glaucoma     Hypertension     Osteoarthritis     Osteoporosis     Pancreatic calcification 6/3/2016    Pancreatitis     Protein-calorie malnutrition, severe ([address] 6/1/2016    Seizures ([address]     Tremor     Type II or unspecified type diabetes mellitus without mention of complication, not stated as uncontrolled     Uncontrolled type 2 DM with hyperosmolar nonketotic hyperglycemia ([address] 6/3/2016     Family History   Problem Relation Age of Onset    Brain Cancer Father     Diabetes Sister     Other Sister         epilepsy     Peanuts [peanut oil], Vicodin [hydrocodone-acetaminophen], Tramadol, Tylenol with codeine #3 [acetaminophen-codeine], and Ibuprofen   Immunization History   Administered Date(s) Administered    COVID-Charlene, Vinnie Faustin, Primary or Immunocompromised, PF, 100mcg/0.5mL 03/11/2021, 04/08/2021, 12/09/2021    Influenza Vaccine, unspecified formulation 10/05/2015    Influenza Virus Vaccine 10/05/2015, 10/10/2018    Influenza, MDCK Quadv, IM, PF (Flucelvax 2 yrs and older) 10/13/2021    Influenza, Bernis Bump, IM, (6 mo and older Fluzone, Flulaval, Fluarix and 3 yrs and older Afluria) 10/10/2018    Influenza, Quadv, IM, PF (6 mo and older Fluzone, Flulaval, Fluarix, and 3 yrs and older Afluria) 01/18/2017, 11/03/2017, 01/02/2020, 10/05/2020    Pneumococcal Conjugate 13-valent ([id number]) 04/27/2017    Pneumococcal Polysaccharide ([id number]) 04/02/2013, 10/10/2018    Tdap (Boostrix, Adacel) 10/05/2015     Current Medications  Current Outpatient Medications   Medication Sig Dispense Refill    megestrol (MEGACE) 20 MG tablet TAKE 1 TABLET BY MOUTH DAILY 30 tablet 10    insulin lispro, 1 Unit Dial, (HUMALOG KWIKPEN) 100 UNIT/ML SOPN Inject 5 Units into the skin 3 times daily (before meals) 4.5 mL 0    blood glucose monitor strips 1 strip by Other route daily Test 1 times a day & as needed for symptoms of irregular blood glucose. 300 strip 5    omeprazole (PRILOSEC) 20 MG delayed release capsule TAKE ONE (1) CAPSULE BY MOUTH TWICE DAILY (Patient not taking: Reported on 5/27/2022) 60 capsule 1    insulin glargine (BASAGLAR KWIKPEN) 100 UNIT/ML injection pen Inject 5 Units into the skin nightly 5 pen 0    Blood Gluc Meter Disp-Strips (BLOOD GLUCOSE METER DISPOSABLE) OXANA Daily and as needed 1 each 0    Lancets MISC 1 each by Does not apply route 2 times daily 300 each 1    ferrous sulfate (IRON 325) 325 (65 Fe) MG tablet Take 1 tablet by mouth every other day 30 tablet 1    nicotine (NICODERM CQ) 21 MG/24HR Place 1 patch onto the skin daily 30 patch 3    Insulin Pen Needle (MEIJER PEN NEEDLES) 31G X 6 MM MISC 1 each by Does not apply route daily 100 each 3    sodium bicarbonate 650 MG tablet TAKE 1 TABLET BY MOUTH THREE TIMES DAILY 90 tablet 0    glimepiride (AMARYL) 2 MG tablet Take 1 tablet by mouth 2 times daily (before meals) 180 tablet 1    albuterol sulfate  (90 Base) MCG/ACT inhaler USE 2 INHALATIONS BY MOUTH  EVERY 6 HOURS AS NEEDED FOR WHEEZING 17 g 1    Alcohol Swabs (ALCOHOL PREP) 70 % PADS Daily as needed 200 each 3    metFORMIN (GLUCOPHAGE-XR) 500 MG extended release tablet Take 2 tablets by mouth daily (with breakfast) AND 1 tablet Daily with supper.  270 tablet 2    mirtazapine (REMERON) 30 MG tablet Take 0.5 tablets by mouth nightly (Patient not taking: Reported on 3/15/2022) 90 tablet 3    SYMBICORT 80-4.5 MCG/ACT AERO Inhale 2 puffs into the lungs 2 times daily 3 each 2    lipase-protease-amylase (CREON) 79312-321098 units CPEP delayed release capsule take 2 capsules by mouth four times a day after meals and at bedtime (Patient not taking: Reported on 5/27/2022) 240 capsule 1    Multiple Vitamin (MULTI-VITAMIN DAILY PO) Take by mouth      ZINC PO Take by mouth      COMFORT EZ PEN NEEDLES 32G X 6 MM MISC  each 2    AquaLance Lancets 30G MISC Daily and as needed for hypoglycemia 200 each 2     No current facility-administered medications for this encounter.   :     Comments:  Allergies: Allergies   Allergen Reactions    Peanuts [Peanut Oil] Anaphylaxis    Vicodin [Hydrocodone-Acetaminophen] Hives    Tramadol     Tylenol With Codeine #3 [Acetaminophen-Codeine]     Ibuprofen Itching         A1C blood level - at goal < 7%   Lab Results   Component Value Date    LABA1C 11.6 01/21/2022    LABA1C 10.5 10/13/2021    LABA1C 9.2 04/09/2021     Lab Results   Component Value Date    LABMICR CANNOT BE CALCULATED 03/11/2019    CREATININE 0.76 02/11/2022       Blood pressure ( 130/ 80)  Or less  BP Readings from Last 3 Encounters:   02/25/22 127/80   02/11/22 117/78   01/23/22 115/70        Cholesterol ( LDL under  100)   Lab Results   Component Value Date    LDLCHOLESTEROL 19 12/28/2020       Diabetes Self- Management Education Record    Participant Name: Olga Champion  Referring Provider: Kym Cabot, APRN - CNP   Assessment/Evaluation Ratings:  1=Needs Instruction   4=Demonstrates Understanding/Competency  2=Needs Review   NC=Not Covered    3=Comprehends Key Points  N/A=Not Applicable  Topics/Learning Objectives Pre-session Assess Date:    3/15/22   Instr. Date Reinforce Date Post- session Eval Comments   Diabetes disease process & Treatment process: Define diabetes & pre-diabetes; Identify own type of diabetes; role of the pancreas; signs/symptoms; diagnostic criteria; prevention & treatment options; contributing factors. 14/27/22 JW question if pt a type 1 and if endo will test antibodies.  3/29/22rs 3/15/22rs - stated dm for sometime and also acute pancreatitis with in pt care 2/2022 - now trying to learn more and improve her health - worried about her liver, pancrease and kidney    Incorporating nutritional management into lifestyle: Describe effect of type, amount & timing of food on blood glucose; Describe basic meal planning techniques & current nutrition guideline   1   - patient has been eating regular jello and ice cream often and 1 meal per day of solid food, drinking mostly water, but also tea with regular sugar and regular pepsi. Stated often having GI upset. Discussed need to try sugar free jello cups, pictures of recommended boxes and cups shared, need to change to diet pop and try splenda or non nutritive sweetener  in tea. Patient would like to gain weight. RD follow up is planned. 4/27/22 JW Pt showed hdt from RD about ways to gain weight. Discussed and said all good except regular sugar items listed (ie fruit packed in heavy syrup, sugar in drinks/foods, etc). Told pt main thing for gaining weight is getting the right meds to get better bs control. Improvement w bs will make a difference. Suggested 6 small meals or meal/snack, try higher protein shakes, pt does own cooking/shopping. States appetite still not good and megace doesn't help. 5/27/22rs   What to eat - Food groups, When to eat - timing of meals and snacks, and How much to eat - portions control. 3/15/22rs  Wt loss and now trying to gain weight- - Of concern with discussion of medications - she stated she is not taking Creon as RX , only taking 1 pill in am due to cost -stated the cost for refill in over 600$ and she is now rationing what she has left in her pill bottle. Recommended patient call to her GI MD to discuss options, lower cost meds or samples.        calories/ day   3 CHO choices/ meal and 1-2 CHO/snack   CHO choices/  day   grams of protein /day   gram of fat /day     3/29/22 very concerned about weight loss with diabetes - provided with supplement / sample of high protein meal replacement shakes today in vanilla and chocolate - patient stated she has used boost in past and liked shakes     5/27/22rs : Diet intake: She was given samples of ensure complete - due to her weight loss  - NOLVIA Franklin RD  would recommend ongoing supplementation of Ensure complete 2 shakes per day - please consider an nutritional RX and send to pharmacy   Correctly read food labels & demonstrate CHO counting & portion control with personalized meal plan. Identify dining out strategies, & dietary sick day guidelines. 1 4/27/22 KURT   Basic label reading. Pt asking about fats, s fats but suggested to focus on cho for now. (was thinking sugar.) Said sugar issue when it makes up all of the cho like in a regular pop. Incorporating physical activity into lifestyle:   Verbalize effect of exercise on blood glucose levels; benefits of regular exercise; safety considerations; contraindications; maintenance of activity. 1 3/29/22rs   3/15/22rs - some walking - not much activity - get cold very easily   Using medications safely:  Identify effects of diabetes medicines on blood glucose levels; List diabetes medication taken, action & side effects;    1 5/27/22rs    3/15/22rs   Discussed taking oral medications amaryl  2 mg twice per day - with Breakfast and Dinner and metformin 500mg 2 tabs in am with breakfast  and 1 tab in pm with dinner. Provided patient with am / pm pill sorter to help with this task. Insulin / Injectable - Appropriate injection sites; proper storage; supplies needed; proper technique; safe needle disposal guidelines. 1 4/27/22 JW had pt show me what she does with pens- needed a few reminders.  5/27/22rs   3/15/22rs -  e was taking basaglar 5 units with meals  ( about 2 meals per day)   - Clarified with patient rx is for once per day 5 units of basaglar - we set time for taking this medication daily at 3pm. Patient stated poor sleep patters and setting am or pm for taking basaglar has not worked out for her. Also clarified humalog with the meal time insulin and she is not to take this insulin at this time, but she may keep the pens in her refrigerator for storage. 3/29/220 - Medication use:  Patient has stated having both lantus and basaglar pens at home - she stated she is taking a shot with EACH meal and has moved dose to 10 units, on her own. She is then taking 30 units per day. Also taking all oral meds daily at 3pm as this is when she can remember to take them - including Amaryl and metformin. - explained long and short acting insulins - explained that lantus and basaglar are both the same medication - long acting insulin    5/27/22:  She stated only has one pen - grey basaglar and taking 5 units with her meals  - on medication list is Humalog 5 units with meals, patient was shown pictures of insulin pens and stated she does not have Humalog pen ( blue)   - she has pen tips at home and stated use belly for injections - some pain injection (patietn with very little sq tissue) could you please order 4 mm pen tips   - stated she is taking metformin and Amaryl most day - and trying to take daily at 11 am - she is using pills sorter. Stated not taking creon due to cost. Taking megace, iron, yanique c and b 12 and yanique e.      Would suggest change dose of basaglar to 15 units once daily   Needs new rx for Humalog pen  Pen tips  4 mm  ( phone call message to PCP)   Reviewed verbally and with written info how insulin works, how to inject and need for close follow up       Monitoring blood glucose, interpreting and using results:  Identify recommended & personal blood glucose targets; importance of testing; testing supplies; HgbA1C target levels; Factors affecting blood glucose;  Importance of logging blood glucose levels for pattern recognition; ketone testing; safe lancet disposal.   1 3/29/22rs   3/15/22rs - stated BG range  At home - 100 to 500     Blood glucose trend:  With inulin use above - stated BG in 200's - 210's pre meals, this is trend down prior was up to 300- 500 range. Denies any low BG in last week. She is interested in getting CGM/ freestyle No Hu if possible as her finer are getting very soar, stated checking 3 - 4 times per day   Prevention, detection & treatment of acute complications:  Identify symptoms of hyper & hypoglycemia, and prevention & treatment strategies. 1 3/29/22rs   High bg and lots of wt loss  22- Blood sugar control/ self monitorin BG in office today - at home unable to check due to not bleeding,  when checking her finger tips. She would like to try freestyle etienne 14 day system with a reader. I have provided her with some basic education on the product and I can her her apply start the sensors. Note to PCP    Describe sick day guidelines & indications for  physician notification. Identify short term consequences of poor control. Disaster preparedness strategies    1       Prevention, detection & treatment of chronic complications:  Define the natural course of diabetes & describe the relationship of blood glucose levels to long term complications of diabetes. Identify preventative measures & standards of care. 1 3/29/22rs     Following with PCP and GI  Has not had foot exam - has eye exam appt [facility] for  - all labs done with recent inpt adm    3/29/22rs Advised patient to call PCP soon  for medication plan clarification and blood glucose response   call to [facility] / referral to Dr America cardona:  office had not received the referral - writer faxed to office , ref, demos, last notes and recent labs, patient given copy of referral and phone number so she can call in few days for follow up.follow up call to [facility] on referral in about 1 week     : Follow up consults:  She has not been able to get into [facility]/ Dr America Borrero office - she stated she called and visited, but they have not called her back to schedule. I also contacted [facility] office in march and confirmed they had the referral info.     I would suggest try another provider and I have known that Dr Scarlett South, [address] of [facility]  [phone]     Developing strategies to address psychosocial issues:  Describe feelings about living with diabetes; Describe how stress, depression & anxiety affect blood glucose; Identify coping strategies; Identify support needed & support network available. 1 5/27/22rs    PAid 15 - recovering from poly substance use    Developing strategies to promote health/change behavior: Identify 7 self-care behaviors; Personal health risk factors; Benefits, challenges & strategies for behavioral change;    1 3/29/22rs     Expressed wanting to improve her health   Individualized goal selection. My goal , to help me improve my health, I will:   1. Taking medications- work on taking medications at regular times - avoid skipping insulin - take daily at 3 pm      2. Plan  Follow-up Appointments planned in 3 weeks as one on one session     Instruction Method: [x]Lecture/Discussion  []Power Point Presentation  []Handouts  []Return Demonstration    Education Materials/Equipment Provided (VIA Mail for phone visits)  :    [x]Self-Management - Initial assessment - Enrolment in to ADA  Where do I Begin, Living with Type 2 diabetes ADA home support program and  handout on diabetes education classes. -- 3/15/22rs   [x]Understanding Diabetes session       Book How to Thrive: A guide for Your Journey with Diabetes ADA booklet -pages 4, 14-19, 22-23        View: Understanding Type 2 Diabetes from Animated Diabetes Patient [email or web address]    [x] Healthy Eating and Meal planning  How to Thrive: A guide for Your journey with Diabetes - ADA booklet - pages 20- 21 & 42-43  Handouts: Smarter snacking, Lowdown on low - carb diets, [employer], Ready, set, start counting, Reading a nutrition fact label, 7 ways to size up your servings4/27/22 JW    [x]   Medications and Prevention of Complications      Book How to Thrive: A guide for Your Journey with Diabetes - ADA booklet -  pages 6-7, 12-13, 24-27 & 28 -35  Handouts: Know your numbers, Vaccinations, Type 2 diabetes and the role of GLP- 1, How to care for your teeth and gums, Caring for your feet, How to pick the right shoes  Individualized Diabetes report card     []  Diet Follow Up- CHO counting and  planning for sick days, holiday, vacations   How to Thrive: A guide for Your journey with Diabetes - ADA booklet  - pages 43- 37  Diabetes Food hub www. diabetesfoodhub.org   Handouts: Sick day rules, Dining out guide, Diabetes and alcohol, Traveling with diabetes, Diabetes disaster preparedness plan, Holidays and special occasions                                                            []  Self care and goal review -  3 month follow -    Handouts: AADE7 Self care behaviors work sheets and personal goal setting worksheet review, DSME support options on line     []Self-Management  Gestational - RN class -Resource materials sent out : care booklet - \" Gestational Diabetes Mellitus ( GDM) toolkit form [place] gestational diabetes postpartum care learning collaborative 2019. \"Simple Guidelines for meal planning with gestational diabetes. SMBG sheets to fax back to M weekly. BD  healthy injection site selection and rotation with 6 mm insulin syringe and 4 mm pen needle. Gestational diabetes handout from [facility] 2016. Did you have gestational diabetes when you were pregnant?  Handout from [facility]  April 2014    []Self-Management Gestational - RD class - My Food Plan for Gestational diabetes    []Glucose Meter     []Insulin Kit     []Other      Encounter Type Date Start Time End Time Comments No Show Dates   Assessment 3/15/22rs     900   1000    x    Class 1 - Understanding diabetes 3/29/22rs  910   x - call to [facility] / referral to Dr Gary Fenton - office had not received the referral - writer faxed to office , ref, angelic, last notes and recent labs    Class 2- Nutrition and diabetes   4/27/22 JW 10:15 11:15 X pt to see endo tomorrow    Class 3 - Preventing Complications 3/40/07HY   10 00 11 10 x    Class 4 -  In depth Nutrition and sick day care        Class 5 - 3 month follow up / goal reassessment        Gestational - RN         Gestational - RD        Individual MNT         Shared Med Appt         Yearly Follow-up        Meter Instrx      How to Measure Your Blood Sugar - [address] Patient Education  [email or web address]    Insulin Instrx      []Pen  []Vial & Syringe   BD Diabetes Care: How to Inject Insulin with a Pen Needle  [email or web address]    Diabetes Care: How to Inject Insulin with a Syringe  [email or web address]       DSMS Support :   [] MNT      [] Annual update     [] Starting Fresh  adults living with diabetes or pre diabetes. [address] [place] [address] [phone] call for dates    []  Diabetes Group at  [address] cintron - Free 6 week diabetes education support   classes - use web site interest form found at  WorkVoices.pt - to enroll       []ADA  Where do I Begin, Living with Type 2 diabetes ADA home support program  Web site: diabetes. org/living    Call: 1800 DIABETES  e-mail: Flor@Tagoodies. org     []  Internet web sites - ADAWeb site: diabetes. org and diabetesfoodhub.org      Post Education Referrals:      [] [facility] information sheet and [address] , 21       [] Dental care - [facility] care of [facility]     [] [facility] link  phone number - for information and referral to [facility] NIKI ESTRELLA  Clinically  [address] [facility], WEIGHT MANAGEMENT        []Other  Nati Stone, RN

## 2022-05-27 NOTE — TELEPHONE ENCOUNTER
Mati Kat in for diabetes education today:    Reviewed with her what she is currently doing at home with her medications:     - She stated only has one pen - grey basaglar and taking 5 units with her meals  - on medication list is Humalog 5 units with meals, patient was shown pictures of insulin pens and stated she does not have Humalog pen ( blue)   - she has pen tips at home and stated use belly for injections - some pain injection (patietn with very little sq tissue) could you please order 4 mm pen tips   - stated she is taking metformin and Amaryl most day - and trying to take daily at 11 am - she is using pills sorter. Stated not taking creon due to cost. Taking megace, iron, yanique c and b 12 and yanique e. Would suggest change dose of basaglar to 15 units once daily   Needs new rx for Humalog pen  Pen tips  4 mm     Blood sugar control/ self monitorin BG in office today - at home unable to check due to not bleeding,  when checking her finger tips. She would like to try freestyle etienne 14 day system with a reader. I have provided her with some basic education on the product and I can her her apply start the sensors. Follow up consults:  She has not been able to get into Endo/ Dr Smith Clifford office - she stated she called and visited, but they have not called her back to schedule. I also contacted [facility] office in march and confirmed they had the referral info. I would suggest try another provider and I have known that Dr Jean Pierre Genao, [facility] [address] of [facility]  [phone]    Diet intake: She was given samples of ensure complete - due to her weight loss  - NOLVIA Vidal RD  would recommend ongoing supplementation of Ensure complete 2 shakes per day - please consider an nutritional RX and send to pharmacy. She is trying to eat Bk egg and mid am snack ice cream, and Delores  And Dn vary.     She is coming in to see you today to discuss her care with you.

## 2022-05-31 ENCOUNTER — OFFICE VISIT (OUTPATIENT)
Dept: PRIMARY CARE CLINIC | Age: 67
End: 2022-05-31
Payer: MEDICARE

## 2022-05-31 ENCOUNTER — OFFICE VISIT (OUTPATIENT)
Dept: GASTROENTEROLOGY | Age: 67
End: 2022-05-31
Payer: MEDICARE

## 2022-05-31 VITALS
WEIGHT: 91 LBS | SYSTOLIC BLOOD PRESSURE: 100 MMHG | TEMPERATURE: 98.6 F | HEART RATE: 109 BPM | DIASTOLIC BLOOD PRESSURE: 74 MMHG | BODY MASS INDEX: 14.25 KG/M2

## 2022-05-31 VITALS
BODY MASS INDEX: 14.72 KG/M2 | DIASTOLIC BLOOD PRESSURE: 83 MMHG | WEIGHT: 94 LBS | SYSTOLIC BLOOD PRESSURE: 131 MMHG | HEART RATE: 99 BPM | OXYGEN SATURATION: 100 % | TEMPERATURE: 97.1 F

## 2022-05-31 DIAGNOSIS — K86.89 PANCREATIC INSUFFICIENCY: Primary | ICD-10-CM

## 2022-05-31 DIAGNOSIS — Z86.010 HX OF COLONIC POLYPS: ICD-10-CM

## 2022-05-31 DIAGNOSIS — D64.9 ANEMIA, UNSPECIFIED TYPE: ICD-10-CM

## 2022-05-31 DIAGNOSIS — R97.0 ELEVATED CEA: ICD-10-CM

## 2022-05-31 DIAGNOSIS — F32.A DEPRESSION, UNSPECIFIED DEPRESSION TYPE: ICD-10-CM

## 2022-05-31 DIAGNOSIS — R97.8 ELEVATED CA 19-9 LEVEL: ICD-10-CM

## 2022-05-31 DIAGNOSIS — Z79.4 TYPE 2 DIABETES MELLITUS WITH DIABETIC POLYNEUROPATHY, WITH LONG-TERM CURRENT USE OF INSULIN (HCC): Primary | ICD-10-CM

## 2022-05-31 DIAGNOSIS — E11.42 TYPE 2 DIABETES MELLITUS WITH DIABETIC POLYNEUROPATHY, WITH LONG-TERM CURRENT USE OF INSULIN (HCC): Primary | ICD-10-CM

## 2022-05-31 DIAGNOSIS — R63.4 WEIGHT LOSS: ICD-10-CM

## 2022-05-31 DIAGNOSIS — M79.2 NEUROPATHIC PAIN: ICD-10-CM

## 2022-05-31 DIAGNOSIS — K86.0 ALCOHOL-INDUCED CHRONIC PANCREATITIS (HCC): ICD-10-CM

## 2022-05-31 DIAGNOSIS — Z23 NEED FOR VACCINATION: ICD-10-CM

## 2022-05-31 LAB — HBA1C MFR BLD: 11.2 %

## 2022-05-31 PROCEDURE — 3046F HEMOGLOBIN A1C LEVEL >9.0%: CPT | Performed by: NURSE PRACTITIONER

## 2022-05-31 PROCEDURE — 83036 HEMOGLOBIN GLYCOSYLATED A1C: CPT | Performed by: NURSE PRACTITIONER

## 2022-05-31 PROCEDURE — 99214 OFFICE O/P EST MOD 30 MIN: CPT | Performed by: NURSE PRACTITIONER

## 2022-05-31 PROCEDURE — 1123F ACP DISCUSS/DSCN MKR DOCD: CPT | Performed by: NURSE PRACTITIONER

## 2022-05-31 PROCEDURE — 1123F ACP DISCUSS/DSCN MKR DOCD: CPT | Performed by: INTERNAL MEDICINE

## 2022-05-31 PROCEDURE — 99214 OFFICE O/P EST MOD 30 MIN: CPT | Performed by: INTERNAL MEDICINE

## 2022-05-31 RX ORDER — FLUOXETINE 10 MG/1
10 CAPSULE ORAL DAILY
Qty: 30 CAPSULE | Refills: 3 | Status: SHIPPED | OUTPATIENT
Start: 2022-05-31 | End: 2022-05-31

## 2022-05-31 RX ORDER — ZOSTER VACCINE RECOMBINANT, ADJUVANTED 50 MCG/0.5
0.5 KIT INTRAMUSCULAR SEE ADMIN INSTRUCTIONS
Qty: 0.5 ML | Refills: 0 | Status: SHIPPED | OUTPATIENT
Start: 2022-05-31 | End: 2022-11-27

## 2022-05-31 RX ORDER — PANCRELIPASE 36000; 180000; 114000 [USP'U]/1; [USP'U]/1; [USP'U]/1
CAPSULE, DELAYED RELEASE PELLETS ORAL
Qty: 240 CAPSULE | Refills: 1 | Status: CANCELLED | OUTPATIENT
Start: 2022-05-31

## 2022-05-31 RX ORDER — FLUOXETINE 10 MG/1
10 CAPSULE ORAL DAILY
Qty: 90 CAPSULE | Refills: 0 | Status: SHIPPED | OUTPATIENT
Start: 2022-05-31

## 2022-05-31 RX ORDER — INSULIN LISPRO 100 [IU]/ML
5 INJECTION, SOLUTION INTRAVENOUS; SUBCUTANEOUS
Qty: 4.5 ML | Refills: 0 | Status: SHIPPED | OUTPATIENT
Start: 2022-05-31 | End: 2022-06-02 | Stop reason: CLARIF

## 2022-05-31 RX ORDER — GABAPENTIN 400 MG/1
400 CAPSULE ORAL 3 TIMES DAILY
Qty: 270 CAPSULE | Refills: 2 | Status: SHIPPED | OUTPATIENT
Start: 2022-05-31 | End: 2022-08-29

## 2022-05-31 SDOH — ECONOMIC STABILITY: FOOD INSECURITY: WITHIN THE PAST 12 MONTHS, YOU WORRIED THAT YOUR FOOD WOULD RUN OUT BEFORE YOU GOT MONEY TO BUY MORE.: NEVER TRUE

## 2022-05-31 SDOH — ECONOMIC STABILITY: FOOD INSECURITY: WITHIN THE PAST 12 MONTHS, THE FOOD YOU BOUGHT JUST DIDN'T LAST AND YOU DIDN'T HAVE MONEY TO GET MORE.: NEVER TRUE

## 2022-05-31 ASSESSMENT — ENCOUNTER SYMPTOMS
TROUBLE SWALLOWING: 0
DIARRHEA: 0
VOMITING: 0
ABDOMINAL PAIN: 0
SHORTNESS OF BREATH: 0
BLOOD IN STOOL: 0
ABDOMINAL DISTENTION: 0
CHOKING: 0
NAUSEA: 0
WHEEZING: 0
VOMITING: 0
COUGH: 0
SHORTNESS OF BREATH: 0
ABDOMINAL PAIN: 0
ANAL BLEEDING: 0
TROUBLE SWALLOWING: 0
BLOOD IN STOOL: 0
DIARRHEA: 0
CONSTIPATION: 0
RECTAL PAIN: 0
WHEEZING: 0

## 2022-05-31 ASSESSMENT — SOCIAL DETERMINANTS OF HEALTH (SDOH): HOW HARD IS IT FOR YOU TO PAY FOR THE VERY BASICS LIKE FOOD, HOUSING, MEDICAL CARE, AND HEATING?: NOT HARD AT ALL

## 2022-05-31 NOTE — PROGRESS NOTES
[facility]  [id number] [place] [facility] [address]  Dept: [phone]  Dept Fax: [phone]    Patient Care Team:  DONTRELL Gutiérrez CNP as PCP - General (Family Medicine)  DONTRELL Gutiérrez CNP as PCP - [facility] Empaneled Provider  Nixon Johnson MD as Consulting Physician (Gastroenterology)    2022     Lawton Spatz (:  1955)is a 73 y.o. female, here for evaluation of the following medical concerns:   Chief Complaint   Patient presents with    Diabetes     discuss insulin/meds       Lawton Spatz presents today for follow-up for uncontrolled type 2 diabetes. Since last visit she has seen diabetic education, they found that she did not have her fast acting Humalog and has been taking her basal insulin 3 times a day. Since her visit with the educator she did start taking the basal insulin 15 units once a day. Patient states she is having trouble with fingersticks and unable to get enough blood for blood sample to monitor glucose levels, they thought she would benefit from a glucose monitoring device. Jos Barry is taking both her oral medications of metformin and glimepiride. She is trying to eat every 3 hours, admits it is hard because she often feels full. Jos Barry also states she recently had a visit with her psychologist.  Having increased symptoms of depression, admits she is intermittently tearful and often lacks motivation or energy to complete tasks. She took mirtazapine in the past to assist with appetite as well as depression without improvement. .    Review of Systems   Constitutional: Positive for fatigue. Negative for appetite change, fever and unexpected weight change. HENT: Negative for hearing loss and trouble swallowing. Eyes: Negative for visual disturbance. Respiratory: Negative for shortness of breath and wheezing.     Cardiovascular: Negative for chest pain and palpitations. Gastrointestinal: Negative for abdominal pain, blood in stool, diarrhea and vomiting. Endocrine: Negative for polydipsia and polyuria. Genitourinary: Negative for dysuria, frequency, hematuria and vaginal discharge. Musculoskeletal: Negative for myalgias and neck pain. Skin: Negative for rash and wound. Neurological: Negative for seizures, numbness and headaches. Psychiatric/Behavioral: Positive for dysphoric mood. Negative for self-injury and suicidal ideas. The patient is not nervous/anxious. Prior to Visit Medications    Medication Sig Taking? Authorizing Provider   zoster recombinant adjuvanted vaccine [facility]) 50 MCG/0.5ML SUSR injection Inject 0.5 mLs into the muscle See Admin Instructions 1 dose now and repeat in 2-6 months Yes DONTRELL Nolasco CNP   insulin lispro, 1 Unit Dial, (HUMALOG KWIKPEN) 100 UNIT/ML SOPN Inject 5 Units into the skin 3 times daily (before meals) Yes DONTRELL Nolasco CNP   FLUoxetine (PROZAC) 10 MG capsule Take 1 capsule by mouth daily Yes DONTRELL Nolasco CNP   gabapentin (NEURONTIN) 400 MG capsule Take 1 capsule by mouth 3 times daily for 90 days. Yes DONTRELL Nolasco CNP   glimepiride (AMARYL) 2 MG tablet Take 1 tablet by mouth 2 times daily (before meals) Yes DONTRELL Nolasco CNP   metFORMIN (GLUCOPHAGE-XR) 500 MG extended release tablet Take 2 tablets by mouth daily (with breakfast) AND 1 tablet Daily with supper.  Yes DONTRELL Nolasco CNP   insulin glargine (BASAGLAR KWIKPEN) 100 UNIT/ML injection pen Inject 15 Units into the skin nightly  DONTRELL Nolasco CNP   Insulin Pen Needle 32G X 4 MM MISC 1 each by Does not apply route 4 times daily (after meals and at bedtime)  DONTRELL Nolasco CNP   Continuous Blood Gluc  (FREESTYLE ANTONIO 14 DAY READER) OXANA 1 Units by Does not apply route 4 times daily  DONTRELL Nolasco CNP   Continuous Blood Gluc Sensor (FREESTYLE ANTONIO 14 DAY SENSOR) MISC 1 box by Does not apply route once a week  Cleola Canavan, APRN - CNP   Nutritional Supplements (ENSURE COMPLETE SHAKE) LIQD Take 1 Can by mouth in the morning and at bedtime  Cleola Canavan, APRN - CNP   megestrol (MEGACE) 20 MG tablet TAKE 1 TABLET BY MOUTH DAILY  Arianna Madison MD   blood glucose monitor strips 1 strip by Other route daily Test 1 times a day & as needed for symptoms of irregular blood glucose.   Cleola Canavan, APRN - CNP   omeprazole (PRILOSEC) 20 MG delayed release capsule TAKE ONE (1) CAPSULE BY MOUTH TWICE DAILY  Patient not taking: Reported on 5/27/2022  Cleola Canavan, APRN - CNP   Blood Gluc Meter Disp-Strips (BLOOD GLUCOSE METER DISPOSABLE) OXANA Daily and as needed  Cleola Canavan, APRN - CNP   Lancets MISC 1 each by Does not apply route 2 times daily  Cleola Canavan, APRN - CNP   ferrous sulfate (IRON 325) 325 (65 Fe) MG tablet Take 1 tablet by mouth every other day  Cleola Canavan, APRN - CNP   nicotine (NICODERM CQ) 21 MG/24HR Place 1 patch onto the skin daily  Samantha Melara MD   sodium bicarbonate 650 MG tablet TAKE 1 TABLET BY MOUTH THREE TIMES DAILY  Samantha Melara MD   albuterol sulfate  (90 Base) MCG/ACT inhaler USE 2 INHALATIONS BY MOUTH  EVERY 6 HOURS AS NEEDED FOR WHEEZING  Cleola Canavan, APRN - CNP   Alcohol Swabs (ALCOHOL PREP) 70 % PADS Daily as needed  Cleola Canavan, APRN - CNP   SYMBICORT 80-4.5 MCG/ACT AERO Inhale 2 puffs into the lungs 2 times daily  Cleola Canavan, APRN - CNP   lipase-protease-amylase (CREON) 11455-147531 units CPEP delayed release capsule take 2 capsules by mouth four times a day after meals and at bedtime  Patient not taking: Reported on 5/27/2022  Cleola Canavan, APRN - CNP   Multiple Vitamin (MULTI-VITAMIN DAILY PO) Take by mouth  Historical Provider, MD   ZINC PO Take by mouth  Historical Provider, MD   AquaLance Lancets 30G MISC Daily and as needed for hypoglycemia  Cleola Canavan, APRN - CNP        Social History     Tobacco Use    Smoking status: Current Every Day Smoker     Packs/day: 0.25     Years: 40.00     Pack years: 10.00    Smokeless tobacco: Former User   Substance Use Topics    Alcohol use: No        Vitals:    22 1513   BP: 131/83   Site: Left Upper Arm   Position: Sitting   Pulse: 99   Temp: 97.1 °F (36.2 °C)   TempSrc: Infrared   SpO2: 100%   Weight: 94 lb (42.6 kg)     Estimated body mass index is 14.72 kg/m² as calculated from the following:    Height as of 2/10/22: 5' 7\" (1.702 m). Weight as of this encounter: 94 lb (42.6 kg). DIAGNOSTIC FINDINGS:  CBC:  Lab Results   Component Value Date    WBC 8.1 2022    HGB 10.5 2022     2022     2012       BMP:    Lab Results   Component Value Date     2022    K 4.5 2022     2022    CO2 23 2022    BUN 5 2022    CREATININE 0.76 2022    GLUCOSE 378 2022    GLUCOSE 159 2012       HEMOGLOBIN A1C:   Lab Results   Component Value Date    LABA1C 11.2 2022       FASTING LIPID PANEL:  Lab Results   Component Value Date    CHOL 174 2017     2020    TRIG 94 2017       Physical Exam  Constitutional:       Appearance: Normal appearance. She is not toxic-appearing. HENT:      Head: Normocephalic. Right Ear: External ear normal.      Left Ear: External ear normal.      Nose: Nose normal.      Mouth/Throat:      Mouth: Mucous membranes are moist.   Eyes:      General: No scleral icterus. Right eye: No discharge. Left eye: No discharge. Conjunctiva/sclera: Conjunctivae normal.   Pulmonary:      Effort: Pulmonary effort is normal.   Musculoskeletal:      Cervical back: Normal range of motion. Right lower le+ Pitting Edema present. Left lower le+ Pitting Edema present. Skin:     Coloration: Skin is not jaundiced.    Neurological:      Mental Status: She is alert and oriented to person, place, and time. Psychiatric:         Mood and Affect: Mood normal.         Behavior: Behavior normal.         Thought Content: Thought content normal.         ASSESSMENT     Diagnosis Orders   1. Type 2 diabetes mellitus with diabetic polyneuropathy, with long-term current use of insulin (HCC)  POCT glycosylated hemoglobin (Hb A1C)    insulin lispro, 1 Unit Dial, (HUMALOG KWIKPEN) 100 UNIT/ML SOPN    gabapentin (NEURONTIN) 400 MG capsule   2. Need for vaccination  zoster recombinant adjuvanted vaccine [facility]) 50 MCG/0.5ML SUSR injection   3. Depression, unspecified depression type  FLUoxetine (PROZAC) 10 MG capsule    DISCONTINUED: FLUoxetine (PROZAC) 10 MG capsule   4. Neuropathic pain  gabapentin (NEURONTIN) 400 MG capsule          PLAN:  Orders Placed This Encounter   Medications    zoster recombinant adjuvanted vaccine (SHINGRIX) 50 MCG/0.5ML SUSR injection     Sig: Inject 0.5 mLs into the muscle See Admin Instructions 1 dose now and repeat in 2-6 months     Dispense:  0.5 mL     Refill:  0    DISCONTD: FLUoxetine (PROZAC) 10 MG capsule     Sig: Take 1 capsule by mouth daily     Dispense:  30 capsule     Refill:  3    insulin lispro, 1 Unit Dial, (HUMALOG KWIKPEN) 100 UNIT/ML SOPN     Sig: Inject 5 Units into the skin 3 times daily (before meals)     Dispense:  4.5 mL     Refill:  0    FLUoxetine (PROZAC) 10 MG capsule     Sig: Take 1 capsule by mouth daily     Dispense:  90 capsule     Refill:  0    gabapentin (NEURONTIN) 400 MG capsule     Sig: Take 1 capsule by mouth 3 times daily for 90 days. Dispense:  270 capsule     Refill:  2         1. We reviewed recommendations from diabetic education. Humalog pen was sent to both mail order and local pharmacy so patient could start mealtime insulin today. 2. I agree patient would benefit from glucose monitoring device such as etienne, prescriptions were sent on Friday.   Patient to keep follow-up with diabetic educator to assist in applying and using etienne  3. Reviewed depression symptoms, at this time patient does feel she may benefit from medication intervention. We will start Prozac once daily. ADRs reviewed. FOLLOW UP AND INSTRUCTIONS:  Return in about 6 weeks (around 7/12/2022) for Diabetes, Depression. · Fawad Garcia received counseling on the following healthy behaviors:nutrition and medication adherence    · Discussed use, benefit, and side effects of prescribed medications. Barriers to  medication compliance addressed. All patient questions answered. Pt  verbalized understanding of all instructions given. · Patient given educational materials - see patient instructions      · Patient advised to contact scheduling offices for any referrals or imaging orders  placed today if they have not been contacted in 48 hours. Return in about 6 weeks (around 7/12/2022) for Diabetes, Depression. An electronic signature was used to authenticate this note. --DONTRELL Milian CNP on 5/31/2022 at 3:46 PM  Visit Information    Have you changed or started any medications since your last visit including any over-the-counter medicines, vitamins, or herbal medicines? no   Are you having any side effects from any of your medications? -  no  Have you stopped taking any of your medications? Is so, why? -  no    Have you seen any other physician or provider since your last visit? Yes - Records Obtained  Have you had any other diagnostic tests since your last visit? No  Have you been seen in the emergency room and/or had an admission to a hospital since we last saw you? No  Have you had your routine dental cleaning in the past 6 months? no    Have you activated your [employer] account? If not, what are your barriers?  Yes     Patient Care Team:  DONTRELL Milian CNP as PCP - General (Family Medicine)  DONTRELL Milian CNP as PCP - [facility] Empaneled Provider  Dluce Stoddard MD as Consulting Physician (Gastroenterology)    Medical History

## 2022-06-01 DIAGNOSIS — Z87.19 HX OF GASTROESOPHAGEAL REFLUX (GERD): ICD-10-CM

## 2022-06-01 DIAGNOSIS — K86.89 PANCREATIC INSUFFICIENCY: ICD-10-CM

## 2022-06-01 RX ORDER — OMEPRAZOLE 20 MG/1
CAPSULE, DELAYED RELEASE ORAL
Qty: 60 CAPSULE | Refills: 10 | OUTPATIENT
Start: 2022-06-01

## 2022-06-01 RX ORDER — MIRTAZAPINE 30 MG/1
TABLET, FILM COATED ORAL
Qty: 15 TABLET | Refills: 3 | OUTPATIENT
Start: 2022-06-01

## 2022-06-02 DIAGNOSIS — Z79.4 TYPE 2 DIABETES MELLITUS WITH DIABETIC POLYNEUROPATHY, WITH LONG-TERM CURRENT USE OF INSULIN (HCC): Primary | ICD-10-CM

## 2022-06-02 DIAGNOSIS — E11.42 TYPE 2 DIABETES MELLITUS WITH DIABETIC POLYNEUROPATHY, WITH LONG-TERM CURRENT USE OF INSULIN (HCC): Primary | ICD-10-CM

## 2022-06-02 RX ORDER — INSULIN ASPART 100 [IU]/ML
5 INJECTION, SOLUTION INTRAVENOUS; SUBCUTANEOUS
Qty: 10 PEN | Refills: 3 | Status: SHIPPED | OUTPATIENT
Start: 2022-06-02

## 2022-06-03 ENCOUNTER — TELEPHONE (OUTPATIENT)
Dept: PRIMARY CARE CLINIC | Age: 67
End: 2022-06-03

## 2022-06-06 ENCOUNTER — TELEPHONE (OUTPATIENT)
Dept: PRIMARY CARE CLINIC | Age: 67
End: 2022-06-06

## 2022-06-06 NOTE — TELEPHONE ENCOUNTER
Received insurance denial from Medicare Part D regarding [employer]. Called for appeal form and was informed that I must wait until Medicare Part B makes there decision by the end of the day on 6/6/22. Part D insurance forwarding the request to Part B. Insurance will only cover [employer]. Reference number for the call is [id number]. Placed call to patient to inform and was unable to reach by phone.

## 2022-06-14 ENCOUNTER — HOSPITAL ENCOUNTER (OUTPATIENT)
Age: 67
Discharge: HOME | End: 2022-06-14
Payer: MEDICARE

## 2022-06-14 DIAGNOSIS — D64.9 ANEMIA, UNSPECIFIED TYPE: ICD-10-CM

## 2022-06-14 LAB
ABSOLUTE EOS #: 0.21 K/UL (ref 0–0.44)
ABSOLUTE IMMATURE GRANULOCYTE: <0.03 K/UL (ref 0–0.3)
ABSOLUTE LYMPH #: 2.97 K/UL (ref 1.1–3.7)
ABSOLUTE MONO #: 0.62 K/UL (ref 0.1–1.2)
ALBUMIN SERPL-MCNC: 4.7 G/DL (ref 3.5–5.2)
ALBUMIN/GLOBULIN RATIO: 1.7 (ref 1–2.5)
ALP BLD-CCNC: 63 U/L (ref 35–104)
ALT SERPL-CCNC: 9 U/L (ref 5–33)
ANION GAP SERPL CALCULATED.3IONS-SCNC: 15 MMOL/L (ref 9–17)
AST SERPL-CCNC: 15 U/L
BASOPHILS # BLD: 1 % (ref 0–2)
BASOPHILS ABSOLUTE: 0.07 K/UL (ref 0–0.2)
BILIRUB SERPL-MCNC: 0.44 MG/DL (ref 0.3–1.2)
BUN BLDV-MCNC: 12 MG/DL (ref 8–23)
C-REACTIVE PROTEIN: <3 MG/L (ref 0–5)
CALCIUM SERPL-MCNC: 10.1 MG/DL (ref 8.6–10.4)
CARCINOEMBRYONIC ANTIGEN: 6.7 NG/ML
CHLORIDE BLD-SCNC: 95 MMOL/L (ref 98–107)
CHOLESTEROL/HDL RATIO: 1.7
CHOLESTEROL: 94 MG/DL
CO2: 26 MMOL/L (ref 20–31)
CREAT SERPL-MCNC: 0.71 MG/DL (ref 0.5–0.9)
EOSINOPHILS RELATIVE PERCENT: 2 % (ref 1–4)
GFR AFRICAN AMERICAN: >60 ML/MIN
GFR NON-AFRICAN AMERICAN: >60 ML/MIN
GFR SERPL CREATININE-BSD FRML MDRD: ABNORMAL ML/MIN/{1.73_M2}
GLUCOSE BLD-MCNC: 213 MG/DL (ref 70–99)
HCT VFR BLD CALC: 38.2 % (ref 36.3–47.1)
HDLC SERPL-MCNC: 56 MG/DL
HEMOGLOBIN: 13 G/DL (ref 11.9–15.1)
IMMATURE GRANULOCYTES: 0 %
LDL CHOLESTEROL: 30 MG/DL (ref 0–130)
LYMPHOCYTES # BLD: 33 % (ref 24–43)
MCH RBC QN AUTO: 32.7 PG (ref 25.2–33.5)
MCHC RBC AUTO-ENTMCNC: 34 G/DL (ref 28.4–34.8)
MCV RBC AUTO: 96 FL (ref 82.6–102.9)
MONOCYTES # BLD: 7 % (ref 3–12)
NRBC AUTOMATED: 0 PER 100 WBC
PDW BLD-RTO: 13.2 % (ref 11.8–14.4)
PLATELET # BLD: 297 K/UL (ref 138–453)
PMV BLD AUTO: 9.5 FL (ref 8.1–13.5)
POTASSIUM SERPL-SCNC: 4.4 MMOL/L (ref 3.7–5.3)
RBC # BLD: 3.98 M/UL (ref 3.95–5.11)
SEDIMENTATION RATE, ERYTHROCYTE: 12 MM/HR (ref 0–30)
SEG NEUTROPHILS: 57 % (ref 36–65)
SEGMENTED NEUTROPHILS ABSOLUTE COUNT: 5.15 K/UL (ref 1.5–8.1)
SODIUM BLD-SCNC: 136 MMOL/L (ref 135–144)
TOTAL PROTEIN: 7.4 G/DL (ref 6.4–8.3)
TRIGL SERPL-MCNC: 41 MG/DL
TSH SERPL DL<=0.05 MIU/L-ACNC: 1.98 UIU/ML (ref 0.3–5)
VITAMIN D 25-HYDROXY: 46.8 NG/ML
WBC # BLD: 9 K/UL (ref 3.5–11.3)

## 2022-06-14 PROCEDURE — 82607 VITAMIN B-12: CPT

## 2022-06-14 PROCEDURE — 84443 ASSAY THYROID STIM HORMONE: CPT

## 2022-06-14 PROCEDURE — 83020 HEMOGLOBIN ELECTROPHORESIS: CPT

## 2022-06-14 PROCEDURE — 82664 ELECTROPHORETIC TEST: CPT

## 2022-06-14 PROCEDURE — 86140 C-REACTIVE PROTEIN: CPT

## 2022-06-14 PROCEDURE — 82378 CARCINOEMBRYONIC ANTIGEN: CPT

## 2022-06-14 PROCEDURE — 80053 COMPREHEN METABOLIC PANEL: CPT

## 2022-06-14 PROCEDURE — 80061 LIPID PANEL: CPT

## 2022-06-14 PROCEDURE — 36415 COLL VENOUS BLD VENIPUNCTURE: CPT

## 2022-06-14 PROCEDURE — 83540 ASSAY OF IRON: CPT

## 2022-06-14 PROCEDURE — 82306 VITAMIN D 25 HYDROXY: CPT

## 2022-06-14 PROCEDURE — 85025 COMPLETE CBC W/AUTO DIFF WBC: CPT

## 2022-06-14 PROCEDURE — 85652 RBC SED RATE AUTOMATED: CPT

## 2022-06-14 PROCEDURE — 82746 ASSAY OF FOLIC ACID SERUM: CPT

## 2022-06-14 PROCEDURE — 83550 IRON BINDING TEST: CPT

## 2022-06-16 LAB
FOLATE: >20 NG/ML
HGB ELECTROPHORESIS INTERP: NORMAL
IRON SATURATION: 25 % (ref 20–55)
IRON: 106 UG/DL (ref 37–145)
PATHOLOGIST: NORMAL
TOTAL IRON BINDING CAPACITY: 426 UG/DL (ref 250–450)
UNSATURATED IRON BINDING CAPACITY: 320 UG/DL (ref 112–347)
VITAMIN B-12: 1071 PG/ML (ref 232–1245)

## 2022-06-21 ENCOUNTER — APPOINTMENT (OUTPATIENT)
Dept: GENERAL RADIOLOGY | Age: 67
End: 2022-06-21
Payer: MEDICARE

## 2022-06-21 ENCOUNTER — APPOINTMENT (OUTPATIENT)
Dept: CT IMAGING | Age: 67
End: 2022-06-21
Payer: MEDICARE

## 2022-06-21 ENCOUNTER — HOSPITAL ENCOUNTER (EMERGENCY)
Age: 67
Discharge: HOME | End: 2022-06-21
Attending: EMERGENCY MEDICINE
Payer: MEDICARE

## 2022-06-21 VITALS
HEART RATE: 125 BPM | RESPIRATION RATE: 18 BRPM | OXYGEN SATURATION: 98 % | DIASTOLIC BLOOD PRESSURE: 87 MMHG | WEIGHT: 94 LBS | BODY MASS INDEX: 14.75 KG/M2 | SYSTOLIC BLOOD PRESSURE: 149 MMHG | TEMPERATURE: 98.1 F | HEIGHT: 67 IN

## 2022-06-21 DIAGNOSIS — R53.1 GENERAL WEAKNESS: ICD-10-CM

## 2022-06-21 DIAGNOSIS — Z87.09 HISTORY OF COPD: ICD-10-CM

## 2022-06-21 DIAGNOSIS — R25.1 TREMOR: Primary | ICD-10-CM

## 2022-06-21 LAB
-: ABNORMAL
ABSOLUTE EOS #: 0.16 K/UL (ref 0–0.44)
ABSOLUTE IMMATURE GRANULOCYTE: 0.01 K/UL (ref 0–0.3)
ABSOLUTE LYMPH #: 2.37 K/UL (ref 1.1–3.7)
ABSOLUTE MONO #: 0.64 K/UL (ref 0.1–1.2)
ALBUMIN SERPL-MCNC: 4.7 G/DL (ref 3.5–5.2)
ALP BLD-CCNC: 61 U/L (ref 35–104)
ALT SERPL-CCNC: 14 U/L (ref 5–33)
AMORPHOUS: ABNORMAL
ANION GAP SERPL CALCULATED.3IONS-SCNC: 11 MMOL/L (ref 9–17)
AST SERPL-CCNC: 17 U/L
BASOPHILS # BLD: 1 % (ref 0–2)
BASOPHILS ABSOLUTE: 0.05 K/UL (ref 0–0.2)
BILIRUB SERPL-MCNC: 0.44 MG/DL (ref 0.3–1.2)
BILIRUBIN URINE: NEGATIVE
BUN BLDV-MCNC: 13 MG/DL (ref 8–23)
BUN/CREAT BLD: 17 (ref 9–20)
CALCIUM SERPL-MCNC: 9.6 MG/DL (ref 8.6–10.4)
CHLORIDE BLD-SCNC: 98 MMOL/L (ref 98–107)
CO2: 26 MMOL/L (ref 20–31)
COLOR: YELLOW
CREAT SERPL-MCNC: 0.78 MG/DL (ref 0.5–0.9)
EKG ATRIAL RATE: 114 BPM
EKG P AXIS: 88 DEGREES
EKG P-R INTERVAL: 150 MS
EKG Q-T INTERVAL: 324 MS
EKG QRS DURATION: 74 MS
EKG QTC CALCULATION (BAZETT): 446 MS
EKG R AXIS: 72 DEGREES
EKG T AXIS: 86 DEGREES
EKG VENTRICULAR RATE: 114 BPM
EOSINOPHILS RELATIVE PERCENT: 2 % (ref 1–4)
EPITHELIAL CELLS UA: ABNORMAL /HPF (ref 0–5)
GFR AFRICAN AMERICAN: >60 ML/MIN
GFR NON-AFRICAN AMERICAN: >60 ML/MIN
GFR SERPL CREATININE-BSD FRML MDRD: ABNORMAL ML/MIN/{1.73_M2}
GLUCOSE BLD-MCNC: 68 MG/DL (ref 70–99)
GLUCOSE URINE: NEGATIVE
HCT VFR BLD CALC: 37.2 % (ref 36.3–47.1)
HEMOGLOBIN: 12.7 G/DL (ref 11.9–15.1)
IMMATURE GRANULOCYTES: 0 %
KETONES, URINE: ABNORMAL
LEUKOCYTE ESTERASE, URINE: NEGATIVE
LYMPHOCYTES # BLD: 31 % (ref 24–43)
MCH RBC QN AUTO: 33.2 PG (ref 25.2–33.5)
MCHC RBC AUTO-ENTMCNC: 34.1 G/DL (ref 28.4–34.8)
MCV RBC AUTO: 97.1 FL (ref 82.6–102.9)
MONOCYTES # BLD: 8 % (ref 3–12)
MYOGLOBIN: <21 NG/ML (ref 25–58)
MYOGLOBIN: <21 NG/ML (ref 25–58)
NITRITE, URINE: NEGATIVE
NRBC AUTOMATED: 0 PER 100 WBC
PDW BLD-RTO: 12.5 % (ref 11.8–14.4)
PH UA: 6 (ref 5–8)
PLATELET # BLD: 214 K/UL (ref 138–453)
PMV BLD AUTO: 9.1 FL (ref 8.1–13.5)
POTASSIUM SERPL-SCNC: 3.9 MMOL/L (ref 3.7–5.3)
PROTEIN UA: ABNORMAL
RBC # BLD: 3.83 M/UL (ref 3.95–5.11)
RBC UA: ABNORMAL /HPF (ref 0–2)
SEG NEUTROPHILS: 58 % (ref 36–65)
SEGMENTED NEUTROPHILS ABSOLUTE COUNT: 4.53 K/UL (ref 1.5–8.1)
SODIUM BLD-SCNC: 135 MMOL/L (ref 135–144)
SPECIFIC GRAVITY UA: 1.02 (ref 1–1.03)
TOTAL PROTEIN: 7.3 G/DL (ref 6.4–8.3)
TROPONIN, HIGH SENSITIVITY: 12 NG/L (ref 0–14)
TROPONIN, HIGH SENSITIVITY: 9 NG/L (ref 0–14)
TURBIDITY: CLEAR
URINE HGB: NEGATIVE
UROBILINOGEN, URINE: NORMAL
WBC # BLD: 7.8 K/UL (ref 3.5–11.3)
WBC UA: ABNORMAL /HPF (ref 0–5)

## 2022-06-21 PROCEDURE — 80053 COMPREHEN METABOLIC PANEL: CPT

## 2022-06-21 PROCEDURE — 71045 X-RAY EXAM CHEST 1 VIEW: CPT

## 2022-06-21 PROCEDURE — 99285 EMERGENCY DEPT VISIT HI MDM: CPT

## 2022-06-21 PROCEDURE — 84484 ASSAY OF TROPONIN QUANT: CPT

## 2022-06-21 PROCEDURE — 85025 COMPLETE CBC W/AUTO DIFF WBC: CPT

## 2022-06-21 PROCEDURE — 36415 COLL VENOUS BLD VENIPUNCTURE: CPT

## 2022-06-21 PROCEDURE — 70450 CT HEAD/BRAIN W/O DYE: CPT

## 2022-06-21 PROCEDURE — 93010 ELECTROCARDIOGRAM REPORT: CPT | Performed by: INTERNAL MEDICINE

## 2022-06-21 PROCEDURE — 93005 ELECTROCARDIOGRAM TRACING: CPT | Performed by: EMERGENCY MEDICINE

## 2022-06-21 PROCEDURE — 81001 URINALYSIS AUTO W/SCOPE: CPT

## 2022-06-21 PROCEDURE — 83874 ASSAY OF MYOGLOBIN: CPT

## 2022-06-21 RX ORDER — ALBUTEROL SULFATE 90 UG/1
AEROSOL, METERED RESPIRATORY (INHALATION)
Qty: 8.5 G | Refills: 10 | Status: SHIPPED | OUTPATIENT
Start: 2022-06-21

## 2022-06-21 ASSESSMENT — PAIN - FUNCTIONAL ASSESSMENT: PAIN_FUNCTIONAL_ASSESSMENT: NONE - DENIES PAIN

## 2022-06-21 NOTE — ED NOTES
Pt has a history of diabetes, does routinely check blood sugars and takes long acting and regular insulin on a sliding scale with fast acting PRN based on her blood sugars. Pt has been shaking involuntarily since Sunday, was unable to go to [employer] and has needed help ambulated and moving because of the shaking. Pt is jittery in bed, SOB with speech and states she is a smoker.       Pallavi Alvarez., RN  07/27/24 6486

## 2022-06-21 NOTE — ED PROVIDER NOTES
[facility] ED  EMERGENCY DEPARTMENT ENCOUNTER   ATTENDING ATTESTATION     Pt Name: Halie Adair  MRN: [id number]  [place] 1955  Date of evaluation: 6/21/22       Halie Adair is a 73 y.o. female who presents with Shaking (c/o extreme shaking with headache that began on Sunday, also c/o sweating all night and not feeling well, called her PCP and was told to come to ED, denies chest pain/sob) and Headache      MDM:       78-year-old female presents with complaints of tremor. The patient's laboratory studies are unremarkable, her vital signs of improved. Patient will follow up with neurology as an outpatient and return if symptoms worsen or change. Vitals:   Vitals:    06/21/22 1306   BP: (!) 149/87   Pulse: (!) 125   Resp: 18   Temp: 98.1 °F (36.7 °C)   TempSrc: Oral   SpO2: 98%   Weight: 94 lb (42.6 kg)   Height: 5' 7\" (1.702 m)         This visit was performed by both a physician and an APC. I personally evaluated and examined the patient.  I performed all aspects of the MDM as documented     Jorge Gonzales MD  Attending Emergency  Physician                  Stephane Yan MD  06/21/22 7107

## 2022-06-21 NOTE — ED PROVIDER NOTES
[address] [place] [address] ED  [facility]      Pt Name: Karma Mason  MRN: [id number]  [place] 1955  Date ofevaluation: 6/21/2022  Provider: Klaus Wilde [facility]       Chief Complaint   Patient presents with   Erskin Sho     c/o extreme shaking with headache that began on Sunday, also c/o sweating all night and not feeling well, called her PCP and was told to come to ED, denies chest pain/sob    Headache         HISTORY OF PRESENT ILLNESS  (Location/Symptom, Timing/Onset, Context/Setting, Quality, Duration, Modifying Factors, Severity.)   Karma Mason is a 73 y.o. female who presents to the emergency department with shaking over the last few days. Anytime the patient attempts to grab something with her hand she shakes uncontrollably. Reports leg weakness as well. Patient also reports having weight loss and general weakness for quite some time. Denies any chest pain shortness of breath. Nursing Notes were reviewed.     ALLERGIES     Peanuts [peanut oil], Vicodin [hydrocodone-acetaminophen], Tramadol, Tylenol with codeine #3 [acetaminophen-codeine], and Ibuprofen    CURRENT MEDICATIONS       Discharge Medication List as of 6/21/2022  4:52 PM      CONTINUE these medications which have NOT CHANGED    Details   albuterol sulfate HFA (PROVENTIL;VENTOLIN;PROAIR) 108 (90 Base) MCG/ACT inhaler INHALE TWO(2) PUFFS INTO LUNGS EVERY SIX(6) HOURS AS NEEDED FOR WHEEZING, Disp-8.5 g, R-10Normal      insulin aspart (NOVOLOG FLEXPEN) 100 UNIT/ML injection pen Inject 5 Units into the skin 3 times daily (before meals), Disp-10 pen, R-3Normal      zoster recombinant adjuvanted vaccine (SHINGRIX) 50 MCG/0.5ML SUSR injection Inject 0.5 mLs into the muscle See Admin Instructions 1 dose now and repeat in 2-6 months, Disp-0.5 mL, R-0Print      FLUoxetine (PROZAC) 10 MG capsule Take 1 capsule by mouth daily, Disp-90 capsule, R-0Normal      gabapentin (NEURONTIN) 400 MG capsule Alcohol Swabs (ALCOHOL PREP) 70 % PADS Disp-200 each, R-3, NormalDaily as needed      metFORMIN (GLUCOPHAGE-XR) 500 MG extended release tablet Take 2 tablets by mouth daily (with breakfast) AND 1 tablet Daily with supper., Disp-270 tablet, R-2Normal      SYMBICORT 80-4.5 MCG/ACT AERO Inhale 2 puffs into the lungs 2 times daily, Disp-3 each, R-2, DAWNormal      lipase-protease-amylase (CREON) 14522-349895 units CPEP delayed release capsule take 2 capsules by mouth four times a day after meals and at bedtime, Disp-240 capsule, R-1Normal      Multiple Vitamin (MULTI-VITAMIN DAILY PO) Take by mouthHistorical Med      ZINC PO Take by mouthHistorical Med      !! AquaLance Lancets 30G MISC Daily and as needed for hypoglycemia, Disp-200 each, R-2Normal       !! - Potential duplicate medications found. Please discuss with provider. PAST MEDICAL HISTORY         Diagnosis Date    Cataracts, bilateral     COPD (chronic obstructive pulmonary disease) (HCC)     CTS (carpal tunnel syndrome) left    Fibromyalgia     Generalized abdominal pain 6/3/2016    Glaucoma     Hypertension     Osteoarthritis     Osteoporosis     Pancreatic calcification 6/3/2016    Pancreatitis     Protein-calorie malnutrition, severe ([address] 6/1/2016    Seizures ([address]     Tremor     Type II or unspecified type diabetes mellitus without mention of complication, not stated as uncontrolled     Uncontrolled type 2 DM with hyperosmolar nonketotic hyperglycemia ([address] 6/3/2016       SURGICAL HISTORY           Procedure Laterality Date    COLONOSCOPY  08/30/2016    very poor prep, unable to complete    COLONOSCOPY  06/30/2017    ADENOMATOUS POLYP.  COLONOSCOPY  05/29/2018    polypectomy x1    COLONOSCOPY  05/07/2021    COLONOSCOPY N/A 5/7/2021    COLONOSCOPY WITH polypectomy performed by Vianney Alanis MD at [address] [place] [address] Left     ovary twice    NECK SURGERY      pt unsure of exact date & what the procedure was ? 2015 @ [facility]    HI COLON CA SCRN NOT  [address] N/A 2018    COLONOSCOPY WITH POLYP REMOVEL performed by Julito Gordon MD at [address] N/A 2017    COLONOSCOPY POLYPECTOMY HOT BIOPSY performed by Julito Gordon MD at [address]  2016    gastritis, MILD CHRONIC GASTRITIS WITH FOCAL ANTRAL EROSION AND ASSOCIATED ACUTE INFLAMMATION. INTESTINAL METAPLASIA PRESENT, NEGATIVE FOR DYSPLASIA. FAMILY HISTORY           Problem Relation Age of Onset    Brain Cancer Father     Diabetes Sister     Other Sister         epilepsy     Family Status   Relation Name Status    Father      Sister  (Not Specified)        SOCIAL HISTORY      reports that she has been smoking. She has a 10.00 pack-year smoking history. She has quit using smokeless tobacco. She reports current drug use. Drug: Marijuana Norval Remak). She reports that she does not drink alcohol. REVIEW OFSYSTEMS    (2-9 systems for level 4, 10 or more for level 5)   Review of Systems    Except as noted above the remainder of the review of systems was reviewed and negative. PHYSICAL EXAM    (up to 7 for level 4, 8 or more for level 5)     ED Triage Vitals [22 1306]   BP Temp Temp Source Heart Rate Resp SpO2 Height Weight   (!) 149/87 98.1 °F (36.7 °C) Oral (!) 125 18 98 % 5' 7\" (1.702 m) 94 lb (42.6 kg)      Physical Exam  Constitutional:       Appearance: She is well-developed. HENT:      Head: Normocephalic and atraumatic. Cardiovascular:      Rate and Rhythm: Normal rate and regular rhythm. Pulmonary:      Effort: Pulmonary effort is normal.      Breath sounds: Normal breath sounds. Abdominal:      General: There is no distension. Palpations: Abdomen is soft. Tenderness: There is no abdominal tenderness. Musculoskeletal:         General: Normal range of motion. Cervical back: Normal range of motion and neck supple. Skin:     General: Skin is warm. Findings: No rash. Neurological:      Mental Status: She is alert and oriented to person, place, and time. GCS: GCS eye subscore is 4. GCS verbal subscore is 5. GCS motor subscore is 6. Cranial Nerves: Cranial nerves are intact. Sensory: Sensation is intact. Motor: Motor function is intact. Coordination: Coordination is intact. Gait: Gait is intact.    Psychiatric:         Behavior: Behavior normal.                 DIAGNOSTIC RESULTS     EKG: All EKG's are interpreted by the Emergency Department Physician who either signs or Co-signs this chart in the absence of a cardiologist.        RADIOLOGY:   Non-plain film images such as CT, Ultrasound and MRI are read by the radiologist. Plain radiographic images arevisualized and preliminarily interpreted by the emergency physician with the below findings:        Interpretation per the Radiologist below, if available at thetime of this note:          ED BEDSIDE ULTRASOUND:   Performed by ED Physician - none    LABS:  Labs Reviewed   URINALYSIS WITH REFLEX TO CULTURE - Abnormal; Notable for the following components:       Result Value    Ketones, Urine TRACE (*)     Protein, UA TRACE (*)     All other components within normal limits   CBC WITH AUTO DIFFERENTIAL - Abnormal; Notable for the following components:    RBC 3.83 (*)     All other components within normal limits   COMPREHENSIVE METABOLIC PANEL - Abnormal; Notable for the following components:    Glucose 68 (*)     All other components within normal limits   TROP/MYOGLOBIN - Abnormal; Notable for the following components:    Myoglobin <21 (*)     All other components within normal limits   TROP/MYOGLOBIN - Abnormal; Notable for the following components:    Myoglobin <21 (*)     All other components within normal limits   MICROSCOPIC URINALYSIS - Abnormal; Notable for the following components:    Amorphous, UA 1+ (*)     All other components within normal limits       All other labs were within normal range or not returned as of this dictation. EMERGENCY DEPARTMENT COURSE and DIFFERENTIAL DIAGNOSIS/MDM:   Vitals:    Vitals:    06/21/22 1306   BP: (!) 149/87   Pulse: (!) 125   Resp: 18   Temp: 98.1 °F (36.7 °C)   TempSrc: Oral   SpO2: 98%   Weight: 94 lb (42.6 kg)   Height: 5' 7\" (1.702 m)   work up is negative. Will DC home. Referred to neuro. No resting tremor seen. CONSULTS:  None    PROCEDURES:  Procedures        FINAL IMPRESSION      1. Tremor    2.  General weakness          DISPOSITION/PLAN   DISPOSITION Decision To Discharge 06/21/2022 04:51:58 PM      PATIENTREFERRED TO:   Ethel Fisher MD  [address]  [phone]    In 3 days      Kitty Gutierrez MD  [facility] [address] [phone]    In 3 days        DISCHARGE MEDICATIONS:     Discharge Medication List as of 6/21/2022  4:52 PM              (Please note that portions of this note were completed with a voice recognition program.  Efforts were made to edit thedictations but occasionally words are mis-transcribed.)    MAYRA Kennedy PA-C  06/22/22 0123

## 2022-06-21 NOTE — ED NOTES
Pt states she is dizzy and does not want to ambulate at this time. Let MAYRA Mendez know, also let him know that she did ambulate independently to the bathroom earlier without assistance and did fine. Catarina Quintero will speak with patient.       Zion Calvin., RN  05/17/31 [id number]

## 2022-06-21 NOTE — TELEPHONE ENCOUNTER
Health Maintenance   Topic Date Due    Annual Wellness Visit (AWV)  Never done    Shingles vaccine (1 of 2) Never done    Diabetic retinal exam  02/08/2018    Diabetic foot exam  02/13/2020    Diabetic microalbuminuria test  03/11/2020    A1C test (Diabetic or Prediabetic)  08/31/2022    Depression Monitoring  01/21/2023    Breast cancer screen  06/02/2023    Lipids  06/14/2023    Pneumococcal 65+ years Vaccine (3 - PPSV23 or PCV20) 10/10/2023    DTaP/Tdap/Td vaccine (2 - Td or Tdap) 10/05/2025    Colorectal Cancer Screen  05/07/2026    Cervical cancer screen  06/09/2026    DEXA (modify frequency per FRAX score)  Completed    Flu vaccine  Completed    COVID-19 Vaccine  Completed    Hepatitis C screen  Completed    HIV screen  Completed    Hepatitis A vaccine  Aged Out    Hib vaccine  Aged Out    Meningococcal (ACWY) vaccine  Aged Out             (applicable per patient's age: Cancer Screenings, Depression Screening, Fall Risk Screening, Immunizations)    Hemoglobin A1C (%)   Date Value   05/31/2022 11.2   01/21/2022 11.6   10/13/2021 10.5     Microalb/Crt.  Ratio (mcg/mg creat)   Date Value   03/11/2019 CANNOT BE CALCULATED     LDL Cholesterol (mg/dL)   Date Value   06/14/2022 30     AST (U/L)   Date Value   06/14/2022 15     ALT (U/L)   Date Value   06/14/2022 9     BUN (mg/dL)   Date Value   06/14/2022 12      (goal A1C is < 7)   (goal LDL is <100) need 30-50% reduction from baseline     BP Readings from Last 3 Encounters:   05/31/22 131/83   05/31/22 100/74   02/25/22 127/80    (goal /80)      All Future Testing planned in [facility]:  Lab Frequency Next Occurrence   Microalbumin, Ur Once 04/28/2022       Next Visit Date:  Future Appointments   Date Time Provider Teresa Shoemaker   7/12/2022  1:30 PM DONTRELL Nolasco - CNP [facility]   12/6/2022  1:00 PM Gaby Roberts MD [email or web address]            Patient Active Problem List:     Intractable epilepsy ([facility] [address] Hypoglycemia     Depression     Osteoporosis     Affective disorder ([facility] [address]     Uncontrolled type 2 DM with hyperosmolar nonketotic hyperglycemia (HCC)     Generalized abdominal pain     Pancreatic calcification     Uncontrolled type 2 diabetes mellitus with hyperglycemia (HCC)     Pancreatic insufficiency     Pain of upper abdomen     Alcohol-induced chronic pancreatitis (HCC)     Weight loss     Polysubstance abuse (HCC)     Unresponsive episode     Chronic neck pain     Neuropathic pain of both legs     Seizure disorder, secondary ([facility] [address]     Essential (primary) hypertension     Type 2 diabetes mellitus without complication, with long-term current use of insulin (HCC)     Neuropathic pain     Elevated CEA     Acute left-sided low back pain without sciatica     Adenomatous polyps     Bilateral wrist pain     Seizure-like activity (HCC)     Elevated CA 19-9 level     Body mass index (BMI) less than 16.5     Pseudohyponatremia     Hyperglycemia     Hypokalemia     Hypocalcemia     Hypomagnesemia     Noncompliance     Severe malnutrition ([address]

## 2022-06-21 NOTE — ED NOTES
Pt called out, is hungry and dizzy, writer noted patient blood sugar from [facility] and provided patient a lunch tray and drink.      Kishor Frank., RN  39/80/99 [id number]

## 2022-06-22 DIAGNOSIS — E11.42 TYPE 2 DIABETES MELLITUS WITH DIABETIC POLYNEUROPATHY, WITH LONG-TERM CURRENT USE OF INSULIN (HCC): ICD-10-CM

## 2022-06-22 DIAGNOSIS — Z79.4 TYPE 2 DIABETES MELLITUS WITH DIABETIC POLYNEUROPATHY, WITH LONG-TERM CURRENT USE OF INSULIN (HCC): ICD-10-CM

## 2022-06-22 DIAGNOSIS — E11.9 TYPE 2 DIABETES MELLITUS WITHOUT COMPLICATION, WITHOUT LONG-TERM CURRENT USE OF INSULIN (HCC): ICD-10-CM

## 2022-06-22 RX ORDER — INSULIN GLARGINE 100 [IU]/ML
INJECTION, SOLUTION SUBCUTANEOUS
Qty: 15 ML | Refills: 10 | OUTPATIENT
Start: 2022-06-22

## 2022-07-11 DIAGNOSIS — Z86.010 HX OF COLONIC POLYPS: ICD-10-CM

## 2022-07-11 DIAGNOSIS — R63.4 WEIGHT LOSS: ICD-10-CM

## 2022-07-19 ENCOUNTER — HOSPITAL ENCOUNTER (INPATIENT)
Age: 67
LOS: 1 days | Discharge: HOME | DRG: 638 | End: 2022-07-21
Attending: EMERGENCY MEDICINE | Admitting: STUDENT IN AN ORGANIZED HEALTH CARE EDUCATION/TRAINING PROGRAM
Payer: MEDICARE

## 2022-07-19 ENCOUNTER — APPOINTMENT (OUTPATIENT)
Dept: GENERAL RADIOLOGY | Age: 67
DRG: 638 | End: 2022-07-19
Payer: MEDICARE

## 2022-07-19 ENCOUNTER — APPOINTMENT (OUTPATIENT)
Dept: CT IMAGING | Age: 67
DRG: 638 | End: 2022-07-19
Payer: MEDICARE

## 2022-07-19 DIAGNOSIS — E11.9 TYPE 2 DIABETES MELLITUS WITHOUT COMPLICATION, WITHOUT LONG-TERM CURRENT USE OF INSULIN (HCC): ICD-10-CM

## 2022-07-19 DIAGNOSIS — E11.42 TYPE 2 DIABETES MELLITUS WITH DIABETIC POLYNEUROPATHY, WITH LONG-TERM CURRENT USE OF INSULIN (HCC): ICD-10-CM

## 2022-07-19 DIAGNOSIS — Z79.4 TYPE 2 DIABETES MELLITUS WITH DIABETIC POLYNEUROPATHY, WITH LONG-TERM CURRENT USE OF INSULIN (HCC): ICD-10-CM

## 2022-07-19 DIAGNOSIS — E16.2 HYPOGLYCEMIA: Primary | ICD-10-CM

## 2022-07-19 DIAGNOSIS — K22.89 ESOPHAGEAL THICKENING: ICD-10-CM

## 2022-07-19 LAB
-: ABNORMAL
ABSOLUTE EOS #: <0.03 K/UL (ref 0–0.44)
ABSOLUTE IMMATURE GRANULOCYTE: 0.03 K/UL (ref 0–0.3)
ABSOLUTE LYMPH #: 1.13 K/UL (ref 1.1–3.7)
ABSOLUTE MONO #: 0.29 K/UL (ref 0.1–1.2)
ALBUMIN SERPL-MCNC: 4.3 G/DL (ref 3.5–5.2)
ALBUMIN/GLOBULIN RATIO: 1.7 (ref 1–2.5)
ALP BLD-CCNC: 72 U/L (ref 35–104)
ALT SERPL-CCNC: 17 U/L (ref 5–33)
AMMONIA: 25 UMOL/L (ref 11–51)
ANION GAP SERPL CALCULATED.3IONS-SCNC: 16 MMOL/L (ref 9–17)
ANION GAP: 7 MMOL/L (ref 7–16)
AST SERPL-CCNC: 26 U/L
BACTERIA: ABNORMAL
BASOPHILS # BLD: 1 % (ref 0–2)
BASOPHILS ABSOLUTE: 0.04 K/UL (ref 0–0.2)
BILIRUB SERPL-MCNC: 0.3 MG/DL (ref 0.3–1.2)
BILIRUBIN URINE: NEGATIVE
BUN BLDV-MCNC: 14 MG/DL (ref 8–23)
C-REACTIVE PROTEIN: <3 MG/L (ref 0–5)
CALCIUM SERPL-MCNC: 9.3 MG/DL (ref 8.6–10.4)
CASTS UA: ABNORMAL /LPF (ref 0–2)
CASTS UA: ABNORMAL /LPF (ref 0–2)
CHLORIDE BLD-SCNC: 99 MMOL/L (ref 98–107)
CHP ED QC CHECK: NORMAL
CO2: 25 MMOL/L (ref 20–31)
COLOR: YELLOW
CREAT SERPL-MCNC: 0.69 MG/DL (ref 0.5–0.9)
EOSINOPHILS RELATIVE PERCENT: 0 % (ref 1–4)
EPITHELIAL CELLS UA: ABNORMAL /HPF (ref 0–5)
GFR AFRICAN AMERICAN: >60 ML/MIN
GFR NON-AFRICAN AMERICAN: >60 ML/MIN
GFR NON-AFRICAN AMERICAN: >60 ML/MIN
GFR SERPL CREATININE-BSD FRML MDRD: >60 ML/MIN
GFR SERPL CREATININE-BSD FRML MDRD: ABNORMAL ML/MIN/{1.73_M2}
GFR SERPL CREATININE-BSD FRML MDRD: NORMAL ML/MIN/{1.73_M2}
GLUCOSE BLD-MCNC: 175 MG/DL (ref 70–99)
GLUCOSE BLD-MCNC: 179 MG/DL (ref 74–100)
GLUCOSE BLD-MCNC: 184 MG/DL (ref 65–105)
GLUCOSE BLD-MCNC: 76 MG/DL
GLUCOSE BLD-MCNC: 76 MG/DL (ref 65–105)
GLUCOSE URINE: NEGATIVE
HCO3 VENOUS: 28.6 MMOL/L (ref 22–29)
HCT VFR BLD CALC: 39.1 % (ref 36.3–47.1)
HEMOGLOBIN: 13.3 G/DL (ref 11.9–15.1)
IMMATURE GRANULOCYTES: 0 %
INR BLD: 1
KETONES, URINE: NEGATIVE
LACTIC ACID, SEPSIS WHOLE BLOOD: 4 MMOL/L (ref 0.5–1.9)
LEUKOCYTE ESTERASE, URINE: ABNORMAL
LYMPHOCYTES # BLD: 15 % (ref 24–43)
MAGNESIUM: 1.8 MG/DL (ref 1.6–2.6)
MCH RBC QN AUTO: 33.7 PG (ref 25.2–33.5)
MCHC RBC AUTO-ENTMCNC: 34 G/DL (ref 28.4–34.8)
MCV RBC AUTO: 99 FL (ref 82.6–102.9)
MONOCYTES # BLD: 4 % (ref 3–12)
NITRITE, URINE: NEGATIVE
NRBC AUTOMATED: 0 PER 100 WBC
O2 SAT, VEN: 68 % (ref 60–85)
PARTIAL THROMBOPLASTIN TIME: 24.9 SEC (ref 20.5–30.5)
PCO2, VEN: 56.4 MM HG (ref 41–51)
PDW BLD-RTO: 12.9 % (ref 11.8–14.4)
PH UA: 6.5 (ref 5–8)
PH VENOUS: 7.31 (ref 7.32–7.43)
PLATELET # BLD: 270 K/UL (ref 138–453)
PMV BLD AUTO: 10.2 FL (ref 8.1–13.5)
PO2, VEN: 39.2 MM HG (ref 30–50)
POC BUN: 13 MG/DL (ref 8–26)
POC CHLORIDE: 106 MMOL/L (ref 98–107)
POC CREATININE: 0.64 MG/DL (ref 0.51–1.19)
POC HEMATOCRIT: 42 % (ref 36–46)
POC HEMOGLOBIN: 14.2 G/DL (ref 12–16)
POC IONIZED CALCIUM: 1.15 MMOL/L (ref 1.15–1.33)
POC LACTIC ACID: 3.34 MMOL/L (ref 0.56–1.39)
POC POTASSIUM: 4 MMOL/L (ref 3.5–4.5)
POC SODIUM: 141 MMOL/L (ref 138–146)
POC TCO2: 29 MMOL/L (ref 22–30)
POSITIVE BASE EXCESS, VEN: 1 (ref 0–3)
POTASSIUM SERPL-SCNC: 4.1 MMOL/L (ref 3.7–5.3)
PROCALCITONIN: 0.03 NG/ML
PROTEIN UA: NEGATIVE
PROTHROMBIN TIME: 10.3 SEC (ref 9.1–12.3)
RBC # BLD: 3.95 M/UL (ref 3.95–5.11)
RBC UA: ABNORMAL /HPF (ref 0–2)
SARS-COV-2, RAPID: NOT DETECTED
SEG NEUTROPHILS: 80 % (ref 36–65)
SEGMENTED NEUTROPHILS ABSOLUTE COUNT: 6.31 K/UL (ref 1.5–8.1)
SODIUM BLD-SCNC: 140 MMOL/L (ref 135–144)
SPECIFIC GRAVITY UA: 1.01 (ref 1–1.03)
SPECIMEN DESCRIPTION: NORMAL
TOTAL PROTEIN: 6.9 G/DL (ref 6.4–8.3)
TROPONIN, HIGH SENSITIVITY: 33 NG/L (ref 0–14)
TSH SERPL DL<=0.05 MIU/L-ACNC: 1.62 UIU/ML (ref 0.3–5)
TURBIDITY: CLEAR
URINE HGB: NEGATIVE
UROBILINOGEN, URINE: NORMAL
WBC # BLD: 7.8 K/UL (ref 3.5–11.3)
WBC UA: ABNORMAL /HPF (ref 0–5)

## 2022-07-19 PROCEDURE — 87040 BLOOD CULTURE FOR BACTERIA: CPT

## 2022-07-19 PROCEDURE — 80051 ELECTROLYTE PANEL: CPT

## 2022-07-19 PROCEDURE — 82140 ASSAY OF AMMONIA: CPT

## 2022-07-19 PROCEDURE — 93005 ELECTROCARDIOGRAM TRACING: CPT | Performed by: NURSE PRACTITIONER

## 2022-07-19 PROCEDURE — 85730 THROMBOPLASTIN TIME PARTIAL: CPT

## 2022-07-19 PROCEDURE — 99285 EMERGENCY DEPT VISIT HI MDM: CPT

## 2022-07-19 PROCEDURE — 86140 C-REACTIVE PROTEIN: CPT

## 2022-07-19 PROCEDURE — 82565 ASSAY OF CREATININE: CPT

## 2022-07-19 PROCEDURE — 84484 ASSAY OF TROPONIN QUANT: CPT

## 2022-07-19 PROCEDURE — 84443 ASSAY THYROID STIM HORMONE: CPT

## 2022-07-19 PROCEDURE — 70450 CT HEAD/BRAIN W/O DYE: CPT

## 2022-07-19 PROCEDURE — 2580000003 HC RX 258: Performed by: STUDENT IN AN ORGANIZED HEALTH CARE EDUCATION/TRAINING PROGRAM

## 2022-07-19 PROCEDURE — 96365 THER/PROPH/DIAG IV INF INIT: CPT

## 2022-07-19 PROCEDURE — 36415 COLL VENOUS BLD VENIPUNCTURE: CPT

## 2022-07-19 PROCEDURE — 71045 X-RAY EXAM CHEST 1 VIEW: CPT

## 2022-07-19 PROCEDURE — 87635 SARS-COV-2 COVID-19 AMP PRB: CPT

## 2022-07-19 PROCEDURE — 81001 URINALYSIS AUTO W/SCOPE: CPT

## 2022-07-19 PROCEDURE — 85025 COMPLETE CBC W/AUTO DIFF WBC: CPT

## 2022-07-19 PROCEDURE — 87186 SC STD MICRODIL/AGAR DIL: CPT

## 2022-07-19 PROCEDURE — 87077 CULTURE AEROBIC IDENTIFY: CPT

## 2022-07-19 PROCEDURE — 84520 ASSAY OF UREA NITROGEN: CPT

## 2022-07-19 PROCEDURE — 85610 PROTHROMBIN TIME: CPT

## 2022-07-19 PROCEDURE — 87086 URINE CULTURE/COLONY COUNT: CPT

## 2022-07-19 PROCEDURE — 85014 HEMATOCRIT: CPT

## 2022-07-19 PROCEDURE — 80053 COMPREHEN METABOLIC PANEL: CPT

## 2022-07-19 PROCEDURE — 84145 PROCALCITONIN (PCT): CPT

## 2022-07-19 PROCEDURE — 96374 THER/PROPH/DIAG INJ IV PUSH: CPT

## 2022-07-19 PROCEDURE — 83735 ASSAY OF MAGNESIUM: CPT

## 2022-07-19 PROCEDURE — 82947 ASSAY GLUCOSE BLOOD QUANT: CPT

## 2022-07-19 PROCEDURE — 82330 ASSAY OF CALCIUM: CPT

## 2022-07-19 PROCEDURE — 83605 ASSAY OF LACTIC ACID: CPT

## 2022-07-19 PROCEDURE — 82803 BLOOD GASES ANY COMBINATION: CPT

## 2022-07-19 RX ORDER — DEXTROSE MONOHYDRATE 100 MG/ML
INJECTION, SOLUTION INTRAVENOUS CONTINUOUS
Status: DISCONTINUED | OUTPATIENT
Start: 2022-07-19 | End: 2022-07-20

## 2022-07-19 RX ADMIN — DEXTROSE MONOHYDRATE: 100 INJECTION, SOLUTION INTRAVENOUS at 23:33

## 2022-07-19 ASSESSMENT — ENCOUNTER SYMPTOMS
DIARRHEA: 0
SINUS PRESSURE: 0
SORE THROAT: 0
BACK PAIN: 0
EYE PAIN: 0
COUGH: 0
NAUSEA: 1
SHORTNESS OF BREATH: 1
VOMITING: 0
ABDOMINAL PAIN: 0

## 2022-07-20 DIAGNOSIS — E11.9 TYPE 2 DIABETES MELLITUS WITHOUT COMPLICATION, WITHOUT LONG-TERM CURRENT USE OF INSULIN (HCC): ICD-10-CM

## 2022-07-20 DIAGNOSIS — E11.42 TYPE 2 DIABETES MELLITUS WITH DIABETIC POLYNEUROPATHY, WITH LONG-TERM CURRENT USE OF INSULIN (HCC): ICD-10-CM

## 2022-07-20 DIAGNOSIS — Z79.4 TYPE 2 DIABETES MELLITUS WITH DIABETIC POLYNEUROPATHY, WITH LONG-TERM CURRENT USE OF INSULIN (HCC): ICD-10-CM

## 2022-07-20 PROBLEM — E87.20 LACTIC ACIDOSIS: Status: ACTIVE | Noted: 2022-07-20

## 2022-07-20 LAB
ANION GAP SERPL CALCULATED.3IONS-SCNC: 9 MMOL/L (ref 9–17)
BETA-HYDROXYBUTYRATE: 0.14 MMOL/L (ref 0.02–0.27)
BUN BLDV-MCNC: 11 MG/DL (ref 8–23)
CALCIUM SERPL-MCNC: 8.9 MG/DL (ref 8.6–10.4)
CARBOXYHEMOGLOBIN: 1.7 % (ref 0–5)
CHLORIDE BLD-SCNC: 97 MMOL/L (ref 98–107)
CHP ED QC CHECK: NORMAL
CHP ED QC CHECK: YES
CO2: 25 MMOL/L (ref 20–31)
CREAT SERPL-MCNC: 0.63 MG/DL (ref 0.5–0.9)
EKG ATRIAL RATE: 109 BPM
EKG P AXIS: 86 DEGREES
EKG P-R INTERVAL: 160 MS
EKG Q-T INTERVAL: 368 MS
EKG QRS DURATION: 64 MS
EKG QTC CALCULATION (BAZETT): 495 MS
EKG R AXIS: 84 DEGREES
EKG T AXIS: 88 DEGREES
EKG VENTRICULAR RATE: 109 BPM
FIO2: ABNORMAL
GFR AFRICAN AMERICAN: >60 ML/MIN
GFR NON-AFRICAN AMERICAN: >60 ML/MIN
GFR SERPL CREATININE-BSD FRML MDRD: ABNORMAL ML/MIN/{1.73_M2}
GLUCOSE BLD-MCNC: 106 MG/DL (ref 65–105)
GLUCOSE BLD-MCNC: 143 MG/DL
GLUCOSE BLD-MCNC: 143 MG/DL (ref 65–105)
GLUCOSE BLD-MCNC: 158 MG/DL
GLUCOSE BLD-MCNC: 158 MG/DL (ref 65–105)
GLUCOSE BLD-MCNC: 178 MG/DL
GLUCOSE BLD-MCNC: 178 MG/DL (ref 65–105)
GLUCOSE BLD-MCNC: 201 MG/DL
GLUCOSE BLD-MCNC: 201 MG/DL (ref 65–105)
GLUCOSE BLD-MCNC: 228 MG/DL
GLUCOSE BLD-MCNC: 228 MG/DL (ref 65–105)
GLUCOSE BLD-MCNC: 254 MG/DL (ref 65–105)
GLUCOSE BLD-MCNC: 254 MG/DL (ref 70–99)
GLUCOSE BLD-MCNC: 268 MG/DL (ref 65–105)
GLUCOSE BLD-MCNC: 404 MG/DL (ref 65–105)
GLUCOSE BLD-MCNC: 50 MG/DL (ref 65–105)
GLUCOSE BLD-MCNC: 531 MG/DL (ref 65–105)
GLUCOSE BLD-MCNC: 55 MG/DL
GLUCOSE BLD-MCNC: 96 MG/DL
GLUCOSE BLD-MCNC: 96 MG/DL (ref 65–105)
GLUCOSE BLD-MCNC: >600 MG/DL (ref 65–105)
HCO3 VENOUS: 20.7 MMOL/L (ref 24–30)
LACTIC ACID, SEPSIS WHOLE BLOOD: 2.4 MMOL/L (ref 0.5–1.9)
LACTIC ACID, SEPSIS WHOLE BLOOD: 2.6 MMOL/L (ref 0.5–1.9)
LACTIC ACID, SEPSIS WHOLE BLOOD: 3.1 MMOL/L (ref 0.5–1.9)
LACTIC ACID, WHOLE BLOOD: 12.3 MMOL/L (ref 0.7–2.1)
LACTIC ACID, WHOLE BLOOD: 4.5 MMOL/L (ref 0.7–2.1)
MAGNESIUM: 1.2 MG/DL (ref 1.6–2.6)
NEGATIVE BASE EXCESS, VEN: 2.5 MMOL/L (ref 0–2)
O2 SAT, VEN: 91.3 % (ref 60–85)
PATIENT TEMP: 37
PCO2, VEN: 32.5 (ref 39–55)
PH VENOUS: 7.42 (ref 7.32–7.42)
PO2, VEN: 58.1 (ref 30–50)
POTASSIUM SERPL-SCNC: 3.4 MMOL/L (ref 3.7–5.3)
POTASSIUM SERPL-SCNC: 5.2 MMOL/L (ref 3.7–5.3)
REASON FOR REJECTION: NORMAL
REASON FOR REJECTION: NORMAL
SODIUM BLD-SCNC: 131 MMOL/L (ref 135–144)
TROPONIN, HIGH SENSITIVITY: 25 NG/L (ref 0–14)
TROPONIN, HIGH SENSITIVITY: 41 NG/L (ref 0–14)
ZZ NTE CLEAN UP: ORDERED TEST: NORMAL
ZZ NTE CLEAN UP: ORDERED TEST: NORMAL
ZZ NTE WITH NAME CLEAN UP: SPECIMEN SOURCE: NORMAL
ZZ NTE WITH NAME CLEAN UP: SPECIMEN SOURCE: NORMAL

## 2022-07-20 PROCEDURE — 36415 COLL VENOUS BLD VENIPUNCTURE: CPT

## 2022-07-20 PROCEDURE — 83605 ASSAY OF LACTIC ACID: CPT

## 2022-07-20 PROCEDURE — 83735 ASSAY OF MAGNESIUM: CPT

## 2022-07-20 PROCEDURE — 82947 ASSAY GLUCOSE BLOOD QUANT: CPT

## 2022-07-20 PROCEDURE — 99223 1ST HOSP IP/OBS HIGH 75: CPT | Performed by: PHYSICIAN ASSISTANT

## 2022-07-20 PROCEDURE — G0378 HOSPITAL OBSERVATION PER HR: HCPCS

## 2022-07-20 PROCEDURE — 2580000003 HC RX 258: Performed by: NURSE PRACTITIONER

## 2022-07-20 PROCEDURE — 6370000000 HC RX 637 (ALT 250 FOR IP): Performed by: PHYSICIAN ASSISTANT

## 2022-07-20 PROCEDURE — 96372 THER/PROPH/DIAG INJ SC/IM: CPT

## 2022-07-20 PROCEDURE — 6360000002 HC RX W HCPCS: Performed by: PHYSICIAN ASSISTANT

## 2022-07-20 PROCEDURE — 84132 ASSAY OF SERUM POTASSIUM: CPT

## 2022-07-20 PROCEDURE — 2580000003 HC RX 258: Performed by: STUDENT IN AN ORGANIZED HEALTH CARE EDUCATION/TRAINING PROGRAM

## 2022-07-20 PROCEDURE — 6360000002 HC RX W HCPCS: Performed by: NURSE PRACTITIONER

## 2022-07-20 PROCEDURE — 2580000003 HC RX 258: Performed by: PHYSICIAN ASSISTANT

## 2022-07-20 PROCEDURE — 82805 BLOOD GASES W/O2 SATURATION: CPT

## 2022-07-20 PROCEDURE — 96367 TX/PROPH/DG ADDL SEQ IV INF: CPT

## 2022-07-20 PROCEDURE — 1200000000 HC SEMI PRIVATE

## 2022-07-20 PROCEDURE — 6370000000 HC RX 637 (ALT 250 FOR IP): Performed by: NURSE PRACTITIONER

## 2022-07-20 PROCEDURE — 93010 ELECTROCARDIOGRAM REPORT: CPT | Performed by: INTERNAL MEDICINE

## 2022-07-20 PROCEDURE — 80048 BASIC METABOLIC PNL TOTAL CA: CPT

## 2022-07-20 PROCEDURE — 82010 KETONE BODYS QUAN: CPT

## 2022-07-20 PROCEDURE — 2500000003 HC RX 250 WO HCPCS: Performed by: STUDENT IN AN ORGANIZED HEALTH CARE EDUCATION/TRAINING PROGRAM

## 2022-07-20 PROCEDURE — 84484 ASSAY OF TROPONIN QUANT: CPT

## 2022-07-20 PROCEDURE — 93005 ELECTROCARDIOGRAM TRACING: CPT | Performed by: STUDENT IN AN ORGANIZED HEALTH CARE EDUCATION/TRAINING PROGRAM

## 2022-07-20 RX ORDER — ONDANSETRON 2 MG/ML
4 INJECTION INTRAMUSCULAR; INTRAVENOUS EVERY 6 HOURS PRN
Status: DISCONTINUED | OUTPATIENT
Start: 2022-07-20 | End: 2022-07-21 | Stop reason: HOSPADM

## 2022-07-20 RX ORDER — INSULIN GLARGINE 100 [IU]/ML
10 INJECTION, SOLUTION SUBCUTANEOUS NIGHTLY
Status: DISCONTINUED | OUTPATIENT
Start: 2022-07-20 | End: 2022-07-21 | Stop reason: HOSPADM

## 2022-07-20 RX ORDER — INSULIN GLARGINE 100 [IU]/ML
INJECTION, SOLUTION SUBCUTANEOUS
Qty: 15 ML | Refills: 10 | OUTPATIENT
Start: 2022-07-20

## 2022-07-20 RX ORDER — GABAPENTIN 400 MG/1
400 CAPSULE ORAL 3 TIMES DAILY
Status: DISCONTINUED | OUTPATIENT
Start: 2022-07-20 | End: 2022-07-21 | Stop reason: HOSPADM

## 2022-07-20 RX ORDER — SODIUM CHLORIDE 9 MG/ML
INJECTION, SOLUTION INTRAVENOUS CONTINUOUS
Status: DISCONTINUED | OUTPATIENT
Start: 2022-07-20 | End: 2022-07-20

## 2022-07-20 RX ORDER — DEXTROSE MONOHYDRATE 50 MG/ML
100 INJECTION, SOLUTION INTRAVENOUS PRN
Status: DISCONTINUED | OUTPATIENT
Start: 2022-07-20 | End: 2022-07-21

## 2022-07-20 RX ORDER — DEXTROSE MONOHYDRATE 100 MG/ML
INJECTION, SOLUTION INTRAVENOUS CONTINUOUS
Status: DISCONTINUED | OUTPATIENT
Start: 2022-07-20 | End: 2022-07-20

## 2022-07-20 RX ORDER — INSULIN LISPRO 100 [IU]/ML
0-4 INJECTION, SOLUTION INTRAVENOUS; SUBCUTANEOUS NIGHTLY
Status: DISCONTINUED | OUTPATIENT
Start: 2022-07-20 | End: 2022-07-21 | Stop reason: HOSPADM

## 2022-07-20 RX ORDER — POTASSIUM CHLORIDE 7.45 MG/ML
10 INJECTION INTRAVENOUS PRN
Status: DISCONTINUED | OUTPATIENT
Start: 2022-07-20 | End: 2022-07-21 | Stop reason: HOSPADM

## 2022-07-20 RX ORDER — POTASSIUM CHLORIDE 20 MEQ/1
40 TABLET, EXTENDED RELEASE ORAL ONCE
Status: COMPLETED | OUTPATIENT
Start: 2022-07-20 | End: 2022-07-20

## 2022-07-20 RX ORDER — SODIUM CHLORIDE 0.9 % (FLUSH) 0.9 %
5-40 SYRINGE (ML) INJECTION EVERY 12 HOURS SCHEDULED
Status: DISCONTINUED | OUTPATIENT
Start: 2022-07-20 | End: 2022-07-21 | Stop reason: HOSPADM

## 2022-07-20 RX ORDER — DEXTROSE MONOHYDRATE 25 G/50ML
25 INJECTION, SOLUTION INTRAVENOUS ONCE
Status: COMPLETED | OUTPATIENT
Start: 2022-07-20 | End: 2022-07-20

## 2022-07-20 RX ORDER — SODIUM CHLORIDE 9 MG/ML
INJECTION, SOLUTION INTRAVENOUS PRN
Status: DISCONTINUED | OUTPATIENT
Start: 2022-07-20 | End: 2022-07-21 | Stop reason: HOSPADM

## 2022-07-20 RX ORDER — DEXTROSE MONOHYDRATE 50 MG/ML
100 INJECTION, SOLUTION INTRAVENOUS PRN
Status: DISCONTINUED | OUTPATIENT
Start: 2022-07-20 | End: 2022-07-21 | Stop reason: HOSPADM

## 2022-07-20 RX ORDER — FLUOXETINE 10 MG/1
10 CAPSULE ORAL DAILY
Status: DISCONTINUED | OUTPATIENT
Start: 2022-07-20 | End: 2022-07-21 | Stop reason: HOSPADM

## 2022-07-20 RX ORDER — ALBUTEROL SULFATE 90 UG/1
2 AEROSOL, METERED RESPIRATORY (INHALATION) EVERY 6 HOURS PRN
Status: DISCONTINUED | OUTPATIENT
Start: 2022-07-20 | End: 2022-07-21 | Stop reason: HOSPADM

## 2022-07-20 RX ORDER — MAGNESIUM SULFATE HEPTAHYDRATE 40 MG/ML
4000 INJECTION, SOLUTION INTRAVENOUS ONCE
Status: DISCONTINUED | OUTPATIENT
Start: 2022-07-20 | End: 2022-07-20

## 2022-07-20 RX ORDER — POTASSIUM CHLORIDE 20 MEQ/1
40 TABLET, EXTENDED RELEASE ORAL PRN
Status: DISCONTINUED | OUTPATIENT
Start: 2022-07-20 | End: 2022-07-21 | Stop reason: HOSPADM

## 2022-07-20 RX ORDER — LANOLIN ALCOHOL/MO/W.PET/CERES
325 CREAM (GRAM) TOPICAL EVERY OTHER DAY
Status: DISCONTINUED | OUTPATIENT
Start: 2022-07-20 | End: 2022-07-21 | Stop reason: HOSPADM

## 2022-07-20 RX ORDER — SODIUM CHLORIDE 0.9 % (FLUSH) 0.9 %
10 SYRINGE (ML) INJECTION PRN
Status: DISCONTINUED | OUTPATIENT
Start: 2022-07-20 | End: 2022-07-21 | Stop reason: HOSPADM

## 2022-07-20 RX ORDER — SODIUM CHLORIDE, SODIUM LACTATE, POTASSIUM CHLORIDE, CALCIUM CHLORIDE 600; 310; 30; 20 MG/100ML; MG/100ML; MG/100ML; MG/100ML
INJECTION, SOLUTION INTRAVENOUS CONTINUOUS
Status: DISCONTINUED | OUTPATIENT
Start: 2022-07-20 | End: 2022-07-21 | Stop reason: HOSPADM

## 2022-07-20 RX ORDER — MAGNESIUM SULFATE IN WATER 40 MG/ML
2000 INJECTION, SOLUTION INTRAVENOUS
Status: DISPENSED | OUTPATIENT
Start: 2022-07-20 | End: 2022-07-20

## 2022-07-20 RX ORDER — NICOTINE 21 MG/24HR
1 PATCH, TRANSDERMAL 24 HOURS TRANSDERMAL DAILY
Status: DISCONTINUED | OUTPATIENT
Start: 2022-07-20 | End: 2022-07-21 | Stop reason: HOSPADM

## 2022-07-20 RX ORDER — GLIMEPIRIDE 2 MG/1
TABLET ORAL
Qty: 60 TABLET | Refills: 10 | OUTPATIENT
Start: 2022-07-20

## 2022-07-20 RX ORDER — PANTOPRAZOLE SODIUM 40 MG/1
40 TABLET, DELAYED RELEASE ORAL
Status: DISCONTINUED | OUTPATIENT
Start: 2022-07-21 | End: 2022-07-20

## 2022-07-20 RX ORDER — SODIUM CHLORIDE, SODIUM LACTATE, POTASSIUM CHLORIDE, AND CALCIUM CHLORIDE .6; .31; .03; .02 G/100ML; G/100ML; G/100ML; G/100ML
1000 INJECTION, SOLUTION INTRAVENOUS ONCE
Status: COMPLETED | OUTPATIENT
Start: 2022-07-20 | End: 2022-07-20

## 2022-07-20 RX ORDER — ENOXAPARIN SODIUM 100 MG/ML
40 INJECTION SUBCUTANEOUS DAILY
Status: DISCONTINUED | OUTPATIENT
Start: 2022-07-20 | End: 2022-07-21 | Stop reason: HOSPADM

## 2022-07-20 RX ORDER — ONDANSETRON 4 MG/1
4 TABLET, ORALLY DISINTEGRATING ORAL EVERY 8 HOURS PRN
Status: DISCONTINUED | OUTPATIENT
Start: 2022-07-20 | End: 2022-07-21 | Stop reason: HOSPADM

## 2022-07-20 RX ORDER — POLYETHYLENE GLYCOL 3350 17 G/17G
17 POWDER, FOR SOLUTION ORAL DAILY PRN
Status: DISCONTINUED | OUTPATIENT
Start: 2022-07-20 | End: 2022-07-21 | Stop reason: HOSPADM

## 2022-07-20 RX ORDER — MAGNESIUM SULFATE 1 G/100ML
1000 INJECTION INTRAVENOUS PRN
Status: DISCONTINUED | OUTPATIENT
Start: 2022-07-20 | End: 2022-07-21 | Stop reason: HOSPADM

## 2022-07-20 RX ORDER — PANTOPRAZOLE SODIUM 40 MG/1
40 TABLET, DELAYED RELEASE ORAL
Refills: 1 | Status: DISCONTINUED | OUTPATIENT
Start: 2022-07-21 | End: 2022-07-21 | Stop reason: HOSPADM

## 2022-07-20 RX ORDER — INSULIN LISPRO 100 [IU]/ML
0-8 INJECTION, SOLUTION INTRAVENOUS; SUBCUTANEOUS
Status: DISCONTINUED | OUTPATIENT
Start: 2022-07-20 | End: 2022-07-21 | Stop reason: HOSPADM

## 2022-07-20 RX ORDER — MEGESTROL ACETATE 20 MG/1
20 TABLET ORAL DAILY
Status: DISCONTINUED | OUTPATIENT
Start: 2022-07-20 | End: 2022-07-21 | Stop reason: HOSPADM

## 2022-07-20 RX ORDER — SODIUM BICARBONATE 650 MG/1
650 TABLET ORAL 3 TIMES DAILY
Status: DISCONTINUED | OUTPATIENT
Start: 2022-07-20 | End: 2022-07-21 | Stop reason: HOSPADM

## 2022-07-20 RX ORDER — METFORMIN HYDROCHLORIDE 500 MG/1
TABLET, EXTENDED RELEASE ORAL
Qty: 90 TABLET | Refills: 10 | OUTPATIENT
Start: 2022-07-20

## 2022-07-20 RX ADMIN — FERROUS SULFATE TAB EC 325 MG (65 MG FE EQUIVALENT) 325 MG: 325 (65 FE) TABLET DELAYED RESPONSE at 15:19

## 2022-07-20 RX ADMIN — MAGNESIUM SULFATE HEPTAHYDRATE 2000 MG: 40 INJECTION, SOLUTION INTRAVENOUS at 10:34

## 2022-07-20 RX ADMIN — SODIUM CHLORIDE, PRESERVATIVE FREE 10 ML: 5 INJECTION INTRAVENOUS at 14:40

## 2022-07-20 RX ADMIN — PANCRELIPASE 36000 UNITS: 24000; 76000; 120000 CAPSULE, DELAYED RELEASE PELLETS ORAL at 21:35

## 2022-07-20 RX ADMIN — GABAPENTIN 400 MG: 400 CAPSULE ORAL at 21:35

## 2022-07-20 RX ADMIN — DEXTROSE MONOHYDRATE: 100 INJECTION, SOLUTION INTRAVENOUS at 05:05

## 2022-07-20 RX ADMIN — SODIUM CHLORIDE, POTASSIUM CHLORIDE, SODIUM LACTATE AND CALCIUM CHLORIDE: 600; 310; 30; 20 INJECTION, SOLUTION INTRAVENOUS at 15:39

## 2022-07-20 RX ADMIN — SODIUM BICARBONATE 650 MG: 648 TABLET ORAL at 21:35

## 2022-07-20 RX ADMIN — MEGESTROL ACETATE 20 MG: 20 TABLET ORAL at 15:19

## 2022-07-20 RX ADMIN — INSULIN LISPRO 8 UNITS: 100 INJECTION, SOLUTION INTRAVENOUS; SUBCUTANEOUS at 18:37

## 2022-07-20 RX ADMIN — FLUOXETINE 10 MG: 10 CAPSULE ORAL at 15:19

## 2022-07-20 RX ADMIN — SODIUM CHLORIDE, POTASSIUM CHLORIDE, SODIUM LACTATE AND CALCIUM CHLORIDE 1000 ML: 600; 310; 30; 20 INJECTION, SOLUTION INTRAVENOUS at 08:45

## 2022-07-20 RX ADMIN — Medication 25 G: at 00:30

## 2022-07-20 RX ADMIN — POTASSIUM CHLORIDE 40 MEQ: 1500 TABLET, EXTENDED RELEASE ORAL at 08:44

## 2022-07-20 RX ADMIN — SODIUM CHLORIDE, POTASSIUM CHLORIDE, SODIUM LACTATE AND CALCIUM CHLORIDE: 600; 310; 30; 20 INJECTION, SOLUTION INTRAVENOUS at 23:46

## 2022-07-20 RX ADMIN — SODIUM CHLORIDE, PRESERVATIVE FREE 10 ML: 5 INJECTION INTRAVENOUS at 21:35

## 2022-07-20 RX ADMIN — ENOXAPARIN SODIUM 40 MG: 100 INJECTION SUBCUTANEOUS at 15:18

## 2022-07-20 ASSESSMENT — ENCOUNTER SYMPTOMS: TACHYPNEA: 1

## 2022-07-20 NOTE — ED NOTES
The following labs labeled with pt sticker and tubed to lab:     [] Blue     [] Lavender   [] on ice  [x] Green/yellow  [x] Green/black [] on ice  [] Yellow  [] Red  [] Pink      [] COVID-19 swab    [] Rapid  [] PCR  [] Flu swab  [] Peds Viral Panel     [] Urine Sample  [] Pelvic Cultures  [] Blood Cultures            Jason Cerdas, [address] [place] [address]  07/20/22 [id number]

## 2022-07-20 NOTE — ED NOTES
Pt is having irregular heart rhythm. HR is jumping from baseline 106bpm to 150's. Admitting doctor notified, awaiting response.       Shadia Isaac RN  07/20/22 2535

## 2022-07-20 NOTE — ED NOTES
The following labs labeled with pt sticker and tubed to lab:     [] Blue     [] Lavender   [] on ice  [x] Green/yellow  [x] Green/black [x] on ice  [] Yellow  [] Red  [] Pink      [] COVID-19 swab    [] Rapid  [] PCR  [] Flu swab  [] Peds Viral Panel     [] Urine Sample  [] Pelvic Cultures  [] Blood Cultures          Mable Crane LPN  85/81/83 1295

## 2022-07-20 NOTE — ED NOTES
The following labs labeled with pt sticker and tubed to lab:     [x] Blue     [x] Lavender   [] on ice  [x] Green/yellow  [x] Green/black [] on ice  [] Yellow  [] Red  [] Pink      [x] COVID-19 swab    [] Rapid  [] PCR  [] Flu swab  [] Peds Viral Panel     [x] Urine Sample  [] Pelvic Cultures  [x] Blood Cultures            Gonzalo Huddleston RN  07/19/22 2534

## 2022-07-20 NOTE — ED PROVIDER NOTES
[address] ED  Emergency Department Encounter  EmergencyMedicineResident     This patient was seen during the COVID-19 crisis. There were limited resources and those resources we did have had to be conserved for the sickest of patients. Pt Name: Sean Coughlin  MRN: [id number]  [place] 1955  Date of evaluation: 7/19/22  PCP: Thais Foreman MD    [address] [place] [address]       Chief Complaint   Patient presents with    Blood Sugar Problem       HISTORY OF PRESENT ILLNESS  (Location/Symptom, Timing/Onset, Context/Setting, Quality, Duration, Modifying Factors, Severity.)      Sean Coughlin is a 73 y.o. female who presents for evaluation of hyperglycemia. History of diabetes. On multiple medications including insulin. She reports she is only had a little bit of chicken today. She presents today altered and tremulous. Her blood glucose was 29 when EMS arrived, she received an amp of glucose it did increase her blood sugar to greater than 200. And then her redraw again today was 180. She is alert to herself and place. She states that she feels like she cannot breathe. She states that she is having some chest pain as well. She reports that Dr. Khai Dasilva is managing her blood glucose. She states she has had issues with blood glucose management in the past.  She believes that this is the cause of her current presentation. She also reports she has a history of seizures however when I do chart I do not see any antiepileptics listed.     PAST MEDICAL / SURGICAL /SOCIAL / FAMILY HISTORY      has a past medical history of Cataracts, bilateral, COPD (chronic obstructive pulmonary disease) (HCC), CTS (carpal tunnel syndrome), Fibromyalgia, Generalized abdominal pain, Glaucoma, Hypertension, Osteoarthritis, Osteoporosis, Pancreatic calcification, Pancreatitis, Protein-calorie malnutrition, severe ([address] Seizures ([address] Tremor, Type II or unspecified type diabetes mellitus without mention of complication, not stated as uncontrolled, and Uncontrolled type 2 DM with hyperosmolar nonketotic hyperglycemia ([facility] has a past surgical history that includes cyst removal (Left); Upper gastrointestinal endoscopy (08/30/2016); Neck surgery; pr colsc flx w/removal lesion by hot bx forceps (N/A, 6/30/2017); Colonoscopy (08/30/2016); Colonoscopy (06/30/2017); Colonoscopy (05/29/2018); pr colon ca scrn not hi rsk ind (N/A, 5/29/2018); Colonoscopy (05/07/2021); and Colonoscopy (N/A, 5/7/2021).       Social History     Socioeconomic History    Marital status:      Spouse name: Not on file    Number of children: Not on file    Years of education: Not on file    Highest education level: Not on file   Occupational History    Not on file   Tobacco Use    Smoking status: Every Day     Packs/day: 0.25     Years: 40.00     Pack years: 10.00     Types: Cigarettes    Smokeless tobacco: Former   Vaping Use    Vaping Use: Never used   Substance and Sexual Activity    Alcohol use: No    Drug use: Yes     Types: Marijuana (Weed)    Sexual activity: Not on file     Comment: last use end 10/2014   Other Topics Concern    Not on file   Social History Narrative    Not on file     Social Determinants of Health     Financial Resource Strain: Low Risk     Difficulty of Paying Living Expenses: Not hard at all   Food Insecurity: No Food Insecurity    Worried About Running Out of Food in the Last Year: Never true    Ran Out of Food in the Last Year: Never true   Transportation Needs: Not on file   Physical Activity: Not on file   Stress: Not on file   Social Connections: Not on file   Intimate Partner Violence: Not on file   Housing Stability: Not on file       Family History   Problem Relation Age of Onset    Brain Cancer Father     Diabetes Sister     Other Sister         epilepsy       Allergies:  Peanuts [peanut oil], Vicodin [hydrocodone-acetaminophen], Tramadol, Tylenol with codeine #3 [acetaminophen-codeine], and Ibuprofen    Home Medications:  Prior to Admission medications    Medication Sig Start Date End Date Taking? Authorizing Provider   albuterol sulfate HFA (PROVENTIL;VENTOLIN;PROAIR) 108 (90 Base) MCG/ACT inhaler INHALE TWO(2) PUFFS INTO LUNGS EVERY SIX(6) HOURS AS NEEDED FOR WHEEZING 6/21/22   DONTRELL Prasad CNP   insulin aspart (NOVOLOG FLEXPEN) 100 UNIT/ML injection pen Inject 5 Units into the skin 3 times daily (before meals) 6/2/22   DONTRELL Prasad CNP   zoster recombinant adjuvanted vaccine [facility]) 50 MCG/0.5ML SUSR injection Inject 0.5 mLs into the muscle See Admin Instructions 1 dose now and repeat in 2-6 months 5/31/22 11/27/22  DONTRELL Prasad CNP   FLUoxetine (PROZAC) 10 MG capsule Take 1 capsule by mouth daily 5/31/22   DONTRELL Prasad CNP   gabapentin (NEURONTIN) 400 MG capsule Take 1 capsule by mouth 3 times daily for 90 days. 5/31/22 8/29/22  DONTRELL Prasad CNP   insulin glargine [facility]) 100 UNIT/ML injection pen Inject 15 Units into the skin nightly 5/27/22   DONTRELL Prasad CNP   Insulin Pen Needle 32G X 4 MM MISC 1 each by Does not apply route 4 times daily (after meals and at bedtime) 5/27/22 8/25/22  DONTRELL Prasad CNP   Continuous Blood Gluc  (FREESTYLE ANTONIO 14 DAY READER) OXANA 1 Units by Does not apply route 4 times daily 5/27/22 8/25/22  DONTRELL Prasad CNP   Continuous Blood Gluc Sensor (FREESTYLE ANTONIO 14 DAY SENSOR) MISC 1 box by Does not apply route once a week 5/27/22 8/25/22  DONTRELL Prasad CNP   Nutritional Supplements (ENSURE COMPLETE SHAKE) LIQD Take 1 Can by mouth in the morning and at bedtime 5/27/22 6/26/22  DONTRELL Prasad CNP   megestrol (MEGACE) 20 MG tablet TAKE 1 TABLET BY MOUTH DAILY 4/18/22   Arianna Madison MD   blood glucose monitor strips 1 strip by Other route daily Test 1 times a day & as needed for symptoms of irregular blood glucose.  4/4/22 5/4/22  Sara Roberts, APRN - CNP   omeprazole (PRILOSEC) 20 MG delayed release capsule TAKE ONE (1) CAPSULE BY MOUTH TWICE DAILY  Patient not taking: Reported on 5/27/2022 3/14/22   DONTRELL Pastor CNP   Blood Gluc Meter Disp-Strips (BLOOD GLUCOSE METER DISPOSABLE) OXANA Daily and as needed 2/25/22   DONTRELL Pastor CNP   Lancets MISC 1 each by Does not apply route 2 times daily 2/25/22   DONTRELL Pastor CNP   ferrous sulfate (IRON 325) 325 (65 Fe) MG tablet Take 1 tablet by mouth every other day 2/25/22   DONTRELL Pastor CNP   nicotine (NICODERM CQ) 21 MG/24HR Place 1 patch onto the skin daily 2/11/22   Brooks Madden MD   sodium bicarbonate 650 MG tablet TAKE 1 TABLET BY MOUTH THREE TIMES DAILY 2/11/22   Brooks Madden MD   glimepiride (AMARYL) 2 MG tablet Take 1 tablet by mouth 2 times daily (before meals) 11/3/21   DONTRELL aPstor CNP   Alcohol Swabs (ALCOHOL PREP) 70 % PADS Daily as needed 10/28/21   DONTRELL Pastor CNP   metFORMIN (GLUCOPHAGE-XR) 500 MG extended release tablet Take 2 tablets by mouth daily (with breakfast) AND 1 tablet Daily with supper. 10/28/21   DONTRELL Psator CNP   SYMBICORT 80-4.5 MCG/ACT AERO Inhale 2 puffs into the lungs 2 times daily 10/13/21 2/25/22  DONTRELL Pastor CNP   lipase-protease-amylase (CREON) 44250-547800 units CPEP delayed release capsule take 2 capsules by mouth four times a day after meals and at bedtime  Patient not taking: Reported on 5/27/2022 10/13/21   DONTRELL Pastor CNP   Multiple Vitamin (MULTI-VITAMIN DAILY PO) Take by mouth    Historical Provider, MD   ZINC PO Take by mouth    Historical Provider, MD Llamas Lancets 30G MISC Daily and as needed for hypoglycemia 4/6/20   DONTRELL Pastor CNP       REVIEW OF SYSTEMS    (2-9 systems for level 4, 10 or more forlevel 5)      Review of Systems   Constitutional:  Positive for activity change, appetite change and fatigue.    HENT:  Negative for congestion, sinus pressure and sore throat. Eyes:  Negative for pain and visual disturbance. Respiratory:  Positive for shortness of breath. Negative for cough. Cardiovascular:  Positive for chest pain. Gastrointestinal:  Positive for nausea. Negative for abdominal pain, diarrhea and vomiting. Genitourinary:  Negative for difficulty urinating, dysuria and hematuria. Musculoskeletal:  Negative for back pain and myalgias. Skin:  Negative for rash and wound. Neurological:  Positive for dizziness, seizures and light-headedness. Negative for headaches. Psychiatric/Behavioral:  Positive for confusion. Negative for agitation. PHYSICAL EXAM   (up to 7 for level 4, 8 or more forlevel 5)      ED TRIAGE VITALS BP: 133/87, Temp: 97.6 °F (36.4 °C), Heart Rate: (!) 109, Resp: 17, SpO2: 97 %    Vitals:    07/20/22 0400 07/20/22 0500 07/20/22 0520 07/20/22 0535   BP:    108/65   Pulse: (!) 107 (!) 109 95 100   Resp: 17 17 15 21   Temp:       SpO2: 97% 98% 100% 99%         Physical Exam  Vitals and nursing note reviewed. Constitutional:       General: She is not in acute distress. Appearance: Normal appearance. She is ill-appearing. HENT:      Head: Normocephalic and atraumatic. Nose: Nose normal.      Mouth/Throat:      Mouth: Mucous membranes are dry. Eyes:      Extraocular Movements: Extraocular movements intact. Pupils: Pupils are equal, round, and reactive to light. Cardiovascular:      Rate and Rhythm: Regular rhythm. Tachycardia present. Pulses: Normal pulses. Heart sounds: Normal heart sounds. Pulmonary:      Effort: Pulmonary effort is normal. No respiratory distress. Breath sounds: Normal breath sounds. No stridor. Abdominal:      General: Abdomen is flat. Palpations: Abdomen is soft. Musculoskeletal:         General: Normal range of motion. Cervical back: Normal range of motion. Skin:     General: Skin is warm and dry.       Capillary Refill: Capillary refill takes less than 2 seconds. Neurological:      General: No focal deficit present. Mental Status: She is alert. She is disoriented. Cranial Nerves: No cranial nerve deficit. Motor: No weakness. Gait: Gait normal.      Comments: Tremulous   Psychiatric:         Mood and Affect: Mood normal.      Comments: Confused         DIFFERENTIAL  DIAGNOSIS     PLAN (LABS / IMAGING / EKG):  Orders Placed This Encounter   Procedures    Culture, Blood 1    Culture, Blood 1    COVID-19, Rapid    Culture, Urine    XR CHEST PORTABLE    CT HEAD WO CONTRAST    Ammonia    CBC with Auto Differential    Comprehensive Metabolic Panel    C-Reactive Protein    Lactate, Sepsis    Magnesium    Procalcitonin    Protime-INR    APTT    Troponin    Urinalysis with Microscopic    ELECTROLYTES PLUS    Hemoglobin and hematocrit, blood    CALCIUM, IONIC (POC)    TSH with Reflex    Basic Metabolic Panel w/ Reflex to MG    Protime-INR    SPECIMEN REJECTION    PREVIOUS SPECIMEN    SPECIMEN REJECTION    Lactate, Sepsis    PREVIOUS SPECIMEN    Magnesium    Potassium    TROP/MYOGLOBIN    ADULT DIET;  Regular; 4 carb choices (60 gm/meal)    HYPOGLYCEMIA TREATMENT: blood glucose less than 50 mg/dL and patient  ALERT and TOLERATING PO    HYPOGLYCEMIA TREATMENT: blood glucose less than 70 mg/dL and patient ALERT and TOLERATING PO    HYPOGLYCEMIA TREATMENT: blood glucose less than 70 mg/dL and patient NOT ALERT or NPO    Vital signs per unit routine    Notify physician    Up with assistance    Daily weights    Intake and output    Telemetry monitoring - continuous duration    HYPOGLYCEMIA TREATMENT: blood glucose less than 50 mg/dL and patient  ALERT and TOLERATING PO    HYPOGLYCEMIA TREATMENT: blood glucose less than 70 mg/dL and patient ALERT and TOLERATING PO    HYPOGLYCEMIA TREATMENT: blood glucose less than 70 mg/dL and patient NOT ALERT or NPO    Full Code    Inpatient consult to Hospitalist    Inpatient consult to Dietitian    OT eval and treat    PT evaluation and treat    Initiate Oxygen Therapy Protocol    Pulse Oximetry Spot Check    POC Glucose Fingerstick    POCT glucose    Venous Blood Gas, POC    Creatinine W/GFR Point of Care    POCT urea (BUN)    Lactic Acid, POC    POCT Glucose    POC Glucose Fingerstick    POCT glucose    POCT Glucose    POCT Glucose    POC Glucose Fingerstick    POC Glucose Fingerstick    POC Glucose Fingerstick    POC Glucose Fingerstick    EKG 12 Lead    EKG 12 Lead    Place in Observation Service       MEDICATIONS ORDERED:  ED Medication Orders (From admission, onward)      Start Ordered     Status Ordering Provider    07/20/22 0900 07/20/22 0411  ferrous sulfate (FE TABS 325) EC tablet 325 mg  EVERY OTHER DAY         Acknowledged [facility], SHIRLEY ANN    07/20/22 0900 07/20/22 0411  FLUoxetine (PROZAC) capsule 10 mg  DAILY         Acknowledged [facility], SHIRLEY ANN    07/20/22 0900 07/20/22 0411  gabapentin (NEURONTIN) capsule 400 mg  3 TIMES DAILY         Acknowledged [facility], SHIRLEY ANN    07/20/22 0900 07/20/22 0411  megestrol (MEGACE) tablet 20 mg  DAILY         Acknowledged [facility], SHIRLEY ANN    07/20/22 0900 07/20/22 0411  nicotine (NICODERM CQ) 21 MG/24HR 1 patch  DAILY         Acknowledged [facility], SHIRLEY ANN    07/20/22 0900 07/20/22 0411  sodium bicarbonate tablet 650 mg  3 TIMES DAILY         Acknowledged [facility], SHIRLEY ANN    07/20/22 0900 07/20/22 0411  sodium chloride flush 0.9 % injection 5-40 mL  2 times per day         Acknowledged [facility], SHIRLEY ANN    07/20/22 0900 07/20/22 0411  enoxaparin (LOVENOX) injection 40 mg  DAILY        Question:  Indication of Use  Answer:  Prophylaxis-DVT/PE    Acknowledged [facility], SHIRLEY ANN    07/20/22 0415 07/20/22 0411  dextrose 10 % infusion  CONTINUOUS         Last MAR action: Canceled Entry - by Alva Ferguson on 07/20/22 at [employer] [facility]    07/20/22 0411 07/20/22 0411  glucose chewable tablet 16 g  PRN         Acknowledged [facility], SHIRLEY ANN    07/20/22 0411 07/20/22 0411  dextrose bolus 10% 125 mL  PRN        See Hyperspace for full Linked Orders Report. Acknowledged Daniel ACEVEDO    07/20/22 0411 07/20/22 0411  dextrose bolus 10% 250 mL  PRN        See Hyperspace for full Linked Orders Report. Acknowledged Daniel ACEVEDO    07/20/22 0411 07/20/22 0411  glucagon (rDNA) injection 1 mg  PRN         Acknowledged [facility], SHIRLEY ANN    07/20/22 0411 07/20/22 0411  dextrose 5 % solution  PRN         Acknowledged [facility], SHIRLEY ANN    07/20/22 0411 07/20/22 0411  sodium chloride flush 0.9 % injection 10 mL  PRN         Acknowledged [facility], SHIRLEY ANN    07/20/22 0411 07/20/22 0411  0.9 % sodium chloride infusion  PRN         Acknowledged [facility], SHIRLEY ANN    07/20/22 0411 07/20/22 0411  potassium chloride (KLOR-CON M) extended release tablet 40 mEq  PRN        See Hyperspace for full Linked Orders Report. Acknowledged Daniel ACEVEDO    07/20/22 0411 07/20/22 0411  potassium bicarb-citric acid (EFFER-K) effervescent tablet 40 mEq  PRN        See Hyperspace for full Linked Orders Report. Acknowledged Daniel ACEVEDO    07/20/22 0411 07/20/22 0411  potassium chloride 10 mEq/100 mL IVPB (Peripheral Line)  PRN        See Hyperspace for full Linked Orders Report. Acknowledged Daniel ACEVEDO    07/20/22 0411 07/20/22 0411  magnesium sulfate 1000 mg in dextrose 5% 100 mL IVPB  PRN         Acknowledged [facility], SHIRLEY ANN    07/20/22 0411 07/20/22 0411  ondansetron (ZOFRAN-ODT) disintegrating tablet 4 mg  EVERY 8 HOURS PRN        See Hyperspace for full Linked Orders Report. Acknowledged Daniel ACEVEDO    07/20/22 0411 07/20/22 0411  ondansetron (ZOFRAN) injection 4 mg  EVERY 6 HOURS PRN        See Hyperspace for full Linked Orders Report.     Acknowledged Daniel ACEVEDO    07/20/22 0411 07/20/22 0411 polyethylene glycol (GLYCOLAX) packet 17 g  DAILY PRN         Acknowledged [facility], SHIRLEY ANN    07/20/22 0122 07/20/22 0126  glucose chewable tablet 16 g  PRN         Acknowledged FEDERICO QURESHI    07/20/22 0122 07/20/22 0126  dextrose bolus 10% 125 mL  PRN        See Hyperspace for full Linked Orders Report. Acknowledged FEDERICO QURESHI    07/20/22 0122 07/20/22 0126  dextrose bolus 10% 250 mL  PRN        See Hyperspace for full Linked Orders Report. Acknowledged FEDERICO QURESHI    07/20/22 0122 07/20/22 0126  glucagon (rDNA) injection 1 mg  PRN         Acknowledged FEDERICO QURESHI    07/20/22 0122 07/20/22 0126  dextrose 5 % solution  PRN         Acknowledged FEDERICO QURESHI    07/20/22 0030 07/20/22 0022  dextrose 50 % IV solution  ONCE         Last MAR action: Given - by Tomasz Tello on 07/20/22 at [facility]    07/19/22 2315 07/19/22 2305  dextrose 10 % infusion  CONTINUOUS         Last MAR action: New Bag - by Dina Wilkes on 07/20/22 at [address] / EMERGENCY DEPARTMENT COURSE / MDM     IMPRESSION & INITIAL PLAN:  This is a 56-year-old female presenting today for evaluation of hyperglycemia. She is on multiple medications including Amaryl, lispro and Lantus. Patient states that she has not eaten today but continues to give her self insulin. She denies any other underlying etiologies such as infectious causes. She is tachycardic today. Her EKG shows sinus tach. Patient was hypoglycemic initially with a blood glucose of 29 on initial EMS arrival.  She was given an amp of D50 with an increase of her glucose greater than 200. Her glucose began dropping while she was here in our emergency department. I started on a D10 drip. However she did drop down to 55. At that time we did bolus her with another amp of D50 and 250 cc of D10. Her glucose climbed back up greater than 200 and remained stable in the mid 100s.   She was admitted to the internal medicine service for management of glucose. LABS:  Results for orders placed or performed during the hospital encounter of 07/19/22   Culture, Blood 1    Specimen: Blood   Result Value Ref Range    Specimen Description . BLOOD     Special Requests RT FOREARM 20ML     Culture NO GROWTH <24 HRS    Culture, Blood 1    Specimen: Blood   Result Value Ref Range    Specimen Description . BLOOD     Special Requests L FOREARM 20 ML      Culture NO GROWTH <24 HRS    COVID-19, Rapid    Specimen: Nasopharyngeal Swab   Result Value Ref Range    Specimen Description . NASOPHARYNGEAL SWAB     SARS-CoV-2, Rapid Not Detected Not Detected   Ammonia   Result Value Ref Range    Ammonia 25 11 - 51 umol/L   CBC with Auto Differential   Result Value Ref Range    WBC 7.8 3.5 - 11.3 k/uL    RBC 3.95 3.95 - 5.11 m/uL    Hemoglobin 13.3 11.9 - 15.1 g/dL    Hematocrit 39.1 36.3 - 47.1 %    MCV 99.0 82.6 - 102.9 fL    MCH 33.7 (H) 25.2 - 33.5 pg    MCHC 34.0 28.4 - 34.8 g/dL    RDW 12.9 11.8 - 14.4 %    Platelets 959 801 - 788 k/uL    MPV 10.2 8.1 - 13.5 fL    NRBC Automated 0.0 0.0 per 100 WBC    Seg Neutrophils 80 (H) 36 - 65 %    Lymphocytes 15 (L) 24 - 43 %    Monocytes 4 3 - 12 %    Eosinophils % 0 (L) 1 - 4 %    Basophils 1 0 - 2 %    Immature Granulocytes 0 0 %    Segs Absolute 6.31 1.50 - 8.10 k/uL    Absolute Lymph # 1.13 1.10 - 3.70 k/uL    Absolute Mono # 0.29 0.10 - 1.20 k/uL    Absolute Eos # <0.03 0.00 - 0.44 k/uL    Basophils Absolute 0.04 0.00 - 0.20 k/uL    Absolute Immature Granulocyte 0.03 0.00 - 0.30 k/uL   Comprehensive Metabolic Panel   Result Value Ref Range    Glucose 175 (H) 70 - 99 mg/dL    BUN 14 8 - 23 mg/dL    CREATININE 0.69 0.50 - 0.90 mg/dL    Calcium 9.3 8.6 - 10.4 mg/dL    Sodium 140 135 - 144 mmol/L    Potassium 4.1 3.7 - 5.3 mmol/L    Chloride 99 98 - 107 mmol/L    CO2 25 20 - 31 mmol/L    Anion Gap 16 9 - 17 mmol/L    Alkaline Phosphatase 72 35 - 104 U/L    ALT 17 5 - 33 U/L    AST 26 <32 U/L Total Bilirubin 0.30 0.3 - 1.2 mg/dL    Total Protein 6.9 6.4 - 8.3 g/dL    Albumin 4.3 3.5 - 5.2 g/dL    Albumin/Globulin Ratio 1.7 1.0 - 2.5    GFR Non-African American >60 >60 mL/min    GFR African American >60 >60 mL/min    GFR Comment         C-Reactive Protein   Result Value Ref Range    CRP <3.0 0.0 - 5.0 mg/L   Lactate, Sepsis   Result Value Ref Range    Lactic Acid, Sepsis, Whole Blood 4.0 (H) 0.5 - 1.9 mmol/L   Lactate, Sepsis   Result Value Ref Range    Lactic Acid, Sepsis, Whole Blood 2.6 (H) 0.5 - 1.9 mmol/L   Magnesium   Result Value Ref Range    Magnesium 1.8 1.6 - 2.6 mg/dL   Procalcitonin   Result Value Ref Range    Procalcitonin 0.03 <0.09 ng/mL   Protime-INR   Result Value Ref Range    Protime 10.3 9.1 - 12.3 sec    INR 1.0    APTT   Result Value Ref Range    PTT 24.9 20.5 - 30.5 sec   Troponin   Result Value Ref Range    Troponin, High Sensitivity 33 (H) 0 - 14 ng/L   Troponin   Result Value Ref Range    Troponin, High Sensitivity 41 (H) 0 - 14 ng/L   Urinalysis with Microscopic   Result Value Ref Range    Color, UA Yellow Yellow    Turbidity UA Clear Clear    Glucose, Ur NEGATIVE NEGATIVE    Bilirubin Urine NEGATIVE NEGATIVE    Ketones, Urine NEGATIVE NEGATIVE    Specific Gravity, UA 1.014 1.005 - 1.030    Urine Hgb NEGATIVE NEGATIVE    pH, UA 6.5 5.0 - 8.0    Protein, UA NEGATIVE NEGATIVE    Urobilinogen, Urine Normal Normal    Nitrite, Urine NEGATIVE NEGATIVE    Leukocyte Esterase, Urine TRACE (A) NEGATIVE    -          WBC, UA 5 TO 10 0 - 5 /HPF    RBC, UA 10 TO 20 0 - 2 /HPF    Casts UA 0 TO 2 0 - 2 /LPF    Casts UA HYALINE 0 - 2 /LPF    Epithelial Cells UA 2 TO 5 0 - 5 /HPF    Bacteria, UA FEW (A) None   ELECTROLYTES PLUS   Result Value Ref Range    POC Sodium 141 138 - 146 mmol/L    POC Potassium 4.0 3.5 - 4.5 mmol/L    POC Chloride 106 98 - 107 mmol/L    POC TCO2 29 22 - 30 mmol/L    Anion Gap 7 7 - 16 mmol/L   Hemoglobin and hematocrit, blood   Result Value Ref Range    POC Hemoglobin 14.2 12.0 - 16.0 g/dL    POC Hematocrit 42 36 - 46 %   CALCIUM, IONIC (POC)   Result Value Ref Range    POC Ionized Calcium 1.15 1.15 - 1.33 mmol/L   TSH with Reflex   Result Value Ref Range    TSH 1.62 0.30 - 5.00 uIU/mL   SPECIMEN REJECTION   Result Value Ref Range    Specimen Source . BLOOD     Ordered Test LACDS     Reason for Rejection Unable to perform testing: Specimen clotted. SPECIMEN REJECTION   Result Value Ref Range    Specimen Source . BLOOD     Ordered Test LACDS     Reason for Rejection Unable to perform testing: Specimen clotted. Lactate, Sepsis   Result Value Ref Range    Lactic Acid, Sepsis, Whole Blood 2.4 (H) 0.5 - 1.9 mmol/L   Magnesium   Result Value Ref Range    Magnesium 1.2 (L) 1.6 - 2.6 mg/dL   Potassium   Result Value Ref Range    Potassium 3.4 (L) 3.7 - 5.3 mmol/L   POC Glucose Fingerstick   Result Value Ref Range    POC Glucose 184 (H) 65 - 105 mg/dL   POCT glucose   Result Value Ref Range    Glucose 76 mg/dL    QC OK? ok    POCT glucose   Result Value Ref Range    Glucose 55 mg/dL    QC OK? OK    POCT glucose   Result Value Ref Range    Glucose 228 mg/dL    QC OK? Ok    POCT glucose   Result Value Ref Range    Glucose 178 mg/dL    QC OK? Ok    POCT glucose   Result Value Ref Range    Glucose 158 mg/dL    QC OK? Ok    POCT glucose   Result Value Ref Range    Glucose 143 mg/dL    QC OK?  Yes    Venous Blood Gas, POC   Result Value Ref Range    pH, Davin 7.314 (L) 7.320 - 7.430    pCO2, Davin 56.4 (H) 41.0 - 51.0 mm Hg    pO2, Davin 39.2 30.0 - 50.0 mm Hg    HCO3, Venous 28.6 22.0 - 29.0 mmol/L    Positive Base Excess, Davin 1 0.0 - 3.0    O2 Sat, Davin 68 60.0 - 85.0 %   Creatinine W/GFR Point of Care   Result Value Ref Range    POC Creatinine 0.64 0.51 - 1.19 mg/dL    GFR Comment >60 >60 mL/min    GFR Non-African American >60 >60 mL/min    GFR Comment         POCT urea (BUN)   Result Value Ref Range    POC BUN 13 8 - 26 mg/dL   Lactic Acid, POC   Result Value Ref Range    POC Lactic Acid 3.34 (H) 0.56 - 1.39 mmol/L   POCT Glucose   Result Value Ref Range    POC Glucose 179 (H) 74 - 100 mg/dL   POC Glucose Fingerstick   Result Value Ref Range    POC Glucose 76 65 - 105 mg/dL   POC Glucose Fingerstick   Result Value Ref Range    POC Glucose 178 (H) 65 - 105 mg/dL   POC Glucose Fingerstick   Result Value Ref Range    POC Glucose 158 (H) 65 - 105 mg/dL   POCT glucose   Result Value Ref Range    Glucose 96 mg/dL    QC OK? yes    POC Glucose Fingerstick   Result Value Ref Range    POC Glucose 143 (H) 65 - 105 mg/dL   POC Glucose Fingerstick   Result Value Ref Range    POC Glucose 96 65 - 105 mg/dL   EKG 12 Lead   Result Value Ref Range    Ventricular Rate 109 BPM    Atrial Rate 109 BPM    P-R Interval 160 ms    QRS Duration 64 ms    Q-T Interval 368 ms    QTc Calculation (Bazett) 495 ms    P Axis 86 degrees    R Axis 84 degrees    T Axis 88 degrees       RADIOLOGY:  XR CHEST PORTABLE   Final Result   Emphysema. No acute disease. CT HEAD WO CONTRAST   Final Result      No evidence for acute intracranial hemorrhage, territorial infarction or   intracranial mass lesion. Mild chronic microangiopathic ischemic disease. Mild generalized volume loss. ED Course as of 07/20/22 0622   Tue Jul 19, 2022   2308 Lactic Acid, Sepsis, Whole Blood(!): 4.0 [TJ]   2308 pH, Davin(!): 7.314 [TJ]   2325 Did not give 30 cc/kg crystalloid fluids the patient is hypoglycemic [TJ]   Wed Jul 20, 2022   0001 No clear source of infection. Will not treat with antibiotics for that reason. [TJ]      ED Course User Index  [TJ] Ana Burgess MD      CONSULTS:  IP CONSULT TO HOSPITALIST  IP CONSULT TO DIETITIAN    CRITICAL CARE:  See attending physician note    FINAL IMPRESSION      1. Hypoglycemia          DISPOSITION / PLAN     DISPOSITION Admitted 07/20/2022 12:53:54 AM      PATIENT REFERRED TO:  No follow-up provider specified.     DISCHARGE MEDICATIONS:  New Prescriptions    No medications on file     Modified Medications    No medications on file        Sangita Chiu MD  Emergency Medicine Resident    (Please note that portions of this note were completed with a voice recognition program.  Efforts were made to edit the dictations but occasionally words are mis-transcribed.)        Sangita Chiu MD  Resident  07/20/22 0031

## 2022-07-20 NOTE — ED NOTES
The following labs labeled with pt sticker and tubed to lab:     [] Blue     [] Lavender   [] on ice  [] Green/yellow  [x] Green/black [] on ice  [] Yellow  [] Red  [] Pink      [] COVID-19 swab    [] Rapid  [] PCR  [] Flu swab  [] Peds Viral Panel     [] Urine Sample  [] Pelvic Cultures  [] Blood Cultures            Sylvia Spivey RN  07/20/22 0334

## 2022-07-20 NOTE — ED PROVIDER NOTES
Faculty Sign-Out Attestation  Handoff taken on the following patient from prior Attending Physician: Vince Staff    I was available and discussed any additional care issues that arose and coordinated the management plans with the resident(s) caring for the patient during my duty period. Any areas of disagreement with residents documentation of care or procedures are noted on the chart. I was personally present for the key portions of any/all procedures during my duty period. I have documented in the chart those procedures where I was not present during the key portions.     Diabetic, long acting po med, on D10 gtt,   Ct head pending,   Needing inter med admit    Jonnie Clipper, DO  Attending Physician      Jonnie Larry, DO  07/20/22 0000    Ct head -, vss  Admitted per plan     Jonnie Larry, DO  07/20/22 6856

## 2022-07-20 NOTE — ED NOTES
The following labs labeled with pt sticker and tubed to lab:     [] Blue     [] Lavender   [] on ice  [] Green/yellow  [] Green/black [] on ice  [] Yellow  [] Red  [] Pink      [] COVID-19 swab    [] Rapid  [] PCR  [] Flu swab  [] Peds Viral Panel     [] Urine Sample  [] Pelvic Cultures  [x] Blood Cultures            Cory Hernandez RN  07/19/22 1231

## 2022-07-20 NOTE — ED NOTES
Report called, notified that morning medications never arrived to ER.      Darnell Buchanan RN  07/20/22 3670

## 2022-07-20 NOTE — ED PROVIDER NOTES
[address] [facility] ED     Emergency Department     Faculty Attestation        I performed a history and physical examination of the patient and discussed management with the resident. I reviewed the residents note and agree with the documented findings and plan of care. Any areas of disagreement are noted on the chart. I was personally present for the key portions of any procedures. I have documented in the chart those procedures where I was not present during the key portions. I have reviewed the emergency nurses triage note. I agree with the chief complaint, past medical history, past surgical history, allergies, medications, social and family history as documented unless otherwise noted below. For mid-level providers such as nurse practitioners as well as physicians assistants:    I have personally seen and evaluated the patient. I find the patient's history and physical exam are consistent with NP/PA documentation. I agree with the care provided, treatment rendered, disposition, & follow-up plan. Additional findings are as noted. Vital Signs: /87   Pulse (!) 109   Temp 97.6 °F (36.4 °C)   Resp 17   SpO2 97%   PCP:  Jasiel Adrian MD    Pertinent Comments:     Presents to the emergency department for evaluation of hyperglycemia. She is brought in by EMS for she had a blood sugar in the 20s. She was given amp of D50 and her blood sugars in the 180s now. She states she does not know why she was here. She states she cannot remember what happened today. Complains of a cough. She has intermittently confused has nonfocal neurological ultrasound but does have evidence of tongue biting.   Given her altered state and exam will obtain full septic labs CT imaging of brain, urinalysis, fluids, anticipated admission      Critical Care  None          Pasha Pineda MD    Attending Emergency Medicine Physician            Talisha Gaviria,

## 2022-07-20 NOTE — ED NOTES
Writer notified lab of missing lab results called about earlier. Per lab tests not ran, will call  to obtain due to poor vascular status. Suellen VAIL- updated.        Annemarie To RN  07/20/22 7575

## 2022-07-20 NOTE — ED NOTES
Report recd from [facility], Pt resting on cot. Tray ordered. No concerns voiced.       Bryant Vega RN  07/20/22 8735

## 2022-07-20 NOTE — H&P
[facility]  Office: [address], DO, Nate Becerra, DO, Cecy Hull, DO, Gopal Archibald Blood, DO, Ehsan Viramontes MD, Amanda Cerrato MD, Kennis Osler, MD, Ashley Reid MD,  Kim Chan MD, Lacie Haskins MD, Davonte Brantley, DO, Ivis Sr MD,  Marty Shay MD, Evelin Eubanks MD, Radha Coronado, DO, Robbin Chavez MD, Grant Perez MD, Jillian Schwarz MD, Jj Dawn, DO, Valentina Mancilla MD, Luis Cedeño MD, Yeni Merida CNP,  Faheem Perez, CNP, Jerrod Jackson, CNP, Brad Chavez, CNP, Alejandra Hope PA-C, Ivan Smith, [facility], Heladio Givens, CNP, Nina Gómez, CNP, Diamond Riddle, CNP, Mannie Bunch, CNP, Artis Ramirez, CNS, Chelsie Mchugh, [facility], Sony Aguilar, CNP, Zeinab Killian, CNP, Mary Sinclair, CNP, Prerna Vazquez, [facility]    HISTORY AND PHYSICAL EXAMINATION            Date:   7/20/2022  Patient name:  Jane Herman  Date of admission:  7/19/2022  9:56 PM  MRN:   [id number]  Account:  [de-identified]  YOB: 1955  PCP:    Ashli Breaux MD  Room:   02/02  Code Status:    Full Code    Chief Complaint:     Chief Complaint   Patient presents with    Blood Sugar Problem       History Obtained From:     patient, electronic medical record    History of Present Illness:     Jane Herman is a 73 y.o. female with a past medical history detailed below. She presented to the ED after EMS was called due to her being found unresponsive. Pt reportedly was found unresponsive after administering her insulin without checking her blood sugar. Upon EMS arrival she her blood glucose was 29. She was administered an amp of dextrose and awoke shortly thereafter. She has struggled with hypoglycemia in the past. She was recently started on insulin about 3 months ago. Recently her lantus was increased to 20 units from 5.  She admittedly does not always check her blood gastritis, MILD CHRONIC GASTRITIS WITH FOCAL ANTRAL EROSION AND ASSOCIATED ACUTE INFLAMMATION. INTESTINAL METAPLASIA PRESENT, NEGATIVE FOR DYSPLASIA. Medications Prior to Admission:     Prior to Admission medications    Medication Sig Start Date End Date Taking? Authorizing Provider   albuterol sulfate HFA (PROVENTIL;VENTOLIN;PROAIR) 108 (90 Base) MCG/ACT inhaler INHALE TWO(2) PUFFS INTO LUNGS EVERY SIX(6) HOURS AS NEEDED FOR WHEEZING 6/21/22   DONTRELL Adan CNP   insulin aspart (NOVOLOG FLEXPEN) 100 UNIT/ML injection pen Inject 5 Units into the skin 3 times daily (before meals) 6/2/22   DONTRELL Adan CNP   zoster recombinant adjuvanted vaccine [facility]) 50 MCG/0.5ML SUSR injection Inject 0.5 mLs into the muscle See Admin Instructions 1 dose now and repeat in 2-6 months 5/31/22 11/27/22  DONTRELL Adan CNP   FLUoxetine (PROZAC) 10 MG capsule Take 1 capsule by mouth daily 5/31/22   DONTRELL Adan - CNP   gabapentin (NEURONTIN) 400 MG capsule Take 1 capsule by mouth 3 times daily for 90 days.  5/31/22 8/29/22  DONTRELL Adan - LÓPEZ   insulin glargine [facility]) 100 UNIT/ML injection pen Inject 15 Units into the skin nightly 5/27/22   DONTRELL Adan CNP   Insulin Pen Needle 32G X 4 MM MISC 1 each by Does not apply route 4 times daily (after meals and at bedtime) 5/27/22 8/25/22  DONTRELL Adan - CNP   Continuous Blood Gluc  (FREESTYLE ANTONIO 14 DAY READER) OXANA 1 Units by Does not apply route 4 times daily 5/27/22 8/25/22  DONTRELL Adan - CNP   Continuous Blood Gluc Sensor (FREESTYLE ANTONIO 14 DAY SENSOR) MISC 1 box by Does not apply route once a week 5/27/22 8/25/22  DONTRELL Adan CNP   Nutritional Supplements (ENSURE COMPLETE SHAKE) LIQD Take 1 Can by mouth in the morning and at bedtime 5/27/22 6/26/22  DONTRELL Adan CNP   megestrol (MEGACE) 20 MG tablet TAKE 1 TABLET BY MOUTH DAILY 4/18/22   Joyce Cohen MD blood glucose monitor strips 1 strip by Other route daily Test 1 times a day & as needed for symptoms of irregular blood glucose. 4/4/22 5/4/22  DONTRELL Celestin CNP   omeprazole (PRILOSEC) 20 MG delayed release capsule TAKE ONE (1) CAPSULE BY MOUTH TWICE DAILY  Patient not taking: Reported on 5/27/2022 3/14/22   DONTRELL Celestin CNP   Blood Gluc Meter Disp-Strips (BLOOD GLUCOSE METER DISPOSABLE) OXANA Daily and as needed 2/25/22   DONTRELL Celestin CNP   Lancets MISC 1 each by Does not apply route 2 times daily 2/25/22   DONTRELL Celestin CNP   ferrous sulfate (IRON 325) 325 (65 Fe) MG tablet Take 1 tablet by mouth every other day 2/25/22   DONTRELL Celestin CNP   nicotine (NICODERM CQ) 21 MG/24HR Place 1 patch onto the skin daily 2/11/22   Hoda Russell MD   sodium bicarbonate 650 MG tablet TAKE 1 TABLET BY MOUTH THREE TIMES DAILY 2/11/22   Hoda Russell MD   glimepiride (AMARYL) 2 MG tablet Take 1 tablet by mouth 2 times daily (before meals) 11/3/21   DONTRELL Celestin CNP   Alcohol Swabs (ALCOHOL PREP) 70 % PADS Daily as needed 10/28/21   DONTRELL Celestin CNP   metFORMIN (GLUCOPHAGE-XR) 500 MG extended release tablet Take 2 tablets by mouth daily (with breakfast) AND 1 tablet Daily with supper.  10/28/21   DONTRELL Celestin CNP   SYMBICORT 80-4.5 MCG/ACT AERO Inhale 2 puffs into the lungs 2 times daily 10/13/21 2/25/22  DONTRELL Celestin CNP   lipase-protease-amylase (CREON) 22895-807858 units CPEP delayed release capsule take 2 capsules by mouth four times a day after meals and at bedtime  Patient not taking: Reported on 5/27/2022 10/13/21   DONTRELL Celestin CNP   Multiple Vitamin (MULTI-VITAMIN DAILY PO) Take by mouth    Historical Provider, MD   ZINC PO Take by mouth    Historical Provider, MD   AquaLance Lancets 30G MISC Daily and as needed for hypoglycemia 4/6/20   Marlene Sermon, APRN - CNP        Allergies:     Peanuts [peanut oil], Vicodin [hydrocodone-acetaminophen], Tramadol, Tylenol with codeine #3 [acetaminophen-codeine], and Ibuprofen    Social History:     Tobacco:    reports that she has been smoking. She has a 10.00 pack-year smoking history. She has quit using smokeless tobacco.  Alcohol:      reports no history of alcohol use. Drug Use:  reports current drug use. Drug: Marijuana Daniel Taylor). Family History:     Family History   Problem Relation Age of Onset    Brain Cancer Father     Diabetes Sister     Other Sister         epilepsy       Review of Systems:     Positive and Negative as described in HPI. CONSTITUTIONAL:  negative for fevers, chills, sweats, fatigue.  + weight loss  HEENT:  negative for vision, hearing changes, runny nose, throat pain  RESPIRATORY:  negative for shortness of breath, cough, congestion, wheezing  CARDIOVASCULAR:  negative for chest pain, palpitations  GASTROINTESTINAL:  negative for nausea, vomiting, diarrhea, constipation, change in bowel habits, abdominal pain   GENITOURINARY:  negative for difficulty of urination, burning with urination, frequency   INTEGUMENT:  negative for rash, skin lesions, easy bruising   HEMATOLOGIC/LYMPHATIC:  negative for swelling/edema   ALLERGIC/IMMUNOLOGIC:  negative for urticaria , itching  ENDOCRINE:  negative increase in drinking, increase in urination, hot or cold intolerance  MUSCULOSKELETAL:  negative joint pains, muscle aches, swelling of joints  NEUROLOGICAL:  negative for headaches, dizziness, lightheadedness, numbness, pain, tingling extremities  BEHAVIOR/PSYCH:  negative for depression, anxiety    Physical Exam:   /65   Pulse 100   Temp 97.6 °F (36.4 °C)   Resp 21   SpO2 99%   Temp (24hrs), Av.6 °F (36.4 °C), Min:97.6 °F (36.4 °C), Max:97.6 °F (36.4 °C)    Recent Labs     22  0407 22  0457 22  0630 22  0746   POCGLU 143* 96 201* 254*     No intake or output data in the 24 hours ending 22 0816    General Appearance: alert, thin appearing female who is in no acute distress  Mental status: oriented to person, place, and time  Head: normocephalic, atraumatic  Eye: no icterus, redness, pupils equal and reactive, extraocular eye movements intact, conjunctiva clear  Ear: normal external ear, no discharge, hearing intact  Nose: no drainage noted  Mouth: mucous membranes moist  Neck: supple, no carotid bruits, thyroid not palpable  Lungs: Bilateral equal air entry, clear to ausculation, no wheezing, rales or rhonchi, normal effort  Cardiovascular: normal rate, regular rhythm, no murmur, gallop, rub  Abdomen: Soft, nontender, nondistended, normal bowel sounds, no hepatomegaly or splenomegaly  Neurologic: There are no new focal motor or sensory deficits, normal muscle tone and bulk, no abnormal sensation, normal speech, cranial nerves II through XII grossly intact  Skin: No gross lesions, rashes, bruising or bleeding on exposed skin area  Extremities: peripheral pulses palpable, no pedal edema or calf pain with palpation  Psych: normal affect    Investigations:      Laboratory Testing:  Recent Results (from the past 24 hour(s))   POC Glucose Fingerstick    Collection Time: 07/19/22  9:59 PM   Result Value Ref Range    POC Glucose 184 (H) 65 - 105 mg/dL   Venous Blood Gas, POC    Collection Time: 07/19/22 10:22 PM   Result Value Ref Range    pH, Davin 7.314 (L) 7.320 - 7.430    pCO2, Davin 56.4 (H) 41.0 - 51.0 mm Hg    pO2, Davin 39.2 30.0 - 50.0 mm Hg    HCO3, Venous 28.6 22.0 - 29.0 mmol/L    Positive Base Excess, Davin 1 0.0 - 3.0    O2 Sat, Davin 68 60.0 - 85.0 %   ELECTROLYTES PLUS    Collection Time: 07/19/22 10:22 PM   Result Value Ref Range    POC Sodium 141 138 - 146 mmol/L    POC Potassium 4.0 3.5 - 4.5 mmol/L    POC Chloride 106 98 - 107 mmol/L    POC TCO2 29 22 - 30 mmol/L    Anion Gap 7 7 - 16 mmol/L   Hemoglobin and hematocrit, blood    Collection Time: 07/19/22 10:22 PM   Result Value Ref Range    POC Hemoglobin 14.2 12.0 - 16.0 g/dL    POC Hematocrit 42 36 - 46 %   Creatinine W/GFR Point of Care    Collection Time: 07/19/22 10:22 PM   Result Value Ref Range    POC Creatinine 0.64 0.51 - 1.19 mg/dL    GFR Comment >60 >60 mL/min    GFR Non-African American >60 >60 mL/min    GFR Comment         CALCIUM, IONIC (POC)    Collection Time: 07/19/22 10:22 PM   Result Value Ref Range    POC Ionized Calcium 1.15 1.15 - 1.33 mmol/L   POCT urea (BUN)    Collection Time: 07/19/22 10:22 PM   Result Value Ref Range    POC BUN 13 8 - 26 mg/dL   Lactic Acid, POC    Collection Time: 07/19/22 10:22 PM   Result Value Ref Range    POC Lactic Acid 3.34 (H) 0.56 - 1.39 mmol/L   POCT Glucose    Collection Time: 07/19/22 10:22 PM   Result Value Ref Range    POC Glucose 179 (H) 74 - 100 mg/dL   Ammonia    Collection Time: 07/19/22 10:36 PM   Result Value Ref Range    Ammonia 25 11 - 51 umol/L   CBC with Auto Differential    Collection Time: 07/19/22 10:36 PM   Result Value Ref Range    WBC 7.8 3.5 - 11.3 k/uL    RBC 3.95 3.95 - 5.11 m/uL    Hemoglobin 13.3 11.9 - 15.1 g/dL    Hematocrit 39.1 36.3 - 47.1 %    MCV 99.0 82.6 - 102.9 fL    MCH 33.7 (H) 25.2 - 33.5 pg    MCHC 34.0 28.4 - 34.8 g/dL    RDW 12.9 11.8 - 14.4 %    Platelets 616 031 - 335 k/uL    MPV 10.2 8.1 - 13.5 fL    NRBC Automated 0.0 0.0 per 100 WBC    Seg Neutrophils 80 (H) 36 - 65 %    Lymphocytes 15 (L) 24 - 43 %    Monocytes 4 3 - 12 %    Eosinophils % 0 (L) 1 - 4 %    Basophils 1 0 - 2 %    Immature Granulocytes 0 0 %    Segs Absolute 6.31 1.50 - 8.10 k/uL    Absolute Lymph # 1.13 1.10 - 3.70 k/uL    Absolute Mono # 0.29 0.10 - 1.20 k/uL    Absolute Eos # <0.03 0.00 - 0.44 k/uL    Basophils Absolute 0.04 0.00 - 0.20 k/uL    Absolute Immature Granulocyte 0.03 0.00 - 0.30 k/uL   Comprehensive Metabolic Panel    Collection Time: 07/19/22 10:36 PM   Result Value Ref Range    Glucose 175 (H) 70 - 99 mg/dL    BUN 14 8 - 23 mg/dL    CREATININE 0.69 0.50 - 0.90 mg/dL    Calcium 9.3 8.6 - 10.4 mg/dL Sodium 140 135 - 144 mmol/L    Potassium 4.1 3.7 - 5.3 mmol/L    Chloride 99 98 - 107 mmol/L    CO2 25 20 - 31 mmol/L    Anion Gap 16 9 - 17 mmol/L    Alkaline Phosphatase 72 35 - 104 U/L    ALT 17 5 - 33 U/L    AST 26 <32 U/L    Total Bilirubin 0.30 0.3 - 1.2 mg/dL    Total Protein 6.9 6.4 - 8.3 g/dL    Albumin 4.3 3.5 - 5.2 g/dL    Albumin/Globulin Ratio 1.7 1.0 - 2.5    GFR Non-African American >60 >60 mL/min    GFR African American >60 >60 mL/min    GFR Comment         C-Reactive Protein    Collection Time: 07/19/22 10:36 PM   Result Value Ref Range    CRP <3.0 0.0 - 5.0 mg/L   Lactate, Sepsis    Collection Time: 07/19/22 10:36 PM   Result Value Ref Range    Lactic Acid, Sepsis, Whole Blood 4.0 (H) 0.5 - 1.9 mmol/L   Magnesium    Collection Time: 07/19/22 10:36 PM   Result Value Ref Range    Magnesium 1.8 1.6 - 2.6 mg/dL   Procalcitonin    Collection Time: 07/19/22 10:36 PM   Result Value Ref Range    Procalcitonin 0.03 <0.09 ng/mL   Protime-INR    Collection Time: 07/19/22 10:36 PM   Result Value Ref Range    Protime 10.3 9.1 - 12.3 sec    INR 1.0    APTT    Collection Time: 07/19/22 10:36 PM   Result Value Ref Range    PTT 24.9 20.5 - 30.5 sec   Troponin    Collection Time: 07/19/22 10:36 PM   Result Value Ref Range    Troponin, High Sensitivity 33 (H) 0 - 14 ng/L   TSH with Reflex    Collection Time: 07/19/22 10:36 PM   Result Value Ref Range    TSH 1.62 0.30 - 5.00 uIU/mL   COVID-19, Rapid    Collection Time: 07/19/22 10:38 PM    Specimen: Nasopharyngeal Swab   Result Value Ref Range    Specimen Description . NASOPHARYNGEAL SWAB     SARS-CoV-2, Rapid Not Detected Not Detected   Culture, Blood 1    Collection Time: 07/19/22 10:39 PM    Specimen: Blood   Result Value Ref Range    Specimen Description . BLOOD     Special Requests L FOREARM 20 ML      Culture NO GROWTH <24 HRS    Urinalysis with Microscopic    Collection Time: 07/19/22 11:02 PM   Result Value Ref Range    Color, UA Yellow Yellow    Turbidity UA Clear Clear    Glucose, Ur NEGATIVE NEGATIVE    Bilirubin Urine NEGATIVE NEGATIVE    Ketones, Urine NEGATIVE NEGATIVE    Specific Gravity, UA 1.014 1.005 - 1.030    Urine Hgb NEGATIVE NEGATIVE    pH, UA 6.5 5.0 - 8.0    Protein, UA NEGATIVE NEGATIVE    Urobilinogen, Urine Normal Normal    Nitrite, Urine NEGATIVE NEGATIVE    Leukocyte Esterase, Urine TRACE (A) NEGATIVE    -          WBC, UA 5 TO 10 0 - 5 /HPF    RBC, UA 10 TO 20 0 - 2 /HPF    Casts UA 0 TO 2 0 - 2 /LPF    Casts UA HYALINE 0 - 2 /LPF    Epithelial Cells UA 2 TO 5 0 - 5 /HPF    Bacteria, UA FEW (A) None   POC Glucose Fingerstick    Collection Time: 07/19/22 11:03 PM   Result Value Ref Range    POC Glucose 76 65 - 105 mg/dL   POCT glucose    Collection Time: 07/19/22 11:05 PM   Result Value Ref Range    Glucose 76 mg/dL    QC OK? ok    Culture, Blood 1    Collection Time: 07/19/22 11:22 PM    Specimen: Blood   Result Value Ref Range    Specimen Description . BLOOD     Special Requests RT FOREARM 20ML     Culture NO GROWTH <24 HRS    POCT glucose    Collection Time: 07/20/22 12:12 AM   Result Value Ref Range    Glucose 55 mg/dL    QC OK? OK    Troponin    Collection Time: 07/20/22 12:51 AM   Result Value Ref Range    Troponin, High Sensitivity 41 (H) 0 - 14 ng/L   SPECIMEN REJECTION    Collection Time: 07/20/22 12:51 AM   Result Value Ref Range    Specimen Source . BLOOD     Ordered Test LACDS     Reason for Rejection Unable to perform testing: Specimen clotted. POCT glucose    Collection Time: 07/20/22 12:58 AM   Result Value Ref Range    Glucose 228 mg/dL    QC OK? Ok    SPECIMEN REJECTION    Collection Time: 07/20/22  1:17 AM   Result Value Ref Range    Specimen Source . BLOOD     Ordered Test LACDS     Reason for Rejection Unable to perform testing: Specimen clotted.     Lactate, Sepsis    Collection Time: 07/20/22  2:01 AM   Result Value Ref Range    Lactic Acid, Sepsis, Whole Blood 2.4 (H) 0.5 - 1.9 mmol/L   POCT glucose    Collection Time: 07/20/22 2:34 AM   Result Value Ref Range    Glucose 178 mg/dL    QC OK? Ok    POC Glucose Fingerstick    Collection Time: 07/20/22  2:34 AM   Result Value Ref Range    POC Glucose 178 (H) 65 - 105 mg/dL   EKG 12 Lead    Collection Time: 07/20/22  3:03 AM   Result Value Ref Range    Ventricular Rate 109 BPM    Atrial Rate 109 BPM    P-R Interval 160 ms    QRS Duration 64 ms    Q-T Interval 368 ms    QTc Calculation (Bazett) 495 ms    P Axis 86 degrees    R Axis 84 degrees    T Axis 88 degrees   POCT glucose    Collection Time: 07/20/22  3:19 AM   Result Value Ref Range    Glucose 158 mg/dL    QC OK? Ok    POC Glucose Fingerstick    Collection Time: 07/20/22  3:19 AM   Result Value Ref Range    POC Glucose 158 (H) 65 - 105 mg/dL   Lactate, Sepsis    Collection Time: 07/20/22  4:03 AM   Result Value Ref Range    Lactic Acid, Sepsis, Whole Blood 2.6 (H) 0.5 - 1.9 mmol/L   Magnesium    Collection Time: 07/20/22  4:03 AM   Result Value Ref Range    Magnesium 1.2 (L) 1.6 - 2.6 mg/dL   Potassium    Collection Time: 07/20/22  4:03 AM   Result Value Ref Range    Potassium 3.4 (L) 3.7 - 5.3 mmol/L   POC Glucose Fingerstick    Collection Time: 07/20/22  4:07 AM   Result Value Ref Range    POC Glucose 143 (H) 65 - 105 mg/dL   POCT glucose    Collection Time: 07/20/22  4:10 AM   Result Value Ref Range    Glucose 143 mg/dL    QC OK? Yes    POC Glucose Fingerstick    Collection Time: 07/20/22  4:57 AM   Result Value Ref Range    POC Glucose 96 65 - 105 mg/dL   POCT glucose    Collection Time: 07/20/22  5:00 AM   Result Value Ref Range    Glucose 96 mg/dL    QC OK? yes    Lactate, Sepsis    Collection Time: 07/20/22  6:27 AM   Result Value Ref Range    Lactic Acid, Sepsis, Whole Blood 3.1 (H) 0.5 - 1.9 mmol/L   POCT glucose    Collection Time: 07/20/22  6:30 AM   Result Value Ref Range    Glucose 201 mg/dL    QC OK?  Yes    POC Glucose Fingerstick    Collection Time: 07/20/22  6:30 AM   Result Value Ref Range    POC Glucose 201 (H) 65 - 105 mg/dL   POC Glucose Fingerstick    Collection Time: 07/20/22  7:46 AM   Result Value Ref Range    POC Glucose 254 (H) 65 - 105 mg/dL       Imaging/Diagnostics:  CT HEAD WO CONTRAST    Result Date: 7/19/2022  No evidence for acute intracranial hemorrhage, territorial infarction or intracranial mass lesion. Mild chronic microangiopathic ischemic disease. Mild generalized volume loss. XR CHEST PORTABLE    Result Date: 7/19/2022  Emphysema. No acute disease. Assessment :      Hospital Problems             Last Modified POA    * (Principal) Hypoglycemia 7/20/2022 Yes       Plan:     Patient status inpatient in the Med/Surge    #Type 2 DM, insulin dependent, complicated by hypoglycemia  - suspect secondary to both amaryl and insulin dosing without sufficient oral intake. Will d/c D10. Check A1C. Hold basal insulin for now. Educated patient on appropriate timing of insulin dosing and importance of adequate nutrition. Will d/c amaryl which is notorious for causing hypoglycemia. I am curious if pt even needs insulin. We may be able to obtain adequate blood glucose control with oral agents. Will add [place] and [place]. Can consider SGLT2 inhibitor as well in the future as an outpatient. #Hypomagnesemia/hypokalemia  - supplement as ordered, repeat BMP at noon    #Lactic acidosis  - 1 liter LR bolus x 1. Continuous infusion @100 ml/h. Repeat at noon. #tachycardia  - sinus tachycardia    #Elevated troponin  - likely demand ischemia in setting of profound hypoglycemia. No chest pain. Will repeat troponin at noon. If downtrending, no further cardiac work-up necessary    #pancreatic insufficiency  - continue creon    #Weight loss  - Age appropriate cancer screening as OP. TSH normal.    #Esophageal wall thickening   - seen on CT chest in February of this year. Will need OP EGD. GI referral on discharge. Start protonix 40 mg daily.      Lovenox DVT prophylaxis      Consultations:   IP CONSULT TO HOSPITALIST  IP CONSULT TO ADMIT

## 2022-07-20 NOTE — ED NOTES
Pt to ED via life squad due to unresponsiveness at the scene PTA. Per life squad they give pt an ampule of dextrose. Per report pt gave her self a shot of insulin without having check her blood sugar level as stated by the life squad. Pt become responsive after receiving the dextrose as reported. Pt states she has a history of diabetes upon assessment. Pt denies and chest pain SOB upon assessment. Pt is alert and oriented x4. Placed pt on full cardiac monitor, EKG done. Seen and examined by Dr. Haley Delcid and Dr. Ingris Rivera. Warm blanket provided, call light w/in reach. Will continue plan of care.      Deandre Hanks RN  07/19/22 [id number]

## 2022-07-20 NOTE — ED NOTES
Pt resting on stretcher. A&Ox4. RR even and unlabored. No distress noted. Pt denies any needs at this time. Call light within reach.         Flaca Seo RN  07/20/22 [id number]

## 2022-07-20 NOTE — ED NOTES
Pt resting comfortably. Alert and oriented. Will cont plan of care.      Brandee Bruce RN  07/20/22 [id number]

## 2022-07-20 NOTE — ED NOTES
Report given, pt transfer to unit, lab notified of room number for draw. [phone].      Annemarie To RN  07/20/22 [id number]

## 2022-07-20 NOTE — ED NOTES
No change noted.   [facility]     Rodrick Maria RN  07/20/22 [id number]

## 2022-07-21 VITALS
DIASTOLIC BLOOD PRESSURE: 81 MMHG | TEMPERATURE: 98.3 F | OXYGEN SATURATION: 98 % | HEART RATE: 111 BPM | SYSTOLIC BLOOD PRESSURE: 138 MMHG | RESPIRATION RATE: 16 BRPM

## 2022-07-21 PROBLEM — Z79.4 TYPE 2 DIABETES MELLITUS WITH HYPOGLYCEMIA WITHOUT COMA, WITH LONG-TERM CURRENT USE OF INSULIN (HCC): Status: ACTIVE | Noted: 2022-07-21

## 2022-07-21 PROBLEM — E11.649 TYPE 2 DIABETES MELLITUS WITH HYPOGLYCEMIA WITHOUT COMA, WITH LONG-TERM CURRENT USE OF INSULIN (HCC): Status: ACTIVE | Noted: 2022-07-21

## 2022-07-21 PROBLEM — N30.00 ACUTE CYSTITIS: Status: ACTIVE | Noted: 2022-07-21

## 2022-07-21 PROBLEM — N30.01 ACUTE CYSTITIS WITH HEMATURIA: Status: ACTIVE | Noted: 2022-07-21

## 2022-07-21 PROBLEM — E87.20 LACTIC ACIDOSIS: Status: RESOLVED | Noted: 2022-07-20 | Resolved: 2022-07-21

## 2022-07-21 LAB
ANION GAP SERPL CALCULATED.3IONS-SCNC: 9 MMOL/L (ref 9–17)
BUN BLDV-MCNC: 8 MG/DL (ref 8–23)
CALCIUM SERPL-MCNC: 8.9 MG/DL (ref 8.6–10.4)
CHLORIDE BLD-SCNC: 96 MMOL/L (ref 98–107)
CO2: 25 MMOL/L (ref 20–31)
CREAT SERPL-MCNC: 0.73 MG/DL (ref 0.5–0.9)
CULTURE: ABNORMAL
EKG ATRIAL RATE: 104 BPM
EKG P AXIS: 88 DEGREES
EKG P-R INTERVAL: 164 MS
EKG Q-T INTERVAL: 370 MS
EKG QRS DURATION: 60 MS
EKG QTC CALCULATION (BAZETT): 486 MS
EKG R AXIS: 78 DEGREES
EKG T AXIS: 78 DEGREES
EKG VENTRICULAR RATE: 104 BPM
ESTIMATED AVERAGE GLUCOSE: 160 MG/DL
GFR AFRICAN AMERICAN: >60 ML/MIN
GFR NON-AFRICAN AMERICAN: >60 ML/MIN
GFR SERPL CREATININE-BSD FRML MDRD: ABNORMAL ML/MIN/{1.73_M2}
GLUCOSE BLD-MCNC: 169 MG/DL (ref 65–105)
GLUCOSE BLD-MCNC: 182 MG/DL (ref 65–105)
GLUCOSE BLD-MCNC: 221 MG/DL (ref 65–105)
GLUCOSE BLD-MCNC: 223 MG/DL (ref 70–99)
GLUCOSE BLD-MCNC: 250 MG/DL (ref 65–105)
HBA1C MFR BLD: 7.2 % (ref 4–6)
INR BLD: 1.1
POTASSIUM SERPL-SCNC: 4.9 MMOL/L (ref 3.7–5.3)
PROTHROMBIN TIME: 11.4 SEC (ref 9.1–12.3)
SODIUM BLD-SCNC: 130 MMOL/L (ref 135–144)
SPECIMEN DESCRIPTION: ABNORMAL

## 2022-07-21 PROCEDURE — 6370000000 HC RX 637 (ALT 250 FOR IP): Performed by: NURSE PRACTITIONER

## 2022-07-21 PROCEDURE — APPSS45 APP SPLIT SHARED TIME 31-45 MINUTES: Performed by: PHYSICIAN ASSISTANT

## 2022-07-21 PROCEDURE — 85610 PROTHROMBIN TIME: CPT

## 2022-07-21 PROCEDURE — 6360000002 HC RX W HCPCS: Performed by: NURSE PRACTITIONER

## 2022-07-21 PROCEDURE — 93010 ELECTROCARDIOGRAM REPORT: CPT | Performed by: INTERNAL MEDICINE

## 2022-07-21 PROCEDURE — 80048 BASIC METABOLIC PNL TOTAL CA: CPT

## 2022-07-21 PROCEDURE — 82947 ASSAY GLUCOSE BLOOD QUANT: CPT

## 2022-07-21 PROCEDURE — 96372 THER/PROPH/DIAG INJ SC/IM: CPT

## 2022-07-21 PROCEDURE — 83036 HEMOGLOBIN GLYCOSYLATED A1C: CPT

## 2022-07-21 PROCEDURE — 99239 HOSP IP/OBS DSCHRG MGMT >30: CPT | Performed by: STUDENT IN AN ORGANIZED HEALTH CARE EDUCATION/TRAINING PROGRAM

## 2022-07-21 PROCEDURE — G0108 DIAB MANAGE TRN  PER INDIV: HCPCS

## 2022-07-21 PROCEDURE — 2580000003 HC RX 258: Performed by: NURSE PRACTITIONER

## 2022-07-21 PROCEDURE — 6370000000 HC RX 637 (ALT 250 FOR IP): Performed by: PHYSICIAN ASSISTANT

## 2022-07-21 PROCEDURE — G0378 HOSPITAL OBSERVATION PER HR: HCPCS

## 2022-07-21 PROCEDURE — 36415 COLL VENOUS BLD VENIPUNCTURE: CPT

## 2022-07-21 RX ORDER — PANTOPRAZOLE SODIUM 40 MG/1
40 TABLET, DELAYED RELEASE ORAL
Qty: 30 TABLET | Refills: 1 | Status: SHIPPED | OUTPATIENT
Start: 2022-07-22

## 2022-07-21 RX ORDER — LEVOFLOXACIN 500 MG/1
500 TABLET, FILM COATED ORAL DAILY
Qty: 3 TABLET | Refills: 0 | Status: SHIPPED | OUTPATIENT
Start: 2022-07-21 | End: 2022-07-24

## 2022-07-21 RX ORDER — INSULIN GLARGINE 100 [IU]/ML
10 INJECTION, SOLUTION SUBCUTANEOUS NIGHTLY
Qty: 5 PEN | Refills: 0 | Status: SHIPPED
Start: 2022-07-21

## 2022-07-21 RX ADMIN — FLUOXETINE 10 MG: 10 CAPSULE ORAL at 09:17

## 2022-07-21 RX ADMIN — ENOXAPARIN SODIUM 40 MG: 100 INJECTION SUBCUTANEOUS at 08:27

## 2022-07-21 RX ADMIN — SODIUM BICARBONATE 650 MG: 648 TABLET ORAL at 09:17

## 2022-07-21 RX ADMIN — PANTOPRAZOLE SODIUM 40 MG: 40 TABLET, DELAYED RELEASE ORAL at 05:00

## 2022-07-21 RX ADMIN — SODIUM CHLORIDE, PRESERVATIVE FREE 5 ML: 5 INJECTION INTRAVENOUS at 08:41

## 2022-07-21 RX ADMIN — INSULIN LISPRO 2 UNITS: 100 INJECTION, SOLUTION INTRAVENOUS; SUBCUTANEOUS at 08:32

## 2022-07-21 RX ADMIN — PANCRELIPASE 36000 UNITS: 24000; 76000; 120000 CAPSULE, DELAYED RELEASE PELLETS ORAL at 10:21

## 2022-07-21 RX ADMIN — PANCRELIPASE 36000 UNITS: 24000; 76000; 120000 CAPSULE, DELAYED RELEASE PELLETS ORAL at 12:33

## 2022-07-21 RX ADMIN — GABAPENTIN 400 MG: 400 CAPSULE ORAL at 08:27

## 2022-07-21 RX ADMIN — MEGESTROL ACETATE 20 MG: 20 TABLET ORAL at 09:17

## 2022-07-21 NOTE — PROGRESS NOTES
CLINICAL PHARMACY NOTE: MEDS TO BEDS    Total # of Prescriptions Filled: 2   The following medications were delivered to the patient:  Levofloxacin 500mg  Pantoprazole 40mg    Additional Documentation: delivered to patient in room 443 7/21 at 11:45am. Co-pay $1.96 cash. Enriqueuiva 100mg Transferred to patients home pharmacy ([address]).

## 2022-07-21 NOTE — PROGRESS NOTES
[facility]  Office: [address], DO, Jillian Lao, DO, David Durand, DO, Nehemiah Andino Blood, DO, Nickolas Munson MD, Sin Gutierrez MD, Sarthak Bunch MD, Danya Davila MD,  Xenia Jauregui MD, Rajan Hart MD, David Scanlon, DO, Brooks Madden MD,  Artie Sanabria MD, Lieutenant Leland MD, Steff Vega, DO, Melanie Banuelos MD, Yohana Tolentino MD, Clemencia Arthur MD, Bala Martinez, DO, Mauro Garzon MD, Luc Valencia MD, Neil Lambert, CNP,  Leo Yousif, CNP, Renetta Hawkins, CNP, Ian Burciaga, CNP, Elvin Griffin PA-C, Mary Jules, [facility], Afshin Ramey, CNP, Sha Pizano, CNP, Apryl Bethea, CNP, Shalonda Hoyos, CNP, Mando Ruiz, CNS, Heena Everett, [facility], Franki Kumar, CNP, Melissa Chaudahri, CNP, Germaine Lee, CNP, Rashawn Donahue, [facility]           [address] [place] [address]    Progress Note    7/21/2022    2:27 PM    Name:   Calvin Rivera  MRN:     [id number]     Acct:      [de-identified]   Room:   [facility] Day:  1  Admit Date:  7/19/2022  9:56 PM    PCP:   Emily Montano MD  Code Status:  Full Code    Subjective:     C/C:   Chief Complaint   Patient presents with    Blood Sugar Problem     Interval History Status: improved. Pt seen and evaluated this morning. She is doing better. Blood sugar better controlled. No additional episodes of hypoglycemia. She is eating well. She is denying any new complaints. Brief History:     Calvin Rivera is a 73 y.o. female with a past medical history detailed below. She presented to the ED after EMS was called due to her being found unresponsive. Pt reportedly was found unresponsive after administering her insulin without checking her blood sugar. Upon EMS arrival she her blood glucose was 29. She was administered an amp of dextrose and awoke shortly thereafter.  She has struggled with hypoglycemia in the past. She was recently started on insulin about 3 months ago. Recently her lantus was increased to 20 units from 5. She admittedly does not always check her blood sugar before administering her insulin and did not eat before or after administering her insulin on this occasion. She also is complaining of significant weight loss over the past several months. She reports that she is up to date on her age appropriate cancer screening, but denies ever having a colonoscopy. In the ED she was found to have elevated lactic acid (3.1), hypokalemia (3.4), hypomagnesemia (1.2). She was placed on a D10 infusion and admitted to the medicine service. Review of Systems:     Constitutional:  negative for chills, fevers, sweats  Respiratory:  negative for cough, dyspnea on exertion, shortness of breath, wheezing  Cardiovascular:  negative for chest pain, chest pressure/discomfort, lower extremity edema, palpitations  Gastrointestinal:  negative for abdominal pain, constipation, diarrhea, nausea, vomiting  Neurological:  negative for dizziness, headache    Medications: Allergies:     Allergies   Allergen Reactions    Peanuts [Peanut Oil] Anaphylaxis    Vicodin [Hydrocodone-Acetaminophen] Hives    Tramadol     Tylenol With Codeine #3 [Acetaminophen-Codeine]     Ibuprofen Itching       Current Meds:   Scheduled Meds:    ferrous sulfate  325 mg Oral Every Other Day    FLUoxetine  10 mg Oral Daily    gabapentin  400 mg Oral TID    megestrol  20 mg Oral Daily    nicotine  1 patch TransDERmal Daily    sodium bicarbonate  650 mg Oral TID    sodium chloride flush  5-40 mL IntraVENous 2 times per day    enoxaparin  40 mg SubCUTAneous Daily    lipase-protease-amylase  36,000 Units Oral TID WC    pantoprazole  40 mg Oral QAM AC    insulin glargine  10 Units SubCUTAneous Nightly    insulin lispro  0-8 Units SubCUTAneous TID WC    insulin lispro  0-4 Units SubCUTAneous Nightly     Continuous Infusions:    sodium chloride Stopped (07/21/22 1144)    dextrose      lactated ringers Stopped (22 1145)     PRN Meds: sodium chloride flush, sodium chloride, potassium chloride **OR** potassium alternative oral replacement **OR** potassium chloride, magnesium sulfate, ondansetron **OR** ondansetron, polyethylene glycol, glucose, dextrose bolus **OR** dextrose bolus, glucagon (rDNA), dextrose, albuterol sulfate HFA    Data:     Past Medical History:   has a past medical history of Cataracts, bilateral, COPD (chronic obstructive pulmonary disease) ([facility] [address] CTS (carpal tunnel syndrome), Fibromyalgia, Generalized abdominal pain, Glaucoma, Hypertension, Osteoarthritis, Osteoporosis, Pancreatic calcification, Pancreatitis, Protein-calorie malnutrition, severe ([facility] [address] Seizures ([facility] [address] Tremor, Type II or unspecified type diabetes mellitus without mention of complication, not stated as uncontrolled, and Uncontrolled type 2 DM with hyperosmolar nonketotic hyperglycemia ([facility] [address] Social History:   reports that she has been smoking. She has a 10.00 pack-year smoking history. She has quit using smokeless tobacco. She reports current drug use. Drug: Marijuana [employer]). She reports that she does not drink alcohol. Family History:   Family History   Problem Relation Age of Onset    Brain Cancer Father     Diabetes Sister     Other Sister         epilepsy       Vitals:  /81   Pulse (!) 111   Temp 98.3 °F (36.8 °C) (Oral)   Resp 16   SpO2 98%   Temp (24hrs), Av.1 °F (36.7 °C), Min:97.6 °F (36.4 °C), Max:98.5 °F (36.9 °C)    Recent Labs     22  0104 22  0458 22  0821 22  1225   POCGLU 250* 182* 221* 169*       I/O (24Hr):     Intake/Output Summary (Last 24 hours) at 2022 1427  Last data filed at 2022 0612  Gross per 24 hour   Intake 3307.13 ml   Output --   Net 3307.13 ml       Labs:  Hematology:  Recent Labs     22  2236 22  0539   WBC 7.8  --    RBC 3.95  --    HGB 13.3  --    HCT 39.1  --    MCV 99.0  --    MCH 33.7*  --    MCHC 34.0  --    RDW 12.9  --    PLT 270  --    MPV 10.2  --    CRP <3.0  --    INR 1.0 1.1     Chemistry:  Recent Labs     07/19/22 2236 07/19/22 2305 07/20/22  0051 07/20/22  0058 07/20/22  0403 07/20/22  0410 07/20/22  0630 07/20/22  1007 07/20/22  1008 07/20/22  1507 07/21/22  0539     --   --   --   --   --   --   --  131*  --  130*   K 4.1  --   --   --  3.4*  --   --   --  5.2  --  4.9   CL 99  --   --   --   --   --   --   --  97*  --  96*   CO2 25  --   --   --   --   --   --   --  25  --  25   GLUCOSE 175*   < >  --    < >  --    < > 201  --  254*  --  223*   BUN 14  --   --   --   --   --   --   --  11  --  8   CREATININE 0.69  --   --   --   --   --   --   --  0.63  --  0.73   MG 1.8  --   --   --  1.2*  --   --   --   --   --   --    ANIONGAP 16  --   --   --   --   --   --   --  9  --  9   LABGLOM >60  --   --   --   --   --   --   --  >60  --  >60   GFRAA >60  --   --   --   --   --   --   --  >60  --  >60   CALCIUM 9.3  --   --   --   --   --   --   --  8.9  --  8.9   TROPHS 33*  --  41*  --   --   --   --   --   --  25*  --    LACTACIDWB  --   --   --   --   --   --   --  12.3*  --  4.5*  --     < > = values in this interval not displayed. Recent Labs     07/19/22 2236 07/19/22 2303 07/20/22  1835 07/20/22  2046 07/21/22  0104 07/21/22  0458 07/21/22  0821 07/21/22  1225   PROT 6.9  --   --   --   --   --   --   --    LABALBU 4.3  --   --   --   --   --   --   --    TSH 1.62  --   --   --   --   --   --   --    AST 26  --   --   --   --   --   --   --    ALT 17  --   --   --   --   --   --   --    ALKPHOS 72  --   --   --   --   --   --   --    BILITOT 0.30  --   --   --   --   --   --   --    AMMONIA 25  --   --   --   --   --   --   --    POCGLU  --    < > 404* 106* 250* 182* 221* 169*    < > = values in this interval not displayed.      ABG:  Lab Results   Component Value Date/Time    PHART 7.43 08/29/2012 05:06 PM    HGA8NED 41 08/29/2012 05:06 PM    PO2ART 97 08/29/2012 05:06 PM    UPR1BYU 26.7 08/29/2012 05:06 PM M9CITFFM 97.6 08/29/2012 05:06 PM    FIO2 INFORMATION NOT PROVIDED 07/20/2022 01:19 PM     Lab Results   Component Value Date/Time    SPECIAL RT FOREARM 20ML 07/19/2022 11:22 PM     Lab Results   Component Value Date/Time    CULTURE NO GROWTH 1 DAY 07/19/2022 11:22 PM       Radiology:  CT HEAD WO CONTRAST    Result Date: 7/19/2022  No evidence for acute intracranial hemorrhage, territorial infarction or intracranial mass lesion. Mild chronic microangiopathic ischemic disease. Mild generalized volume loss. XR CHEST PORTABLE    Result Date: 7/19/2022  Emphysema. No acute disease. Physical Examination:        General appearance:  alert, cooperative and no distress  Mental Status:  oriented to person, place and time and normal affect  Lungs:  clear to auscultation bilaterally, normal effort  Heart:  regular rate and rhythm, no murmur  Abdomen:  soft, nontender, nondistended, normal bowel sounds, no masses, hepatomegaly, splenomegaly  Extremities:  no edema, redness, tenderness in the calves  Skin:  no gross lesions, rashes, induration    Assessment/Plan    #Type 2 DM, insulin dependent, complicated by hypoglycemia  - suspect secondary to both amaryl and insulin dosing without sufficient oral intake. Reduce lantus to 10U qhs. Educated patient on appropriate timing of insulin dosing and importance of adequate nutrition. Will d/c amaryl which is notorious for causing hypoglycemia. Will add [place]. Can consider SGLT2 inhibitor as well in the future as an outpatient. #Elevated troponin  - likely demand ischemia in setting of profound hypoglycemia. No chest pain. Troponin down trending. No further cardiac work-up necessary inpatient. Consider OP stress test.     #UTI  - levaquin 500 mg PO x 3 days     #pancreatic insufficiency  - continue creon     #Weight loss  - Age appropriate cancer screening as OP. TSH normal.     #Esophageal wall thickening  - seen on CT chest in February of this year. Will need OP EGD. GI referral on discharge. Start protonix 40 mg daily. Lovenox DVT prophylaxis      On this date 07/21/22 I have spent 32 mins  in direct patient care, reviewing notes, test results and diagnosis and plan of care discussions with the patient, as well as coordination of care.   Plan of care made with No att. providers found      Dominique Arellano PA-C  7/21/2022  2:27 PM

## 2022-07-21 NOTE — PROGRESS NOTES
Pt known to diabetes education seen most recently 5/27/22 with follow-up 6/21/22 but did not show. Spoke with pt today, in her room along with spouse, and rescheduled this appt to 8/9/22 @ 3:00pm. Also, called endo office bc pt states she hadn't been contacted for an appt. Office stated they tried calling- writer notes from dm appt pt has VM that is full and can't leave messages. Scheduled appt with endo for 8/1/22 @ 3:50pm. Put both appt on pt AVS. Pt admitted via EMS - spouse called bc pt passed out at home and was foaming at the mouth. Pt states hadn't checked her bs and took her insulin. However pt states she takes 20 units Lantus in the am and had taken her amaryl and glucophage and hadn't eaten much that day. Denies drinking alcohol. Was found to have UTI. States doesn't like checking bs bc she has a difficult time getting blood, wants CGM but issues with getting. Provided pt with a Truemetrix lancing device with the standard lancets and reviewed techniques to help get blood. (Pt was using the delica lancet which has the lightest poke, thus harder to get blood). Emphasized need for taking meds consistently and eating regular  (provided in the past samples of Glucerna drinks when pt unable to eat). Provided phone # to call if questions between now and when she returns for DM Ed.

## 2022-07-21 NOTE — CARE COORDINATION
07/21/22 0840   Service Assessment   Patient Orientation Alert and Oriented   Cognition Alert   History Provided By Patient   Primary Caregiver Self   Support Systems Spouse/Significant Other   Patient's Healthcare Decision Maker is: Legal Next of Kin   PCP Verified by CM Yes   Last Visit to PCP Within last 3 months   Prior Functional Level Independent in ADLs/IADLs   Current Functional Level Independent in ADLs/IADLs   Can patient return to prior living arrangement Yes   Ability to make needs known: Good   Family able to assist with home care needs: Yes   Social/Functional History   Lives With Spouse   Type of [address] Two level;Bed/Bath upstairs   Home Access Stairs to enter with rails   Entrance Stairs - Number of Steps 4   Bathroom Shower/Tub Shower chair with back   [address] bars in [facility];[place], [address] Help From Family   ADL Assistance Independent   Homemaking Assistance Independent   Ambulation Assistance Independent   Transfer Assistance Independent   Active  Yes   Mode of Transportation Car   Occupation On disability   Discharge [address] Medications Yes   Meds-to-Beds: Does the patient want to have any new prescriptions delivered to bedside prior to discharge? Yes   One/Two Story Residence Two story   History of falls? 0   Services At/After Discharge   [employer] Information Provided? No   Mode of Transport at Discharge   ()   Condition of Participation: Discharge Planning   The Plan for Transition of Care is related to the following treatment goals: to not go home too fast and get diabetes right   Plan is to return home. Has transportation. Pt goes to diabetic ed center at [facility]. Pt voiced concerns for paying for creon.   CM called Jereld Castleman who offered suggestions of price shopping and going to manufactures web site as it is not on our formulary. Pt informed.

## 2022-07-21 NOTE — PROGRESS NOTES
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 Patient independent with ADLs and functional tasks with no acute OT needs. Will defer OT evaluation at this time. Please reorder OT if future needs arise.      Electronically signed by Sergio Zhang OT on 2022 at 12:10 PM

## 2022-07-21 NOTE — PROGRESS NOTES
Patient discharged and given discharge instructions with diabetic teaching. Patient was seen by diabetic educator.

## 2022-07-21 NOTE — DISCHARGE SUMMARY
[facility]  Office: [address], DO, Flor Din, DO, John Acron, DO, Arpit Chaudhari Blood, DO, Lisa Hermosillo MD, Dar Zendejas MD, Valentina Freed MD, Jose Peña MD,  Dwayne Uribe MD, Makayla Levin MD, Jg Lara, DO, Shaylee Stevens MD,  Natasha Baird MD, Michael Teague MD, Leelee Lenz, DO, Nick Gao MD, Ignacia Chiu MD, Ruben Kendall MD, Tio Gutierrez, DO, Robbie Ibanez MD, Jd Simpson MD, Miguelina Cantu, CNP,  Adelia Alpers, [facility], Leah Gilmore, CNP, Madhavi Bray, CNP, Coreen Kahn PA-C, Michelle Girard, DNP, Mike Addison, [facility], Mary Collazo, CNP, Marixa Bacon, CNP, Alexa Story, CNP, Josi Small, CNS, Duy Estrada, [facility], Rose Milian, CNP, Mindy Parks, CNP, Olegario De Paza, [facility], Carleen Richter, [facility]    Discharge Summary     Patient ID: Sherren Patee  :     MRN: [id number]     ACCOUNT:  [de-identified]   Patient's PCP: Tatyana Melara MD  Admit Date: 2022   Discharge Date: 2022   Length of Stay: 1  Code Status:  Full Code  Admitting Physician: Dominique Zhang MD  Discharge Physician: Brandon Painting     Active Discharge Diagnoses:     Hospital Problem Lists:  Principal Problem (Resolved): Hypoglycemia  Active Problems:    * No active hospital problems. *  Resolved Problems:    Lactic acidosis      Admission Condition:  poor     Discharged Condition: good    Hospital Stay:     Hospital Course:      Sherren Patee is a 73 y.o. female with a past medical history detailed below. She presented to the ED after EMS was called due to her being found unresponsive. Pt reportedly was found unresponsive after administering her insulin without checking her blood sugar. Upon EMS arrival she her blood glucose was 29. She was administered an amp of dextrose and awoke shortly thereafter.  She has struggled with hypoglycemia in the past. She was recently started on insulin about 3 months ago. Recently her lantus was increased to 20 units from 5. She admittedly does not always check her blood sugar before administering her insulin and did not eat before or after administering her insulin on this occasion. She also is complaining of significant weight loss over the past several months. She reports that she is up to date on her age appropriate cancer screening, but denies ever having a colonoscopy. In the ED she was found to have elevated lactic acid (3.1), hypokalemia (3.4), hypomagnesemia (1.2). She was placed on a D10 infusion and admitted to the medicine service. Significant therapeutic interventions:     Assessment/Plan     #Type 2 DM, insulin dependent, complicated by hypoglycemia  - suspect secondary to both amaryl and insulin dosing without sufficient oral intake. Reduce lantus to 10U qhs. Educated patient on appropriate timing of insulin dosing and importance of adequate nutrition. Will d/c amaryl which is notorious for causing hypoglycemia. Will add Richard Gutierrezady. Can consider SGLT2 inhibitor as well in the future as an outpatient. #Elevated troponin  - likely demand ischemia in setting of profound hypoglycemia. No chest pain. Troponin down trending. No further cardiac work-up necessary inpatient. Consider OP stress test.     #UTI  - levaquin 500 mg PO x 3 days     #pancreatic insufficiency  - continue creon     #Weight loss  - Age appropriate cancer screening as OP. TSH normal.     #Esophageal wall thickening  - seen on CT chest in February of this year. Will need OP EGD. GI referral on discharge. Start protonix 40 mg daily.      Significant Diagnostic Studies:   Labs / Micro:  CBC:   Lab Results   Component Value Date/Time    WBC 7.8 07/19/2022 10:36 PM    RBC 3.95 07/19/2022 10:36 PM    RBC 4.27 05/01/2012 11:37 PM    HGB 13.3 07/19/2022 10:36 PM    HCT 39.1 07/19/2022 10:36 PM    MCV 99.0 07/19/2022 10:36 PM PHUR 6.5 07/19/2022 11:02 PM    PROTEINU NEGATIVE 07/19/2022 11:02 PM    GLUCOSEU NEGATIVE 07/19/2022 11:02 PM    GLUCOSEU NEGATIVE 05/02/2012 12:49 AM    KETUA NEGATIVE 07/19/2022 11:02 PM    BILIRUBINUR NEGATIVE 07/19/2022 11:02 PM    BILIRUBINUR negative 06/05/2019 02:45 PM    BILIRUBINUR NEGATIVE 05/02/2012 12:49 AM    UROBILINOGEN Normal 07/19/2022 11:02 PM    NITRU NEGATIVE 07/19/2022 11:02 PM    LEUKOCYTESUR TRACE 07/19/2022 11:02 PM     TSH:    Lab Results   Component Value Date/Time    TSH 1.62 07/19/2022 10:36 PM       Radiology:  CT HEAD WO CONTRAST    Result Date: 7/19/2022  No evidence for acute intracranial hemorrhage, territorial infarction or intracranial mass lesion. Mild chronic microangiopathic ischemic disease. Mild generalized volume loss. XR CHEST PORTABLE    Result Date: 7/19/2022  Emphysema. No acute disease. Consultations:    Consults:     Final Specialist Recommendations/Findings:   IP CONSULT TO HOSPITALIST  IP CONSULT TO DIETITIAN  IP CONSULT TO DIABETES EDUCATOR      The patient was seen and examined on day of discharge and this discharge summary is in conjunction with any daily progress note from day of discharge. Discharge plan:     Disposition: Home    Physician Follow Up:    Jerrod Joshi MD  [address] [facility] [address]  [phone]    Schedule an appointment as soon as possible for a visit in 3 day(s)      Yeny Ruiz MD  [address] [address]  [phone]    Schedule an appointment as soon as possible for a visit in 3 day(s)  neuropathy    Krysta Carrillo MD  [address] [facility]  [address] [id number]  [phone]    Schedule an appointment as soon as possible for a visit in 3 day(s)  esophageal thickening    Kylie Rod PA-C  [address] [id number]             Requiring Further Evaluation/Follow Up POST HOSPITALIZATION/Incidental Findings:   - GI referral for esophageal thickening    Diet: diabetic diet    Activity: As tolerated    Discharge Medications:      Medication List        START taking these medications      Creon [id number] units Cpep delayed release capsule  Generic drug: lipase-protease-amylase  take 2 capsules by mouth four times a day after meals and at bedtime     levoFLOXacin 500 MG tablet  Commonly known as: LEVAQUIN  Take 1 tablet by mouth in the morning for 3 days. pantoprazole 40 MG tablet  Commonly known as: PROTONIX  Take 1 tablet by mouth every morning (before breakfast)  Start taking on: July 22, 2022  Replaces: omeprazole 20 MG delayed release capsule     SITagliptin 100 MG tablet  Commonly known as: Januvia  Take 1 tablet by mouth in the morning. CHANGE how you take these medications      Basaglar KwikPen 100 UNIT/ML injection pen  Generic drug: insulin glargine  Inject 10 Units into the skin nightly  What changed: how much to take            CONTINUE taking these medications      albuterol sulfate  (90 Base) MCG/ACT inhaler  Commonly known as: PROVENTIL;VENTOLIN;PROAIR  INHALE TWO(2) PUFFS INTO LUNGS EVERY SIX(6) HOURS AS NEEDED FOR WHEEZING     Alcohol Prep 70 % Pads  Daily as needed     * AquaLance Lancets 30G Misc  Daily and as needed for hypoglycemia     * Lancets Misc  1 each by Does not apply route 2 times daily     BLOOD GLUCOSE METER DISPOSABLE Yesenia  Daily and as needed     Ensure Complete Shake Liqd  Take 1 Can by mouth in the morning and at bedtime     ferrous sulfate 325 (65 Fe) MG tablet  Commonly known as: IRON 325  Take 1 tablet by mouth every other day     FLUoxetine 10 MG capsule  Commonly known as: PROZAC  Take 1 capsule by mouth daily     FreeStyle Drew 14 Day [place]  1 Units by Does not apply route 4 times daily     FreeStyle Drew 14 Day Sensor Misc  1 box by Does not apply route once a week     gabapentin 400 MG capsule  Commonly known as: NEURONTIN  Take 1 capsule by mouth 3 times daily for 90 days. Insulin Pen Needle 32G X 4 MM Misc  1 each by Does not apply route 4 times daily (after meals and at bedtime)     megestrol 20 MG tablet  Commonly known as: MEGACE  TAKE 1 TABLET BY MOUTH DAILY     metFORMIN 500 MG extended release tablet  Commonly known as: GLUCOPHAGE-XR  Take 2 tablets by mouth daily (with breakfast) AND 1 tablet Daily with supper. MULTI-VITAMIN DAILY PO     nicotine 21 MG/24HR  Commonly known as: NICODERM CQ  Place 1 patch onto the skin daily     NovoLOG FlexPen 100 UNIT/ML injection pen  Generic drug: insulin aspart  Inject 5 Units into the skin 3 times daily (before meals)     Shingrix 50 MCG/0.5ML Susr injection  Generic drug: zoster recombinant adjuvanted vaccine  Inject 0.5 mLs into the muscle See Admin Instructions 1 dose now and repeat in 2-6 months     sodium bicarbonate 650 MG tablet  TAKE 1 TABLET BY MOUTH THREE TIMES DAILY     Symbicort 80-4.5 MCG/ACT Aero  Generic drug: budesonide-formoterol  Inhale 2 puffs into the lungs 2 times daily     ZINC PO           * This list has 2 medication(s) that are the same as other medications prescribed for you. Read the directions carefully, and ask your doctor or other care provider to review them with you.                 STOP taking these medications      blood glucose test strips     glimepiride 2 MG tablet  Commonly known as: AMARYL     omeprazole 20 MG delayed release capsule  Commonly known as: PRILOSEC  Replaced by: pantoprazole 40 MG tablet               Where to Get Your Medications        These medications were sent to [facility] [address] [facility], [address] [facility]  [address] [facility], [facility] [address]      Phone: [phone]   levoFLOXacin 500 MG tablet  pantoprazole 40 MG tablet  SITagliptin 100 MG tablet       Information about where to get these medications is not yet available    Ask your nurse or doctor about these medications  Basaglar KwikPen 100 UNIT/ML injection pen

## 2022-07-21 NOTE — CARE COORDINATION
Discharge [facility] Case Management Department  Written by: Edson Pinedo RN    Patient Name: Sean Coughlin  Attending Provider: No att. providers found  Admit Date: 2022  9:56 PM  MRN: [id number]  Account: [de-identified]                     : 1955  Discharge Date: 2022      Disposition: home    Edson Pinedo RN

## 2022-07-22 ENCOUNTER — CARE COORDINATION (OUTPATIENT)
Dept: CASE MANAGEMENT | Age: 67
End: 2022-07-22

## 2022-07-22 NOTE — CARE COORDINATION
Aleks 45 Transitions Initial Follow Up Call    Call within 2 business days of discharge: Yes    Patient: Loren Stephenson Patient : 1955   MRN: [id number]  Reason for Admission: Hypoglycemia     Discharge Date: 22 RARS: Readmission Risk Score: 11.6      Last Discharge [facility]       Date Complaint Diagnosis Description Type Department Provider    22 Blood Sugar Problem Hypoglycemia . .. ED to Hosp-Admission (Discharged) (ADMITTED) Jose Cruz Yeboah MD; Kiarra Stauffer. .. Spoke with:  Attempted to contact patient for 24 hour initial transitional call. Unable to reach patient. Caller left a Hipaa compliant message introducing self, role, nature of the call and contact information for a return call.      Facility: [address] [place] [address]    Non-face-to-face services provided:  Obtained and reviewed discharge summary and/or continuity of care documents    Care Transitions 24 Hour Call    Care Transitions Interventions         Follow Up  Future Appointments   Date Time Provider Teresa Shoemaker   2022  3:00 PM Doc Young RN [address] [facility]   2022  3:30 PM Lucas Dey DO Neuro [facility]   2022  1:00 PM Joyce Cohen MD grtl[facility] exc [address] LPN Care Transitions Nurse   [phone]

## 2022-07-24 LAB
CULTURE: NORMAL
CULTURE: NORMAL
Lab: NORMAL
Lab: NORMAL
SPECIMEN DESCRIPTION: NORMAL
SPECIMEN DESCRIPTION: NORMAL

## 2022-07-25 ENCOUNTER — CARE COORDINATION (OUTPATIENT)
Dept: CARE COORDINATION | Age: 67
End: 2022-07-25

## 2022-07-25 ENCOUNTER — CARE COORDINATION (OUTPATIENT)
Dept: CASE MANAGEMENT | Age: 67
End: 2022-07-25

## 2022-07-25 NOTE — CARE COORDINATION
Facility: n/a    Challenges to be reviewed by the provider   Additional needs identified to be addressed with provider: Yes  medications-creon and januvia             Method of communication with provider : phone    Advance Care Planning:   Does patient have an Advance Directive: reviewed and current. LPN Care Coordinator contacted the patient by telephone to perform post hospital discharge assessment. Verified name and  with patient as identifiers. Provided introduction to self, and explanation of the LPN CC role. LPN CC reviewed discharge instructions, medical action plan and red flags with patient who verbalized understanding. Patient given an opportunity to ask questions and does not have any further questions or concerns at this time. Were discharge instructions available to patient? Yes. Reviewed appropriate site of care based on symptoms and resources available to patient including: PCP  When to call [address] The patient agrees to contact the PCP office for questions related to their healthcare. Medication reconciliation was performed with patient, who verbalizes understanding of administration of home medications. Advised obtaining a 90-day supply of all daily and as-needed medications. Was patient discharged with a pulse oximeter? no    LPN CC provided contact information. Plan for follow-up call in 5-7 days based on severity of symptoms and risk factors.   Plan for next call: symptom management-blood glucose levels  self management-check blood sugar daily as directed  medication management-take all medications as directed       Facility: [facility]    Non-face-to-face services provided:  Obtained and reviewed discharge summary and/or continuity of care documents    Care Transitions 24 Hour Call    Care Transitions Interventions         Follow Up  Future Appointments   Date Time Provider Teresa Shoemaker   2022  3:00 PM [address], [address] [facility] [place] 8/25/2022  3:30  [facility], DO Neuro [facility]   12/6/2022  1:00 PM Isam Kristen Daily MD grtlk exc [address] LPN Care Transitions Nurse   [phone]

## 2022-07-26 NOTE — CARE COORDINATION
Attempted to call the patient to discuss medication assistance, no answer and mailbox is full.         [facility]   Medication Assistance  [address] [place] [address], and [employer]    H) [phone] (d) [phone]

## 2022-07-27 ENCOUNTER — CARE COORDINATION (OUTPATIENT)
Dept: CASE MANAGEMENT | Age: 67
End: 2022-07-27

## 2022-07-27 NOTE — CARE COORDINATION
[facility] Transitions Follow Up Call    2022    Patient: Sean Coughlin  Patient : 1955   MRN: [id number]  Reason for Admission: hypoglycemia  Discharge Date: 22 RARS: Readmission Risk Score: 11.6         Spoke with: Subsequent transitional call. No answer. Left HIPPA compliant message to return call to this writer. Call to check if pt obtained januvia and Creon. Return call to patient. She states her FBS was 556. \" I took my fast acting insulin \" - \"that's all I took\"  Educated on use of Metformin. . Pt Saw Dr Khai Dasilva on 22. She states she is going to his office today. She did not get the Creon or Januvia. Appetite is good. Frequent urination. Pt states she is on her way to the Dr 's office now but she does not have an appt. Call to Dr's office x3. Spoke to [facility]. Gal Found will get pt an appt today with a provider in the office. Updated on  high blood sugar and medication issues. Will continue to follow. Patient is aware of when to contact MD with any new or worsening symptoms. Advised to contact PCP  with any health concerns for early outpatient intervention in an effort to avoid hospitalization. Report any worsening symptoms to PCP and/or Call 911 and/or GO TO EMERGENCY ROOM if symptoms are severe. Expresses understanding. Care Transitions Follow Up Call    Needs to be reviewed by the provider   Additional needs identified to be addressed with provider: Yes  medications-januvia and creon             Method of communication with provider : phone      LPN Care Coordinator contacted the patient by telephone to follow up after admission on 22. Verified name and  with patient as identifiers. Addressed changes since last contact: medications-medications she does not have Creon and Januvia  Discussed follow-up appointments. If no appointment was previously scheduled, appointment scheduling offered: Yes.    Is follow up appointment scheduled within 7 days of discharge? Yes. Advance Care Planning:   Does patient have an Advance Directive: reviewed and current. LPN CC reviewed discharge instructions, medical action plan and red flags with patient and discussed any barriers to care and/or understanding of plan of care after discharge. Discussed appropriate site of care based on symptoms and resources available to patient including: PCP  Specialist  When to call [address] The patient agrees to contact the PCP office for questions related to their healthcare. Patients top risk factors for readmission: medical condition-hyperglycemia  hypoglycemia    Interventions to address risk factors: Obtained and reviewed discharge summary and/or continuity of care documents      [facility] follow up appointment(s): n/a    LPN CC provided contact information for future needs. Plan for follow-up call in 7-10 days based on severity of symptoms and risk factors. Plan for next call: symptom management-hyper/ hypo glycemia  self management-check blood glucose levels daily and prn  medication management-take all medications as prescribed. Care Transitions Subsequent and Final Call    Subsequent and Final Calls  Care Transitions Interventions  Other Interventions:              Follow Up  Future Appointments   Date Time Provider Teresa Shoemaker   8/9/2022  3:00 PM Clifford Padilla RN [facility] DIAB ED [facility]   8/25/2022  3:30 PM Bladimir Salter DO Neuro [facility]   12/6/2022  1:00 PM Nara Albright MD grtlk exc [address] LPN Care Transitions Nurse   [phone]

## 2022-08-03 ENCOUNTER — CARE COORDINATION (OUTPATIENT)
Dept: CASE MANAGEMENT | Age: 67
End: 2022-08-03

## 2022-08-03 NOTE — CARE COORDINATION
Aleks 45 Transitions Follow Up Call    8/3/2022    Patient: Sherren Patee  Patient : 1955   MRN: [id number]  Reason for Admission: hypoglycemia  Discharge Date: 22 RARS: Readmission Risk Score: 11.6         Spoke with: Subsequent transitional call. No answer. Left HIPPA compliant message to return call to this writer. Care Transitions Subsequent and Final Call    Subsequent and Final Calls  Care Transitions Interventions  Other Interventions:              Follow Up  Future Appointments   Date Time Provider Teresa Shoemaker   2022  3:00 PM Willie Greer, [address],  ST DIAB ED [facility]   2022  3:30 PM Irene Aviles DO Neuro [facility]   2022  1:00 PM Jaelyn Naqvi MD [facility] exc [address] LPN Care Transitions Nurse   [phone]

## 2022-08-05 ENCOUNTER — CARE COORDINATION (OUTPATIENT)
Dept: CASE MANAGEMENT | Age: 67
End: 2022-08-05

## 2022-08-05 NOTE — CARE COORDINATION
Aleks 45 Transitions Follow Up Call    2022    Patient: Sherren Patee  Patient : 1955   MRN: [id number]  Reason for Admission: Hypoglycemia   Discharge Date: 22 RARS: Readmission Risk Score: 11.6         Spoke with: Subsequent transitional call. No answer. Left HIPPA compliant message to return call to this writer. Second consecutive attempt with no answer. No return call. Episode resolved. Care Transitions Subsequent and Final Call    Subsequent and Final Calls  Care Transitions Interventions  Other Interventions:              Follow Up  Future Appointments   Date Time Provider Teresa Shoemaker   2022  5:30 PM STA MAMMO RM 1 [facility]MO [facility]   2022  3:00 PM Willie Greer RD, LD STVZ DIAB ED [facility]   2022  3:30 PM Irene Aviles DO Neuro [facility] [address] [facility]  Maggie Kruger LP Care Transitions Nurse   [phone]

## 2022-08-09 ENCOUNTER — HOSPITAL ENCOUNTER (OUTPATIENT)
Dept: DIABETES SERVICES | Age: 67
Setting detail: THERAPIES SERIES
Discharge: HOME | End: 2022-08-09
Payer: MEDICARE

## 2022-08-09 DIAGNOSIS — Z79.4 TYPE 2 DIABETES MELLITUS WITH DIABETIC POLYNEUROPATHY, WITH LONG-TERM CURRENT USE OF INSULIN (HCC): Primary | ICD-10-CM

## 2022-08-09 DIAGNOSIS — K86.89 PANCREATIC INSUFFICIENCY: ICD-10-CM

## 2022-08-09 DIAGNOSIS — E46 MALNUTRITION, UNSPECIFIED TYPE (HCC): ICD-10-CM

## 2022-08-09 DIAGNOSIS — R73.9 HYPERGLYCEMIA: ICD-10-CM

## 2022-08-09 DIAGNOSIS — E11.42 TYPE 2 DIABETES MELLITUS WITH DIABETIC POLYNEUROPATHY, WITH LONG-TERM CURRENT USE OF INSULIN (HCC): Primary | ICD-10-CM

## 2022-08-09 PROCEDURE — G0108 DIAB MANAGE TRN  PER INDIV: HCPCS

## 2022-08-09 NOTE — PROGRESS NOTES
Diabetes Self- Management Education Program Assessment -   Also see Diabetic Screening  Patient, Tammie Montoya,  here for diabetes self-management education  visit/ assessment. Today's visit was in an individual setting.     MEDICAL HISTORY:  Past Medical History:   Diagnosis Date    Cataracts, bilateral     COPD (chronic obstructive pulmonary disease) (HCC)     CTS (carpal tunnel syndrome) left    Fibromyalgia     Generalized abdominal pain 6/3/2016    Glaucoma     Hypertension     Osteoarthritis     Osteoporosis     Pancreatic calcification 6/3/2016    Pancreatitis     Protein-calorie malnutrition, severe (HCC) 6/1/2016    Seizures ([facility] [address]     Tremor     Type II or unspecified type diabetes mellitus without mention of complication, not stated as uncontrolled     Uncontrolled type 2 DM with hyperosmolar nonketotic hyperglycemia ([address] 6/3/2016     Family History   Problem Relation Age of Onset    Brain Cancer Father     Diabetes Sister     Other Sister         epilepsy     Peanuts [peanut oil], Vicodin [hydrocodone-acetaminophen], Tramadol, Tylenol with codeine #3 [acetaminophen-codeine], and Ibuprofen   Immunization History   Administered Date(s) Administered    COVID-19, MODERNA BLUE border, Primary or Immunocompromised, (age 12y+), IM, 100 mcg/0.5mL 03/11/2021, 04/08/2021, 12/09/2021    Influenza Vaccine, unspecified formulation 10/05/2015    Influenza Virus Vaccine 10/05/2015, 10/10/2018    Influenza, MDCK Quadv, IM, PF (Flucelvax 2 yrs and older) 10/13/2021    Influenza, Quadv, IM, (6 mo and older Fluzone, Flulaval, Fluarix and 3 yrs and older Afluria) 10/10/2018    Influenza, Quadv, IM, PF (6 mo and older Fluzone, Flulaval, Fluarix, and 3 yrs and older Afluria) 01/18/2017, 11/03/2017, 01/02/2020, 10/05/2020    Pneumococcal Conjugate 13-valent ([id number]) 04/27/2017    Pneumococcal Polysaccharide ([id number]) 04/02/2013, 10/10/2018    Tdap (Boostrix, Adacel) 10/05/2015     Current Medications  Current Outpatient Medications   Medication Sig Dispense Refill    insulin glargine (BASAGLAR KWIKPEN) 100 UNIT/ML injection pen Inject 10 Units into the skin nightly 5 pen 0    pantoprazole (PROTONIX) 40 MG tablet Take 1 tablet by mouth every morning (before breakfast) 30 tablet 1    SITagliptin (JANUVIA) 100 MG tablet Take 1 tablet by mouth in the morning. (Patient not taking: Reported on 7/25/2022) 30 tablet 0    albuterol sulfate HFA (PROVENTIL;VENTOLIN;PROAIR) 108 (90 Base) MCG/ACT inhaler INHALE TWO(2) PUFFS INTO LUNGS EVERY SIX(6) HOURS AS NEEDED FOR WHEEZING 8.5 g 10    insulin aspart (NOVOLOG FLEXPEN) 100 UNIT/ML injection pen Inject 5 Units into the skin 3 times daily (before meals) 10 pen 3    zoster recombinant adjuvanted vaccine (SHINGRIX) 50 MCG/0.5ML SUSR injection Inject 0.5 mLs into the muscle See Admin Instructions 1 dose now and repeat in 2-6 months 0.5 mL 0    FLUoxetine (PROZAC) 10 MG capsule Take 1 capsule by mouth daily 90 capsule 0    gabapentin (NEURONTIN) 400 MG capsule Take 1 capsule by mouth 3 times daily for 90 days.  270 capsule 2    Insulin Pen Needle 32G X 4 MM MISC 1 each by Does not apply route 4 times daily (after meals and at bedtime) 1000 each 3    Continuous Blood Gluc  (FREESTYLE ANTONIO 14 DAY READER) OXANA 1 Units by Does not apply route 4 times daily 3 each 2    Continuous Blood Gluc Sensor (FREESTYLE ANTONIO 14 DAY SENSOR) MISC 1 box by Does not apply route once a week 6 each 3    Nutritional Supplements (ENSURE COMPLETE SHAKE) LIQD Take 1 Can by mouth in the morning and at bedtime 60 each 5    megestrol (MEGACE) 20 MG tablet TAKE 1 TABLET BY MOUTH DAILY 30 tablet 10    Blood Gluc Meter Disp-Strips (BLOOD GLUCOSE METER DISPOSABLE) OXANA Daily and as needed 1 each 0    Lancets MISC 1 each by Does not apply route 2 times daily 300 each 1    ferrous sulfate (IRON 325) 325 (65 Fe) MG tablet Take 1 tablet by mouth every other day 30 tablet 1    nicotine (NICODERM CQ) 21 MG/24HR Place 1 Objectives Pre-session Assess Date:    3/15/22   Instr. Date Reinforce Date Post- session Eval Comments   Diabetes disease process & Treatment process: Define diabetes & pre-diabetes; Identify own type of diabetes; role of the pancreas; signs/symptoms; diagnostic criteria; prevention & treatment options; contributing factors. 14/27/22 JW question if pt a type 1 and if endo will test antibodies. 3/29/22rs   3/15/22rs - stated dm for sometime and also acute pancreatitis with in pt care 2/2022 - now trying to learn more and improve her health - worried about her liver, pancrease and kidney    Incorporating nutritional management into lifestyle: Describe effect of type, amount & timing of food on blood glucose; Describe basic meal planning techniques & current nutrition guideline   1   - patient has been eating regular jello and ice cream often and 1 meal per day of solid food, drinking mostly water, but also tea with regular sugar and regular pepsi. Stated often having GI upset. Discussed need to try sugar free jello cups, pictures of recommended boxes and cups shared, need to change to diet pop and try splenda or non nutritive sweetener  in tea. Patient would like to gain weight. RD follow up is planned. 4/27/22 JW Pt showed hdt from RD about ways to gain weight. Discussed and said all good except regular sugar items listed (ie fruit packed in heavy syrup, sugar in drinks/foods, etc). Told pt main thing for gaining weight is getting the right meds to get better bs control. Improvement w bs will make a difference. Suggested 6 small meals or meal/snack, try higher protein shakes, pt does own cooking/shopping. States appetite still not good and megace doesn't help. 5/27/22rs   8/9/22 JW Gave sample of glucerna and coupons. Reviewed basics- small, frequent meals and snacks. Pt asked about doing vegetarian diet- discouraged her d/t potential to lose more weight with doing.  Suggested pt continue with eating from all the food groups. (Pt might have issues with gastroparesis)  What to eat - Food groups, When to eat - timing of meals and snacks, and How much to eat - portions control. 3/15/22rs  Wt loss and now trying to gain weight- - Of concern with discussion of medications - she stated she is not taking Creon as RX , only taking 1 pill in am due to cost -stated the cost for refill in over 600$ and she is now rationing what she has left in her pill bottle. Recommended patient call to her GI MD to discuss options, lower cost meds or samples. calories/ day   3 CHO choices/ meal and 1-2 CHO/snack   CHO choices/  day   grams of protein /day   gram of fat /day     3/29/22 very concerned about weight loss with diabetes - provided with supplement / sample of high protein meal replacement shakes today in vanilla and chocolate - patient stated she has used boost in past and liked shakes     5/27/22rs : Diet intake: She was given samples of ensure complete - due to her weight loss  - NOLVIA Dozier RD  would recommend ongoing supplementation of Ensure complete 2 shakes per day - please consider an nutritional RX and send to pharmacy   Correctly read food labels & demonstrate CHO counting & portion control with personalized meal plan. Identify dining out strategies, & dietary sick day guidelines. 1 4/27/22 JW   Basic label reading. Pt asking about fats, s fats but suggested to focus on cho for now. (was thinking sugar.) Said sugar issue when it makes up all of the cho like in a regular pop. Incorporating physical activity into lifestyle:   Verbalize effect of exercise on blood glucose levels; benefits of regular exercise; safety considerations; contraindications; maintenance of activity. 1 3/29/22rs   3/15/22rs - some walking - not much activity - get cold very easily   Using medications safely:  Identify effects of diabetes medicines on blood glucose levels;  List diabetes medication taken, action & side effects;    1 5/27/22rs    3/15/22rs   Discussed taking oral medications amaryl  2 mg twice per day - with Breakfast and Dinner and metformin 500mg 2 tabs in am with breakfast  and 1 tab in pm with dinner. Provided patient with am / pm pill sorter to help with this task. Insulin / Injectable - Appropriate injection sites; proper storage; supplies needed; proper technique; safe needle disposal guidelines. 1 4/27/22 JW had pt show me what she does with pens- needed a few reminders. 5/27/22rs   8/9/22 JW saw [address] who adjusted insulin to 15 units basaglar and Novolog 5 units ac meals  3/15/22rs -  e was taking basaglar 5 units with meals  ( about 2 meals per day)   - Clarified with patient rx is for once per day 5 units of basaglar - we set time for taking this medication daily at 3pm. Patient stated poor sleep patters and setting am or pm for taking basaglar has not worked out for her. Also clarified humalog with the meal time insulin and she is not to take this insulin at this time, but she may keep the pens in her refrigerator for storage. 3/29/220 - Medication use:  Patient has stated having both lantus and basaglar pens at home - she stated she is taking a shot with EACH meal and has moved dose to 10 units, on her own. She is then taking 30 units per day. Also taking all oral meds daily at 3pm as this is when she can remember to take them - including Amaryl and metformin.   - explained long and short acting insulins - explained that lantus and basaglar are both the same medication - long acting insulin    5/27/22:  She stated only has one pen - grey basaglar and taking 5 units with her meals  - on medication list is Humalog 5 units with meals, patient was shown pictures of insulin pens and stated she does not have Humalog pen ( blue)   - she has pen tips at home and stated use belly for injections - some pain injection (patietn with very little sq tissue) could you please order 4 mm pen tips   - stated she is Disaster preparedness strategies    1       Prevention, detection & treatment of chronic complications:  Define the natural course of diabetes & describe the relationship of blood glucose levels to long term complications of diabetes. Identify preventative measures & standards of care. 1 3/29/22rs  5/27/22rs   Following with PCP and GI  Has not had foot exam - has [facility] exam appt [facility] for April 8 - all labs done with recent inpt adm    3/29/22rs Advised patient to call PCP soon  for medication plan clarification and blood glucose response   call to [facility] / referral to Dr Bailee cardona:  office had not received the referral - writer faxed to office , ref, demos, last notes and recent labs, patient given copy of referral and phone number so she can call in few days for follow up.follow up call to [facility] on referral in about 1 week    5/27/22rs : Follow up consults:  She has not been able to get into Endo/ Dr Bailee Gracia office - she stated she called and visited, but they have not called her back to schedule. I also contacted [facility] office in march and confirmed they had the referral info. I would suggest try another provider and I have known that Dr Kelly Villalba, [facility] [address] [facility]      Developing strategies to address psychosocial issues:  Describe feelings about living with diabetes; Describe how stress, depression & anxiety affect blood glucose; Identify coping strategies; Identify support needed & support network available. 1 5/27/22rs    PAid 15 - recovering from poly substance use    Developing strategies to promote health/change behavior: Identify 7 self-care behaviors; Personal health risk factors; Benefits, challenges & strategies for behavioral change;    1 3/29/22rs     Expressed wanting to improve her health   Individualized goal selection. My goal , to help me improve my health, I will:   1.  Taking medications- work on taking medications at regular times - avoid skipping insulin - take daily at 3 pm      2. Plan  Follow-up Appointments planned in 3 weeks as one on one session     Instruction Method: [x]Lecture/Discussion  []Power Point Presentation  []Handouts  []Return Demonstration    Education Materials/Equipment Provided (VIA Mail for phone visits)  :    [x]Self-Management - Initial assessment - Enrolment in to ADA  Where do I Begin, Living with Type 2 diabetes ADA home support program and  handout on diabetes education classes. -- 3/15/22rs   [x]Understanding Diabetes session       Book How to Thrive: A guide for Your Journey with Diabetes ADA booklet -pages 4, 14-19, 22-23        View: Understanding Type 2 Diabetes from Animated Diabetes Patient [email or web address]    [x] Healthy Eating and Meal planning  How to Thrive: A guide for Your journey with Diabetes - ADA booklet - pages 20- 21 & 42-43  Handouts: Smarter snacking, Lowdown on low - carb diets, Supermarket savvy, Ready, set, start counting, Reading a nutrition fact label, 7 ways to size up your servings4/27/22 JW    [x]   Medications and Prevention of Complications      Book How to Thrive: A guide for Your Journey with Diabetes - ADA booklet -  pages 6-7, 12-13, 24-27 & 28 -28  Handouts: Know your numbers, Vaccinations, Type 2 diabetes and the role of GLP- 1, How to care for your teeth and gums, Caring for your feet, How to pick the right shoes  Individualized Diabetes report card     [x]  Diet Follow Up- CHO counting and  planning for sick days, holiday, vacations (8/9/22 JW more basics review)  How to Thrive: A guide for Your journey with Diabetes - ADA booklet  - pages 43- 37  Diabetes Food hub www. diabetesfoodhub.org   Handouts: Sick day rules, Dining out guide, Diabetes and alcohol, Traveling with diabetes, Diabetes disaster preparedness plan, Holidays and special occasions                                                            []  Self care and goal review -  3 [] Starting Fresh  adults living with diabetes or pre diabetes. [address] [place] [address] [phone] call for dates    []  Diabetes Group at  [address] - Free 6 week diabetes education support   classes - use web site interest form found at  EchoFirst.pt - to enroll       []ADA  Where do I Begin, Living with Type 2 diabetes ADA home support program  Web site: diabetes. org/living    Call: 1800 DIABETES  e-mail: Michelle@[email or web address]. org     []  Internet web sites - ADAWeb site: diabetes. org and diabetesfoodhub.org      Post Education Referrals:      [] [facility] information sheet and [address] [facility] , 21       [] Dental care - [facility]     [] [facility] ([facility]) link  phone number - for information and referral to [address], WOUND, WEIGHT MANAGEMENT        []Other  Juan Sierra RD, LD

## 2022-09-29 ENCOUNTER — HOSPITAL ENCOUNTER (OUTPATIENT)
Dept: DIABETES SERVICES | Age: 67
Setting detail: THERAPIES SERIES
Discharge: HOME | End: 2022-09-29
Payer: MEDICARE

## 2022-09-29 ENCOUNTER — TELEPHONE (OUTPATIENT)
Dept: DIABETES SERVICES | Age: 67
End: 2022-09-29

## 2022-09-29 PROCEDURE — G0108 DIAB MANAGE TRN  PER INDIV: HCPCS

## 2022-09-29 NOTE — PROGRESS NOTES
Refill    insulin glargine (BASAGLAR KWIKPEN) 100 UNIT/ML injection pen Inject 10 Units into the skin nightly 5 pen 0    SITagliptin (JANUVIA) 100 MG tablet Take 1 tablet by mouth in the morning. (Patient taking differently: Take 100 mg by mouth daily Patient paid and got the medication) 30 tablet 0    insulin aspart (NOVOLOG FLEXPEN) 100 UNIT/ML injection pen Inject 5 Units into the skin 3 times daily (before meals) (Patient taking differently: Inject into the skin 3 times daily (before meals) Scale only) 10 pen 3    metFORMIN (GLUCOPHAGE-XR) 500 MG extended release tablet Take 2 tablets by mouth daily (with breakfast) AND 1 tablet Daily with supper. 270 tablet 2    pantoprazole (PROTONIX) 40 MG tablet Take 1 tablet by mouth every morning (before breakfast) 30 tablet 1    albuterol sulfate HFA (PROVENTIL;VENTOLIN;PROAIR) 108 (90 Base) MCG/ACT inhaler INHALE TWO(2) PUFFS INTO LUNGS EVERY SIX(6) HOURS AS NEEDED FOR WHEEZING 8.5 g 10    zoster recombinant adjuvanted vaccine (SHINGRIX) 50 MCG/0.5ML SUSR injection Inject 0.5 mLs into the muscle See Admin Instructions 1 dose now and repeat in 2-6 months 0.5 mL 0    FLUoxetine (PROZAC) 10 MG capsule Take 1 capsule by mouth daily 90 capsule 0    gabapentin (NEURONTIN) 400 MG capsule Take 1 capsule by mouth 3 times daily for 90 days.  270 capsule 2    Insulin Pen Needle 32G X 4 MM MISC 1 each by Does not apply route 4 times daily (after meals and at bedtime) 1000 each 3    Nutritional Supplements (ENSURE COMPLETE SHAKE) LIQD Take 1 Can by mouth in the morning and at bedtime 60 each 5    megestrol (MEGACE) 20 MG tablet TAKE 1 TABLET BY MOUTH DAILY 30 tablet 10    Blood Gluc Meter Disp-Strips (BLOOD GLUCOSE METER DISPOSABLE) OXANA Daily and as needed 1 each 0    Lancets MISC 1 each by Does not apply route 2 times daily 300 each 1    ferrous sulfate (IRON 325) 325 (65 Fe) MG tablet Take 1 tablet by mouth every other day 30 tablet 1    nicotine (NICODERM CQ) 21 MG/24HR Place 1 patch onto the skin daily 30 patch 3    sodium bicarbonate 650 MG tablet TAKE 1 TABLET BY MOUTH THREE TIMES DAILY 90 tablet 0    Alcohol Swabs (ALCOHOL PREP) 70 % PADS Daily as needed 200 each 3    SYMBICORT 80-4.5 MCG/ACT AERO Inhale 2 puffs into the lungs 2 times daily 3 each 2    lipase-protease-amylase (CREON) 56583-981304 units CPEP delayed release capsule take 2 capsules by mouth four times a day after meals and at bedtime (Patient not taking: Reported on 7/25/2022) 240 capsule 1    Multiple Vitamin (MULTI-VITAMIN DAILY PO) Take by mouth      ZINC PO Take by mouth      AquaLance Lancets 30G MISC Daily and as needed for hypoglycemia 200 each 2     No current facility-administered medications for this encounter.   :     Comments:  Allergies: Allergies   Allergen Reactions    Peanuts [Peanut Oil] Anaphylaxis    Vicodin [Hydrocodone-Acetaminophen] Hives    Tramadol     Tylenol With Codeine #3 [Acetaminophen-Codeine]     Ibuprofen Itching         A1C blood level - at goal < 7%   Lab Results   Component Value Date    LABA1C 7.2 (H) 07/21/2022    LABA1C 11.2 05/31/2022    LABA1C 11.6 01/21/2022     Lab Results   Component Value Date    LABMICR CANNOT BE CALCULATED 03/11/2019    CREATININE 0.73 07/21/2022       Blood pressure ( 130/ 80)  Or less  BP Readings from Last 3 Encounters:   07/21/22 138/81   06/21/22 (!) 149/87   05/31/22 131/83        Cholesterol ( LDL under  100)   Lab Results   Component Value Date    LDLCHOLESTEROL 30 06/14/2022       Diabetes Self- Management Education Record    Participant Name: Lisette Tobin  Referring Provider: Lopez Bhandari MD   Assessment/Evaluation Ratings:  1=Needs Instruction   4=Demonstrates Understanding/Competency  2=Needs Review   NC=Not Covered    3=Comprehends Key Points  N/A=Not Applicable  Topics/Learning Objectives Pre-session Assess Date:    3/15/22   Instr.  Date Reinforce Date Post- session Eval Comments   Diabetes disease process & Treatment process: Define diabetes & pre-diabetes; Identify own type of diabetes; role of the pancreas; signs/symptoms; diagnostic criteria; prevention & treatment options; contributing factors. 14/27/22 JW question if pt a type 1 and if endo will test antibodies. 3/29/22rs   3/15/22rs - stated dm for sometime and also acute pancreatitis with in pt care 2/2022 - now trying to learn more and improve her health - worried about her liver, pancrease and kidney    Incorporating nutritional management into lifestyle: Describe effect of type, amount & timing of food on blood glucose; Describe basic meal planning techniques & current nutrition guideline   1   - patient has been eating regular jello and ice cream often and 1 meal per day of solid food, drinking mostly water, but also tea with regular sugar and regular pepsi. Stated often having GI upset. Discussed need to try sugar free jello cups, pictures of recommended boxes and cups shared, need to change to diet pop and try splenda or non nutritive sweetener  in tea. Patient would like to gain weight. RD follow up is planned. 4/27/22 JW Pt showed hdt from RD about ways to gain weight. Discussed and said all good except regular sugar items listed (ie fruit packed in heavy syrup, sugar in drinks/foods, etc). Told pt main thing for gaining weight is getting the right meds to get better bs control. Improvement w bs will make a difference. Suggested 6 small meals or meal/snack, try higher protein shakes, pt does own cooking/shopping. States appetite still not good and megace doesn't help. 5/27/22rs   8/9/22 JW Gave sample of glucerna and coupons. Reviewed basics- small, frequent meals and snacks. Pt asked about doing vegetarian diet- discouraged her d/t potential to lose more weight with doing. Suggested pt continue with eating from all the food groups.  (Pt might have issues with gastroparesis)  What to eat - Food groups, When to eat - timing of meals and snacks, and How much to eat - portions control. 3/15/22rs  Wt loss and now trying to gain weight- - Of concern with discussion of medications - she stated she is not taking Creon as RX , only taking 1 pill in am due to cost -stated the cost for refill in over 600$ and she is now rationing what she has left in her pill bottle. Recommended patient call to her GI MD to discuss options, lower cost meds or samples. calories/ day   3 CHO choices/ meal and 1-2 CHO/snack   CHO choices/  day   grams of protein /day   gram of fat /day     3/29/22 very concerned about weight loss with diabetes - provided with supplement / sample of high protein meal replacement shakes today in vanilla and chocolate - patient stated she has used boost in past and liked shakes     5/27/22rs : Diet intake: She was given samples of ensure complete - due to her weight loss  - NOLVIA Puckett RD  would recommend ongoing supplementation of Ensure complete 2 shakes per day - please consider an nutritional RX and send to pharmacy   Correctly read food labels & demonstrate CHO counting & portion control with personalized meal plan. Identify dining out strategies, & dietary sick day guidelines. 1 4/27/22 JW   Basic label reading. Pt asking about fats, s fats but suggested to focus on cho for now. (was thinking sugar.) Said sugar issue when it makes up all of the cho like in a regular pop. Incorporating physical activity into lifestyle:   Verbalize effect of exercise on blood glucose levels; benefits of regular exercise; safety considerations; contraindications; maintenance of activity. 1 3/29/22rs   3/15/22rs - some walking - not much activity - get cold very easily   Using medications safely:  Identify effects of diabetes medicines on blood glucose levels; List diabetes medication taken, action & side effects;    1 5/27/22rs      9-29-22 BB saw Endocrinologist, Pennie Bruno on 9-22-22.  Med list updated in EPIC   3/15/22rs   Discussed taking oral medications amaryl  2 mg twice per day - with Breakfast and Dinner and metformin 500mg 2 tabs in am with breakfast  and 1 tab in pm with dinner. Provided patient with am / pm pill sorter to help with this task. Insulin / Injectable - Appropriate injection sites; proper storage; supplies needed; proper technique; safe needle disposal guidelines. 1 4/27/22 JW had pt show me what she does with pens- needed a few reminders. 5/27/22rs   8/9/22 JW saw [address] Gomez who adjusted insulin to 15 units basaglar and Novolog 5 units ac meals  3/15/22rs -  e was taking basaglar 5 units with meals  ( about 2 meals per day)   - Clarified with patient rx is for once per day 5 units of basaglar - we set time for taking this medication daily at 3pm. Patient stated poor sleep patters and setting am or pm for taking basaglar has not worked out for her. Also clarified humalog with the meal time insulin and she is not to take this insulin at this time, but she may keep the pens in her refrigerator for storage. 3/29/220 - Medication use:  Patient has stated having both lantus and basaglar pens at home - she stated she is taking a shot with EACH meal and has moved dose to 10 units, on her own. She is then taking 30 units per day. Also taking all oral meds daily at 3pm as this is when she can remember to take them - including Amaryl and metformin.   - explained long and short acting insulins - explained that lantus and basaglar are both the same medication - long acting insulin    5/27/22:  She stated only has one pen - grey basaglar and taking 5 units with her meals  - on medication list is Humalog 5 units with meals, patient was shown pictures of insulin pens and stated she does not have Humalog pen ( blue)   - she has pen tips at home and stated use belly for injections - some pain injection (patietn with very little sq tissue) could you please order 4 mm pen tips   - stated she is taking metformin and Amaryl most day - and trying to take daily at 11 am - she is using pills sorter. Stated not taking creon due to cost. Taking megace, iron, yanique c and b 12 and yanique e. Would suggest change dose of basaglar to 15 units once daily   Needs new rx for Humalog pen  Pen tips  4 mm  ( phone call message to PCP)   Reviewed verbally and with written info how insulin works, how to inject and need for close follow up       Monitoring blood glucose, interpreting and using results:  Identify recommended & personal blood glucose targets; importance of testing; testing supplies; HgbA1C target levels; Factors affecting blood glucose; Importance of logging blood glucose levels for pattern recognition; ketone testing; safe lancet disposal.   1 3/29/22rs 22 KURT is doing some premeal bs checks. Endo to check c-peptide level and EVENS studies. She has a follow-up scheduled 8/15. encouraged her to do blood work before appt    3/15/22rs - stated BG range  At home - 100 to 500     Blood glucose trend:  With inulin use above - stated BG in 200's - 210's pre meals, this is trend down prior was up to 300- 500 range. Denies any low BG in last week. She is interested in getting CGM/ freestyle Petr Beer if possible as her finer are getting very soar, stated checking 3 - 4 times per day    22 BB patient requested assistance to place [facility]. [address] reader - assisted patient in setting the reader up. She had a total of 3 sensors. Helped her to unbox one and apply. Location placed - back of right arm. Watched 2 Bettym and Radah together - how to set up and use reader and how to apply sensor. after 60 minute set up time, she will be ready for her first scan at about 1:15pm   Prevention, detection & treatment of acute complications:  Identify symptoms of hyper & hypoglycemia, and prevention & treatment strategies.     1 3/29/22rs   High bg and lots of wt loss  22- Blood sugar control/ self monitorin BG in office today - at home unable to check due to not bleeding,  when checking her finger tips. She would like to try freestyle etienne 14 day system with a reader. I have provided her with some basic education on the product and I can her her apply start the sensors. Note to PCP    Describe sick day guidelines & indications for  physician notification. Identify short term consequences of poor control. Disaster preparedness strategies    1       Prevention, detection & treatment of chronic complications:  Define the natural course of diabetes & describe the relationship of blood glucose levels to long term complications of diabetes. Identify preventative measures & standards of care. 1 3/29/22rs  5/27/22rs   Following with PCP and GI  Has not had foot exam - has eye exam appt [facility] for April 8 - all labs done with recent inpt adm    3/29/22rs Advised patient to call PCP soon  for medication plan clarification and blood glucose response   call to [facility] / referral to Dr Sunday cardona:  office had not received the referral - writer faxed to office , ref, demos, last notes and recent labs, patient given copy of referral and phone number so she can call in few days for follow up.follow up call to [facility] on referral in about 1 week    5/27/22rs : Follow up consults:  She has not been able to get into [facility]/ Dr Sunday Serna office - she stated she called and visited, but they have not called her back to schedule. I also contacted [facility] office in march and confirmed they had the referral info. I would suggest try another provider and I have known that Dr Francisco Javier Noriega, [address] [facility]  [phone]     Developing strategies to address psychosocial issues:  Describe feelings about living with diabetes; Describe how stress, depression & anxiety affect blood glucose; Identify coping strategies; Identify support needed & support network available.  1 5/27/22rs    PAid 15 - recovering from poly substance use    Developing strategies to promote health/change behavior: Identify 7 self-care behaviors; Personal health risk factors; Benefits, challenges & strategies for behavioral change;    1 3/29/22rs     Expressed wanting to improve her health   Individualized goal selection. My goal , to help me improve my health, I will:   1. Taking medications- work on taking medications at regular times - avoid skipping insulin - take daily at 3 pm      2. Plan  Follow-up Appointments planned in 2 weeks as one on one session (support to change Drew CGM)    Instruction Method: [x]Lecture/Discussion  []Power Point Presentation  []Handouts  []Return Demonstration    Education Materials/Equipment Provided (VIA Mail for phone visits)  :    [x]Self-Management - Initial assessment - Enrolment in to ADA  Where do I Begin, Living with Type 2 diabetes ADA home support program and  handout on diabetes education classes. -- 3/15/22rs   [x]Understanding Diabetes session       Book How to Thrive: A guide for Your Journey with Diabetes ADA booklet -pages 4, 14-19, 22-23        View: Understanding Type 2 Diabetes from Animated Diabetes Patient [email or web address]    [x] Healthy Eating and Meal planning  How to Thrive: A guide for Your journey with Diabetes - ADA booklet - pages 20- 21 & 42-43  Handouts: Smarter snacking, Lowdown on low - carb diets, Supermarket savvy, Ready, set, start counting, Reading a nutrition fact label, 7 ways to size up your servings4/27/22 JW    [x]   Medications and Prevention of Complications      Book How to Thrive: A guide for Your Journey with Diabetes - ADA booklet -  pages 6-7, 12-13, 24-27 & 28 -35  Handouts: Know your numbers, Vaccinations, Type 2 diabetes and the role of GLP- 1, How to care for your teeth and gums, Caring for your feet, How to pick the right shoes  Individualized Diabetes report card     [x]  Diet Follow Up- CHO counting and  planning for sick days, holiday, vacations (8/9/22 JW more basics review)  How to Thrive: A guide for Your journey with Diabetes - ADA booklet  - pages 43- 37  Diabetes Food hub www. diabetesfoodhub.org   Handouts: Sick day rules, Dining out guide, Diabetes and alcohol, Traveling with diabetes, Diabetes disaster preparedness plan, Holidays and special occasions                                                            []  Self care and goal review -  3 month follow -    Handouts: AADE7 Self care behaviors work sheets and personal goal setting worksheet review, DSME support options on line     []Self-Management  Gestational - RN class -Resource materials sent out : care booklet - \" Gestational Diabetes Mellitus ( GDM) toolkit form [place] gestational diabetes postpartum care learning collaborative 2019. \"Simple Guidelines for meal planning with gestational diabetes. SMBG sheets to fax back to [facility] weekly. BD  healthy injection site selection and rotation with 6 mm insulin syringe and 4 mm pen needle. Gestational diabetes handout from [facility] 2016. Did you have gestational diabetes when you were pregnant?  Handout from [facility]  April 2014    []Self-Management Gestational - RD class - My Food Plan for Gestational diabetes    []Glucose Meter     []Insulin Kit     []Other      Encounter Type Date Start Time End Time Comments No Show Dates   Assessment 3/15/22rs     900   1000    x    Class 1 - Understanding diabetes 3/29/22rs  910   x - call to [facility] / referral to Dr Arielle Flores - office had not received the referral - writer faxed to office , ref, angelic, last notes and recent labs    Class 2- Nutrition and diabetes   4/27/22  10:15 11:15 X pt to see endo tomorrow    Class 3 - Preventing Complications 0/12/40OT   10 00 11 10 x    Class 4 -  In depth Nutrition and sick day care      Jakub Application and medication and monitoring content  8/9/22 JW                9-29-22 BB 3:00                11:30am 4:00                12:30pm   X in person                Face to face      Class 5 - 3 month follow up / goal reassessment        Gestational - RN         Gestational - RD        Individual MNT         Shared Med Appt         Yearly Follow-up        Meter Instrx      How to Measure Your Blood Sugar - [facility] Patient Education  [email or web address]    Insulin Instrx      []Pen  []Vial & Syringe   BD Diabetes Care: How to Inject Insulin with a Pen Needle  [email or web address]    Diabetes Care: How to Inject Insulin with a Syringe  [email or web address]       DSMS Support :   [] MNT      [] Annual update     [] Starting Fresh  adults living with diabetes or pre diabetes. [address] [facility] [phone] call for dates    []  Diabetes Group at  [address] - Free 6 week diabetes education support   classes - use web site interest form found at  Shoulder Tap.pt - to enroll       []ADA  Where do I Begin, Living with Type 2 diabetes ADA home support program  Web site: diabetes. org/living    Call: 1800 DIABETES  e-mail: Dana@epacube. org     []  Internet web sites - ADAWeb site: diabetes. org and diabetesfoodhub.org      Post Education Referrals:      [] [address] information sheet and [address] [facility] , 21       [] Dental care - Dental care of [facility]     [] [facility]) link  phone number - for information and referral to [facility]  Clinically  [address] [facility], FOOT, CARDIAC, WOUND, WEIGHT MANAGEMENT        []Other  SANDI [place] RN

## 2022-10-05 NOTE — PROGRESS NOTES
GI CLINIC FOLLOW UP    INTERVAL HISTORY:   No referring provider defined for this encounter. Chief Complaint   Patient presents with    Pancreatitis     Patient is here for f/u on hx of pancreatitis and elevated CEA-19, colonoscopy (completed last year) labs           HISTORY OF PRESENT ILLNESS: Sayra Mcdonald is a 73 y.o. female , referred for evaluation of**  pancreatitis,pancreatic insufficiency, elevated tumor markers  Not gaining wt    Here for f/u with above    CEA was slightly still high and still  CA-19-9 completely normal  Had colonoscopy  We did an MRI MRCP on her which showed sequela of chronic pancreatitis, questionable side  branches I PMN, both findings are similar to the imaging from 2016   we have her on Creon  Last visit colonoscopy and Labs :   colonoscopy as below   lab : ca 19-9 normal    CEA better as below  prilosec and iron   Hg last was in 2/22  10.5        Results for Светлана Cadet (MRN [id number]) as of 5/31/2022 13:39   Ref. Range 8/21/2017 16:20 5/11/2018 09:20 9/21/2018 12:18 12/18/2019 15:20 6/2/2021 09:07   CEA Latest Ref Range: <3.9 ng/mL 7.2 (H) 9.0 (H) 7.9 (H) 8.4 (H) 6.9 (H)               MRI   2/2021:         Impression   1. No acute upper abdominal abnormality.    2. Coarse calcifications throughout the pancreas are seen to better advantage   on prior CT.  Findings are compatible with chronic pancreatitis.  No   suspicious pancreatic mass.             Order History    Open Order Details                 The prep was fair.       Findings:  Terminal ileum: normal     Cecum/Ascending colon: normal     Transverse colon: normal     Descending/Sigmoid colon: abnormal: 1 cm sessile polyp snared  Very redundant colon with diverticulosis and a lot of twisting we have to spend some time to navigate the scope but we were successful to pass that area and reach the cecum very freely     Rectum/Anus: examined in normal and retroflexed positions and was abnormal: Internal and external hemorrhoids     Withdrawal Time was (minutes): 10     The colon was decompressed and the scope was removed. The patient tolerated the procedure well.      Recommendations/Plan:   1. Lifestyle and dietary modifications as discussed  2. F/U Biopsies  3. F/U In OfficeYes  4. Discussed with the family  5. Repeat colonoscopy [id number]     Electronically signed by Nixon Johnson MD  on 5/7/2021 at 2:51 PM   -- Diagnosis --   SIGMOID COLON, POLYP, BIOPSIES:     -  ADENOMATOUS POLYP (TUBULAR   ADENOMA). [facility], M.D.   **Electronically Signed Out**         jet/5/11/2021     Past Medical,Family, and Social History reviewed and does contribute to the patient presentingcondition. Patient's PMH/PSH,SH,PSYCH Hx, MEDs, ALLERGIES, and ROS were all reviewed and updated in the appropriate sections. PAST MEDICAL HISTORY:  Past Medical History:   Diagnosis Date    Cataracts, bilateral     COPD (chronic obstructive pulmonary disease) (HCC)     CTS (carpal tunnel syndrome) left    Fibromyalgia     Generalized abdominal pain 6/3/2016    Glaucoma     Hypertension     Osteoarthritis     Osteoporosis     Pancreatic calcification 6/3/2016    Pancreatitis     Protein-calorie malnutrition, severe ([address] 6/1/2016    Seizures ([address]     Tremor     Type II or unspecified type diabetes mellitus without mention of complication, not stated as uncontrolled     Uncontrolled type 2 DM with hyperosmolar nonketotic hyperglycemia ([address] 6/3/2016       Past Surgical History:   Procedure Laterality Date    COLONOSCOPY  08/30/2016    very poor prep, unable to complete    COLONOSCOPY  06/30/2017    ADENOMATOUS POLYP.  COLONOSCOPY  05/29/2018    polypectomy x1    COLONOSCOPY  05/07/2021    COLONOSCOPY N/A 5/7/2021    COLONOSCOPY WITH polypectomy performed by Nixon Johnson MD at [address] [place] [address] Left     ovary twice    NECK SURGERY      pt unsure of exact date & what the procedure was ? 2015 @ [facility]    IA COLON CA SCRN NOT  [address] IND N/A 5/29/2018    COLONOSCOPY WITH POLYP REMOVEL performed by Bhakti Perez MD at [address] N/A 6/30/2017    COLONOSCOPY POLYPECTOMY HOT BIOPSY performed by Bhakti Perez MD at [address]  08/30/2016    gastritis, MILD CHRONIC GASTRITIS WITH FOCAL ANTRAL EROSION AND ASSOCIATED ACUTE INFLAMMATION. INTESTINAL METAPLASIA PRESENT, NEGATIVE FOR DYSPLASIA.          CURRENT MEDICATIONS:    Current Outpatient Medications:     insulin lispro, 1 Unit Dial, (HUMALOG KWIKPEN) 100 UNIT/ML SOPN, Inject 5 Units into the skin 3 times daily (before meals), Disp: 13.5 mL, Rfl: 0    insulin glargine (BASAGLAR KWIKPEN) 100 UNIT/ML injection pen, Inject 15 Units into the skin nightly, Disp: 5 pen, Rfl: 0    Insulin Pen Needle 32G X 4 MM MISC, 1 each by Does not apply route 4 times daily (after meals and at bedtime), Disp: 1000 each, Rfl: 3    Continuous Blood Gluc  (FREESTYLE ANTONIO 14 DAY READER) OXANA, 1 Units by Does not apply route 4 times daily, Disp: 3 each, Rfl: 2    Continuous Blood Gluc Sensor (FREESTYLE ANTONIO 14 DAY SENSOR) MISC, 1 box by Does not apply route once a week, Disp: 6 each, Rfl: 3    Nutritional Supplements (ENSURE COMPLETE SHAKE) LIQD, Take 1 Can by mouth in the morning and at bedtime, Disp: 60 each, Rfl: 5    megestrol (MEGACE) 20 MG tablet, TAKE 1 TABLET BY MOUTH DAILY, Disp: 30 tablet, Rfl: 10    blood glucose monitor strips, 1 strip by Other route daily Test 1 times a day & as needed for symptoms of irregular blood glucose., Disp: 300 strip, Rfl: 5    omeprazole (PRILOSEC) 20 MG delayed release capsule, TAKE ONE (1) CAPSULE BY MOUTH TWICE DAILY (Patient not taking: Reported on 5/27/2022), Disp: 60 capsule, Rfl: 1    Blood Gluc Meter Disp-Strips (BLOOD GLUCOSE METER DISPOSABLE) OXANA, Daily and as needed, Disp: 1 each, Rfl: 0    Lancets MISC, 1 each by Does not apply route 2 times daily, Disp: 300 each, Rfl: 1    ferrous sulfate (IRON 325) 325 (65 Fe) MG tablet, Take 1 tablet by mouth every other day, Disp: 30 tablet, Rfl: 1    nicotine (NICODERM CQ) 21 MG/24HR, Place 1 patch onto the skin daily, Disp: 30 patch, Rfl: 3    sodium bicarbonate 650 MG tablet, TAKE 1 TABLET BY MOUTH THREE TIMES DAILY, Disp: 90 tablet, Rfl: 0    glimepiride (AMARYL) 2 MG tablet, Take 1 tablet by mouth 2 times daily (before meals), Disp: 180 tablet, Rfl: 1    albuterol sulfate  (90 Base) MCG/ACT inhaler, USE 2 INHALATIONS BY MOUTH  EVERY 6 HOURS AS NEEDED FOR WHEEZING, Disp: 17 g, Rfl: 1    Alcohol Swabs (ALCOHOL PREP) 70 % PADS, Daily as needed, Disp: 200 each, Rfl: 3    metFORMIN (GLUCOPHAGE-XR) 500 MG extended release tablet, Take 2 tablets by mouth daily (with breakfast) AND 1 tablet Daily with supper., Disp: 270 tablet, Rfl: 2    mirtazapine (REMERON) 30 MG tablet, Take 0.5 tablets by mouth nightly (Patient not taking: Reported on 3/15/2022), Disp: 90 tablet, Rfl: 3    SYMBICORT 80-4.5 MCG/ACT AERO, Inhale 2 puffs into the lungs 2 times daily, Disp: 3 each, Rfl: 2    lipase-protease-amylase (CREON) 53428-315112 units CPEP delayed release capsule, take 2 capsules by mouth four times a day after meals and at bedtime (Patient not taking: Reported on 5/27/2022), Disp: 240 capsule, Rfl: 1    Multiple Vitamin (MULTI-VITAMIN DAILY PO), Take by mouth, Disp: , Rfl:     ZINC PO, Take by mouth, Disp: , Rfl:     AquaLance Lancets 30G MISC, Daily and as needed for hypoglycemia, Disp: 200 each, Rfl: 2    ALLERGIES:   Allergies   Allergen Reactions    Peanuts [Peanut Oil] Anaphylaxis    Vicodin [Hydrocodone-Acetaminophen] Hives    Tramadol     Tylenol With Codeine #3 [Acetaminophen-Codeine]     Ibuprofen Itching       FAMILY HISTORY:       Problem Relation Age of Onset    Brain Cancer Father     Diabetes Sister     Other Sister         epilepsy         SOCIAL HISTORY:   Social History Socioeconomic History    Marital status:      Spouse name: Not on file    Number of children: Not on file    Years of education: Not on file    Highest education level: Not on file   Occupational History    Not on file   Tobacco Use    Smoking status: Current Every Day Smoker     Packs/day: 0.25     Years: 40.00     Pack years: 10.00    Smokeless tobacco: Former User   Vaping Use    Vaping Use: Never used   Substance and Sexual Activity    Alcohol use: No    Drug use: Yes     Types: Marijuana Myrtis Regulus)    Sexual activity: Not on file     Comment: last use end 10/2014   Other Topics Concern    Not on file   Social History Narrative    Not on file     Social Determinants of Health     Financial Resource Strain:     Difficulty of Paying Living Expenses: Not on file   Food Insecurity:     Worried About [address] in the Last Year: Not on file    [address] in the Last Year: Not on file   Transportation Needs:     Lack of Transportation (Medical): Not on file    Lack of Transportation (Non-Medical):  Not on file   Physical Activity:     Days of Exercise per Week: Not on file    Minutes of Exercise per Session: Not on file   Stress:     Feeling of Stress : Not on file   Social Connections:     Frequency of Communication with Friends and Family: Not on file    Frequency of Social Gatherings with Friends and Family: Not on file    Attends Oriental orthodox Services: Not on file    Active Member of [address] [place] [address] or Organizations: Not on file    Attends Club or Organization Meetings: Not on file    Marital Status: Not on file   Intimate Partner Violence:     Fear of Current or Ex-Partner: Not on file    Emotionally Abused: Not on file    Physically Abused: Not on file    Sexually Abused: Not on file   Housing Stability:     Unable to Pay for Housing in the Last Year: Not on file    Number of [place] in the Last Year: Not on file    Unstable Housing in the Last Year: Not on file REVIEW OF SYSTEMS: A 12-point review of systemswas obtained and pertinent positives and negatives were enumerated above in the history of present illness. All other reviewed systems / symptoms were negative. Review of Systems   Constitutional: Positive for fatigue. Negative for appetite change and unexpected weight change. HENT: Negative for trouble swallowing. Respiratory: Negative for cough, choking, shortness of breath and wheezing. Cardiovascular: Negative for chest pain, palpitations and leg swelling. Gastrointestinal: Negative for abdominal distention, abdominal pain, anal bleeding, blood in stool, constipation, diarrhea, nausea, rectal pain and vomiting. Genitourinary: Negative for difficulty urinating. Allergic/Immunologic: Negative for environmental allergies and food allergies. Neurological: Negative for dizziness, weakness, light-headedness, numbness and headaches. Hematological: Does not bruise/bleed easily. Psychiatric/Behavioral: Negative for sleep disturbance. The patient is not nervous/anxious.             LABORATORY DATA: Reviewed  Lab Results   Component Value Date    WBC 8.1 02/11/2022    HGB 10.5 (L) 02/11/2022    HCT 30.5 (L) 02/11/2022    MCV 92.7 02/11/2022     02/11/2022     (L) 02/11/2022    K 4.5 02/11/2022     02/11/2022    CO2 23 02/11/2022    BUN 5 (L) 02/11/2022    CREATININE 0.76 02/11/2022    LABPROT 7.4 05/01/2012    LABALBU 3.7 02/09/2022    BILITOT 0.31 02/09/2022    ALKPHOS 106 (H) 02/09/2022    AST 13 02/09/2022    ALT 11 02/09/2022    INR 1.1 02/11/2022         Lab Results   Component Value Date    RBC 3.29 (L) 02/11/2022    HGB 10.5 (L) 02/11/2022    MCV 92.7 02/11/2022    MCH 31.9 02/11/2022    MCHC 34.4 02/11/2022    RDW 12.1 02/11/2022    MPV 11.3 02/11/2022    BASOPCT 0 02/09/2022    LYMPHSABS 1.10 02/09/2022    MONOSABS 0.61 02/09/2022    NEUTROABS 5.91 02/09/2022    EOSABS 0.10 02/09/2022    BASOSABS 0.03 02/09/2022 DIAGNOSTIC TESTING:     No results found. PHYSICAL EXAMINATION: Vital signs reviewed per the nursing documentation. /74   Pulse (!) 109   Temp 98.6 °F (37 °C)   Wt 91 lb (41.3 kg)   BMI 14.25 kg/m²   Body mass index is 14.25 kg/m². Physical Exam  Vitals and nursing note reviewed. Constitutional:       General: She is not in acute distress. Appearance: She is well-developed. She is not diaphoretic. HENT:      Head: Normocephalic. Mouth/Throat:      Pharynx: No oropharyngeal exudate. Eyes:      General: No scleral icterus. Pupils: Pupils are equal, round, and reactive to light. Neck:      Thyroid: No thyromegaly. Vascular: No JVD. Trachea: No tracheal deviation. Cardiovascular:      Rate and Rhythm: Normal rate and regular rhythm. Heart sounds: Normal heart sounds. No murmur heard. Pulmonary:      Effort: Pulmonary effort is normal. No respiratory distress. Breath sounds: Normal breath sounds. No wheezing. Abdominal:      General: Bowel sounds are normal. There is no distension. Palpations: Abdomen is soft. Tenderness: There is no abdominal tenderness. There is no guarding or rebound. Comments: No ascites   Musculoskeletal:         General: Normal range of motion. Cervical back: Normal range of motion and neck supple. Skin:     General: Skin is warm. Coloration: Skin is not pale. Findings: No erythema or rash. Comments: She is not diaphoretic   Neurological:      Mental Status: She is alert and oriented to person, place, and time. Deep Tendon Reflexes: Reflexes are normal and symmetric. Psychiatric:         Behavior: Behavior normal.         Thought Content: Thought content normal.         Judgment: Judgment normal.           IMPRESSION: Ms. Lisbeth Dubin is a 73 y.o. female with      Diagnosis Orders   1. Pancreatic insufficiency     2. Elevated CEA     3. Hx of colonic polyps     4.  Alcohol-induced chronic pancreatitis (HCC)     5. Elevated CA 19-9 level     6. Weight loss     7. Anemia, unspecified type  Iron and TIBC    Vitamin B12    Folate    Hemoglobinopathy Evaluation     \Tumor markers are better  Imaging is not showing us evidence of malignancy  We will continue to watch  We will restudy the anemia and take it from there  Diet/life style/natural hx /complication of the dx were all explained in details   Past medical, past surgical, social history, psychiatric history, medications or allergies, all reviewed and  updated    Thank you for allowing me to participate in the care of Ms. Jeison Grider. For any further questions please do not hesitate to contact me. I have reviewed and agree with the ROS entered by the MA/RN. Note is dictated utilizing voice recognition software. Unfortunately this leads to occasional typographical errors. Please contact our office if you have any questions.       Maddy Livingston MD  [facility]  O: #[phone] Mood disorder

## 2022-10-13 ENCOUNTER — HOSPITAL ENCOUNTER (OUTPATIENT)
Dept: DIABETES SERVICES | Age: 67
Setting detail: THERAPIES SERIES
Discharge: HOME | End: 2022-10-13
Payer: MEDICARE

## 2022-10-13 PROCEDURE — G0108 DIAB MANAGE TRN  PER INDIV: HCPCS

## 2022-10-13 NOTE — PROGRESS NOTES
Diabetes Self- Management Education Program Assessment -   Also see Diabetic Screening  Patient, Enedina Kuo,  here for diabetes self-management education  visit/ assessment. Today's visit was in an individual setting.     MEDICAL HISTORY:  Past Medical History:   Diagnosis Date    Cataracts, bilateral     COPD (chronic obstructive pulmonary disease) (HCC)     CTS (carpal tunnel syndrome) left    Fibromyalgia     Generalized abdominal pain 6/3/2016    Glaucoma     Hypertension     Osteoarthritis     Osteoporosis     Pancreatic calcification 6/3/2016    Pancreatitis     Protein-calorie malnutrition, severe (HCC) 6/1/2016    Seizures ([address]     Tremor     Type II or unspecified type diabetes mellitus without mention of complication, not stated as uncontrolled     Uncontrolled type 2 DM with hyperosmolar nonketotic hyperglycemia ([address] 6/3/2016     Family History   Problem Relation Age of Onset    Brain Cancer Father     Diabetes Sister     Other Sister         epilepsy     Peanuts [peanut oil], Vicodin [hydrocodone-acetaminophen], Tramadol, Tylenol with codeine #3 [acetaminophen-codeine], and Ibuprofen   Immunization History   Administered Date(s) Administered    COVID-19, MODERNA BLUE border, Primary or Immunocompromised, (age 12y+), IM, 100 mcg/0.5mL 03/11/2021, 04/08/2021, 12/09/2021    Influenza Vaccine, unspecified formulation 10/05/2015    Influenza Virus Vaccine 10/05/2015, 10/10/2018    Influenza, AFLURIA (age 1 yrs+), FLUZONE, (age 10 mo+), MDV, 0.5mL 10/10/2018    Influenza, FLUARIX, FLULAVAL, FLUZONE (age 10 mo+) AND AFLURIA, (age 1 y+), PF, 0.5mL 01/18/2017, 11/03/2017, 01/02/2020, 10/05/2020    Influenza, FLUCELVAX, (age 10 mo+), MDCK, PF, 0.5mL 10/13/2021    Pneumococcal Conjugate 13-valent (Iocdvcn17) 04/27/2017    Pneumococcal Polysaccharide (Kowsquazd96) 04/02/2013, 10/10/2018    Tdap (Boostrix, Adacel) 10/05/2015     Current Medications  Current Outpatient Medications   Medication Sig Dispense Refill    insulin glargine (BASAGLAR KWIKPEN) 100 UNIT/ML injection pen Inject 10 Units into the skin nightly 5 pen 0    pantoprazole (PROTONIX) 40 MG tablet Take 1 tablet by mouth every morning (before breakfast) 30 tablet 1    SITagliptin (JANUVIA) 100 MG tablet Take 1 tablet by mouth in the morning. (Patient taking differently: Take 100 mg by mouth daily Patient paid and got the medication) 30 tablet 0    albuterol sulfate HFA (PROVENTIL;VENTOLIN;PROAIR) 108 (90 Base) MCG/ACT inhaler INHALE TWO(2) PUFFS INTO LUNGS EVERY SIX(6) HOURS AS NEEDED FOR WHEEZING 8.5 g 10    insulin aspart (NOVOLOG FLEXPEN) 100 UNIT/ML injection pen Inject 5 Units into the skin 3 times daily (before meals) (Patient taking differently: Inject into the skin 3 times daily (before meals) Scale only) 10 pen 3    zoster recombinant adjuvanted vaccine (SHINGRIX) 50 MCG/0.5ML SUSR injection Inject 0.5 mLs into the muscle See Admin Instructions 1 dose now and repeat in 2-6 months 0.5 mL 0    FLUoxetine (PROZAC) 10 MG capsule Take 1 capsule by mouth daily 90 capsule 0    gabapentin (NEURONTIN) 400 MG capsule Take 1 capsule by mouth 3 times daily for 90 days.  270 capsule 2    Insulin Pen Needle 32G X 4 MM MISC 1 each by Does not apply route 4 times daily (after meals and at bedtime) 1000 each 3    Nutritional Supplements (ENSURE COMPLETE SHAKE) LIQD Take 1 Can by mouth in the morning and at bedtime 60 each 5    megestrol (MEGACE) 20 MG tablet TAKE 1 TABLET BY MOUTH DAILY 30 tablet 10    Blood Gluc Meter Disp-Strips (BLOOD GLUCOSE METER DISPOSABLE) OXANA Daily and as needed 1 each 0    Lancets MISC 1 each by Does not apply route 2 times daily 300 each 1    ferrous sulfate (IRON 325) 325 (65 Fe) MG tablet Take 1 tablet by mouth every other day 30 tablet 1    nicotine (NICODERM CQ) 21 MG/24HR Place 1 patch onto the skin daily 30 patch 3    sodium bicarbonate 650 MG tablet TAKE 1 TABLET BY MOUTH THREE TIMES DAILY 90 tablet 0    Alcohol Swabs (ALCOHOL PREP) 70 % PADS Daily as needed 200 each 3    metFORMIN (GLUCOPHAGE-XR) 500 MG extended release tablet Take 2 tablets by mouth daily (with breakfast) AND 1 tablet Daily with supper. 270 tablet 2    SYMBICORT 80-4.5 MCG/ACT AERO Inhale 2 puffs into the lungs 2 times daily 3 each 2    lipase-protease-amylase (CREON) 40387-913845 units CPEP delayed release capsule take 2 capsules by mouth four times a day after meals and at bedtime (Patient not taking: Reported on 7/25/2022) 240 capsule 1    Multiple Vitamin (MULTI-VITAMIN DAILY PO) Take by mouth      ZINC PO Take by mouth      AquaLance Lancets 30G MISC Daily and as needed for hypoglycemia 200 each 2     No current facility-administered medications for this encounter.   :     Comments:  Allergies: Allergies   Allergen Reactions    Peanuts [Peanut Oil] Anaphylaxis    Vicodin [Hydrocodone-Acetaminophen] Hives    Tramadol     Tylenol With Codeine #3 [Acetaminophen-Codeine]     Ibuprofen Itching         A1C blood level - at goal < 7%   Lab Results   Component Value Date    LABA1C 7.2 (H) 07/21/2022    LABA1C 11.2 05/31/2022    LABA1C 11.6 01/21/2022     Lab Results   Component Value Date    LABMICR CANNOT BE CALCULATED 03/11/2019    CREATININE 0.73 07/21/2022       Blood pressure ( 130/ 80)  Or less  BP Readings from Last 3 Encounters:   07/21/22 138/81   06/21/22 (!) 149/87   05/31/22 131/83        Cholesterol ( LDL under  100)   Lab Results   Component Value Date    LDLCHOLESTEROL 30 06/14/2022       Diabetes Self- Management Education Record    Participant Name: Pito Britton  Referring Provider: Valdemar Partida MD   Assessment/Evaluation Ratings:  1=Needs Instruction   4=Demonstrates Understanding/Competency  2=Needs Review   NC=Not Covered    3=Comprehends Key Points  N/A=Not Applicable  Topics/Learning Objectives Pre-session Assess Date:    3/15/22   Instr.  Date Reinforce Date Post- session Eval Comments   Diabetes disease process & Treatment process: Define diabetes & pre-diabetes; Identify own type of diabetes; role of the pancreas; signs/symptoms; diagnostic criteria; prevention & treatment options; contributing factors. 14/27/22 JW question if pt a type 1 and if endo will test antibodies. 3/29/22rs   3/15/22rs - stated dm for sometime and also acute pancreatitis with in pt care 2/2022 - now trying to learn more and improve her health - worried about her liver, pancrease and kidney    Incorporating nutritional management into lifestyle: Describe effect of type, amount & timing of food on blood glucose; Describe basic meal planning techniques & current nutrition guideline   1   - patient has been eating regular jello and ice cream often and 1 meal per day of solid food, drinking mostly water, but also tea with regular sugar and regular pepsi. Stated often having GI upset. Discussed need to try sugar free jello cups, pictures of recommended boxes and cups shared, need to change to diet pop and try splenda or non nutritive sweetener  in tea. Patient would like to gain weight. RD follow up is planned. 4/27/22 JW Pt showed hdt from RD about ways to gain weight. Discussed and said all good except regular sugar items listed (ie fruit packed in heavy syrup, sugar in drinks/foods, etc). Told pt main thing for gaining weight is getting the right meds to get better bs control. Improvement w bs will make a difference. Suggested 6 small meals or meal/snack, try higher protein shakes, pt does own cooking/shopping. States appetite still not good and megace doesn't help. 5/27/22rs   8/9/22 JW Gave sample of glucerna and coupons. Reviewed basics- small, frequent meals and snacks. Pt asked about doing vegetarian diet- discouraged her d/t potential to lose more weight with doing. Suggested pt continue with eating from all the food groups.  (Pt might have issues with gastroparesis)  What to eat - Food groups, When to eat - timing of meals and snacks, and How much to eat - portions control. 3/15/22rs  Wt loss and now trying to gain weight- - Of concern with discussion of medications - she stated she is not taking Creon as RX , only taking 1 pill in am due to cost -stated the cost for refill in over 600$ and she is now rationing what she has left in her pill bottle. Recommended patient call to her GI MD to discuss options, lower cost meds or samples. calories/ day   3 CHO choices/ meal and 1-2 CHO/snack   CHO choices/  day   grams of protein /day   gram of fat /day     3/29/22 very concerned about weight loss with diabetes - provided with supplement / sample of high protein meal replacement shakes today in vanilla and chocolate - patient stated she has used boost in past and liked shakes     5/27/22rs : Diet intake: She was given samples of ensure complete - due to her weight loss  - RD Carey Cola RD  would recommend ongoing supplementation of Ensure complete 2 shakes per day - please consider an nutritional RX and send to pharmacy   Correctly read food labels & demonstrate CHO counting & portion control with personalized meal plan. Identify dining out strategies, & dietary sick day guidelines. 1 4/27/22 JW   Basic label reading. Pt asking about fats, s fats but suggested to focus on cho for now. (was thinking sugar.) Said sugar issue when it makes up all of the cho like in a regular pop. Incorporating physical activity into lifestyle:   Verbalize effect of exercise on blood glucose levels; benefits of regular exercise; safety considerations; contraindications; maintenance of activity. 1 3/29/22rs   3/15/22rs - some walking - not much activity - get cold very easily   Using medications safely:  Identify effects of diabetes medicines on blood glucose levels; List diabetes medication taken, action & side effects;    1 5/27/22rs      9-29-22 BB saw EndocrinologistZoë on 9-22-22.  Med list updated in EPIC   3/15/22rs   Discussed taking oral medications amaryl  2 mg twice per day - with Breakfast and Dinner and metformin 500mg 2 tabs in am with breakfast  and 1 tab in pm with dinner. Provided patient with am / pm pill sorter to help with this task. Insulin / Injectable - Appropriate injection sites; proper storage; supplies needed; proper technique; safe needle disposal guidelines. 1 4/27/22 JW had pt show me what she does with pens- needed a few reminders. 5/27/22rs   8/9/22 JW saw [address] who adjusted insulin to 15 units basaglar and Novolog 5 units ac meals  3/15/22rs -  e was taking basaglar 5 units with meals  ( about 2 meals per day)   - Clarified with patient rx is for once per day 5 units of basaglar - we set time for taking this medication daily at 3pm. Patient stated poor sleep patters and setting am or pm for taking basaglar has not worked out for her. Also clarified humalog with the meal time insulin and she is not to take this insulin at this time, but she may keep the pens in her refrigerator for storage. 3/29/220 - Medication use:  Patient has stated having both lantus and basaglar pens at home - she stated she is taking a shot with EACH meal and has moved dose to 10 units, on her own. She is then taking 30 units per day. Also taking all oral meds daily at 3pm as this is when she can remember to take them - including Amaryl and metformin.   - explained long and short acting insulins - explained that lantus and basaglar are both the same medication - long acting insulin    5/27/22:  She stated only has one pen - grey basaglar and taking 5 units with her meals  - on medication list is Humalog 5 units with meals, patient was shown pictures of insulin pens and stated she does not have Humalog pen ( blue)   - she has pen tips at home and stated use belly for injections - some pain injection (patietn with very little sq tissue) could you please order 4 mm pen tips   - stated she is taking metformin and Amaryl most day - and trying to take daily at 11 am - she is using pills sorter. Stated not taking creon due to cost. Taking megace, iron, yanique c and b 12 and yanique e. Would suggest change dose of basaglar to 15 units once daily   Needs new rx for Humalog pen  Pen tips  4 mm  ( phone call message to PCP)   Reviewed verbally and with written info how insulin works, how to inject and need for close follow up       Monitoring blood glucose, interpreting and using results:  Identify recommended & personal blood glucose targets; importance of testing; testing supplies; HgbA1C target levels; Factors affecting blood glucose; Importance of logging blood glucose levels for pattern recognition; ketone testing; safe lancet disposal.   1 3/29/22rs 8/9/22 KURT is doing some premeal bs checks. Endo to check c-peptide level and EVENS studies. She has a follow-up scheduled 8/15. encouraged her to do blood work before appt    3/15/22rs - stated BG range  At home - 100 to 500     Blood glucose trend:  With inulin use above - stated BG in 200's - 210's pre meals, this is trend down prior was up to 300- 500 range. Denies any low BG in last week. She is interested in getting CGM/ freestyle Hu Prows if possible as her finer are getting very soar, stated checking 3 - 4 times per day    9-29-22 BB patient requested assistance to place [address] [place] [address]. [address] [place] [address] reader - assisted patient in setting the reader up. She had a total of 3 sensors. Helped her to unbox one and apply. Location placed - back of right arm. Watched 2 Jamal and Radha together - how to set up and use reader and how to apply sensor. after 60 minute set up time, she will be ready for her first scan at about 1:15pm    10-13-22 BB copy of BRII TERRAZAS [facility] download given to patient. Reviewed reports with her. 55% in target, 16% high, 16%very high, 11% low, 2% very low. Also explained to her how to scroll through her reader to get data to analyze.   Goals - to have bedtime snack with carb and protein to help with lows overnight and to be more consistent with taking meal time insulin as highs typically between 12pm and 6pm.      Prevention, detection & treatment of acute complications:  Identify symptoms of hyper & hypoglycemia, and prevention & treatment strategies. 1 3/29/22rs   High bg and lots of wt loss  22- Blood sugar control/ self monitorin BG in office today - at home unable to check due to not bleeding,  when checking her finger tips. She would like to try freestyle etienne 14 day system with a reader. I have provided her with some basic education on the product and I can her her apply start the sensors. Note to PCP    Describe sick day guidelines & indications for  physician notification. Identify short term consequences of poor control. Disaster preparedness strategies    1       Prevention, detection & treatment of chronic complications:  Define the natural course of diabetes & describe the relationship of blood glucose levels to long term complications of diabetes. Identify preventative measures & standards of care. 1 3/29/22rs     Following with PCP and GI  Has not had foot exam - has eye exam appt [facility] for  - all labs done with recent inpt adm    3/29/22rs Advised patient to call PCP soon  for medication plan clarification and blood glucose response   call to [facility] / referral to Dr Pickard:  office had not received the referral - writer faxed to office , ref, angelic, last notes and recent labs, patient given copy of referral and phone number so she can call in few days for follow up.follow up call to [facility] on referral in about 1 week     : Follow up consults:  She has not been able to get into Endo/ Dr Soto office - she stated she called and visited, but they have not called her back to schedule. I also contacted [facility] office in march and confirmed they had the referral info.      I would suggest try another provider and I have known that Dr Galina Ha, [facility]       Developing strategies to address psychosocial issues:  Describe feelings about living with diabetes; Describe how stress, depression & anxiety affect blood glucose; Identify coping strategies; Identify support needed & support network available. 1 5/27/22rs    PAid 15 - recovering from poly substance use    Developing strategies to promote health/change behavior: Identify 7 self-care behaviors; Personal health risk factors; Benefits, challenges & strategies for behavioral change;    1 3/29/22rs     Expressed wanting to improve her health   Individualized goal selection. My goal , to help me improve my health, I will:   1. Taking medications- work on taking medications at regular times - avoid skipping insulin - take daily at 3 pm      2. Plan  Follow-up Appointments planned in 2 weeks as one on one session (support to change Drew MORA) & MNT    Instruction Method: [x]Lecture/Discussion  []Power Point Presentation  []Handouts  []Return Demonstration    Education Materials/Equipment Provided (VIA Mail for phone visits)  :    [x]Self-Management - Initial assessment - Enrolment in to ADA  Where do I Begin, Living with Type 2 diabetes ADA home support program and  handout on diabetes education classes. -- 3/15/22rs   [x]Understanding Diabetes session       Book How to Thrive: A guide for Your Journey with Diabetes ADA booklet -pages 4, 14-19, 22-23        View: Understanding Type 2 Diabetes from Animated Diabetes Patient [email or web address]    [x] Healthy Eating and Meal planning  How to Thrive: A guide for Your journey with Diabetes - ADA booklet - pages 20- 21 & 42-43  Handouts: Smarter snacking, Lowdown on low - carb diets, Supermarket savvy, Ready, set, start counting, Reading a nutrition fact label, 7 ways to size up your servings4/27/22 JW    [x]   Medications and Prevention of Complications      Book How to Thrive: A guide for Your Journey with Diabetes - ADA booklet -  pages 6-7, 12-13, 24-27 & 28 -35  Handouts: Know your numbers, Vaccinations, Type 2 diabetes and the role of GLP- 1, How to care for your teeth and gums, Caring for your feet, How to pick the right shoes  Individualized Diabetes report card     [x]  Diet Follow Up- CHO counting and  planning for sick days, holiday, vacations (8/9/22 JW more basics review)  How to Thrive: A guide for Your journey with Diabetes - ADA booklet  - pages 43- 37  Diabetes Food hub www. diabetesfoodhub.org   Handouts: Sick day rules, Dining out guide, Diabetes and alcohol, Traveling with diabetes, Diabetes disaster preparedness plan, Holidays and special occasions                                                            []  Self care and goal review -  3 month follow -    Handouts: AADE7 Self care behaviors work sheets and personal goal setting worksheet review, DSME support options on line     []Self-Management  Gestational - RN class -Resource materials sent out : care booklet - \" Gestational Diabetes Mellitus ( GDM) toolkit form [place] gestational diabetes postpartum care learning collaborative 2019. \"Simple Guidelines for meal planning with gestational diabetes. SMBG sheets to fax back to M weekly. BD  healthy injection site selection and rotation with 6 mm insulin syringe and 4 mm pen needle. Gestational diabetes handout from [facility] 2016. Did you have gestational diabetes when you were pregnant?  Handout from [facility]  April 2014    []Self-Management Gestational - RD class - My Food Plan for Gestational diabetes    []Glucose Meter     []Insulin Kit     []Other      Encounter Type Date Start Time End Time Comments No Show Dates   Assessment 3/15/22rs     900   1000    x    Class 1 - Understanding diabetes 3/29/22rs  910   x - call to [facility] / referral to Dr Tavo Tello - office had not received the referral - writer faxed to office , ref, demos, last notes and recent labs    Class 2- Nutrition and diabetes   4/27/22 JW 10:15 11:15 X pt to see endo tomorrow    Class 3 - Preventing Complications 3/20/31FP   10 00 11 10 x    Class 4 -  In depth Nutrition and sick day care      Call Application and medication and monitoring content  8/9/22 JW                9-29-22 BB    10-13-22 BB 3:00                11:30am    11:45am 4:00                12:30pm    12:45pm   X in person                Face to face    Face to face    Class 5 - 3 month follow up / goal reassessment        Gestational - RN         Gestational - RD        Individual MNT         Shared Med Appt         Yearly Follow-up        Meter Instrx      How to Measure Your 66 [address] Juan[address] Fracisco Patient Education  [email or web address]    Insulin Instrx      []Pen  []Vial & Syringe   BD Diabetes Care: How to Inject Insulin with a Pen Needle  [email or web address]    Diabetes Care: How to Inject Insulin with a Syringe  [email or web address]       DSMS Support :   [] MNT      [] Annual update     [] Starting Fresh  adults living with diabetes or pre diabetes. [address] [place] [address] [phone] call for dates    []  Diabetes Group at  [address] [place] - Free 6 week diabetes education support   classes - use web site interest form found at  Shareable Social.pt - to enroll       []ADA  Where do I Begin, Living with Type 2 diabetes ADA home support program  Web site: diabetes. org/living    Call: 1800 DIABETES  e-mail: [email or web address]     []  Internet web sites - ADAWeb site: diabetes. org and diabetesfoodhub.org      Post Education Referrals:      [] [address] information sheet and [address]       [] Dental care - [facility] of [facility]     [] [facility]) link  phone number - for information and referral to [facility]  Clinically  [address] [facility], WEIGHT MANAGEMENT        []Other  SANDI [place] RN

## 2022-10-27 ENCOUNTER — HOSPITAL ENCOUNTER (OUTPATIENT)
Dept: DIABETES SERVICES | Age: 67
Setting detail: THERAPIES SERIES
Discharge: HOME | End: 2022-10-27
Payer: MEDICARE

## 2022-10-27 PROCEDURE — 97802 MEDICAL NUTRITION INDIV IN: CPT

## 2022-10-27 PROCEDURE — G0108 DIAB MANAGE TRN  PER INDIV: HCPCS

## 2022-10-28 NOTE — PROGRESS NOTES
Current Medications  Current Outpatient Medications   Medication Sig Dispense Refill    insulin glargine (BASAGLAR KWIKPEN) 100 UNIT/ML injection pen Inject 10 Units into the skin nightly 5 pen 0    pantoprazole (PROTONIX) 40 MG tablet Take 1 tablet by mouth every morning (before breakfast) 30 tablet 1    SITagliptin (JANUVIA) 100 MG tablet Take 1 tablet by mouth in the morning. (Patient taking differently: Take 100 mg by mouth daily Patient paid and got the medication) 30 tablet 0    albuterol sulfate HFA (PROVENTIL;VENTOLIN;PROAIR) 108 (90 Base) MCG/ACT inhaler INHALE TWO(2) PUFFS INTO LUNGS EVERY SIX(6) HOURS AS NEEDED FOR WHEEZING 8.5 g 10    insulin aspart (NOVOLOG FLEXPEN) 100 UNIT/ML injection pen Inject 5 Units into the skin 3 times daily (before meals) (Patient taking differently: Inject into the skin 3 times daily (before meals) Scale only) 10 pen 3    zoster recombinant adjuvanted vaccine (SHINGRIX) 50 MCG/0.5ML SUSR injection Inject 0.5 mLs into the muscle See Admin Instructions 1 dose now and repeat in 2-6 months 0.5 mL 0    FLUoxetine (PROZAC) 10 MG capsule Take 1 capsule by mouth daily 90 capsule 0    gabapentin (NEURONTIN) 400 MG capsule Take 1 capsule by mouth 3 times daily for 90 days.  270 capsule 2    Insulin Pen Needle 32G X 4 MM MISC 1 each by Does not apply route 4 times daily (after meals and at bedtime) 1000 each 3    Nutritional Supplements (ENSURE COMPLETE SHAKE) LIQD Take 1 Can by mouth in the morning and at bedtime 60 each 5    megestrol (MEGACE) 20 MG tablet TAKE 1 TABLET BY MOUTH DAILY 30 tablet 10    Blood Gluc Meter Disp-Strips (BLOOD GLUCOSE METER DISPOSABLE) OXANA Daily and as needed 1 each 0    Lancets MISC 1 each by Does not apply route 2 times daily 300 each 1    ferrous sulfate (IRON 325) 325 (65 Fe) MG tablet Take 1 tablet by mouth every other day 30 tablet 1    nicotine (NICODERM CQ) 21 MG/24HR Place 1 patch onto the skin daily 30 patch 3    sodium bicarbonate 650 MG tablet TAKE 1 TABLET BY MOUTH THREE TIMES DAILY 90 tablet 0    Alcohol Swabs (ALCOHOL PREP) 70 % PADS Daily as needed 200 each 3    metFORMIN (GLUCOPHAGE-XR) 500 MG extended release tablet Take 2 tablets by mouth daily (with breakfast) AND 1 tablet Daily with supper. 270 tablet 2    SYMBICORT 80-4.5 MCG/ACT AERO Inhale 2 puffs into the lungs 2 times daily 3 each 2    lipase-protease-amylase (CREON) 14443-752633 units CPEP delayed release capsule take 2 capsules by mouth four times a day after meals and at bedtime (Patient not taking: Reported on 7/25/2022) 240 capsule 1    Multiple Vitamin (MULTI-VITAMIN DAILY PO) Take by mouth      ZINC PO Take by mouth      AquaLance Lancets 30G MISC Daily and as needed for hypoglycemia 200 each 2     No current facility-administered medications for this encounter.   :     Comments:  Allergies: Allergies   Allergen Reactions    Peanuts [Peanut Oil] Anaphylaxis    Vicodin [Hydrocodone-Acetaminophen] Hives    Tramadol     Tylenol With Codeine #3 [Acetaminophen-Codeine]     Ibuprofen Itching         A1C blood level - at goal < 7%   Lab Results   Component Value Date    LABA1C 7.2 (H) 07/21/2022    LABA1C 11.2 05/31/2022    LABA1C 11.6 01/21/2022     Lab Results   Component Value Date    LABMICR CANNOT BE CALCULATED 03/11/2019    CREATININE 0.73 07/21/2022       Blood pressure ( 130/ 80)  Or less  BP Readings from Last 3 Encounters:   07/21/22 138/81   06/21/22 (!) 149/87   05/31/22 131/83        Cholesterol ( LDL under  100)   Lab Results   Component Value Date    LDLCHOLESTEROL 30 06/14/2022       Diabetes Self- Management Education Record    Participant Name: Yvetta Hatchet  Referring Provider: Mary Hernandez MD   Assessment/Evaluation Ratings:  1=Needs Instruction   4=Demonstrates Understanding/Competency  2=Needs Review   NC=Not Covered    3=Comprehends Key Points  N/A=Not Applicable  Topics/Learning Objectives Pre-session Assess Date:    3/15/22   Instr.  Date Reinforce Date Post- session Eval Comments   Diabetes disease process & Treatment process: Define diabetes & pre-diabetes; Identify own type of diabetes; role of the pancreas; signs/symptoms; diagnostic criteria; prevention & treatment options; contributing factors. 14/27/22 JW question if pt a type 1 and if endo will test antibodies. 3/29/22rs   3/15/22rs - stated dm for sometime and also acute pancreatitis with in pt care 2/2022 - now trying to learn more and improve her health - worried about her liver, pancrease and kidney    Incorporating nutritional management into lifestyle: Describe effect of type, amount & timing of food on blood glucose; Describe basic meal planning techniques & current nutrition guideline   1   - patient has been eating regular jello and ice cream often and 1 meal per day of solid food, drinking mostly water, but also tea with regular sugar and regular pepsi. Stated often having GI upset. Discussed need to try sugar free jello cups, pictures of recommended boxes and cups shared, need to change to diet pop and try splenda or non nutritive sweetener  in tea. Patient would like to gain weight. RD follow up is planned. 4/27/22 JW Pt showed hdt from RD about ways to gain weight. Discussed and said all good except regular sugar items listed (ie fruit packed in heavy syrup, sugar in drinks/foods, etc). Told pt main thing for gaining weight is getting the right meds to get better bs control. Improvement w bs will make a difference. Suggested 6 small meals or meal/snack, try higher protein shakes, pt does own cooking/shopping. States appetite still not good and megace doesn't help. 5/27/22rs   8/9/22 JW Gave sample of glucerna and coupons. Reviewed basics- small, frequent meals and snacks. Pt asked about doing vegetarian diet- discouraged her d/t potential to lose more weight with doing. Suggested pt continue with eating from all the food groups.  (Pt might have issues with gastroparesis)  10/27/22 KURT wt at endo 10/20 93# (down). Pt says she still drinks 1-2 boost beverages a day. (Forgets to eat and doesn't seem to get hunger signals or signs of low bs.) suggested she set a timer to remind to eat. Discussed calorie dense foods. What to eat - Food groups, When to eat - timing of meals and snacks, and How much to eat - portions control. 3/15/22rs  Wt loss and now trying to gain weight- - Of concern with discussion of medications - she stated she is not taking Creon as RX , only taking 1 pill in am due to cost -stated the cost for refill in over 600$ and she is now rationing what she has left in her pill bottle. Recommended patient call to her GI MD to discuss options, lower cost meds or samples. calories/ day   3 CHO choices/ meal and 1-2 CHO/snack   CHO choices/  day   grams of protein /day   gram of fat /day     3/29/22 very concerned about weight loss with diabetes - provided with supplement / sample of high protein meal replacement shakes today in vanilla and chocolate - patient stated she has used boost in past and liked shakes     5/27/22rs : Diet intake: She was given samples of ensure complete - due to her weight loss  - NOLVIA Last RD  would recommend ongoing supplementation of Ensure complete 2 shakes per day - please consider an nutritional RX and send to pharmacy   Correctly read food labels & demonstrate CHO counting & portion control with personalized meal plan. Identify dining out strategies, & dietary sick day guidelines. 1 4/27/22 JW   Basic label reading. Pt asking about fats, s fats but suggested to focus on cho for now. (was thinking sugar.) Said sugar issue when it makes up all of the cho like in a regular pop. Incorporating physical activity into lifestyle:   Verbalize effect of exercise on blood glucose levels; benefits of regular exercise; safety considerations; contraindications; maintenance of activity.    1 3/29/22rs   3/15/22rs - some walking - not much activity - get cold very easily   Using medications safely:  Identify effects of diabetes medicines on blood glucose levels; List diabetes medication taken, action & side effects;    1 5/27/22rs      9-29-22 BB saw Endocrinologist, Carmen Velarde on 9-22-22. Med list updated in EPIC   3/15/22rs   Discussed taking oral medications amaryl  2 mg twice per day - with Breakfast and Dinner and metformin 500mg 2 tabs in am with breakfast  and 1 tab in pm with dinner. Provided patient with am / pm pill sorter to help with this task. Insulin / Injectable - Appropriate injection sites; proper storage; supplies needed; proper technique; safe needle disposal guidelines. 1 4/27/22 JW had pt show me what she does with pens- needed a few reminders. 5/27/22rs   8/9/22 JW saw [address] who adjusted insulin to 15 units basaglar and Novolog 5 units ac meals  10/27/22 JW pt had low at endo office. They decreased \"basaglar to 8 units, Novolog 2 units ac meals + low intensity sliding scale (1-5 ac (>150mg/dl and 1-4 hs (>200mg/dl). Pt to take correction even if not eating. \" (Pt was dosing novolog 1-2 hours after eating based on bs and was overcorrecting lows. Reiterated that pt take novolog before eating and why.  3/15/22rs -  e was taking basaglar 5 units with meals  ( about 2 meals per day)   - Clarified with patient rx is for once per day 5 units of basaglar - we set time for taking this medication daily at 3pm. Patient stated poor sleep patters and setting am or pm for taking basaglar has not worked out for her. Also clarified humalog with the meal time insulin and she is not to take this insulin at this time, but she may keep the pens in her refrigerator for storage. 3/29/220 - Medication use:  Patient has stated having both lantus and basaglar pens at home - she stated she is taking a shot with EACH meal and has moved dose to 10 units, on her own. She is then taking 30 units per day.  Also taking all oral meds daily at 3pm as this is when she can remember to take them - including Amaryl and metformin. - explained long and short acting insulins - explained that lantus and basaglar are both the same medication - long acting insulin    5/27/22:  She stated only has one pen - grey basaglar and taking 5 units with her meals  - on medication list is Humalog 5 units with meals, patient was shown pictures of insulin pens and stated she does not have Humalog pen ( blue)   - she has pen tips at home and stated use belly for injections - some pain injection (patietn with very little sq tissue) could you please order 4 mm pen tips   - stated she is taking metformin and Amaryl most day - and trying to take daily at 11 am - she is using pills sorter. Stated not taking creon due to cost. Taking megace, iron, yanique c and b 12 and yanique e. Would suggest change dose of basaglar to 15 units once daily   Needs new rx for Humalog pen  Pen tips  4 mm  ( phone call message to PCP)   Reviewed verbally and with written info how insulin works, how to inject and need for close follow up       Monitoring blood glucose, interpreting and using results:  Identify recommended & personal blood glucose targets; importance of testing; testing supplies; HgbA1C target levels; Factors affecting blood glucose; Importance of logging blood glucose levels for pattern recognition; ketone testing; safe lancet disposal.   1 3/29/22rs 8/9/22 KURT is doing some premeal bs checks. Endo to check c-peptide level and EVENS studies. She has a follow-up scheduled 8/15. encouraged her to do blood work before appt  10/27/22 Ilan Estrada report (scanned in media)  GMI lower than last at 6.5%  Target range- 62%  Hi and very hi- 21%  Low and very low- 17%      3/15/22rs - stated BG range  At home - 100 to 500     Blood glucose trend:  With inulin use above - stated BG in 200's - 210's pre meals, this is trend down prior was up to 300- 500 range. Denies any low BG in last week.  She is interested in getting CGM/ freestyle [place] if possible as her finer are getting very soar, stated checking 3 - 4 times per day    22 BB patient requested assistance to place [address]. [address] reader - assisted patient in setting the reader up. She had a total of 3 sensors. Helped her to unbox one and apply. Location placed - back of right arm. Watched 2 [employer] together - how to set up and use reader and how to apply sensor. after 60 minute set up time, she will be ready for her first scan at about 1:15pm    10-13-22 BB copy of [facility] download given to patient. Reviewed reports with her. 55% in target, 16% high, 16%very high, 11% low, 2% very low. Also explained to her how to scroll through her reader to get data to analyze. Goals - to have bedtime snack with carb and protein to help with lows overnight and to be more consistent with taking meal time insulin as highs typically between 12pm and 6pm.      Prevention, detection & treatment of acute complications:  Identify symptoms of hyper & hypoglycemia, and prevention & treatment strategies. 1 3/29/22rs    10/27/22 JW pt put new sensor on at 1pm, appt at 3pm and still not warmed up. Tapped reader to restart and scanned, 60 min countdown started but when close to finishing wouldn't register. Checked pt blood sugar w office meter and applied a new sensor. @ 4:00pm Pt bs 55 10/27/22 JW pt drank 3 juices (honest brand 8 grams carb each) 4:15 bs 44 ate 6 gram crax and pb, started orange pop 4:30 bs 50. Pt continued to sip pop (finished 24 oz) took her to cafe where she ate 12oz chili. 5:15 bs 101 and by 5:30 pt new sensor registering 114. (Pt states she took 8units basaglar this am and ate 1 egg/ pickett sandwich @ 10am and 1pm.  High bg and lots of wt loss  22- Blood sugar control/ self monitorin BG in office today - at home unable to check due to not bleeding,  when checking her finger tips. She would like to try freestyle [place] 14 day system with a reader.  I have provided her with some basic education on the product and I can her her apply start the sensors. Note to PCP    Describe sick day guidelines & indications for  physician notification. Identify short term consequences of poor control. Disaster preparedness strategies    1       Prevention, detection & treatment of chronic complications:  Define the natural course of diabetes & describe the relationship of blood glucose levels to long term complications of diabetes. Identify preventative measures & standards of care. 1 3/29/22rs  5/27/22rs   Following with PCP and GI  Has not had foot exam - has eye exam appt [facility] for April 8 - all labs done with recent inpt adm    3/29/22rs Advised patient to call PCP soon  for medication plan clarification and blood glucose response   call to [facility] / referral to Dr Cachorro cardona:  office had not received the referral - writer faxed to office , ref, demos, last notes and recent labs, patient given copy of referral and phone number so she can call in few days for follow up.follow up call to [facility] on referral in about 1 week    5/27/22rs : Follow up consults:  She has not been able to get into Endo/ Dr Cachorro Riddle office - she stated she called and visited, but they have not called her back to schedule. I also contacted [facility] office in march and confirmed they had the referral info. I would suggest try another provider and I have known that Dr Ana Akers, [address] of [facility]  [phone]     Developing strategies to address psychosocial issues:  Describe feelings about living with diabetes; Describe how stress, depression & anxiety affect blood glucose; Identify coping strategies; Identify support needed & support network available. 1 5/27/22rs    PAid 15 - recovering from poly substance use    Developing strategies to promote health/change behavior: Identify 7 self-care behaviors; Personal health risk factors;  Benefits, challenges & strategies for behavioral change;    1 3/29/22rs     Expressed wanting to improve her health   Individualized goal selection. My goal , to help me improve my health, I will:   1. Taking medications- work on taking medications at regular times - avoid skipping insulin - take daily at 3 pm      2. Plan  Follow-up Appointments planned in 2 weeks as one on one session (support to change Drew CGM) & MNT    Instruction Method: [x]Lecture/Discussion  []Power Point Presentation  []Handouts  []Return Demonstration    Education Materials/Equipment Provided (VIA Mail for phone visits)  :    [x]Self-Management - Initial assessment - Enrolment in to ADA  Where do I Begin, Living with Type 2 diabetes ADA home support program and  handout on diabetes education classes. -- 3/15/22rs   [x]Understanding Diabetes session       Book How to Thrive: A guide for Your Journey with Diabetes [place] booklet -pages 4, 14-19, 22-23        View: Understanding Type 2 Diabetes from Animated Diabetes Patient [email or web address]    [x] Healthy Eating and Meal planning  How to Thrive: A guide for Your journey with Diabetes - [place] booklet - pages 20- 21 & 42-43  Handouts: Smarter snacking, Lowdown on low - carb diets, Supermarket savvy, Ready, set, start counting, Reading a nutrition fact label, 7 ways to size up your servings4/27/22 JW    [x]   Medications and Prevention of Complications      Book How to Thrive: A guide for Your Journey with Diabetes - [place] booklet -  pages 6-7, 12-13, 24-27 & 28 -28  Handouts: Know your numbers, Vaccinations, Type 2 diabetes and the role of GLP- 1, How to care for your teeth and gums, Caring for your feet, How to pick the right shoes  Individualized Diabetes report card     [x]  Diet Follow Up- CHO counting and  planning for sick days, holiday, vacations (8/9/22 JW more basics review)  How to Thrive: A guide for Your journey with Diabetes - ADA booklet  - pages 43- 43  Diabetes Food hub www.diabetesfoodhub.org   Handouts: Sick day rules, Dining out guide, Diabetes and alcohol, Traveling with diabetes, Diabetes disaster preparedness plan, Holidays and special occasions                                                            []  Self care and goal review -  3 month follow -    Handouts: AADE7 Self care behaviors work sheets and personal goal setting worksheet review, DSME support options on line     []Self-Management  Gestational - RN class -Resource materials sent out : care booklet - \" Gestational Diabetes Mellitus ( GDM) toolkit form [place] gestational diabetes postpartum care learning collaborative 2019. \"Simple Guidelines for meal planning with gestational diabetes. SMBG sheets to fax back to [facility] weekly. BD  healthy injection site selection and rotation with 6 mm insulin syringe and 4 mm pen needle. Gestational diabetes handout from [facility] 2016. Did you have gestational diabetes when you were pregnant?  Handout from [facility]  April 2014    []Self-Management Gestational - RD class - My Food Plan for Gestational diabetes    []Glucose Meter     []Insulin Kit     []Other      Encounter Type Date Start Time End Time Comments No Show Dates   Assessment 3/15/22rs     900   1000    x    Class 1 - Understanding diabetes 3/29/22rs  910   x - call to [facility] / referral to Dr Jazz Kaur - office had not received the referral - writer faxed to office , ref, angelic, last notes and recent labs    Class 2- Nutrition and diabetes   4/27/22  10:15 11:15 X pt to see endo tomorrow    Class 3 - Preventing Complications 5/63/09KY   10 00 11 10 x    Class 4 -  In depth Nutrition and sick day care      German Avila Application and medication and monitoring content  8/9/22                 9-29-22 BB    10-13-22 BB 3:00                11:30am    11:45am 4:00                12:30pm    12:45pm   X in person                Face to face    Face to face    Class 5 - 3 month follow up / goal reassessment Gestational - RN         Gestational - RD        Individual MNT         Shared Med Appt         Yearly Follow-up        Meter Instrx      How to Measure Your Blood Sugar - [facility] Patient Education  [email or web address]    Insulin Instrx      []Pen  []Vial & Syringe   BD Diabetes Care: How to Inject Insulin with a Pen Needle  [email or web address]    Diabetes Care: How to Inject Insulin with a Syringe  [email or web address]       DSMS Support :   [] MNT      [] Annual update     [] Starting Fresh  adults living with diabetes or pre diabetes. [address] [place] [phone] call for dates    []  Diabetes Group at  [address] cintron - Free 6 week diabetes education support   classes - use web site interest form found at  VMLogix.pt - to enroll       []ADA  Where do I Begin, Living with Type 2 diabetes ADA home support program  Web site: diabetes. org/living    Call: 1800 DIABETES  e-mail: Dulce@[place]. org     []  Internet web sites - ADAWeb site: diabetes. org and diabetesfoodhub.org      Post Education Referrals:      [] [facility] information sheet and [address] [facility] , 21       [] Dental care - [facility] of [facility]     [] [facility] ([facility]) link  phone number - for information and referral to [address], WOUND, WEIGHT MANAGEMENT        []Other  Lizandro Read RD, LD

## 2022-12-09 ENCOUNTER — HOSPITAL ENCOUNTER (OUTPATIENT)
Age: 67
Discharge: HOME | End: 2022-12-09
Payer: MEDICARE

## 2022-12-09 LAB
ANION GAP SERPL CALCULATED.3IONS-SCNC: NORMAL MMOL/L (ref 9–17)
BUN BLDV-MCNC: NORMAL MG/DL (ref 8–23)
CHLORIDE BLD-SCNC: NORMAL MMOL/L (ref 98–107)
CO2: NORMAL MMOL/L (ref 20–31)
CREAT SERPL-MCNC: NORMAL MG/DL (ref 0.5–0.9)
GFR SERPL CREATININE-BSD FRML MDRD: NORMAL ML/MIN/1.73M2
GLUCOSE BLD-MCNC: NORMAL MG/DL (ref 70–99)
HCT VFR BLD CALC: 40.6 % (ref 36.3–47.1)
HEMOGLOBIN: 13.5 G/DL (ref 11.9–15.1)
MCH RBC QN AUTO: 32.4 PG (ref 25.2–33.5)
MCHC RBC AUTO-ENTMCNC: 33.3 G/DL (ref 28.4–34.8)
MCV RBC AUTO: 97.4 FL (ref 82.6–102.9)
NRBC AUTOMATED: 0 PER 100 WBC
PDW BLD-RTO: 13.6 % (ref 11.8–14.4)
PLATELET # BLD: 263 K/UL (ref 138–453)
PMV BLD AUTO: 11 FL (ref 8.1–13.5)
RBC # BLD: 4.17 M/UL (ref 3.95–5.11)
SODIUM BLD-SCNC: NORMAL MMOL/L (ref 135–144)
WBC # BLD: 9.5 K/UL (ref 3.5–11.3)

## 2022-12-09 PROCEDURE — 80048 BASIC METABOLIC PNL TOTAL CA: CPT

## 2022-12-09 PROCEDURE — 85027 COMPLETE CBC AUTOMATED: CPT

## 2022-12-19 ENCOUNTER — APPOINTMENT (OUTPATIENT)
Dept: GENERAL RADIOLOGY | Age: 67
End: 2022-12-19
Payer: MEDICARE

## 2022-12-19 ENCOUNTER — APPOINTMENT (OUTPATIENT)
Dept: CT IMAGING | Age: 67
End: 2022-12-19
Payer: MEDICARE

## 2022-12-19 ENCOUNTER — HOSPITAL ENCOUNTER (EMERGENCY)
Age: 67
Discharge: HOME | End: 2022-12-19
Attending: EMERGENCY MEDICINE
Payer: MEDICARE

## 2022-12-19 VITALS
TEMPERATURE: 98.4 F | OXYGEN SATURATION: 94 % | DIASTOLIC BLOOD PRESSURE: 80 MMHG | BODY MASS INDEX: 14.1 KG/M2 | HEART RATE: 91 BPM | WEIGHT: 90 LBS | SYSTOLIC BLOOD PRESSURE: 134 MMHG | RESPIRATION RATE: 15 BRPM

## 2022-12-19 DIAGNOSIS — J44.1 COPD EXACERBATION (HCC): ICD-10-CM

## 2022-12-19 DIAGNOSIS — R73.9 HYPERGLYCEMIA: Primary | ICD-10-CM

## 2022-12-19 LAB
ABSOLUTE EOS #: 0.05 K/UL (ref 0–0.44)
ABSOLUTE IMMATURE GRANULOCYTE: 0 K/UL (ref 0–0.3)
ABSOLUTE LYMPH #: 1.69 K/UL (ref 1.1–3.7)
ABSOLUTE MONO #: 0.09 K/UL (ref 0.1–1.2)
ALBUMIN SERPL-MCNC: 3.7 G/DL (ref 3.5–5.2)
ALP BLD-CCNC: 115 U/L (ref 35–104)
ALT SERPL-CCNC: 21 U/L (ref 5–33)
ANION GAP SERPL CALCULATED.3IONS-SCNC: 17 MMOL/L (ref 9–17)
AST SERPL-CCNC: 21 U/L
BASOPHILS # BLD: 0 % (ref 0–2)
BASOPHILS ABSOLUTE: 0 K/UL (ref 0–0.2)
BILIRUB SERPL-MCNC: 0.4 MG/DL (ref 0.3–1.2)
BILIRUBIN DIRECT: 0.1 MG/DL
BILIRUBIN URINE: NEGATIVE
BILIRUBIN, INDIRECT: 0.3 MG/DL (ref 0–1)
BUN BLDV-MCNC: 6 MG/DL (ref 8–23)
BUN/CREAT BLD: 8 (ref 9–20)
CALCIUM SERPL-MCNC: 9.5 MG/DL (ref 8.6–10.4)
CHLORIDE BLD-SCNC: 92 MMOL/L (ref 98–107)
CHP ED QC CHECK: NORMAL
CHP ED QC CHECK: YES
CO2: 22 MMOL/L (ref 20–31)
COLOR: YELLOW
CREAT SERPL-MCNC: 0.72 MG/DL (ref 0.5–0.9)
EKG ATRIAL RATE: 106 BPM
EKG P AXIS: 82 DEGREES
EKG P-R INTERVAL: 150 MS
EKG Q-T INTERVAL: 364 MS
EKG QRS DURATION: 70 MS
EKG QTC CALCULATION (BAZETT): 483 MS
EKG R AXIS: 42 DEGREES
EKG T AXIS: 82 DEGREES
EKG VENTRICULAR RATE: 106 BPM
EOSINOPHILS RELATIVE PERCENT: 1 % (ref 1–4)
EPITHELIAL CELLS UA: ABNORMAL /HPF (ref 0–5)
GFR SERPL CREATININE-BSD FRML MDRD: >60 ML/MIN/1.73M2
GLUCOSE BLD-MCNC: 131 MG/DL (ref 65–105)
GLUCOSE BLD-MCNC: 133 MG/DL
GLUCOSE BLD-MCNC: 133 MG/DL (ref 65–105)
GLUCOSE BLD-MCNC: 290 MG/DL
GLUCOSE BLD-MCNC: 290 MG/DL (ref 65–105)
GLUCOSE BLD-MCNC: 545 MG/DL (ref 70–99)
GLUCOSE BLD-MCNC: 590 MG/DL (ref 65–105)
GLUCOSE URINE: ABNORMAL
HCO3 VENOUS: 28 MMOL/L (ref 22–29)
HCT VFR BLD CALC: 39.2 % (ref 36.3–47.1)
HEMOGLOBIN: 13.5 G/DL (ref 11.9–15.1)
IMMATURE GRANULOCYTES: 0 %
KETONES, URINE: NEGATIVE
LACTIC ACID: 2.6 MMOL/L (ref 0.5–2.2)
LACTIC ACID: 2.6 MMOL/L (ref 0.5–2.2)
LACTIC ACID: 3.4 MMOL/L (ref 0.5–2.2)
LEUKOCYTE ESTERASE, URINE: NEGATIVE
LIPASE: 59 U/L (ref 13–60)
LYMPHOCYTES # BLD: 36 % (ref 24–43)
MCH RBC QN AUTO: 32.1 PG (ref 25.2–33.5)
MCHC RBC AUTO-ENTMCNC: 34.4 G/DL (ref 28.4–34.8)
MCV RBC AUTO: 93.3 FL (ref 82.6–102.9)
MONOCYTES # BLD: 2 % (ref 3–12)
MYOGLOBIN: 22 NG/ML (ref 25–58)
NITRITE, URINE: NEGATIVE
NRBC AUTOMATED: 0 PER 100 WBC
O2 SAT, VEN: 92 % (ref 60–85)
PCO2, VEN: 39.5 MM HG (ref 41–51)
PDW BLD-RTO: 12.8 % (ref 11.8–14.4)
PH UA: 7 (ref 5–8)
PH VENOUS: 7.46 (ref 7.32–7.43)
PLATELET # BLD: 271 K/UL (ref 138–453)
PMV BLD AUTO: 10.4 FL (ref 8.1–13.5)
PO2, VEN: 60.3 MM HG (ref 30–50)
POSITIVE BASE EXCESS, VEN: 4 (ref 0–3)
POTASSIUM SERPL-SCNC: 4.2 MMOL/L (ref 3.7–5.3)
PRO-BNP: 884 PG/ML
PROTEIN UA: NEGATIVE
RBC # BLD: 4.2 M/UL (ref 3.95–5.11)
RBC UA: ABNORMAL /HPF (ref 0–2)
SEG NEUTROPHILS: 61 % (ref 36–65)
SEGMENTED NEUTROPHILS ABSOLUTE COUNT: 2.87 K/UL (ref 1.5–8.1)
SERUM OSMOLALITY: 301 MOSM/KG (ref 275–295)
SODIUM BLD-SCNC: 131 MMOL/L (ref 135–144)
SPECIFIC GRAVITY UA: 1.04 (ref 1–1.03)
TOTAL PROTEIN: 7.6 G/DL (ref 6.4–8.3)
TROPONIN, HIGH SENSITIVITY: 12 NG/L (ref 0–14)
TROPONIN, HIGH SENSITIVITY: 13 NG/L (ref 0–14)
TURBIDITY: CLEAR
URINE HGB: NEGATIVE
UROBILINOGEN, URINE: NORMAL
WBC # BLD: 4.7 K/UL (ref 3.5–11.3)
WBC UA: ABNORMAL /HPF (ref 0–5)

## 2022-12-19 PROCEDURE — 82803 BLOOD GASES ANY COMBINATION: CPT

## 2022-12-19 PROCEDURE — 82947 ASSAY GLUCOSE BLOOD QUANT: CPT

## 2022-12-19 PROCEDURE — 71260 CT THORAX DX C+: CPT | Performed by: NURSE PRACTITIONER

## 2022-12-19 PROCEDURE — 93005 ELECTROCARDIOGRAM TRACING: CPT | Performed by: NURSE PRACTITIONER

## 2022-12-19 PROCEDURE — 99285 EMERGENCY DEPT VISIT HI MDM: CPT

## 2022-12-19 PROCEDURE — 83880 ASSAY OF NATRIURETIC PEPTIDE: CPT

## 2022-12-19 PROCEDURE — 6360000004 HC RX CONTRAST MEDICATION: Performed by: NURSE PRACTITIONER

## 2022-12-19 PROCEDURE — 2580000003 HC RX 258: Performed by: NURSE PRACTITIONER

## 2022-12-19 PROCEDURE — 84484 ASSAY OF TROPONIN QUANT: CPT

## 2022-12-19 PROCEDURE — 83930 ASSAY OF BLOOD OSMOLALITY: CPT

## 2022-12-19 PROCEDURE — 80048 BASIC METABOLIC PNL TOTAL CA: CPT

## 2022-12-19 PROCEDURE — 96361 HYDRATE IV INFUSION ADD-ON: CPT

## 2022-12-19 PROCEDURE — 85025 COMPLETE CBC W/AUTO DIFF WBC: CPT

## 2022-12-19 PROCEDURE — 83605 ASSAY OF LACTIC ACID: CPT

## 2022-12-19 PROCEDURE — 6370000000 HC RX 637 (ALT 250 FOR IP): Performed by: NURSE PRACTITIONER

## 2022-12-19 PROCEDURE — 71045 X-RAY EXAM CHEST 1 VIEW: CPT

## 2022-12-19 PROCEDURE — 83690 ASSAY OF LIPASE: CPT

## 2022-12-19 PROCEDURE — 96374 THER/PROPH/DIAG INJ IV PUSH: CPT

## 2022-12-19 PROCEDURE — 80076 HEPATIC FUNCTION PANEL: CPT

## 2022-12-19 PROCEDURE — 81001 URINALYSIS AUTO W/SCOPE: CPT

## 2022-12-19 PROCEDURE — 83874 ASSAY OF MYOGLOBIN: CPT

## 2022-12-19 RX ORDER — AZITHROMYCIN 250 MG/1
TABLET, FILM COATED ORAL
Qty: 1 PACKET | Refills: 0 | Status: SHIPPED | OUTPATIENT
Start: 2022-12-19 | End: 2022-12-23

## 2022-12-19 RX ORDER — BENZONATATE 100 MG/1
100 CAPSULE ORAL 2 TIMES DAILY PRN
Qty: 20 CAPSULE | Refills: 0 | Status: SHIPPED | OUTPATIENT
Start: 2022-12-19 | End: 2022-12-26

## 2022-12-19 RX ORDER — 0.9 % SODIUM CHLORIDE 0.9 %
1000 INTRAVENOUS SOLUTION INTRAVENOUS ONCE
Status: DISCONTINUED | OUTPATIENT
Start: 2022-12-19 | End: 2022-12-20 | Stop reason: HOSPADM

## 2022-12-19 RX ORDER — SODIUM CHLORIDE 9 MG/ML
INJECTION, SOLUTION INTRAVENOUS CONTINUOUS
Status: DISCONTINUED | OUTPATIENT
Start: 2022-12-19 | End: 2022-12-20 | Stop reason: HOSPADM

## 2022-12-19 RX ORDER — SODIUM CHLORIDE 0.9 % (FLUSH) 0.9 %
10 SYRINGE (ML) INJECTION PRN
Status: DISCONTINUED | OUTPATIENT
Start: 2022-12-19 | End: 2022-12-20 | Stop reason: HOSPADM

## 2022-12-19 RX ORDER — 0.9 % SODIUM CHLORIDE 0.9 %
80 INTRAVENOUS SOLUTION INTRAVENOUS ONCE
Status: COMPLETED | OUTPATIENT
Start: 2022-12-19 | End: 2022-12-19

## 2022-12-19 RX ORDER — GUAIFENESIN 600 MG/1
600 TABLET, EXTENDED RELEASE ORAL 2 TIMES DAILY
Qty: 30 TABLET | Refills: 0 | Status: SHIPPED | OUTPATIENT
Start: 2022-12-19 | End: 2023-01-03

## 2022-12-19 RX ORDER — PREDNISONE 20 MG/1
TABLET ORAL
Qty: 18 TABLET | Refills: 0 | Status: SHIPPED | OUTPATIENT
Start: 2022-12-19 | End: 2023-01-03

## 2022-12-19 RX ADMIN — SODIUM CHLORIDE: 9 INJECTION, SOLUTION INTRAVENOUS at 14:42

## 2022-12-19 RX ADMIN — INSULIN HUMAN 10 UNITS: 100 INJECTION, SOLUTION PARENTERAL at 14:42

## 2022-12-19 RX ADMIN — IOPAMIDOL 75 ML: 755 INJECTION, SOLUTION INTRAVENOUS at 18:18

## 2022-12-19 RX ADMIN — SODIUM CHLORIDE, PRESERVATIVE FREE 10 ML: 5 INJECTION INTRAVENOUS at 18:19

## 2022-12-19 RX ADMIN — SODIUM CHLORIDE 80 ML: 0.9 INJECTION, SOLUTION INTRAVENOUS at 18:18

## 2022-12-19 ASSESSMENT — ENCOUNTER SYMPTOMS
ABDOMINAL PAIN: 0
VOMITING: 1
PHOTOPHOBIA: 0
COUGH: 1
TROUBLE SWALLOWING: 0
EYE PAIN: 0
SHORTNESS OF BREATH: 1
RHINORRHEA: 1
BACK PAIN: 0
SORE THROAT: 0
COLOR CHANGE: 0
SINUS PRESSURE: 0
DIARRHEA: 1
NAUSEA: 1

## 2022-12-19 ASSESSMENT — PAIN DESCRIPTION - DESCRIPTORS: DESCRIPTORS: ACHING

## 2022-12-19 ASSESSMENT — PAIN DESCRIPTION - LOCATION: LOCATION: CHEST;GENERALIZED

## 2022-12-19 ASSESSMENT — PAIN - FUNCTIONAL ASSESSMENT: PAIN_FUNCTIONAL_ASSESSMENT: 0-10

## 2022-12-19 ASSESSMENT — PAIN DESCRIPTION - FREQUENCY: FREQUENCY: CONTINUOUS

## 2022-12-19 ASSESSMENT — PAIN SCALES - GENERAL
PAINLEVEL_OUTOF10: 5
PAINLEVEL_OUTOF10: 8

## 2022-12-19 NOTE — DISCHARGE INSTRUCTIONS
Take medications that were prescribed here as instructed. If you do not start to notice improvement over the next 3-4 days I recommend reevaluation.

## 2022-12-19 NOTE — ED PROVIDER NOTES
[facility] ED  eMERGENCY dEPARTMENT eNCOUnter      Pt Name: Lizeth Amaya  MRN: [id number]  [place] 1955  Date of evaluation: 12/19/2022  Provider: DONTRELL Mathews 3669       Chief Complaint   Patient presents with    Cough    Shortness of Breath    Hyperglycemia     Triage , states has not taken insulin for 2 weeks         HISTORY OF PRESENT ILLNESS  (Location/Symptom, Timing/Onset, Context/Setting, Quality, Duration, Modifying Factors, Severity.)   Lizeth Amaya is a 73 y.o. female who presents to the emergency department. C/o cough, SOB, N/V/D, body aches, fatigue. Onset was 2 weeks ago. She has not been taking her insulin the past 2 weeks; her BS is elevated. Denies edema, abd pain, dysuria. Rates her pain 8/10 at this time. Nursing Notes were reviewed.     ALLERGIES     Peanuts [peanut oil], Vicodin [hydrocodone-acetaminophen], Tramadol, Tylenol with codeine #3 [acetaminophen-codeine], and Ibuprofen    CURRENT MEDICATIONS       Discharge Medication List as of 12/19/2022 10:07 PM        CONTINUE these medications which have NOT CHANGED    Details   insulin glargine (BASAGLAR KWIKPEN) 100 UNIT/ML injection pen Inject 10 Units into the skin nightly, Disp-5 pen, R-0Adjust Sig      pantoprazole (PROTONIX) 40 MG tablet Take 1 tablet by mouth every morning (before breakfast), Disp-30 tablet, R-1Normal      SITagliptin (JANUVIA) 100 MG tablet Take 1 tablet by mouth in the morning., Disp-30 tablet, R-0Normal      albuterol sulfate HFA (PROVENTIL;VENTOLIN;PROAIR) 108 (90 Base) MCG/ACT inhaler INHALE TWO(2) PUFFS INTO LUNGS EVERY SIX(6) HOURS AS NEEDED FOR WHEEZING, Disp-8.5 g, R-10Normal      insulin aspart (NOVOLOG FLEXPEN) 100 UNIT/ML injection pen Inject 5 Units into the skin 3 times daily (before meals), Disp-10 pen, R-3Normal      FLUoxetine (PROZAC) 10 MG capsule Take 1 capsule by mouth daily, Disp-90 capsule, R-0Normal      gabapentin (NEURONTIN) 400 MG capsule Take 1 capsule by mouth 3 times daily for 90 days. , Disp-270 capsule, R-2Normal      Insulin Pen Needle 32G X 4 MM MISC 4 TIMES DAILY AFTER MEALS AND BEFORE BEDTIME Starting Fri 5/27/2022, Until Thu 8/25/2022, For 90 days, Disp-1000 each, R-3, Normal      Nutritional Supplements (ENSURE COMPLETE SHAKE) LIQD Take 1 Can by mouth in the morning and at bedtime, Disp-60 each, R-5Normal      megestrol (MEGACE) 20 MG tablet TAKE 1 TABLET BY MOUTH DAILY, Disp-30 tablet, R-10Normal      Blood Gluc Meter Disp-Strips (BLOOD GLUCOSE METER DISPOSABLE) OXANA Disp-1 each, R-0, NormalDaily and as needed      !! Lancets MISC 2 TIMES DAILY Starting Fri 2/25/2022, Disp-300 each, R-1, Normal      ferrous sulfate (IRON 325) 325 (65 Fe) MG tablet Take 1 tablet by mouth every other day, Disp-30 tablet, R-1Normal      nicotine (NICODERM CQ) 21 MG/24HR Place 1 patch onto the skin daily, Disp-30 patch, R-3Normal      sodium bicarbonate 650 MG tablet TAKE 1 TABLET BY MOUTH THREE TIMES DAILY, Disp-90 tablet, R-0Normal      Alcohol Swabs (ALCOHOL PREP) 70 % PADS Disp-200 each, R-3, NormalDaily as needed      metFORMIN (GLUCOPHAGE-XR) 500 MG extended release tablet Take 2 tablets by mouth daily (with breakfast) AND 1 tablet Daily with supper., Disp-270 tablet, R-2Normal      SYMBICORT 80-4.5 MCG/ACT AERO Inhale 2 puffs into the lungs 2 times daily, Disp-3 each, R-2, DAWNormal      lipase-protease-amylase (CREON) 40875-408783 units CPEP delayed release capsule take 2 capsules by mouth four times a day after meals and at bedtime, Disp-240 capsule, R-1Normal      Multiple Vitamin (MULTI-VITAMIN DAILY PO) Take by mouthHistorical Med      ZINC PO Take by mouthHistorical Med      !! AquaLance Lancets 30G MISC Daily and as needed for hypoglycemia, Disp-200 each, R-2Normal       !! - Potential duplicate medications found. Please discuss with provider.           PAST MEDICAL HISTORY         Diagnosis Date    Cataracts, bilateral     COPD (chronic obstructive pulmonary disease) (HCC)     CTS (carpal tunnel syndrome) left    Fibromyalgia     Generalized abdominal pain 6/3/2016    Glaucoma     Hypertension     Osteoarthritis     Osteoporosis     Pancreatic calcification 6/3/2016    Pancreatitis     Protein-calorie malnutrition, severe ([address] 2016    Seizures ([facility] [address]     Tremor     Type II or unspecified type diabetes mellitus without mention of complication, not stated as uncontrolled     Uncontrolled type 2 DM with hyperosmolar nonketotic hyperglycemia ([facility] [address] 6/3/2016       SURGICAL HISTORY           Procedure Laterality Date    COLONOSCOPY  2016    very poor prep, unable to complete    COLONOSCOPY  2017    ADENOMATOUS POLYP. COLONOSCOPY  2018    polypectomy x1    COLONOSCOPY  2021    COLONOSCOPY N/A 2021    COLONOSCOPY WITH polypectomy performed by Pepe Newell MD at [address] [place] [address] Rd Left     ovary twice    NECK SURGERY      pt unsure of exact date & what the procedure was ? 2015 @ [facility]    NM COLON CA SCRN NOT  [address] IND N/A 2018    COLONOSCOPY WITH POLYP REMOVEL performed by Pepe Newell MD at [facility] N/A 2017    [facility]OS[facility]  [facility]    gastritis, MILD CHRONIC GASTRITIS WITH FOCAL ANTRAL EROSION AND ASSOCIATED ACUTE INFLAMMATION. INTESTINAL METAPLASIA PRESENT, NEGATIVE FOR DYSPLASIA. FAMILY HISTORY           Problem Relation Age of Onset    Brain Cancer Father     Diabetes Sister     Other Sister         epilepsy     Family Status   Relation Name Status    Father      Sister  (Not Specified)        SOCIAL HISTORY      reports that she has been smoking. She has a 10.00 pack-year smoking history. She has quit using smokeless tobacco. She reports current drug use. Drug: Marijuana [place] [employer]).  She reports that she does not drink alcohol. REVIEW OF SYSTEMS    (2-9 systems for level 4, 10 or more for level 5)     Review of Systems   Constitutional:  Positive for chills and fatigue. Negative for activity change, appetite change, diaphoresis and fever. HENT:  Positive for congestion and rhinorrhea. Negative for ear discharge, ear pain, postnasal drip, sinus pressure, sore throat and trouble swallowing. Eyes:  Negative for photophobia, pain and visual disturbance. Respiratory:  Positive for cough and shortness of breath. Cardiovascular:  Negative for chest pain and palpitations. Gastrointestinal:  Positive for diarrhea, nausea and vomiting. Negative for abdominal pain. Genitourinary:  Negative for dysuria, flank pain, frequency, genital sores, hematuria and urgency. Musculoskeletal:  Positive for arthralgias and myalgias. Negative for back pain, neck pain and neck stiffness. Skin:  Negative for color change, rash and wound. Neurological:  Negative for dizziness, weakness, light-headedness and headaches. Except as noted above the remainder of the review of systems was reviewed and negative. PHYSICAL EXAM    (up to 7 for level 4, 8 or more for level 5)     ED Triage Vitals [12/19/22 1349]   BP Temp Temp Source Heart Rate Resp SpO2 Height Weight   125/87 98.4 °F (36.9 °C) Oral (!) 114 20 98 % -- 90 lb (40.8 kg)     Physical Exam  Vitals reviewed. Constitutional:       General: She is not in acute distress. Appearance: She is well-developed. She is not diaphoretic. HENT:      Right Ear: External ear normal.      Left Ear: External ear normal.      Mouth/Throat:      Mouth: Mucous membranes are dry. Eyes:      General: No scleral icterus. Conjunctiva/sclera: Conjunctivae normal.   Cardiovascular:      Rate and Rhythm: Regular rhythm. Tachycardia present. Pulmonary:      Effort: Pulmonary effort is normal. No respiratory distress. Breath sounds: No stridor. Decreased breath sounds present. Abdominal:      General: There is no distension. Tenderness: There is no abdominal tenderness. There is no guarding. Musculoskeletal:      Cervical back: Neck supple. No rigidity. Right lower leg: No edema. Left lower leg: No edema. Comments: Moves extremities. Skin:     General: Skin is warm and dry. Findings: No rash. Neurological:      Mental Status: She is alert and oriented to person, place, and time. GCS: GCS eye subscore is 4. GCS verbal subscore is 5. GCS motor subscore is 6. Cranial Nerves: Cranial nerves 2-12 are intact. Motor: Motor function is intact. Coordination: Coordination is intact. Psychiatric:         Behavior: Behavior normal.        DIAGNOSTIC RESULTS     RADIOLOGY:   Non-plain film images such as CT, Ultrasound and MRI are read by the radiologist. Plain radiographic images are visualized and preliminarily interpreted by the emergency physician with the below findings:    Interpretation per the Radiologist below, if available at the time of this note:    XR CHEST PORTABLE    Result Date: 12/19/2022  EXAMINATION: [address] 12/19/2022 2:30 pm COMPARISON: 07/19/2022 HISTORY: ORDERING SYSTEM PROVIDED HISTORY: SOB TECHNOLOGIST PROVIDED HISTORY: SOB Reason for Exam: SOB, AP UPRIGHT PORTABLE Initial encounter FINDINGS: Emphysematous changes. No focal consolidation, pleural effusion or pneumothorax. The cardiomediastinal silhouette is stable. No overt pulmonary edema. The osseous structures are stable. No acute cardiopulmonary findings. CT CHEST PULMONARY EMBOLISM W CONTRAST    Result Date: 12/19/2022  EXAMINATION: CTA OF THE CHEST 12/19/2022 5:37 pm TECHNIQUE: CTA of the chest was performed after the administration of intravenous contrast.  Multiplanar reformatted images are provided for review. MIP images are provided for review.  Automated exposure control, iterative reconstruction, and/or weight based adjustment of the mA/kV was utilized to reduce the radiation dose to as low as reasonably achievable. COMPARISON: None. HISTORY: ORDERING SYSTEM PROVIDED HISTORY: SOB TECHNOLOGIST PROVIDED HISTORY: SOB Decision Support Exception - unselect if not a suspected or confirmed emergency medical condition->Emergency Medical Condition (MA) Reason for Exam: Pt c/o SOB and cough FINDINGS: Pulmonary Arteries: Pulmonary arteries are adequately opacified for evaluation. No evidence of intraluminal filling defect to suggest pulmonary embolism. Main pulmonary artery is normal in caliber. Mediastinum: No evidence of mediastinal lymphadenopathy. The heart and pericardium demonstrate no acute abnormality. There is no acute abnormality of the thoracic aorta. Lungs/pleura: Centrilobular pulmonary emphysema. The lungs are without acute process. No focal consolidation or pulmonary edema. No evidence of pleural effusion or pneumothorax. Upper Abdomen: Limited images of the upper abdomen are unremarkable. Soft Tissues/Bones: No acute bone or soft tissue abnormality. No evidence of pulmonary embolism or acute pulmonary abnormality. Centrilobular pulmonary emphysema.  RECOMMENDATIONS: Unavailable         LABS:  Labs Reviewed   CBC WITH AUTO DIFFERENTIAL - Abnormal; Notable for the following components:       Result Value    Monocytes 2 (*)     Absolute Mono # 0.09 (*)     All other components within normal limits   BASIC METABOLIC PANEL - Abnormal; Notable for the following components:    Glucose 545 (*)     BUN 6 (*)     Bun/Cre Ratio 8 (*)     Sodium 131 (*)     Chloride 92 (*)     All other components within normal limits   LACTIC ACID - Abnormal; Notable for the following components:    Lactic Acid 2.6 (*)     All other components within normal limits   HEPATIC FUNCTION PANEL - Abnormal; Notable for the following components:    Alkaline Phosphatase 115 (*)     All other components within normal limits   BRAIN NATRIURETIC PEPTIDE - Abnormal; Notable for the following components:    Pro- (*)     All other components within normal limits   TROP/MYOGLOBIN - Abnormal; Notable for the following components:    Myoglobin 22 (*)     All other components within normal limits   OSMOLALITY - Abnormal; Notable for the following components:    Serum Osmolality 301 (*)     All other components within normal limits   URINALYSIS WITH MICROSCOPIC - Abnormal; Notable for the following components:    Glucose, Ur 3+ (*)     Specific Gravity, UA 1.038 (*)     All other components within normal limits   LACTIC ACID - Abnormal; Notable for the following components:    Lactic Acid 3.4 (*)     All other components within normal limits   LACTIC ACID - Abnormal; Notable for the following components:    Lactic Acid 2.6 (*)     All other components within normal limits   POC GLUCOSE FINGERSTICK - Abnormal; Notable for the following components:    POC Glucose 590 (*)     All other components within normal limits   VENOUS BLOOD GAS, POINT OF CARE - Abnormal; Notable for the following components:    pH, Davin 7.459 (*)     pCO2, Davin 39.5 (*)     pO2, Davin 60.3 (*)     Positive Base Excess, Davin 4 (*)     O2 Sat, Davin 92 (*)     All other components within normal limits   POC GLUCOSE FINGERSTICK - Abnormal; Notable for the following components:    POC Glucose 290 (*)     All other components within normal limits   POC GLUCOSE FINGERSTICK - Abnormal; Notable for the following components:    POC Glucose 131 (*)     All other components within normal limits   POC GLUCOSE FINGERSTICK - Abnormal; Notable for the following components:    POC Glucose 133 (*)     All other components within normal limits   POCT GLUCOSE - Normal   POCT GLUCOSE - Normal   LIPASE   TROPONIN       All other labs were within normal range or not returned as of this dictation.     EMERGENCY DEPARTMENT COURSE and DIFFERENTIAL DIAGNOSIS/MDM:   Vitals:    Vitals:    12/19/22 1833 12/19/22 2022 12/19/22 2052 12/19/22 2101   BP:    134/80   Pulse: (!) 102 91 90 91   Resp: 17 17 21 15   Temp:       TempSrc:       SpO2: 97% 95% 91% 94%   Weight:           MEDICATIONS GIVEN IN THE ED:  Medications   insulin regular (HUMULIN R;NOVOLIN R) injection 10 Units (10 Units IntraVENous Given 12/19/22 1442)   0.9 % sodium chloride bolus (0 mLs IntraVENous Stopped 12/19/22 1819)   iopamidol (ISOVUE-370) 76 % injection 75 mL (75 mLs IntraVENous Given 12/19/22 1818)       CLINICAL DECISION MAKING:  The patient presented alert with a nontoxic appearance and was seen in conjunction with Dr. Lynn and Dr. Nina Langford. Imaging was negative for acute findings. The patient was given IV insulin and fluids with improvement in her blood sugar. She also reported improvement in her dyspnea. CT scan was pending. Care was resumed to Dr. Nina Langford. See her dictation for further evaluation and treatment. Care was provided during an unprecedented national emergency due to the novel coronavirus, Covid-19. CRITICAL CARE TIME     Due to the high probability of sudden and clinically significant deterioration in the patient's condition she required highest level of my preparedness to intervene urgently. I provided critical care time including documentation time, medication orders and management, reevaluation, vital sign assessment, ordering and reviewing of of lab tests ordering and reviewing of x-ray studies, and admission orders. Aggregate critical care time is 30 minutes including only time during which I was engaged in work directly related to her care and did not include time spent treating other patients simultaneously. FINAL IMPRESSION      1. Hyperglycemia    2. COPD exacerbation [facility])            Problem List  Patient Active Problem List   Diagnosis Code    Intractable epilepsy ([facility] [address] G40.919    Hypoglycemia E16.2    Depression F32. A    Osteoporosis M81.0    Affective disorder ([facility] [address] F39    Uncontrolled type 2 DM with hyperosmolar nonketotic hyperglycemia ([facility] [address] E11.00    Generalized abdominal pain R10.84    Pancreatic calcification K86.89    Uncontrolled type 2 diabetes mellitus with hyperglycemia (HCC) E11.65    Pancreatic insufficiency K86.89    Pain of upper abdomen R10.10    Alcohol-induced chronic pancreatitis ([facility]) K86.0    Weight loss R63.4    Polysubstance abuse ([facility]) F19.10    Unresponsive episode R41.89    Chronic neck pain M54.2, G89.29    Neuropathic pain of both legs G57.93    Seizure disorder, secondary ([facility] [address] G40.909    Essential (primary) hypertension I10    Type 2 diabetes mellitus without complication, with long-term current use of insulin (HCC) E11.9, Z79.4    Neuropathic pain M79.2    Elevated CEA R97.0    Acute left-sided low back pain without sciatica M54.50    Adenomatous polyps D36.9    Bilateral wrist pain M25.531, M25.532    Seizure-like activity ([facility]) R56.9    Elevated CA 19-9 level R97.8    Body mass index (BMI) less than 16.5 Z68.1    Pseudohyponatremia R79.89    Hyperglycemia R73.9    Hypokalemia E87.6    Hypocalcemia E83.51    Hypomagnesemia E83.42    Noncompliance Z91.199    Severe malnutrition ([facility]) E43    Acute cystitis N30.00    Type 2 diabetes mellitus with hypoglycemia without coma, with long-term current use of insulin ([facility]) E11.649, Z79.4         DISPOSITION/PLAN   DISPOSITION Decision To Discharge 12/19/2022 10:06:12 PM      PATIENT REFERRED TO:   Sydni Silva MD  [facility], [facility] [place] [address] [address] [facility]  [phone]    Schedule an appointment as soon as possible for a visit       [facility] ED  [address] [facility]  [phone]    If symptoms worsen, As needed    DISCHARGE MEDICATIONS:     Discharge Medication List as of 12/19/2022 10:07 PM        START taking these medications    Details   predniSONE (DELTASONE) 20 MG tablet Take 2 tablets by mouth daily for 5 days, THEN 1 tablet daily for 5 days, THEN 0.5 tablets daily for 5 days. , Disp-18 tablet, R-0Print      azithromycin (ZITHROMAX Z-RADHA) 250 MG tablet Take 2 tablets (500 mg) on Day 1, and then take 1 tablet (250 mg) on days 2 through 5., Disp-1 packet, R-0Print      guaiFENesin (MUCINEX) 600 MG extended release tablet Take 1 tablet by mouth 2 times daily for 15 days, Disp-30 tablet, R-0Print      benzonatate (TESSALON) 100 MG capsule Take 1 capsule by mouth 2 times daily as needed for Cough, Disp-20 capsule, R-0Print                 (Please note that portions of this note were completed with a voice recognition program.  Efforts were made to edit the dictations but occasionally words are mis-transcribed.)    DONTRELL Armenta CNP, APRN - CNP  12/20/22 0946

## 2022-12-19 NOTE — ED PROVIDER NOTES
EMERGENCY DEPARTMENT ENCOUNTER   ATTENDING ATTESTATION     Pt Name: Miguel Patel  MRN: [id number]  [place] 1955  Date of evaluation: 12/19/22   Miguel Patel is a 73 y.o. female with CC: Cough, Shortness of Breath, and Hyperglycemia (Triage , states has not taken insulin for 2 weeks)    MDM:   This visit was performed by both a physician and an APC. I personally evaluated and examined the patient. I performed all aspects of the MDM as documented. CRITICAL CARE:       EKG: All EKG's are interpreted by the Emergency Department Physician who either signs or Co-signs this chart in the absence of a cardiologist.      RADIOLOGY:All plain film, CT, MRI, and formal ultrasound images (except ED bedside ultrasound) are read by the radiologist, see reports below, unless otherwise noted in MDM or here. XR CHEST PORTABLE   Final Result   No acute cardiopulmonary findings. CT CHEST PULMONARY EMBOLISM W CONTRAST    (Results Pending)     LABS: All lab results were reviewed by myself, and all abnormals are listed below.   Labs Reviewed   CBC WITH AUTO DIFFERENTIAL - Abnormal; Notable for the following components:       Result Value    Monocytes 2 (*)     Absolute Mono # 0.09 (*)     All other components within normal limits   BASIC METABOLIC PANEL - Abnormal; Notable for the following components:    Glucose 545 (*)     BUN 6 (*)     Bun/Cre Ratio 8 (*)     Sodium 131 (*)     Chloride 92 (*)     All other components within normal limits   LACTIC ACID - Abnormal; Notable for the following components:    Lactic Acid 2.6 (*)     All other components within normal limits   HEPATIC FUNCTION PANEL - Abnormal; Notable for the following components:    Alkaline Phosphatase 115 (*)     All other components within normal limits   BRAIN NATRIURETIC PEPTIDE - Abnormal; Notable for the following components:    Pro- (*)     All other components within normal limits   TROP/MYOGLOBIN - Abnormal; Notable for the following components:    Myoglobin 22 (*)     All other components within normal limits   URINALYSIS WITH MICROSCOPIC - Abnormal; Notable for the following components:    Glucose, Ur 3+ (*)     Specific Gravity, UA 1.038 (*)     All other components within normal limits   POC GLUCOSE FINGERSTICK - Abnormal; Notable for the following components:    POC Glucose 590 (*)     All other components within normal limits   VENOUS BLOOD GAS, POINT OF CARE - Abnormal; Notable for the following components:    pH, Davin 7.459 (*)     pCO2, Davin 39.5 (*)     pO2, Davin 60.3 (*)     Positive Base Excess, Davin 4 (*)     O2 Sat, Davin 92 (*)     All other components within normal limits   POC GLUCOSE FINGERSTICK - Abnormal; Notable for the following components:    POC Glucose 290 (*)     All other components within normal limits   POCT GLUCOSE - Normal   LIPASE   OSMOLALITY   TROPONIN   LACTIC ACID   POCT GLUCOSE     CONSULTS:  None  FINAL IMPRESSION    No diagnosis found. PASTMEDICAL HISTORY     Past Medical History:   Diagnosis Date    Cataracts, bilateral     COPD (chronic obstructive pulmonary disease) (HCC)     CTS (carpal tunnel syndrome) left    Fibromyalgia     Generalized abdominal pain 6/3/2016    Glaucoma     Hypertension     Osteoarthritis     Osteoporosis     Pancreatic calcification 6/3/2016    Pancreatitis     Protein-calorie malnutrition, severe (HCC) 6/1/2016    Seizures ([address]     Tremor     Type II or unspecified type diabetes mellitus without mention of complication, not stated as uncontrolled     Uncontrolled type 2 DM with hyperosmolar nonketotic hyperglycemia ([address] 6/3/2016     SURGICAL HISTORY       Past Surgical History:   Procedure Laterality Date    COLONOSCOPY  08/30/2016    very poor prep, unable to complete    COLONOSCOPY  06/30/2017    ADENOMATOUS POLYP.      COLONOSCOPY  05/29/2018    polypectomy x1    COLONOSCOPY  05/07/2021    COLONOSCOPY N/A 5/7/2021    COLONOSCOPY WITH polypectomy performed by Juan Jose Morel MD at [facility]  Left     ovary twice    NECK SURGERY      pt unsure of exact date & what the procedure was ? 2015 @ [facility]    AR COLON CA SCRN NOT  [address] [facility] N/A 5/29/2018    COLONOSCOPY WITH POLYP REMOVEL performed by Juan Jose Morel MD at [facility] N/A 6/30/2017    COLONOSCOPY POLYPECTOMY HOT BIOPSY performed by Juan Jose Morel MD at [address] [place] [address]  08/30/2016    gastritis, MILD CHRONIC GASTRITIS WITH FOCAL ANTRAL EROSION AND ASSOCIATED ACUTE INFLAMMATION. INTESTINAL METAPLASIA PRESENT, NEGATIVE FOR DYSPLASIA. CURRENT MEDICATIONS       Previous Medications    ALBUTEROL SULFATE HFA (PROVENTIL;VENTOLIN;PROAIR) 108 (90 BASE) MCG/ACT INHALER    INHALE TWO(2) PUFFS INTO LUNGS EVERY SIX(6) HOURS AS NEEDED FOR WHEEZING    ALCOHOL SWABS (ALCOHOL PREP) 70 % PADS    Daily as needed    AQUALANCE LANCETS 30G MISC    Daily and as needed for hypoglycemia    BLOOD GLUC METER DISP-STRIPS (BLOOD GLUCOSE METER DISPOSABLE) OXANA    Daily and as needed    FERROUS SULFATE (IRON 325) 325 (65 FE) MG TABLET    Take 1 tablet by mouth every other day    FLUOXETINE (PROZAC) 10 MG CAPSULE    Take 1 capsule by mouth daily    GABAPENTIN (NEURONTIN) 400 MG CAPSULE    Take 1 capsule by mouth 3 times daily for 90 days.     INSULIN ASPART (NOVOLOG FLEXPEN) 100 UNIT/ML INJECTION PEN    Inject 5 Units into the skin 3 times daily (before meals)    INSULIN GLARGINE (BASAGLAR KWIKPEN) 100 UNIT/ML INJECTION PEN    Inject 10 Units into the skin nightly    INSULIN PEN NEEDLE 32G X 4 MM MISC    1 each by Does not apply route 4 times daily (after meals and at bedtime)    LANCETS MISC    1 each by Does not apply route 2 times daily    LIPASE-PROTEASE-AMYLASE (CREON) 27872-785315 UNITS CPEP DELAYED RELEASE CAPSULE    take 2 capsules by mouth four times a day after meals and at bedtime    MEGESTROL (MEGACE) 20 MG TABLET    TAKE 1 TABLET BY MOUTH DAILY    METFORMIN (GLUCOPHAGE-XR) 500 MG EXTENDED RELEASE TABLET    Take 2 tablets by mouth daily (with breakfast) AND 1 tablet Daily with supper. MULTIPLE VITAMIN (MULTI-VITAMIN DAILY PO)    Take by mouth    NICOTINE (NICODERM CQ) 21 MG/24HR    Place 1 patch onto the skin daily    NUTRITIONAL SUPPLEMENTS (ENSURE COMPLETE SHAKE) LIQD    Take 1 Can by mouth in the morning and at bedtime    PANTOPRAZOLE (PROTONIX) 40 MG TABLET    Take 1 tablet by mouth every morning (before breakfast)    SITAGLIPTIN (JANUVIA) 100 MG TABLET    Take 1 tablet by mouth in the morning. SODIUM BICARBONATE 650 MG TABLET    TAKE 1 TABLET BY MOUTH THREE TIMES DAILY    SYMBICORT 80-4.5 MCG/ACT AERO    Inhale 2 puffs into the lungs 2 times daily    ZINC PO    Take by mouth     ALLERGIES     is allergic to peanuts [peanut oil], vicodin [hydrocodone-acetaminophen], tramadol, tylenol with codeine #3 [acetaminophen-codeine], and ibuprofen. FAMILY HISTORY     She indicated that her father is . She indicated that the status of her sister is unknown. SOCIAL HISTORY       Social History     Tobacco Use    Smoking status: Every Day     Packs/day: 0.25     Years: 40.00     Pack years: 10.00     Types: Cigarettes    Smokeless tobacco: Former   Vaping Use    Vaping Use: Never used   Substance Use Topics    Alcohol use: No    Drug use: Yes     Types: Marijuana ([place])          Senait Gillespie MD  The care is provided during an unprecedented national emergency due to the novel coronavirus, COVID 19.   Attending Emergency Physician          Senait Gillespie MD   [phone]

## 2022-12-20 NOTE — ED PROVIDER NOTES
EMERGENCY DEPARTMENT ENCOUNTER    Pt Name: Kofi Deng  MRN: [id number]  [place] 1955  Date of evaluation: 12/19/22  CHIEF COMPLAINT       Chief Complaint   Patient presents with    Cough    Shortness of Breath    Hyperglycemia     Triage , states has not taken insulin for 2 weeks       PASTMEDICAL HISTORY     Past Medical History:   Diagnosis Date    Cataracts, bilateral     COPD (chronic obstructive pulmonary disease) ([facility]     CTS (carpal tunnel syndrome) left    Fibromyalgia     Generalized abdominal pain 6/3/2016    Glaucoma     Hypertension     Osteoarthritis     Osteoporosis     Pancreatic calcification 6/3/2016    Pancreatitis     Protein-calorie malnutrition, severe (HCC) 6/1/2016    Seizures ([facility]     Tremor     Type II or unspecified type diabetes mellitus without mention of complication, not stated as uncontrolled     Uncontrolled type 2 DM with hyperosmolar nonketotic hyperglycemia ([facility] 6/3/2016     Past Problem List  Patient Active Problem List   Diagnosis Code    Intractable epilepsy ([facility] G40.919    Hypoglycemia E16.2    Depression F32. A    Osteoporosis M81.0    Affective disorder ([facility] F39    Uncontrolled type 2 DM with hyperosmolar nonketotic hyperglycemia (HCC) E11.00    Generalized abdominal pain R10.84    Pancreatic calcification K86.89    Uncontrolled type 2 diabetes mellitus with hyperglycemia (HCC) E11.65    Pancreatic insufficiency K86.89    Pain of upper abdomen R10.10    Alcohol-induced chronic pancreatitis (HCC) K86.0    Weight loss R63.4    Polysubstance abuse (HCC) F19.10    Unresponsive episode R41.89    Chronic neck pain M54.2, G89.29    Neuropathic pain of both legs G57.93    Seizure disorder, secondary ([address] G40.909    Essential (primary) hypertension I10    Type 2 diabetes mellitus without complication, with long-term current use of insulin (HCC) E11.9, Z79.4    Neuropathic pain M79.2    Elevated CEA R97.0    Acute left-sided low back pain without sciatica M54.50 Adenomatous polyps D36.9    Bilateral wrist pain M25.531, M25.532    Seizure-like activity (HCC) R56.9    Elevated CA 19-9 level R97.8    Body mass index (BMI) less than 16.5 Z68.1    Pseudohyponatremia R79.89    Hyperglycemia R73.9    Hypokalemia E87.6    Hypocalcemia E83.51    Hypomagnesemia E83.42    Noncompliance Z91.199    Severe malnutrition (HCC) E43    Acute cystitis N30.00    Type 2 diabetes mellitus with hypoglycemia without coma, with long-term current use of insulin ([facility] E11.649, Z79.4     SURGICAL HISTORY       Past Surgical History:   Procedure Laterality Date    COLONOSCOPY  08/30/2016    very poor prep, unable to complete    COLONOSCOPY  06/30/2017    ADENOMATOUS POLYP. COLONOSCOPY  05/29/2018    polypectomy x1    COLONOSCOPY  05/07/2021    COLONOSCOPY N/A 5/7/2021    COLONOSCOPY WITH polypectomy performed by Yaniv Forman MD at [address] [place] [address] Left     ovary twice    NECK SURGERY      pt unsure of exact date & what the procedure was ? 2015 @ [facility]    ND COLON CA SCRN NOT  [address] [facility] N/A 5/29/2018    COLONOSCOPY WITH POLYP REMOVEL performed by Yaniv Forman MD at [facility] N/A 6/30/2017    COLONOSCOPY POLYPECTOMY HOT BIOPSY performed by Yaniv Forman MD at [address] [place] [address]  08/30/2016    gastritis, MILD CHRONIC GASTRITIS WITH FOCAL ANTRAL EROSION AND ASSOCIATED ACUTE INFLAMMATION. INTESTINAL METAPLASIA PRESENT, NEGATIVE FOR DYSPLASIA.        CURRENT MEDICATIONS       Discharge Medication List as of 12/19/2022 10:07 PM        CONTINUE these medications which have NOT CHANGED    Details   insulin glargine (BASAGLAR KWIKPEN) 100 UNIT/ML injection pen Inject 10 Units into the skin nightly, Disp-5 pen, R-0Adjust Sig      pantoprazole (PROTONIX) 40 MG tablet Take 1 tablet by mouth every morning (before breakfast), Disp-30 tablet, R-1Normal      SITagliptin (JANUVIA) 100 MG tablet Take 1 tablet by mouth in the morning., Disp-30 tablet, R-0Normal      albuterol sulfate HFA (PROVENTIL;VENTOLIN;PROAIR) 108 (90 Base) MCG/ACT inhaler INHALE TWO(2) PUFFS INTO LUNGS EVERY SIX(6) HOURS AS NEEDED FOR WHEEZING, Disp-8.5 g, R-10Normal      insulin aspart (NOVOLOG FLEXPEN) 100 UNIT/ML injection pen Inject 5 Units into the skin 3 times daily (before meals), Disp-10 pen, R-3Normal      FLUoxetine (PROZAC) 10 MG capsule Take 1 capsule by mouth daily, Disp-90 capsule, R-0Normal      gabapentin (NEURONTIN) 400 MG capsule Take 1 capsule by mouth 3 times daily for 90 days. , Disp-270 capsule, R-2Normal      Insulin Pen Needle 32G X 4 MM MISC 4 TIMES DAILY AFTER MEALS AND BEFORE BEDTIME Starting Fri 5/27/2022, Until Thu 8/25/2022, For 90 days, Disp-1000 each, R-3, Normal      Nutritional Supplements (ENSURE COMPLETE SHAKE) LIQD Take 1 Can by mouth in the morning and at bedtime, Disp-60 each, R-5Normal      megestrol (MEGACE) 20 MG tablet TAKE 1 TABLET BY MOUTH DAILY, Disp-30 tablet, R-10Normal      Blood Gluc Meter Disp-Strips (BLOOD GLUCOSE METER DISPOSABLE) OXANA Disp-1 each, R-0, NormalDaily and as needed      !!  Lancets MISC 2 TIMES DAILY Starting Fri 2/25/2022, Disp-300 each, R-1, Normal      ferrous sulfate (IRON 325) 325 (65 Fe) MG tablet Take 1 tablet by mouth every other day, Disp-30 tablet, R-1Normal      nicotine (NICODERM CQ) 21 MG/24HR Place 1 patch onto the skin daily, Disp-30 patch, R-3Normal      sodium bicarbonate 650 MG tablet TAKE 1 TABLET BY MOUTH THREE TIMES DAILY, Disp-90 tablet, R-0Normal      Alcohol Swabs (ALCOHOL PREP) 70 % PADS Disp-200 each, R-3, NormalDaily as needed      metFORMIN (GLUCOPHAGE-XR) 500 MG extended release tablet Take 2 tablets by mouth daily (with breakfast) AND 1 tablet Daily with supper., Disp-270 tablet, R-2Normal      SYMBICORT 80-4.5 MCG/ACT AERO Inhale 2 puffs into the lungs 2 times daily, Disp-3 each, R-2, DAWNormal      lipase-protease-amylase (CREON) 50780-039323 units CPEP delayed release capsule take 2 capsules by mouth four times a day after meals and at bedtime, Disp-240 capsule, R-1Normal      Multiple Vitamin (MULTI-VITAMIN DAILY PO) Take by mouthHistorical Med      ZINC PO Take by mouthHistorical Med      !! AquaLance Lancets 30G MISC Daily and as needed for hypoglycemia, Disp-200 each, R-2Normal       !! - Potential duplicate medications found. Please discuss with provider. ALLERGIES     is allergic to peanuts [peanut oil], vicodin [hydrocodone-acetaminophen], tramadol, tylenol with codeine #3 [acetaminophen-codeine], and ibuprofen. FAMILY HISTORY     She indicated that her father is . She indicated that the status of her sister is unknown. SOCIAL HISTORY       Social History     Tobacco Use    Smoking status: Every Day     Packs/day: 0.25     Years: 40.00     Pack years: 10.00     Types: Cigarettes    Smokeless tobacco: Former   Vaping Use    Vaping Use: Never used   Substance Use Topics    Alcohol use: No    Drug use: Yes     Types: Marijuana ([place])     PHYSICAL EXAM     INITIAL VITALS: /80   Pulse 91   Temp 98.4 °F (36.9 °C) (Oral)   Resp 15   Wt 90 lb (40.8 kg)   SpO2 94%   BMI 14.10 kg/m²       MEDICAL DECISION MAKING:     This case was signed out to me by Dr. Peggy Aquino at shift change pending CT results. Patient is a 67-year-old female presenting to the emergency room for cough and shortness of breath that has been ongoing for about 3 weeks. Patient is not in any respiratory distress here in the ED. She has normal respiratory rate. Slight wheezing and rhonchi heard throughout lung bases. Patient noted to have significant hyperglycemia here in the ED improved with IV fluids and insulin. Cardiac enzymes are within acceptable limits. CT chest was completed to rule out pulmonary embolism and is negative. There are emphysematous changes on her CT. Given symptoms patient will be considered COPD exacerbation.   Patient started on steroids Z-Armando and cough medication for home. Patient given return precautions. She has appropriate insulin medication at home. Patient encouraged to continue using her insulin even when she feels unwell. CRITICAL CARE:       PROCEDURES:    Procedures    DIAGNOSTIC RESULTS   EKG:All EKG's are interpreted by the Emergency Department Physician who either signs or Co-signs this chart in the absence of a cardiologist.        RADIOLOGY:All plain film, CT, MRI, and formal ultrasound images (except ED bedside ultrasound) are read by the radiologist, see reports below, unless otherwisenoted in MDM or here. CT CHEST PULMONARY EMBOLISM W CONTRAST   Final Result   No evidence of pulmonary embolism or acute pulmonary abnormality. Centrilobular pulmonary emphysema. RECOMMENDATIONS:   Unavailable         XR CHEST PORTABLE   Final Result   No acute cardiopulmonary findings. LABS: All lab results were reviewed by myself, and all abnormals are listed below.   Labs Reviewed   CBC WITH AUTO DIFFERENTIAL - Abnormal; Notable for the following components:       Result Value    Monocytes 2 (*)     Absolute Mono # 0.09 (*)     All other components within normal limits   BASIC METABOLIC PANEL - Abnormal; Notable for the following components:    Glucose 545 (*)     BUN 6 (*)     Bun/Cre Ratio 8 (*)     Sodium 131 (*)     Chloride 92 (*)     All other components within normal limits   LACTIC ACID - Abnormal; Notable for the following components:    Lactic Acid 2.6 (*)     All other components within normal limits   HEPATIC FUNCTION PANEL - Abnormal; Notable for the following components:    Alkaline Phosphatase 115 (*)     All other components within normal limits   BRAIN NATRIURETIC PEPTIDE - Abnormal; Notable for the following components:    Pro- (*)     All other components within normal limits   TROP/MYOGLOBIN - Abnormal; Notable for the following components:    Myoglobin 22 (*)     All other components within normal limits OSMOLALITY - Abnormal; Notable for the following components:    Serum Osmolality 301 (*)     All other components within normal limits   URINALYSIS WITH MICROSCOPIC - Abnormal; Notable for the following components:    Glucose, Ur 3+ (*)     Specific Gravity, UA 1.038 (*)     All other components within normal limits   LACTIC ACID - Abnormal; Notable for the following components:    Lactic Acid 3.4 (*)     All other components within normal limits   LACTIC ACID - Abnormal; Notable for the following components:    Lactic Acid 2.6 (*)     All other components within normal limits   POC GLUCOSE FINGERSTICK - Abnormal; Notable for the following components:    POC Glucose 590 (*)     All other components within normal limits   VENOUS BLOOD GAS, POINT OF CARE - Abnormal; Notable for the following components:    pH, Davin 7.459 (*)     pCO2, Davin 39.5 (*)     pO2, Davin 60.3 (*)     Positive Base Excess, Davin 4 (*)     O2 Sat, Davin 92 (*)     All other components within normal limits   POC GLUCOSE FINGERSTICK - Abnormal; Notable for the following components:    POC Glucose 290 (*)     All other components within normal limits   POC GLUCOSE FINGERSTICK - Abnormal; Notable for the following components:    POC Glucose 131 (*)     All other components within normal limits   POC GLUCOSE FINGERSTICK - Abnormal; Notable for the following components:    POC Glucose 133 (*)     All other components within normal limits   POCT GLUCOSE - Normal   POCT GLUCOSE - Normal   LIPASE   TROPONIN       EMERGENCY DEPARTMENTCOURSE:         Vitals:    Vitals:    12/19/22 1833 12/19/22 2022 12/19/22 2052 12/19/22 2101   BP:    134/80   Pulse: (!) 102 91 90 91   Resp: 17 17 21 15   Temp:       TempSrc:       SpO2: 97% 95% 91% 94%   Weight:           The patient was given the following medications while in the emergency department:  Orders Placed This Encounter   Medications    insulin regular (HUMULIN R;NOVOLIN R) injection 10 Units    DISCONTD: 0.9 % sodium chloride infusion    DISCONTD: 0.9 % sodium chloride bolus    0.9 % sodium chloride bolus    iopamidol (ISOVUE-370) 76 % injection 75 mL    DISCONTD: sodium chloride flush 0.9 % injection 10 mL    predniSONE (DELTASONE) 20 MG tablet     Sig: Take 2 tablets by mouth daily for 5 days, THEN 1 tablet daily for 5 days, THEN 0.5 tablets daily for 5 days. Dispense:  18 tablet     Refill:  0    azithromycin (ZITHROMAX Z-RADHA) 250 MG tablet     Sig: Take 2 tablets (500 mg) on Day 1, and then take 1 tablet (250 mg) on days 2 through 5. Dispense:  1 packet     Refill:  0    guaiFENesin (MUCINEX) 600 MG extended release tablet     Sig: Take 1 tablet by mouth 2 times daily for 15 days     Dispense:  30 tablet     Refill:  0    benzonatate (TESSALON) 100 MG capsule     Sig: Take 1 capsule by mouth 2 times daily as needed for Cough     Dispense:  20 capsule     Refill:  0     CONSULTS:  None    FINAL IMPRESSION      1. Hyperglycemia    2. COPD exacerbation [facility])          DISPOSITION/PLAN   DISPOSITION Decision To Discharge 12/19/2022 10:06:12 PM      PATIENT REFERRED TO:  Jennifer Coronado MD  [facility], [facility]  [address] [place] [address]  [phone]    Schedule an appointment as soon as possible for a visit       [place] [facility]  [address] [facility]  [phone]    If symptoms worsen, As needed    DISCHARGE MEDICATIONS:  Discharge Medication List as of 12/19/2022 10:07 PM        START taking these medications    Details   predniSONE (DELTASONE) 20 MG tablet Take 2 tablets by mouth daily for 5 days, THEN 1 tablet daily for 5 days, THEN 0.5 tablets daily for 5 days. , Disp-18 tablet, R-0Print      azithromycin (ZITHROMAX Z-RADHA) 250 MG tablet Take 2 tablets (500 mg) on Day 1, and then take 1 tablet (250 mg) on days 2 through 5., Disp-1 packet, R-0Print      guaiFENesin (MUCINEX) 600 MG extended release tablet Take 1 tablet by mouth 2 times daily for 15 days, Disp-30 tablet, R-0Print      benzonatate (TESSALON) 100 MG capsule Take 1 capsule by mouth 2 times daily as needed for Cough, Disp-20 capsule, R-0Print           Demetrius Dawn MD  Attending Emergency Physician      Care during this encounter was due to an unprecedented national emergency due to COVID-19.             Ricky Oliveira MD  12/20/22 7601

## 2023-01-13 ENCOUNTER — APPOINTMENT (OUTPATIENT)
Dept: CT IMAGING | Age: 68
End: 2023-01-13
Payer: MEDICARE

## 2023-01-13 ENCOUNTER — APPOINTMENT (OUTPATIENT)
Dept: GENERAL RADIOLOGY | Age: 68
End: 2023-01-13
Payer: MEDICARE

## 2023-01-13 ENCOUNTER — HOSPITAL ENCOUNTER (OUTPATIENT)
Age: 68
Setting detail: OBSERVATION
Discharge: HOME | End: 2023-01-14
Attending: EMERGENCY MEDICINE | Admitting: INTERNAL MEDICINE
Payer: MEDICARE

## 2023-01-13 DIAGNOSIS — R42 LIGHTHEADEDNESS: ICD-10-CM

## 2023-01-13 DIAGNOSIS — R42 DIZZINESS: Primary | ICD-10-CM

## 2023-01-13 DIAGNOSIS — Z79.4 TYPE 2 DIABETES MELLITUS WITH DIABETIC POLYNEUROPATHY, WITH LONG-TERM CURRENT USE OF INSULIN (HCC): ICD-10-CM

## 2023-01-13 DIAGNOSIS — E16.2 HYPOGLYCEMIA: ICD-10-CM

## 2023-01-13 DIAGNOSIS — E11.42 TYPE 2 DIABETES MELLITUS WITH DIABETIC POLYNEUROPATHY, WITH LONG-TERM CURRENT USE OF INSULIN (HCC): ICD-10-CM

## 2023-01-13 PROBLEM — K21.9 GERD (GASTROESOPHAGEAL REFLUX DISEASE): Status: ACTIVE | Noted: 2022-06-01

## 2023-01-13 PROBLEM — F12.10 CANNABIS ABUSE: Status: ACTIVE | Noted: 2022-06-01

## 2023-01-13 PROBLEM — E83.39 HYPOPHOSPHATEMIA: Status: ACTIVE | Noted: 2023-01-13

## 2023-01-13 LAB
ABSOLUTE EOS #: 0.21 K/UL (ref 0–0.44)
ABSOLUTE IMMATURE GRANULOCYTE: 0.05 K/UL (ref 0–0.3)
ABSOLUTE LYMPH #: 4.66 K/UL (ref 1.1–3.7)
ABSOLUTE MONO #: 0.97 K/UL (ref 0.1–1.2)
ANION GAP SERPL CALCULATED.3IONS-SCNC: 10 MMOL/L (ref 9–17)
BASOPHILS # BLD: 1 % (ref 0–2)
BASOPHILS ABSOLUTE: 0.06 K/UL (ref 0–0.2)
BILIRUBIN URINE: NEGATIVE
BUN BLDV-MCNC: 9 MG/DL (ref 8–23)
BUN/CREAT BLD: 14 (ref 9–20)
CALCIUM SERPL-MCNC: 10 MG/DL (ref 8.6–10.4)
CHLORIDE BLD-SCNC: 96 MMOL/L (ref 98–107)
CHP ED QC CHECK: NORMAL
CO2: 31 MMOL/L (ref 20–31)
COLOR: YELLOW
CREAT SERPL-MCNC: 0.65 MG/DL (ref 0.5–0.9)
D-DIMER QUANTITATIVE: 1.56 MG/L FEU (ref 0–0.59)
EOSINOPHILS RELATIVE PERCENT: 2 % (ref 1–4)
GFR SERPL CREATININE-BSD FRML MDRD: >60 ML/MIN/1.73M2
GLUCOSE BLD-MCNC: 132 MG/DL
GLUCOSE BLD-MCNC: 132 MG/DL (ref 65–105)
GLUCOSE BLD-MCNC: 150 MG/DL (ref 65–105)
GLUCOSE BLD-MCNC: 188 MG/DL
GLUCOSE BLD-MCNC: 188 MG/DL (ref 65–105)
GLUCOSE BLD-MCNC: 41 MG/DL
GLUCOSE BLD-MCNC: 41 MG/DL (ref 65–105)
GLUCOSE BLD-MCNC: 57 MG/DL (ref 70–99)
GLUCOSE URINE: ABNORMAL
HCT VFR BLD CALC: 42.6 % (ref 36.3–47.1)
HEMOGLOBIN: 14.3 G/DL (ref 11.9–15.1)
IMMATURE GRANULOCYTES: 0 %
INR BLD: 1
KETONES, URINE: NEGATIVE
LACTIC ACID: 2.2 MMOL/L (ref 0.5–2.2)
LEUKOCYTE ESTERASE, URINE: NEGATIVE
LIPASE: 47 U/L (ref 13–60)
LYMPHOCYTES # BLD: 36 % (ref 24–43)
MAGNESIUM: 1.4 MG/DL (ref 1.6–2.6)
MCH RBC QN AUTO: 32.3 PG (ref 25.2–33.5)
MCHC RBC AUTO-ENTMCNC: 33.6 G/DL (ref 28.4–34.8)
MCV RBC AUTO: 96.2 FL (ref 82.6–102.9)
MONOCYTES # BLD: 7 % (ref 3–12)
NITRITE, URINE: NEGATIVE
NRBC AUTOMATED: 0 PER 100 WBC
PARTIAL THROMBOPLASTIN TIME: 26 SEC (ref 23.9–33.8)
PDW BLD-RTO: 13.5 % (ref 11.8–14.4)
PH UA: 6 (ref 5–8)
PHOSPHORUS: 2.1 MG/DL (ref 2.6–4.5)
PLATELET # BLD: 223 K/UL (ref 138–453)
PMV BLD AUTO: 10.5 FL (ref 8.1–13.5)
POTASSIUM SERPL-SCNC: 4 MMOL/L (ref 3.7–5.3)
PROTEIN UA: NEGATIVE
PROTHROMBIN TIME: 13.2 SEC (ref 11.5–14.2)
RBC # BLD: 4.43 M/UL (ref 3.95–5.11)
SEG NEUTROPHILS: 54 % (ref 36–65)
SEGMENTED NEUTROPHILS ABSOLUTE COUNT: 7.13 K/UL (ref 1.5–8.1)
SODIUM BLD-SCNC: 137 MMOL/L (ref 135–144)
SPECIFIC GRAVITY UA: 1.03 (ref 1–1.03)
TROPONIN, HIGH SENSITIVITY: 12 NG/L (ref 0–14)
TROPONIN, HIGH SENSITIVITY: 8 NG/L (ref 0–14)
TURBIDITY: CLEAR
URINE HGB: NEGATIVE
UROBILINOGEN, URINE: NORMAL
WBC # BLD: 13.1 K/UL (ref 3.5–11.3)

## 2023-01-13 PROCEDURE — 84100 ASSAY OF PHOSPHORUS: CPT

## 2023-01-13 PROCEDURE — 72128 CT CHEST SPINE W/O DYE: CPT

## 2023-01-13 PROCEDURE — 71260 CT THORAX DX C+: CPT | Performed by: EMERGENCY MEDICINE

## 2023-01-13 PROCEDURE — 81003 URINALYSIS AUTO W/O SCOPE: CPT

## 2023-01-13 PROCEDURE — 80048 BASIC METABOLIC PNL TOTAL CA: CPT

## 2023-01-13 PROCEDURE — 84484 ASSAY OF TROPONIN QUANT: CPT

## 2023-01-13 PROCEDURE — 2580000003 HC RX 258: Performed by: NURSE PRACTITIONER

## 2023-01-13 PROCEDURE — 82947 ASSAY GLUCOSE BLOOD QUANT: CPT

## 2023-01-13 PROCEDURE — G0378 HOSPITAL OBSERVATION PER HR: HCPCS

## 2023-01-13 PROCEDURE — 71045 X-RAY EXAM CHEST 1 VIEW: CPT

## 2023-01-13 PROCEDURE — 85025 COMPLETE CBC W/AUTO DIFF WBC: CPT

## 2023-01-13 PROCEDURE — 96361 HYDRATE IV INFUSION ADD-ON: CPT

## 2023-01-13 PROCEDURE — 2580000003 HC RX 258: Performed by: EMERGENCY MEDICINE

## 2023-01-13 PROCEDURE — 6360000004 HC RX CONTRAST MEDICATION: Performed by: EMERGENCY MEDICINE

## 2023-01-13 PROCEDURE — 85610 PROTHROMBIN TIME: CPT

## 2023-01-13 PROCEDURE — 85379 FIBRIN DEGRADATION QUANT: CPT

## 2023-01-13 PROCEDURE — 83735 ASSAY OF MAGNESIUM: CPT

## 2023-01-13 PROCEDURE — 99285 EMERGENCY DEPT VISIT HI MDM: CPT

## 2023-01-13 PROCEDURE — 83690 ASSAY OF LIPASE: CPT

## 2023-01-13 PROCEDURE — 72170 X-RAY EXAM OF PELVIS: CPT

## 2023-01-13 PROCEDURE — 72125 CT NECK SPINE W/O DYE: CPT

## 2023-01-13 PROCEDURE — 70450 CT HEAD/BRAIN W/O DYE: CPT

## 2023-01-13 PROCEDURE — 36415 COLL VENOUS BLD VENIPUNCTURE: CPT

## 2023-01-13 PROCEDURE — 85730 THROMBOPLASTIN TIME PARTIAL: CPT

## 2023-01-13 PROCEDURE — 93005 ELECTROCARDIOGRAM TRACING: CPT

## 2023-01-13 PROCEDURE — 72131 CT LUMBAR SPINE W/O DYE: CPT

## 2023-01-13 PROCEDURE — 87040 BLOOD CULTURE FOR BACTERIA: CPT

## 2023-01-13 PROCEDURE — 83605 ASSAY OF LACTIC ACID: CPT

## 2023-01-13 RX ORDER — SODIUM CHLORIDE 0.9 % (FLUSH) 0.9 %
10 SYRINGE (ML) INJECTION ONCE
Status: COMPLETED | OUTPATIENT
Start: 2023-01-13 | End: 2023-01-13

## 2023-01-13 RX ORDER — DEXTROSE MONOHYDRATE 100 MG/ML
INJECTION, SOLUTION INTRAVENOUS CONTINUOUS
Status: DISCONTINUED | OUTPATIENT
Start: 2023-01-13 | End: 2023-01-13

## 2023-01-13 RX ORDER — SODIUM CHLORIDE 9 MG/ML
INJECTION, SOLUTION INTRAVENOUS PRN
Status: DISCONTINUED | OUTPATIENT
Start: 2023-01-13 | End: 2023-01-14 | Stop reason: HOSPADM

## 2023-01-13 RX ORDER — ACETAMINOPHEN 650 MG/1
650 SUPPOSITORY RECTAL EVERY 6 HOURS PRN
Status: DISCONTINUED | OUTPATIENT
Start: 2023-01-13 | End: 2023-01-14 | Stop reason: HOSPADM

## 2023-01-13 RX ORDER — ALBUTEROL SULFATE 90 UG/1
2 AEROSOL, METERED RESPIRATORY (INHALATION) EVERY 6 HOURS PRN
Status: DISCONTINUED | OUTPATIENT
Start: 2023-01-13 | End: 2023-01-14

## 2023-01-13 RX ORDER — SODIUM CHLORIDE 0.9 % (FLUSH) 0.9 %
5-40 SYRINGE (ML) INJECTION EVERY 12 HOURS SCHEDULED
Status: DISCONTINUED | OUTPATIENT
Start: 2023-01-13 | End: 2023-01-14 | Stop reason: HOSPADM

## 2023-01-13 RX ORDER — DEXTROSE AND SODIUM CHLORIDE 5; .45 G/100ML; G/100ML
INJECTION, SOLUTION INTRAVENOUS CONTINUOUS
Status: ACTIVE | OUTPATIENT
Start: 2023-01-13 | End: 2023-01-14

## 2023-01-13 RX ORDER — PANTOPRAZOLE SODIUM 40 MG/1
40 TABLET, DELAYED RELEASE ORAL
Status: DISCONTINUED | OUTPATIENT
Start: 2023-01-14 | End: 2023-01-14 | Stop reason: HOSPADM

## 2023-01-13 RX ORDER — ACETAMINOPHEN 325 MG/1
650 TABLET ORAL EVERY 6 HOURS PRN
Status: DISCONTINUED | OUTPATIENT
Start: 2023-01-13 | End: 2023-01-14 | Stop reason: HOSPADM

## 2023-01-13 RX ORDER — ONDANSETRON 2 MG/ML
4 INJECTION INTRAMUSCULAR; INTRAVENOUS EVERY 6 HOURS PRN
Status: DISCONTINUED | OUTPATIENT
Start: 2023-01-13 | End: 2023-01-14 | Stop reason: HOSPADM

## 2023-01-13 RX ORDER — 0.9 % SODIUM CHLORIDE 0.9 %
1000 INTRAVENOUS SOLUTION INTRAVENOUS ONCE
Status: COMPLETED | OUTPATIENT
Start: 2023-01-13 | End: 2023-01-13

## 2023-01-13 RX ORDER — SODIUM CHLORIDE 0.9 % (FLUSH) 0.9 %
10 SYRINGE (ML) INJECTION PRN
Status: DISCONTINUED | OUTPATIENT
Start: 2023-01-13 | End: 2023-01-14 | Stop reason: HOSPADM

## 2023-01-13 RX ORDER — ONDANSETRON 4 MG/1
4 TABLET, ORALLY DISINTEGRATING ORAL EVERY 8 HOURS PRN
Status: DISCONTINUED | OUTPATIENT
Start: 2023-01-13 | End: 2023-01-14 | Stop reason: HOSPADM

## 2023-01-13 RX ORDER — POTASSIUM CHLORIDE 20 MEQ/1
40 TABLET, EXTENDED RELEASE ORAL PRN
Status: DISCONTINUED | OUTPATIENT
Start: 2023-01-13 | End: 2023-01-14 | Stop reason: HOSPADM

## 2023-01-13 RX ORDER — POTASSIUM CHLORIDE 7.45 MG/ML
10 INJECTION INTRAVENOUS PRN
Status: DISCONTINUED | OUTPATIENT
Start: 2023-01-13 | End: 2023-01-14 | Stop reason: HOSPADM

## 2023-01-13 RX ORDER — 0.9 % SODIUM CHLORIDE 0.9 %
80 INTRAVENOUS SOLUTION INTRAVENOUS ONCE
Status: DISCONTINUED | OUTPATIENT
Start: 2023-01-13 | End: 2023-01-14 | Stop reason: HOSPADM

## 2023-01-13 RX ORDER — SODIUM BICARBONATE 650 MG/1
650 TABLET ORAL 3 TIMES DAILY
Status: DISCONTINUED | OUTPATIENT
Start: 2023-01-13 | End: 2023-01-14 | Stop reason: HOSPADM

## 2023-01-13 RX ORDER — MAGNESIUM SULFATE 1 G/100ML
1000 INJECTION INTRAVENOUS PRN
Status: DISCONTINUED | OUTPATIENT
Start: 2023-01-13 | End: 2023-01-14 | Stop reason: HOSPADM

## 2023-01-13 RX ORDER — DEXTROSE MONOHYDRATE 100 MG/ML
INJECTION, SOLUTION INTRAVENOUS CONTINUOUS PRN
Status: DISCONTINUED | OUTPATIENT
Start: 2023-01-13 | End: 2023-01-14 | Stop reason: HOSPADM

## 2023-01-13 RX ORDER — POLYETHYLENE GLYCOL 3350 17 G/17G
17 POWDER, FOR SOLUTION ORAL DAILY PRN
Status: DISCONTINUED | OUTPATIENT
Start: 2023-01-13 | End: 2023-01-14 | Stop reason: HOSPADM

## 2023-01-13 RX ADMIN — SODIUM CHLORIDE, PRESERVATIVE FREE 10 ML: 5 INJECTION INTRAVENOUS at 18:14

## 2023-01-13 RX ADMIN — DEXTROSE MONOHYDRATE 250 ML: 100 INJECTION, SOLUTION INTRAVENOUS at 19:55

## 2023-01-13 RX ADMIN — SODIUM CHLORIDE, PRESERVATIVE FREE 10 ML: 5 INJECTION INTRAVENOUS at 21:36

## 2023-01-13 RX ADMIN — SODIUM CHLORIDE 1000 ML: 9 INJECTION, SOLUTION INTRAVENOUS at 15:31

## 2023-01-13 RX ADMIN — DEXTROSE MONOHYDRATE 125 ML: 100 INJECTION, SOLUTION INTRAVENOUS at 17:06

## 2023-01-13 RX ADMIN — IOPAMIDOL 65 ML: 755 INJECTION, SOLUTION INTRAVENOUS at 18:13

## 2023-01-13 RX ADMIN — DEXTROSE MONOHYDRATE: 100 INJECTION, SOLUTION INTRAVENOUS at 21:11

## 2023-01-13 RX ADMIN — Medication 80 ML: at 18:14

## 2023-01-13 ASSESSMENT — PAIN - FUNCTIONAL ASSESSMENT
PAIN_FUNCTIONAL_ASSESSMENT: 0-10
PAIN_FUNCTIONAL_ASSESSMENT: 0-10

## 2023-01-13 ASSESSMENT — ENCOUNTER SYMPTOMS
VOICE CHANGE: 0
VOMITING: 0
EYE PAIN: 0
ABDOMINAL PAIN: 0
TROUBLE SWALLOWING: 0
BACK PAIN: 0
CHEST TIGHTNESS: 0
COLOR CHANGE: 0
PHOTOPHOBIA: 0
SHORTNESS OF BREATH: 0
NAUSEA: 0
FACIAL SWELLING: 0

## 2023-01-13 ASSESSMENT — PAIN DESCRIPTION - LOCATION
LOCATION: BUTTOCKS
LOCATION: BACK;BUTTOCKS
LOCATION: BUTTOCKS
LOCATION: BACK

## 2023-01-13 ASSESSMENT — PAIN SCALES - GENERAL
PAINLEVEL_OUTOF10: 7
PAINLEVEL_OUTOF10: 6
PAINLEVEL_OUTOF10: 5
PAINLEVEL_OUTOF10: 10

## 2023-01-13 ASSESSMENT — PAIN DESCRIPTION - DESCRIPTORS: DESCRIPTORS: SHARP;STABBING;THROBBING

## 2023-01-13 ASSESSMENT — PAIN DESCRIPTION - FREQUENCY: FREQUENCY: CONTINUOUS

## 2023-01-13 NOTE — ED PROVIDER NOTES
EMERGENCY DEPARTMENT ENCOUNTER    Pt Name: Deliah Lanes  MRN: [id number]  [place] 1955  Date of evaluation: 1/13/23  CHIEF COMPLAINT       Chief Complaint   Patient presents with    Tailbone Pain     Kulwanta Kory last Monday    Dizziness     Onset the beginning of Dec     HISTORY OF PRESENT ILLNESS   70-year-old female presenting to the ER complaining of a syncopal event on Monday. Patient states she has been dizzy the whole month of December on and ended up having a syncopal event on Monday. Patient states she is persistently lightheaded. Patient states that she did hurt her tailbone and the incident on Monday. Patient states that she was on the ground for 5 or 6 hours. Patient denies the use of blood thinners. Patient does admit to an underlying history of hypertension and diabetes. Patient denies any heart disease. The history is provided by the patient. Loss of Consciousness  Episode history:  Single  Most recent episode:  More than 2 days ago  Chronicity:  New  Associated symptoms: dizziness    Associated symptoms: no chest pain, no headaches, no nausea, no palpitations, no shortness of breath and no vomiting          REVIEW OF SYSTEMS     Review of Systems   Constitutional:  Negative for activity change, appetite change and fatigue. HENT:  Negative for facial swelling, trouble swallowing and voice change. Eyes:  Negative for photophobia and pain. Respiratory:  Negative for chest tightness and shortness of breath. Cardiovascular:  Positive for syncope. Negative for chest pain and palpitations. Gastrointestinal:  Negative for abdominal pain, nausea and vomiting. Genitourinary:  Negative for dysuria and urgency. Musculoskeletal:  Positive for arthralgias (back pain). Negative for back pain. Skin:  Negative for color change and rash. Neurological:  Positive for dizziness, syncope and light-headedness. Negative for headaches.    Psychiatric/Behavioral:  Negative for behavioral problems and hallucinations. PASTMEDICAL HISTORY     Past Medical History:   Diagnosis Date    Cataracts, bilateral     COPD (chronic obstructive pulmonary disease) ([facility])     CTS (carpal tunnel syndrome) left    Fibromyalgia     Generalized abdominal pain 6/3/2016    Glaucoma     Hypertension     Osteoarthritis     Osteoporosis     Pancreatic calcification 6/3/2016    Pancreatitis     Protein-calorie malnutrition, severe ([facility]) 6/1/2016    Seizures ([facility]     Tremor     Type II or unspecified type diabetes mellitus without mention of complication, not stated as uncontrolled     Uncontrolled type 2 DM with hyperosmolar nonketotic hyperglycemia ([facility] 6/3/2016     Past Problem List  Patient Active Problem List   Diagnosis Code    Intractable epilepsy ([facility] G40.919    Hypoglycemia E16.2    Depression F32. A    Osteoporosis M81.0    Affective disorder ([facility] F39    Uncontrolled type 2 DM with hyperosmolar nonketotic hyperglycemia ([facility]) E11.00    Generalized abdominal pain R10.84    Pancreatic calcification K86.89    Uncontrolled type 2 diabetes mellitus with hyperglycemia ([facility]) E11.65    Pancreatic insufficiency K86.89    Pain of upper abdomen R10.10    Alcohol-induced chronic pancreatitis ([facility]) K86.0    Weight loss R63.4    Polysubstance abuse ([facility]) F19.10    Unresponsive episode R41.89    Chronic neck pain M54.2, G89.29    Neuropathic pain of both legs G57.93    Seizure disorder, secondary ([facility] G40.909    Essential (primary) hypertension I10    Type 2 diabetes mellitus without complication, with long-term current use of insulin ([facility]) E11.9, Z79.4    Neuropathic pain M79.2    Elevated CEA R97.0    Acute left-sided low back pain without sciatica M54.50    Adenomatous polyps D36.9    Bilateral wrist pain M25.531, M25.532    Seizure-like activity ([facility]) R56.9    Elevated CA 19-9 level R97.8    Body mass index (BMI) less than 16.5 Z68.1    Pseudohyponatremia R79.89    Hyperglycemia R73.9    Hypokalemia E87.6    Hypocalcemia E83.51    Hypomagnesemia E83.42    Noncompliance Z91.199    Severe malnutrition ([facility] E43    Acute cystitis N30.00    Type 2 diabetes mellitus with hypoglycemia without coma, with long-term current use of insulin ([facility] E11.649, Z79.4     SURGICAL HISTORY       Past Surgical History:   Procedure Laterality Date    COLONOSCOPY  08/30/2016    very poor prep, unable to complete    COLONOSCOPY  06/30/2017    ADENOMATOUS POLYP. COLONOSCOPY  05/29/2018    polypectomy x1    COLONOSCOPY  05/07/2021    COLONOSCOPY N/A 5/7/2021    COLONOSCOPY WITH polypectomy performed by Darion Awad MD at [address] Left     ovary twice    NECK SURGERY      pt unsure of exact date & what the procedure was ? 2015 @ [facility]    ND COLON CA SCRN NOT  [address] N/A 5/29/2018    COLONOSCOPY WITH POLYP REMOVEL performed by Darion Awad MD at [facility] N/A 6/30/2017    COLONOSCOPY POLYPECTOMY HOT BIOPSY performed by Darion Awad MD at [address]  08/30/2016    gastritis, MILD CHRONIC GASTRITIS WITH FOCAL ANTRAL EROSION AND ASSOCIATED ACUTE INFLAMMATION. INTESTINAL METAPLASIA PRESENT, NEGATIVE FOR DYSPLASIA. CURRENT MEDICATIONS       Previous Medications    ALBUTEROL SULFATE HFA (PROVENTIL;VENTOLIN;PROAIR) 108 (90 BASE) MCG/ACT INHALER    INHALE TWO(2) PUFFS INTO LUNGS EVERY SIX(6) HOURS AS NEEDED FOR WHEEZING    ALCOHOL SWABS (ALCOHOL PREP) 70 % PADS    Daily as needed    AQUALANCE LANCETS 30G MISC    Daily and as needed for hypoglycemia    BLOOD GLUC METER DISP-STRIPS (BLOOD GLUCOSE METER DISPOSABLE) OXANA    Daily and as needed    FERROUS SULFATE (IRON 325) 325 (65 FE) MG TABLET    Take 1 tablet by mouth every other day    FLUOXETINE (PROZAC) 10 MG CAPSULE    Take 1 capsule by mouth daily    GABAPENTIN (NEURONTIN) 400 MG CAPSULE    Take 1 capsule by mouth 3 times daily for 90 days.     INSULIN ASPART (NOVOLOG FLEXPEN) 100 UNIT/ML INJECTION PEN    Inject 5 Units into the skin 3 times daily (before meals)    INSULIN GLARGINE (BASAGLAR KWIKPEN) 100 UNIT/ML INJECTION PEN    Inject 10 Units into the skin nightly    INSULIN PEN NEEDLE 32G X 4 MM MISC    1 each by Does not apply route 4 times daily (after meals and at bedtime)    LANCETS MISC    1 each by Does not apply route 2 times daily    LIPASE-PROTEASE-AMYLASE (CREON) [id number] UNITS CPEP DELAYED RELEASE CAPSULE    take 2 capsules by mouth four times a day after meals and at bedtime    MEGESTROL (MEGACE) 20 MG TABLET    TAKE 1 TABLET BY MOUTH DAILY    METFORMIN (GLUCOPHAGE-XR) 500 MG EXTENDED RELEASE TABLET    Take 2 tablets by mouth daily (with breakfast) AND 1 tablet Daily with supper. MULTIPLE VITAMIN (MULTI-VITAMIN DAILY PO)    Take by mouth    NICOTINE (NICODERM CQ) 21 MG/24HR    Place 1 patch onto the skin daily    NUTRITIONAL SUPPLEMENTS (ENSURE COMPLETE SHAKE) LIQD    Take 1 Can by mouth in the morning and at bedtime    PANTOPRAZOLE (PROTONIX) 40 MG TABLET    Take 1 tablet by mouth every morning (before breakfast)    SITAGLIPTIN (JANUVIA) 100 MG TABLET    Take 1 tablet by mouth in the morning. SODIUM BICARBONATE 650 MG TABLET    TAKE 1 TABLET BY MOUTH THREE TIMES DAILY    SYMBICORT 80-4.5 MCG/ACT AERO    Inhale 2 puffs into the lungs 2 times daily    ZINC PO    Take by mouth     ALLERGIES     has No Known Allergies. FAMILY HISTORY     She indicated that her father is . She indicated that the status of her sister is unknown.      SOCIAL HISTORY       Social History     Tobacco Use    Smoking status: Every Day     Packs/day: 0.25     Years: 40.00     Pack years: 10.00     Types: Cigarettes     Passive exposure: Current    Smokeless tobacco: Former   Vaping Use    Vaping Use: Never used   Substance Use Topics    Alcohol use: No    Drug use: Yes     Types: Marijuana (Weed)     PHYSICAL EXAM     INITIAL VITALS: BP (!) 134/91   Pulse 88   Temp 98.4 °F (36.9 °C) (Oral)   Resp 16 Wt 87 lb (39.5 kg)   SpO2 97%   BMI 13.63 kg/m²    Physical Exam  Vitals reviewed. Constitutional:       General: She is not in acute distress. Appearance: Normal appearance. She is not ill-appearing or toxic-appearing. HENT:      Head: Normocephalic and atraumatic. Right Ear: External ear normal.      Left Ear: External ear normal.      Nose: No congestion or rhinorrhea. Eyes:      Extraocular Movements: Extraocular movements intact. Pupils: Pupils are equal, round, and reactive to light. Cardiovascular:      Rate and Rhythm: Regular rhythm. Tachycardia present. Pulses: Normal pulses. Heart sounds: Normal heart sounds. Pulmonary:      Effort: Pulmonary effort is normal. No respiratory distress. Breath sounds: Normal breath sounds. No wheezing. Abdominal:      General: Bowel sounds are normal. There is no distension. Palpations: Abdomen is soft. Tenderness: There is no abdominal tenderness. Musculoskeletal:         General: No deformity or signs of injury. Normal range of motion. Cervical back: No rigidity or tenderness. Thoracic back: Tenderness present. Lumbar back: Tenderness present. Skin:     General: Skin is warm and dry. Neurological:      Mental Status: She is alert and oriented to person, place, and time. Mental status is at baseline.    Psychiatric:         Mood and Affect: Mood normal.         Behavior: Behavior normal.       MEDICAL DECISION MAKING / ED COURSE:         1)  Number and Complexity of Problems Addressed at this Encounter  Problem List This Visit: Patient with a syncopal event persistent dizziness    Differential Diagnosis: Hypoglycemia, acute coronary syndrome, intracranial abnormality    Pertinent Comorbid Conditions: Diabetes and hypertension    2)  Data Reviewed and Analyzed  (Lab and radiology tests/orders below in next section)    My EKG interpretation: Did not display signs of acute cardiac ischemia    CT and chest x-ray imaging did not display signs of acute or normality. 3)  Treatment and Disposition         Patient with a syncopal event and persistent dizziness. Cardiac work-up in the ER did not display positive signs of acute cardiac ischemia. EKG was reviewed and interpreted by myself, ED physician. Patient with persistent episodes of hypoglycemia while in the ER.  2 250 mL d10 bags were provided to the patient while she was in the ER. Patient's dizziness was amplified during the episodes of hypoglycemia while in the ER. Patient likely is dizzy outpatient due to persistent state of hypoglycemia. Patient was admitted after speaking with the admitting team.  Patient understands and agrees with the plan. CRITICAL CARE:       PROCEDURES:    Procedures      DATA FOR LAB AND RADIOLOGY TESTS ORDERED BELOW ARE REVIEWED BY THE ED CLINICIAN:    RADIOLOGY: All x-rays, CT, MRI, and formal ultrasound images (except ED bedside ultrasound) are read by the radiologist, see reports below, unless otherwise noted in MDM or here. Reports below are reviewed by myself. CT CHEST PULMONARY EMBOLISM W CONTRAST   Final Result   1. No pulmonary embolus. 2. No acute pulmonary abnormality. 3. Chronic pancreatitis. RECOMMENDATIONS:   Unavailable         CT LUMBAR SPINE WO CONTRAST   Final Result   No acute osseous abnormalities in the thoracic and lumbar spine         CT THORACIC SPINE WO CONTRAST   Final Result   No acute osseous abnormalities in the thoracic and lumbar spine         CT CERVICAL SPINE WO CONTRAST   Final Result   No acute abnormality of the cervical spine. Stable changes of anterior cervical fusion at C5-6. Advanced degenerative   disc disease at C4-5. CT HEAD WO CONTRAST   Final Result   No acute intracranial abnormality. XR CHEST PORTABLE   Final Result   No acute cardiopulmonary disease. COPD. XR PELVIS (1-2 VIEWS)   Final Result   No acute abnormality by radiograph. LABS: Lab orders shown below, the results are reviewed by myself, and all abnormals are listed below.   Labs Reviewed   PHOSPHORUS - Abnormal; Notable for the following components:       Result Value    Phosphorus 2.1 (*)     All other components within normal limits   MAGNESIUM - Abnormal; Notable for the following components:    Magnesium 1.4 (*)     All other components within normal limits   BASIC METABOLIC PANEL - Abnormal; Notable for the following components:    Glucose 57 (*)     Chloride 96 (*)     All other components within normal limits   CBC WITH AUTO DIFFERENTIAL - Abnormal; Notable for the following components:    WBC 13.1 (*)     Absolute Lymph # 4.66 (*)     All other components within normal limits   D-DIMER, QUANTITATIVE - Abnormal; Notable for the following components:    D-Dimer, Quant 1.56 (*)     All other components within normal limits   POC GLUCOSE FINGERSTICK - Abnormal; Notable for the following components:    POC Glucose 132 (*)     All other components within normal limits   POC GLUCOSE FINGERSTICK - Abnormal; Notable for the following components:    POC Glucose 41 (*)     All other components within normal limits   POCT GLUCOSE - Normal   CULTURE, BLOOD 1   CULTURE, BLOOD 1   TROPONIN   TROPONIN   APTT   PROTIME-INR   LIPASE   URINALYSIS WITH REFLEX TO CULTURE   LACTIC ACID   POCT GLUCOSE   POCT GLUCOSE   POCT GLUCOSE   POCT GLUCOSE   POCT GLUCOSE   POCT GLUCOSE   POCT GLUCOSE   POCT GLUCOSE   POCT GLUCOSE   POCT GLUCOSE   POCT GLUCOSE   POCT GLUCOSE       Vitals Reviewed:    Vitals:    01/13/23 1900 01/13/23 1915 01/13/23 1929 01/1955   BP: (!) 164/89 (!) 179/88  (!) 134/91   Pulse:    88   Resp:    16   Temp:       TempSrc:       SpO2: 98% 98% 98% 97%   Weight:         MEDICATIONS GIVEN TO PATIENT THIS ENCOUNTER:  Orders Placed This Encounter   Medications    0.9 % sodium chloride bolus    OR Linked Order Group     dextrose bolus 10% 125 mL     dextrose bolus 10% 250 mL 0.9 % sodium chloride bolus    iopamidol (ISOVUE-370) 76 % injection 75 mL    sodium chloride flush 0.9 % injection 10 mL    OR Linked Order Group     dextrose bolus 10% 125 mL     dextrose bolus 10% 250 mL    dextrose 10 % infusion     DISCHARGE PRESCRIPTIONS:  New Prescriptions    No medications on file     PHYSICIAN CONSULTS ORDERED THIS ENCOUNTER:  IP CONSULT TO INTERNAL MEDICINE  FINAL IMPRESSION      1. Dizziness    2. Lightheadedness    3. Hypoglycemia          DISPOSITION/PLAN   DISPOSITION Admitted 01/13/2023 08:07:43 PM      OUTPATIENT FOLLOW UP THE PATIENT:  No follow-up provider specified.     DO Eagle Rivas DO  01/13/23 2041

## 2023-01-14 VITALS
TEMPERATURE: 99 F | WEIGHT: 91 LBS | SYSTOLIC BLOOD PRESSURE: 144 MMHG | HEART RATE: 105 BPM | DIASTOLIC BLOOD PRESSURE: 92 MMHG | BODY MASS INDEX: 14.28 KG/M2 | HEIGHT: 67 IN | OXYGEN SATURATION: 98 % | RESPIRATION RATE: 16 BRPM

## 2023-01-14 PROBLEM — R42 DIZZINESS: Status: ACTIVE | Noted: 2023-01-14

## 2023-01-14 LAB
ANION GAP SERPL CALCULATED.3IONS-SCNC: 10 MMOL/L (ref 9–17)
BUN BLDV-MCNC: 8 MG/DL (ref 8–23)
BUN/CREAT BLD: 13 (ref 9–20)
CALCIUM SERPL-MCNC: 8.5 MG/DL (ref 8.6–10.4)
CHLORIDE BLD-SCNC: 102 MMOL/L (ref 98–107)
CO2: 26 MMOL/L (ref 20–31)
CREAT SERPL-MCNC: 0.62 MG/DL (ref 0.5–0.9)
EKG ATRIAL RATE: 109 BPM
EKG P AXIS: 83 DEGREES
EKG P-R INTERVAL: 152 MS
EKG Q-T INTERVAL: 330 MS
EKG QRS DURATION: 68 MS
EKG QTC CALCULATION (BAZETT): 444 MS
EKG R AXIS: 61 DEGREES
EKG T AXIS: 79 DEGREES
EKG VENTRICULAR RATE: 109 BPM
GFR SERPL CREATININE-BSD FRML MDRD: >60 ML/MIN/1.73M2
GLUCOSE BLD-MCNC: 169 MG/DL (ref 65–105)
GLUCOSE BLD-MCNC: 171 MG/DL (ref 70–99)
GLUCOSE BLD-MCNC: 183 MG/DL (ref 65–105)
GLUCOSE BLD-MCNC: 285 MG/DL (ref 65–105)
GLUCOSE BLD-MCNC: 413 MG/DL (ref 65–105)
HCT VFR BLD CALC: 34.7 % (ref 36.3–47.1)
HEMOGLOBIN: 11.3 G/DL (ref 11.9–15.1)
MCH RBC QN AUTO: 31.5 PG (ref 25.2–33.5)
MCHC RBC AUTO-ENTMCNC: 32.6 G/DL (ref 28.4–34.8)
MCV RBC AUTO: 96.7 FL (ref 82.6–102.9)
NRBC AUTOMATED: 0 PER 100 WBC
PDW BLD-RTO: 13.4 % (ref 11.8–14.4)
PLATELET # BLD: 168 K/UL (ref 138–453)
PMV BLD AUTO: 10.2 FL (ref 8.1–13.5)
POTASSIUM SERPL-SCNC: 4.8 MMOL/L (ref 3.7–5.3)
RBC # BLD: 3.59 M/UL (ref 3.95–5.11)
SODIUM BLD-SCNC: 138 MMOL/L (ref 135–144)
WBC # BLD: 8.1 K/UL (ref 3.5–11.3)

## 2023-01-14 PROCEDURE — G0378 HOSPITAL OBSERVATION PER HR: HCPCS

## 2023-01-14 PROCEDURE — 82947 ASSAY GLUCOSE BLOOD QUANT: CPT

## 2023-01-14 PROCEDURE — 2500000003 HC RX 250 WO HCPCS: Performed by: NURSE PRACTITIONER

## 2023-01-14 PROCEDURE — 96361 HYDRATE IV INFUSION ADD-ON: CPT

## 2023-01-14 PROCEDURE — 6360000002 HC RX W HCPCS: Performed by: NURSE PRACTITIONER

## 2023-01-14 PROCEDURE — 36415 COLL VENOUS BLD VENIPUNCTURE: CPT

## 2023-01-14 PROCEDURE — 85027 COMPLETE CBC AUTOMATED: CPT

## 2023-01-14 PROCEDURE — 6370000000 HC RX 637 (ALT 250 FOR IP): Performed by: NURSE PRACTITIONER

## 2023-01-14 PROCEDURE — 96366 THER/PROPH/DIAG IV INF ADDON: CPT

## 2023-01-14 PROCEDURE — APPSS45 APP SPLIT SHARED TIME 31-45 MINUTES: Performed by: NURSE PRACTITIONER

## 2023-01-14 PROCEDURE — 96365 THER/PROPH/DIAG IV INF INIT: CPT

## 2023-01-14 PROCEDURE — 2580000003 HC RX 258: Performed by: NURSE PRACTITIONER

## 2023-01-14 PROCEDURE — 80048 BASIC METABOLIC PNL TOTAL CA: CPT

## 2023-01-14 PROCEDURE — 96367 TX/PROPH/DG ADDL SEQ IV INF: CPT

## 2023-01-14 PROCEDURE — 99222 1ST HOSP IP/OBS MODERATE 55: CPT | Performed by: INTERNAL MEDICINE

## 2023-01-14 RX ORDER — ALBUTEROL SULFATE 90 UG/1
2 AEROSOL, METERED RESPIRATORY (INHALATION) EVERY 4 HOURS PRN
Status: DISCONTINUED | OUTPATIENT
Start: 2023-01-14 | End: 2023-01-14 | Stop reason: HOSPADM

## 2023-01-14 RX ORDER — CHLORHEXIDINE GLUCONATE 0.12 MG/ML
5 RINSE ORAL 3 TIMES DAILY
Status: DISCONTINUED | OUTPATIENT
Start: 2023-01-14 | End: 2023-01-14 | Stop reason: HOSPADM

## 2023-01-14 RX ORDER — INSULIN ASPART 100 [IU]/ML
5 INJECTION, SOLUTION INTRAVENOUS; SUBCUTANEOUS
Qty: 1 ADJUSTABLE DOSE PRE-FILLED PEN SYRINGE | Refills: 0 | Status: SHIPPED
Start: 2023-01-14

## 2023-01-14 RX ADMIN — PANTOPRAZOLE SODIUM 40 MG: 40 TABLET, DELAYED RELEASE ORAL at 05:12

## 2023-01-14 RX ADMIN — SODIUM PHOSPHATE, MONOBASIC, MONOHYDRATE AND SODIUM PHOSPHATE, DIBASIC, ANHYDROUS 15 MMOL: 276; 142 INJECTION, SOLUTION INTRAVENOUS at 00:47

## 2023-01-14 RX ADMIN — MAGNESIUM SULFATE HEPTAHYDRATE 1000 MG: 1 INJECTION, SOLUTION INTRAVENOUS at 06:07

## 2023-01-14 RX ADMIN — MAGNESIUM SULFATE HEPTAHYDRATE 1000 MG: 1 INJECTION, SOLUTION INTRAVENOUS at 05:15

## 2023-01-14 RX ADMIN — CHLORHEXIDINE GLUCONATE 5 ML: 1.2 RINSE ORAL at 08:47

## 2023-01-14 RX ADMIN — DEXTROSE AND SODIUM CHLORIDE: 5; 450 INJECTION, SOLUTION INTRAVENOUS at 00:44

## 2023-01-14 RX ADMIN — ACETAMINOPHEN 650 MG: 325 TABLET ORAL at 00:47

## 2023-01-14 RX ADMIN — SODIUM BICARBONATE 650 MG: 650 TABLET ORAL at 00:47

## 2023-01-14 RX ADMIN — SODIUM BICARBONATE 650 MG: 650 TABLET ORAL at 08:47

## 2023-01-14 ASSESSMENT — ENCOUNTER SYMPTOMS
SORE THROAT: 0
RESPIRATORY NEGATIVE: 1
NAUSEA: 1
VOMITING: 1
CONSTIPATION: 0
EYES NEGATIVE: 1
DIARRHEA: 0
ABDOMINAL PAIN: 0
SINUS PAIN: 0

## 2023-01-14 NOTE — ED NOTES
Pt resting on cot. Family at bedside. Pt co diaphoresis. Pt asking for something to drink. Primary RN informed. Pt respirations are even and unlabored, pt is alert and oriented X 4, speaking in complete sentences, bed is in the lowest position, call light is within reach. Will continue to monitor.        Sebastian Urbano RN  01/13/23 1928

## 2023-01-14 NOTE — H&P
[facility]  Office: [address], DO, Andreeaon Robards, DO, Chiquis Beets, DO, Odalys Martin Blood, DO, Patricia Chaney MD, Nora Simon MD, Johnnie Claude, MD, Marcia Lopez MD,  Phill Whyte MD, Truett Burkitt, MD, Makayla Agustin, DO, Bryson Alvarado MD,  Juan Manuel Henley MD, Jeff Boothe MD, Nika Robins, DO, Zahraa Medina MD, Zaynab Farias MD, Genesis Quarles, DO, Alex Mujica MD, Cm Forman MD, Livia Alanis MD, Janina Castillo MD, Brendan Ernandez, DO, Hiren Flores MD, Gabriela Maldonado MD, Maicol Sánchez, CNP,  Teresa Upton, CNP, Niurka Carrera, CNP, Jaime Bauer, CNP,  Lorenzo Arango, DNP, Marylen Net, CNP, Brandon Sotomayor, CNP, Lissy Navarro, CNP, Kaur Powell, CNP, Warren Mccormick, CNP, America Bonner PA-C, Analy Martinez, CNS, Maycol Arreguin, CNP, Azar Martins, CNP         [address]    HISTORY AND PHYSICAL EXAMINATION            Date:   1/14/2023  Patient name:  Rosie King  Date of admission:  1/13/2023  2:42 PM  MRN:   [id number]  Account:  [de-identified]  YOB: 1955  PCP:    Darrin Mena MD  Room:   6142/8639-50  Code Status:    Full Code    Chief Complaint:     Chief Complaint   Patient presents with    Tailbone Pain     Jose Luis Cristobal last Monday    Dizziness     Onset the beginning of Dec       History Obtained From:     patient    History of Present Illness:     Rosie King is a 78 y.o. Non- / non  female who presents with Tailbone Pain Donavinash Dominguezon last Monday) and Dizziness (Onset the beginning of Dec)   and is admitted to the hospital for the management of Hypoglycemia. Patient says she blacked out and fell on her kitchen floor on Monday and broke a tooth. She says she didnt remember much for a few hours afterward. She didnt come to the hospital at that time. She came to the hospital yesterday because her blood sugar was dropping down to the 40s.  She has a known hx of DM and takes short and long acting insulins. She says she has been smoking marijuana to help her appetite so she can gain weight, although she hasnt smoked for a few weeks. She also has been having dizziness recently. She c/o daily nausea since December and vomits after eating due to getting food stuck in her throat. She has an appointment to go see GI for an EGD in February. Other PMH includes COPD, HTN, and chronic pancreatitis. While in ED, her BG dropped to the low 40s. She received dextrose boluses and juice. Imaging revealed nothing acute. Her magnesium and phosphorus were noted to be low. She was admitted for further observation and management of dizziness and hypoglycemia. Past Medical History:     Past Medical History:   Diagnosis Date    Cataracts, bilateral     COPD (chronic obstructive pulmonary disease) ([address]     CTS (carpal tunnel syndrome) left    Fibromyalgia     Generalized abdominal pain 6/3/2016    Glaucoma     Hypertension     Osteoarthritis     Osteoporosis     Pancreatic calcification 6/3/2016    Pancreatitis     Protein-calorie malnutrition, severe (HCC) 6/1/2016    Seizures ([address]     Tremor     Type II or unspecified type diabetes mellitus without mention of complication, not stated as uncontrolled     Uncontrolled type 2 DM with hyperosmolar nonketotic hyperglycemia ([address] 6/3/2016        Past Surgical History:     Past Surgical History:   Procedure Laterality Date    COLONOSCOPY  08/30/2016    very poor prep, unable to complete    COLONOSCOPY  06/30/2017    ADENOMATOUS POLYP. COLONOSCOPY  05/29/2018    polypectomy x1    COLONOSCOPY  05/07/2021    COLONOSCOPY N/A 5/7/2021    COLONOSCOPY WITH polypectomy performed by Cheli Leone MD at [address] Left     ovary twice    NECK SURGERY      pt unsure of exact date & what the procedure was ? 2015 @ [facility]    DC COLON CA SCRN NOT  [address] IND N/A 5/29/2018    COLONOSCOPY WITH POLYP REMOVEL performed by Miranda Cameron MD at [facility] N/A 6/30/2017    COLONOSCOPY POLYPECTOMY HOT BIOPSY performed by Miranda Cameron MD at [address] [place] [address]  08/30/2016    gastritis, MILD CHRONIC GASTRITIS WITH FOCAL ANTRAL EROSION AND ASSOCIATED ACUTE INFLAMMATION. INTESTINAL METAPLASIA PRESENT, NEGATIVE FOR DYSPLASIA. Medications Prior to Admission:     Prior to Admission medications    Medication Sig Start Date End Date Taking? Authorizing Provider   insulin glargine [facility]) 100 UNIT/ML injection pen Inject 10 Units into the skin nightly 7/21/22   Paddy Roe PA-C   pantoprazole (PROTONIX) 40 MG tablet Take 1 tablet by mouth every morning (before breakfast) 7/22/22   Paddy Roe PA-C   SITagliptin (JANUVIA) 100 MG tablet Take 1 tablet by mouth in the morning. Patient taking differently: Take 100 mg by mouth daily Patient paid and got the medication 7/21/22   Christian Nicholson PA-C   albuterol sulfate HFA (PROVENTIL;VENTOLIN;PROAIR) 108 (90 Base) MCG/ACT inhaler INHALE TWO(2) PUFFS INTO LUNGS EVERY SIX(6) HOURS AS NEEDED FOR WHEEZING 6/21/22   DONTRELL Lopez CNP   gabapentin (NEURONTIN) 400 MG capsule Take 1 capsule by mouth 3 times daily for 90 days. 5/31/22 8/29/22  DONTRELL Lopez CNP   Insulin Pen Needle 32G X 4 MM MISC 1 each by Does not apply route 4 times daily (after meals and at bedtime) 5/27/22 8/25/22  DONTRELL Lopez CNP   Nutritional Supplements (ENSURE COMPLETE SHAKE) LIQD Take 1 Can by mouth in the morning and at bedtime 5/27/22 6/26/22  DONTRELL Lopez CNP   blood glucose monitor strips 1 strip by Other route daily Test 1 times a day & as needed for symptoms of irregular blood glucose.  4/4/22 7/21/22  DONTRELL Lopez CNP   omeprazole (PRILOSEC) 20 MG delayed release capsule TAKE ONE (1) CAPSULE BY MOUTH TWICE DAILY  Patient not taking: Reported on 5/27/2022 3/14/22 7/21/22  Sallye LoopDONTRELL - CNP   Blood Gluc Meter Disp-Strips (BLOOD GLUCOSE METER DISPOSABLE) OXANA Daily and as needed 2/25/22   Sallye LoopDONTRELL - CNP   Lancets MISC 1 each by Does not apply route 2 times daily 2/25/22   Sallye Loop, APRN - CNP   sodium bicarbonate 650 MG tablet TAKE 1 TABLET BY MOUTH THREE TIMES DAILY 2/11/22   Blayne Escobedo MD   glimepiride (AMARYL) 2 MG tablet Take 1 tablet by mouth 2 times daily (before meals) 11/3/21 7/21/22  Sallye LoopDONTRELL - CNP   Alcohol Swabs (ALCOHOL PREP) 70 % PADS Daily as needed 10/28/21   Sallye LoopDONTRELL CNP   metFORMIN (GLUCOPHAGE-XR) 500 MG extended release tablet Take 2 tablets by mouth daily (with breakfast) AND 1 tablet Daily with supper. 10/28/21   Sallye LoopDONTRELL - LÓPEZ   SYMBICORT 80-4.5 MCG/ACT AERO Inhale 2 puffs into the lungs 2 times daily 10/13/21 2/25/22  Sallye LoopDONTRELL - LÓPEZ   Multiple Vitamin (MULTI-VITAMIN DAILY PO) Take by mouth    Historical Provider, MD   AquaLance Lancets 30G MISC Daily and as needed for hypoglycemia 4/6/20   Jamielyconner LoopDONTRELL CNP        Allergies:     Patient has no known allergies. Social History:     Tobacco:    reports that she has been smoking cigarettes. She has a 10.00 pack-year smoking history. She has been exposed to tobacco smoke. She has quit using smokeless tobacco.  Alcohol:      reports no history of alcohol use. Drug Use:  reports current drug use. Drug: Marijuana [place]). Family History:     Family History   Problem Relation Age of Onset    Brain Cancer Father     Diabetes Sister     Other Sister         epilepsy       Review of Systems:     Positive and Negative as described in HPI. Review of Systems   Constitutional: Negative. HENT:  Positive for dental problem. Negative for congestion, sinus pain and sore throat. Eyes: Negative. Respiratory: Negative. Cardiovascular:  Positive for chest pain and palpitations. Negative for leg swelling. This chest pain caused by food getting stuck   Gastrointestinal:  Positive for nausea and vomiting. Negative for abdominal pain, constipation and diarrhea. Endocrine: Positive for cold intolerance. Negative for heat intolerance and polyphagia. Genitourinary: Negative. Skin: Negative. Neurological:  Positive for dizziness and syncope. Negative for light-headedness and headaches. Psychiatric/Behavioral: Negative. Physical Exam:   /78   Pulse 99   Temp 97.9 °F (36.6 °C) (Oral)   Resp 16   Ht 5' 7\" (1.702 m)   Wt 91 lb (41.3 kg)   SpO2 98%   BMI 14.25 kg/m²   Temp (24hrs), Av.9 °F (36.6 °C), Min:97.3 °F (36.3 °C), Max:98.4 °F (36.9 °C)    Recent Labs     23  1933 23  2040 23  2326 23  0155   POCGLU 41* 188* 150* 169*       Intake/Output Summary (Last 24 hours) at 2023 0332  Last data filed at 2023 2132  Gross per 24 hour   Intake --   Output 300 ml   Net -300 ml       Physical Exam  Vitals and nursing note reviewed. Constitutional:       General: She is not in acute distress. Appearance: She is ill-appearing. She is not toxic-appearing or diaphoretic. HENT:      Head: Normocephalic and atraumatic. Right Ear: External ear normal.      Left Ear: External ear normal.      Nose: Nose normal. No rhinorrhea. Mouth/Throat:      Mouth: Mucous membranes are moist.   Eyes:      General: No scleral icterus. Right eye: No discharge. Left eye: No discharge. Extraocular Movements: Extraocular movements intact. Conjunctiva/sclera: Conjunctivae normal.      Pupils: Pupils are equal, round, and reactive to light. Cardiovascular:      Rate and Rhythm: Normal rate and regular rhythm. Pulses: Normal pulses. Heart sounds: Normal heart sounds. No murmur heard. No friction rub. No gallop. Comments: Bounding heart tones  Pulmonary:      Effort: Pulmonary effort is normal. No respiratory distress. Breath sounds: Normal breath sounds. No wheezing or rhonchi. Abdominal:      General: Abdomen is flat. There is no distension. Palpations: Abdomen is soft. Tenderness: There is no abdominal tenderness. There is no guarding. Hernia: No hernia is present. Comments: Hypoactive bowel sounds   Musculoskeletal:         General: Normal range of motion. Cervical back: Normal range of motion and neck supple. Right lower leg: No edema. Left lower leg: No edema. Skin:     General: Skin is warm and dry. Coloration: Skin is not jaundiced. Findings: No bruising, erythema or lesion. Neurological:      General: No focal deficit present. Mental Status: She is alert and oriented to person, place, and time. Psychiatric:         Attention and Perception: Attention normal.         Mood and Affect: Affect normal. Mood is anxious. Speech: Speech normal.         Behavior: Behavior normal. Behavior is cooperative. Thought Content:  Thought content normal.         Cognition and Memory: Cognition and memory normal.         Judgment: Judgment normal.       Investigations:      Laboratory Testing:  Recent Results (from the past 24 hour(s))   EKG 12 Lead    Collection Time: 01/13/23  3:15 PM   Result Value Ref Range    Ventricular Rate 109 BPM    Atrial Rate 109 BPM    P-R Interval 152 ms    QRS Duration 68 ms    Q-T Interval 330 ms    QTc Calculation (Bazett) 444 ms    P Axis 83 degrees    R Axis 61 degrees    T Axis 79 degrees   Troponin    Collection Time: 01/13/23  3:20 PM   Result Value Ref Range    Troponin, High Sensitivity 12 0 - 14 ng/L   APTT    Collection Time: 01/13/23  3:20 PM   Result Value Ref Range    PTT 26.0 23.9 - 33.8 sec   Protime-INR    Collection Time: 01/13/23  3:20 PM   Result Value Ref Range    Protime 13.2 11.5 - 14.2 sec    INR 1.0    Phosphorus    Collection Time: 01/13/23  3:20 PM   Result Value Ref Range    Phosphorus 2.1 (L) 2.6 - 4.5 mg/dL Magnesium    Collection Time: 01/13/23  3:20 PM   Result Value Ref Range    Magnesium 1.4 (L) 1.6 - 2.6 mg/dL   Basic Metabolic Panel    Collection Time: 01/13/23  3:20 PM   Result Value Ref Range    Glucose 57 (L) 70 - 99 mg/dL    BUN 9 8 - 23 mg/dL    Creatinine 0.65 0.50 - 0.90 mg/dL    Est, Glom Filt Rate >60 >60 mL/min/1.73m2    Bun/Cre Ratio 14 9 - 20    Calcium 10.0 8.6 - 10.4 mg/dL    Sodium 137 135 - 144 mmol/L    Potassium 4.0 3.7 - 5.3 mmol/L    Chloride 96 (L) 98 - 107 mmol/L    CO2 31 20 - 31 mmol/L    Anion Gap 10 9 - 17 mmol/L   CBC with Auto Differential    Collection Time: 01/13/23  3:20 PM   Result Value Ref Range    WBC 13.1 (H) 3.5 - 11.3 k/uL    RBC 4.43 3.95 - 5.11 m/uL    Hemoglobin 14.3 11.9 - 15.1 g/dL    Hematocrit 42.6 36.3 - 47.1 %    MCV 96.2 82.6 - 102.9 fL    MCH 32.3 25.2 - 33.5 pg    MCHC 33.6 28.4 - 34.8 g/dL    RDW 13.5 11.8 - 14.4 %    Platelets 072 127 - 337 k/uL    MPV 10.5 8.1 - 13.5 fL    NRBC Automated 0.0 0.0 per 100 WBC    Seg Neutrophils 54 36 - 65 %    Lymphocytes 36 24 - 43 %    Monocytes 7 3 - 12 %    Eosinophils % 2 1 - 4 %    Basophils 1 0 - 2 %    Immature Granulocytes 0 0 %    Segs Absolute 7.13 1.50 - 8.10 k/uL    Absolute Lymph # 4.66 (H) 1.10 - 3.70 k/uL    Absolute Mono # 0.97 0.10 - 1.20 k/uL    Absolute Eos # 0.21 0.00 - 0.44 k/uL    Basophils Absolute 0.06 0.00 - 0.20 k/uL    Absolute Immature Granulocyte 0.05 0.00 - 0.30 k/uL   D-Dimer, Quantitative    Collection Time: 01/13/23  3:20 PM   Result Value Ref Range    D-Dimer, Quant 1.56 (H) 0.00 - 0.59 mg/L FEU   Lipase    Collection Time: 01/13/23  3:20 PM   Result Value Ref Range    Lipase 47 13 - 60 U/L   Troponin    Collection Time: 01/13/23  5:20 PM   Result Value Ref Range    Troponin, High Sensitivity 8 0 - 14 ng/L   POC Glucose Fingerstick    Collection Time: 01/13/23  5:41 PM   Result Value Ref Range    POC Glucose 132 (H) 65 - 105 mg/dL   POCT Glucose    Collection Time: 01/13/23  5:42 PM   Result Value Ref Range    Glucose 132 mg/dL    QC OK? OK    POC Glucose Fingerstick    Collection Time: 01/13/23  7:33 PM   Result Value Ref Range    POC Glucose 41 (L) 65 - 105 mg/dL   POCT Glucose    Collection Time: 01/13/23  7:33 PM   Result Value Ref Range    Glucose 41 mg/dL    QC OK? ok    Culture, Blood 1    Collection Time: 01/13/23  8:30 PM    Specimen: Blood   Result Value Ref Range    Specimen Description . BLOOD     Special Requests LEFT AC 9 ML     Culture NO GROWTH <24 HRS    POC Glucose Fingerstick    Collection Time: 01/13/23  8:40 PM   Result Value Ref Range    POC Glucose 188 (H) 65 - 105 mg/dL   POCT Glucose    Collection Time: 01/13/23  8:42 PM   Result Value Ref Range    Glucose 188 mg/dL    QC OK? ok    Culture, Blood 1    Collection Time: 01/13/23  8:47 PM    Specimen: Blood   Result Value Ref Range    Specimen Description . BLOOD     Special Requests LEFT HAND 9ML     Culture NO GROWTH <24 HRS    Lactic Acid    Collection Time: 01/13/23  8:47 PM   Result Value Ref Range    Lactic Acid 2.2 0.5 - 2.2 mmol/L   Urinalysis with Reflex to Culture    Collection Time: 01/13/23  9:30 PM    Specimen: Urine, clean catch   Result Value Ref Range    Color, UA Yellow Yellow    Turbidity UA Clear Clear    Glucose, Ur TRACE (A) NEGATIVE    Bilirubin Urine NEGATIVE NEGATIVE    Ketones, Urine NEGATIVE NEGATIVE    Specific Gravity, UA 1.035 (H) 1.005 - 1.030    Urine Hgb NEGATIVE NEGATIVE    pH, UA 6.0 5.0 - 8.0    Protein, UA NEGATIVE NEGATIVE    Urobilinogen, Urine Normal Normal    Nitrite, Urine NEGATIVE NEGATIVE    Leukocyte Esterase, Urine NEGATIVE NEGATIVE   POC Glucose Fingerstick    Collection Time: 01/13/23 11:26 PM   Result Value Ref Range    POC Glucose 150 (H) 65 - 105 mg/dL   POC Glucose Fingerstick    Collection Time: 01/14/23  1:55 AM   Result Value Ref Range    POC Glucose 169 (H) 65 - 105 mg/dL       Imaging/Diagnostics:  XR PELVIS (1-2 VIEWS)    Result Date: 1/13/2023  No acute abnormality by radiograph. CT HEAD WO CONTRAST    Result Date: 1/13/2023  No acute intracranial abnormality. CT CERVICAL SPINE WO CONTRAST    Result Date: 1/13/2023  No acute abnormality of the cervical spine. Stable changes of anterior cervical fusion at C5-6. Advanced degenerative disc disease at C4-5. CT THORACIC SPINE WO CONTRAST    Result Date: 1/13/2023  No acute osseous abnormalities in the thoracic and lumbar spine     CT LUMBAR SPINE WO CONTRAST    Result Date: 1/13/2023  No acute osseous abnormalities in the thoracic and lumbar spine     XR CHEST PORTABLE    Result Date: 1/13/2023  No acute cardiopulmonary disease. COPD. CT CHEST PULMONARY EMBOLISM W CONTRAST    Result Date: 1/13/2023  1. No pulmonary embolus. 2. No acute pulmonary abnormality. 3. Chronic pancreatitis. RECOMMENDATIONS: Unavailable       Assessment :      Hospital Problems             Last Modified POA    * (Principal) Hypoglycemia 1/13/2023 Yes    Type 2 diabetes mellitus with hypoglycemia without coma, with long-term current use of insulin ([address] 1/13/2023 Yes    Hypophosphatemia 1/13/2023 Yes    Essential (primary) hypertension 1/13/2023 Yes    Hypomagnesemia 1/13/2023 Yes       Plan:     Patient status observation in the  Progressive Unit/Step down    Hypoglycemia: Initially started on a D10 drip with good result. IV fluids later then switched to D5 and a half normal saline. Glucose improving. Hold insulins and antidiabetic medications. Peridex rinse for broken tooth to avoid dental infection. Patient denying any mouth sores or abscesses. Monitor for signs of infection. Diabetes: Order blood glucose every 2 hours. Continue to hold insulins for now. Encourage p.o. intake. Hypophosphatemia: Sodium Phos 15 millimoles IV x1. Courage p.o. intake. Hypertension: BP reasonably controlled. Continue to monitor as ordered. Hypomagnesemia: Replace magnesium as ordered. Encourage p.o. intake.     Consultations:   IP CONSULT TO INTERNAL MEDICINE     Patient is admitted as inpatient status because of co-morbidities listed above, severity of signs and symptoms as outlined, requirement for current medical therapies and most importantly because of direct risk to patient if care not provided in a hospital setting. Expected length of stay > 48 hours.     Total 32 minutes spent in the completion of this H&P    DONTRELL Aquino NP  1/14/2023  3:32 AM    Copy sent to Dr. Cassie Gilmore MD

## 2023-01-14 NOTE — DISCHARGE SUMMARY
[facility]  Office: [address], DO, [place], DO, Nicolehilda Cottle, DO, Kong Hesham Blood, DO, Camilo Montalvo MD, Mary Gamboa MD, Carol Beltrán MD, Caroline De La Paz MD,  Livia Cool MD, Qian Jenkins MD, Lauren Sow, DO, Sharon Schroeder MD,  Consuelo Cortes MD, Linda Lee MD, Tori Moritz, DO, Amanda Paz MD, Nasreen Kruger MD, Asuncion Macdonald DO, Damián Florez MD, Yasir Vernon MD, Karthik Dao MD, Jennifer Chaudhry MD, Mitchell Stockton, DO, Aris Winter MD, Tequila Mcqueen MD, Rumalda Snellen, CNP,  Jayme Alcocer, CNP, Constantin Fontaine, CNP, Bita Hugo, CNP,  Jade Swanson, [facility], Kirstin Yousif, CNP, Norma Roy, CNP, Caty Bradford, CNP, Mesha Siu, CNP, Juanita Linda, CNP, Varghese Samuel PA-C, Chris Walter, CNS, Zamzam Torres, CNP, Starr Page, [facility]    Discharge Summary     Patient ID: Orlando Allen  :  1955   MRN: [id number]     ACCOUNT:  [de-identified]   Patient's PCP: Meet Nuñez MD  Admit Date: 2023   Discharge Date: 2023     Length of Stay: 0  Code Status:  Full Code  Admitting Physician: Princess Luiz Francis DO  Discharge Physician: Princess Luiz Francis DO     Active Discharge Diagnoses:     Hospital Problem Lists:  Principal Problem:    Hypoglycemia  Active Problems:    Type 2 diabetes mellitus with hypoglycemia without coma, with long-term current use of insulin (HCC)    Hypophosphatemia    Essential (primary) hypertension    Hypomagnesemia  Resolved Problems:    * No resolved hospital problems.  *      Admission Condition:  fair     Discharged Condition: stable    Hospital Stay:     Hospital Course:  Orlando Allen is a 78 y.o. female who was admitted for the management of  Hypoglycemia , presented to ER with Tailbone Pain [place] last Monday) and Dizziness (Onset the beginning of Dec)    Admitted after syncopal event and found to have hypoglycemia to low 40s. All dm meds held and by next day glucose>400. No further syncope. All her dm meds were resumed except januvia (already had been taken off amaryl). She will speak to her endo about this med in future    Separately, she has had dizziness for >1 month-does not seem to correlate with hypoglycemia and this probably needs worked up      Significant therapeutic interventions: see above    Significant Diagnostic Studies:   Labs / Micro:  CBC:   Lab Results   Component Value Date/Time    WBC 8.1 01/14/2023 05:43 AM    RBC 3.59 01/14/2023 05:43 AM    RBC 4.27 05/01/2012 11:37 PM    HGB 11.3 01/14/2023 05:43 AM    HCT 34.7 01/14/2023 05:43 AM    MCV 96.7 01/14/2023 05:43 AM    MCH 31.5 01/14/2023 05:43 AM    MCHC 32.6 01/14/2023 05:43 AM    RDW 13.4 01/14/2023 05:43 AM     01/14/2023 05:43 AM     05/01/2012 11:37 PM     CMP:    Lab Results   Component Value Date/Time    GLUCOSE 171 01/14/2023 05:43 AM    GLUCOSE 159 05/01/2012 11:37 PM     01/14/2023 05:43 AM    K 4.8 01/14/2023 05:43 AM     01/14/2023 05:43 AM    CO2 26 01/14/2023 05:43 AM    BUN 8 01/14/2023 05:43 AM    CREATININE 0.62 01/14/2023 05:43 AM    ANIONGAP 10 01/14/2023 05:43 AM    ALKPHOS 115 12/19/2022 02:55 PM    ALT 21 12/19/2022 02:55 PM    AST 21 12/19/2022 02:55 PM    BILITOT 0.4 12/19/2022 02:55 PM    LABALBU 3.7 12/19/2022 02:55 PM    LABALBU 4.7 05/01/2012 11:37 PM    ALBUMIN 1.7 07/19/2022 10:36 PM    LABGLOM >60 01/14/2023 05:43 AM    GFRAA >60 07/21/2022 05:39 AM    GFR      07/21/2022 05:39 AM    PROT 7.6 12/19/2022 02:55 PM    CALCIUM 8.5 01/14/2023 05:43 AM        Radiology:  XR PELVIS (1-2 VIEWS)    Result Date: 1/13/2023  No acute abnormality by radiograph. CT HEAD WO CONTRAST    Result Date: 1/13/2023  No acute intracranial abnormality. CT CERVICAL SPINE WO CONTRAST    Result Date: 1/13/2023  No acute abnormality of the cervical spine.  Stable changes of anterior cervical fusion at C5-6. Advanced degenerative disc disease at C4-5. CT THORACIC SPINE WO CONTRAST    Result Date: 1/13/2023  No acute osseous abnormalities in the thoracic and lumbar spine     CT LUMBAR SPINE WO CONTRAST    Result Date: 1/13/2023  No acute osseous abnormalities in the thoracic and lumbar spine     XR CHEST PORTABLE    Result Date: 1/13/2023  No acute cardiopulmonary disease. COPD. CT CHEST PULMONARY EMBOLISM W CONTRAST    Result Date: 1/13/2023  1. No pulmonary embolus. 2. No acute pulmonary abnormality. 3. Chronic pancreatitis. RECOMMENDATIONS: Unavailable       Consultations:    Consults:     Final Specialist Recommendations/Findings:   IP CONSULT TO INTERNAL MEDICINE      The patient was seen and examined on day of discharge and this discharge summary is in conjunction with any daily progress note from day of discharge. Discharge plan:     Disposition: Home    Physician Follow Up:      Alix Alonso MD  [address] [place] [address],[address] [facility]  [phone]    Follow up in 1 week(s)      Mayte Stevenson PA-C  [address] [place] [address]  [phone]    Schedule an appointment as soon as possible for a visit         Requiring Further Evaluation/Follow Up POST HOSPITALIZATION/Incidental Findings: consider echo as outpatient and any other tests deemed appropriate to work up the dizziness    Diet: diabetic diet    Activity: As tolerated    Instructions to Patient: take medications as prescribed      Discharge Medications:      Medication List        CHANGE how you take these medications      NovoLOG FlexPen 100 UNIT/ML injection pen  Generic drug: insulin aspart  Inject 5 Units into the skin 3 times daily (before meals) Scale only  What changed: additional instructions            CONTINUE taking these medications      albuterol sulfate  (90 Base) MCG/ACT inhaler  Commonly known as: PROVENTIL;VENTOLIN;PROAIR  INHALE TWO(2) PUFFS INTO LUNGS EVERY SIX(6) HOURS AS NEEDED FOR WHEEZING     Alcohol Prep 70 % Pads  Daily as needed     * AquaLance Lancets 30G Misc  Daily and as needed for hypoglycemia     * Lancets Misc  1 each by Does not apply route 2 times daily     Basaglar KwikPen 100 UNIT/ML injection pen  Generic drug: insulin glargine  Inject 10 Units into the skin nightly     BLOOD GLUCOSE METER DISPOSABLE Yesenia  Daily and as needed     Ensure Complete Shake Liqd  Take 1 Can by mouth in the morning and at bedtime     gabapentin 400 MG capsule  Commonly known as: NEURONTIN  Take 1 capsule by mouth 3 times daily for 90 days. Insulin Pen Needle 32G X 4 MM Misc  1 each by Does not apply route 4 times daily (after meals and at bedtime)     metFORMIN 500 MG extended release tablet  Commonly known as: GLUCOPHAGE-XR  Take 2 tablets by mouth daily (with breakfast) AND 1 tablet Daily with supper. MULTI-VITAMIN DAILY PO     pantoprazole 40 MG tablet  Commonly known as: PROTONIX  Take 1 tablet by mouth every morning (before breakfast)     sodium bicarbonate 650 MG tablet  TAKE 1 TABLET BY MOUTH THREE TIMES DAILY     Symbicort 80-4.5 MCG/ACT Aero  Generic drug: budesonide-formoterol  Inhale 2 puffs into the lungs 2 times daily           * This list has 2 medication(s) that are the same as other medications prescribed for you. Read the directions carefully, and ask your doctor or other care provider to review them with you.                 STOP taking these medications      blood glucose test strips     Creon 32031-751193 units Cpep delayed release capsule  Generic drug: lipase-protease-amylase     ferrous sulfate 325 (65 Fe) MG tablet  Commonly known as: IRON 325     FLUoxetine 10 MG capsule  Commonly known as: PROZAC     glimepiride 2 MG tablet  Commonly known as: AMARYL     megestrol 20 MG tablet  Commonly known as: MEGACE     nicotine 21 MG/24HR  Commonly known as: NICODERM CQ     omeprazole 20 MG delayed release capsule  Commonly known as: PRILOSEC     SITagliptin 100 MG tablet  Commonly known as: Januvia     ZINC PO               Where to Get Your Medications        Information about where to get these medications is not yet available    Ask your nurse or doctor about these medications  NovoLOG FlexPen 100 UNIT/ML injection pen         No discharge procedures on file. Time Spent on discharge is  35 mins in patient examination, evaluation, counseling as well as medication reconciliation, prescriptions for required medications, discharge plan and follow up. Electronically signed by   Sejal Francis DO  1/14/2023  2:33 PM      Thank you Dr. Darrin Mena MD for the opportunity to be involved in this patient's care.

## 2023-01-14 NOTE — RT PROTOCOL NOTE
RT Inhaler-Nebulizer Bronchodilator Protocol Note    There is a bronchodilator order in the chart from a provider indicating to follow the RT Bronchodilator Protocol and there is an Initiate RT Inhaler-Nebulizer Bronchodilator Protocol order as well (see protocol at bottom of note). CXR Findings:  XR CHEST PORTABLE    Result Date: 1/13/2023  No acute cardiopulmonary disease. COPD. The findings from the last RT Protocol Assessment were as follows:   History Pulmonary Disease: Smoker 15 pack years or more  Respiratory Pattern: Regular pattern and RR 12-20 bpm  Breath Sounds: Clear breath sounds  Cough: Strong, spontaneous, non-productive  Indication for Bronchodilator Therapy: On home bronchodilators  Bronchodilator Assessment Score: 1    Aerosolized bronchodilator medication orders have been revised according to the RT Inhaler-Nebulizer Bronchodilator Protocol below. Respiratory Therapist to perform RT Therapy Protocol Assessment initially then follow the protocol. Repeat RT Therapy Protocol Assessment PRN for score 0-3 or on second treatment, BID, and PRN for scores above 3. No Indications - adjust the frequency to every 6 hours PRN wheezing or bronchospasm, if no treatments needed after 48 hours then discontinue using Per Protocol order mode. If indication present, adjust the RT bronchodilator orders based on the Bronchodilator Assessment Score as indicated below. Use Inhaler orders unless patient has one or more of the following: on home nebulizer, not able to hold breath for 10 seconds, is not alert and oriented, cannot activate and use MDI correctly, or respiratory rate 25 breaths per minute or more, then use the equivalent nebulizer order(s) with same Frequency and PRN reasons based on the score. If a patient is on this medication at home then do not decrease Frequency below that used at home.     0-3 - enter or revise RT bronchodilator order(s) to equivalent RT Bronchodilator order with Frequency of every 4 hours PRN for wheezing or increased work of breathing using Per Protocol order mode. 4-6 - enter or revise RT Bronchodilator order(s) to two equivalent RT bronchodilator orders with one order with BID Frequency and one order with Frequency of every 4 hours PRN wheezing or increased work of breathing using Per Protocol order mode. 7-10 - enter or revise RT Bronchodilator order(s) to two equivalent RT bronchodilator orders with one order with TID Frequency and one order with Frequency of every 4 hours PRN wheezing or increased work of breathing using Per Protocol order mode. 11-13 - enter or revise RT Bronchodilator order(s) to one equivalent RT bronchodilator order with QID Frequency and an Albuterol order with Frequency of every 4 hours PRN wheezing or increased work of breathing using Per Protocol order mode. Greater than 13 - enter or revise RT Bronchodilator order(s) to one equivalent RT bronchodilator order with every 4 hours Frequency and an Albuterol order with Frequency of every 2 hours PRN wheezing or increased work of breathing using Per Protocol order mode. RT to enter RT Home Evaluation for COPD & MDI Assessment order using Per Protocol order mode.     Electronically signed by Sven Winston RCP on 1/14/2023 at 4:44 AM

## 2023-01-14 NOTE — PROGRESS NOTES
Pt discharged to home, left via private car with . Belonging gathered and taken with pt, IV removed, discharge instruction given, pt verbalizes understanding. All questions and concerns addressed. Safety maintained.

## 2023-01-14 NOTE — PLAN OF CARE
Problem: Discharge Planning  Goal: Discharge to home or other facility with appropriate resources  1/14/2023 1425 by Bart Lindo RN  Outcome: Completed  1/14/2023 1126 by Bart Lindo RN  Outcome: Progressing  1/14/2023 0556 by Kris Motley RN  Outcome: Progressing  1/14/2023 0555 by Kris Motley RN  Outcome: Progressing

## 2023-01-14 NOTE — ED NOTES
ED to inpatient nurses report     Chief Complaint   Patient presents with    Tailbone Pain     [place] last Monday    Dizziness     Onset the beginning of Dec      Present to ED from home  LOC: alert and orientated to name, place, date  Vital signs   Vitals:    01/13/23 1900 01/13/23 1915 01/13/23 1929 01/1955   BP: (!) 164/89 (!) 179/88  (!) 134/91   Pulse:    88   Resp:    16   Temp:       TempSrc:       SpO2: 98% 98% 98% 97%   Weight:          Oxygen Baseline room air    Current needs required room air   SEPSIS: no  [non] Lactate X 2 ordered (Yes or No)  [no] Antibiotics given (Yes or No)  [no] IV Fluids ordered (Yes or No)             [no] IV completed (Yes or No)  [no] Hourly Vital Signs (Validated)  [no] Outstanding Orders:     LDAs:   Peripheral IV 01/13/23 Right Forearm (Active)   Site Assessment Clean, dry & intact 01/13/23 1702   Line Status Blood return noted 01/13/23 1702   Phlebitis Assessment No symptoms 01/13/23 1702   Infiltration Assessment 0 01/13/23 1702   Dressing Status New dressing applied 01/13/23 1702   Dressing Intervention New 01/13/23 1702     Mobility: Independent  Fall Risk:   high  Pending ED orders: glucose check  Present condition: stable  Code Status: full  Consults: IP CONSULT TO INTERNAL MEDICINE  [x]  Hospitalist  Completed  [x] yes [] no Who: Blood  []  Medicine  Completed  [] yes [] No Who:   []  Cardiology  Completed  [] yes [] No Who:   []  GI   Completed  [] yes [] No Who:   []  Neurology  Completed  [] yes [] No Who:   []  Nephrology Completed  [] yes [] No Who:    []  Vascular  Completed  [] yes [] No Who:   []  Ortho  Completed  [] yes [] No Who:     []  Surgery  Completed  [] yes [] No Who:    []  Urology  Completed  [] yes [] No Who:    []  CT Surgery Completed  [] yes [] No Who:   []  Podiatry  Completed  [] yes [] No Who:    []  Other    Completed  [] yes [] No Who:  Interventions: orange juice and Dextrose  Important Events: had syncopal episode on Monday. originally seen for back pain due to fall. Determined glucose was low. Gave dextrose and food. Glucose came up and was stable.,  Pt  called out and states pt is shaky and diaphoretic again. rechecked sugar and 41. Provided more juice and dextrose.   Admitting for glucose monitoring       Electronically signed by Hans Goode RN on 1/13/2023 at 7:59 PM              Hans Goode RN  01/13/23 2002

## 2023-01-14 NOTE — PLAN OF CARE
Problem: Pain  Goal: Verbalizes/displays adequate comfort level or baseline comfort level  1/14/2023 1126 by Haley Tobin RN  Outcome: Progressing  Flowsheets (Taken 1/14/2023 1126)  Verbalizes/displays adequate comfort level or baseline comfort level:   Encourage patient to monitor pain and request assistance   Assess pain using appropriate pain scale   Administer analgesics based on type and severity of pain and evaluate response     Problem: Metabolic/Fluid and Electrolytes - Adult  Goal: Glucose maintained within prescribed range  1/14/2023 1126 by Haley Tobin RN  Outcome: Progressing  Flowsheets (Taken 1/14/2023 1126)  Glucose maintained within prescribed range:   Monitor blood glucose as ordered   Assess for signs and symptoms of hyperglycemia and hypoglycemia   Administer ordered medications to maintain glucose within target range

## 2023-01-14 NOTE — PLAN OF CARE
Pt alert and oriented. VSS. NSR on tele. SpO2 mid 90s on RA. Afebrile. Pain in tailbone treated with prn tylenol. Dextrose 5% in 0.45% NS running at 50ml/hr. Sugars controlled in the 100s. Phos and mag replaced. Bed alarm on, call light within reach. Will continue to monitor. Will most likely d/c home once medically stable.       Problem: Discharge Planning  Goal: Discharge to home or other facility with appropriate resources  1/14/2023 0556 by Heidi Wilkins RN  Outcome: Progressing  1/14/2023 0555 by Heidi Wilkins RN  Outcome: Progressing     Problem: Pain  Goal: Verbalizes/displays adequate comfort level or baseline comfort level  1/14/2023 0556 by Heidi Wilkins RN  Outcome: Progressing  1/14/2023 0555 by Heidi Wilkins RN  Outcome: Progressing     Problem: Safety - Adult  Goal: Free from fall injury  1/14/2023 0556 by Heidi Wilkins RN  Outcome: Progressing  1/14/2023 0555 by Heidi Wilkins RN  Outcome: Progressing     Problem: Metabolic/Fluid and Electrolytes - Adult  Goal: Glucose maintained within prescribed range  Outcome: Progressing

## 2023-01-16 LAB — GLUCOSE BLD-MCNC: 63 MG/DL (ref 65–105)

## 2023-01-17 LAB
EKG ATRIAL RATE: 109 BPM
EKG P AXIS: 83 DEGREES
EKG P-R INTERVAL: 152 MS
EKG Q-T INTERVAL: 330 MS
EKG QRS DURATION: 68 MS
EKG QTC CALCULATION (BAZETT): 444 MS
EKG R AXIS: 61 DEGREES
EKG T AXIS: 79 DEGREES
EKG VENTRICULAR RATE: 109 BPM

## 2023-02-21 ENCOUNTER — OFFICE VISIT (OUTPATIENT)
Dept: GASTROENTEROLOGY | Age: 68
End: 2023-02-21
Payer: MEDICARE

## 2023-02-21 VITALS
HEART RATE: 98 BPM | BODY MASS INDEX: 14.88 KG/M2 | TEMPERATURE: 97 F | WEIGHT: 95 LBS | DIASTOLIC BLOOD PRESSURE: 82 MMHG | SYSTOLIC BLOOD PRESSURE: 127 MMHG

## 2023-02-21 DIAGNOSIS — R97.0 ELEVATED CEA: ICD-10-CM

## 2023-02-21 DIAGNOSIS — K86.0 ALCOHOL-INDUCED CHRONIC PANCREATITIS (HCC): ICD-10-CM

## 2023-02-21 DIAGNOSIS — R97.8 ELEVATED CA 19-9 LEVEL: ICD-10-CM

## 2023-02-21 DIAGNOSIS — Z86.010 HX OF COLONIC POLYPS: ICD-10-CM

## 2023-02-21 DIAGNOSIS — R13.10 DYSPHAGIA, UNSPECIFIED TYPE: Primary | ICD-10-CM

## 2023-02-21 DIAGNOSIS — D64.9 ANEMIA, UNSPECIFIED TYPE: ICD-10-CM

## 2023-02-21 DIAGNOSIS — R63.4 WEIGHT LOSS: ICD-10-CM

## 2023-02-21 PROCEDURE — 1123F ACP DISCUSS/DSCN MKR DOCD: CPT | Performed by: INTERNAL MEDICINE

## 2023-02-21 PROCEDURE — 3074F SYST BP LT 130 MM HG: CPT | Performed by: INTERNAL MEDICINE

## 2023-02-21 PROCEDURE — 3079F DIAST BP 80-89 MM HG: CPT | Performed by: INTERNAL MEDICINE

## 2023-02-21 PROCEDURE — 99214 OFFICE O/P EST MOD 30 MIN: CPT | Performed by: INTERNAL MEDICINE

## 2023-02-21 ASSESSMENT — ENCOUNTER SYMPTOMS
ABDOMINAL PAIN: 0
DIARRHEA: 0
ABDOMINAL DISTENTION: 0
NAUSEA: 0
TROUBLE SWALLOWING: 1
ANAL BLEEDING: 0
VOMITING: 1
BLOOD IN STOOL: 0
RECTAL PAIN: 0
CONSTIPATION: 0
SHORTNESS OF BREATH: 0
WHEEZING: 0
CHOKING: 1
COUGH: 0

## 2023-02-21 NOTE — PROGRESS NOTES
GI CLINIC FOLLOW UP    INTERVAL HISTORY:   No referring provider defined for this encounter. Chief Complaint   Patient presents with    Pancreatitis     Patient is f/u on pancreatic insufficiency, anemia, and elevated CEA and CA-19    Dysphagia     Patient c/o dysphagia. She states food gets in esophagus. HISTORY OF PRESENT ILLNESS: Shayla Banuelos is a 78 y.o. female , referred for evaluation of  pancreatitis,pancreatic insufficiency, elevated tumor markers  Not gaining wt    Here for f/u with above    CEA was slightly still high and still  CA-19-9 completely normal  Had colonoscopy  We did an MRI MRCP on her which showed sequela of chronic pancreatitis, questionable side  branches I PMN, both findings are similar to the imaging from 2016   we have her on Creon    Colonosocpy 5/2021 : difficult colon ,polyp, hemorrhoids  Last visit anemia w/u 6/22: all normal     Today    Indigestion and n/v    Wt stable   ? Dysphagia /solid   On Protonix , no ht burn         Past Medical,Family, and Social History reviewed and does contribute to the patient presentingcondition. Patient's PMH/PSH,SH,PSYCH Hx, MEDs, ALLERGIES, and ROS were all reviewed and updated in the appropriate sections.     PAST MEDICAL HISTORY:  Past Medical History:   Diagnosis Date    Cataracts, bilateral     COPD (chronic obstructive pulmonary disease) ([address]     CTS (carpal tunnel syndrome) left    Fibromyalgia     Generalized abdominal pain 6/3/2016    Glaucoma     Hypertension     Osteoarthritis     Osteoporosis     Pancreatic calcification 6/3/2016    Pancreatitis     Protein-calorie malnutrition, severe (HCC) 6/1/2016    Seizures ([address]     Tremor     Type II or unspecified type diabetes mellitus without mention of complication, not stated as uncontrolled     Uncontrolled type 2 DM with hyperosmolar nonketotic hyperglycemia ([address] 6/3/2016       Past Surgical History:   Procedure Laterality Date    COLONOSCOPY  08/30/2016 very poor prep, unable to complete    COLONOSCOPY  06/30/2017    ADENOMATOUS POLYP. COLONOSCOPY  05/29/2018    polypectomy x1    COLONOSCOPY  05/07/2021    COLONOSCOPY N/A 5/7/2021    COLONOSCOPY WITH polypectomy performed by Silvia Dawkins MD at [address] [place] [address] Left     ovary twice    NECK SURGERY      pt unsure of exact date & what the procedure was ? 2015 @ [facility]    NE COLON CA SCRN NOT  [address] IND N/A 5/29/2018    COLONOSCOPY WITH POLYP REMOVEL performed by Silvia Dawkins MD at [facility] N/A 6/30/2017    COLONOSCOPY POLYPECTOMY HOT BIOPSY performed by Silvia Dawkins MD at [facility]. [address]  08/30/2016    gastritis, MILD CHRONIC GASTRITIS WITH FOCAL ANTRAL EROSION AND ASSOCIATED ACUTE INFLAMMATION. INTESTINAL METAPLASIA PRESENT, NEGATIVE FOR DYSPLASIA. CURRENT MEDICATIONS:    Current Outpatient Medications:     insulin aspart (NOVOLOG FLEXPEN) 100 UNIT/ML injection pen, Inject 5 Units into the skin 3 times daily (before meals) Scale only, Disp: 1 Adjustable Dose Pre-filled Pen Syringe, Rfl: 0    insulin glargine (BASAGLAR KWIKPEN) 100 UNIT/ML injection pen, Inject 10 Units into the skin nightly, Disp: 5 pen, Rfl: 0    pantoprazole (PROTONIX) 40 MG tablet, Take 1 tablet by mouth every morning (before breakfast), Disp: 30 tablet, Rfl: 1    albuterol sulfate HFA (PROVENTIL;VENTOLIN;PROAIR) 108 (90 Base) MCG/ACT inhaler, INHALE TWO(2) PUFFS INTO LUNGS EVERY SIX(6) HOURS AS NEEDED FOR WHEEZING, Disp: 8.5 g, Rfl: 10    gabapentin (NEURONTIN) 400 MG capsule, Take 1 capsule by mouth 3 times daily for 90 days. , Disp: 270 capsule, Rfl: 2    Insulin Pen Needle 32G X 4 MM MISC, 1 each by Does not apply route 4 times daily (after meals and at bedtime), Disp: 1000 each, Rfl: 3    Nutritional Supplements (ENSURE COMPLETE SHAKE) LIQD, Take 1 Can by mouth in the morning and at bedtime, Disp: 60 each, Rfl: 5    Blood Gluc Meter Disp-Strips (BLOOD GLUCOSE METER DISPOSABLE) OXANA, Daily and as needed, Disp: 1 each, Rfl: 0    Lancets MISC, 1 each by Does not apply route 2 times daily, Disp: 300 each, Rfl: 1    sodium bicarbonate 650 MG tablet, TAKE 1 TABLET BY MOUTH THREE TIMES DAILY, Disp: 90 tablet, Rfl: 0    Alcohol Swabs (ALCOHOL PREP) 70 % PADS, Daily as needed, Disp: 200 each, Rfl: 3    metFORMIN (GLUCOPHAGE-XR) 500 MG extended release tablet, Take 2 tablets by mouth daily (with breakfast) AND 1 tablet Daily with supper., Disp: 270 tablet, Rfl: 2    SYMBICORT 80-4.5 MCG/ACT AERO, Inhale 2 puffs into the lungs 2 times daily, Disp: 3 each, Rfl: 2    Multiple Vitamin (MULTI-VITAMIN DAILY PO), Take by mouth, Disp: , Rfl:     AquaLance Lancets 30G MISC, Daily and as needed for hypoglycemia, Disp: 200 each, Rfl: 2    ALLERGIES:   Allergies   Allergen Reactions    Aspirin      Other reaction(s): Unknown    Codeine      Other reaction(s): Unknown    Hydrocodone-Acetaminophen      Other reaction(s): Unknown    Ibuprofen Itching     Other reaction(s): Unknown       FAMILY HISTORY:       Problem Relation Age of Onset    Brain Cancer Father     Diabetes Sister     Other Sister         epilepsy         SOCIAL HISTORY:   Social History     Socioeconomic History    Marital status:      Spouse name: Not on file    Number of children: Not on file    Years of education: Not on file    Highest education level: Not on file   Occupational History    Not on file   Tobacco Use    Smoking status: Every Day     Packs/day: 0.25     Years: 40.00     Pack years: 10.00     Types: Cigarettes     Passive exposure: Current    Smokeless tobacco: Former   Vaping Use    Vaping Use: Never used   Substance and Sexual Activity    Alcohol use: No    Drug use: Yes     Types: Marijuana (Weed)    Sexual activity: Not on file     Comment: last use end 10/2014   Other Topics Concern    Not on file   Social History Narrative    Not on file     Social Determinants of Health Financial Resource Strain: Low Risk     Difficulty of Paying Living Expenses: Not hard at all   Food Insecurity: No Food Insecurity    Worried About Running Out of Food in the Last Year: Never true    Ran Out of Food in the Last Year: Never true   Transportation Needs: Not on file   Physical Activity: Not on file   Stress: Not on file   Social Connections: Not on file   Intimate Partner Violence: Not on file   Housing Stability: Not on file       REVIEW OF SYSTEMS: A 12-point review of systemswas obtained and pertinent positives and negatives were enumerated above in the history of present illness. All other reviewed systems / symptoms were negative. Review of Systems   Constitutional:  Positive for fatigue. Negative for appetite change and unexpected weight change. HENT:  Positive for trouble swallowing. Respiratory:  Positive for choking. Negative for cough, shortness of breath and wheezing. Cardiovascular:  Negative for chest pain, palpitations and leg swelling. Gastrointestinal:  Positive for vomiting. Negative for abdominal distention, abdominal pain, anal bleeding, blood in stool, constipation, diarrhea, nausea and rectal pain. Genitourinary:  Negative for difficulty urinating. Allergic/Immunologic: Negative for environmental allergies and food allergies. Neurological:  Negative for dizziness, weakness, light-headedness, numbness and headaches. Hematological:  Does not bruise/bleed easily. Psychiatric/Behavioral:  Negative for sleep disturbance. The patient is not nervous/anxious.           LABORATORY DATA: Reviewed  Lab Results   Component Value Date    WBC 8.1 01/14/2023    HGB 11.3 (L) 01/14/2023    HCT 34.7 (L) 01/14/2023    MCV 96.7 01/14/2023     01/14/2023     01/14/2023    K 4.8 01/14/2023     01/14/2023    CO2 26 01/14/2023    BUN 8 01/14/2023    CREATININE 0.62 01/14/2023    LABPROT 7.4 05/01/2012    LABALBU 3.7 12/19/2022    BILITOT 0.4 12/19/2022 ALKPHOS 115 (H) 12/19/2022    AST 21 12/19/2022    ALT 21 12/19/2022    INR 1.0 01/13/2023         Lab Results   Component Value Date    RBC 3.59 (L) 01/14/2023    HGB 11.3 (L) 01/14/2023    MCV 96.7 01/14/2023    MCH 31.5 01/14/2023    MCHC 32.6 01/14/2023    RDW 13.4 01/14/2023    MPV 10.2 01/14/2023    BASOPCT 1 01/13/2023    LYMPHSABS 4.66 (H) 01/13/2023    MONOSABS 0.97 01/13/2023    NEUTROABS 7.13 01/13/2023    EOSABS 0.21 01/13/2023    BASOSABS 0.06 01/13/2023         DIAGNOSTIC TESTING:     No results found. PHYSICAL EXAMINATION: Vital signs reviewed per the nursing documentation. /82   Pulse 98   Temp 97 °F (36.1 °C)   Wt 95 lb (43.1 kg)   BMI 14.88 kg/m²   Body mass index is 14.88 kg/m². Physical Exam  Vitals and nursing note reviewed. Constitutional:       General: She is not in acute distress. Appearance: She is well-developed. She is not diaphoretic. HENT:      Head: Normocephalic. Mouth/Throat:      Pharynx: No oropharyngeal exudate. Eyes:      General: No scleral icterus. Pupils: Pupils are equal, round, and reactive to light. Neck:      Thyroid: No thyromegaly. Vascular: No JVD. Trachea: No tracheal deviation. Cardiovascular:      Rate and Rhythm: Normal rate and regular rhythm. Heart sounds: Normal heart sounds. No murmur heard. Pulmonary:      Effort: Pulmonary effort is normal. No respiratory distress. Breath sounds: Normal breath sounds. No wheezing. Abdominal:      General: Bowel sounds are normal. There is no distension. Palpations: Abdomen is soft. Tenderness: There is no abdominal tenderness. There is no guarding or rebound. Comments: No ascites   Musculoskeletal:         General: Normal range of motion. Cervical back: Normal range of motion and neck supple. Skin:     General: Skin is warm. Coloration: Skin is not pale. Findings: No erythema or rash.       Comments: She is not diaphoretic Neurological:      Mental Status: She is alert and oriented to person, place, and time. Deep Tendon Reflexes: Reflexes are normal and symmetric. Psychiatric:         Behavior: Behavior normal.         Thought Content: Thought content normal.         Judgment: Judgment normal.         IMPRESSION: Ms. CEJA is a 78 y.o. female with      Diagnosis Orders   1. Dysphagia, unspecified type  EGD      2. Elevated CA 19-9 level  Cancer Antigen 19-9      3. Elevated CEA  CEA      4. Hx of colonic polyps        5. Alcohol-induced chronic pancreatitis ([facility]        6. Weight loss        7. Anemia, unspecified type  Hemoglobinopathy Evaluation      Because of the new issue with the dysphagia we will proceed with repeat EGD and dilatation  Continue the Protonix  We will repeat her tumor markers as mentioned imaging is not showing any mass and the tumor markers have been stable        Diet/life style/natural hx /complication of the dx were all explained in details   Past medical, past surgical, social history, psychiatric history, medications or allergies, all reviewed and  updated    Thank you for allowing me to participate in the care of Ms. CEJA. For any further questions please do not hesitate to contact me. I have reviewed and agree with the ROS entered by the MA/RN. Note is dictated utilizing voice recognition software. Unfortunately this leads to occasional typographical errors. Please contact our office if you have any questions.       Eli Escobedo MD  [facility]  O: #[phone]

## 2023-02-22 ENCOUNTER — HOSPITAL ENCOUNTER (OUTPATIENT)
Age: 68
Setting detail: SPECIMEN
Discharge: HOME | End: 2023-02-22
Payer: MEDICARE

## 2023-02-22 DIAGNOSIS — D64.9 ANEMIA, UNSPECIFIED TYPE: ICD-10-CM

## 2023-02-22 DIAGNOSIS — R97.0 ELEVATED CEA: ICD-10-CM

## 2023-02-22 DIAGNOSIS — R97.8 ELEVATED CA 19-9 LEVEL: ICD-10-CM

## 2023-02-22 LAB
CANCER AG19-9 SERPL IA-ACNC: 38 U/ML (ref 0–35)
CEA SERPL-MCNC: 7.1 NG/ML

## 2023-02-22 PROCEDURE — 83020 HEMOGLOBIN ELECTROPHORESIS: CPT

## 2023-02-22 PROCEDURE — 36415 COLL VENOUS BLD VENIPUNCTURE: CPT

## 2023-02-22 PROCEDURE — 82378 CARCINOEMBRYONIC ANTIGEN: CPT

## 2023-02-22 PROCEDURE — 86301 IMMUNOASSAY TUMOR CA 19-9: CPT

## 2023-02-23 LAB
HGB ELECTROPHORESIS INTERP: NORMAL
PATHOLOGIST: NORMAL

## 2023-02-24 ENCOUNTER — HOSPITAL ENCOUNTER (OUTPATIENT)
Age: 68
Setting detail: OUTPATIENT SURGERY
Discharge: HOME | End: 2023-02-24
Attending: INTERNAL MEDICINE | Admitting: INTERNAL MEDICINE
Payer: MEDICARE

## 2023-02-24 ENCOUNTER — TELEPHONE (OUTPATIENT)
Dept: GASTROENTEROLOGY | Age: 68
End: 2023-02-24

## 2023-02-24 ENCOUNTER — ANESTHESIA EVENT (OUTPATIENT)
Dept: OPERATING ROOM | Age: 68
End: 2023-02-24
Payer: MEDICARE

## 2023-02-24 ENCOUNTER — ANESTHESIA (OUTPATIENT)
Dept: OPERATING ROOM | Age: 68
End: 2023-02-24
Payer: MEDICARE

## 2023-02-24 VITALS
BODY MASS INDEX: 14.46 KG/M2 | HEART RATE: 81 BPM | TEMPERATURE: 97.9 F | OXYGEN SATURATION: 97 % | RESPIRATION RATE: 17 BRPM | SYSTOLIC BLOOD PRESSURE: 167 MMHG | WEIGHT: 92.15 LBS | DIASTOLIC BLOOD PRESSURE: 92 MMHG | HEIGHT: 67 IN

## 2023-02-24 DIAGNOSIS — R13.10 DYSPHAGIA, UNSPECIFIED TYPE: ICD-10-CM

## 2023-02-24 LAB
GLUCOSE BLD-MCNC: 185 MG/DL (ref 65–105)
GLUCOSE BLD-MCNC: 199 MG/DL (ref 65–105)

## 2023-02-24 PROCEDURE — 43239 EGD BIOPSY SINGLE/MULTIPLE: CPT | Performed by: INTERNAL MEDICINE

## 2023-02-24 PROCEDURE — 3700000001 HC ADD 15 MINUTES (ANESTHESIA): Performed by: INTERNAL MEDICINE

## 2023-02-24 PROCEDURE — 2580000003 HC RX 258: Performed by: STUDENT IN AN ORGANIZED HEALTH CARE EDUCATION/TRAINING PROGRAM

## 2023-02-24 PROCEDURE — 2500000003 HC RX 250 WO HCPCS: Performed by: NURSE ANESTHETIST, CERTIFIED REGISTERED

## 2023-02-24 PROCEDURE — 7100000011 HC PHASE II RECOVERY - ADDTL 15 MIN: Performed by: INTERNAL MEDICINE

## 2023-02-24 PROCEDURE — 88305 TISSUE EXAM BY PATHOLOGIST: CPT

## 2023-02-24 PROCEDURE — 2709999900 HC NON-CHARGEABLE SUPPLY: Performed by: INTERNAL MEDICINE

## 2023-02-24 PROCEDURE — 82947 ASSAY GLUCOSE BLOOD QUANT: CPT

## 2023-02-24 PROCEDURE — 3609012400 HC EGD TRANSORAL BIOPSY SINGLE/MULTIPLE: Performed by: INTERNAL MEDICINE

## 2023-02-24 PROCEDURE — 7100000010 HC PHASE II RECOVERY - FIRST 15 MIN: Performed by: INTERNAL MEDICINE

## 2023-02-24 PROCEDURE — 3700000000 HC ANESTHESIA ATTENDED CARE: Performed by: INTERNAL MEDICINE

## 2023-02-24 PROCEDURE — 6360000002 HC RX W HCPCS: Performed by: NURSE ANESTHETIST, CERTIFIED REGISTERED

## 2023-02-24 RX ORDER — SODIUM CHLORIDE 9 MG/ML
INJECTION, SOLUTION INTRAVENOUS CONTINUOUS
Status: DISCONTINUED | OUTPATIENT
Start: 2023-02-24 | End: 2023-02-24

## 2023-02-24 RX ORDER — PROPOFOL 10 MG/ML
INJECTION, EMULSION INTRAVENOUS PRN
Status: DISCONTINUED | OUTPATIENT
Start: 2023-02-24 | End: 2023-02-24 | Stop reason: SDUPTHER

## 2023-02-24 RX ORDER — SODIUM CHLORIDE, SODIUM LACTATE, POTASSIUM CHLORIDE, CALCIUM CHLORIDE 600; 310; 30; 20 MG/100ML; MG/100ML; MG/100ML; MG/100ML
INJECTION, SOLUTION INTRAVENOUS CONTINUOUS
Status: DISCONTINUED | OUTPATIENT
Start: 2023-02-24 | End: 2023-02-24 | Stop reason: HOSPADM

## 2023-02-24 RX ORDER — ONDANSETRON 2 MG/ML
4 INJECTION INTRAMUSCULAR; INTRAVENOUS
Status: DISCONTINUED | OUTPATIENT
Start: 2023-02-24 | End: 2023-02-24 | Stop reason: HOSPADM

## 2023-02-24 RX ORDER — SODIUM CHLORIDE 9 MG/ML
INJECTION, SOLUTION INTRAVENOUS PRN
Status: DISCONTINUED | OUTPATIENT
Start: 2023-02-24 | End: 2023-02-24 | Stop reason: HOSPADM

## 2023-02-24 RX ORDER — SODIUM CHLORIDE 0.9 % (FLUSH) 0.9 %
5-40 SYRINGE (ML) INJECTION EVERY 12 HOURS SCHEDULED
Status: DISCONTINUED | OUTPATIENT
Start: 2023-02-24 | End: 2023-02-24 | Stop reason: HOSPADM

## 2023-02-24 RX ORDER — HYDROMORPHONE HYDROCHLORIDE 1 MG/ML
0.5 INJECTION, SOLUTION INTRAMUSCULAR; INTRAVENOUS; SUBCUTANEOUS EVERY 5 MIN PRN
Status: DISCONTINUED | OUTPATIENT
Start: 2023-02-24 | End: 2023-02-24 | Stop reason: HOSPADM

## 2023-02-24 RX ORDER — SODIUM CHLORIDE 0.9 % (FLUSH) 0.9 %
5-40 SYRINGE (ML) INJECTION PRN
Status: DISCONTINUED | OUTPATIENT
Start: 2023-02-24 | End: 2023-02-24 | Stop reason: HOSPADM

## 2023-02-24 RX ORDER — FENTANYL CITRATE 50 UG/ML
25 INJECTION, SOLUTION INTRAMUSCULAR; INTRAVENOUS EVERY 5 MIN PRN
Status: DISCONTINUED | OUTPATIENT
Start: 2023-02-24 | End: 2023-02-24 | Stop reason: HOSPADM

## 2023-02-24 RX ORDER — LIDOCAINE HYDROCHLORIDE 10 MG/ML
1 INJECTION, SOLUTION EPIDURAL; INFILTRATION; INTRACAUDAL; PERINEURAL
Status: DISCONTINUED | OUTPATIENT
Start: 2023-02-24 | End: 2023-02-24 | Stop reason: HOSPADM

## 2023-02-24 RX ORDER — LIDOCAINE HYDROCHLORIDE 20 MG/ML
INJECTION, SOLUTION EPIDURAL; INFILTRATION; INTRACAUDAL; PERINEURAL PRN
Status: DISCONTINUED | OUTPATIENT
Start: 2023-02-24 | End: 2023-02-24 | Stop reason: SDUPTHER

## 2023-02-24 RX ADMIN — LIDOCAINE HYDROCHLORIDE 50 MG: 20 INJECTION, SOLUTION EPIDURAL; INFILTRATION; INTRACAUDAL; PERINEURAL at 11:22

## 2023-02-24 RX ADMIN — PROPOFOL 40 MG: 10 INJECTION, EMULSION INTRAVENOUS at 11:31

## 2023-02-24 RX ADMIN — SODIUM CHLORIDE, POTASSIUM CHLORIDE, SODIUM LACTATE AND CALCIUM CHLORIDE: 600; 310; 30; 20 INJECTION, SOLUTION INTRAVENOUS at 10:07

## 2023-02-24 RX ADMIN — PROPOFOL 100 MG: 10 INJECTION, EMULSION INTRAVENOUS at 11:23

## 2023-02-24 ASSESSMENT — LIFESTYLE VARIABLES: SMOKING_STATUS: 1

## 2023-02-24 ASSESSMENT — PAIN - FUNCTIONAL ASSESSMENT: PAIN_FUNCTIONAL_ASSESSMENT: 0-10

## 2023-02-24 NOTE — ANESTHESIA POSTPROCEDURE EVALUATION
Department of Anesthesiology  Postprocedure Note    Patient: Rahul Sampson  MRN: [id number]  YOB: 1955  Date of evaluation: 2/24/2023      Procedure Summary     Date: 02/24/23 Room / Location: [place] [address] / [facility] - INPATIENT    Anesthesia Start: 1115 Anesthesia Stop: 1138    Procedure: EGD BIOPSY Diagnosis:       Dysphagia, unspecified type      (Dysphagia, unspecified type [R13.10])    Surgeons: Eduard Mckeon MD Responsible Provider: Evelyn Song MD    Anesthesia Type: general, TIVA ASA Status: 3          Anesthesia Type: No value filed.     Angelita Phase I:      Angelita Phase II: Angelita Score: 10      Anesthesia Post Evaluation    Patient location during evaluation: PACU  Patient participation: complete - patient participated  Level of consciousness: awake  Airway patency: patent  Nausea & Vomiting: no nausea  Complications: no  Cardiovascular status: blood pressure returned to baseline  Respiratory status: acceptable  Hydration status: euvolemic  Comments: Multimodal analgesia pain management as indicated by procedure  Multimodal analgesia pain management approach

## 2023-02-24 NOTE — ANESTHESIA PRE PROCEDURE
Department of Anesthesiology  Preprocedure Note       Name:  Jamie Bojorquez   Age:  78 y.o.  :  1955                                          MRN:  [id number]         Date:  2023      Surgeon: Chano Guzman):  Ander Goode MD    Procedure: Procedure(s):  EGD ESOPHAGOGASTRODUODENOSCOPY    Medications prior to admission:   Prior to Admission medications    Medication Sig Start Date End Date Taking? Authorizing Provider   insulin aspart (NOVOLOG FLEXPEN) 100 UNIT/ML injection pen Inject 5 Units into the skin 3 times daily (before meals) Scale only  Patient taking differently: Inject into the skin 3 times daily (before meals) Scale only 23   Ricardo ROMEO Blood, DO   insulin glargine (BASAGLAR KWIKPEN) 100 UNIT/ML injection pen Inject 10 Units into the skin nightly  Patient taking differently: Inject 10 Units into the skin daily 22   Ana Roe PA-C   pantoprazole (PROTONIX) 40 MG tablet Take 1 tablet by mouth every morning (before breakfast) 22   Precious Jaimes PA-C   albuterol sulfate HFA (PROVENTIL;VENTOLIN;PROAIR) 108 (90 Base) MCG/ACT inhaler INHALE TWO(2) PUFFS INTO LUNGS EVERY SIX(6) HOURS AS NEEDED FOR WHEEZING  Patient not taking: Reported on 2023   DONTRELL Briggs CNP   gabapentin (NEURONTIN) 400 MG capsule Take 1 capsule by mouth 3 times daily for 90 days. 22  DONTRELL Briggs CNP   Insulin Pen Needle 32G X 4 MM MISC 1 each by Does not apply route 4 times daily (after meals and at bedtime) 22  DONTRELL Briggs CNP   Nutritional Supplements (ENSURE COMPLETE SHAKE) LIQD Take 1 Can by mouth in the morning and at bedtime 22  DONTRELL Briggs CNP   blood glucose monitor strips 1 strip by Other route daily Test 1 times a day & as needed for symptoms of irregular blood glucose.  22  DONTRELL Briggs CNP   omeprazole (PRILOSEC) 20 MG delayed release capsule TAKE ONE (1) CAPSULE BY MOUTH TWICE DAILY  Patient not taking: Reported on 5/27/2022 3/14/22 7/21/22  DONTRELL Crespo CNP   Blood Gluc Meter Disp-Strips (BLOOD GLUCOSE METER DISPOSABLE) OXANA Daily and as needed 2/25/22   DONTRELL Crespo CNP   Lancets MISC 1 each by Does not apply route 2 times daily 2/25/22   DONTRELL Crespo CNP   sodium bicarbonate 650 MG tablet TAKE 1 TABLET BY MOUTH THREE TIMES DAILY 2/11/22   Francheska Zepeda MD   glimepiride (AMARYL) 2 MG tablet Take 1 tablet by mouth 2 times daily (before meals) 11/3/21 7/21/22  DONTRELL Crespo CNP   Alcohol Swabs (ALCOHOL PREP) 70 % PADS Daily as needed 10/28/21   DONTRELL Crespo CNP   metFORMIN (GLUCOPHAGE-XR) 500 MG extended release tablet Take 2 tablets by mouth daily (with breakfast) AND 1 tablet Daily with supper. 10/28/21   DONTRELL Crespo CNP   SYMBICORT 80-4.5 MCG/ACT AERO Inhale 2 puffs into the lungs 2 times daily 10/13/21 2/24/23  DONTRELL Crespo CNP   Multiple Vitamin (MULTI-VITAMIN DAILY PO) Take by mouth    Historical Provider, MD   AquaLance Lancets 30G MISC Daily and as needed for hypoglycemia 4/6/20   DONTRELL Crespo CNP       Current medications:    No current facility-administered medications for this visit. No current outpatient medications on file.      Facility-Administered Medications Ordered in Other Visits   Medication Dose Route Frequency Provider Last Rate Last Admin    lidocaine PF 1 % injection 1 mL  1 mL IntraDERmal Once PRN Ned Baird MD        0.9 % sodium chloride infusion   IntraVENous Continuous Ned MD Brie        lactated ringers IV soln infusion   IntraVENous Continuous Ned MD Brie 125 mL/hr at 02/24/23 1007 New Bag at 02/24/23 1007    sodium chloride flush 0.9 % injection 5-40 mL  5-40 mL IntraVENous 2 times per day Ned Baird MD        sodium chloride flush 0.9 % injection 5-40 mL  5-40 mL IntraVENous PRN Ned Baird MD        0.9 % sodium chloride infusion   IntraVENous PRN Taylor Roblero MD           Allergies: Allergies   Allergen Reactions    Aspirin      PATIENT STATES ALLERGY WENT AWAY.  Codeine      PATIENT STATES ALLERGY WENT AWAY.  Hydrocodone-Acetaminophen      PATIENT STATES ALLERGY WENT AWAY.  Ibuprofen Itching     PATIENT STATES ALLERGY WENT AWAY. Problem List:    Patient Active Problem List   Diagnosis Code    Intractable epilepsy ([facility] G40.919    Hypoglycemia E16.2    Depression F32. A    Osteoporosis M81.0    Affective disorder ([facility] F39    Uncontrolled type 2 DM with hyperosmolar nonketotic hyperglycemia ([facility]) E11.00    Generalized abdominal pain R10.84    Pancreatic calcification K86.89    Uncontrolled type 2 diabetes mellitus with hyperglycemia ([facility]) E11.65    Pancreatic insufficiency K86.89    Pain of upper abdomen R10.10    Alcohol-induced chronic pancreatitis ([facility]) K86.0    Weight loss R63.4    Polysubstance abuse ([facility]) F19.10    Unresponsive episode R41.89    Chronic neck pain M54.2, G89.29    Neuropathic pain of both legs G57.93    Seizure disorder, secondary ([facility] G40.909    Essential (primary) hypertension I10    Type 2 diabetes mellitus without complication, with long-term current use of insulin (HCC) E11.9, Z79.4    Neuropathic pain M79.2    Elevated CEA R97.0    Acute left-sided low back pain without sciatica M54.50    Adenomatous polyps D36.9    Bilateral wrist pain M25.531, M25.532    Seizure-like activity ([facility]) R56.9    Elevated CA 19-9 level R97.8    Body mass index (BMI) less than 16.5 Z68.1    Pseudohyponatremia R79.89    Hyperglycemia R73.9    Hypokalemia E87.6    Hypocalcemia E83.51    Hypomagnesemia E83.42    Noncompliance Z91.199    Severe malnutrition ([facility]) E43    Acute cystitis N30.00    Type 2 diabetes mellitus with hypoglycemia without coma, with long-term current use of insulin ([facility]) E11.649, Z79.4    Hypophosphatemia E83.39    GERD (gastroesophageal reflux disease) K21.9    Cannabis abuse F12.10    Dizziness R42       Past Medical History:        Diagnosis Date    Cataracts, bilateral     COPD (chronic obstructive pulmonary disease) (HCC)     CTS (carpal tunnel syndrome) left    Fibromyalgia     Generalized abdominal pain 06/03/2016    GERD (gastroesophageal reflux disease)     Glaucoma     Hypertension     Osteoarthritis     Osteoporosis     Pancreatic calcification 06/03/2016    Pancreatitis     Protein-calorie malnutrition, severe ([address] 06/01/2016    Seizures ([facility] 2012    Tremor     Type II or unspecified type diabetes mellitus without mention of complication, not stated as uncontrolled     Uncontrolled type 2 DM with hyperosmolar nonketotic hyperglycemia ([facility] 06/03/2016       Past Surgical History:        Procedure Laterality Date    COLONOSCOPY  08/30/2016    very poor prep, unable to complete    COLONOSCOPY  06/30/2017    ADENOMATOUS POLYP.  COLONOSCOPY  05/29/2018    polypectomy x1    COLONOSCOPY  05/07/2021    COLONOSCOPY N/A 5/7/2021    COLONOSCOPY WITH polypectomy performed by Maxwell Pandya MD at [facility] Left     ovary twice    NECK SURGERY      pt unsure of exact date & what the procedure was ? 2015 @ [facility]    SD COLON CA SCRN NOT  [address] IND N/A 5/29/2018    COLONOSCOPY WITH POLYP REMOVEL performed by Maxwell Pandya MD at [address] N/A 6/30/2017    COLONOSCOPY POLYPECTOMY HOT BIOPSY performed by Maxwell Pandya MD at [address] [facility]  08/30/2016    gastritis, MILD CHRONIC GASTRITIS WITH FOCAL ANTRAL EROSION AND ASSOCIATED ACUTE INFLAMMATION. INTESTINAL METAPLASIA PRESENT, NEGATIVE FOR DYSPLASIA.          Social History:    Social History     Tobacco Use    Smoking status: Every Day     Packs/day: 0.25     Years: 40.00     Pack years: 10.00     Types: Cigarettes     Passive exposure: Current    Smokeless tobacco: Former Substance Use Topics    Alcohol use: No                                Ready to quit: Not Answered  Counseling given: Not Answered      Vital Signs (Current): There were no vitals filed for this visit. BP Readings from Last 3 Encounters:   02/24/23 138/74   02/21/23 127/82   01/14/23 (!) 144/92       NPO Status:                                                                                 BMI:   Wt Readings from Last 3 Encounters:   02/24/23 92 lb 2.4 oz (41.8 kg)   02/21/23 95 lb (43.1 kg)   01/13/23 91 lb (41.3 kg)     There is no height or weight on file to calculate BMI.    CBC:   Lab Results   Component Value Date/Time    WBC 8.1 01/14/2023 05:43 AM    RBC 3.59 01/14/2023 05:43 AM    RBC 4.27 05/01/2012 11:37 PM    HGB 11.3 01/14/2023 05:43 AM    HCT 34.7 01/14/2023 05:43 AM    MCV 96.7 01/14/2023 05:43 AM    RDW 13.4 01/14/2023 05:43 AM     01/14/2023 05:43 AM     05/01/2012 11:37 PM       CMP:   Lab Results   Component Value Date/Time     01/14/2023 05:43 AM    K 4.8 01/14/2023 05:43 AM     01/14/2023 05:43 AM    CO2 26 01/14/2023 05:43 AM    BUN 8 01/14/2023 05:43 AM    CREATININE 0.62 01/14/2023 05:43 AM    GFRAA >60 07/21/2022 05:39 AM    LABGLOM >60 01/14/2023 05:43 AM    GLUCOSE 171 01/14/2023 05:43 AM    GLUCOSE 159 05/01/2012 11:37 PM    PROT 7.6 12/19/2022 02:55 PM    CALCIUM 8.5 01/14/2023 05:43 AM    BILITOT 0.4 12/19/2022 02:55 PM    ALKPHOS 115 12/19/2022 02:55 PM    AST 21 12/19/2022 02:55 PM    ALT 21 12/19/2022 02:55 PM       POC Tests: No results for input(s): POCGLU, POCNA, POCK, POCCL, POCBUN, POCHEMO, POCHCT in the last 72 hours.     Coags:   Lab Results   Component Value Date/Time    PROTIME 13.2 01/13/2023 03:20 PM    PROTIME 13.1 05/01/2012 11:37 PM    INR 1.0 01/13/2023 03:20 PM    APTT 26.0 01/13/2023 03:20 PM       HCG (If Applicable): No results found for: PREGTESTUR, PREGSERUM, HCG, HCGQUANT     ABGs:   Lab Results   Component Value Date/Time    PHART 7.43 08/29/2012 05:06 PM    PO2ART 97 08/29/2012 05:06 PM    WSW3KRO 41 08/29/2012 05:06 PM    HTT8KCH 26.7 08/29/2012 05:06 PM    F5HFFYMI 97.6 08/29/2012 05:06 PM        Type & Screen (If Applicable):  No results found for: LABABO, LABRH    Drug/Infectious Status (If Applicable):  Lab Results   Component Value Date/Time    HEPCAB NONREACTIVE 06/03/2016 04:13 AM       COVID-19 Screening (If Applicable):   Lab Results   Component Value Date/Time    COVID19 Not Detected 07/19/2022 10:38 PM    COVID19 Not Detected 05/03/2021 08:30 AM           Anesthesia Evaluation  Patient summary reviewed and Nursing notes reviewed no history of anesthetic complications:   Airway: Mallampati: II  TM distance: >3 FB   Neck ROM: full  Mouth opening: > = 3 FB   Dental: normal exam         Pulmonary:normal exam  breath sounds clear to auscultation  (+) COPD (pt denies any inh use or issues):  current smoker    (-) sleep apnea                           Cardiovascular:    (+) hypertension:,     (-) past MI, CAD, CABG/stent, dysrhythmias and  CHF    ECG reviewed  Rhythm: regular  Rate: normal                    Neuro/Psych:   (+) seizures (remote per pt, unclear hx, no current med req):, neuromuscular disease (fibromyalgia, neuropathy, chronic neck and lbp):, depression/anxiety    (-) TIA and CVA           GI/Hepatic/Renal:   (+) GERD: well controlled,      (-) liver disease and no renal disease       Endo/Other:    (+) Diabetes (a1c 9.2)Type II DM, poorly controlled, , : arthritis: OA., .    (-) hypothyroidism, hyperthyroidism               Abdominal:             Vascular: Other Findings:             Anesthesia Plan      general and TIVA     ASA 3       Induction: intravenous. MIPS: prophylactic pharmacologic antiemetic agents not administered perioperatively for documented reasons. Anesthetic plan and risks discussed with patient.       Plan discussed with Vianca Roberto MD   2/24/2023

## 2023-02-24 NOTE — TELEPHONE ENCOUNTER
[employer] Program is reviewed with pt in office 2/21/23 and over the phone today. She agrees with terms and form is faxed. Copies of forms are mailed to pt.

## 2023-02-24 NOTE — DISCHARGE INSTRUCTIONS
Upper GI Endoscopy: What to Expect at Home  Your Recovery  You may have a sore throat for a day or two after the test.  How can you care for yourself at home?  Activity  Rest as much as you need to after you go home.  You should be able to go back to your usual activities the day after the test.  Diet  Drink plenty of fluids (unless your doctor has told you not to).  Follow-up care is a key part of your treatment and safety. Be sure to make and go to all appointments, and call your doctor if you are having problems.  When should you call for help?  Call 911 anytime you think you may need emergency care. For example, call if:  You passed out (lost consciousness).  You cough up blood.  You vomit blood or what looks like coffee grounds.  You pass maroon or very bloody stools.  Call your doctor now or seek immediate medical care if:  You have trouble swallowing.  You have belly pain.  Your stools are black and tarlike or have streaks of blood.  You are sick to your stomach or cannot keep fluids down.  Watch closely for changes in your health, and be sure to contact your doctor if:  Your throat still hurts after a day or two.  You do not get better as expected.   Where can you learn more?   Go to [email or web address] and sign in to your "[employer] account. Enter J454 in the Search Health Information box to learn more about “Upper GI Endoscopy: What to Expect at Home.”    .     © 8541-6714 [employer]. Care instructions adapted under license by [employer]. This care instruction is for use with your licensed healthcare professional. If you have questions about a medical condition or this instruction, always ask your healthcare professional. [employer] disclaims any warranty or liability for your use of this information.  Content Version: 9.9.149509; Last Revised: February 20, 2013

## 2023-02-24 NOTE — OP NOTE
PROCEDURE NOTE    DATE OF PROCEDURE: 2/24/2023     SURGEON: Su Diallo MD  Facility: [facility] ERIKA LORENZO  ASSISTANT: None  Anesthesia: MAC  PREOPERATIVE DIAGNOSIS:   Dysphagia  Dyspepsia  Elevated CEA  Anemia    Diagnosis:  Severe gastritis with multiple linear ulcers in the antrum and the body of the stomach responsible for the patient's symptoms and the anemia most likely biopsies were taken    Duodenitis      No stricture or ring appreciated in the esophagus to explain the patient dysphagia but there is tiny spots which could be inflammatory tiny ulcers scattered through the distal half of the esophagus, there and biopsied those          POSTOPERATIVE DIAGNOSIS: As described below    OPERATION: Upper GI endoscopy with Biopsy    ANESTHESIA: Moderate Sedation     ESTIMATED BLOOD LOSS: Less than 50 ml    COMPLICATIONS: None. SPECIMENS:  Was Obtained:     As above     HISTORY: The patient is a 78y.o. year old female with history of above preop diagnosis. I recommended esophagogastroduodenoscopy with possible biopsy and I explained the risk, benefits, expected outcome, and alternatives to the procedure. Risks included but are not limited to bleeding, infection, respiratory distress, hypotension, and perforation of the esophagus, stomach, or duodenum. Patient understands and is in agreement. The patient was counseled at length about the risks of castillo Covid-19 during their perioperative period and any recovery window from their procedure. The patient was made aware that castillo Covid-19  may worsen their prognosis for recovering from their procedure  and lend to a higher morbidity and/or mortality risk. All material risks, benefits, and reasonable alternatives including postponing the procedure were discussed. The patient does wish to proceed with the procedure at this time. PROCEDURE: The patient was given IV conscious sedation.   The patient's SPO2 remained above 90% throughout the procedure. The gastroscope was inserted orally and advanced under direct vision through the esophagus, through the stomach, through the pylorus, and into the descending duodenum. Post sedation note : The patient's SPO2 remained above 90% throughout the procedure. the vital signs remained stable , and no immediate complication form the procedure noted, patient will be ready for d/c when criteria is met . Findings:    Retropharyngeal area was grossly normal appearing    Esophagus: abnormal:     No stricture or ring appreciated in the esophagus to explain the patient dysphagia but there is tiny spots which could be inflammatory tiny ulcers scattered through the distal half of the esophagus, there and biopsied those    Stomach:  Severe gastritis with multiple linear ulcers in the antrum and the body of the stomach responsible for the patient's symptoms and the anemia most likely biopsies were taken        Duodenum:   Duodenitis    The scope was removed and the patient tolerated the procedure well.      Recommendations/Plan:   F/U Biopsies  F/U In Office in 3-4 weeks  Discussed with the family    Electronically signed by hCeli Leone MD  on 2/24/2023 at 11:31 AM

## 2023-02-24 NOTE — H&P
History and Physical GI Update    Pt Name: Jamie Bojorquez  MRN: [id number]  YOB: 1955  Date of evaluation: 2/24/2023      [x] I have reviewed the  Initial Consult Note found in [facility] dated 2/21/2023  by Dr. Sage Sales  which meets the criteria for an Interval History and Physical note and is attached below. [x] I have examined  Jamie Bojorquez and there are no changes to the patient or plans for an EGD. by Dr Sage Sales for [address] . Denies history of ulcers, hiatal hernia, acid reflux/GERD, or IBS. Previous EGD: Yes.  {6/2017 . Biopsies were taken. Patient today denies  fever, chills, night sweats, pain or unexplained weight loss. SHE HAS DIABETES, BLOOD SUGAR TODAY  . SHE  DOES NOT TAKE BLOOD THINNERS. Vital signs: /74   Pulse (!) 107   Temp 97.5 °F (36.4 °C) (Temporal)   Resp 14   Ht 5' 7\" (1.702 m)   Wt 92 lb 2.4 oz (41.8 kg)   SpO2 96%   BMI 14.43 kg/m²     Allergies:  Aspirin, Codeine, Hydrocodone-acetaminophen, and Ibuprofen    Medications:   No current facility-administered medications on file prior to encounter. Current Outpatient Medications on File Prior to Encounter   Medication Sig Dispense Refill    insulin aspart (NOVOLOG FLEXPEN) 100 UNIT/ML injection pen Inject 5 Units into the skin 3 times daily (before meals) Scale only 1 Adjustable Dose Pre-filled Pen Syringe 0    insulin glargine (BASAGLAR KWIKPEN) 100 UNIT/ML injection pen Inject 10 Units into the skin nightly 5 pen 0    pantoprazole (PROTONIX) 40 MG tablet Take 1 tablet by mouth every morning (before breakfast) 30 tablet 1    albuterol sulfate HFA (PROVENTIL;VENTOLIN;PROAIR) 108 (90 Base) MCG/ACT inhaler INHALE TWO(2) PUFFS INTO LUNGS EVERY SIX(6) HOURS AS NEEDED FOR WHEEZING 8.5 g 10    gabapentin (NEURONTIN) 400 MG capsule Take 1 capsule by mouth 3 times daily for 90 days.  270 capsule 2    Insulin Pen Needle 32G X 4 MM MISC 1 each by Does not apply route 4 times daily (after meals and at bedtime) 1000 each 3    Nutritional Supplements (ENSURE COMPLETE SHAKE) LIQD Take 1 Can by mouth in the morning and at bedtime 60 each 5    [DISCONTINUED] blood glucose monitor strips 1 strip by Other route daily Test 1 times a day & as needed for symptoms of irregular blood glucose. 300 strip 5    [DISCONTINUED] omeprazole (PRILOSEC) 20 MG delayed release capsule TAKE ONE (1) CAPSULE BY MOUTH TWICE DAILY (Patient not taking: Reported on 5/27/2022) 60 capsule 1    Blood Gluc Meter Disp-Strips (BLOOD GLUCOSE METER DISPOSABLE) OXANA Daily and as needed 1 each 0    Lancets MISC 1 each by Does not apply route 2 times daily 300 each 1    sodium bicarbonate 650 MG tablet TAKE 1 TABLET BY MOUTH THREE TIMES DAILY 90 tablet 0    [DISCONTINUED] glimepiride (AMARYL) 2 MG tablet Take 1 tablet by mouth 2 times daily (before meals) 180 tablet 1    Alcohol Swabs (ALCOHOL PREP) 70 % PADS Daily as needed 200 each 3    metFORMIN (GLUCOPHAGE-XR) 500 MG extended release tablet Take 2 tablets by mouth daily (with breakfast) AND 1 tablet Daily with supper. 270 tablet 2    SYMBICORT 80-4.5 MCG/ACT AERO Inhale 2 puffs into the lungs 2 times daily 3 each 2    Multiple Vitamin (MULTI-VITAMIN DAILY PO) Take by mouth      AquaLance Lancets 30G MISC Daily and as needed for hypoglycemia 200 each 2            Prior to Admission medications    Medication Sig Start Date End Date Taking?  Authorizing Provider   insulin aspart (NOVOLOG FLEXPEN) 100 UNIT/ML injection pen Inject 5 Units into the skin 3 times daily (before meals) Scale only 1/14/23   Gabriela Stage P Blood, DO   insulin glargine (BASAGLAR KWIKPEN) 100 UNIT/ML injection pen Inject 10 Units into the skin nightly 7/21/22   Edda Roe PA-C   pantoprazole (PROTONIX) 40 MG tablet Take 1 tablet by mouth every morning (before breakfast) 7/22/22   Prudencio Miller PA-C   albuterol sulfate HFA (PROVENTIL;VENTOLIN;PROAIR) 108 (90 Base) MCG/ACT inhaler INHALE TWO(2) PUFFS INTO LUNGS EVERY SIX(6) HOURS AS NEEDED FOR WHEEZING 6/21/22   DONTRELL Callejas CNP   gabapentin (NEURONTIN) 400 MG capsule Take 1 capsule by mouth 3 times daily for 90 days. 5/31/22 8/29/22  DONTRELL Callejas CNP   Insulin Pen Needle 32G X 4 MM MISC 1 each by Does not apply route 4 times daily (after meals and at bedtime) 5/27/22 8/25/22  DONTRELL Callejas CNP   Nutritional Supplements (ENSURE COMPLETE SHAKE) LIQD Take 1 Can by mouth in the morning and at bedtime 5/27/22 6/26/22  DONTRELL Callejas CNP   blood glucose monitor strips 1 strip by Other route daily Test 1 times a day & as needed for symptoms of irregular blood glucose. 4/4/22 7/21/22  DONTRELL Callejas CNP   omeprazole (PRILOSEC) 20 MG delayed release capsule TAKE ONE (1) CAPSULE BY MOUTH TWICE DAILY  Patient not taking: Reported on 5/27/2022 3/14/22 7/21/22  DONTRELL Callejas CNP   Blood Gluc Meter Disp-Strips (BLOOD GLUCOSE METER DISPOSABLE) OXANA Daily and as needed 2/25/22   DONTRELL Callejas CNP   Lancets MISC 1 each by Does not apply route 2 times daily 2/25/22   DONTRELL Callejas CNP   sodium bicarbonate 650 MG tablet TAKE 1 TABLET BY MOUTH THREE TIMES DAILY 2/11/22   Alo Stroud MD   glimepiride (AMARYL) 2 MG tablet Take 1 tablet by mouth 2 times daily (before meals) 11/3/21 7/21/22  DONTRELL Callejas CNP   Alcohol Swabs (ALCOHOL PREP) 70 % PADS Daily as needed 10/28/21   DONTRELL Calleajs CNP   metFORMIN (GLUCOPHAGE-XR) 500 MG extended release tablet Take 2 tablets by mouth daily (with breakfast) AND 1 tablet Daily with supper. 10/28/21   DONTRELL Callejas CNP   SYMBICORT 80-4.5 MCG/ACT AERO Inhale 2 puffs into the lungs 2 times daily 10/13/21 2/25/22  DONTRELL Callejas CNP   Multiple Vitamin (MULTI-VITAMIN DAILY PO) Take by mouth    Historical MD Deo   AquaLance Lancets 30G MISC Daily and as needed for hypoglycemia 4/6/20   Freeman Wong, APRN - CNP       This is a 78 y.o. female who is  pleasant, cooperative, alert and oriented x3, in no acute distress. Heart: Heart regular rate and rhythm   Lungs:clear to auscultation without wheezes or rales    Abdomen: soft, nontender, nondistended, .  bowel sounds present . PEDAL PULSES + 2 BILATERALLY NO CALF TENDERNESS NO EDEMA    Labs:  Date:  2/21/2023          Related encounter: Office Visit from 2/21/2023 in [address] [place] [facility]     INTERVAL HISTORY:   No referring provider defined for this encounter. Chief Complaint   Patient presents with    Pancreatitis       Patient is f/u on pancreatic insufficiency, anemia, and elevated CEA and CA-19    Dysphagia       Patient c/o dysphagia. She states food gets in esophagus. HISTORY OF PRESENT ILLNESS: Shayla Banuelos is a 78 y.o. female , referred for evaluation of  pancreatitis,pancreatic insufficiency, elevated tumor markers  Not gaining wt    Here for f/u with above    CEA was slightly still high and still  CA-19-9 completely normal  Had colonoscopy  We did an MRI MRCP on her which showed sequela of chronic pancreatitis, questionable side  branches I PMN, both findings are similar to the imaging from 2016   we have her on Creon     Colonosocpy 5/2021 : difficult colon ,polyp, hemorrhoids  Last visit anemia w/u 6/22: all normal      Today    Indigestion and n/v    Wt stable   ?  Dysphagia Aric Dickey On Protonix , no ht burn            Past Medical,Family, and Social History reviewed and does contribute to the patient presentingcondition. Patient's PMH/PSH,SH,PSYCH Hx, MEDs, ALLERGIES, and ROS were all reviewed and updated in the appropriate sections. PAST MEDICAL HISTORY:  Past Medical History        Past Medical History:   Diagnosis Date    Cataracts, bilateral      COPD (chronic obstructive pulmonary disease) ([address]      CTS (carpal tunnel syndrome) left    Fibromyalgia      Generalized abdominal pain 6/3/2016    Glaucoma      Hypertension      Osteoarthritis      Osteoporosis      Pancreatic calcification 6/3/2016    Pancreatitis      Protein-calorie malnutrition, severe ([address] 6/1/2016    Seizures ([address]      Tremor      Type II or unspecified type diabetes mellitus without mention of complication, not stated as uncontrolled      Uncontrolled type 2 DM with hyperosmolar nonketotic hyperglycemia ([address] 6/3/2016            Past Surgical History         Past Surgical History:   Procedure Laterality Date    COLONOSCOPY   08/30/2016     very poor prep, unable to complete    COLONOSCOPY   06/30/2017     ADENOMATOUS POLYP. COLONOSCOPY   05/29/2018     polypectomy x1    COLONOSCOPY   05/07/2021    COLONOSCOPY N/A 5/7/2021     COLONOSCOPY WITH polypectomy performed by He Bustamante MD at [address] [place] [address] Dr Left       ovary twice    NECK SURGERY         pt unsure of exact date & what the procedure was ? 2015 @ [facility]    NM COLON CA SCRN NOT  [facility] N/A 5/29/2018     COLONOSCOPY WITH POLYP REMOVEL performed by He Bustamante MD at [facility] N/A 6/30/2017     COLONOSCOPY POLYPECTOMY HOT BIOPSY performed by He Bustamante MD at [address] [place] [address]   08/30/2016     gastritis, MILD CHRONIC GASTRITIS WITH FOCAL ANTRAL EROSION AND ASSOCIATED ACUTE INFLAMMATION. INTESTINAL METAPLASIA PRESENT, NEGATIVE FOR DYSPLASIA. CURRENT MEDICATIONS:    Current Medication      Current Outpatient Medications:     insulin aspart (NOVOLOG FLEXPEN) 100 UNIT/ML injection pen, Inject 5 Units into the skin 3 times daily (before meals) Scale only, Disp: 1 Adjustable Dose Pre-filled Pen Syringe, Rfl: 0    insulin glargine (BASAGLAR KWIKPEN) 100 UNIT/ML injection pen, Inject 10 Units into the skin nightly, Disp: 5 pen, Rfl: 0    pantoprazole (PROTONIX) 40 MG tablet, Take 1 tablet by mouth every morning (before breakfast), Disp: 30 tablet, Rfl: 1    albuterol sulfate HFA (PROVENTIL;VENTOLIN;PROAIR) 108 (90 Base) MCG/ACT inhaler, INHALE TWO(2) PUFFS INTO LUNGS EVERY SIX(6) HOURS AS NEEDED FOR WHEEZING, Disp: 8.5 g, Rfl: 10    gabapentin (NEURONTIN) 400 MG capsule, Take 1 capsule by mouth 3 times daily for 90 days. , Disp: 270 capsule, Rfl: 2    Insulin Pen Needle 32G X 4 MM MISC, 1 each by Does not apply route 4 times daily (after meals and at bedtime), Disp: 1000 each, Rfl: 3    Nutritional Supplements (ENSURE COMPLETE SHAKE) LIQD, Take 1 Can by mouth in the morning and at bedtime, Disp: 60 each, Rfl: 5    Blood Gluc Meter Disp-Strips (BLOOD GLUCOSE METER DISPOSABLE) OXANA, Daily and as needed, Disp: 1 each, Rfl: 0    Lancets MISC, 1 each by Does not apply route 2 times daily, Disp: 300 each, Rfl: 1    sodium bicarbonate 650 MG tablet, TAKE 1 TABLET BY MOUTH THREE TIMES DAILY, Disp: 90 tablet, Rfl: 0    Alcohol Swabs (ALCOHOL PREP) 70 % PADS, Daily as needed, Disp: 200 each, Rfl: 3    metFORMIN (GLUCOPHAGE-XR) 500 MG extended release tablet, Take 2 tablets by mouth daily (with breakfast) AND 1 tablet Daily with supper., Disp: 270 tablet, Rfl: 2    SYMBICORT 80-4.5 MCG/ACT AERO, Inhale 2 puffs into the lungs 2 times daily, Disp: 3 each, Rfl: 2    Multiple Vitamin (MULTI-VITAMIN DAILY PO), Take by mouth, Disp: , Rfl:     AquaLance Lancets 30G MISC, Daily and as needed for hypoglycemia, Disp: 200 each, Rfl: 2        ALLERGIES:         Allergies   Allergen Reactions    Aspirin         Other reaction(s): Unknown    Codeine         Other reaction(s): Unknown    Hydrocodone-Acetaminophen         Other reaction(s): Unknown    Ibuprofen Itching       Other reaction(s): Unknown         FAMILY HISTORY:   Family History             Problem Relation Age of Onset    Brain Cancer Father      Diabetes Sister      Other Sister           epilepsy               SOCIAL HISTORY:   Social History               Socioeconomic History    Marital status:        Spouse name: Not on file    Number of children: Not on file    Years of education: Not on file    Highest education level: Not on file   Occupational History    Not on file   Tobacco Use    Smoking status: Every Day       Packs/day: 0.25       Years: 40.00       Pack years: 10.00       Types: Cigarettes       Passive exposure: Current    Smokeless tobacco: Former   Vaping Use    Vaping Use: Never used   Substance and Sexual Activity    Alcohol use: No    Drug use: Yes       Types: Marijuana (Weed)    Sexual activity: Not on file       Comment: last use end 10/2014   Other Topics Concern    Not on file   Social History Narrative    Not on file      Social Determinants of Health          Financial Resource Strain: Low Risk     Difficulty of Paying Living Expenses: Not hard at all   Food Insecurity: No Food Insecurity    Worried About Running Out of Food in the Last Year: Never true    Ran Out of Food in the Last Year: Never true   Transportation Needs: Not on file   Physical Activity: Not on file   Stress: Not on file   Social Connections: Not on file   Intimate Partner Violence: Not on file   Housing Stability: Not on file            REVIEW OF SYSTEMS: A 12-point review of systemswas obtained and pertinent positives and negatives were enumerated above in the history of present illness. All other reviewed systems / symptoms were negative. Review of Systems   Constitutional:  Positive for fatigue.  Negative for appetite change and unexpected weight change. HENT:  Positive for trouble swallowing. Respiratory:  Positive for choking. Negative for cough, shortness of breath and wheezing. Cardiovascular:  Negative for chest pain, palpitations and leg swelling. Gastrointestinal:  Positive for vomiting. Negative for abdominal distention, abdominal pain, anal bleeding, blood in stool, constipation, diarrhea, nausea and rectal pain. Genitourinary:  Negative for difficulty urinating. Allergic/Immunologic: Negative for environmental allergies and food allergies. Neurological:  Negative for dizziness, weakness, light-headedness, numbness and headaches. Hematological:  Does not bruise/bleed easily. Psychiatric/Behavioral:  Negative for sleep disturbance. The patient is not nervous/anxious. LABORATORY DATA: Reviewed        Lab Results   Component Value Date     WBC 8.1 01/14/2023     HGB 11.3 (L) 01/14/2023     HCT 34.7 (L) 01/14/2023     MCV 96.7 01/14/2023      01/14/2023      01/14/2023     K 4.8 01/14/2023      01/14/2023     CO2 26 01/14/2023     BUN 8 01/14/2023     CREATININE 0.62 01/14/2023     LABPROT 7.4 05/01/2012     LABALBU 3.7 12/19/2022     BILITOT 0.4 12/19/2022     ALKPHOS 115 (H) 12/19/2022     AST 21 12/19/2022     ALT 21 12/19/2022     INR 1.0 01/13/2023                  Lab Results   Component Value Date     RBC 3.59 (L) 01/14/2023     HGB 11.3 (L) 01/14/2023     MCV 96.7 01/14/2023     MCH 31.5 01/14/2023     MCHC 32.6 01/14/2023     RDW 13.4 01/14/2023     MPV 10.2 01/14/2023     BASOPCT 1 01/13/2023     LYMPHSABS 4.66 (H) 01/13/2023     MONOSABS 0.97 01/13/2023     NEUTROABS 7.13 01/13/2023     EOSABS 0.21 01/13/2023     BASOSABS 0.06 01/13/2023            DIAGNOSTIC TESTING:      No results found. PHYSICAL EXAMINATION: Vital signs reviewed per the nursing documentation.       /82   Pulse 98   Temp 97 °F (36.1 °C)   Wt 95 lb (43.1 kg)   BMI 14.88 kg/m² Body mass index is 14.88 kg/m². Physical Exam  Vitals and nursing note reviewed. Constitutional:       General: She is not in acute distress. Appearance: She is well-developed. She is not diaphoretic. HENT:      Head: Normocephalic. Mouth/Throat:      Pharynx: No oropharyngeal exudate. Eyes:      General: No scleral icterus. Pupils: Pupils are equal, round, and reactive to light. Neck:      Thyroid: No thyromegaly. Vascular: No JVD. Trachea: No tracheal deviation. Cardiovascular:      Rate and Rhythm: Normal rate and regular rhythm. Heart sounds: Normal heart sounds. No murmur heard. Pulmonary:      Effort: Pulmonary effort is normal. No respiratory distress. Breath sounds: Normal breath sounds. No wheezing. Abdominal:      General: Bowel sounds are normal. There is no distension. Palpations: Abdomen is soft. Tenderness: There is no abdominal tenderness. There is no guarding or rebound. Comments: No ascites   Musculoskeletal:         General: Normal range of motion. Cervical back: Normal range of motion and neck supple. Skin:     General: Skin is warm. Coloration: Skin is not pale. Findings: No erythema or rash. Comments: She is not diaphoretic   Neurological:      Mental Status: She is alert and oriented to person, place, and time. Deep Tendon Reflexes: Reflexes are normal and symmetric. Psychiatric:         Behavior: Behavior normal.         Thought Content: Thought content normal.         Judgment: Judgment normal.            IMPRESSION: Ms. [facility] MEREDITH is a 78 y.o. female with        Diagnosis Orders   1. Dysphagia, unspecified type  EGD       2. Elevated CA 19-9 level  Cancer Antigen 19-9       3. Elevated CEA  CEA       4. Hx of colonic polyps          5. Alcohol-induced chronic pancreatitis ([facility]          6. Weight loss          7.  Anemia, unspecified type  Hemoglobinopathy Evaluation       Because of the new issue with the dysphagia we will proceed with repeat EGD and dilatation  Continue the Protonix  We will repeat her tumor markers as mentioned imaging is not showing any mass and the tumor markers have been stable           Diet/life style/natural hx /complication of the dx were all explained in details   Past medical, past surgical, social history, psychiatric history, medications or allergies, all reviewed and  updated     Thank you for allowing me to participate in the care of Ms. Elaine Odell. For any further questions please do not hesitate to contact me. I have reviewed and agree with the ROS entered by the MA/RN. Note is dictated utilizing voice recognition software. Unfortunately this leads to occasional typographical errors. Please contact our office if you have any questions. Miranda Cameron MD  [facility]  O: #[phone]                    Revision History                            No results for input(s): HGB, HCT, WBC, MCV, PLATELET, NA, K, CL, CO2, BUN, CREATININE, GLUCOSE, INR, PROTIME, APTT, AST, ALT, LABALBU, HCG in the last 720 hours.     Gabino Dawson, DONTRELL - CNP  , ACNP-BC  Electronically signed 2/24/2023 at 9:47 AM

## 2023-02-27 LAB — SURGICAL PATHOLOGY REPORT: NORMAL

## 2023-03-30 ENCOUNTER — HOSPITAL ENCOUNTER (OUTPATIENT)
Dept: MRI IMAGING | Age: 68
Discharge: HOME | End: 2023-04-01
Payer: MEDICARE

## 2023-03-30 DIAGNOSIS — Z87.898 HISTORY OF ALCOHOL USE: ICD-10-CM

## 2023-03-30 DIAGNOSIS — R25.1 TREMOR: ICD-10-CM

## 2023-03-30 LAB
CREAT SERPL-MCNC: 0.69 MG/DL (ref 0.5–0.9)
GFR SERPL CREATININE-BSD FRML MDRD: >60 ML/MIN/1.73M2

## 2023-03-30 PROCEDURE — 70553 MRI BRAIN STEM W/O & W/DYE: CPT

## 2023-03-30 PROCEDURE — 36415 COLL VENOUS BLD VENIPUNCTURE: CPT

## 2023-03-30 PROCEDURE — 6360000004 HC RX CONTRAST MEDICATION: Performed by: PSYCHIATRY & NEUROLOGY

## 2023-03-30 PROCEDURE — A9579 GAD-BASE MR CONTRAST NOS,1ML: HCPCS | Performed by: PSYCHIATRY & NEUROLOGY

## 2023-03-30 PROCEDURE — 82565 ASSAY OF CREATININE: CPT

## 2023-03-30 RX ADMIN — GADOTERIDOL 8 ML: 279.3 INJECTION, SOLUTION INTRAVENOUS at 08:23

## 2023-09-09 DIAGNOSIS — K86.89 PANCREATIC INSUFFICIENCY: ICD-10-CM

## 2023-09-12 RX ORDER — PANCRELIPASE 36000; 180000; 114000 [USP'U]/1; [USP'U]/1; [USP'U]/1
CAPSULE, DELAYED RELEASE PELLETS ORAL
Qty: 240 CAPSULE | Refills: 3 | Status: SHIPPED | OUTPATIENT
Start: 2023-09-12

## 2023-10-18 LAB
AVERAGE GLUCOSE: NORMAL
HBA1C MFR BLD: 5.2 %

## 2023-10-20 ENCOUNTER — APPOINTMENT (OUTPATIENT)
Dept: CT IMAGING | Age: 68
End: 2023-10-20
Payer: MEDICARE

## 2023-10-20 ENCOUNTER — HOSPITAL ENCOUNTER (OUTPATIENT)
Age: 68
Setting detail: OBSERVATION
Discharge: HOME | End: 2023-10-23
Attending: EMERGENCY MEDICINE
Payer: MEDICARE

## 2023-10-20 ENCOUNTER — APPOINTMENT (OUTPATIENT)
Dept: GENERAL RADIOLOGY | Age: 68
End: 2023-10-20
Payer: MEDICARE

## 2023-10-20 DIAGNOSIS — R00.0 TACHYCARDIA: ICD-10-CM

## 2023-10-20 DIAGNOSIS — R79.89 ELEVATED TROPONIN: ICD-10-CM

## 2023-10-20 DIAGNOSIS — R07.9 CHEST PAIN, UNSPECIFIED TYPE: ICD-10-CM

## 2023-10-20 DIAGNOSIS — R06.02 SHORTNESS OF BREATH: Primary | ICD-10-CM

## 2023-10-20 LAB
ANION GAP SERPL CALCULATED.3IONS-SCNC: 13 MMOL/L (ref 9–17)
BASOPHILS # BLD: 0.06 K/UL (ref 0–0.2)
BASOPHILS NFR BLD: 1 % (ref 0–2)
BNP SERPL-MCNC: 252 PG/ML
BUN SERPL-MCNC: 13 MG/DL (ref 8–23)
CALCIUM SERPL-MCNC: 9.7 MG/DL (ref 8.6–10.4)
CHLORIDE SERPL-SCNC: 99 MMOL/L (ref 98–107)
CO2 SERPL-SCNC: 23 MMOL/L (ref 20–31)
CREAT SERPL-MCNC: 0.8 MG/DL (ref 0.5–0.9)
D DIMER PPP FEU-MCNC: 0.42 UG/ML FEU (ref 0–0.57)
EOSINOPHIL # BLD: 0.11 K/UL (ref 0–0.44)
EOSINOPHILS RELATIVE PERCENT: 1 % (ref 1–4)
ERYTHROCYTE [DISTWIDTH] IN BLOOD BY AUTOMATED COUNT: 14 % (ref 11.8–14.4)
FLUAV AG SPEC QL: NEGATIVE
FLUBV AG SPEC QL: NEGATIVE
GFR SERPL CREATININE-BSD FRML MDRD: >60 ML/MIN/1.73M2
GLUCOSE BLD-MCNC: 290 MG/DL (ref 65–105)
GLUCOSE BLD-MCNC: 356 MG/DL (ref 65–105)
GLUCOSE SERPL-MCNC: 318 MG/DL (ref 70–99)
HCT VFR BLD AUTO: 39.3 % (ref 36.3–47.1)
HGB BLD-MCNC: 13.3 G/DL (ref 11.9–15.1)
IMM GRANULOCYTES # BLD AUTO: 0.03 K/UL (ref 0–0.3)
IMM GRANULOCYTES NFR BLD: 0 %
LYMPHOCYTES NFR BLD: 1.24 K/UL (ref 1.1–3.7)
LYMPHOCYTES RELATIVE PERCENT: 12 % (ref 24–43)
MCH RBC QN AUTO: 33.1 PG (ref 25.2–33.5)
MCHC RBC AUTO-ENTMCNC: 33.8 G/DL (ref 28.4–34.8)
MCV RBC AUTO: 97.8 FL (ref 82.6–102.9)
MONOCYTES NFR BLD: 0.66 K/UL (ref 0.1–1.2)
MONOCYTES NFR BLD: 6 % (ref 3–12)
NEUTROPHILS NFR BLD: 80 % (ref 36–65)
NEUTS SEG NFR BLD: 8.5 K/UL (ref 1.5–8.1)
NRBC BLD-RTO: 0 PER 100 WBC
PLATELET # BLD AUTO: 254 K/UL (ref 138–453)
PMV BLD AUTO: 10.3 FL (ref 8.1–13.5)
POTASSIUM SERPL-SCNC: 4.9 MMOL/L (ref 3.7–5.3)
RBC # BLD AUTO: 4.02 M/UL (ref 3.95–5.11)
SARS-COV-2 RDRP RESP QL NAA+PROBE: NOT DETECTED
SODIUM SERPL-SCNC: 135 MMOL/L (ref 135–144)
SPECIMEN DESCRIPTION: NORMAL
TROPONIN I SERPL HS-MCNC: 16 NG/L (ref 0–14)
TROPONIN I SERPL HS-MCNC: 18 NG/L (ref 0–14)
WBC OTHER # BLD: 10.6 K/UL (ref 3.5–11.3)

## 2023-10-20 PROCEDURE — 99285 EMERGENCY DEPT VISIT HI MDM: CPT

## 2023-10-20 PROCEDURE — 96372 THER/PROPH/DIAG INJ SC/IM: CPT

## 2023-10-20 PROCEDURE — 96361 HYDRATE IV INFUSION ADD-ON: CPT

## 2023-10-20 PROCEDURE — G0378 HOSPITAL OBSERVATION PER HR: HCPCS

## 2023-10-20 PROCEDURE — 71260 CT THORAX DX C+: CPT

## 2023-10-20 PROCEDURE — 6370000000 HC RX 637 (ALT 250 FOR IP): Performed by: HOSPITALIST

## 2023-10-20 PROCEDURE — 82947 ASSAY GLUCOSE BLOOD QUANT: CPT

## 2023-10-20 PROCEDURE — 96374 THER/PROPH/DIAG INJ IV PUSH: CPT

## 2023-10-20 PROCEDURE — 94640 AIRWAY INHALATION TREATMENT: CPT

## 2023-10-20 PROCEDURE — 99223 1ST HOSP IP/OBS HIGH 75: CPT | Performed by: HOSPITALIST

## 2023-10-20 PROCEDURE — 80048 BASIC METABOLIC PNL TOTAL CA: CPT

## 2023-10-20 PROCEDURE — 71045 X-RAY EXAM CHEST 1 VIEW: CPT

## 2023-10-20 PROCEDURE — 2580000003 HC RX 258: Performed by: HOSPITALIST

## 2023-10-20 PROCEDURE — 6360000002 HC RX W HCPCS: Performed by: STUDENT IN AN ORGANIZED HEALTH CARE EDUCATION/TRAINING PROGRAM

## 2023-10-20 PROCEDURE — 87804 INFLUENZA ASSAY W/OPTIC: CPT

## 2023-10-20 PROCEDURE — 93005 ELECTROCARDIOGRAM TRACING: CPT | Performed by: STUDENT IN AN ORGANIZED HEALTH CARE EDUCATION/TRAINING PROGRAM

## 2023-10-20 PROCEDURE — 2580000003 HC RX 258: Performed by: STUDENT IN AN ORGANIZED HEALTH CARE EDUCATION/TRAINING PROGRAM

## 2023-10-20 PROCEDURE — 6360000004 HC RX CONTRAST MEDICATION: Performed by: STUDENT IN AN ORGANIZED HEALTH CARE EDUCATION/TRAINING PROGRAM

## 2023-10-20 PROCEDURE — 6370000000 HC RX 637 (ALT 250 FOR IP): Performed by: STUDENT IN AN ORGANIZED HEALTH CARE EDUCATION/TRAINING PROGRAM

## 2023-10-20 PROCEDURE — 6360000002 HC RX W HCPCS: Performed by: HOSPITALIST

## 2023-10-20 PROCEDURE — 83880 ASSAY OF NATRIURETIC PEPTIDE: CPT

## 2023-10-20 PROCEDURE — 85025 COMPLETE CBC W/AUTO DIFF WBC: CPT

## 2023-10-20 PROCEDURE — 94664 DEMO&/EVAL PT USE INHALER: CPT

## 2023-10-20 PROCEDURE — 87635 SARS-COV-2 COVID-19 AMP PRB: CPT

## 2023-10-20 PROCEDURE — 84484 ASSAY OF TROPONIN QUANT: CPT

## 2023-10-20 PROCEDURE — 85379 FIBRIN DEGRADATION QUANT: CPT

## 2023-10-20 RX ORDER — BUDESONIDE AND FORMOTEROL FUMARATE DIHYDRATE 80; 4.5 UG/1; UG/1
2 AEROSOL RESPIRATORY (INHALATION) 2 TIMES DAILY
Status: DISCONTINUED | OUTPATIENT
Start: 2023-10-20 | End: 2023-10-21

## 2023-10-20 RX ORDER — POLYETHYLENE GLYCOL 3350 17 G/17G
17 POWDER, FOR SOLUTION ORAL DAILY PRN
Status: DISCONTINUED | OUTPATIENT
Start: 2023-10-20 | End: 2023-10-23 | Stop reason: HOSPADM

## 2023-10-20 RX ORDER — SODIUM BICARBONATE 650 MG/1
650 TABLET ORAL 3 TIMES DAILY
Status: DISCONTINUED | OUTPATIENT
Start: 2023-10-20 | End: 2023-10-23 | Stop reason: HOSPADM

## 2023-10-20 RX ORDER — ALBUTEROL SULFATE 2.5 MG/3ML
2.5 SOLUTION RESPIRATORY (INHALATION)
Status: DISCONTINUED | OUTPATIENT
Start: 2023-10-20 | End: 2023-10-21

## 2023-10-20 RX ORDER — CODEINE PHOSPHATE AND GUAIFENESIN 10; 100 MG/5ML; MG/5ML
5 SOLUTION ORAL EVERY 4 HOURS PRN
Status: DISCONTINUED | OUTPATIENT
Start: 2023-10-20 | End: 2023-10-23 | Stop reason: HOSPADM

## 2023-10-20 RX ORDER — ACETAMINOPHEN 650 MG/1
650 SUPPOSITORY RECTAL EVERY 6 HOURS PRN
Status: DISCONTINUED | OUTPATIENT
Start: 2023-10-20 | End: 2023-10-23 | Stop reason: HOSPADM

## 2023-10-20 RX ORDER — POTASSIUM CHLORIDE 20 MEQ/1
40 TABLET, EXTENDED RELEASE ORAL PRN
Status: DISCONTINUED | OUTPATIENT
Start: 2023-10-20 | End: 2023-10-23 | Stop reason: HOSPADM

## 2023-10-20 RX ORDER — ONDANSETRON 2 MG/ML
4 INJECTION INTRAMUSCULAR; INTRAVENOUS EVERY 6 HOURS PRN
Status: DISCONTINUED | OUTPATIENT
Start: 2023-10-20 | End: 2023-10-23 | Stop reason: HOSPADM

## 2023-10-20 RX ORDER — PANTOPRAZOLE SODIUM 40 MG/1
40 TABLET, DELAYED RELEASE ORAL
Status: DISCONTINUED | OUTPATIENT
Start: 2023-10-21 | End: 2023-10-23 | Stop reason: HOSPADM

## 2023-10-20 RX ORDER — LORAZEPAM 0.5 MG/1
0.5 TABLET ORAL ONCE
Status: COMPLETED | OUTPATIENT
Start: 2023-10-20 | End: 2023-10-20

## 2023-10-20 RX ORDER — 0.9 % SODIUM CHLORIDE 0.9 %
1000 INTRAVENOUS SOLUTION INTRAVENOUS ONCE
Status: COMPLETED | OUTPATIENT
Start: 2023-10-20 | End: 2023-10-20

## 2023-10-20 RX ORDER — SODIUM CHLORIDE 0.9 % (FLUSH) 0.9 %
5-40 SYRINGE (ML) INJECTION EVERY 12 HOURS SCHEDULED
Status: DISCONTINUED | OUTPATIENT
Start: 2023-10-20 | End: 2023-10-23 | Stop reason: HOSPADM

## 2023-10-20 RX ORDER — DEXAMETHASONE SODIUM PHOSPHATE 10 MG/ML
10 INJECTION, SOLUTION INTRAMUSCULAR; INTRAVENOUS ONCE
Status: COMPLETED | OUTPATIENT
Start: 2023-10-20 | End: 2023-10-20

## 2023-10-20 RX ORDER — ALBUTEROL SULFATE 90 UG/1
2 AEROSOL, METERED RESPIRATORY (INHALATION) EVERY 6 HOURS PRN
Status: DISCONTINUED | OUTPATIENT
Start: 2023-10-20 | End: 2023-10-21

## 2023-10-20 RX ORDER — SUCRALFATE 1 G/1
1 TABLET ORAL 4 TIMES DAILY
Status: DISCONTINUED | OUTPATIENT
Start: 2023-10-20 | End: 2023-10-23 | Stop reason: HOSPADM

## 2023-10-20 RX ORDER — ENOXAPARIN SODIUM 100 MG/ML
30 INJECTION SUBCUTANEOUS DAILY
Status: DISCONTINUED | OUTPATIENT
Start: 2023-10-20 | End: 2023-10-23 | Stop reason: HOSPADM

## 2023-10-20 RX ORDER — INSULIN GLARGINE 100 [IU]/ML
10 INJECTION, SOLUTION SUBCUTANEOUS DAILY
Status: DISCONTINUED | OUTPATIENT
Start: 2023-10-20 | End: 2023-10-23 | Stop reason: HOSPADM

## 2023-10-20 RX ORDER — MAGNESIUM SULFATE IN WATER 40 MG/ML
2000 INJECTION, SOLUTION INTRAVENOUS PRN
Status: DISCONTINUED | OUTPATIENT
Start: 2023-10-20 | End: 2023-10-23 | Stop reason: HOSPADM

## 2023-10-20 RX ORDER — POTASSIUM CHLORIDE 7.45 MG/ML
10 INJECTION INTRAVENOUS PRN
Status: DISCONTINUED | OUTPATIENT
Start: 2023-10-20 | End: 2023-10-23 | Stop reason: HOSPADM

## 2023-10-20 RX ORDER — SODIUM CHLORIDE 0.9 % (FLUSH) 0.9 %
5-40 SYRINGE (ML) INJECTION PRN
Status: DISCONTINUED | OUTPATIENT
Start: 2023-10-20 | End: 2023-10-23 | Stop reason: HOSPADM

## 2023-10-20 RX ORDER — SODIUM CHLORIDE 9 MG/ML
INJECTION, SOLUTION INTRAVENOUS CONTINUOUS
Status: DISCONTINUED | OUTPATIENT
Start: 2023-10-20 | End: 2023-10-21

## 2023-10-20 RX ORDER — ONDANSETRON 4 MG/1
4 TABLET, ORALLY DISINTEGRATING ORAL EVERY 8 HOURS PRN
Status: DISCONTINUED | OUTPATIENT
Start: 2023-10-20 | End: 2023-10-23 | Stop reason: HOSPADM

## 2023-10-20 RX ORDER — SODIUM CHLORIDE 9 MG/ML
INJECTION, SOLUTION INTRAVENOUS PRN
Status: DISCONTINUED | OUTPATIENT
Start: 2023-10-20 | End: 2023-10-23 | Stop reason: HOSPADM

## 2023-10-20 RX ORDER — IPRATROPIUM BROMIDE AND ALBUTEROL SULFATE 2.5; .5 MG/3ML; MG/3ML
1 SOLUTION RESPIRATORY (INHALATION) ONCE
Status: COMPLETED | OUTPATIENT
Start: 2023-10-20 | End: 2023-10-20

## 2023-10-20 RX ORDER — ACETAMINOPHEN 325 MG/1
650 TABLET ORAL EVERY 6 HOURS PRN
Status: DISCONTINUED | OUTPATIENT
Start: 2023-10-20 | End: 2023-10-23 | Stop reason: HOSPADM

## 2023-10-20 RX ADMIN — INSULIN GLARGINE 10 UNITS: 100 INJECTION, SOLUTION SUBCUTANEOUS at 22:24

## 2023-10-20 RX ADMIN — BUDESONIDE AND FORMOTEROL FUMARATE DIHYDRATE 2 PUFF: 80; 4.5 AEROSOL RESPIRATORY (INHALATION) at 19:36

## 2023-10-20 RX ADMIN — IPRATROPIUM BROMIDE AND ALBUTEROL SULFATE 1 DOSE: .5; 2.5 SOLUTION RESPIRATORY (INHALATION) at 14:16

## 2023-10-20 RX ADMIN — SODIUM CHLORIDE, PRESERVATIVE FREE 10 ML: 5 INJECTION INTRAVENOUS at 21:22

## 2023-10-20 RX ADMIN — SODIUM CHLORIDE: 9 INJECTION, SOLUTION INTRAVENOUS at 20:35

## 2023-10-20 RX ADMIN — GABAPENTIN 400 MG: 400 CAPSULE ORAL at 21:22

## 2023-10-20 RX ADMIN — SODIUM BICARBONATE 650 MG: 648 TABLET ORAL at 21:22

## 2023-10-20 RX ADMIN — ENOXAPARIN SODIUM 30 MG: 100 INJECTION SUBCUTANEOUS at 20:19

## 2023-10-20 RX ADMIN — IOPAMIDOL 75 ML: 755 INJECTION, SOLUTION INTRAVENOUS at 13:22

## 2023-10-20 RX ADMIN — LORAZEPAM 0.5 MG: 0.5 TABLET ORAL at 15:00

## 2023-10-20 RX ADMIN — PANCRELIPASE LIPASE, PANCRELIPASE PROTEASE, PANCRELIPASE AMYLASE 40000 UNITS: 20000; 63000; 84000 CAPSULE, DELAYED RELEASE ORAL at 21:25

## 2023-10-20 RX ADMIN — DEXAMETHASONE SODIUM PHOSPHATE 10 MG: 10 INJECTION, SOLUTION INTRAMUSCULAR; INTRAVENOUS at 15:00

## 2023-10-20 RX ADMIN — PANCRELIPASE LIPASE, PANCRELIPASE PROTEASE, PANCRELIPASE AMYLASE 30000 UNITS: 15000; 47000; 63000 CAPSULE, DELAYED RELEASE ORAL at 21:25

## 2023-10-20 RX ADMIN — SODIUM CHLORIDE 1000 ML: 9 INJECTION, SOLUTION INTRAVENOUS at 13:35

## 2023-10-20 RX ADMIN — SUCRALFATE 1 G: 1 TABLET ORAL at 21:45

## 2023-10-20 ASSESSMENT — PAIN - FUNCTIONAL ASSESSMENT: PAIN_FUNCTIONAL_ASSESSMENT: ACTIVITIES ARE NOT PREVENTED

## 2023-10-20 ASSESSMENT — PAIN DESCRIPTION - LOCATION: LOCATION: GENERALIZED

## 2023-10-20 ASSESSMENT — PAIN DESCRIPTION - FREQUENCY: FREQUENCY: CONTINUOUS

## 2023-10-20 ASSESSMENT — PATIENT HEALTH QUESTIONNAIRE - PHQ9
SUM OF ALL RESPONSES TO PHQ QUESTIONS 1-9: 0
SUM OF ALL RESPONSES TO PHQ QUESTIONS 1-9: 0
SUM OF ALL RESPONSES TO PHQ9 QUESTIONS 1 & 2: 0
SUM OF ALL RESPONSES TO PHQ QUESTIONS 1-9: 0
2. FEELING DOWN, DEPRESSED OR HOPELESS: 0
SUM OF ALL RESPONSES TO PHQ QUESTIONS 1-9: 0
1. LITTLE INTEREST OR PLEASURE IN DOING THINGS: 0

## 2023-10-20 ASSESSMENT — PAIN DESCRIPTION - ORIENTATION: ORIENTATION: RIGHT;LEFT

## 2023-10-20 ASSESSMENT — PAIN SCALES - GENERAL: PAINLEVEL_OUTOF10: 6

## 2023-10-20 ASSESSMENT — PAIN DESCRIPTION - PAIN TYPE: TYPE: ACUTE PAIN

## 2023-10-20 ASSESSMENT — PAIN DESCRIPTION - ONSET: ONSET: ON-GOING

## 2023-10-20 ASSESSMENT — HEART SCORE: ECG: 0

## 2023-10-20 ASSESSMENT — PAIN DESCRIPTION - DESCRIPTORS: DESCRIPTORS: DISCOMFORT;ACHING

## 2023-10-20 NOTE — H&P
Yes    Seizure disorder, secondary ([address]) 10/21/2023 Yes    Essential (primary) hypertension 10/21/2023 Yes    Acute left-sided low back pain without sciatica 10/21/2023 Yes       Plan:     Patient status observation in the observation unit    Tachycardia-patient noted to be tachycardic in the ED. Patient given 1 L of IV fluids. Patient will be continued on IV fluids. Patient states that she has had decreased oral intake over the past couple of days. Patient will be placed on telemetry. COPD-patient with history of COPD. Patient states that she was told she had COPD a couple of years ago. Patient will be given Brovana, Pulmicort and DuoNeb. Patient states that she feels that she has mucus in her chest.  Patient will also be given Mucinex. Type 2 diabetes-patient with history of type 2 diabetes. Patient be placed on insulin sliding scale. Accu-Cheks as scheduled. GERD-patient with history of acid reflux. Patient be placed on a PPI. Pancreatic insufficiency-patient noted to have a history of pancreatitis secondary to alcohol use. Patient currently on Creon. This will be continued. Primary hypertension-patient with history of hypertension. Patient will be continued on blood pressure medications.     Consultations:   IP CONSULT TO HOSPITALIST      Joshua Garcia MD  10/20/2023  4:40 PM    Copy sent to Dr. Aubree Leary MD

## 2023-10-20 NOTE — ED NOTES
Writer at bedside. Pt resting comfortably. Pt given warm blankets and placed back on full cardiac monitor. Pt resp even and non labored.  Will continue with plan of care     Ángel Hawkins RN  10/20/23 [id number]

## 2023-10-20 NOTE — ED NOTES
Pt. Resting comfortably in bed with even and non labored resp. Pt remains on full monitor. Will continue with plan of care.      Jason Washburn RN  10/20/23 3536

## 2023-10-20 NOTE — ED NOTES
Pt remains alert Ox3 and cooperative  Pt sts the RT treatment \"makes me feel jumpy\"  Pt requested cardiac monitor off      Radha Quick, RN  10/20/23 1953

## 2023-10-20 NOTE — ED NOTES
Report to Mikhail NARANJO  Updated  Resting on [facility]     [place], [place]  10/20/23 9663

## 2023-10-20 NOTE — ED TRIAGE NOTES
Pt reported SOB after sore throat/cough   Pt non productive  Pt reported swelling to both ankles yesterday  Pt able to speak in complete sentences without distress  Pt denied CP

## 2023-10-21 LAB
AMPHET UR QL SCN: NEGATIVE
BARBITURATES UR QL SCN: NEGATIVE
BENZODIAZ UR QL: NEGATIVE
CANNABINOIDS UR QL SCN: POSITIVE
COCAINE UR QL SCN: NEGATIVE
EKG ATRIAL RATE: 112 BPM
EKG P AXIS: 81 DEGREES
EKG P-R INTERVAL: 136 MS
EKG Q-T INTERVAL: 322 MS
EKG QRS DURATION: 70 MS
EKG QTC CALCULATION (BAZETT): 439 MS
EKG R AXIS: 71 DEGREES
EKG T AXIS: 77 DEGREES
EKG VENTRICULAR RATE: 112 BPM
FENTANYL UR QL: NEGATIVE
GLUCOSE BLD-MCNC: 169 MG/DL (ref 65–105)
GLUCOSE BLD-MCNC: 402 MG/DL (ref 65–105)
GLUCOSE BLD-MCNC: 87 MG/DL (ref 65–105)
METHADONE UR QL: NEGATIVE
OPIATES UR QL SCN: NEGATIVE
OXYCODONE UR QL SCN: NEGATIVE
PCP UR QL SCN: NEGATIVE
TEST INFORMATION: ABNORMAL
TROPONIN I SERPL HS-MCNC: 16 NG/L (ref 0–14)

## 2023-10-21 PROCEDURE — 99232 SBSQ HOSP IP/OBS MODERATE 35: CPT | Performed by: HOSPITALIST

## 2023-10-21 PROCEDURE — 6360000002 HC RX W HCPCS: Performed by: HOSPITALIST

## 2023-10-21 PROCEDURE — 2580000003 HC RX 258: Performed by: HOSPITALIST

## 2023-10-21 PROCEDURE — 93010 ELECTROCARDIOGRAM REPORT: CPT | Performed by: INTERNAL MEDICINE

## 2023-10-21 PROCEDURE — G0378 HOSPITAL OBSERVATION PER HR: HCPCS

## 2023-10-21 PROCEDURE — 6370000000 HC RX 637 (ALT 250 FOR IP): Performed by: HOSPITALIST

## 2023-10-21 PROCEDURE — 80307 DRUG TEST PRSMV CHEM ANLYZR: CPT

## 2023-10-21 PROCEDURE — 96361 HYDRATE IV INFUSION ADD-ON: CPT

## 2023-10-21 PROCEDURE — 96372 THER/PROPH/DIAG INJ SC/IM: CPT

## 2023-10-21 PROCEDURE — 36415 COLL VENOUS BLD VENIPUNCTURE: CPT

## 2023-10-21 PROCEDURE — 94640 AIRWAY INHALATION TREATMENT: CPT

## 2023-10-21 PROCEDURE — 84484 ASSAY OF TROPONIN QUANT: CPT

## 2023-10-21 PROCEDURE — 82947 ASSAY GLUCOSE BLOOD QUANT: CPT

## 2023-10-21 RX ORDER — ARFORMOTEROL TARTRATE 15 UG/2ML
15 SOLUTION RESPIRATORY (INHALATION)
Status: DISCONTINUED | OUTPATIENT
Start: 2023-10-21 | End: 2023-10-23 | Stop reason: HOSPADM

## 2023-10-21 RX ORDER — INSULIN LISPRO 100 [IU]/ML
0-4 INJECTION, SOLUTION INTRAVENOUS; SUBCUTANEOUS
Status: DISCONTINUED | OUTPATIENT
Start: 2023-10-21 | End: 2023-10-23 | Stop reason: HOSPADM

## 2023-10-21 RX ORDER — IPRATROPIUM BROMIDE AND ALBUTEROL SULFATE 2.5; .5 MG/3ML; MG/3ML
1 SOLUTION RESPIRATORY (INHALATION)
Status: DISCONTINUED | OUTPATIENT
Start: 2023-10-21 | End: 2023-10-23 | Stop reason: HOSPADM

## 2023-10-21 RX ORDER — DEXTROSE MONOHYDRATE 100 MG/ML
INJECTION, SOLUTION INTRAVENOUS CONTINUOUS PRN
Status: DISCONTINUED | OUTPATIENT
Start: 2023-10-21 | End: 2023-10-23 | Stop reason: HOSPADM

## 2023-10-21 RX ORDER — METOPROLOL SUCCINATE 25 MG/1
25 TABLET, EXTENDED RELEASE ORAL DAILY
Status: DISCONTINUED | OUTPATIENT
Start: 2023-10-21 | End: 2023-10-23

## 2023-10-21 RX ORDER — IPRATROPIUM BROMIDE AND ALBUTEROL SULFATE 2.5; .5 MG/3ML; MG/3ML
1 SOLUTION RESPIRATORY (INHALATION)
Status: DISCONTINUED | OUTPATIENT
Start: 2023-10-21 | End: 2023-10-21

## 2023-10-21 RX ORDER — BUDESONIDE 0.5 MG/2ML
0.5 INHALANT ORAL
Status: DISCONTINUED | OUTPATIENT
Start: 2023-10-21 | End: 2023-10-23 | Stop reason: HOSPADM

## 2023-10-21 RX ORDER — INSULIN LISPRO 100 [IU]/ML
0-4 INJECTION, SOLUTION INTRAVENOUS; SUBCUTANEOUS NIGHTLY
Status: DISCONTINUED | OUTPATIENT
Start: 2023-10-21 | End: 2023-10-23 | Stop reason: HOSPADM

## 2023-10-21 RX ORDER — INSULIN LISPRO 100 [IU]/ML
10 INJECTION, SOLUTION INTRAVENOUS; SUBCUTANEOUS
Status: DISCONTINUED | OUTPATIENT
Start: 2023-10-21 | End: 2023-10-23 | Stop reason: HOSPADM

## 2023-10-21 RX ORDER — GUAIFENESIN 600 MG/1
600 TABLET, EXTENDED RELEASE ORAL 2 TIMES DAILY
Status: DISCONTINUED | OUTPATIENT
Start: 2023-10-21 | End: 2023-10-23 | Stop reason: HOSPADM

## 2023-10-21 RX ORDER — ALBUTEROL SULFATE 2.5 MG/3ML
2.5 SOLUTION RESPIRATORY (INHALATION) EVERY 4 HOURS PRN
Status: DISCONTINUED | OUTPATIENT
Start: 2023-10-21 | End: 2023-10-23 | Stop reason: HOSPADM

## 2023-10-21 RX ORDER — PREDNISONE 20 MG/1
20 TABLET ORAL DAILY
Status: DISCONTINUED | OUTPATIENT
Start: 2023-10-21 | End: 2023-10-23 | Stop reason: HOSPADM

## 2023-10-21 RX ADMIN — PREDNISONE 20 MG: 20 TABLET ORAL at 12:13

## 2023-10-21 RX ADMIN — ALBUTEROL SULFATE 2.5 MG: 2.5 SOLUTION RESPIRATORY (INHALATION) at 04:51

## 2023-10-21 RX ADMIN — BUDESONIDE INHALATION 500 MCG: 0.5 SUSPENSION RESPIRATORY (INHALATION) at 19:48

## 2023-10-21 RX ADMIN — METOPROLOL SUCCINATE 25 MG: 25 TABLET, EXTENDED RELEASE ORAL at 14:03

## 2023-10-21 RX ADMIN — ENOXAPARIN SODIUM 30 MG: 100 INJECTION SUBCUTANEOUS at 08:32

## 2023-10-21 RX ADMIN — SUCRALFATE 1 G: 1 TABLET ORAL at 08:29

## 2023-10-21 RX ADMIN — BUDESONIDE INHALATION 500 MCG: 0.5 SUSPENSION RESPIRATORY (INHALATION) at 07:43

## 2023-10-21 RX ADMIN — SUCRALFATE 1 G: 1 TABLET ORAL at 21:26

## 2023-10-21 RX ADMIN — GUAIFENESIN 600 MG: 600 TABLET, EXTENDED RELEASE ORAL at 21:27

## 2023-10-21 RX ADMIN — SODIUM BICARBONATE 650 MG: 648 TABLET ORAL at 12:14

## 2023-10-21 RX ADMIN — PANCRELIPASE LIPASE, PANCRELIPASE PROTEASE, PANCRELIPASE AMYLASE 40000 UNITS: 20000; 63000; 84000 CAPSULE, DELAYED RELEASE ORAL at 08:28

## 2023-10-21 RX ADMIN — PANTOPRAZOLE SODIUM 40 MG: 40 TABLET, DELAYED RELEASE ORAL at 08:30

## 2023-10-21 RX ADMIN — GUAIFENESIN 600 MG: 600 TABLET, EXTENDED RELEASE ORAL at 08:31

## 2023-10-21 RX ADMIN — INSULIN GLARGINE 10 UNITS: 100 INJECTION, SOLUTION SUBCUTANEOUS at 08:32

## 2023-10-21 RX ADMIN — SODIUM BICARBONATE 650 MG: 648 TABLET ORAL at 08:30

## 2023-10-21 RX ADMIN — GABAPENTIN 400 MG: 400 CAPSULE ORAL at 12:19

## 2023-10-21 RX ADMIN — IPRATROPIUM BROMIDE AND ALBUTEROL SULFATE 1 DOSE: .5; 2.5 SOLUTION RESPIRATORY (INHALATION) at 19:48

## 2023-10-21 RX ADMIN — GABAPENTIN 400 MG: 400 CAPSULE ORAL at 21:26

## 2023-10-21 RX ADMIN — PANCRELIPASE LIPASE, PANCRELIPASE PROTEASE, PANCRELIPASE AMYLASE 40000 UNITS: 20000; 63000; 84000 CAPSULE, DELAYED RELEASE ORAL at 17:31

## 2023-10-21 RX ADMIN — IPRATROPIUM BROMIDE AND ALBUTEROL SULFATE 1 DOSE: 2.5; .5 SOLUTION RESPIRATORY (INHALATION) at 07:43

## 2023-10-21 RX ADMIN — SUCRALFATE 1 G: 1 TABLET ORAL at 17:31

## 2023-10-21 RX ADMIN — PANCRELIPASE LIPASE, PANCRELIPASE PROTEASE, PANCRELIPASE AMYLASE 40000 UNITS: 20000; 63000; 84000 CAPSULE, DELAYED RELEASE ORAL at 12:13

## 2023-10-21 RX ADMIN — SODIUM CHLORIDE, PRESERVATIVE FREE 10 ML: 5 INJECTION INTRAVENOUS at 21:26

## 2023-10-21 RX ADMIN — SUCRALFATE 1 G: 1 TABLET ORAL at 12:19

## 2023-10-21 RX ADMIN — INSULIN LISPRO 4 UNITS: 100 INJECTION, SOLUTION INTRAVENOUS; SUBCUTANEOUS at 17:29

## 2023-10-21 RX ADMIN — ACETAMINOPHEN 650 MG: 325 TABLET ORAL at 21:27

## 2023-10-21 RX ADMIN — IPRATROPIUM BROMIDE AND ALBUTEROL SULFATE 1 DOSE: .5; 2.5 SOLUTION RESPIRATORY (INHALATION) at 13:49

## 2023-10-21 RX ADMIN — INSULIN LISPRO 10 UNITS: 100 INJECTION, SOLUTION INTRAVENOUS; SUBCUTANEOUS at 17:30

## 2023-10-21 RX ADMIN — GABAPENTIN 400 MG: 400 CAPSULE ORAL at 08:31

## 2023-10-21 RX ADMIN — SODIUM BICARBONATE 650 MG: 648 TABLET ORAL at 21:27

## 2023-10-21 ASSESSMENT — PAIN SCALES - GENERAL
PAINLEVEL_OUTOF10: 2
PAINLEVEL_OUTOF10: 0
PAINLEVEL_OUTOF10: 0

## 2023-10-21 ASSESSMENT — ENCOUNTER SYMPTOMS
RHINORRHEA: 1
ABDOMINAL PAIN: 0
WHEEZING: 0
EYE ITCHING: 1
VOMITING: 0
CONSTIPATION: 0
VOMITING: 1
SINUS PAIN: 0
SORE THROAT: 1
COUGH: 1
NAUSEA: 1
SHORTNESS OF BREATH: 1
BACK PAIN: 0
NAUSEA: 0
SINUS PRESSURE: 0
DIARRHEA: 0

## 2023-10-21 NOTE — CARE COORDINATION
DISCHARGE PLANNING EVALUATION: OP/OBSERVATION        10/21/23, 9:45 AM EDT    Sunitha Silveira         Location: OBS 23/23-1   Reason for hospitalization: Shortness of breath [R06.02]  Tachycardia [R00.0]  Elevated troponin [R79.89]     CM Services requested for transitional needs. PCP: Gloria Deng MD    Transportation/Food Security/Housekeeping Addressed:  No issues identified.      Equipment needs:     Transition plan:  home with

## 2023-10-22 LAB
ANION GAP SERPL CALCULATED.3IONS-SCNC: 13 MMOL/L (ref 9–17)
BUN SERPL-MCNC: 14 MG/DL (ref 8–23)
CALCIUM SERPL-MCNC: 9.6 MG/DL (ref 8.6–10.4)
CHLORIDE SERPL-SCNC: 96 MMOL/L (ref 98–107)
CO2 SERPL-SCNC: 24 MMOL/L (ref 20–31)
CREAT SERPL-MCNC: 0.9 MG/DL (ref 0.5–0.9)
ERYTHROCYTE [DISTWIDTH] IN BLOOD BY AUTOMATED COUNT: 14.1 % (ref 11.8–14.4)
GFR SERPL CREATININE-BSD FRML MDRD: >60 ML/MIN/1.73M2
GLUCOSE BLD-MCNC: 141 MG/DL (ref 65–105)
GLUCOSE BLD-MCNC: 170 MG/DL (ref 65–105)
GLUCOSE BLD-MCNC: 183 MG/DL (ref 65–105)
GLUCOSE SERPL-MCNC: 167 MG/DL (ref 70–99)
HCT VFR BLD AUTO: 37.3 % (ref 36.3–47.1)
HGB BLD-MCNC: 12.6 G/DL (ref 11.9–15.1)
LIPASE SERPL-CCNC: 8 U/L (ref 13–60)
MCH RBC QN AUTO: 33.2 PG (ref 25.2–33.5)
MCHC RBC AUTO-ENTMCNC: 33.8 G/DL (ref 28.4–34.8)
MCV RBC AUTO: 98.2 FL (ref 82.6–102.9)
NRBC BLD-RTO: 0 PER 100 WBC
PLATELET # BLD AUTO: 233 K/UL (ref 138–453)
PMV BLD AUTO: 10.3 FL (ref 8.1–13.5)
POTASSIUM SERPL-SCNC: 4.2 MMOL/L (ref 3.7–5.3)
RBC # BLD AUTO: 3.8 M/UL (ref 3.95–5.11)
SODIUM SERPL-SCNC: 133 MMOL/L (ref 135–144)
TROPONIN I SERPL HS-MCNC: 16 NG/L (ref 0–14)
WBC OTHER # BLD: 11.2 K/UL (ref 3.5–11.3)

## 2023-10-22 PROCEDURE — 85027 COMPLETE CBC AUTOMATED: CPT

## 2023-10-22 PROCEDURE — 82947 ASSAY GLUCOSE BLOOD QUANT: CPT

## 2023-10-22 PROCEDURE — 96372 THER/PROPH/DIAG INJ SC/IM: CPT

## 2023-10-22 PROCEDURE — 80048 BASIC METABOLIC PNL TOTAL CA: CPT

## 2023-10-22 PROCEDURE — G0378 HOSPITAL OBSERVATION PER HR: HCPCS

## 2023-10-22 PROCEDURE — 6370000000 HC RX 637 (ALT 250 FOR IP): Performed by: HOSPITALIST

## 2023-10-22 PROCEDURE — 84484 ASSAY OF TROPONIN QUANT: CPT

## 2023-10-22 PROCEDURE — 94760 N-INVAS EAR/PLS OXIMETRY 1: CPT

## 2023-10-22 PROCEDURE — 94640 AIRWAY INHALATION TREATMENT: CPT

## 2023-10-22 PROCEDURE — 36415 COLL VENOUS BLD VENIPUNCTURE: CPT

## 2023-10-22 PROCEDURE — 6360000002 HC RX W HCPCS: Performed by: HOSPITALIST

## 2023-10-22 PROCEDURE — 2580000003 HC RX 258: Performed by: HOSPITALIST

## 2023-10-22 PROCEDURE — 83690 ASSAY OF LIPASE: CPT

## 2023-10-22 PROCEDURE — 99232 SBSQ HOSP IP/OBS MODERATE 35: CPT | Performed by: HOSPITALIST

## 2023-10-22 PROCEDURE — 6370000000 HC RX 637 (ALT 250 FOR IP): Performed by: NURSE PRACTITIONER

## 2023-10-22 RX ORDER — NITROGLYCERIN 0.4 MG/1
0.4 TABLET SUBLINGUAL EVERY 5 MIN PRN
Status: COMPLETED | OUTPATIENT
Start: 2023-10-22 | End: 2023-10-23

## 2023-10-22 RX ADMIN — METOPROLOL SUCCINATE 25 MG: 25 TABLET, EXTENDED RELEASE ORAL at 10:29

## 2023-10-22 RX ADMIN — NITROGLYCERIN 0.4 MG: 0.4 TABLET SUBLINGUAL at 19:32

## 2023-10-22 RX ADMIN — GUAIFENESIN 600 MG: 600 TABLET, EXTENDED RELEASE ORAL at 19:58

## 2023-10-22 RX ADMIN — SUCRALFATE 1 G: 1 TABLET ORAL at 10:28

## 2023-10-22 RX ADMIN — PANTOPRAZOLE SODIUM 40 MG: 40 TABLET, DELAYED RELEASE ORAL at 10:30

## 2023-10-22 RX ADMIN — BUDESONIDE INHALATION 500 MCG: 0.5 SUSPENSION RESPIRATORY (INHALATION) at 19:43

## 2023-10-22 RX ADMIN — IPRATROPIUM BROMIDE AND ALBUTEROL SULFATE 1 DOSE: .5; 2.5 SOLUTION RESPIRATORY (INHALATION) at 19:43

## 2023-10-22 RX ADMIN — PREDNISONE 20 MG: 20 TABLET ORAL at 10:30

## 2023-10-22 RX ADMIN — INSULIN GLARGINE 10 UNITS: 100 INJECTION, SOLUTION SUBCUTANEOUS at 10:36

## 2023-10-22 RX ADMIN — GABAPENTIN 400 MG: 400 CAPSULE ORAL at 16:43

## 2023-10-22 RX ADMIN — IPRATROPIUM BROMIDE AND ALBUTEROL SULFATE 1 DOSE: .5; 2.5 SOLUTION RESPIRATORY (INHALATION) at 08:52

## 2023-10-22 RX ADMIN — PANCRELIPASE LIPASE, PANCRELIPASE PROTEASE, PANCRELIPASE AMYLASE 40000 UNITS: 20000; 63000; 84000 CAPSULE, DELAYED RELEASE ORAL at 16:43

## 2023-10-22 RX ADMIN — SODIUM BICARBONATE 650 MG: 648 TABLET ORAL at 19:58

## 2023-10-22 RX ADMIN — GABAPENTIN 400 MG: 400 CAPSULE ORAL at 20:04

## 2023-10-22 RX ADMIN — NITROGLYCERIN 0.4 MG: 0.4 TABLET SUBLINGUAL at 17:33

## 2023-10-22 RX ADMIN — GABAPENTIN 400 MG: 400 CAPSULE ORAL at 10:27

## 2023-10-22 RX ADMIN — ENOXAPARIN SODIUM 30 MG: 100 INJECTION SUBCUTANEOUS at 10:28

## 2023-10-22 RX ADMIN — ACETAMINOPHEN 650 MG: 325 TABLET ORAL at 10:29

## 2023-10-22 RX ADMIN — PANCRELIPASE LIPASE, PANCRELIPASE PROTEASE, PANCRELIPASE AMYLASE 40000 UNITS: 20000; 63000; 84000 CAPSULE, DELAYED RELEASE ORAL at 10:29

## 2023-10-22 RX ADMIN — SUCRALFATE 1 G: 1 TABLET ORAL at 19:58

## 2023-10-22 RX ADMIN — SUCRALFATE 1 G: 1 TABLET ORAL at 16:43

## 2023-10-22 RX ADMIN — SODIUM CHLORIDE, PRESERVATIVE FREE 10 ML: 5 INJECTION INTRAVENOUS at 20:01

## 2023-10-22 RX ADMIN — GUAIFENESIN 600 MG: 600 TABLET, EXTENDED RELEASE ORAL at 10:28

## 2023-10-22 RX ADMIN — SODIUM BICARBONATE 650 MG: 648 TABLET ORAL at 16:43

## 2023-10-22 RX ADMIN — SODIUM CHLORIDE, PRESERVATIVE FREE 10 ML: 5 INJECTION INTRAVENOUS at 10:30

## 2023-10-22 RX ADMIN — SODIUM BICARBONATE 650 MG: 648 TABLET ORAL at 10:30

## 2023-10-22 RX ADMIN — PANCRELIPASE LIPASE, PANCRELIPASE PROTEASE, PANCRELIPASE AMYLASE 40000 UNITS: 20000; 63000; 84000 CAPSULE, DELAYED RELEASE ORAL at 20:25

## 2023-10-22 ASSESSMENT — PAIN SCALES - GENERAL
PAINLEVEL_OUTOF10: 5
PAINLEVEL_OUTOF10: 10

## 2023-10-22 ASSESSMENT — PAIN DESCRIPTION - LOCATION: LOCATION: CHEST

## 2023-10-23 ENCOUNTER — HOSPITAL ENCOUNTER (OUTPATIENT)
Age: 68
Setting detail: OBSERVATION
Discharge: HOME | End: 2023-10-25
Attending: HOSPITALIST
Payer: MEDICARE

## 2023-10-23 ENCOUNTER — APPOINTMENT (OUTPATIENT)
Age: 68
End: 2023-10-23
Attending: HOSPITALIST
Payer: MEDICARE

## 2023-10-23 ENCOUNTER — APPOINTMENT (OUTPATIENT)
Dept: NUCLEAR MEDICINE | Age: 68
End: 2023-10-23
Attending: HOSPITALIST
Payer: MEDICARE

## 2023-10-23 VITALS
DIASTOLIC BLOOD PRESSURE: 89 MMHG | WEIGHT: 96.56 LBS | SYSTOLIC BLOOD PRESSURE: 161 MMHG | RESPIRATION RATE: 18 BRPM | HEART RATE: 91 BPM | OXYGEN SATURATION: 97 % | HEIGHT: 67 IN | TEMPERATURE: 99.1 F | BODY MASS INDEX: 15.16 KG/M2

## 2023-10-23 LAB
ANION GAP SERPL CALCULATED.3IONS-SCNC: 10 MMOL/L (ref 9–17)
BUN SERPL-MCNC: 15 MG/DL (ref 8–23)
CALCIUM SERPL-MCNC: 9.4 MG/DL (ref 8.6–10.4)
CHLORIDE SERPL-SCNC: 99 MMOL/L (ref 98–107)
CHOLEST SERPL-MCNC: 86 MG/DL
CHOLESTEROL/HDL RATIO: 1.6
CO2 SERPL-SCNC: 24 MMOL/L (ref 20–31)
CREAT SERPL-MCNC: 0.8 MG/DL (ref 0.5–0.9)
ECHO AO ROOT DIAM: 2.6 CM
ECHO AO ROOT INDEX: 1.76 CM/M2
ECHO AV AREA PEAK VELOCITY: 1.5 CM2
ECHO AV AREA VTI: 1.8 CM2
ECHO AV AREA/BSA PEAK VELOCITY: 1 CM2/M2
ECHO AV AREA/BSA VTI: 1.2 CM2/M2
ECHO AV MEAN GRADIENT: 2 MMHG
ECHO AV MEAN VELOCITY: 0.7 M/S
ECHO AV PEAK GRADIENT: 6 MMHG
ECHO AV PEAK VELOCITY: 1.3 M/S
ECHO AV VELOCITY RATIO: 0.62
ECHO AV VTI: 22.2 CM
ECHO BSA: 1.44 M2
ECHO BSA: 1.44 M2
ECHO LA AREA 2C: 13 CM2
ECHO LA AREA 4C: 10.1 CM2
ECHO LA DIAMETER INDEX: 1.22 CM/M2
ECHO LA DIAMETER: 1.8 CM
ECHO LA MAJOR AXIS: 4 CM
ECHO LA MINOR AXIS: 4.5 CM
ECHO LA TO AORTIC ROOT RATIO: 0.69
ECHO LA VOL 2C: 30 ML (ref 22–52)
ECHO LA VOL 4C: 20 ML (ref 22–52)
ECHO LA VOL BP: 26 ML (ref 22–52)
ECHO LA VOL/BSA BIPLANE: 18 ML/M2 (ref 16–34)
ECHO LA VOLUME INDEX A2C: 20 ML/M2 (ref 16–34)
ECHO LA VOLUME INDEX A4C: 14 ML/M2 (ref 16–34)
ECHO LV E' LATERAL VELOCITY: 8 CM/S
ECHO LV E' SEPTAL VELOCITY: 7 CM/S
ECHO LV EDV A2C: 36 ML
ECHO LV EDV A4C: 35 ML
ECHO LV EDV INDEX A4C: 24 ML/M2
ECHO LV EDV NDEX A2C: 24 ML/M2
ECHO LV EJECTION FRACTION A2C: 79 %
ECHO LV EJECTION FRACTION A4C: 42 %
ECHO LV EJECTION FRACTION BIPLANE: 65 % (ref 55–100)
ECHO LV ESV A2C: 8 ML
ECHO LV ESV A4C: 20 ML
ECHO LV ESV INDEX A2C: 5 ML/M2
ECHO LV ESV INDEX A4C: 14 ML/M2
ECHO LV FRACTIONAL SHORTENING: 32 % (ref 28–44)
ECHO LV INTERNAL DIMENSION DIASTOLE INDEX: 2.77 CM/M2
ECHO LV INTERNAL DIMENSION DIASTOLIC: 4.1 CM (ref 3.9–5.3)
ECHO LV INTERNAL DIMENSION SYSTOLIC INDEX: 1.89 CM/M2
ECHO LV INTERNAL DIMENSION SYSTOLIC: 2.8 CM
ECHO LV IVSD: 0.6 CM (ref 0.6–0.9)
ECHO LV MASS 2D: 74.3 G (ref 67–162)
ECHO LV MASS INDEX 2D: 50.2 G/M2 (ref 43–95)
ECHO LV POSTERIOR WALL DIASTOLIC: 0.7 CM (ref 0.6–0.9)
ECHO LV RELATIVE WALL THICKNESS RATIO: 0.34
ECHO LVOT AREA: 2.3 CM2
ECHO LVOT AV VTI INDEX: 0.81
ECHO LVOT DIAM: 1.7 CM
ECHO LVOT MEAN GRADIENT: 1 MMHG
ECHO LVOT PEAK GRADIENT: 3 MMHG
ECHO LVOT PEAK VELOCITY: 0.8 M/S
ECHO LVOT STROKE VOLUME INDEX: 27.4 ML/M2
ECHO LVOT SV: 40.6 ML
ECHO LVOT VTI: 17.9 CM
ECHO MV A VELOCITY: 0.85 M/S
ECHO MV E DECELERATION TIME (DT): 131 MS
ECHO MV E VELOCITY: 0.74 M/S
ECHO MV E/A RATIO: 0.87
ECHO MV E/E' LATERAL: 9.25
ECHO MV E/E' RATIO (AVERAGED): 9.91
ECHO MV E/E' SEPTAL: 10.57
ECHO PV MAX VELOCITY: 1 M/S
ECHO PV PEAK GRADIENT: 4 MMHG
ECHO RA AREA 4C: 7.9 CM2
ECHO RV BASAL DIMENSION: 2.7 CM
ECHO RV FREE WALL PEAK S': 12 CM/S
ECHO RV TAPSE: 1.8 CM (ref 1.7–?)
ECHO TV REGURGITANT MAX VELOCITY: 6.01 M/S
ECHO TV REGURGITANT PEAK GRADIENT: 144 MMHG
ERYTHROCYTE [DISTWIDTH] IN BLOOD BY AUTOMATED COUNT: 13.6 % (ref 11.8–14.4)
GFR SERPL CREATININE-BSD FRML MDRD: >60 ML/MIN/1.73M2
GLUCOSE BLD-MCNC: 123 MG/DL (ref 65–105)
GLUCOSE SERPL-MCNC: 201 MG/DL (ref 70–99)
HCT VFR BLD AUTO: 35.8 % (ref 36.3–47.1)
HDLC SERPL-MCNC: 54 MG/DL
HGB BLD-MCNC: 11.9 G/DL (ref 11.9–15.1)
LDLC SERPL CALC-MCNC: 21 MG/DL (ref 0–130)
MCH RBC QN AUTO: 32.7 PG (ref 25.2–33.5)
MCHC RBC AUTO-ENTMCNC: 33.2 G/DL (ref 28.4–34.8)
MCV RBC AUTO: 98.4 FL (ref 82.6–102.9)
NRBC BLD-RTO: 0 PER 100 WBC
NUC STRESS EJECTION FRACTION: 68 %
PLATELET # BLD AUTO: 237 K/UL (ref 138–453)
PMV BLD AUTO: 10.4 FL (ref 8.1–13.5)
POTASSIUM SERPL-SCNC: 4.6 MMOL/L (ref 3.7–5.3)
RBC # BLD AUTO: 3.64 M/UL (ref 3.95–5.11)
SODIUM SERPL-SCNC: 133 MMOL/L (ref 135–144)
STRESS BASELINE DIAS BP: 87 MMHG
STRESS BASELINE HR: 100 BPM
STRESS BASELINE SYS BP: 138 MMHG
STRESS ESTIMATED WORKLOAD: 1 METS
STRESS PEAK DIAS BP: 97 MMHG
STRESS PEAK SYS BP: 169 MMHG
STRESS PERCENT HR ACHIEVED: 67 %
STRESS POST PEAK HR: 103 BPM
STRESS RATE PRESSURE PRODUCT: NORMAL BPM*MMHG
STRESS ST DEPRESSION: 0 MM
STRESS TARGET HR: 154 BPM
TID: 1.13
TRIGL SERPL-MCNC: 56 MG/DL
WBC OTHER # BLD: 10.6 K/UL (ref 3.5–11.3)

## 2023-10-23 PROCEDURE — 82947 ASSAY GLUCOSE BLOOD QUANT: CPT

## 2023-10-23 PROCEDURE — 2580000003 HC RX 258: Performed by: HOSPITALIST

## 2023-10-23 PROCEDURE — G0378 HOSPITAL OBSERVATION PER HR: HCPCS

## 2023-10-23 PROCEDURE — 78452 HT MUSCLE IMAGE SPECT MULT: CPT

## 2023-10-23 PROCEDURE — A9500 TC99M SESTAMIBI: HCPCS | Performed by: HOSPITALIST

## 2023-10-23 PROCEDURE — 80048 BASIC METABOLIC PNL TOTAL CA: CPT

## 2023-10-23 PROCEDURE — 96372 THER/PROPH/DIAG INJ SC/IM: CPT

## 2023-10-23 PROCEDURE — 6360000002 HC RX W HCPCS: Performed by: HOSPITALIST

## 2023-10-23 PROCEDURE — 94640 AIRWAY INHALATION TREATMENT: CPT

## 2023-10-23 PROCEDURE — 93017 CV STRESS TEST TRACING ONLY: CPT

## 2023-10-23 PROCEDURE — 93306 TTE W/DOPPLER COMPLETE: CPT

## 2023-10-23 PROCEDURE — 6370000000 HC RX 637 (ALT 250 FOR IP): Performed by: HOSPITALIST

## 2023-10-23 PROCEDURE — 6370000000 HC RX 637 (ALT 250 FOR IP): Performed by: NURSE PRACTITIONER

## 2023-10-23 PROCEDURE — 78452 HT MUSCLE IMAGE SPECT MULT: CPT | Performed by: INTERNAL MEDICINE

## 2023-10-23 PROCEDURE — 36415 COLL VENOUS BLD VENIPUNCTURE: CPT

## 2023-10-23 PROCEDURE — 93306 TTE W/DOPPLER COMPLETE: CPT | Performed by: INTERNAL MEDICINE

## 2023-10-23 PROCEDURE — 80061 LIPID PANEL: CPT

## 2023-10-23 PROCEDURE — 85027 COMPLETE CBC AUTOMATED: CPT

## 2023-10-23 PROCEDURE — 93018 CV STRESS TEST I&R ONLY: CPT | Performed by: INTERNAL MEDICINE

## 2023-10-23 PROCEDURE — 3430000000 HC RX DIAGNOSTIC RADIOPHARMACEUTICAL: Performed by: HOSPITALIST

## 2023-10-23 RX ORDER — LOSARTAN POTASSIUM 25 MG/1
25 TABLET ORAL DAILY
Qty: 30 TABLET | Refills: 3 | Status: SHIPPED | OUTPATIENT
Start: 2023-10-23

## 2023-10-23 RX ORDER — SODIUM CHLORIDE 0.9 % (FLUSH) 0.9 %
10 SYRINGE (ML) INJECTION PRN
Status: DISCONTINUED | OUTPATIENT
Start: 2023-10-23 | End: 2023-10-23 | Stop reason: HOSPADM

## 2023-10-23 RX ORDER — NITROGLYCERIN 0.4 MG/1
0.4 TABLET SUBLINGUAL EVERY 5 MIN PRN
Status: DISCONTINUED | OUTPATIENT
Start: 2023-10-23 | End: 2023-10-23

## 2023-10-23 RX ORDER — METOPROLOL TARTRATE 5 MG/5ML
5 INJECTION INTRAVENOUS EVERY 5 MIN PRN
Status: DISCONTINUED | OUTPATIENT
Start: 2023-10-23 | End: 2023-10-23

## 2023-10-23 RX ORDER — METOPROLOL SUCCINATE 25 MG/1
50 TABLET, EXTENDED RELEASE ORAL DAILY
Status: DISCONTINUED | OUTPATIENT
Start: 2023-10-24 | End: 2023-10-23 | Stop reason: HOSPADM

## 2023-10-23 RX ORDER — LOSARTAN POTASSIUM 50 MG/1
25 TABLET ORAL DAILY
Status: DISCONTINUED | OUTPATIENT
Start: 2023-10-23 | End: 2023-10-23 | Stop reason: HOSPADM

## 2023-10-23 RX ORDER — TETRAKIS(2-METHOXYISOBUTYLISOCYANIDE)COPPER(I) TETRAFLUOROBORATE 1 MG/ML
14 INJECTION, POWDER, LYOPHILIZED, FOR SOLUTION INTRAVENOUS
Status: COMPLETED | OUTPATIENT
Start: 2023-10-23 | End: 2023-10-23

## 2023-10-23 RX ORDER — SODIUM CHLORIDE 0.9 % (FLUSH) 0.9 %
5-40 SYRINGE (ML) INJECTION PRN
Status: DISCONTINUED | OUTPATIENT
Start: 2023-10-23 | End: 2023-10-23

## 2023-10-23 RX ORDER — ALBUTEROL SULFATE 90 UG/1
2 AEROSOL, METERED RESPIRATORY (INHALATION) 4 TIMES DAILY PRN
Qty: 54 G | Refills: 1 | Status: SHIPPED | OUTPATIENT
Start: 2023-10-23

## 2023-10-23 RX ORDER — PREDNISONE 20 MG/1
20 TABLET ORAL DAILY
Qty: 2 TABLET | Refills: 0 | Status: SHIPPED | OUTPATIENT
Start: 2023-10-24 | End: 2023-10-26

## 2023-10-23 RX ORDER — SODIUM CHLORIDE 9 MG/ML
500 INJECTION, SOLUTION INTRAVENOUS CONTINUOUS PRN
Status: DISCONTINUED | OUTPATIENT
Start: 2023-10-23 | End: 2023-10-23

## 2023-10-23 RX ORDER — ATROPINE SULFATE 0.1 MG/ML
0.5 INJECTION INTRAVENOUS EVERY 5 MIN PRN
Status: DISCONTINUED | OUTPATIENT
Start: 2023-10-23 | End: 2023-10-23

## 2023-10-23 RX ORDER — REGADENOSON 0.08 MG/ML
0.4 INJECTION, SOLUTION INTRAVENOUS
Status: COMPLETED | OUTPATIENT
Start: 2023-10-23 | End: 2023-10-23

## 2023-10-23 RX ORDER — AMINOPHYLLINE DIHYDRATE 25 MG/ML
50 INJECTION, SOLUTION INTRAVENOUS PRN
Status: DISCONTINUED | OUTPATIENT
Start: 2023-10-23 | End: 2023-10-23

## 2023-10-23 RX ORDER — ALBUTEROL SULFATE 90 UG/1
2 AEROSOL, METERED RESPIRATORY (INHALATION) PRN
Status: DISCONTINUED | OUTPATIENT
Start: 2023-10-23 | End: 2023-10-23

## 2023-10-23 RX ORDER — TETRAKIS(2-METHOXYISOBUTYLISOCYANIDE)COPPER(I) TETRAFLUOROBORATE 1 MG/ML
37.6 INJECTION, POWDER, LYOPHILIZED, FOR SOLUTION INTRAVENOUS
Status: COMPLETED | OUTPATIENT
Start: 2023-10-23 | End: 2023-10-23

## 2023-10-23 RX ORDER — METOPROLOL SUCCINATE 50 MG/1
50 TABLET, EXTENDED RELEASE ORAL DAILY
Qty: 30 TABLET | Refills: 3 | Status: SHIPPED | OUTPATIENT
Start: 2023-10-24

## 2023-10-23 RX ADMIN — IPRATROPIUM BROMIDE AND ALBUTEROL SULFATE 1 DOSE: .5; 2.5 SOLUTION RESPIRATORY (INHALATION) at 14:16

## 2023-10-23 RX ADMIN — TETRAKIS(2-METHOXYISOBUTYLISOCYANIDE)COPPER(I) TETRAFLUOROBORATE 37.6 MILLICURIE: 1 INJECTION, POWDER, LYOPHILIZED, FOR SOLUTION INTRAVENOUS at 08:07

## 2023-10-23 RX ADMIN — ENOXAPARIN SODIUM 30 MG: 100 INJECTION SUBCUTANEOUS at 12:21

## 2023-10-23 RX ADMIN — NITROGLYCERIN 0.4 MG: 0.4 TABLET SUBLINGUAL at 12:12

## 2023-10-23 RX ADMIN — SODIUM CHLORIDE, PRESERVATIVE FREE 10 ML: 5 INJECTION INTRAVENOUS at 10:20

## 2023-10-23 RX ADMIN — TETRAKIS(2-METHOXYISOBUTYLISOCYANIDE)COPPER(I) TETRAFLUOROBORATE 14 MILLICURIE: 1 INJECTION, POWDER, LYOPHILIZED, FOR SOLUTION INTRAVENOUS at 08:07

## 2023-10-23 RX ADMIN — SODIUM CHLORIDE, PRESERVATIVE FREE 10 ML: 5 INJECTION INTRAVENOUS at 10:25

## 2023-10-23 RX ADMIN — REGADENOSON 0.4 MG: 0.08 INJECTION, SOLUTION INTRAVENOUS at 10:21

## 2023-10-23 RX ADMIN — SODIUM CHLORIDE, PRESERVATIVE FREE 10 ML: 5 INJECTION INTRAVENOUS at 08:07

## 2023-10-23 RX ADMIN — SODIUM CHLORIDE, PRESERVATIVE FREE 10 ML: 5 INJECTION INTRAVENOUS at 09:38

## 2023-10-23 RX ADMIN — SODIUM CHLORIDE, PRESERVATIVE FREE 10 ML: 5 INJECTION INTRAVENOUS at 09:00

## 2023-10-23 RX ADMIN — SUCRALFATE 1 G: 1 TABLET ORAL at 12:11

## 2023-10-23 RX ADMIN — SODIUM BICARBONATE 650 MG: 648 TABLET ORAL at 12:11

## 2023-10-23 RX ADMIN — PANCRELIPASE LIPASE, PANCRELIPASE PROTEASE, PANCRELIPASE AMYLASE 40000 UNITS: 20000; 63000; 84000 CAPSULE, DELAYED RELEASE ORAL at 13:01

## 2023-10-23 RX ADMIN — SODIUM CHLORIDE, PRESERVATIVE FREE 10 ML: 5 INJECTION INTRAVENOUS at 10:21

## 2023-10-23 RX ADMIN — PREDNISONE 20 MG: 20 TABLET ORAL at 12:11

## 2023-10-23 RX ADMIN — PANTOPRAZOLE SODIUM 40 MG: 40 TABLET, DELAYED RELEASE ORAL at 12:15

## 2023-10-23 RX ADMIN — GUAIFENESIN 600 MG: 600 TABLET, EXTENDED RELEASE ORAL at 12:11

## 2023-10-23 RX ADMIN — INSULIN GLARGINE 10 UNITS: 100 INJECTION, SOLUTION SUBCUTANEOUS at 12:12

## 2023-10-23 RX ADMIN — LOSARTAN POTASSIUM 25 MG: 50 TABLET, FILM COATED ORAL at 15:52

## 2023-10-23 RX ADMIN — GABAPENTIN 400 MG: 400 CAPSULE ORAL at 12:15

## 2023-10-23 NOTE — DISCHARGE SUMMARY
[facility]  Office: [phone]  Erin Shaffer DO, Clarence Barajas, DO, Gaurav Westbrook, DO, Joey Francis, DO, Cheryl Justice MD, Miranda Valdovinos MD, Nasreen Pierre MD, Marcia Sainz MD,  Joseph Yanez MD, Linda Garza MD, Jaelyn Ya MD,  Trinity Castro MD, Xi Cash MD, Marce Burrows DO, Abhinav Pedraza MD,  Lilliana Love DO, Leona Eisenmenger, MD, Karolina Morris MD, Jonah Aragon MD, Cheryle Spillers, MD,  Madan Gaviria MD, Yoli Rodríguez MD, Nicky Dickinson MD, Lizandro Ga MD, Dewayne Lin MD, Laura Zimmerman DO, Patricia Montalvo DO, Marsha Oneal MD,  Aj Torrez MD, Marilyn Beckwith, Bambi Hwang, CNP, Lily Lee, CNP,  Shadia Camacho, [facility], Lera Sandhoff, CNP, Nicole Terrell, CNP, Ottoniel Posada, CNP, Lisa Mc, CNP, Surekha Canales, CNP, Annmarie Melgar, CNP, Joseph Dickerson, CNS, Meli Hutson, CNP, Shefali Ndiaye, [address]os    Discharge Summary     Patient ID: Sixto Viramontes  :  1955   MRN: [id number]     ACCOUNT:  [de-identified]   Patient's PCP: Loran Ganser, MD  Admit Date: 10/20/2023   Discharge Date: 10/23/2023 ***    Length of Stay: 0  Code Status:  Full Code  Admitting Physician: No admitting provider for patient encounter. Discharge Physician: Jose Haile DO     Active Discharge Diagnoses:     Hospital Problem Lists:  Principal Problem:    Tachycardia  Active Problems:    Type 2 diabetes mellitus with hypoglycemia without coma, with long-term current use of insulin (HCC)    GERD (gastroesophageal reflux disease)    Pancreatic insufficiency    Seizure disorder, secondary ([address])    Essential (primary) hypertension    Acute left-sided low back pain without sciatica  Resolved Problems:    * No resolved hospital problems.  *      Admission Condition:  {condition:15144}     Discharged Condition: {condition:94555}    Hospital Stay:     [facility]

## 2023-10-31 ENCOUNTER — OFFICE VISIT (OUTPATIENT)
Dept: GASTROENTEROLOGY | Age: 68
End: 2023-10-31
Payer: MEDICARE

## 2023-10-31 VITALS
OXYGEN SATURATION: 96 % | SYSTOLIC BLOOD PRESSURE: 130 MMHG | DIASTOLIC BLOOD PRESSURE: 86 MMHG | HEART RATE: 109 BPM | BODY MASS INDEX: 15.66 KG/M2 | WEIGHT: 100 LBS

## 2023-10-31 DIAGNOSIS — D64.9 ANEMIA, UNSPECIFIED TYPE: ICD-10-CM

## 2023-10-31 DIAGNOSIS — K86.89 PANCREATIC INSUFFICIENCY: Primary | ICD-10-CM

## 2023-10-31 DIAGNOSIS — R63.4 WEIGHT LOSS: ICD-10-CM

## 2023-10-31 DIAGNOSIS — K86.0 ALCOHOL-INDUCED CHRONIC PANCREATITIS (HCC): ICD-10-CM

## 2023-10-31 DIAGNOSIS — Z86.010 HX OF COLONIC POLYPS: ICD-10-CM

## 2023-10-31 DIAGNOSIS — D49.0 IPMN (INTRADUCTAL PAPILLARY MUCINOUS NEOPLASM): ICD-10-CM

## 2023-10-31 DIAGNOSIS — R97.8 ELEVATED CA 19-9 LEVEL: ICD-10-CM

## 2023-10-31 DIAGNOSIS — R97.0 ELEVATED CEA: ICD-10-CM

## 2023-10-31 DIAGNOSIS — R13.10 DYSPHAGIA, UNSPECIFIED TYPE: ICD-10-CM

## 2023-10-31 PROCEDURE — 99214 OFFICE O/P EST MOD 30 MIN: CPT | Performed by: INTERNAL MEDICINE

## 2023-10-31 PROCEDURE — 3078F DIAST BP <80 MM HG: CPT | Performed by: INTERNAL MEDICINE

## 2023-10-31 PROCEDURE — 3074F SYST BP LT 130 MM HG: CPT | Performed by: INTERNAL MEDICINE

## 2023-10-31 PROCEDURE — 1123F ACP DISCUSS/DSCN MKR DOCD: CPT | Performed by: INTERNAL MEDICINE

## 2023-10-31 ASSESSMENT — ENCOUNTER SYMPTOMS
DIARRHEA: 0
SHORTNESS OF BREATH: 0
NAUSEA: 0
RECTAL PAIN: 0
VOMITING: 1
ABDOMINAL PAIN: 0
BLOOD IN STOOL: 0
WHEEZING: 0
CHOKING: 0
COUGH: 0
TROUBLE SWALLOWING: 0
ANAL BLEEDING: 0
ABDOMINAL DISTENTION: 0
CONSTIPATION: 0

## 2023-10-31 NOTE — PROGRESS NOTES
reconstruction, and/or weight based adjustment of the mA/kV was utilized to reduce the radiation dose to as low as reasonably achievable. COMPARISON: Chest radiograph 10/20/2023 CT 01/13/2023. HISTORY: ORDERING SYSTEM PROVIDED HISTORY: persistent tachycardia and shortness of breath TECHNOLOGIST PROVIDED HISTORY: persistent tachycardia and shortness of breath Decision Support Exception - unselect if not a suspected or confirmed emergency medical condition->Emergency Medical Condition (MA) Reason for Exam: persistent tachycardia and shortness of breath FINDINGS: Pulmonary Arteries: Pulmonary arteries are adequately opacified for evaluation. No evidence of intraluminal filling defect to suggest pulmonary embolism. Main pulmonary artery is normal in caliber. Mediastinum: No evidence of mediastinal lymphadenopathy. The heart and pericardium demonstrate no acute abnormality. There is no acute abnormality of the thoracic aorta. Lungs/pleura: There is mild centrilobular emphysema. There is no confluent airspace consolidation or pleural effusion. The central airways are normally patent. Upper Abdomen: Calcifications are again noted throughout the pancreas. The upper abdomen is otherwise unremarkable. Soft Tissues/Bones: No acute bone or soft tissue abnormality. 1. No pulmonary embolism or other acute finding in the chest. 2. Mild emphysema. 3. Chronic pancreatitis. XR CHEST PORTABLE    Result Date: 10/20/2023  EXAMINATION: ONE XRAY VIEW OF THE CHEST 10/20/2023 12:27 pm COMPARISON: Chest x-ray dated 01/13/2023. HISTORY: ORDERING SYSTEM PROVIDED HISTORY: shortness of breath TECHNOLOGIST PROVIDED HISTORY: shortness of breath Reason for Exam: shortness of breath FINDINGS: HEART/MEDIASTINUM: The cardiomediastinal silhouette is within normal limits. PLEURA/LUNGS: There are no focal consolidations or pleural effusions. There is no appreciable pneumothorax.   Nodular densities are noted over the bilateral mid lungs

## 2024-01-10 ENCOUNTER — APPOINTMENT (OUTPATIENT)
Dept: GENERAL RADIOLOGY | Age: 69
DRG: 638 | End: 2024-01-10
Payer: MEDICARE

## 2024-01-10 ENCOUNTER — APPOINTMENT (OUTPATIENT)
Dept: CT IMAGING | Age: 69
DRG: 638 | End: 2024-01-10
Payer: MEDICARE

## 2024-01-10 ENCOUNTER — HOSPITAL ENCOUNTER (INPATIENT)
Age: 69
LOS: 1 days | Discharge: HOME | DRG: 638 | End: 2024-01-10
Attending: EMERGENCY MEDICINE | Admitting: INTERNAL MEDICINE
Payer: MEDICARE

## 2024-01-10 VITALS
SYSTOLIC BLOOD PRESSURE: 128 MMHG | TEMPERATURE: 98.7 F | OXYGEN SATURATION: 90 % | WEIGHT: 100 LBS | HEART RATE: 95 BPM | DIASTOLIC BLOOD PRESSURE: 74 MMHG | BODY MASS INDEX: 15.66 KG/M2 | RESPIRATION RATE: 20 BRPM

## 2024-01-10 DIAGNOSIS — R06.03 RESPIRATORY DISTRESS: Primary | ICD-10-CM

## 2024-01-10 DIAGNOSIS — E11.42 TYPE 2 DIABETES MELLITUS WITH DIABETIC POLYNEUROPATHY, WITH LONG-TERM CURRENT USE OF INSULIN (HCC): ICD-10-CM

## 2024-01-10 DIAGNOSIS — J44.1 COPD EXACERBATION (HCC): ICD-10-CM

## 2024-01-10 DIAGNOSIS — E16.2 HYPOGLYCEMIA: ICD-10-CM

## 2024-01-10 DIAGNOSIS — E11.9 TYPE 2 DIABETES MELLITUS WITHOUT COMPLICATION, WITHOUT LONG-TERM CURRENT USE OF INSULIN (HCC): ICD-10-CM

## 2024-01-10 DIAGNOSIS — Z79.4 TYPE 2 DIABETES MELLITUS WITH DIABETIC POLYNEUROPATHY, WITH LONG-TERM CURRENT USE OF INSULIN (HCC): ICD-10-CM

## 2024-01-10 LAB
ALBUMIN SERPL-MCNC: 4.2 G/DL (ref 3.5–5.2)
ALBUMIN/GLOB SERPL: 1.3 {RATIO} (ref 1–2.5)
ALP SERPL-CCNC: 76 U/L (ref 35–104)
ALT SERPL-CCNC: 16 U/L (ref 5–33)
ANION GAP SERPL CALCULATED.3IONS-SCNC: 10 MMOL/L (ref 9–17)
ANION GAP SERPL CALCULATED.3IONS-SCNC: 11 MMOL/L (ref 9–17)
AST SERPL-CCNC: 41 U/L
BACTERIA URNS QL MICRO: ABNORMAL
BASOPHILS # BLD: 0.05 K/UL (ref 0–0.2)
BASOPHILS NFR BLD: 1 % (ref 0–2)
BILIRUB SERPL-MCNC: 0.5 MG/DL (ref 0.3–1.2)
BILIRUB UR QL STRIP: NEGATIVE
BUN BLD-MCNC: 10 MG/DL (ref 8–26)
BUN BLD-MCNC: 9 MG/DL (ref 8–26)
BUN SERPL-MCNC: 10 MG/DL (ref 8–23)
BUN SERPL-MCNC: 11 MG/DL (ref 8–23)
CA-I BLD-SCNC: 1.14 MMOL/L (ref 1.13–1.33)
CA-I BLD-SCNC: 1.33 MMOL/L (ref 1.15–1.33)
CA-I BLD-SCNC: 1.74 MMOL/L (ref 1.15–1.33)
CALCIUM SERPL-MCNC: 8.5 MG/DL (ref 8.6–10.4)
CALCIUM SERPL-MCNC: 9.8 MG/DL (ref 8.6–10.4)
CASTS #/AREA URNS LPF: ABNORMAL /LPF (ref 0–8)
CHLORIDE BLD-SCNC: 107 MMOL/L (ref 98–107)
CHLORIDE BLD-SCNC: 114 MMOL/L (ref 98–107)
CHLORIDE SERPL-SCNC: 101 MMOL/L (ref 98–107)
CHLORIDE SERPL-SCNC: 103 MMOL/L (ref 98–107)
CK SERPL-CCNC: 105 U/L (ref 26–192)
CLARITY UR: CLEAR
CO2 BLD CALC-SCNC: 24 MMOL/L (ref 22–30)
CO2 BLD CALC-SCNC: 31 MMOL/L (ref 22–30)
CO2 SERPL-SCNC: 23 MMOL/L (ref 20–31)
CO2 SERPL-SCNC: 30 MMOL/L (ref 20–31)
COLOR UR: YELLOW
CREAT SERPL-MCNC: 0.6 MG/DL (ref 0.5–0.9)
CREAT SERPL-MCNC: 0.7 MG/DL (ref 0.5–0.9)
D DIMER PPP FEU-MCNC: 0.97 UG/ML FEU (ref 0–0.57)
EGFR, POC: >60 ML/MIN/1.73M2
EGFR, POC: >60 ML/MIN/1.73M2
EOSINOPHIL # BLD: 0.14 K/UL (ref 0–0.44)
EOSINOPHILS RELATIVE PERCENT: 2 % (ref 1–4)
EPI CELLS #/AREA URNS HPF: ABNORMAL /HPF (ref 0–5)
ERYTHROCYTE [DISTWIDTH] IN BLOOD BY AUTOMATED COUNT: 13.4 % (ref 11.8–14.4)
FLUAV AG SPEC QL: NEGATIVE
FLUBV AG SPEC QL: NEGATIVE
GFR SERPL CREATININE-BSD FRML MDRD: >60 ML/MIN/1.73M2
GFR SERPL CREATININE-BSD FRML MDRD: >60 ML/MIN/1.73M2
GLUCOSE BLD-MCNC: 159 MG/DL (ref 65–105)
GLUCOSE BLD-MCNC: 182 MG/DL (ref 74–100)
GLUCOSE BLD-MCNC: 40 MG/DL (ref 74–100)
GLUCOSE SERPL-MCNC: 290 MG/DL (ref 70–99)
GLUCOSE SERPL-MCNC: 46 MG/DL (ref 70–99)
GLUCOSE UR STRIP-MCNC: ABNORMAL MG/DL
HCO3 VENOUS: 23.8 MMOL/L (ref 22–29)
HCO3 VENOUS: 31 MMOL/L (ref 22–29)
HCT VFR BLD AUTO: 33 % (ref 36–46)
HCT VFR BLD AUTO: 48.5 % (ref 36.3–47.1)
HCT VFR BLD AUTO: 50 % (ref 36–46)
HGB BLD-MCNC: 15.9 G/DL (ref 11.9–15.1)
HGB UR QL STRIP.AUTO: NEGATIVE
IMM GRANULOCYTES # BLD AUTO: <0.03 K/UL (ref 0–0.3)
IMM GRANULOCYTES NFR BLD: 0 %
KETONES UR STRIP-MCNC: NEGATIVE MG/DL
LEUKOCYTE ESTERASE UR QL STRIP: NEGATIVE
LIPASE SERPL-CCNC: 122 U/L (ref 13–60)
LYMPHOCYTES NFR BLD: 3.82 K/UL (ref 1.1–3.7)
LYMPHOCYTES RELATIVE PERCENT: 58 % (ref 24–43)
MCH RBC QN AUTO: 33.5 PG (ref 25.2–33.5)
MCHC RBC AUTO-ENTMCNC: 32.8 G/DL (ref 28.4–34.8)
MCV RBC AUTO: 102.1 FL (ref 82.6–102.9)
MONOCYTES NFR BLD: 0.43 K/UL (ref 0.1–1.2)
MONOCYTES NFR BLD: 7 % (ref 3–12)
MYOGLOBIN SERPL-MCNC: 64 NG/ML (ref 25–58)
NEGATIVE BASE EXCESS, VEN: 0.4 MMOL/L (ref 0–2)
NEGATIVE BASE EXCESS, VEN: 4.9 MMOL/L (ref 0–2)
NEUTROPHILS NFR BLD: 32 % (ref 36–65)
NEUTS SEG NFR BLD: 2.06 K/UL (ref 1.5–8.1)
NITRITE UR QL STRIP: NEGATIVE
NRBC BLD-RTO: 0 PER 100 WBC
O2 SAT, VEN: 32.1 % (ref 60–85)
O2 SAT, VEN: 46.8 % (ref 60–85)
PCO2, VEN: 61.7 MM HG (ref 41–51)
PCO2, VEN: 81 MM HG (ref 41–51)
PH UR STRIP: 7 [PH] (ref 5–8)
PH VENOUS: 7.19 (ref 7.32–7.43)
PH VENOUS: 7.2 (ref 7.32–7.43)
PLATELET # BLD AUTO: 251 K/UL (ref 138–453)
PMV BLD AUTO: 10.1 FL (ref 8.1–13.5)
PO2, VEN: 25.7 MM HG (ref 30–50)
PO2, VEN: 32 MM HG (ref 30–50)
POC ANION GAP: 11 MMOL/L (ref 7–16)
POC ANION GAP: 8 MMOL/L (ref 7–16)
POC CREATININE: 0.4 MG/DL (ref 0.51–1.19)
POC CREATININE: 0.6 MG/DL (ref 0.51–1.19)
POC HEMOGLOBIN (CALC): 11.3 G/DL (ref 12–16)
POC HEMOGLOBIN (CALC): 16.9 G/DL (ref 12–16)
POC LACTIC ACID: 3.9 MMOL/L (ref 0.56–1.39)
POC LACTIC ACID: 5.1 MMOL/L (ref 0.56–1.39)
POTASSIUM BLD-SCNC: 4.1 MMOL/L (ref 3.5–4.5)
POTASSIUM BLD-SCNC: 5.2 MMOL/L (ref 3.5–4.5)
POTASSIUM SERPL-SCNC: 5.1 MMOL/L (ref 3.7–5.3)
POTASSIUM SERPL-SCNC: 5.7 MMOL/L (ref 3.7–5.3)
PROT SERPL-MCNC: 7.5 G/DL (ref 6.4–8.3)
PROT UR STRIP-MCNC: ABNORMAL MG/DL
RBC # BLD AUTO: 4.75 M/UL (ref 3.95–5.11)
RBC #/AREA URNS HPF: ABNORMAL /HPF (ref 0–4)
SARS-COV-2 RDRP RESP QL NAA+PROBE: NOT DETECTED
SODIUM BLD-SCNC: 145 MMOL/L (ref 138–146)
SODIUM BLD-SCNC: 148 MMOL/L (ref 138–146)
SODIUM SERPL-SCNC: 135 MMOL/L (ref 135–144)
SODIUM SERPL-SCNC: 143 MMOL/L (ref 135–144)
SP GR UR STRIP: 1.01 (ref 1–1.03)
SPECIMEN DESCRIPTION: NORMAL
TROPONIN I SERPL HS-MCNC: 14 NG/L (ref 0–14)
TROPONIN I SERPL HS-MCNC: 18 NG/L (ref 0–14)
UROBILINOGEN UR STRIP-ACNC: NORMAL EU/DL (ref 0–1)
WBC #/AREA URNS HPF: ABNORMAL /HPF (ref 0–5)
WBC OTHER # BLD: 6.5 K/UL (ref 3.5–11.3)

## 2024-01-10 PROCEDURE — 72125 CT NECK SPINE W/O DYE: CPT

## 2024-01-10 PROCEDURE — 96361 HYDRATE IV INFUSION ADD-ON: CPT

## 2024-01-10 PROCEDURE — 82803 BLOOD GASES ANY COMBINATION: CPT

## 2024-01-10 PROCEDURE — 96365 THER/PROPH/DIAG IV INF INIT: CPT

## 2024-01-10 PROCEDURE — 93005 ELECTROCARDIOGRAM TRACING: CPT | Performed by: STUDENT IN AN ORGANIZED HEALTH CARE EDUCATION/TRAINING PROGRAM

## 2024-01-10 PROCEDURE — 96366 THER/PROPH/DIAG IV INF ADDON: CPT

## 2024-01-10 PROCEDURE — 81001 URINALYSIS AUTO W/SCOPE: CPT

## 2024-01-10 PROCEDURE — 6370000000 HC RX 637 (ALT 250 FOR IP): Performed by: INTERNAL MEDICINE

## 2024-01-10 PROCEDURE — 83874 ASSAY OF MYOGLOBIN: CPT

## 2024-01-10 PROCEDURE — G0378 HOSPITAL OBSERVATION PER HR: HCPCS

## 2024-01-10 PROCEDURE — 6360000002 HC RX W HCPCS: Performed by: STUDENT IN AN ORGANIZED HEALTH CARE EDUCATION/TRAINING PROGRAM

## 2024-01-10 PROCEDURE — 96375 TX/PRO/DX INJ NEW DRUG ADDON: CPT

## 2024-01-10 PROCEDURE — 99222 1ST HOSP IP/OBS MODERATE 55: CPT | Performed by: INTERNAL MEDICINE

## 2024-01-10 PROCEDURE — 94640 AIRWAY INHALATION TREATMENT: CPT

## 2024-01-10 PROCEDURE — 84520 ASSAY OF UREA NITROGEN: CPT

## 2024-01-10 PROCEDURE — 82330 ASSAY OF CALCIUM: CPT

## 2024-01-10 PROCEDURE — 6370000000 HC RX 637 (ALT 250 FOR IP): Performed by: STUDENT IN AN ORGANIZED HEALTH CARE EDUCATION/TRAINING PROGRAM

## 2024-01-10 PROCEDURE — 94660 CPAP INITIATION&MGMT: CPT

## 2024-01-10 PROCEDURE — 2500000003 HC RX 250 WO HCPCS: Performed by: STUDENT IN AN ORGANIZED HEALTH CARE EDUCATION/TRAINING PROGRAM

## 2024-01-10 PROCEDURE — 87635 SARS-COV-2 COVID-19 AMP PRB: CPT

## 2024-01-10 PROCEDURE — 80051 ELECTROLYTE PANEL: CPT

## 2024-01-10 PROCEDURE — 80048 BASIC METABOLIC PNL TOTAL CA: CPT

## 2024-01-10 PROCEDURE — 1200000000 HC SEMI PRIVATE

## 2024-01-10 PROCEDURE — 70450 CT HEAD/BRAIN W/O DYE: CPT

## 2024-01-10 PROCEDURE — 87040 BLOOD CULTURE FOR BACTERIA: CPT

## 2024-01-10 PROCEDURE — 99285 EMERGENCY DEPT VISIT HI MDM: CPT

## 2024-01-10 PROCEDURE — 84484 ASSAY OF TROPONIN QUANT: CPT

## 2024-01-10 PROCEDURE — 6360000004 HC RX CONTRAST MEDICATION: Performed by: EMERGENCY MEDICINE

## 2024-01-10 PROCEDURE — 2580000003 HC RX 258

## 2024-01-10 PROCEDURE — 87804 INFLUENZA ASSAY W/OPTIC: CPT

## 2024-01-10 PROCEDURE — 96367 TX/PROPH/DG ADDL SEQ IV INF: CPT

## 2024-01-10 PROCEDURE — 82947 ASSAY GLUCOSE BLOOD QUANT: CPT

## 2024-01-10 PROCEDURE — 85025 COMPLETE CBC W/AUTO DIFF WBC: CPT

## 2024-01-10 PROCEDURE — 6370000000 HC RX 637 (ALT 250 FOR IP): Performed by: NURSE PRACTITIONER

## 2024-01-10 PROCEDURE — 71045 X-RAY EXAM CHEST 1 VIEW: CPT

## 2024-01-10 PROCEDURE — 85014 HEMATOCRIT: CPT

## 2024-01-10 PROCEDURE — 94664 DEMO&/EVAL PT USE INHALER: CPT

## 2024-01-10 PROCEDURE — 80053 COMPREHEN METABOLIC PANEL: CPT

## 2024-01-10 PROCEDURE — 83690 ASSAY OF LIPASE: CPT

## 2024-01-10 PROCEDURE — 2580000003 HC RX 258: Performed by: STUDENT IN AN ORGANIZED HEALTH CARE EDUCATION/TRAINING PROGRAM

## 2024-01-10 PROCEDURE — 85379 FIBRIN DEGRADATION QUANT: CPT

## 2024-01-10 PROCEDURE — 82550 ASSAY OF CK (CPK): CPT

## 2024-01-10 PROCEDURE — 71260 CT THORAX DX C+: CPT

## 2024-01-10 PROCEDURE — 82565 ASSAY OF CREATININE: CPT

## 2024-01-10 PROCEDURE — 83605 ASSAY OF LACTIC ACID: CPT

## 2024-01-10 PROCEDURE — 87086 URINE CULTURE/COLONY COUNT: CPT

## 2024-01-10 PROCEDURE — 2580000003 HC RX 258: Performed by: NURSE PRACTITIONER

## 2024-01-10 RX ORDER — METOPROLOL SUCCINATE 50 MG/1
50 TABLET, EXTENDED RELEASE ORAL DAILY
Status: DISCONTINUED | OUTPATIENT
Start: 2024-01-10 | End: 2024-01-10

## 2024-01-10 RX ORDER — ONDANSETRON 2 MG/ML
4 INJECTION INTRAMUSCULAR; INTRAVENOUS EVERY 6 HOURS PRN
Status: DISCONTINUED | OUTPATIENT
Start: 2024-01-10 | End: 2024-01-10 | Stop reason: HOSPADM

## 2024-01-10 RX ORDER — METOPROLOL SUCCINATE 25 MG/1
25 TABLET, EXTENDED RELEASE ORAL DAILY
Qty: 30 TABLET | Refills: 3 | Status: SHIPPED | OUTPATIENT
Start: 2024-01-10

## 2024-01-10 RX ORDER — SODIUM CHLORIDE 0.9 % (FLUSH) 0.9 %
5-40 SYRINGE (ML) INJECTION PRN
Status: DISCONTINUED | OUTPATIENT
Start: 2024-01-10 | End: 2024-01-10 | Stop reason: HOSPADM

## 2024-01-10 RX ORDER — ENOXAPARIN SODIUM 100 MG/ML
40 INJECTION SUBCUTANEOUS DAILY
Status: DISCONTINUED | OUTPATIENT
Start: 2024-01-10 | End: 2024-01-10 | Stop reason: HOSPADM

## 2024-01-10 RX ORDER — SODIUM BICARBONATE 650 MG/1
650 TABLET ORAL 3 TIMES DAILY
Status: DISCONTINUED | OUTPATIENT
Start: 2024-01-10 | End: 2024-01-10 | Stop reason: HOSPADM

## 2024-01-10 RX ORDER — ALBUTEROL SULFATE 2.5 MG/3ML
2.5 SOLUTION RESPIRATORY (INHALATION)
Status: DISCONTINUED | OUTPATIENT
Start: 2024-01-10 | End: 2024-01-10 | Stop reason: HOSPADM

## 2024-01-10 RX ORDER — DEXTROSE MONOHYDRATE 100 MG/ML
INJECTION, SOLUTION INTRAVENOUS CONTINUOUS PRN
Status: DISCONTINUED | OUTPATIENT
Start: 2024-01-10 | End: 2024-01-10 | Stop reason: HOSPADM

## 2024-01-10 RX ORDER — IPRATROPIUM BROMIDE AND ALBUTEROL SULFATE 2.5; .5 MG/3ML; MG/3ML
1 SOLUTION RESPIRATORY (INHALATION)
Status: DISCONTINUED | OUTPATIENT
Start: 2024-01-10 | End: 2024-01-10 | Stop reason: HOSPADM

## 2024-01-10 RX ORDER — WATER 10 ML/10ML
INJECTION INTRAMUSCULAR; INTRAVENOUS; SUBCUTANEOUS
Status: COMPLETED
Start: 2024-01-10 | End: 2024-01-10

## 2024-01-10 RX ORDER — ALBUTEROL SULFATE 2.5 MG/3ML
15 SOLUTION RESPIRATORY (INHALATION)
Status: DISCONTINUED | OUTPATIENT
Start: 2024-01-10 | End: 2024-01-10 | Stop reason: HOSPADM

## 2024-01-10 RX ORDER — SODIUM CHLORIDE 9 MG/ML
INJECTION, SOLUTION INTRAVENOUS PRN
Status: DISCONTINUED | OUTPATIENT
Start: 2024-01-10 | End: 2024-01-10 | Stop reason: HOSPADM

## 2024-01-10 RX ORDER — ONDANSETRON 4 MG/1
4 TABLET, ORALLY DISINTEGRATING ORAL EVERY 8 HOURS PRN
Status: DISCONTINUED | OUTPATIENT
Start: 2024-01-10 | End: 2024-01-10 | Stop reason: HOSPADM

## 2024-01-10 RX ORDER — DEXTROSE MONOHYDRATE 25 G/50ML
25 INJECTION, SOLUTION INTRAVENOUS ONCE
Status: COMPLETED | OUTPATIENT
Start: 2024-01-10 | End: 2024-01-10

## 2024-01-10 RX ORDER — ATORVASTATIN CALCIUM 40 MG/1
40 TABLET, FILM COATED ORAL DAILY
Qty: 30 TABLET | Refills: 3 | Status: SHIPPED | OUTPATIENT
Start: 2024-01-10

## 2024-01-10 RX ORDER — METHYLPREDNISOLONE SODIUM SUCCINATE 1 G/16ML
125 INJECTION, POWDER, LYOPHILIZED, FOR SOLUTION INTRAMUSCULAR; INTRAVENOUS ONCE
Status: COMPLETED | OUTPATIENT
Start: 2024-01-10 | End: 2024-01-10

## 2024-01-10 RX ORDER — SODIUM CHLORIDE 0.9 % (FLUSH) 0.9 %
5-40 SYRINGE (ML) INJECTION EVERY 12 HOURS SCHEDULED
Status: DISCONTINUED | OUTPATIENT
Start: 2024-01-10 | End: 2024-01-10 | Stop reason: HOSPADM

## 2024-01-10 RX ORDER — ACETAMINOPHEN 650 MG/1
650 SUPPOSITORY RECTAL EVERY 6 HOURS PRN
Status: DISCONTINUED | OUTPATIENT
Start: 2024-01-10 | End: 2024-01-10 | Stop reason: HOSPADM

## 2024-01-10 RX ORDER — ONDANSETRON 2 MG/ML
4 INJECTION INTRAMUSCULAR; INTRAVENOUS ONCE
Status: COMPLETED | OUTPATIENT
Start: 2024-01-10 | End: 2024-01-10

## 2024-01-10 RX ORDER — PREDNISONE 20 MG/1
40 TABLET ORAL DAILY
Qty: 10 TABLET | Refills: 0 | Status: SHIPPED | OUTPATIENT
Start: 2024-01-10 | End: 2024-01-15

## 2024-01-10 RX ORDER — INSULIN GLARGINE 100 [IU]/ML
10 INJECTION, SOLUTION SUBCUTANEOUS DAILY
Qty: 5 ADJUSTABLE DOSE PRE-FILLED PEN SYRINGE | Refills: 0 | Status: SHIPPED
Start: 2024-01-10

## 2024-01-10 RX ORDER — 0.9 % SODIUM CHLORIDE 0.9 %
1000 INTRAVENOUS SOLUTION INTRAVENOUS ONCE
Status: COMPLETED | OUTPATIENT
Start: 2024-01-10 | End: 2024-01-10

## 2024-01-10 RX ORDER — SODIUM CHLORIDE 9 MG/ML
INJECTION, SOLUTION INTRAVENOUS CONTINUOUS
Status: DISCONTINUED | OUTPATIENT
Start: 2024-01-10 | End: 2024-01-10 | Stop reason: HOSPADM

## 2024-01-10 RX ORDER — POLYETHYLENE GLYCOL 3350 17 G/17G
17 POWDER, FOR SOLUTION ORAL DAILY PRN
Status: DISCONTINUED | OUTPATIENT
Start: 2024-01-10 | End: 2024-01-10 | Stop reason: HOSPADM

## 2024-01-10 RX ORDER — MAGNESIUM SULFATE IN WATER 40 MG/ML
2000 INJECTION, SOLUTION INTRAVENOUS ONCE
Status: COMPLETED | OUTPATIENT
Start: 2024-01-10 | End: 2024-01-10

## 2024-01-10 RX ORDER — LOSARTAN POTASSIUM 25 MG/1
25 TABLET ORAL DAILY
Status: DISCONTINUED | OUTPATIENT
Start: 2024-01-10 | End: 2024-01-10

## 2024-01-10 RX ORDER — CALCIUM GLUCONATE 20 MG/ML
1000 INJECTION, SOLUTION INTRAVENOUS ONCE
Status: COMPLETED | OUTPATIENT
Start: 2024-01-10 | End: 2024-01-10

## 2024-01-10 RX ORDER — ACETAMINOPHEN 325 MG/1
650 TABLET ORAL EVERY 6 HOURS PRN
Status: DISCONTINUED | OUTPATIENT
Start: 2024-01-10 | End: 2024-01-10 | Stop reason: HOSPADM

## 2024-01-10 RX ORDER — UMECLIDINIUM 62.5 UG/1
1 AEROSOL, POWDER ORAL DAILY
Qty: 1 EACH | Refills: 1 | Status: SHIPPED | OUTPATIENT
Start: 2024-01-10

## 2024-01-10 RX ORDER — PANTOPRAZOLE SODIUM 40 MG/1
40 TABLET, DELAYED RELEASE ORAL
Status: DISCONTINUED | OUTPATIENT
Start: 2024-01-10 | End: 2024-01-10 | Stop reason: HOSPADM

## 2024-01-10 RX ORDER — PREDNISONE 20 MG/1
40 TABLET ORAL DAILY
Status: DISCONTINUED | OUTPATIENT
Start: 2024-01-10 | End: 2024-01-10 | Stop reason: HOSPADM

## 2024-01-10 RX ORDER — METOPROLOL SUCCINATE 25 MG/1
25 TABLET, EXTENDED RELEASE ORAL DAILY
Status: DISCONTINUED | OUTPATIENT
Start: 2024-01-10 | End: 2024-01-10 | Stop reason: HOSPADM

## 2024-01-10 RX ADMIN — METOPROLOL SUCCINATE 25 MG: 50 TABLET, FILM COATED, EXTENDED RELEASE ORAL at 08:53

## 2024-01-10 RX ADMIN — DEXTROSE MONOHYDRATE 25 G: 25 INJECTION, SOLUTION INTRAVENOUS at 01:20

## 2024-01-10 RX ADMIN — IOPAMIDOL 75 ML: 755 INJECTION, SOLUTION INTRAVENOUS at 03:36

## 2024-01-10 RX ADMIN — PREDNISONE 40 MG: 20 TABLET ORAL at 08:52

## 2024-01-10 RX ADMIN — ONDANSETRON 4 MG: 2 INJECTION INTRAMUSCULAR; INTRAVENOUS at 02:08

## 2024-01-10 RX ADMIN — SODIUM BICARBONATE 650 MG: 648 TABLET ORAL at 08:53

## 2024-01-10 RX ADMIN — IPRATROPIUM BROMIDE AND ALBUTEROL SULFATE 1 DOSE: 2.5; .5 SOLUTION RESPIRATORY (INHALATION) at 01:05

## 2024-01-10 RX ADMIN — METHYLPREDNISOLONE SODIUM SUCCINATE 125 MG: 125 INJECTION INTRAMUSCULAR; INTRAVENOUS at 01:21

## 2024-01-10 RX ADMIN — AZITHROMYCIN MONOHYDRATE 500 MG: 500 INJECTION, POWDER, LYOPHILIZED, FOR SOLUTION INTRAVENOUS at 06:23

## 2024-01-10 RX ADMIN — Medication 1000 MG: at 06:19

## 2024-01-10 RX ADMIN — SODIUM ZIRCONIUM CYCLOSILICATE 10 G: 5 POWDER, FOR SUSPENSION ORAL at 02:36

## 2024-01-10 RX ADMIN — IPRATROPIUM BROMIDE AND ALBUTEROL SULFATE 1 DOSE: 2.5; .5 SOLUTION RESPIRATORY (INHALATION) at 07:54

## 2024-01-10 RX ADMIN — PANTOPRAZOLE SODIUM 40 MG: 40 TABLET, DELAYED RELEASE ORAL at 07:47

## 2024-01-10 RX ADMIN — WATER: 1 INJECTION INTRAMUSCULAR; INTRAVENOUS; SUBCUTANEOUS at 01:46

## 2024-01-10 RX ADMIN — CALCIUM GLUCONATE 1000 MG: 20 INJECTION, SOLUTION INTRAVENOUS at 02:23

## 2024-01-10 RX ADMIN — SODIUM CHLORIDE: 9 INJECTION, SOLUTION INTRAVENOUS at 07:47

## 2024-01-10 RX ADMIN — MAGNESIUM SULFATE HEPTAHYDRATE 2000 MG: 40 INJECTION, SOLUTION INTRAVENOUS at 01:24

## 2024-01-10 RX ADMIN — SODIUM CHLORIDE 1000 ML: 9 INJECTION, SOLUTION INTRAVENOUS at 02:07

## 2024-01-10 ASSESSMENT — ENCOUNTER SYMPTOMS
NAUSEA: 0
DIARRHEA: 0
VOMITING: 0
EYE REDNESS: 0
ABDOMINAL PAIN: 0
SHORTNESS OF BREATH: 1
RHINORRHEA: 0
COUGH: 1

## 2024-01-10 ASSESSMENT — PAIN SCALES - GENERAL: PAINLEVEL_OUTOF10: 0

## 2024-01-10 NOTE — ED NOTES
Pt brought to ED by EMS for hypoglycemia and difficulty breathing  Per EMS pt was found down by her    Pt states that she fell asleep and woke up on the ground  Pt denies having any LOC or hitting her head  On arrival glucose 40  Pt having difficulty breathing, given neb tx PTA by EMS but no glucose correction other than juice at home due to them being unable to get IV access   Pt still having difficulty breathing upon arrival, pt placed on BiPAP per dr alexander

## 2024-01-10 NOTE — ED PROVIDER NOTES
[facility]  Emergency Department  Faculty Attestation     I performed a history and physical examination of the patient and discussed management with the resident. I reviewed the resident’s note and agree with the documented findings and plan of care. Any areas of disagreement are noted on the chart. I was personally present for the key portions of any procedures. I have documented in the chart those procedures where I was not present during the key portions. I have reviewed the emergency nurses triage note. I agree with the chief complaint, past medical history, past surgical history, allergies, medications, social and family history as documented unless otherwise noted below.    For Physician Assistant/ Nurse Practitioner cases/documentation I have personally evaluated this patient and have completed at least one if not all key elements of the E/M (history, physical exam, and MDM). Additional findings are as noted.    Preliminary note started at 1:10 AM EST    Primary Care Physician:  Griffin Aiken MD    Screenings:  [unfilled]    CHIEF COMPLAINT       Chief Complaint   Patient presents with    Respiratory Distress       RECENT VITALS:   There were no vitals taken for this visit.    LABS:  Labs Reviewed   CULTURE, URINE   COVID-19, RAPID   RAPID INFLUENZA A/B ANTIGENS   CBC WITH AUTO DIFFERENTIAL   COMPREHENSIVE METABOLIC PANEL   URINALYSIS WITH MICROSCOPIC   CK   MYOGLOBIN, BLOOD   TROPONIN   TROPONIN   LIPASE   POC BLOOD GAS AND CHEMISTRY       Radiology  XR CHEST PORTABLE    (Results Pending)   CT HEAD WO CONTRAST    (Results Pending)   CT CERVICAL SPINE WO CONTRAST    (Results Pending)       CRITICAL CARE: There was a high probability of clinically significant/life threatening deterioration in this patient's condition which required my urgent intervention.  Total critical care time was 10 minutes.  This excludes any time for separately reportable procedures.

## 2024-01-10 NOTE — ED NOTES
ED to inpatient nurses report      Chief Complaint:  Chief Complaint   Patient presents with   • Respiratory Distress     Present to ED from: Home via EMS     MOA:     LOC: alert and orientated to name, place, date  Mobility: Independent  Oxygen Baseline: Ra    Current needs required: none    Pending ED orders: none  Present condition: stable     Why did the patient come to the ED? Pt brought to ED by EMS for hypoglycemia and difficulty breathing  Per EMS pt was found down by her    Pt states that she fell asleep and woke up on the ground  Pt denies having any LOC or hitting her head  On arrival glucose 40  Pt having difficulty breathing, given neb tx PTA by EMS but no glucose correction other than juice at home due to them being unable to get IV access   Pt still having difficulty breathing upon arrival, pt placed on BiPAP per dr alexander   What is the plan? Admit for diff breathing   Any procedures or intervention occur? CT, Lab, Xray, BiPAP (no longer on)   Any safety concerns??    Mental Status:       Psych Assessment:      Vital signs   Vitals:    01/10/24 0430 01/10/24 0435 01/10/24 0445 01/10/24 0451   BP: (!) 145/97  119/71    Pulse: 86 79 86 86   Resp: (!) 32 15 17 16   Temp:       TempSrc:       SpO2:  93% (!) 89% 90%        Vitals:  Patient Vitals for the past 24 hrs:   BP Temp Temp src Pulse Resp SpO2   01/10/24 0451 -- -- -- 86 16 90 %   01/10/24 0445 119/71 -- -- 86 17 (!) 89 %   01/10/24 0435 -- -- -- 79 15 93 %   01/10/24 0430 (!) 145/97 -- -- 86 (!) 32 --   01/10/24 0315 130/80 -- -- 85 17 96 %   01/10/24 0255 -- -- -- 84 21 96 %   01/10/24 0245 134/79 -- -- (!) 102 19 98 %   01/10/24 0225 123/71 -- -- 77 15 100 %   01/10/24 0215 (!) 129/107 -- -- 75 15 100 %   01/10/24 0213 -- -- -- 75 13 100 %   01/10/24 0206 -- -- -- 85 17 100 %   01/10/24 0200 -- 98.7 °F (37.1 °C) Oral -- -- --   01/10/24 0143 -- -- -- 85 20 90 %   01/10/24 0138 -- -- -- 85 21 91 %   01/10/24 0137 -- -- -- 77 21 91 %       Answer:   Yes - ED protocol    • albuterol (PROVENTIL) (2.5 MG/3ML) 0.083% nebulizer solution 2.5 mg      Order Specific Question:   Initiate RT Bronchodilator Protocol      Answer:   Yes - ED protocol   • AND Linked Order Group    • albuterol (PROVENTIL) (2.5 MG/3ML) 0.083% nebulizer solution 15 mg      Order Specific Question:   Initiate RT Bronchodilator Protocol      Answer:   Yes - ED protocol    • ipratropium (ATROVENT) 0.02 % nebulizer solution 0.5 mg      Order Specific Question:   Initiate RT Bronchodilator Protocol      Answer:   Yes - ED protocol   • dextrose 50 % IV solution   • glucagon (rDNA) injection 1 mg   • sterile water injection     Ashley Garcia: cabinet override   • ondansetron (ZOFRAN) injection 4 mg   • calcium gluconate 1,000 mg in sodium chloride 50 mL   • sodium zirconium cyclosilicate (LOKELMA) oral suspension 10 g   • iopamidol (ISOVUE-370) 76 % injection 75 mL       SURGICAL HISTORY       Past Surgical History:   Procedure Laterality Date   • COLONOSCOPY  08/30/2016    very poor prep, unable to complete   • COLONOSCOPY  06/30/2017    ADENOMATOUS POLYP.    • COLONOSCOPY  05/29/2018    polypectomy x1   • COLONOSCOPY  05/07/2021   • COLONOSCOPY N/A 5/7/2021    COLONOSCOPY WITH polypectomy performed by Arianna Madison MD at [facility] OR   • CYST REMOVAL Left     ovary twice   • NECK SURGERY      pt unsure of exact date & what the procedure was ?2015 @ [facility]   • ND COLON CA SCRN NOT HI RSK IND N/A 5/29/2018    COLONOSCOPY WITH POLYP REMOVEL performed by Arianna Madison MD at [facility] OR   • ND COLSC FLX W/REMOVAL LESION BY HOT BX FORCEPS N/A 6/30/2017    COLONOSCOPY POLYPECTOMY HOT BIOPSY performed by Arianna Madison MD at [facility] OR   • UPPER GASTROINTESTINAL ENDOSCOPY  08/30/2016    gastritis, MILD CHRONIC GASTRITIS WITH FOCAL ANTRAL EROSION AND ASSOCIATED ACUTE INFLAMMATION. INTESTINAL METAPLASIA PRESENT, NEGATIVE FOR DYSPLASIA.     • UPPER GASTROINTESTINAL ENDOSCOPY N/A 2/24/2023    EGD  the following components:    POC Lactic Acid 3.9 (*)     All other components within normal limits   POCT GLUCOSE - Abnormal; Notable for the following components:    POC Glucose 182 (*)     All other components within normal limits   COVID-19, RAPID   RAPID INFLUENZA A/B ANTIGENS   CULTURE, BLOOD 1   CULTURE, BLOOD 1   CULTURE, URINE   CK   TROPONIN   CALCIUM, IONIC (POC)   BASIC METABOLIC PANEL   CREATININE W/GFR POINT OF CARE   UREA N (POC)   UREA N (POC)   POC BLOOD GAS AND CHEMISTRY       Electronically signed by Latasha Workman RN on 1/10/2024 at 4:53 AM

## 2024-01-10 NOTE — DISCHARGE SUMMARY
[facility]  Office: [phone]  Tyler Logan DO, Mickey Garrett DO, Abhijit Arnold DO, Porter Francis DO, Melisa Caballero MD, Rebecca Andrews MD, Heena Valenzuela MD, Lin Hancock MD,  Edgar Lazo MD, Olimpia Cha MD, Cordelia Last MD,  Haley Taylor DO, Ragini Mandel MD, Austin Tolentino MD, Neo Logan DO, Anita Walker MD,  Drew Kirkland DO, Judy Hodge MD, Asha Medina MD, Amee Palacios MD, Lan Montes MD,  Kamari Mcclain MD, David Shah MD, Faith Garces MD, Efren Akers MD, Chad Herr MD, Marie Mcconnell MD, Von Matos DO, Roberto Francisco DO, Shirin Welch MD,  Derrell Ceballos MD, Shirley Waterhouse, CNP,  Christina Scott, CNP, Leroy Lopez, CNP,  Louise Colindres, DNP, Kaylie Espinoza, CNP, Jocelyn Granda, CNP, Nevaeh Neri CNP, Indy Guido, CNP, Tatyana Harman, CNP, Rosario Roblero, PA-C, Olena Goss PA-C, Constance Frye, CNP, Abbi Gooden, CNS, Reshma Melvin, CNP, Mica Sequeira, CNP, Tracy Schwab, CNP         [facility]   IN-PATIENT SERVICE   [facility]    Discharge Summary     Patient ID: Gloria Abdul  :  1955   MRN: [id number]     ACCOUNT:  [id number]   Patient's PCP: Griffin Aiken MD  Admit Date: 1/10/2024   Discharge Date: 1/10/2024     Length of Stay: 1  Code Status:  Prior  Admitting Physician: Porter Francis DO  Discharge Physician: Porter Francis DO     Active Discharge Diagnoses:     Hospital Problem Lists:  Principal Problem:    Hypoglycemia  Active Problems:    Type 2 diabetes mellitus with hypoglycemia without coma, with long-term current use of insulin (HCC)    Pancreatic insufficiency    Seizure disorder, secondary (HCC)  Resolved Problems:    * No resolved hospital problems. *      Admission Condition:  fair     Discharged Condition: stable    Hospital Stay:     Hospital Course:  Gloria ISAAC AlvaresAbdul is a 68 y.o. female who was admitted for the management of   Hypoglycemia ,  strength  how much to take            CONTINUE taking these medications      albuterol sulfate  (90 Base) MCG/ACT inhaler  Commonly known as: Ventolin HFA  Inhale 2 puffs into the lungs 4 times daily as needed for Wheezing     Alcohol Prep 70 % Pads  Daily as needed     * AquaLance Lancets 30G Misc  Daily and as needed for hypoglycemia     * Lancets Misc  1 each by Does not apply route 2 times daily     Basaglar KwikPen 100 UNIT/ML injection pen  Generic drug: insulin glargine  Inject 10 Units into the skin daily     BLOOD GLUCOSE METER DISPOSABLE Yesenia  Daily and as needed     Creon 41055-460321 units Cpep delayed release capsule  Generic drug: lipase-protease-amylase  TAKE 2 CAPSULES 3 TIMES    DAILY WITH MEALS. MAY ALSO TAKE 1 CAPSULE WITH SNACKS (UP TO TWO TIMES A DAY )     Ensure Complete Shake Liqd  Take 1 Can by mouth in the morning and at bedtime     Insulin Pen Needle 32G X 4 MM Misc  1 each by Does not apply route 4 times daily (after meals and at bedtime)     MULTI-VITAMIN DAILY PO     NovoLOG FlexPen 100 UNIT/ML injection pen  Generic drug: insulin aspart  Inject 5 Units into the skin 3 times daily (before meals) Scale only     pantoprazole 40 MG tablet  Commonly known as: PROTONIX  Take 1 tablet by mouth every morning (before breakfast)     sodium bicarbonate 650 MG tablet  TAKE 1 TABLET BY MOUTH THREE TIMES DAILY     Symbicort 80-4.5 MCG/ACT Aero  Generic drug: budesonide-formoterol  Inhale 2 puffs into the lungs 2 times daily           * This list has 2 medication(s) that are the same as other medications prescribed for you. Read the directions carefully, and ask your doctor or other care provider to review them with you.                STOP taking these medications      blood glucose test strips     gabapentin 400 MG capsule  Commonly known as: NEURONTIN     glimepiride 2 MG tablet  Commonly known as: AMARYL     losartan 25 MG tablet  Commonly known as: COZAAR     metFORMIN 500 MG extended  release tablet  Commonly known as: GLUCOPHAGE-XR     omeprazole 20 MG delayed release capsule  Commonly known as: PRILOSEC     sucralfate 1 GM tablet  Commonly known as: Carafate               Where to Get Your Medications        These medications were sent to [facility] [place], [place] - [address] [facility] - P [phone] - F [phone]  [facility] [facility] [address]      Phone: [phone]   atorvastatin 40 MG tablet  Incruse Ellipta 62.5 MCG/ACT inhaler  metoprolol succinate 25 MG extended release tablet  predniSONE 20 MG tablet       Information about where to get these medications is not yet available    Ask your nurse or doctor about these medications  Basaglar KwikPen 100 UNIT/ML injection pen         No discharge procedures on file.    Time Spent on discharge is  20 mins in patient examination, evaluation, counseling as well as medication reconciliation, prescriptions for required medications, discharge plan and follow up.    Electronically signed by   Porter Francis DO  1/10/2024  5:26 PM      Thank you Griffin Bundy MD for the opportunity to be involved in this patient's care.

## 2024-01-10 NOTE — ED PROVIDER NOTES
[facility] ED  Emergency Department Encounter  Emergency Medicine Resident     Pt Name:Gloria Abdul  MRN: [id number]  Birthdate 1955  Date of evaluation: 1/10/24  PCP:  Griffin Aiken MD  Note Started: 1:03 AM EST    CHIEF COMPLAINT       Chief Complaint   Patient presents with    Respiratory Distress     HISTORY OF PRESENT ILLNESS  (Location/Symptom, Timing/Onset, Context/Setting, Quality, Duration, Modifying Factors, Severity.)      Gloria Abdul is a 68 y.o. female who presents via EMS after being found by  lying on the floor and hypoglycemic.  The  gave orange juice and when EMS arrived, her sugar was in the 80s.  Patient does not remember the event and is unsure exactly how long she was lying on the floor.  Her only complaint at this time is shortness of breath.  She denies any chest pain abdominal pain, nausea vomiting or diarrhea.  On chart review, patient does have history of hypoglycemia induced seizures.  She last saw neurology in 2017.  Previously been on Keppra but does not appear to be on Keppra at this time.  EMS gave Combivent prior to arrival but was unable to establish IV access.  Patient does have tremor at baseline per chart review.    PAST MEDICAL / SURGICAL / SOCIAL / FAMILY HISTORY      has a past medical history of Cataracts, bilateral, COPD (chronic obstructive pulmonary disease) (HCC), CTS (carpal tunnel syndrome), Fibromyalgia, Generalized abdominal pain, GERD (gastroesophageal reflux disease), Glaucoma, Hypertension, Osteoarthritis, Osteoporosis, Pancreatic calcification, Pancreatitis, Protein-calorie malnutrition, severe (HCC), Seizures (HCC), Tremor, Type II or unspecified type diabetes mellitus without mention of complication, not stated as uncontrolled, and Uncontrolled type 2 DM with hyperosmolar nonketotic hyperglycemia (HCC).     has a past surgical history that includes cyst removal (Left); Upper gastrointestinal endoscopy  epilepsy       Allergies:  Patient has no known allergies.    Home Medications:  Prior to Admission medications    Medication Sig Start Date End Date Taking? Authorizing Provider   insulin glargine (BASAGLAR KWIKPEN) 100 UNIT/ML injection pen Inject 10 Units into the skin daily 1/10/24  Yes Porter Francis DO   metoprolol succinate (TOPROL XL) 25 MG extended release tablet Take 1 tablet by mouth daily 1/10/24  Yes Porter Francis DO   predniSONE (DELTASONE) 20 MG tablet Take 2 tablets by mouth daily for 5 doses 1/10/24 1/15/24 Yes Porter Francis DO   tiotropium (SPIRIVA RESPIMAT) 2.5 MCG/ACT AERS inhaler Inhale 2 puffs into the lungs daily 1/10/24  Yes Porter Francis DO   atorvastatin (LIPITOR) 40 MG tablet Take 1 tablet by mouth daily 1/10/24  Yes Porter Francis DO   albuterol sulfate HFA (VENTOLIN HFA) 108 (90 Base) MCG/ACT inhaler Inhale 2 puffs into the lungs 4 times daily as needed for Wheezing 10/23/23   Leodan Malloy DO   CREON 90217-096021 units CPEP delayed release capsule TAKE 2 CAPSULES 3 TIMES    DAILY WITH MEALS. MAY ALSO TAKE 1 CAPSULE WITH SNACKS (UP TO TWO TIMES A DAY ) 9/12/23   Arianna Madison MD   insulin aspart (NOVOLOG FLEXPEN) 100 UNIT/ML injection pen Inject 5 Units into the skin 3 times daily (before meals) Scale only 1/14/23   Porter Francis DO   pantoprazole (PROTONIX) 40 MG tablet Take 1 tablet by mouth every morning (before breakfast) 7/22/22   Duarte Roe PA-C   Insulin Pen Needle 32G X 4 MM MISC 1 each by Does not apply route 4 times daily (after meals and at bedtime) 5/27/22 8/25/22  Livia Neal APRN - CNP   Nutritional Supplements (ENSURE COMPLETE SHAKE) LIQD Take 1 Can by mouth in the morning and at bedtime 5/27/22 6/26/22  Livia Neal APRN - CNP   blood glucose monitor strips 1 strip by Other route daily Test 1 times a day & as needed for symptoms of irregular blood glucose. 4/4/22 7/21/22  Livia Neal, APRN - CNP   omeprazole (PRILOSEC) 20 MG

## 2024-01-10 NOTE — H&P
[facility]  Office: [phone]  Tyler Logan DO, Mickey Garrett DO, Abhijit Arnold DO, Porter Francis DO, Melisa Caballero MD, Rebecca Andrews MD, Heena Valenzuela MD, Lin Hancock MD,  Edgar Lazo MD, Olimpia Cha MD, Cordelia Last MD,  Haley Taylor DO, Ragini Mandel MD, Austin Tolentino MD, Neo Logan DO, Anita Walker MD,  Drew Kirkland DO, Judy Hodge MD, Asha Medina MD, Amee Palacios MD, Lan Montes MD,  Kamari Mcclain MD, David Shah MD, Faith Garces MD, Efren Akers MD, Chad Herr MD, Marie Mcconnell MD, Von Matos DO, Roberto Francisco DO, Shirin Welch MD,  Derrell Ceballos MD, Shirley Waterhouse, CNP,  Christina Scott, CNP, Leroy Lopez, CNP,  Louise Colindres, DNP, Kaylie Espinoaz, CNP, Jocelyn Granda, CNP, Nevaeh Neri CNP, Indy Guido, CNP, Tatyana Harman, CNP, Rosario Roblero, PA-C, Olena Goss PA-C, Constance Frye, CNP, Abbi Gooden, CNS, Reshma Melvin, CNP, Mica Sequeira, CNP, Tracy Schwab, CNP         [facility]   IN-PATIENT SERVICE   [facility]    HISTORY AND PHYSICAL EXAMINATION            Date:   1/10/2024  Patient name:  Gloria Abdul  Date of admission:  1/10/2024 12:55 AM  MRN:   [id number]  Account:  [id number]  YOB: 1955  PCP:    Griffin Aiken MD  Room:   03/03  Code Status:    Prior    Chief Complaint:     Chief Complaint   Patient presents with    Respiratory Distress       History Obtained From:     patient, electronic medical record    History of Present Illness:     Gloria Abdul is a 68 y.o. Non- / non  female who presents with Respiratory Distress   and is admitted to the hospital for the management of Hypoglycemia.    This 68 yof presents with hypoglycemia at home with glucose down to 40s.  Her  found her unconscious on ground and her dexcom read 64-but then it dropped further by the time EMS arrived.  When she had laid down to take a nap kimberli     Collection Time: 01/10/24  2:45 AM   Result Value Ref Range    POC BUN 9 8 - 26 mg/dL   Lactic Acid, POC    Collection Time: 01/10/24  2:45 AM   Result Value Ref Range    POC Lactic Acid 3.9 (H) 0.56 - 1.39 mmol/L   POCT Glucose    Collection Time: 01/10/24  2:45 AM   Result Value Ref Range    POC Glucose 182 (H) 74 - 100 mg/dL   Urinalysis with Microscopic    Collection Time: 01/10/24  3:19 AM   Result Value Ref Range    Color, UA Yellow Yellow    Turbidity UA Clear Clear    Glucose, Ur TRACE (A) NEGATIVE mg/dL    Bilirubin Urine NEGATIVE NEGATIVE    Ketones, Urine NEGATIVE NEGATIVE mg/dL    Specific Gravity, UA 1.010 1.005 - 1.030    Urine Hgb NEGATIVE NEGATIVE    pH, UA 7.0 5.0 - 8.0    Protein, UA 1+ (A) NEGATIVE mg/dL    Urobilinogen, Urine Normal 0.0 - 1.0 EU/dL    Nitrite, Urine NEGATIVE NEGATIVE    Leukocyte Esterase, Urine NEGATIVE NEGATIVE    WBC, UA None 0 - 5 /HPF    RBC, UA 0 TO 2 0 - 4 /HPF    Casts UA  0 - 8 /LPF     None Reference range defined for non-centrifuged specimen.    Epithelial Cells UA 0 TO 2 0 - 5 /HPF    Bacteria, UA None None   Basic Metabolic Panel    Collection Time: 01/10/24  5:15 AM   Result Value Ref Range    Sodium 135 135 - 144 mmol/L    Potassium 5.1 3.7 - 5.3 mmol/L    Chloride 101 98 - 107 mmol/L    CO2 23 20 - 31 mmol/L    Anion Gap 11 9 - 17 mmol/L    Glucose 290 (H) 70 - 99 mg/dL    BUN 11 8 - 23 mg/dL    Creatinine 0.6 0.5 - 0.9 mg/dL    Est, Glom Filt Rate >60 >60 mL/min/1.73m2    Calcium 8.5 (L) 8.6 - 10.4 mg/dL   Calcium, Ionized    Collection Time: 01/10/24  7:48 AM   Result Value Ref Range    Calcium, Ionized 1.14 1.13 - 1.33 mmol/L       Imaging/Diagnostics:  CT CERVICAL SPINE WO CONTRAST    Result Date: 1/10/2024  1. No evidence of acute fracture or dislocation in cervical spine. 2. Evidence of previous anterior spinal fusion at C5-C6 level. 3. Multilevel degenerative disc disease and facet arthropathy as described above.     CT HEAD WO CONTRAST    Result Date:

## 2024-01-10 NOTE — ED PROVIDER NOTES
Faculty Sign-Out Attestation  Handoff taken on the following patient from prior Attending Physician: Ambika    I was available and discussed any additional care issues that arose and coordinated the management plans with the resident(s) caring for the patient during my duty period. Any areas of disagreement with resident’s documentation of care or procedures are noted on the chart. I was personally present for the key portions of any/all procedures during my duty period. I have documented in the chart those procedures where I was not present during the key portions.    Found down, respiratory distress,   Ct brain -p,   Syncope eval    Ct brain / c spine stable,   Ct r/o pe pending    Ct no pe,      --- Admit  Davis Duncan,   01/10/24 0427       Davis Duncan DO  01/10/24 0576

## 2024-01-11 LAB
EKG ATRIAL RATE: 81 BPM
EKG ATRIAL RATE: 86 BPM
EKG P AXIS: 70 DEGREES
EKG P AXIS: 80 DEGREES
EKG P-R INTERVAL: 118 MS
EKG P-R INTERVAL: 174 MS
EKG Q-T INTERVAL: 366 MS
EKG Q-T INTERVAL: 404 MS
EKG QRS DURATION: 52 MS
EKG QRS DURATION: 74 MS
EKG QTC CALCULATION (BAZETT): 425 MS
EKG QTC CALCULATION (BAZETT): 483 MS
EKG R AXIS: 77 DEGREES
EKG R AXIS: 83 DEGREES
EKG T AXIS: 77 DEGREES
EKG T AXIS: 77 DEGREES
EKG VENTRICULAR RATE: 81 BPM
EKG VENTRICULAR RATE: 86 BPM
MICROORGANISM SPEC CULT: NORMAL
SPECIMEN DESCRIPTION: NORMAL

## 2024-01-11 PROCEDURE — 93010 ELECTROCARDIOGRAM REPORT: CPT | Performed by: INTERNAL MEDICINE

## 2024-01-14 LAB
MICROORGANISM SPEC CULT: NORMAL
MICROORGANISM SPEC CULT: NORMAL
SERVICE CMNT-IMP: NORMAL
SERVICE CMNT-IMP: NORMAL
SPECIMEN DESCRIPTION: NORMAL
SPECIMEN DESCRIPTION: NORMAL

## 2024-01-15 LAB
EKG ATRIAL RATE: 81 BPM
EKG P AXIS: 80 DEGREES
EKG P-R INTERVAL: 174 MS
EKG Q-T INTERVAL: 366 MS
EKG QRS DURATION: 52 MS
EKG QTC CALCULATION (BAZETT): 425 MS
EKG R AXIS: 83 DEGREES
EKG T AXIS: 77 DEGREES
EKG VENTRICULAR RATE: 81 BPM
MICROORGANISM SPEC CULT: NORMAL
MICROORGANISM SPEC CULT: NORMAL
SERVICE CMNT-IMP: NORMAL
SERVICE CMNT-IMP: NORMAL
SPECIMEN DESCRIPTION: NORMAL
SPECIMEN DESCRIPTION: NORMAL

## 2024-03-23 ENCOUNTER — HOSPITAL ENCOUNTER (INPATIENT)
Age: 69
LOS: 2 days | Discharge: HOME | DRG: 637 | End: 2024-03-25
Attending: EMERGENCY MEDICINE | Admitting: INTERNAL MEDICINE
Payer: MEDICARE

## 2024-03-23 DIAGNOSIS — E11.00 UNCONTROLLED TYPE 2 DM WITH HYPEROSMOLAR NONKETOTIC HYPERGLYCEMIA (HCC): ICD-10-CM

## 2024-03-23 DIAGNOSIS — E16.2 HYPOGLYCEMIA: Primary | ICD-10-CM

## 2024-03-23 DIAGNOSIS — E87.1 HYPONATREMIA: ICD-10-CM

## 2024-03-23 DIAGNOSIS — E11.649 TYPE 2 DIABETES MELLITUS WITH HYPOGLYCEMIA WITHOUT COMA, WITH LONG-TERM CURRENT USE OF INSULIN (HCC): ICD-10-CM

## 2024-03-23 DIAGNOSIS — E11.9 TYPE 2 DIABETES MELLITUS WITHOUT COMPLICATION, WITH LONG-TERM CURRENT USE OF INSULIN (HCC): ICD-10-CM

## 2024-03-23 DIAGNOSIS — Z79.4 TYPE 2 DIABETES MELLITUS WITH HYPOGLYCEMIA WITHOUT COMA, WITH LONG-TERM CURRENT USE OF INSULIN (HCC): ICD-10-CM

## 2024-03-23 DIAGNOSIS — Z79.4 TYPE 2 DIABETES MELLITUS WITHOUT COMPLICATION, WITH LONG-TERM CURRENT USE OF INSULIN (HCC): ICD-10-CM

## 2024-03-23 PROBLEM — G93.41 METABOLIC ENCEPHALOPATHY: Status: ACTIVE | Noted: 2024-03-23

## 2024-03-23 LAB
ANION GAP SERPL CALCULATED.3IONS-SCNC: 8 MMOL/L (ref 9–17)
BASOPHILS # BLD: 0.04 K/UL (ref 0–0.2)
BASOPHILS NFR BLD: 1 % (ref 0–2)
BUN SERPL-MCNC: 12 MG/DL (ref 8–23)
BUN/CREAT SERPL: 13 (ref 9–20)
CALCIUM SERPL-MCNC: 9.4 MG/DL (ref 8.6–10.4)
CHLORIDE SERPL-SCNC: 92 MMOL/L (ref 98–107)
CO2 SERPL-SCNC: 28 MMOL/L (ref 20–31)
CREAT SERPL-MCNC: 0.9 MG/DL (ref 0.5–0.9)
EOSINOPHIL # BLD: 0.08 K/UL (ref 0–0.44)
EOSINOPHILS RELATIVE PERCENT: 1 % (ref 1–4)
ERYTHROCYTE [DISTWIDTH] IN BLOOD BY AUTOMATED COUNT: 13.3 % (ref 11.8–14.4)
GFR SERPL CREATININE-BSD FRML MDRD: >60 ML/MIN/1.73M2
GLUCOSE BLD-MCNC: 105 MG/DL (ref 65–105)
GLUCOSE BLD-MCNC: 121 MG/DL (ref 65–105)
GLUCOSE BLD-MCNC: 121 MG/DL (ref 65–105)
GLUCOSE BLD-MCNC: 21 MG/DL (ref 65–105)
GLUCOSE BLD-MCNC: 25 MG/DL (ref 65–105)
GLUCOSE BLD-MCNC: 81 MG/DL (ref 65–105)
GLUCOSE BLD-MCNC: 99 MG/DL (ref 65–105)
GLUCOSE SERPL-MCNC: 375 MG/DL (ref 70–99)
HCT VFR BLD AUTO: 43.3 % (ref 36.3–47.1)
HGB BLD-MCNC: 14 G/DL (ref 11.9–15.1)
IMM GRANULOCYTES # BLD AUTO: 0 K/UL (ref 0–0.3)
IMM GRANULOCYTES NFR BLD: 0 %
LYMPHOCYTES NFR BLD: 1.46 K/UL (ref 1.1–3.7)
LYMPHOCYTES RELATIVE PERCENT: 19 % (ref 24–43)
MCH RBC QN AUTO: 33.3 PG (ref 25.2–33.5)
MCHC RBC AUTO-ENTMCNC: 32.3 G/DL (ref 28.4–34.8)
MCV RBC AUTO: 103.1 FL (ref 82.6–102.9)
MONOCYTES NFR BLD: 0.45 K/UL (ref 0.1–1.2)
MONOCYTES NFR BLD: 6 % (ref 3–12)
NEUTROPHILS NFR BLD: 74 % (ref 36–65)
NEUTS SEG NFR BLD: 5.8 K/UL (ref 1.5–8.1)
PLATELET # BLD AUTO: 282 K/UL (ref 138–453)
PMV BLD AUTO: 10.1 FL (ref 8.1–13.5)
POTASSIUM SERPL-SCNC: 4.3 MMOL/L (ref 3.7–5.3)
RBC # BLD AUTO: 4.2 M/UL (ref 3.95–5.11)
RBC # BLD: ABNORMAL 10*6/UL
SODIUM SERPL-SCNC: 128 MMOL/L (ref 135–144)
WBC OTHER # BLD: 7.8 K/UL (ref 3.5–11.3)

## 2024-03-23 PROCEDURE — 99285 EMERGENCY DEPT VISIT HI MDM: CPT

## 2024-03-23 PROCEDURE — 2500000003 HC RX 250 WO HCPCS: Performed by: EMERGENCY MEDICINE

## 2024-03-23 PROCEDURE — 80048 BASIC METABOLIC PNL TOTAL CA: CPT

## 2024-03-23 PROCEDURE — 2580000003 HC RX 258: Performed by: EMERGENCY MEDICINE

## 2024-03-23 PROCEDURE — 2060000000 HC ICU INTERMEDIATE R&B

## 2024-03-23 PROCEDURE — 36415 COLL VENOUS BLD VENIPUNCTURE: CPT

## 2024-03-23 PROCEDURE — 2580000003 HC RX 258: Performed by: NURSE PRACTITIONER

## 2024-03-23 PROCEDURE — 85025 COMPLETE CBC W/AUTO DIFF WBC: CPT

## 2024-03-23 PROCEDURE — 99222 1ST HOSP IP/OBS MODERATE 55: CPT | Performed by: NURSE PRACTITIONER

## 2024-03-23 PROCEDURE — 82947 ASSAY GLUCOSE BLOOD QUANT: CPT

## 2024-03-23 RX ORDER — ONDANSETRON 4 MG/1
4 TABLET, ORALLY DISINTEGRATING ORAL EVERY 8 HOURS PRN
Status: DISCONTINUED | OUTPATIENT
Start: 2024-03-23 | End: 2024-03-25 | Stop reason: HOSPADM

## 2024-03-23 RX ORDER — ONDANSETRON 2 MG/ML
4 INJECTION INTRAMUSCULAR; INTRAVENOUS EVERY 6 HOURS PRN
Status: DISCONTINUED | OUTPATIENT
Start: 2024-03-23 | End: 2024-03-25 | Stop reason: HOSPADM

## 2024-03-23 RX ORDER — MAGNESIUM SULFATE 1 G/100ML
1000 INJECTION INTRAVENOUS PRN
Status: DISCONTINUED | OUTPATIENT
Start: 2024-03-23 | End: 2024-03-25 | Stop reason: HOSPADM

## 2024-03-23 RX ORDER — ACETAMINOPHEN 325 MG/1
650 TABLET ORAL EVERY 6 HOURS PRN
Status: DISCONTINUED | OUTPATIENT
Start: 2024-03-23 | End: 2024-03-25 | Stop reason: HOSPADM

## 2024-03-23 RX ORDER — POTASSIUM CHLORIDE 20 MEQ/1
40 TABLET, EXTENDED RELEASE ORAL PRN
Status: DISCONTINUED | OUTPATIENT
Start: 2024-03-23 | End: 2024-03-25 | Stop reason: HOSPADM

## 2024-03-23 RX ORDER — SODIUM CHLORIDE 9 MG/ML
INJECTION, SOLUTION INTRAVENOUS CONTINUOUS
Status: DISCONTINUED | OUTPATIENT
Start: 2024-03-23 | End: 2024-03-23

## 2024-03-23 RX ORDER — DEXTROSE MONOHYDRATE 100 MG/ML
INJECTION, SOLUTION INTRAVENOUS CONTINUOUS
Status: DISCONTINUED | OUTPATIENT
Start: 2024-03-23 | End: 2024-03-24

## 2024-03-23 RX ORDER — ENOXAPARIN SODIUM 100 MG/ML
30 INJECTION SUBCUTANEOUS DAILY
Status: DISCONTINUED | OUTPATIENT
Start: 2024-03-24 | End: 2024-03-25 | Stop reason: HOSPADM

## 2024-03-23 RX ORDER — SODIUM CHLORIDE 9 MG/ML
INJECTION, SOLUTION INTRAVENOUS PRN
Status: DISCONTINUED | OUTPATIENT
Start: 2024-03-23 | End: 2024-03-25 | Stop reason: HOSPADM

## 2024-03-23 RX ORDER — SODIUM CHLORIDE 0.9 % (FLUSH) 0.9 %
10 SYRINGE (ML) INJECTION PRN
Status: DISCONTINUED | OUTPATIENT
Start: 2024-03-23 | End: 2024-03-25 | Stop reason: HOSPADM

## 2024-03-23 RX ORDER — ACETAMINOPHEN 650 MG/1
650 SUPPOSITORY RECTAL EVERY 6 HOURS PRN
Status: DISCONTINUED | OUTPATIENT
Start: 2024-03-23 | End: 2024-03-25 | Stop reason: HOSPADM

## 2024-03-23 RX ORDER — DEXTROSE AND SODIUM CHLORIDE 5; .9 G/100ML; G/100ML
INJECTION, SOLUTION INTRAVENOUS CONTINUOUS
Status: DISCONTINUED | OUTPATIENT
Start: 2024-03-23 | End: 2024-03-24

## 2024-03-23 RX ORDER — POTASSIUM CHLORIDE 7.45 MG/ML
10 INJECTION INTRAVENOUS PRN
Status: DISCONTINUED | OUTPATIENT
Start: 2024-03-23 | End: 2024-03-25 | Stop reason: HOSPADM

## 2024-03-23 RX ORDER — DEXTROSE MONOHYDRATE 100 MG/ML
INJECTION, SOLUTION INTRAVENOUS CONTINUOUS PRN
Status: DISCONTINUED | OUTPATIENT
Start: 2024-03-23 | End: 2024-03-25 | Stop reason: HOSPADM

## 2024-03-23 RX ORDER — POLYETHYLENE GLYCOL 3350 17 G/17G
17 POWDER, FOR SOLUTION ORAL DAILY PRN
Status: DISCONTINUED | OUTPATIENT
Start: 2024-03-23 | End: 2024-03-25 | Stop reason: HOSPADM

## 2024-03-23 RX ORDER — SODIUM CHLORIDE 0.9 % (FLUSH) 0.9 %
5-40 SYRINGE (ML) INJECTION EVERY 12 HOURS SCHEDULED
Status: DISCONTINUED | OUTPATIENT
Start: 2024-03-23 | End: 2024-03-25 | Stop reason: HOSPADM

## 2024-03-23 RX ORDER — DEXTROSE MONOHYDRATE 25 G/50ML
25 INJECTION, SOLUTION INTRAVENOUS ONCE
Status: COMPLETED | OUTPATIENT
Start: 2024-03-23 | End: 2024-03-23

## 2024-03-23 RX ADMIN — DEXTROSE AND SODIUM CHLORIDE: 5; 900 INJECTION, SOLUTION INTRAVENOUS at 20:19

## 2024-03-23 RX ADMIN — DEXTROSE MONOHYDRATE 25 G: 25 INJECTION, SOLUTION INTRAVENOUS at 15:55

## 2024-03-23 RX ADMIN — DEXTROSE MONOHYDRATE: 100 INJECTION, SOLUTION INTRAVENOUS at 15:30

## 2024-03-23 ASSESSMENT — PAIN SCALES - GENERAL
PAINLEVEL_OUTOF10: 0
PAINLEVEL_OUTOF10: 0

## 2024-03-23 ASSESSMENT — PAIN - FUNCTIONAL ASSESSMENT: PAIN_FUNCTIONAL_ASSESSMENT: 0-10

## 2024-03-23 NOTE — ED PROVIDER NOTES
Prophylaxis-DVT/PE    OR Linked Order Group     ondansetron (ZOFRAN-ODT) disintegrating tablet 4 mg     ondansetron (ZOFRAN) injection 4 mg    polyethylene glycol (GLYCOLAX) packet 17 g    OR Linked Order Group     acetaminophen (TYLENOL) tablet 650 mg     acetaminophen (TYLENOL) suppository 650 mg    dextrose 5 % and 0.9 % sodium chloride infusion     DISCHARGE PRESCRIPTIONS:  Current Discharge Medication List        PHYSICIAN CONSULTS ORDERED THIS ENCOUNTER:  IP CONSULT TO INTERNAL MEDICINE  FINAL IMPRESSION      1. Hypoglycemia    2. Hyponatremia          DISPOSITION/PLAN   DISPOSITION Admitted 03/23/2024 06:10:22 PM      OUTPATIENT FOLLOW UP THE PATIENT:  No follow-up provider specified.    MD Pili Vieyra Alicia, MD  03/23/24 2058

## 2024-03-23 NOTE — ED NOTES
Pt brought to ED by EMS. EMS stated pt was found driving around a parking lot and a bystander called 911. Initial blood sugar by EMS was 36. Pt was given 2 PO glucose tablets and approx 200 mL of D10. Pt BS increased to 182. Pt stated she has been taking her insulin but has not been checking her blood sugar. Pt stated she is having difficulty getting a glucometer from her insurance. Pt also takes metformin as well.

## 2024-03-23 NOTE — H&P
Imaging/Diagnostics:  No results found.    Assessment :      Hospital Problems             Last Modified POA    * (Principal) Hypoglycemia 3/23/2024 Yes    Type 2 diabetes mellitus with hypoglycemia without coma, with long-term current use of insulin (HCC) 3/23/2024 Yes    Essential (primary) hypertension 3/23/2024 Yes    Acute metabolic encephalopathy due to hypoglycemia 3/23/2024 Yes    Hyponatremia 3/23/2024 Yes       Plan:     Patient status inpatient in the  Progressive Unit/Step down    Diabetes with hypoglycemia  D10 currently infusing  Will change to D5   Monitor glucose levels every 4 hours  Pharmacy to assist in verifying accurate home medication lists/dosages  Hold all insulins at this time  Acute metabolic encephalopathy due to hypoglycemia  Check urine  Re-orient as needed  Treat hypoglycemia  Hypertension  Monitor vitals.   Hyponatremia  Monitor sodium levels every 6 hours.   Adult diet  Monitor labs    Consultations:   IP CONSULT TO INTERNAL MEDICINE    Patient is admitted as inpatient status because of co-morbidities listed above, severity of signs and symptoms as outlined, requirement for current medical therapies and most importantly because of direct risk to patient if care not provided in a hospital setting.  Expected length of stay > 48 hours.    DONTRELL Gar - CNP  3/23/2024  6:41 PM    Copy sent to Dr. Aiken, Griffin BEASLEY MD

## 2024-03-24 LAB
AMPHET UR QL SCN: NEGATIVE
ANION GAP SERPL CALCULATED.3IONS-SCNC: 7 MMOL/L (ref 9–17)
BARBITURATES UR QL SCN: NEGATIVE
BENZODIAZ UR QL: NEGATIVE
BILIRUB UR QL STRIP: NEGATIVE
BUN SERPL-MCNC: 12 MG/DL (ref 8–23)
BUN/CREAT SERPL: 15 (ref 9–20)
CALCIUM SERPL-MCNC: 9.1 MG/DL (ref 8.6–10.4)
CANNABINOIDS UR QL SCN: POSITIVE
CHLORIDE SERPL-SCNC: 104 MMOL/L (ref 98–107)
CLARITY UR: CLEAR
CO2 SERPL-SCNC: 26 MMOL/L (ref 20–31)
COCAINE UR QL SCN: NEGATIVE
COLOR UR: YELLOW
CREAT SERPL-MCNC: 0.8 MG/DL (ref 0.5–0.9)
FENTANYL UR QL: NEGATIVE
GFR SERPL CREATININE-BSD FRML MDRD: >60 ML/MIN/1.73M2
GLUCOSE BLD-MCNC: 146 MG/DL (ref 65–105)
GLUCOSE BLD-MCNC: 179 MG/DL (ref 65–105)
GLUCOSE BLD-MCNC: 204 MG/DL (ref 65–105)
GLUCOSE BLD-MCNC: 212 MG/DL (ref 65–105)
GLUCOSE BLD-MCNC: 86 MG/DL (ref 65–105)
GLUCOSE SERPL-MCNC: 122 MG/DL (ref 70–99)
GLUCOSE UR STRIP-MCNC: NEGATIVE MG/DL
HGB UR QL STRIP.AUTO: NEGATIVE
KETONES UR STRIP-MCNC: NEGATIVE MG/DL
LEUKOCYTE ESTERASE UR QL STRIP: NEGATIVE
METHADONE UR QL: NEGATIVE
NITRITE UR QL STRIP: NEGATIVE
OPIATES UR QL SCN: NEGATIVE
OXYCODONE UR QL SCN: NEGATIVE
PCP UR QL SCN: NEGATIVE
PH UR STRIP: 7 [PH] (ref 5–8)
POTASSIUM SERPL-SCNC: 4.7 MMOL/L (ref 3.7–5.3)
PROT UR STRIP-MCNC: NEGATIVE MG/DL
SODIUM SERPL-SCNC: 133 MMOL/L (ref 135–144)
SODIUM SERPL-SCNC: 137 MMOL/L (ref 135–144)
SP GR UR STRIP: 1.01 (ref 1–1.03)
TEST INFORMATION: ABNORMAL
UROBILINOGEN UR STRIP-ACNC: NORMAL EU/DL (ref 0–1)

## 2024-03-24 PROCEDURE — 94640 AIRWAY INHALATION TREATMENT: CPT

## 2024-03-24 PROCEDURE — 81003 URINALYSIS AUTO W/O SCOPE: CPT

## 2024-03-24 PROCEDURE — 80307 DRUG TEST PRSMV CHEM ANLYZR: CPT

## 2024-03-24 PROCEDURE — 94760 N-INVAS EAR/PLS OXIMETRY 1: CPT

## 2024-03-24 PROCEDURE — 2580000003 HC RX 258: Performed by: NURSE PRACTITIONER

## 2024-03-24 PROCEDURE — 2060000000 HC ICU INTERMEDIATE R&B

## 2024-03-24 PROCEDURE — 6360000002 HC RX W HCPCS: Performed by: NURSE PRACTITIONER

## 2024-03-24 PROCEDURE — 99232 SBSQ HOSP IP/OBS MODERATE 35: CPT | Performed by: NURSE PRACTITIONER

## 2024-03-24 PROCEDURE — 84295 ASSAY OF SERUM SODIUM: CPT

## 2024-03-24 PROCEDURE — 6370000000 HC RX 637 (ALT 250 FOR IP): Performed by: NURSE PRACTITIONER

## 2024-03-24 PROCEDURE — 82947 ASSAY GLUCOSE BLOOD QUANT: CPT

## 2024-03-24 PROCEDURE — 80048 BASIC METABOLIC PNL TOTAL CA: CPT

## 2024-03-24 PROCEDURE — 36415 COLL VENOUS BLD VENIPUNCTURE: CPT

## 2024-03-24 RX ORDER — CALCIUM CARBONATE 500 MG/1
500 TABLET, CHEWABLE ORAL 3 TIMES DAILY PRN
Status: DISCONTINUED | OUTPATIENT
Start: 2024-03-24 | End: 2024-03-25 | Stop reason: HOSPADM

## 2024-03-24 RX ORDER — 0.9 % SODIUM CHLORIDE 0.9 %
500 INTRAVENOUS SOLUTION INTRAVENOUS ONCE
Status: COMPLETED | OUTPATIENT
Start: 2024-03-24 | End: 2024-03-24

## 2024-03-24 RX ORDER — PANTOPRAZOLE SODIUM 20 MG/1
20 TABLET, DELAYED RELEASE ORAL
COMMUNITY
Start: 2024-03-06

## 2024-03-24 RX ORDER — INSULIN LISPRO 100 [IU]/ML
0-4 INJECTION, SOLUTION INTRAVENOUS; SUBCUTANEOUS
Status: DISCONTINUED | OUTPATIENT
Start: 2024-03-24 | End: 2024-03-25 | Stop reason: HOSPADM

## 2024-03-24 RX ORDER — INSULIN GLARGINE 100 [IU]/ML
10 INJECTION, SOLUTION SUBCUTANEOUS EVERY MORNING
Status: DISCONTINUED | OUTPATIENT
Start: 2024-03-25 | End: 2024-03-25 | Stop reason: HOSPADM

## 2024-03-24 RX ORDER — PANTOPRAZOLE SODIUM 20 MG/1
20 TABLET, DELAYED RELEASE ORAL
Status: DISCONTINUED | OUTPATIENT
Start: 2024-03-24 | End: 2024-03-25 | Stop reason: HOSPADM

## 2024-03-24 RX ORDER — METOPROLOL SUCCINATE 25 MG/1
25 TABLET, EXTENDED RELEASE ORAL DAILY
Status: DISCONTINUED | OUTPATIENT
Start: 2024-03-24 | End: 2024-03-25 | Stop reason: HOSPADM

## 2024-03-24 RX ORDER — ALBUTEROL SULFATE 90 UG/1
2 AEROSOL, METERED RESPIRATORY (INHALATION) 4 TIMES DAILY PRN
Status: DISCONTINUED | OUTPATIENT
Start: 2024-03-24 | End: 2024-03-25 | Stop reason: HOSPADM

## 2024-03-24 RX ORDER — GABAPENTIN 400 MG/1
400 CAPSULE ORAL 3 TIMES DAILY
Status: DISCONTINUED | OUTPATIENT
Start: 2024-03-24 | End: 2024-03-25 | Stop reason: HOSPADM

## 2024-03-24 RX ORDER — ATORVASTATIN CALCIUM 40 MG/1
40 TABLET, FILM COATED ORAL DAILY
Status: DISCONTINUED | OUTPATIENT
Start: 2024-03-24 | End: 2024-03-25 | Stop reason: HOSPADM

## 2024-03-24 RX ORDER — SODIUM BICARBONATE 650 MG/1
650 TABLET ORAL 3 TIMES DAILY
Status: DISCONTINUED | OUTPATIENT
Start: 2024-03-24 | End: 2024-03-25 | Stop reason: HOSPADM

## 2024-03-24 RX ADMIN — PANCRELIPASE LIPASE, PANCRELIPASE PROTEASE, PANCRELIPASE AMYLASE 20000 UNITS: 20000; 63000; 84000 CAPSULE, DELAYED RELEASE ORAL at 12:19

## 2024-03-24 RX ADMIN — GABAPENTIN 400 MG: 400 CAPSULE ORAL at 15:13

## 2024-03-24 RX ADMIN — GABAPENTIN 400 MG: 400 CAPSULE ORAL at 19:36

## 2024-03-24 RX ADMIN — ACETAMINOPHEN 650 MG: 325 TABLET ORAL at 12:20

## 2024-03-24 RX ADMIN — PANCRELIPASE LIPASE, PANCRELIPASE PROTEASE, PANCRELIPASE AMYLASE 15000 UNITS: 15000; 47000; 63000 CAPSULE, DELAYED RELEASE ORAL at 15:14

## 2024-03-24 RX ADMIN — PANCRELIPASE LIPASE, PANCRELIPASE PROTEASE, PANCRELIPASE AMYLASE 15000 UNITS: 15000; 47000; 63000 CAPSULE, DELAYED RELEASE ORAL at 12:19

## 2024-03-24 RX ADMIN — Medication 500 MG: at 19:36

## 2024-03-24 RX ADMIN — SODIUM CHLORIDE, PRESERVATIVE FREE 10 ML: 5 INJECTION INTRAVENOUS at 19:37

## 2024-03-24 RX ADMIN — ENOXAPARIN SODIUM 30 MG: 100 INJECTION SUBCUTANEOUS at 07:51

## 2024-03-24 RX ADMIN — TIOTROPIUM BROMIDE INHALATION SPRAY 2 PUFF: 3.12 SPRAY, METERED RESPIRATORY (INHALATION) at 10:13

## 2024-03-24 RX ADMIN — METOPROLOL SUCCINATE 25 MG: 25 TABLET, EXTENDED RELEASE ORAL at 08:17

## 2024-03-24 RX ADMIN — SODIUM BICARBONATE 650 MG: 650 TABLET ORAL at 15:13

## 2024-03-24 RX ADMIN — SODIUM CHLORIDE 500 ML: 9 INJECTION, SOLUTION INTRAVENOUS at 10:18

## 2024-03-24 RX ADMIN — SODIUM BICARBONATE 650 MG: 650 TABLET ORAL at 19:36

## 2024-03-24 RX ADMIN — DEXTROSE AND SODIUM CHLORIDE: 5; 900 INJECTION, SOLUTION INTRAVENOUS at 06:22

## 2024-03-24 RX ADMIN — PANTOPRAZOLE SODIUM 20 MG: 20 TABLET, DELAYED RELEASE ORAL at 08:17

## 2024-03-24 RX ADMIN — ATORVASTATIN CALCIUM 40 MG: 40 TABLET, FILM COATED ORAL at 08:17

## 2024-03-24 RX ADMIN — PANCRELIPASE LIPASE, PANCRELIPASE PROTEASE, PANCRELIPASE AMYLASE 20000 UNITS: 20000; 63000; 84000 CAPSULE, DELAYED RELEASE ORAL at 15:14

## 2024-03-24 NOTE — CARE COORDINATION
none   Potential Assistance needed at discharge: Durable Medical Equipment            Potential DME: Glucometer  Patient expects to discharge to: House  Plan for transportation at discharge: Family    Financial    Payor: [facility] MEDICARE / Plan: [facility] MEDICARE ADVANTAGE / Product Type: *No Product type* /     Does insurance require precert for SNF: Yes    Potential assistance Purchasing Medications: No  Meds-to-Beds request:      No Pharmacies Listed    Notes:    Factors facilitating achievement of predicted outcomes: Family support, Motivated, Cooperative, Pleasant, and Sense of humor    Barriers to discharge: Medication managment    Additional Case Management Notes:   Patient lives with spouse in 2 level home with 5 steps to enter. Bed and bath are upstairs. She is independent. DME: rw, cane, sc in home. Uses nothing for ambulation. She drives. Never had home care nor need for snf.     Patient admitted with hypoglycemia. She has no working glucometer at home and is requesting one. She is to be lined up with dex com but PCP stated has to go thru her endocrinologist NP Edilia Robb.      Will need to fax over dme rx to [place] [facility] [place] [place] once obtained. PS to NP to obtain order.     The Plan for Transition of Care is related to the following treatment goals of Hypoglycemia [E16.2]    IF APPLICABLE: The Patient and/or patient representative Gloria and her family were provided with a choice of provider and agrees with the discharge plan. Freedom of choice list with basic dialogue that supports the patient's individualized plan of care/goals and shares the quality data associated with the providers was provided to: Patient   Patient Representative Name:       The Patient and/or Patient Representative Agree with the Discharge Plan? Yes    DIPTI GROVE RN  Case Management Department

## 2024-03-24 NOTE — ACP (ADVANCE CARE PLANNING)
Advance Care Planning     Advance Care Planning Activator (Inpatient)  Conversation Note      Date of ACP Conversation: 3/24/2024     Conversation Conducted with: Patient with Decision Making Capacity    ACP Activator: DIPTI GROVE RN          Health Care Decision Maker:     Current Designated Health Care Decision Maker:     Primary Decision Maker: Santy Abdul - Spouse - [phone]  Click here to complete Healthcare Decision Makers including section of the Healthcare Decision Maker Relationship (ie \"Primary\")  Today we documented Decision Maker(s) consistent with Legal Next of Kin hierarchy.    Care Preferences    Ventilation:  \"If you were in your present state of health and suddenly became very ill and were unable to breathe on your own, what would your preference be about the use of a ventilator (breathing machine) if it were available to you?\"      Would the patient desire the use of ventilator (breathing machine)?: yes    \"If your health worsens and it becomes clear that your chance of recovery is unlikely, what would your preference be about the use of a ventilator (breathing machine) if it were available to you?\"     Would the patient desire the use of ventilator (breathing machine)?: No      Resuscitation  \"CPR works best to restart the heart when there is a sudden event, like a heart attack, in someone who is otherwise healthy. Unfortunately, CPR does not typically restart the heart for people who have serious health conditions or who are very sick.\"    \"In the event your heart stopped as a result of an underlying serious health condition, would you want attempts to be made to restart your heart (answer \"yes\" for attempt to resuscitate) or would you prefer a natural death (answer \"no\" for do not attempt to resuscitate)?\" yes       [] Yes   [] No   Educated Patient / Decision Maker regarding differences between Advance Directives and portable DNR orders.    Length of ACP Conversation in minutes:

## 2024-03-24 NOTE — FLOWSHEET NOTE
03/24/24 0800   Vital Signs   Temp Source Oral   Pulse (!) 111   Heart Rate Source Monitor   Respirations 16   BP (!) 160/94     Christina Stewart notified. Home dose of Metoprolol ordered.

## 2024-03-25 VITALS
TEMPERATURE: 98.6 F | DIASTOLIC BLOOD PRESSURE: 93 MMHG | HEIGHT: 67 IN | OXYGEN SATURATION: 94 % | WEIGHT: 100.9 LBS | RESPIRATION RATE: 17 BRPM | HEART RATE: 102 BPM | BODY MASS INDEX: 15.84 KG/M2 | SYSTOLIC BLOOD PRESSURE: 112 MMHG

## 2024-03-25 LAB
ANION GAP SERPL CALCULATED.3IONS-SCNC: 9 MMOL/L (ref 9–17)
BUN SERPL-MCNC: 12 MG/DL (ref 8–23)
BUN/CREAT SERPL: 13 (ref 9–20)
CALCIUM SERPL-MCNC: 9.1 MG/DL (ref 8.6–10.4)
CHLORIDE SERPL-SCNC: 102 MMOL/L (ref 98–107)
CO2 SERPL-SCNC: 25 MMOL/L (ref 20–31)
CREAT SERPL-MCNC: 0.9 MG/DL (ref 0.5–0.9)
GFR SERPL CREATININE-BSD FRML MDRD: >60 ML/MIN/1.73M2
GLUCOSE BLD-MCNC: 187 MG/DL (ref 65–105)
GLUCOSE SERPL-MCNC: 170 MG/DL (ref 70–99)
POTASSIUM SERPL-SCNC: 4.8 MMOL/L (ref 3.7–5.3)
SODIUM SERPL-SCNC: 136 MMOL/L (ref 135–144)

## 2024-03-25 PROCEDURE — 36415 COLL VENOUS BLD VENIPUNCTURE: CPT

## 2024-03-25 PROCEDURE — 99232 SBSQ HOSP IP/OBS MODERATE 35: CPT | Performed by: NURSE PRACTITIONER

## 2024-03-25 PROCEDURE — 6360000002 HC RX W HCPCS: Performed by: NURSE PRACTITIONER

## 2024-03-25 PROCEDURE — 6370000000 HC RX 637 (ALT 250 FOR IP): Performed by: NURSE PRACTITIONER

## 2024-03-25 PROCEDURE — 80048 BASIC METABOLIC PNL TOTAL CA: CPT

## 2024-03-25 PROCEDURE — 82947 ASSAY GLUCOSE BLOOD QUANT: CPT

## 2024-03-25 PROCEDURE — 94640 AIRWAY INHALATION TREATMENT: CPT

## 2024-03-25 RX ORDER — BLOOD-GLUCOSE METER
1 KIT MISCELLANEOUS DAILY
Qty: 1 KIT | Refills: 0 | Status: SHIPPED | OUTPATIENT
Start: 2024-03-25

## 2024-03-25 RX ORDER — GLUCOSAMINE HCL/CHONDROITIN SU 500-400 MG
CAPSULE ORAL
Qty: 200 STRIP | Refills: 3 | Status: SHIPPED | OUTPATIENT
Start: 2024-03-25

## 2024-03-25 RX ORDER — LANCETS
1 EACH MISCELLANEOUS 4 TIMES DAILY
Qty: 200 EACH | Refills: 3 | Status: SHIPPED | OUTPATIENT
Start: 2024-03-25

## 2024-03-25 RX ADMIN — PANTOPRAZOLE SODIUM 20 MG: 20 TABLET, DELAYED RELEASE ORAL at 05:15

## 2024-03-25 RX ADMIN — GABAPENTIN 400 MG: 400 CAPSULE ORAL at 08:02

## 2024-03-25 RX ADMIN — PANCRELIPASE LIPASE, PANCRELIPASE PROTEASE, PANCRELIPASE AMYLASE 20000 UNITS: 20000; 63000; 84000 CAPSULE, DELAYED RELEASE ORAL at 08:05

## 2024-03-25 RX ADMIN — TIOTROPIUM BROMIDE INHALATION SPRAY 2 PUFF: 3.12 SPRAY, METERED RESPIRATORY (INHALATION) at 09:38

## 2024-03-25 RX ADMIN — METOPROLOL SUCCINATE 25 MG: 25 TABLET, EXTENDED RELEASE ORAL at 08:02

## 2024-03-25 RX ADMIN — ATORVASTATIN CALCIUM 40 MG: 40 TABLET, FILM COATED ORAL at 08:02

## 2024-03-25 RX ADMIN — ENOXAPARIN SODIUM 30 MG: 100 INJECTION SUBCUTANEOUS at 08:04

## 2024-03-25 RX ADMIN — PANCRELIPASE LIPASE, PANCRELIPASE PROTEASE, PANCRELIPASE AMYLASE 15000 UNITS: 15000; 47000; 63000 CAPSULE, DELAYED RELEASE ORAL at 08:05

## 2024-03-25 RX ADMIN — INSULIN GLARGINE 10 UNITS: 100 INJECTION, SOLUTION SUBCUTANEOUS at 08:04

## 2024-03-25 RX ADMIN — SODIUM BICARBONATE 650 MG: 650 TABLET ORAL at 08:02

## 2024-03-25 ASSESSMENT — ENCOUNTER SYMPTOMS
VOMITING: 0
NAUSEA: 0
CONSTIPATION: 0
DIARRHEA: 0
ABDOMINAL PAIN: 0
SHORTNESS OF BREATH: 0
CHEST TIGHTNESS: 0
COUGH: 0

## 2024-03-25 ASSESSMENT — PAIN SCALES - GENERAL: PAINLEVEL_OUTOF10: 0

## 2024-03-25 NOTE — PROGRESS NOTES
All patient belongings collected. IV and telemetry removed. Discharge paperwork given and explained to patient, all questions answered. See discharge event for further details.     
[facility]  Office: [phone]  Tyler Logan DO, Mickey Garrett, DO, Abhijit Arnold DO, Porter Francis, DO, Melisa Caballero MD, Rebecca Andrews MD, Heena Valenzuela MD, Lin Hancock MD,  Edgar Lazo MD, Olimpia Cha MD, Cordelia Last MD,  Haley Taylor DO, Ragini Mandel MD, Austin Tolentino MD, Neo Logan DO, Anita Walker MD,  Drew Kirkland DO, Judy Hodge MD, Asha Medina MD, Amee Palacios MD, Lan Montes MD,  Kamari Mcclain MD, David Shah MD, Faith Garces MD, Efren Akers MD, Chad Herr MD, Marie Mcconnell MD, Von Matos DO, Roberto Francisco DO, Shirin Welch MD,  Derrell Ceballos MD, Shirley Waterhouse, CNP,  Christina Scott, CNP, Leroy Lopez, CNP,  Louise Colindres, DNP, Kaylie Espinoza, CNP, Jocelyn Granda, CNP, Nevaeh Neri CNP, Indy Guido, CNP, Tatyana Harman, CNP, Rosario Roblero, PA-C, Olena Goss, PA-C, Dolores Atkins, CNP, Evie Baker, CNP, Constance Frye, CNP, Abbi Gooden, CNS, Reshma Melvin, CNP, Mica Sequeira, CNP, Tracy Schwab, CNP         [facility]   IN-PATIENT SERVICE   [facility]    Progress Note    3/24/2024    7:17 AM    Name:   Gloria Abdul  MRN:     [id number]     Acct:      [id number]   Room:   1007/1007-02   Day:  1  Admit Date:  3/23/2024  3:08 PM    PCP:   Griffin Aiken MD  Code Status:  Full Code    Subjective:     C/C:   Chief Complaint   Patient presents with    Hypoglycemia     Pt BS was 36 when EMS arrived. Given 2 PO glucose and 100 mL of D10. Pt now alert and oriented.     Interval History Status: improved.     Glucose trend 97-59-  She has been tachycardic in the low 110s  Potassium is 4.7    Brief History:     Per previous documentation  \"Patient presented to the ED with hypoglycemia. Patient was found driving around in a parking lot when a bystander called EMS. EMS assessed patient and found that her glucose level was in the 30s. Patient was given dextrose and patient was 
Took pt's glucose at 0234; it read as 103. Glucometer docked, but result did not transfer to results.  
no abdominal tenderness. There is no guarding.   Musculoskeletal:         General: Normal range of motion.      Cervical back: Normal range of motion.   Skin:     General: Skin is warm and dry.      Capillary Refill: Capillary refill takes less than 2 seconds.   Neurological:      Mental Status: She is alert and oriented to person, place, and time. Mental status is at baseline.   Psychiatric:         Mood and Affect: Mood normal.         Behavior: Behavior normal.         Thought Content: Thought content normal.         Judgment: Judgment normal.       Assessment:        Hospital Problems             Last Modified POA    * (Principal) Hypoglycemia 3/23/2024 Yes    Type 2 diabetes mellitus with hypoglycemia without coma, with long-term current use of insulin ([facility]) 3/23/2024 Yes    Essential (primary) hypertension 3/23/2024 Yes    Acute metabolic encephalopathy due to hypoglycemia 3/23/2024 Yes    Hyponatremia 3/23/2024 Yes       Plan:        Diabetes with hypoglycemia  Monitor glucose levels AC/HS with sliding scale coverage.   Acute metabolic encephalopathy due to hypoglycemia  Resolved.  Hypertension  Continue home meds.   Hyponatremia  Resolved  Ok for discharge. Will send in diabetic supplies.     DONTRELL Gar - CNP  3/25/2024  9:16 AM

## 2024-03-25 NOTE — DISCHARGE SUMMARY
[facility]  Office: [phone]  Tyler Logan DO, Mickey Garrett DO, Abhijit Arnold DO, Porter Francis DO, Melisa Caballero MD, Rebecca Andrews MD, Heena Valenzuela MD, Lin Hancock MD,  Edgar Lazo MD, Olimpia Cha MD, Cordelia Last MD,  Haley Taylor DO, Ragini Mandel MD, Austin Tolentino MD, Neo Logan DO, Anita Walker MD,  Drew Kirkland DO, Judy Hodge MD, Asha Medina MD, Amee Palacios MD, Lan Montes MD,  Kamari Mcclain MD, David Shah MD, Faith Garces MD, Efren Akers MD, Chad Herr MD, Marie Mcconnell MD, Von Matos DO, Roberto Francisco DO, Shirin Welch MD,  Derrell Ceballos MD, Shirley Waterhouse, CNP,  Christina Scott CNP, Leroy Lopez, CNP,  Louise Colindres, NICOLE, Kaylie Espinoza, CNP, Jocelyn Granda, CNP, Nevaeh Neri CNP, Indy Guido CNP, Tatyana Harman, CNP, Rosario Roblero, PA-C, Olena Goss PA-C, Dolores Atkins, CNP, Evie Baker, CNP, Constance Frye, CNP, Abbi Gooden, CNS, Reshma Melvin, CNP, Mica Sequeira, CNP, Tracy Schwab, CNP         [facility]   IN-PATIENT SERVICE   [facility]    Discharge Summary     Patient ID: Gloria Abdul  :  1955   MRN: [id number]     ACCOUNT:  [id number]   Patient's PCP: Griffin Aiken MD  Admit Date: 3/23/2024   Discharge Date: 3/25/2024     Length of Stay: 2  Code Status:  Full Code  Admitting Physician: Porter Francis DO  Discharge Physician: DONTRELL Gar - CNP     Active Discharge Diagnoses:     Hospital Problem Lists:  Principal Problem:    Hypoglycemia  Active Problems:    Type 2 diabetes mellitus with hypoglycemia without coma, with long-term current use of insulin (HCC)    Essential (primary) hypertension    Acute metabolic encephalopathy due to hypoglycemia    Hyponatremia  Resolved Problems:    * No resolved hospital problems. *    Admission Condition:  fair     Discharged Condition: stable    Hospital Stay:     Hospital Course:  Gloria GRAY

## 2024-03-25 NOTE — PLAN OF CARE
Problem: Respiratory - Adult  Goal: Achieves optimal ventilation and oxygenation  3/25/2024 0943 by Lexy Frye, RN  Outcome: Completed  3/25/2024 0044 by Misael Jimenez, RN  Outcome: Progressing     
Patient A & O x4 and ambulatory  ACHS blood sugar, this evenings check was 212  PRN Tums given  Vitals WDL  Lantus reordered for morning  Plan will be to return home when stable for discharge  Care ongoing      Problem: Discharge Planning  Goal: Discharge to home or other facility with appropriate resources  3/25/2024 0044 by Misael Jimenez RN  Outcome: Progressing     Problem: Pain  Goal: Verbalizes/displays adequate comfort level or baseline comfort level  3/25/2024 0044 by Misael Jimenez RN  Outcome: Progressing     Problem: Respiratory - Adult  Goal: Achieves optimal ventilation and oxygenation  Outcome: Progressing     Problem: Metabolic/Fluid and Electrolytes - Adult  Goal: Glucose maintained within prescribed range  3/25/2024 0044 by Misael Jimenez RN  Outcome: Progressing     Problem: Metabolic/Fluid and Electrolytes - Adult  Goal: Electrolytes maintained within normal limits  3/25/2024 0044 by Misael Jimenez RN  Outcome: Progressing     Problem: Chronic Conditions and Co-morbidities  Goal: Patient's chronic conditions and co-morbidity symptoms are monitored and maintained or improved  3/25/2024 0044 by Misael Jimenez RN  Outcome: Progressing     Problem: Cardiovascular - Adult  Goal: Maintains optimal cardiac output and hemodynamic stability  3/25/2024 0044 by Misael Jimenez RN  Outcome: Progressing     Problem: Cardiovascular - Adult  Goal: Absence of cardiac dysrhythmias or at baseline  3/25/2024 0044 by Misael Jimenez RN  Outcome: Progressing     Problem: Skin/Tissue Integrity - Adult  Goal: Skin integrity remains intact  3/25/2024 0044 by Misael Jimenez RN  Outcome: Progressing  Flowsheets (Taken 3/25/2024 0043)  Skin Integrity Remains Intact: Monitor for areas of redness and/or skin breakdown     
Patient resting in bed on room air.  Patient up to bathroom ad azeem.  Patient tachycardic throughout shift.  Received 500 mL normal saline bolus. Dextrose infusion discontinued.   Patient refused lovenox injection.  Patient ACHS, blood sugars within defined limits throughout shift.    Problem: Discharge Planning  Goal: Discharge to home or other facility with appropriate resources  3/24/2024 1251 by Lexy Frye RN  Outcome: Progressing     Problem: Pain  Goal: Verbalizes/displays adequate comfort level or baseline comfort level  3/24/2024 1251 by Lexy Frye RN  Outcome: Progressing     Problem: Metabolic/Fluid and Electrolytes - Adult  Goal: Glucose maintained within prescribed range  3/24/2024 1251 by Lexy Frye RN  Outcome: Progressing     Problem: Metabolic/Fluid and Electrolytes - Adult  Goal: Electrolytes maintained within normal limits  Outcome: Progressing     Problem: Chronic Conditions and Co-morbidities  Goal: Patient's chronic conditions and co-morbidity symptoms are monitored and maintained or improved  Outcome: Progressing     Problem: Cardiovascular - Adult  Goal: Maintains optimal cardiac output and hemodynamic stability  Outcome: Progressing     Problem: Cardiovascular - Adult  Goal: Absence of cardiac dysrhythmias or at baseline  Outcome: Progressing     Problem: Skin/Tissue Integrity - Adult  Goal: Skin integrity remains intact  Outcome: Progressing     
ordered medications to maintain glucose within target range

## 2024-03-28 LAB — GLUCOSE BLD-MCNC: 103 MG/DL (ref 65–105)

## 2024-04-04 ENCOUNTER — TELEPHONE (OUTPATIENT)
Dept: PHARMACY | Age: 69
End: 2024-04-04

## 2024-04-04 NOTE — TELEPHONE ENCOUNTER
Received new referral for Diabetes Medication Management from Dr. Scott.  Called patient and  voicemail box was full - not able to leave a voicemail .

## 2024-08-08 ENCOUNTER — TELEPHONE (OUTPATIENT)
Dept: GASTROENTEROLOGY | Age: 69
End: 2024-08-08

## 2024-08-08 NOTE — TELEPHONE ENCOUNTER
Patient left vm wanting to schedule office visit with Dr. Madison. Please call patient to schedule. [phone].

## 2024-10-01 ENCOUNTER — OFFICE VISIT (OUTPATIENT)
Dept: GASTROENTEROLOGY | Age: 69
End: 2024-10-01
Payer: MEDICARE

## 2024-10-01 VITALS
BODY MASS INDEX: 14.57 KG/M2 | WEIGHT: 93 LBS | SYSTOLIC BLOOD PRESSURE: 114 MMHG | HEART RATE: 118 BPM | TEMPERATURE: 97.3 F | RESPIRATION RATE: 16 BRPM | DIASTOLIC BLOOD PRESSURE: 73 MMHG

## 2024-10-01 DIAGNOSIS — R97.0 ELEVATED CEA: ICD-10-CM

## 2024-10-01 DIAGNOSIS — R97.8 ELEVATED CA 19-9 LEVEL: ICD-10-CM

## 2024-10-01 DIAGNOSIS — K86.89 PANCREATIC CALCIFICATION: Chronic | ICD-10-CM

## 2024-10-01 DIAGNOSIS — R63.4 WEIGHT LOSS: ICD-10-CM

## 2024-10-01 DIAGNOSIS — K21.9 GASTROESOPHAGEAL REFLUX DISEASE, UNSPECIFIED WHETHER ESOPHAGITIS PRESENT: ICD-10-CM

## 2024-10-01 DIAGNOSIS — K86.0 ALCOHOL-INDUCED CHRONIC PANCREATITIS (HCC): Primary | ICD-10-CM

## 2024-10-01 DIAGNOSIS — E43 SEVERE MALNUTRITION (HCC): ICD-10-CM

## 2024-10-01 DIAGNOSIS — K86.89 PANCREATIC INSUFFICIENCY: ICD-10-CM

## 2024-10-01 PROCEDURE — 99214 OFFICE O/P EST MOD 30 MIN: CPT | Performed by: PHYSICIAN ASSISTANT

## 2024-10-01 PROCEDURE — 3074F SYST BP LT 130 MM HG: CPT | Performed by: PHYSICIAN ASSISTANT

## 2024-10-01 PROCEDURE — 3078F DIAST BP <80 MM HG: CPT | Performed by: PHYSICIAN ASSISTANT

## 2024-10-01 PROCEDURE — 1123F ACP DISCUSS/DSCN MKR DOCD: CPT | Performed by: PHYSICIAN ASSISTANT

## 2024-10-01 ASSESSMENT — ENCOUNTER SYMPTOMS
NAUSEA: 0
VOICE CHANGE: 0
COUGH: 0
SORE THROAT: 0
ANAL BLEEDING: 0
WHEEZING: 0
CHOKING: 0
RECTAL PAIN: 0
VOMITING: 0
DIARRHEA: 0
BLOOD IN STOOL: 0
ABDOMINAL DISTENTION: 0
CONSTIPATION: 0
TROUBLE SWALLOWING: 0
ABDOMINAL PAIN: 0

## 2024-10-01 NOTE — PROGRESS NOTES
explained in details   Past medical, past surgical, social history, psychiatric history, medications or allergies, all reviewed and  updated    Thank you for allowing me to participate in the care of Ms. Abdul. For any further questions please do not hesitate to contact me.    I have reviewed and agree with the ROS entered by the MA/RN.     Note is dictated utilizing voice recognition software. Unfortunately this leads to occasional typographical errors. Please contact our office if you have any questions.  This note is created with the assistance of the speech recognition program. While intending to generate a document that actually reflects the content of the visit, it can still have some errors including those of syntax and sound-a-like substitutions which may escape proof reading. Actual meaning can be extrapolated by contextual inference.  This patient was seen with MARYA Yarbrough MD  [facility]  O: #[phone]

## 2024-10-06 ENCOUNTER — HOSPITAL ENCOUNTER (INPATIENT)
Age: 69
LOS: 1 days | Discharge: HOME | DRG: 638 | End: 2024-10-07
Attending: EMERGENCY MEDICINE | Admitting: STUDENT IN AN ORGANIZED HEALTH CARE EDUCATION/TRAINING PROGRAM
Payer: MEDICARE

## 2024-10-06 ENCOUNTER — APPOINTMENT (OUTPATIENT)
Dept: GENERAL RADIOLOGY | Age: 69
DRG: 638 | End: 2024-10-06
Payer: MEDICARE

## 2024-10-06 DIAGNOSIS — E16.2 HYPOGLYCEMIA: Primary | ICD-10-CM

## 2024-10-06 DIAGNOSIS — T68.XXXA HYPOTHERMIA, INITIAL ENCOUNTER: ICD-10-CM

## 2024-10-06 DIAGNOSIS — E11.9 TYPE 2 DIABETES MELLITUS WITHOUT COMPLICATION, WITHOUT LONG-TERM CURRENT USE OF INSULIN (HCC): ICD-10-CM

## 2024-10-06 DIAGNOSIS — Z79.4 TYPE 2 DIABETES MELLITUS WITH DIABETIC POLYNEUROPATHY, WITH LONG-TERM CURRENT USE OF INSULIN (HCC): ICD-10-CM

## 2024-10-06 DIAGNOSIS — E11.42 TYPE 2 DIABETES MELLITUS WITH DIABETIC POLYNEUROPATHY, WITH LONG-TERM CURRENT USE OF INSULIN (HCC): ICD-10-CM

## 2024-10-06 LAB
ALBUMIN SERPL-MCNC: 4.4 G/DL (ref 3.5–5.2)
ALBUMIN/GLOB SERPL: 1 {RATIO} (ref 1–2.5)
ALP SERPL-CCNC: 71 U/L (ref 35–104)
ALT SERPL-CCNC: 12 U/L (ref 10–35)
ANION GAP SERPL CALCULATED.3IONS-SCNC: 13 MMOL/L (ref 9–16)
AST SERPL-CCNC: 33 U/L (ref 10–35)
BACTERIA URNS QL MICRO: ABNORMAL
BASOPHILS # BLD: 0.05 K/UL (ref 0–0.2)
BASOPHILS NFR BLD: 1 % (ref 0–2)
BILIRUB SERPL-MCNC: 0.3 MG/DL (ref 0–1.2)
BILIRUB UR QL STRIP: NEGATIVE
BODY TEMPERATURE: 37
BUN BLD-MCNC: 23 MG/DL (ref 8–26)
BUN SERPL-MCNC: 17 MG/DL (ref 8–23)
CA-I BLD-SCNC: 1.33 MMOL/L (ref 1.15–1.33)
CALCIUM SERPL-MCNC: 9.4 MG/DL (ref 8.6–10.4)
CASTS #/AREA URNS LPF: ABNORMAL /LPF (ref 0–8)
CHLORIDE BLD-SCNC: 111 MMOL/L (ref 98–107)
CHLORIDE SERPL-SCNC: 108 MMOL/L (ref 98–107)
CK SERPL-CCNC: 55 U/L (ref 26–192)
CLARITY UR: ABNORMAL
CO2 BLD CALC-SCNC: 29 MMOL/L (ref 22–30)
CO2 SERPL-SCNC: 19 MMOL/L (ref 20–31)
COHGB MFR BLD: 3.9 % (ref 0–5)
COLOR UR: YELLOW
CREAT SERPL-MCNC: 0.8 MG/DL (ref 0.5–0.9)
EGFR, POC: NORMAL ML/MIN/1.73M2
EOSINOPHIL # BLD: 0.28 K/UL (ref 0–0.44)
EOSINOPHILS RELATIVE PERCENT: 3 % (ref 1–4)
EPI CELLS #/AREA URNS HPF: ABNORMAL /HPF (ref 0–5)
ERYTHROCYTE [DISTWIDTH] IN BLOOD BY AUTOMATED COUNT: 13.7 % (ref 11.8–14.4)
ETHANOL PERCENT: <0.01 %
ETHANOLAMINE SERPL-MCNC: <10 MG/DL (ref 0–0.08)
FIO2 ON VENT: ABNORMAL %
GFR, ESTIMATED: 86 ML/MIN/1.73M2
GLUCOSE BLD-MCNC: 109 MG/DL (ref 65–105)
GLUCOSE BLD-MCNC: 110 MG/DL (ref 74–100)
GLUCOSE BLD-MCNC: 162 MG/DL (ref 65–105)
GLUCOSE BLD-MCNC: 180 MG/DL (ref 65–105)
GLUCOSE BLD-MCNC: 192 MG/DL (ref 65–105)
GLUCOSE BLD-MCNC: 195 MG/DL (ref 65–105)
GLUCOSE BLD-MCNC: 233 MG/DL (ref 65–105)
GLUCOSE BLD-MCNC: 36 MG/DL (ref 65–105)
GLUCOSE SERPL-MCNC: 52 MG/DL (ref 74–99)
GLUCOSE UR STRIP-MCNC: ABNORMAL MG/DL
HCO3 VENOUS: 22.9 MMOL/L (ref 24–30)
HCO3 VENOUS: 27.9 MMOL/L (ref 22–29)
HCT VFR BLD AUTO: 38 % (ref 36.3–47.1)
HCT VFR BLD AUTO: 39 % (ref 36–46)
HGB BLD-MCNC: 12.6 G/DL (ref 11.9–15.1)
HGB UR QL STRIP.AUTO: NEGATIVE
IMM GRANULOCYTES # BLD AUTO: 0.04 K/UL (ref 0–0.3)
IMM GRANULOCYTES NFR BLD: 0 %
INR PPP: 1.1
KETONES UR STRIP-MCNC: NEGATIVE MG/DL
LEUKOCYTE ESTERASE UR QL STRIP: ABNORMAL
LIPASE SERPL-CCNC: 14 U/L (ref 13–60)
LYMPHOCYTES NFR BLD: 2.88 K/UL (ref 1.1–3.7)
LYMPHOCYTES RELATIVE PERCENT: 31 % (ref 24–43)
MCH RBC QN AUTO: 32.2 PG (ref 25.2–33.5)
MCHC RBC AUTO-ENTMCNC: 33.2 G/DL (ref 28.4–34.8)
MCV RBC AUTO: 97.2 FL (ref 82.6–102.9)
MONOCYTES NFR BLD: 0.73 K/UL (ref 0.1–1.2)
MONOCYTES NFR BLD: 8 % (ref 3–12)
NEGATIVE BASE EXCESS, VEN: 1.5 MMOL/L (ref 0–2)
NEGATIVE BASE EXCESS, VEN: 2.9 MMOL/L (ref 0–2)
NEUTROPHILS NFR BLD: 57 % (ref 36–65)
NEUTS SEG NFR BLD: 5.42 K/UL (ref 1.5–8.1)
NITRITE UR QL STRIP: NEGATIVE
NRBC BLD-RTO: 0 PER 100 WBC
O2 SAT, VEN: 98.5 % (ref 60–85)
O2 SAT, VEN: ABNORMAL % (ref 60–85)
PCO2 VENOUS: 39.9 MM HG (ref 39–55)
PCO2 VENOUS: 79.6 MM HG (ref 41–51)
PH UR STRIP: 5.5 [PH] (ref 5–8)
PH VENOUS: 7.15 (ref 7.32–7.43)
PH VENOUS: 7.38 (ref 7.32–7.42)
PLATELET # BLD AUTO: 317 K/UL (ref 138–453)
PMV BLD AUTO: 10 FL (ref 8.1–13.5)
PO2 VENOUS: 117 MM HG (ref 30–50)
PO2 VENOUS: <5 MM HG (ref 30–50)
POC ANION GAP: 3 MMOL/L (ref 7–16)
POC CREATININE: NORMAL MG/DL (ref 0.51–1.19)
POC HEMOGLOBIN (CALC): 13.4 G/DL (ref 12–16)
POC LACTIC ACID: 1.6 MMOL/L (ref 0.56–1.39)
POTASSIUM BLD-SCNC: 4.6 MMOL/L (ref 3.5–4.5)
POTASSIUM SERPL-SCNC: 4.8 MMOL/L (ref 3.7–5.3)
PROT SERPL-MCNC: 7.5 G/DL (ref 6.6–8.7)
PROT UR STRIP-MCNC: ABNORMAL MG/DL
PROTHROMBIN TIME: 14 SEC (ref 11.7–14.9)
RBC # BLD AUTO: 3.91 M/UL (ref 3.95–5.11)
RBC #/AREA URNS HPF: ABNORMAL /HPF (ref 0–4)
SODIUM BLD-SCNC: 142 MMOL/L (ref 138–146)
SODIUM SERPL-SCNC: 140 MMOL/L (ref 136–145)
SP GR UR STRIP: 1.01 (ref 1–1.03)
TROPONIN I SERPL HS-MCNC: 19 NG/L (ref 0–14)
TROPONIN I SERPL HS-MCNC: 20 NG/L (ref 0–14)
TROPONIN I SERPL HS-MCNC: 21 NG/L (ref 0–14)
TROPONIN I SERPL HS-MCNC: 21 NG/L (ref 0–14)
TSH SERPL DL<=0.05 MIU/L-ACNC: 0.54 UIU/ML (ref 0.27–4.2)
UROBILINOGEN UR STRIP-ACNC: NORMAL EU/DL (ref 0–1)
WBC #/AREA URNS HPF: ABNORMAL /HPF (ref 0–5)
WBC OTHER # BLD: 9.4 K/UL (ref 3.5–11.3)

## 2024-10-06 PROCEDURE — 2500000003 HC RX 250 WO HCPCS

## 2024-10-06 PROCEDURE — 84484 ASSAY OF TROPONIN QUANT: CPT

## 2024-10-06 PROCEDURE — 93005 ELECTROCARDIOGRAM TRACING: CPT

## 2024-10-06 PROCEDURE — 6370000000 HC RX 637 (ALT 250 FOR IP): Performed by: STUDENT IN AN ORGANIZED HEALTH CARE EDUCATION/TRAINING PROGRAM

## 2024-10-06 PROCEDURE — 80051 ELECTROLYTE PANEL: CPT

## 2024-10-06 PROCEDURE — 87186 SC STD MICRODIL/AGAR DIL: CPT

## 2024-10-06 PROCEDURE — 87086 URINE CULTURE/COLONY COUNT: CPT

## 2024-10-06 PROCEDURE — 6360000002 HC RX W HCPCS: Performed by: STUDENT IN AN ORGANIZED HEALTH CARE EDUCATION/TRAINING PROGRAM

## 2024-10-06 PROCEDURE — 71045 X-RAY EXAM CHEST 1 VIEW: CPT

## 2024-10-06 PROCEDURE — 96374 THER/PROPH/DIAG INJ IV PUSH: CPT

## 2024-10-06 PROCEDURE — 85014 HEMATOCRIT: CPT

## 2024-10-06 PROCEDURE — 94640 AIRWAY INHALATION TREATMENT: CPT

## 2024-10-06 PROCEDURE — 84520 ASSAY OF UREA NITROGEN: CPT

## 2024-10-06 PROCEDURE — 85025 COMPLETE CBC W/AUTO DIFF WBC: CPT

## 2024-10-06 PROCEDURE — 80053 COMPREHEN METABOLIC PANEL: CPT

## 2024-10-06 PROCEDURE — 82947 ASSAY GLUCOSE BLOOD QUANT: CPT

## 2024-10-06 PROCEDURE — 2060000000 HC ICU INTERMEDIATE R&B

## 2024-10-06 PROCEDURE — 83690 ASSAY OF LIPASE: CPT

## 2024-10-06 PROCEDURE — 84443 ASSAY THYROID STIM HORMONE: CPT

## 2024-10-06 PROCEDURE — G0480 DRUG TEST DEF 1-7 CLASSES: HCPCS

## 2024-10-06 PROCEDURE — 99285 EMERGENCY DEPT VISIT HI MDM: CPT

## 2024-10-06 PROCEDURE — 82565 ASSAY OF CREATININE: CPT

## 2024-10-06 PROCEDURE — 2580000003 HC RX 258

## 2024-10-06 PROCEDURE — 83605 ASSAY OF LACTIC ACID: CPT

## 2024-10-06 PROCEDURE — 94761 N-INVAS EAR/PLS OXIMETRY MLT: CPT

## 2024-10-06 PROCEDURE — 82550 ASSAY OF CK (CPK): CPT

## 2024-10-06 PROCEDURE — 99223 1ST HOSP IP/OBS HIGH 75: CPT | Performed by: STUDENT IN AN ORGANIZED HEALTH CARE EDUCATION/TRAINING PROGRAM

## 2024-10-06 PROCEDURE — 87077 CULTURE AEROBIC IDENTIFY: CPT

## 2024-10-06 PROCEDURE — 82330 ASSAY OF CALCIUM: CPT

## 2024-10-06 PROCEDURE — 81001 URINALYSIS AUTO W/SCOPE: CPT

## 2024-10-06 PROCEDURE — 82803 BLOOD GASES ANY COMBINATION: CPT

## 2024-10-06 PROCEDURE — 85610 PROTHROMBIN TIME: CPT

## 2024-10-06 PROCEDURE — 82805 BLOOD GASES W/O2 SATURATION: CPT

## 2024-10-06 PROCEDURE — 36415 COLL VENOUS BLD VENIPUNCTURE: CPT

## 2024-10-06 PROCEDURE — 2700000000 HC OXYGEN THERAPY PER DAY

## 2024-10-06 RX ORDER — POTASSIUM CHLORIDE 1500 MG/1
40 TABLET, EXTENDED RELEASE ORAL PRN
Status: DISCONTINUED | OUTPATIENT
Start: 2024-10-06 | End: 2024-10-07 | Stop reason: HOSPADM

## 2024-10-06 RX ORDER — POTASSIUM CHLORIDE 7.45 MG/ML
10 INJECTION INTRAVENOUS PRN
Status: DISCONTINUED | OUTPATIENT
Start: 2024-10-06 | End: 2024-10-07 | Stop reason: HOSPADM

## 2024-10-06 RX ORDER — DEXTROSE MONOHYDRATE AND SODIUM CHLORIDE 5; .45 G/100ML; G/100ML
INJECTION, SOLUTION INTRAVENOUS CONTINUOUS
Status: DISCONTINUED | OUTPATIENT
Start: 2024-10-06 | End: 2024-10-07 | Stop reason: HOSPADM

## 2024-10-06 RX ORDER — METOPROLOL SUCCINATE 25 MG/1
25 TABLET, EXTENDED RELEASE ORAL DAILY
Status: DISCONTINUED | OUTPATIENT
Start: 2024-10-06 | End: 2024-10-07 | Stop reason: HOSPADM

## 2024-10-06 RX ORDER — ONDANSETRON 4 MG/1
4 TABLET, ORALLY DISINTEGRATING ORAL EVERY 8 HOURS PRN
Status: DISCONTINUED | OUTPATIENT
Start: 2024-10-06 | End: 2024-10-07 | Stop reason: HOSPADM

## 2024-10-06 RX ORDER — ATORVASTATIN CALCIUM 40 MG/1
40 TABLET, FILM COATED ORAL DAILY
Status: DISCONTINUED | OUTPATIENT
Start: 2024-10-06 | End: 2024-10-07 | Stop reason: HOSPADM

## 2024-10-06 RX ORDER — ALBUTEROL SULFATE 90 UG/1
2 INHALANT RESPIRATORY (INHALATION) 4 TIMES DAILY PRN
Status: DISCONTINUED | OUTPATIENT
Start: 2024-10-06 | End: 2024-10-07 | Stop reason: HOSPADM

## 2024-10-06 RX ORDER — 0.9 % SODIUM CHLORIDE 0.9 %
500 INTRAVENOUS SOLUTION INTRAVENOUS ONCE
Status: COMPLETED | OUTPATIENT
Start: 2024-10-06 | End: 2024-10-06

## 2024-10-06 RX ORDER — ACETAMINOPHEN 650 MG/1
650 SUPPOSITORY RECTAL EVERY 6 HOURS PRN
Status: DISCONTINUED | OUTPATIENT
Start: 2024-10-06 | End: 2024-10-07 | Stop reason: HOSPADM

## 2024-10-06 RX ORDER — MAGNESIUM SULFATE 1 G/100ML
1000 INJECTION INTRAVENOUS PRN
Status: DISCONTINUED | OUTPATIENT
Start: 2024-10-06 | End: 2024-10-07 | Stop reason: HOSPADM

## 2024-10-06 RX ORDER — GLUCAGON 1 MG/ML
1 KIT INJECTION PRN
Status: DISCONTINUED | OUTPATIENT
Start: 2024-10-06 | End: 2024-10-07 | Stop reason: HOSPADM

## 2024-10-06 RX ORDER — ENOXAPARIN SODIUM 100 MG/ML
40 INJECTION SUBCUTANEOUS DAILY
Status: DISCONTINUED | OUTPATIENT
Start: 2024-10-06 | End: 2024-10-07 | Stop reason: HOSPADM

## 2024-10-06 RX ORDER — SODIUM BICARBONATE 650 MG/1
650 TABLET ORAL 3 TIMES DAILY
Status: DISCONTINUED | OUTPATIENT
Start: 2024-10-06 | End: 2024-10-07 | Stop reason: HOSPADM

## 2024-10-06 RX ORDER — PANTOPRAZOLE SODIUM 20 MG/1
20 TABLET, DELAYED RELEASE ORAL
Status: DISCONTINUED | OUTPATIENT
Start: 2024-10-07 | End: 2024-10-07 | Stop reason: HOSPADM

## 2024-10-06 RX ORDER — DEXTROSE MONOHYDRATE 25 G/50ML
INJECTION, SOLUTION INTRAVENOUS
Status: COMPLETED
Start: 2024-10-06 | End: 2024-10-06

## 2024-10-06 RX ORDER — SODIUM CHLORIDE 0.9 % (FLUSH) 0.9 %
5-40 SYRINGE (ML) INJECTION EVERY 12 HOURS SCHEDULED
Status: DISCONTINUED | OUTPATIENT
Start: 2024-10-06 | End: 2024-10-07 | Stop reason: HOSPADM

## 2024-10-06 RX ORDER — BUDESONIDE AND FORMOTEROL FUMARATE DIHYDRATE 80; 4.5 UG/1; UG/1
2 AEROSOL RESPIRATORY (INHALATION) 2 TIMES DAILY
Status: DISCONTINUED | OUTPATIENT
Start: 2024-10-06 | End: 2024-10-07 | Stop reason: HOSPADM

## 2024-10-06 RX ORDER — DEXTROSE MONOHYDRATE 25 G/50ML
25 INJECTION, SOLUTION INTRAVENOUS ONCE
Status: COMPLETED | OUTPATIENT
Start: 2024-10-06 | End: 2024-10-06

## 2024-10-06 RX ORDER — SODIUM CHLORIDE 0.9 % (FLUSH) 0.9 %
10 SYRINGE (ML) INJECTION PRN
Status: DISCONTINUED | OUTPATIENT
Start: 2024-10-06 | End: 2024-10-07 | Stop reason: HOSPADM

## 2024-10-06 RX ORDER — ACETAMINOPHEN 325 MG/1
650 TABLET ORAL EVERY 6 HOURS PRN
Status: DISCONTINUED | OUTPATIENT
Start: 2024-10-06 | End: 2024-10-07 | Stop reason: HOSPADM

## 2024-10-06 RX ORDER — DEXTROSE MONOHYDRATE AND SODIUM CHLORIDE 5; .45 G/100ML; G/100ML
INJECTION, SOLUTION INTRAVENOUS
Status: COMPLETED
Start: 2024-10-06 | End: 2024-10-06

## 2024-10-06 RX ORDER — SODIUM CHLORIDE 9 MG/ML
INJECTION, SOLUTION INTRAVENOUS PRN
Status: DISCONTINUED | OUTPATIENT
Start: 2024-10-06 | End: 2024-10-07 | Stop reason: HOSPADM

## 2024-10-06 RX ORDER — ONDANSETRON 2 MG/ML
4 INJECTION INTRAMUSCULAR; INTRAVENOUS EVERY 6 HOURS PRN
Status: DISCONTINUED | OUTPATIENT
Start: 2024-10-06 | End: 2024-10-07 | Stop reason: HOSPADM

## 2024-10-06 RX ORDER — POLYETHYLENE GLYCOL 3350 17 G/17G
17 POWDER, FOR SOLUTION ORAL DAILY PRN
Status: DISCONTINUED | OUTPATIENT
Start: 2024-10-06 | End: 2024-10-07 | Stop reason: HOSPADM

## 2024-10-06 RX ADMIN — BUDESONIDE AND FORMOTEROL FUMARATE DIHYDRATE 2 PUFF: 80; 4.5 AEROSOL RESPIRATORY (INHALATION) at 19:42

## 2024-10-06 RX ADMIN — ENOXAPARIN SODIUM 40 MG: 100 INJECTION SUBCUTANEOUS at 19:52

## 2024-10-06 RX ADMIN — METOPROLOL SUCCINATE 25 MG: 25 TABLET, EXTENDED RELEASE ORAL at 19:52

## 2024-10-06 RX ADMIN — DEXTROSE MONOHYDRATE 25 G: 25 INJECTION, SOLUTION INTRAVENOUS at 11:39

## 2024-10-06 RX ADMIN — PANCRELIPASE LIPASE, PANCRELIPASE PROTEASE, PANCRELIPASE AMYLASE 40000 UNITS: 20000; 63000; 84000 CAPSULE, DELAYED RELEASE ORAL at 19:52

## 2024-10-06 RX ADMIN — DEXTROSE AND SODIUM CHLORIDE: 5; 450 INJECTION, SOLUTION INTRAVENOUS at 12:06

## 2024-10-06 RX ADMIN — SODIUM BICARBONATE 650 MG: 650 TABLET ORAL at 19:52

## 2024-10-06 RX ADMIN — DEXTROSE MONOHYDRATE AND SODIUM CHLORIDE: 5; .45 INJECTION, SOLUTION INTRAVENOUS at 12:06

## 2024-10-06 RX ADMIN — PANCRELIPASE LIPASE, PANCRELIPASE PROTEASE, PANCRELIPASE AMYLASE 30000 UNITS: 15000; 47000; 63000 CAPSULE, DELAYED RELEASE ORAL at 19:52

## 2024-10-06 RX ADMIN — SODIUM CHLORIDE 500 ML: 0.9 INJECTION, SOLUTION INTRAVENOUS at 16:14

## 2024-10-06 RX ADMIN — ATORVASTATIN CALCIUM 40 MG: 40 TABLET, FILM COATED ORAL at 19:52

## 2024-10-06 ASSESSMENT — PAIN - FUNCTIONAL ASSESSMENT: PAIN_FUNCTIONAL_ASSESSMENT: NONE - DENIES PAIN

## 2024-10-06 NOTE — PLAN OF CARE
Problem: Chronic Conditions and Co-morbidities  Goal: Patient's chronic conditions and co-morbidity symptoms are monitored and maintained or improved  Outcome: Progressing     Problem: Discharge Planning  Goal: Discharge to home or other facility with appropriate resources  Outcome: Progressing     Problem: Skin/Tissue Integrity  Goal: Absence of new skin breakdown  Description: 1.  Monitor for areas of redness and/or skin breakdown  2.  Assess vascular access sites hourly  3.  Every 4-6 hours minimum:  Change oxygen saturation probe site  4.  Every 4-6 hours:  If on nasal continuous positive airway pressure, respiratory therapy assess nares and determine need for appliance change or resting period.  Outcome: Progressing     Problem: ABCDS Injury Assessment  Goal: Absence of physical injury  Outcome: Progressing

## 2024-10-06 NOTE — ED PROVIDER NOTES
[facility]     Emergency Department     Faculty Attestation    I performed a history and physical examination of the patient and discussed management with the resident. I reviewed the resident's note and agree with the documented findings and plan of care. Any areas of disagreement are noted on the chart. I was personally present for the key portions of any procedures. I have documented in the chart those procedures where I was not present during the key portions. I have reviewed the emergency nurses triage note. I agree with the chief complaint, past medical history, past surgical history, allergies, medications, social and family history as documented unless otherwise noted below. For Physician Assistant/ Nurse Practitioner cases/documentation I have personally evaluated this patient and have completed at least one if not all key elements of the E/M (history, physical exam, and MDM). Additional findings are as noted.    Patient brought in due to hypoglycemia and  EKG suggestive of acute MI.  Patient denies chest pain or shortness of breath, chest clear, heart exam normal, no lower extremity pain or swelling on examination.       EKG Interpretation    Interpreted by emergency department physician    Rhythm: normal sinus   Rate: normal/72  Axis: normal 80  Ectopy: none  Conduction: normal  ST and T wave changes suggestive of inferior STEMI  Q Waves: none    Clinical Impression: Abnormal EKG    Marty Greco III    CRITICAL CARE: There was a high probability of clinically significant/life threatening deterioration in this patient's condition which required my urgent intervention.  Total critical care time was 30 minutes.  This excludes any time for separately reportable procedures.        Marty Greco MD  10/06/24 1092

## 2024-10-06 NOTE — PROGRESS NOTES
Pt. Declined interaction with        10/06/24 1428   Encounter Summary   Encounter Overview/Reason Initial Encounter   Service Provided For Patient and family together   Referral/Consult From Multi-disciplinary team   Support System Significant other;Family members   Last Encounter  10/06/24   Complexity of Encounter Low   Begin Time 1130   End Time  1150   Total Time Calculated 20 min   Assessment/Intervention/Outcome   Assessment Calm;Powerlessness;Unable to assess   Intervention Sustaining Presence/Ministry of presence   Outcome Refused/Declined

## 2024-10-06 NOTE — ED NOTES
Cardiology bedside  
D50 given IVP per Dr. Silva  
Labs sent  
Pt given juice due to no IV access  
Pt given warm blankets   
Pt placed on 2LNC  
Pt reminded that we need a urine sample.   
STEMI called  
Warming blanket placed on pt  
Xray bedside  
stated as uncontrolled     Uncontrolled type 2 DM with hyperosmolar nonketotic hyperglycemia (HCC) 06/03/2016       Labs:  Labs Reviewed   CBC WITH AUTO DIFFERENTIAL - Abnormal; Notable for the following components:       Result Value    RBC 3.91 (*)     All other components within normal limits   COMPREHENSIVE METABOLIC PANEL - Abnormal; Notable for the following components:    Chloride 108 (*)     CO2 19 (*)     Glucose 52 (*)     All other components within normal limits   TROPONIN - Abnormal; Notable for the following components:    Troponin, High Sensitivity 21 (*)     All other components within normal limits   TROPONIN - Abnormal; Notable for the following components:    Troponin, High Sensitivity 21 (*)     All other components within normal limits   ELECTROLYTES PLUS - Abnormal; Notable for the following components:    POC Potassium 4.6 (*)     POC Chloride 111 (*)     POC Anion Gap 3 (*)     All other components within normal limits   POC GLUCOSE FINGERSTICK - Abnormal; Notable for the following components:    POC Glucose 36 (*)     All other components within normal limits   VENOUS BLOOD GAS, POINT OF CARE - Abnormal; Notable for the following components:    pH, Davin 7.152 (*)     pCO2, Davin 79.6 (*)     PO2, Davin <5.0 (*)     Negative Base Excess, Davin 2.9 (*)     All other components within normal limits   LACTIC ACID,POINT OF CARE - Abnormal; Notable for the following components:    POC Lactic Acid 1.6 (*)     All other components within normal limits   POCT GLUCOSE - Abnormal; Notable for the following components:    POC Glucose 110 (*)     All other components within normal limits   POC GLUCOSE FINGERSTICK - Abnormal; Notable for the following components:    POC Glucose 233 (*)     All other components within normal limits   POC GLUCOSE FINGERSTICK - Abnormal; Notable for the following components:    POC Glucose 180 (*)     All other components within normal limits   POC GLUCOSE FINGERSTICK - Abnormal;

## 2024-10-06 NOTE — H&P
[facility]  Office: [phone]  Tyler Logan DO, Mickey Garrett, DO, Abhijit Arnold DO, Porter Francis, DO, Melisa Caballero MD, Rebecca Andrews MD, Heena Valenzuela MD, Lin Hancock MD,  Edgar Lazo MD, Olimpia Cha MD, Cordelia Last MD,  Haley Taylor DO, Ragini Mandel MD, Austin Tolentino MD, Neo Logan DO, Anita Walker MD,  Drew Kirkland DO, Judy Hodge MD, Asha Medina MD, Amee Palacios MD, Lan Mnotes MD,  Kamari Mcclain MD, David Shah MD, Faith Garces MD, Efren Akers MD, Chad Herr MD, Marie Mcconnell MD, Leroy Marcelo, DO, Von Matos DO, Roberto Francisco DO, Derrell Ceballos MD, Shirley Waterhouse, CNP,  Christina Scott, CNP, Leroy Lopez, CNP,  Louise Colindres, DNP, Kaylie Espinoza, CNP, Jocelyn Granda, CNP, Indy Guido, CNP, Tatyana Harman, CNP, Rosario Roblero, PA-C, Olena Goss PA-C, Dolores Atkins, CNP, Lorraine Martel, CNP,  Iesha Parks, CNP, Constance Frye, CNP, Nevaeh Neri, CNP, Abbi Gooden, CNS, Reshma Melvin, CNP, Mica Sequeira, CNP, Tracy Schwab, CNP         [facility]   IN-PATIENT SERVICE   [facility]    HISTORY AND PHYSICAL EXAMINATION            Date:   10/6/2024  Patient name:  Gloria Abdul  Date of admission:  10/6/2024 11:31 AM  MRN:   [id number]  Account:  [id number]  YOB: 1955  PCP:    Griffin Aiken MD  Room:   27/27  Code Status:    Prior    Chief Complaint:     Chief Complaint   Patient presents with    Hypoglycemia     History Obtained From:     patient, electronic medical record    History of Present Illness:     Gloria Abdul is a 68 y.o. female with PMHx of COPD, GERD, HTN, OA, seizures and DM2 on insulin who presents with Hypoglycemia and is admitted to the hospital for the management of Hypoglycemia.  Patient frequently presents to the ED with episodes of hypoglycemia.  Patient's current insulin regimen is glargine 10 units daily and NovoLog 5 units 3 times daily

## 2024-10-06 NOTE — CARE COORDINATION
Cardiology was paged for concern of STEMI.  Patient was seen and examined at bedside along with the attending, Dr. Phillips.  EKG with diffuse subtle ST elevation similar to previous EKGs from January 2024.  Patient was brought to hospital by  for hypoglycemia with blood glucose in 20s.  She does not have any cardiac symptoms at all.  No chest pain or shortness of breath.  No concern of STEMI at this point.  Continue to correct hypoglycemia.  Check and trend troponins.

## 2024-10-06 NOTE — ED PROVIDER NOTES
[facility] 4A STEPDOWN  Emergency Department Encounter  Emergency Medicine Resident     Pt Name:Gloria Abdul  MRN: [id number]  Birthdate 1955  Date of evaluation: 10/6/24  PCP:  Griffin Aiken MD  Note Started: 11:39 AM EDT      CHIEF COMPLAINT       Chief Complaint   Patient presents with    Hypoglycemia       HISTORY OF PRESENT ILLNESS  (Location/Symptom, Timing/Onset, Context/Setting, Quality, Duration, Modifying Factors, Severity.)      Gloria Abdul is a 68 y.o. female brought in by EMS for evaluation of altered mental status, hypoglycemia.  They were initially called out for decreased mentation, blood sugar on scene was 27, improved to the 120s after an amp of D10 but quickly dropped again.  EKG on scene also concerning for STEMI.  Upon arrival to the ED she reports having some periumbilical abdominal pain last night but has otherwise been feeling well until today.  No chest pain, shortness of breath.     PAST MEDICAL / SURGICAL / SOCIAL / FAMILY HISTORY      has a past medical history of Cataracts, bilateral, COPD (chronic obstructive pulmonary disease) (HCC), CTS (carpal tunnel syndrome), Fibromyalgia, Generalized abdominal pain, GERD (gastroesophageal reflux disease), Glaucoma, Hypertension, Osteoarthritis, Osteoporosis, Pancreatic calcification, Pancreatitis, Protein-calorie malnutrition, severe (HCC), Seizures (HCC), Tremor, Type II or unspecified type diabetes mellitus without mention of complication, not stated as uncontrolled, and Uncontrolled type 2 DM with hyperosmolar nonketotic hyperglycemia (HCC).     has a past surgical history that includes cyst removal (Left); Upper gastrointestinal endoscopy (08/30/2016); Neck surgery; pr colsc flx w/removal lesion by hot bx forceps (N/A, 6/30/2017); Colonoscopy (08/30/2016); Colonoscopy (06/30/2017); Colonoscopy (05/29/2018); pr colon ca scrn not hi rsk ind (N/A, 5/29/2018); Colonoscopy (05/07/2021); Colonoscopy (N/A, 5/7/2021); and Upper

## 2024-10-07 VITALS
HEART RATE: 88 BPM | SYSTOLIC BLOOD PRESSURE: 122 MMHG | TEMPERATURE: 98.1 F | DIASTOLIC BLOOD PRESSURE: 78 MMHG | OXYGEN SATURATION: 100 % | RESPIRATION RATE: 22 BRPM

## 2024-10-07 LAB
ANION GAP SERPL CALCULATED.3IONS-SCNC: 10 MMOL/L (ref 9–16)
BASOPHILS # BLD: 0.03 K/UL (ref 0–0.2)
BASOPHILS NFR BLD: 0 % (ref 0–2)
BUN SERPL-MCNC: 13 MG/DL (ref 8–23)
CALCIUM SERPL-MCNC: 8.2 MG/DL (ref 8.6–10.4)
CHLORIDE SERPL-SCNC: 108 MMOL/L (ref 98–107)
CO2 SERPL-SCNC: 17 MMOL/L (ref 20–31)
CREAT SERPL-MCNC: 0.7 MG/DL (ref 0.5–0.9)
EKG ATRIAL RATE: 72 BPM
EKG P AXIS: 83 DEGREES
EKG P-R INTERVAL: 158 MS
EKG Q-T INTERVAL: 452 MS
EKG QRS DURATION: 68 MS
EKG QTC CALCULATION (BAZETT): 494 MS
EKG R AXIS: 80 DEGREES
EKG T AXIS: 87 DEGREES
EKG VENTRICULAR RATE: 72 BPM
EOSINOPHIL # BLD: 0.17 K/UL (ref 0–0.44)
EOSINOPHILS RELATIVE PERCENT: 2 % (ref 1–4)
ERYTHROCYTE [DISTWIDTH] IN BLOOD BY AUTOMATED COUNT: 13.6 % (ref 11.8–14.4)
GFR, ESTIMATED: >90 ML/MIN/1.73M2
GLUCOSE BLD-MCNC: 218 MG/DL (ref 65–105)
GLUCOSE BLD-MCNC: 228 MG/DL (ref 65–105)
GLUCOSE BLD-MCNC: 279 MG/DL (ref 65–105)
GLUCOSE SERPL-MCNC: 111 MG/DL (ref 74–99)
HCT VFR BLD AUTO: 29.3 % (ref 36.3–47.1)
HGB BLD-MCNC: 9.5 G/DL (ref 11.9–15.1)
IMM GRANULOCYTES # BLD AUTO: <0.03 K/UL (ref 0–0.3)
IMM GRANULOCYTES NFR BLD: 0 %
INR PPP: 1.1
LYMPHOCYTES NFR BLD: 2.04 K/UL (ref 1.1–3.7)
LYMPHOCYTES RELATIVE PERCENT: 27 % (ref 24–43)
MCH RBC QN AUTO: 32 PG (ref 25.2–33.5)
MCHC RBC AUTO-ENTMCNC: 32.4 G/DL (ref 28.4–34.8)
MCV RBC AUTO: 98.7 FL (ref 82.6–102.9)
MONOCYTES NFR BLD: 0.59 K/UL (ref 0.1–1.2)
MONOCYTES NFR BLD: 8 % (ref 3–12)
NEUTROPHILS NFR BLD: 62 % (ref 36–65)
NEUTS SEG NFR BLD: 4.72 K/UL (ref 1.5–8.1)
NRBC BLD-RTO: 0 PER 100 WBC
PLATELET # BLD AUTO: 233 K/UL (ref 138–453)
PMV BLD AUTO: 9.9 FL (ref 8.1–13.5)
POTASSIUM SERPL-SCNC: 3.8 MMOL/L (ref 3.7–5.3)
PROTHROMBIN TIME: 14.4 SEC (ref 11.7–14.9)
RBC # BLD AUTO: 2.97 M/UL (ref 3.95–5.11)
SODIUM SERPL-SCNC: 135 MMOL/L (ref 136–145)
WBC OTHER # BLD: 7.6 K/UL (ref 3.5–11.3)

## 2024-10-07 PROCEDURE — 85610 PROTHROMBIN TIME: CPT

## 2024-10-07 PROCEDURE — 93010 ELECTROCARDIOGRAM REPORT: CPT | Performed by: INTERNAL MEDICINE

## 2024-10-07 PROCEDURE — 82947 ASSAY GLUCOSE BLOOD QUANT: CPT

## 2024-10-07 PROCEDURE — 94640 AIRWAY INHALATION TREATMENT: CPT

## 2024-10-07 PROCEDURE — 6370000000 HC RX 637 (ALT 250 FOR IP): Performed by: STUDENT IN AN ORGANIZED HEALTH CARE EDUCATION/TRAINING PROGRAM

## 2024-10-07 PROCEDURE — 2580000003 HC RX 258: Performed by: STUDENT IN AN ORGANIZED HEALTH CARE EDUCATION/TRAINING PROGRAM

## 2024-10-07 PROCEDURE — 99239 HOSP IP/OBS DSCHRG MGMT >30: CPT | Performed by: STUDENT IN AN ORGANIZED HEALTH CARE EDUCATION/TRAINING PROGRAM

## 2024-10-07 PROCEDURE — 97162 PT EVAL MOD COMPLEX 30 MIN: CPT

## 2024-10-07 PROCEDURE — 99223 1ST HOSP IP/OBS HIGH 75: CPT | Performed by: INTERNAL MEDICINE

## 2024-10-07 PROCEDURE — 6360000002 HC RX W HCPCS: Performed by: STUDENT IN AN ORGANIZED HEALTH CARE EDUCATION/TRAINING PROGRAM

## 2024-10-07 PROCEDURE — 97530 THERAPEUTIC ACTIVITIES: CPT

## 2024-10-07 PROCEDURE — 36415 COLL VENOUS BLD VENIPUNCTURE: CPT

## 2024-10-07 PROCEDURE — 80048 BASIC METABOLIC PNL TOTAL CA: CPT

## 2024-10-07 PROCEDURE — 2700000000 HC OXYGEN THERAPY PER DAY

## 2024-10-07 PROCEDURE — 94761 N-INVAS EAR/PLS OXIMETRY MLT: CPT

## 2024-10-07 PROCEDURE — 97116 GAIT TRAINING THERAPY: CPT

## 2024-10-07 PROCEDURE — 85025 COMPLETE CBC W/AUTO DIFF WBC: CPT

## 2024-10-07 RX ORDER — INSULIN LISPRO 100 [IU]/ML
0-4 INJECTION, SOLUTION INTRAVENOUS; SUBCUTANEOUS NIGHTLY
Status: DISCONTINUED | OUTPATIENT
Start: 2024-10-07 | End: 2024-10-07 | Stop reason: HOSPADM

## 2024-10-07 RX ORDER — INSULIN GLARGINE 100 [IU]/ML
5 INJECTION, SOLUTION SUBCUTANEOUS DAILY
Status: DISCONTINUED | OUTPATIENT
Start: 2024-10-07 | End: 2024-10-07 | Stop reason: HOSPADM

## 2024-10-07 RX ORDER — INSULIN GLARGINE 100 [IU]/ML
6 INJECTION, SOLUTION SUBCUTANEOUS DAILY
Qty: 5 ADJUSTABLE DOSE PRE-FILLED PEN SYRINGE | Refills: 0 | Status: SHIPPED | OUTPATIENT
Start: 2024-10-07

## 2024-10-07 RX ORDER — INSULIN LISPRO 100 [IU]/ML
0-4 INJECTION, SOLUTION INTRAVENOUS; SUBCUTANEOUS
Status: DISCONTINUED | OUTPATIENT
Start: 2024-10-07 | End: 2024-10-07 | Stop reason: HOSPADM

## 2024-10-07 RX ADMIN — PANTOPRAZOLE SODIUM 20 MG: 20 TABLET, DELAYED RELEASE ORAL at 05:53

## 2024-10-07 RX ADMIN — SODIUM BICARBONATE 650 MG: 650 TABLET ORAL at 12:12

## 2024-10-07 RX ADMIN — INSULIN LISPRO 2 UNITS: 100 INJECTION, SOLUTION INTRAVENOUS; SUBCUTANEOUS at 12:12

## 2024-10-07 RX ADMIN — TIOTROPIUM BROMIDE INHALATION SPRAY 2 PUFF: 3.12 SPRAY, METERED RESPIRATORY (INHALATION) at 08:38

## 2024-10-07 RX ADMIN — ENOXAPARIN SODIUM 40 MG: 100 INJECTION SUBCUTANEOUS at 07:49

## 2024-10-07 RX ADMIN — SODIUM BICARBONATE 650 MG: 650 TABLET ORAL at 07:49

## 2024-10-07 RX ADMIN — PANCRELIPASE LIPASE, PANCRELIPASE PROTEASE, PANCRELIPASE AMYLASE 40000 UNITS: 20000; 63000; 84000 CAPSULE, DELAYED RELEASE ORAL at 07:49

## 2024-10-07 RX ADMIN — PANCRELIPASE LIPASE, PANCRELIPASE PROTEASE, PANCRELIPASE AMYLASE 40000 UNITS: 20000; 63000; 84000 CAPSULE, DELAYED RELEASE ORAL at 12:11

## 2024-10-07 RX ADMIN — ATORVASTATIN CALCIUM 40 MG: 40 TABLET, FILM COATED ORAL at 07:49

## 2024-10-07 RX ADMIN — INSULIN GLARGINE 5 UNITS: 100 INJECTION, SOLUTION SUBCUTANEOUS at 12:11

## 2024-10-07 RX ADMIN — METOPROLOL SUCCINATE 25 MG: 25 TABLET, EXTENDED RELEASE ORAL at 07:50

## 2024-10-07 RX ADMIN — SODIUM CHLORIDE, PRESERVATIVE FREE 10 ML: 5 INJECTION INTRAVENOUS at 07:50

## 2024-10-07 RX ADMIN — BUDESONIDE AND FORMOTEROL FUMARATE DIHYDRATE 2 PUFF: 80; 4.5 AEROSOL RESPIRATORY (INHALATION) at 08:40

## 2024-10-07 NOTE — PLAN OF CARE
Problem: Respiratory - Adult  Goal: Achieves optimal ventilation and oxygenation  Outcome: Progressing  Flowsheets (Taken 10/7/2024 7728)  Achieves optimal ventilation and oxygenation:   Assess for changes in respiratory status   Respiratory therapy support as indicated   Oxygen supplementation based on oxygen saturation or arterial blood gases   Assess and instruct to report shortness of breath or any respiratory difficulty

## 2024-10-07 NOTE — CONSULTS
[facility]    Consult               Today's Date: 10/7/2024  Patient Name: Gloria Abdul  Date of admission: 10/6/2024 11:31 AM  Patient's age: 68 y.o., 1955  Admission Dx: Hypoglycemia [E16.2]  Hypothermia, initial encounter [T68.XXXA]    Requesting Physician: Marie Mcconnell MD    Cardiac Evaluation Reason: Concerns for STEMI    History Obtained From: patient and chart review     History of Present Illness:    This patient 68 y.o. years old with past medical type 2 diabetes mellitus on insulin, recurrent hypoglycemic spells, COPD, chronic pancreatitis, malnutrition, seizures presented to the hospital with altered mental status.  The patient has frequent ED visits for episodes of hypoglycemia.  As per EMS patient has a blood glucose of 27 on scene and improved to 120 after an pill of D10.  EKG at scene had concern for STEMI.  As per patient she has recurrent episodes of hypoglycemia and states that she has awareness of hypoglycemia.  The patient states that she has history of shortness of breath secondary to her COPD and has been using inhalers.  The patient denies any chest pain or shortness of breath on this admission.  The patient does state that she does states that she gets frequent palpitation particularly with mild exertion.  But says she does not feel any dizziness/lightheadedness, chest pains with these palpitations  The patient states that previously she had shortness of breath and has workup done including a stress test and an echocardiography.  Which were negative for any ischemic changes  Patient has history of smoking tobacco and marijuana and does have occasional binge alcohol but denies any use of cocaine.        Past Medical History:   has a past medical history of Cataracts, bilateral, COPD (chronic obstructive pulmonary disease) (HCC), CTS (carpal tunnel syndrome), Fibromyalgia, Generalized abdominal pain, GERD (gastroesophageal reflux disease), Glaucoma, Hypertension,        Allergies:  Patient has no known allergies.    Social History:   reports that she has been smoking cigarettes. She has a 10 pack-year smoking history. She has been exposed to tobacco smoke. She has quit using smokeless tobacco. She reports current drug use. Drug: Marijuana (Weed). She reports that she does not drink alcohol.     Family History: family history includes Brain Cancer in her father; Diabetes in her sister; Other in her sister.     REVIEW OF SYSTEMS:    Constitutional: Negative for fatigue, weight loss, loss of appetite   Cardiovascular: as per HPI  Respiratory: as per HPI  Gastrointestinal: Negative for abdominal pain, N/V  Genitourinary: No dysuria, trouble voiding, or hematuria.  Musculoskeletal:  No gait disturbance, No weakness or joint complaints.  Neurological: No headache, diplopia, change in muscle strength, numbness or tingling. No change in gate.   Endocrine: No temperature intolerance. No excessive thirst, fluid intake, or urination. No tremor.  Hematologic/Lymphatic: No abnormal bruising or bleeding    PHYSICAL EXAM:      /72   Pulse 92   Temp 97.9 °F (36.6 °C)   Resp 21   SpO2 100%    Constitutional and General Appearance: alert, cooperative, in no distress   HEENT: atraumatic, normocephalic.   Respiratory:  Clear to auscultation bilaterally  Cardiovascular:  Regular S1 and S2.  No JVD  Peripheral pulses are symmetrical and full   Abdomen:   Soft, non tender   Bowel sounds present  Extremities:  No Le edema or cyanosis   Neurological:  Deferred     LABS:   CBC:   Recent Labs     10/06/24  1159 10/07/24  0311   WBC 9.4 7.6   HGB 12.6 9.5*   HCT 38.0 29.3*    233     BMP:   Recent Labs     10/06/24  1159 10/07/24  0311    135*   K 4.8 3.8   CO2 19* 17*   BUN 17 13   CREATININE 0.8 0.7   LABGLOM 86 >90   GLUCOSE 52* 111*     BNP: No results for input(s): \"BNP\" in the last 72 hours.  PT/INR:   Recent Labs     10/06/24  1955 10/07/24  0311   PROTIME 14.0 14.4   INR

## 2024-10-07 NOTE — CARE COORDINATION
Case Management Assessment  Initial Evaluation    Date/Time of Evaluation: 10/7/2024 11:48 AM  Assessment Completed by: ISABELL TEJEDA    If patient is discharged prior to next notation, then this note serves as note for discharge by case management.    Patient Name: Gloria Abdul                   YOB: 1955  Diagnosis: Hypoglycemia [E16.2]  Hypothermia, initial encounter [T68.XXXA]                   Date / Time: 10/6/2024 11:31 AM    Patient Admission Status: Inpatient   Readmission Risk (Low < 19, Mod (19-27), High > 27): Readmission Risk Score: 15.5    Current PCP: Griffin Aiken MD  PCP verified by CM? (P) Yes    Chart Reviewed: Yes      History Provided by: (P) Patient  Patient Orientation: (P) Alert and Oriented, Person, Place, Situation, Self    Patient Cognition: (P) Alert    Hospitalization in the last 30 days (Readmission):  No    If yes, Readmission Assessment in  Navigator will be completed.    Advance Directives:      Code Status: Full Code   Patient's Primary Decision Maker is: (P) Legal Next of Kin    Primary Decision Maker: Santy Abdul - Spouse - [phone]    Discharge Planning:    Patient lives with: (P) Spouse/Significant Other Type of Home: (P) House  Primary Care Giver: (P) Self  Patient Support Systems include: (P) Spouse/Significant Other, Family Members, Friends/Neighbors   Current Financial resources: (P) Medicaid, Medicare  Current community resources: (P) None  Current services prior to admission: (P) Durable Medical Equipment            Current DME: (P) Walker            Type of Home Care services:  (P) None    ADLS  Prior functional level: (P) Independent in ADLs/IADLs  Current functional level: (P) Independent in ADLs/IADLs    PT AM-PAC: 20 /24  OT AM-PAC:   /24    Family can provide assistance at DC: (P) Yes  Would you like Case Management to discuss the discharge plan with any other family members/significant others, and if so, who? (P) No  Plans to  Return to Present Housing: (P) Yes  Other Identified Issues/Barriers to RETURNING to current housing: medical condition   Potential Assistance needed at discharge: (P) Home Care            Potential DME:    Patient expects to discharge to: (P) House  Plan for transportation at discharge: (P) Family    Financial    Payor: [facility] MEDICARE / Plan: [facility] OPTUM PLAN AARP MCR ADV / Product Type: *No Product type* /     Does insurance require precert for SNF: Yes    Potential assistance Purchasing Medications: (P) No  Meds-to-Beds request: Yes      [facility] - [place] - [address] - P [phone] - F [phone]  [address] [address]  Phone: [phone] Fax: [phone]      Notes:    Factors facilitating achievement of predicted outcomes: Family support, Caregiver support, Motivated, Cooperative, Pleasant, Sense of humor, and Has needed Durable Medical Equipment at home    Barriers to discharge: Impulsivity, Limited safety awareness, and Limited insight into deficits    Additional Case Management Notes: Spoke w pt re: transitional plan. Pt lives w  , pt plans to return home w HB and has ride. Discussed hx of multiple ER trips, recommended HHC. Pt agreeable to [facility].     Case Management Services Information Letter Provided [x]                      Post Acute Facility/Agency List     Provided patient with the following list, the list includes the overall star ratings obtained from CMS per the Medicare Web site (www.Medicare.gov):     [] Long Term Acute Care Facilities  [] Acute Inpatient Rehabilitation Facilities  [] Skilled Nursing Facilities  [] Hospice Facilities  [x] Home Care    Provided verbal instructions on how to utilize the QR Code to obtain additional detailed star ratings from www.Medicare.gov     offered to print and provide the detailed list:    [x]Accepted   []Declined  Pt agrees to C, Referral sent to [facility]

## 2024-10-07 NOTE — PLAN OF CARE
Problem: Chronic Conditions and Co-morbidities  Goal: Patient's chronic conditions and co-morbidity symptoms are monitored and maintained or improved  10/7/2024 1322 by Devika Pisano RN  Outcome: Adequate for Discharge  10/7/2024 0808 by Devika Pisano RN  Outcome: Progressing     Problem: Discharge Planning  Goal: Discharge to home or other facility with appropriate resources  10/7/2024 1322 by Devika Pisano RN  Outcome: Adequate for Discharge  10/7/2024 0808 by Devika Pisano RN  Outcome: Progressing     Problem: Skin/Tissue Integrity  Goal: Absence of new skin breakdown  Description: 1.  Monitor for areas of redness and/or skin breakdown  2.  Assess vascular access sites hourly  3.  Every 4-6 hours minimum:  Change oxygen saturation probe site  4.  Every 4-6 hours:  If on nasal continuous positive airway pressure, respiratory therapy assess nares and determine need for appliance change or resting period.  10/7/2024 1322 by Devika Pisano RN  Outcome: Adequate for Discharge  10/7/2024 0808 by Devika Pisano RN  Outcome: Progressing     Problem: ABCDS Injury Assessment  Goal: Absence of physical injury  10/7/2024 1322 by Devika Pisano RN  Outcome: Adequate for Discharge  10/7/2024 0808 by Devika Pisano RN  Outcome: Progressing     Problem: Safety - Adult  Goal: Free from fall injury  10/7/2024 1322 by Devika Pisano RN  Outcome: Adequate for Discharge  10/7/2024 0808 by Devika Pisano RN  Outcome: Progressing     Problem: Respiratory - Adult  Goal: Achieves optimal ventilation and oxygenation  10/7/2024 1322 by Devika Pisano RN  Outcome: Adequate for Discharge  10/7/2024 0846 by Jessica Hoyt [email or web address]  Outcome: Progressing  Flowsheets (Taken 10/7/2024 0846)  Achieves optimal ventilation and oxygenation:   Assess for changes in respiratory status   Respiratory therapy support as indicated   Oxygen supplementation based on oxygen saturation or arterial blood gases   Assess and instruct to report shortness of

## 2024-10-07 NOTE — DISCHARGE SUMMARY
[facility]  Office: [phone]  Tyler Logan DO, Mickey Garrett DO, Abhijit Arnold DO, Porter Francis DO, Melisa Caballero MD, Rebecca Andrews MD, Heena Valenzuela MD, Lin Hancock MD,  Edgar Lazo MD, Olimpia Cha MD, Cordelia Last MD,  Haley Taylor DO, Ragini Mandel MD, Austin Tolentino MD, Neo Logan DO, Anita Walker MD,  Drew Kirkland DO, Judy Hodge MD, Asha Medina MD, Amee Palacios MD, Lan Montes MD,  Kamari Mcclain MD, David Shah MD, Faith Garces MD, Efren Akers MD, Chad Herr MD, Marie Mcconnell MD, Leroy Marcelo, DO, Von Matos DO, Roberto Francisco DO, Derrell Ceballos MD, Shirley Waterhouse, CNP,  Christina Scott CNP, Leroy Lopez, CNP,  Louise Colindres, [facility], Kaylie Espinoza, CNP, Jocelyn Granda, CNP, Indy Guido, CNP, Tatyana Harman, CNP, Rosario Roblero, PA-C, Olena Goss PA-C, Dolores Atkins, CNP, Lorraine Martel, CNP,  Iesha Parks, CNP, Constance Frye, CNP, Nevaeh Neri, CNP, Abbi Gooden, CNS, Reshma Melvin, CNP, Mica Sequeira, CNP, Tracy Schwab, CNP         [facility]   IN-PATIENT SERVICE   [facility]    Discharge Summary     Patient ID: Gloria Abdul  :  1955   MRN: [id number]     ACCOUNT:  [id number]   Patient's PCP: Griffin Aiken MD  Admit Date: 10/6/2024   Discharge Date: 10/7/2024     Length of Stay: 1  Code Status:  Full Code  Admitting Physician: Marie Mcconnell MD  Discharge Physician: Marie Mcconnell MD     Active Discharge Diagnoses:     Hospital Problem Lists:  Principal Problem:    Hypoglycemia  Resolved Problems:    * No resolved hospital problems. *      Admission Condition:  fair     Discharged Condition: good    Hospital Stay:     Hospital Course:  Gloria Abdul is a 68 y.o. female with PMHx of COPD, GERD, HTN, OA, seizures and DM2 on insulin who presents with Hypoglycemia and is admitted to the hospital for the management of Hypoglycemia.  Patient frequently presents

## 2024-10-07 NOTE — PROGRESS NOTES
Physical Therapy  Facility/Department: [address] STEPDOWN  Physical Therapy Initial Assessment    Name: Gloria Abdul  : 1955  MRN: [id number]  Date of Service: 10/7/2024    Chief Complaint   Patient presents with    Hypoglycemia        Discharge Recommendations:  Patient would benefit from continued therapy after discharge   PT Equipment Recommendations  Equipment Needed: No  Other: Pt own cane and walker      Patient Diagnosis(es): The primary encounter diagnosis was Hypoglycemia. A diagnosis of Hypothermia, initial encounter was also pertinent to this visit.  Past Medical History:  has a past medical history of Cataracts, bilateral, COPD (chronic obstructive pulmonary disease) (HCC), CTS (carpal tunnel syndrome), Fibromyalgia, Generalized abdominal pain, GERD (gastroesophageal reflux disease), Glaucoma, Hypertension, Osteoarthritis, Osteoporosis, Pancreatic calcification, Pancreatitis, Protein-calorie malnutrition, severe (HCC), Seizures (HCC), Tremor, Type II or unspecified type diabetes mellitus without mention of complication, not stated as uncontrolled, and Uncontrolled type 2 DM with hyperosmolar nonketotic hyperglycemia (HCC).  Past Surgical History:  has a past surgical history that includes cyst removal (Left); Upper gastrointestinal endoscopy (2016); Neck surgery; pr colsc flx w/removal lesion by hot bx forceps (N/A, 2017); Colonoscopy (2016); Colonoscopy (2017); Colonoscopy (2018); pr colon ca scrn not hi rsk ind (N/A, 2018); Colonoscopy (2021); Colonoscopy (N/A, 2021); and Upper gastrointestinal endoscopy (N/A, 2023).    Assessment  Body Structures, Functions, Activity Limitations Requiring Skilled Therapeutic Intervention: Decreased functional mobility ;Decreased tolerance to work activity;Decreased strength;Decreased endurance;Decreased balance;Increased pain  Assessment: Pt presetns with general weakness and deconditioning with history of  Strategies  Education Provided Comments: Pt privided verbal, written and practical instruction in LE seated ROM/strengthening exercises to improve strength  Education Method: Verbal  Barriers to Learning: None  Education Outcome: Verbalized understanding;Continued education needed      Therapy Time   Individual Concurrent Group Co-treatment   Time In 0810         Time Out 0859         Minutes 49         Timed Code Treatment Minutes: 32 Minutes       Richelle Ding, PT, DPT

## 2024-10-08 ENCOUNTER — TELEPHONE (OUTPATIENT)
Dept: GASTROENTEROLOGY | Age: 69
End: 2024-10-08

## 2024-10-08 PROBLEM — Z79.4 TYPE 2 DIABETES MELLITUS WITH DIABETIC POLYNEUROPATHY, WITH LONG-TERM CURRENT USE OF INSULIN (HCC): Status: ACTIVE | Noted: 2024-10-08

## 2024-10-08 PROBLEM — E11.42 TYPE 2 DIABETES MELLITUS WITH DIABETIC POLYNEUROPATHY, WITH LONG-TERM CURRENT USE OF INSULIN (HCC): Status: ACTIVE | Noted: 2024-10-08

## 2024-10-08 PROBLEM — I21.4 NSTEMI (NON-ST ELEVATED MYOCARDIAL INFARCTION) (HCC): Status: ACTIVE | Noted: 2024-10-08

## 2024-10-08 LAB
MICROORGANISM SPEC CULT: ABNORMAL
SPECIMEN DESCRIPTION: ABNORMAL

## 2024-10-08 NOTE — TELEPHONE ENCOUNTER
Patient called requesting status of  refill of medication , Creon . Last visit 10/1  [facility] pharmacy has not received.    Please be advised that the best time to call anytime.    Thank you.

## 2024-10-09 DIAGNOSIS — K86.89 PANCREATIC INSUFFICIENCY: ICD-10-CM

## 2024-10-09 NOTE — TELEPHONE ENCOUNTER
Writer spoke to pt directly who states that she has not received Creon medication. Writer advised pt that it is coming through [employer] mail in service. Pt asked for number to call. Number given. Call ends.

## 2024-10-15 NOTE — PROGRESS NOTES
Physician Progress Note      PATIENT:               JENI ANGELO  CSN #:                  [id number]  :                       1955  ADMIT DATE:       10/6/2024 11:31 AM  DISCH DATE:        10/7/2024 3:33 PM  RESPONDING  PROVIDER #:        Marie Mcconnell MD          QUERY TEXT:    Pt admitted with AMS. Pt noted to have hypoglycemia. If possible, please   document in the progress notes and discharge summary if you are evaluating and   / or treating any of the following:    The medical record reflects the following:  Risk Factors: hypoglycemia  Clinical Indicators: per ED notes \"brought in by EMS for evaluation of altered   mental status, hypoglycemia. They were initially called out for decreased   mentation, blood sugar on scene was 27. She is hypothermic at 33.7C.   Saturation also inconsistent, high 80s to 97 with good waveform\", per H&P   \"Patient was brought to the ED by EMS for evaluation of AMS and hypoglycemia\",   glucose in ED 36, pH, Davin(!!): 7.152, pCO2, Davin(!!): 79.6, trop 21 EKG showed   diffuse subtle ST elevations  Treatment: D50, D5 1/2 NS infusion, EKG, troponins, VBG's, Karina hugger,   supplemental oxygen 2L NC, labs    Thank you, Genesis CARR,RN, CDI  email - [email or web address]  cell- [phone]  office hours M-F-6A-2P  Options provided:  -- Metabolic encephalopathy  -- Other - I will add my own diagnosis  -- Disagree - Not applicable / Not valid  -- Disagree - Clinically unable to determine / Unknown  -- Refer to Clinical Documentation Reviewer    PROVIDER RESPONSE TEXT:    This patient has metabolic encephalopathy.    Query created by: Genesis Alexis on 10/7/2024 9:18 AM      Electronically signed by:  Marie Mcconnell MD 10/15/2024 12:13 PM

## 2024-10-23 ENCOUNTER — HOSPITAL ENCOUNTER (OUTPATIENT)
Dept: MRI IMAGING | Age: 69
Discharge: HOME | End: 2024-10-25
Payer: MEDICARE

## 2024-10-23 DIAGNOSIS — R63.4 WEIGHT LOSS: ICD-10-CM

## 2024-10-23 DIAGNOSIS — K86.89 PANCREATIC INSUFFICIENCY: ICD-10-CM

## 2024-10-23 DIAGNOSIS — K86.89 PANCREATIC CALCIFICATION: Chronic | ICD-10-CM

## 2024-10-23 DIAGNOSIS — K86.0 ALCOHOL-INDUCED CHRONIC PANCREATITIS (HCC): ICD-10-CM

## 2024-10-23 PROCEDURE — A9579 GAD-BASE MR CONTRAST NOS,1ML: HCPCS | Performed by: PHYSICIAN ASSISTANT

## 2024-10-23 PROCEDURE — 74183 MRI ABD W/O CNTR FLWD CNTR: CPT

## 2024-10-23 PROCEDURE — 6360000004 HC RX CONTRAST MEDICATION: Performed by: PHYSICIAN ASSISTANT

## 2024-10-23 PROCEDURE — 2580000003 HC RX 258: Performed by: PHYSICIAN ASSISTANT

## 2024-10-23 RX ORDER — 0.9 % SODIUM CHLORIDE 0.9 %
30 INTRAVENOUS SOLUTION INTRAVENOUS ONCE
Status: COMPLETED | OUTPATIENT
Start: 2024-10-23 | End: 2024-10-23

## 2024-10-23 RX ADMIN — SODIUM CHLORIDE 30 ML: 9 INJECTION, SOLUTION INTRAVENOUS at 14:17

## 2024-10-23 RX ADMIN — GADOTERIDOL 10 ML: 279.3 INJECTION, SOLUTION INTRAVENOUS at 14:17

## 2024-10-24 DIAGNOSIS — K86.89 PANCREATIC INSUFFICIENCY: ICD-10-CM

## 2024-10-24 RX ORDER — PANCRELIPASE 36000; 180000; 114000 [USP'U]/1; [USP'U]/1; [USP'U]/1
2 CAPSULE, DELAYED RELEASE PELLETS ORAL
Qty: 240 CAPSULE | Refills: 3 | Status: SHIPPED | OUTPATIENT
Start: 2024-10-24

## 2024-11-04 DIAGNOSIS — K86.0 ALCOHOL-INDUCED CHRONIC PANCREATITIS (HCC): ICD-10-CM

## 2024-11-04 DIAGNOSIS — K86.9 PANCREATIC LESION: Primary | ICD-10-CM

## 2024-11-05 ENCOUNTER — TELEPHONE (OUTPATIENT)
Dept: GASTROENTEROLOGY | Age: 69
End: 2024-11-05

## 2024-11-05 NOTE — TELEPHONE ENCOUNTER
Attempted to call patient, mailbox is full, unable to leave message. Patient needs advised of results, need for blood work Anaya ordered on 10/1/24 get done, EUS also ordered.

## 2024-11-05 NOTE — TELEPHONE ENCOUNTER
----- Message from GENNA Yarbrough sent at 11/4/2024  2:12 PM EST -----  Possible pseudocyst vs lesion on her pancreas. She needs to get her labs completed that I ordered previously. Will also order EUS to ensure it is benign

## 2025-04-01 ENCOUNTER — PREP FOR PROCEDURE (OUTPATIENT)
Dept: GASTROENTEROLOGY | Age: 70
End: 2025-04-01

## 2025-04-01 ENCOUNTER — HOSPITAL ENCOUNTER (OUTPATIENT)
Age: 70
Discharge: HOME | End: 2025-04-01
Payer: MEDICARE

## 2025-04-01 ENCOUNTER — OFFICE VISIT (OUTPATIENT)
Dept: GASTROENTEROLOGY | Age: 70
End: 2025-04-01
Payer: MEDICARE

## 2025-04-01 VITALS
HEART RATE: 81 BPM | DIASTOLIC BLOOD PRESSURE: 55 MMHG | SYSTOLIC BLOOD PRESSURE: 91 MMHG | TEMPERATURE: 97.3 F | WEIGHT: 89 LBS | OXYGEN SATURATION: 94 % | RESPIRATION RATE: 18 BRPM | BODY MASS INDEX: 13.94 KG/M2

## 2025-04-01 DIAGNOSIS — R93.5 ABNORMAL MRI OF ABDOMEN: ICD-10-CM

## 2025-04-01 DIAGNOSIS — R97.0 ELEVATED CEA: ICD-10-CM

## 2025-04-01 DIAGNOSIS — R63.4 WEIGHT LOSS: ICD-10-CM

## 2025-04-01 DIAGNOSIS — K86.89 PANCREATIC CALCIFICATION: ICD-10-CM

## 2025-04-01 DIAGNOSIS — R97.8 ELEVATED CA 19-9 LEVEL: ICD-10-CM

## 2025-04-01 DIAGNOSIS — R97.8 ELEVATED CA 19-9 LEVEL: Primary | ICD-10-CM

## 2025-04-01 DIAGNOSIS — D49.0 IPMN (INTRADUCTAL PAPILLARY MUCINOUS NEOPLASM): ICD-10-CM

## 2025-04-01 DIAGNOSIS — K21.9 GASTROESOPHAGEAL REFLUX DISEASE, UNSPECIFIED WHETHER ESOPHAGITIS PRESENT: ICD-10-CM

## 2025-04-01 DIAGNOSIS — D64.9 ANEMIA, UNSPECIFIED TYPE: ICD-10-CM

## 2025-04-01 DIAGNOSIS — Z86.0100 HX OF COLONIC POLYPS: ICD-10-CM

## 2025-04-01 LAB
ALBUMIN SERPL-MCNC: 4.6 G/DL (ref 3.5–5.2)
ALBUMIN/GLOB SERPL: 1.8 {RATIO} (ref 1–2.5)
ALP SERPL-CCNC: 78 U/L (ref 35–104)
ALT SERPL-CCNC: 11 U/L (ref 10–35)
ANION GAP SERPL CALCULATED.3IONS-SCNC: 16 MMOL/L (ref 9–16)
AST SERPL-CCNC: 18 U/L (ref 10–35)
BASOPHILS # BLD: 0.04 K/UL (ref 0–0.2)
BASOPHILS NFR BLD: 1 % (ref 0–2)
BILIRUB DIRECT SERPL-MCNC: 0.1 MG/DL (ref 0–0.2)
BILIRUB INDIRECT SERPL-MCNC: 0.2 MG/DL (ref 0–1)
BILIRUB SERPL-MCNC: 0.3 MG/DL (ref 0–1.2)
BUN SERPL-MCNC: 9 MG/DL (ref 8–23)
CALCIUM SERPL-MCNC: 10 MG/DL (ref 8.6–10.4)
CANCER AG19-9 SERPL IA-ACNC: 46 U/ML (ref 0–35)
CEA SERPL-MCNC: 9.3 NG/ML (ref 0–3.8)
CHLORIDE SERPL-SCNC: 96 MMOL/L (ref 98–107)
CO2 SERPL-SCNC: 24 MMOL/L (ref 20–31)
CREAT SERPL-MCNC: 0.9 MG/DL (ref 0.6–0.9)
EOSINOPHIL # BLD: 0.07 K/UL (ref 0–0.44)
EOSINOPHILS RELATIVE PERCENT: 1 % (ref 1–4)
ERYTHROCYTE [DISTWIDTH] IN BLOOD BY AUTOMATED COUNT: 12.8 % (ref 11.8–14.4)
GFR, ESTIMATED: 70 ML/MIN/1.73M2
GLUCOSE SERPL-MCNC: 465 MG/DL (ref 74–99)
HCT VFR BLD AUTO: 39.3 % (ref 36.3–47.1)
HGB BLD-MCNC: 13.8 G/DL (ref 11.9–15.1)
IMM GRANULOCYTES # BLD AUTO: <0.03 K/UL (ref 0–0.3)
IMM GRANULOCYTES NFR BLD: 0 %
LYMPHOCYTES NFR BLD: 1.49 K/UL (ref 1.1–3.7)
LYMPHOCYTES RELATIVE PERCENT: 23 % (ref 24–43)
MCH RBC QN AUTO: 32.9 PG (ref 25.2–33.5)
MCHC RBC AUTO-ENTMCNC: 35.1 G/DL (ref 28.4–34.8)
MCV RBC AUTO: 93.8 FL (ref 82.6–102.9)
MONOCYTES NFR BLD: 0.35 K/UL (ref 0.1–1.2)
MONOCYTES NFR BLD: 6 % (ref 3–12)
NEUTROPHILS NFR BLD: 69 % (ref 36–65)
NEUTS SEG NFR BLD: 4.43 K/UL (ref 1.5–8.1)
NRBC BLD-RTO: 0 PER 100 WBC
PLATELET # BLD AUTO: 207 K/UL (ref 138–453)
PMV BLD AUTO: 10.8 FL (ref 8.1–13.5)
POTASSIUM SERPL-SCNC: 4 MMOL/L (ref 3.7–5.3)
PROT SERPL-MCNC: 7.1 G/DL (ref 6.6–8.7)
RBC # BLD AUTO: 4.19 M/UL (ref 3.95–5.11)
SODIUM SERPL-SCNC: 136 MMOL/L (ref 136–145)
WBC OTHER # BLD: 6.4 K/UL (ref 3.5–11.3)

## 2025-04-01 PROCEDURE — 82378 CARCINOEMBRYONIC ANTIGEN: CPT

## 2025-04-01 PROCEDURE — 1159F MED LIST DOCD IN RCRD: CPT | Performed by: INTERNAL MEDICINE

## 2025-04-01 PROCEDURE — 36415 COLL VENOUS BLD VENIPUNCTURE: CPT

## 2025-04-01 PROCEDURE — 99214 OFFICE O/P EST MOD 30 MIN: CPT | Performed by: INTERNAL MEDICINE

## 2025-04-01 PROCEDURE — 1123F ACP DISCUSS/DSCN MKR DOCD: CPT | Performed by: INTERNAL MEDICINE

## 2025-04-01 PROCEDURE — 1126F AMNT PAIN NOTED NONE PRSNT: CPT | Performed by: INTERNAL MEDICINE

## 2025-04-01 PROCEDURE — 80053 COMPREHEN METABOLIC PANEL: CPT

## 2025-04-01 PROCEDURE — 82248 BILIRUBIN DIRECT: CPT

## 2025-04-01 PROCEDURE — 85025 COMPLETE CBC W/AUTO DIFF WBC: CPT

## 2025-04-01 PROCEDURE — 3078F DIAST BP <80 MM HG: CPT | Performed by: INTERNAL MEDICINE

## 2025-04-01 PROCEDURE — 3074F SYST BP LT 130 MM HG: CPT | Performed by: INTERNAL MEDICINE

## 2025-04-01 PROCEDURE — 86301 IMMUNOASSAY TUMOR CA 19-9: CPT

## 2025-04-01 ASSESSMENT — ENCOUNTER SYMPTOMS
CONSTIPATION: 0
NAUSEA: 1
SORE THROAT: 0
RECTAL PAIN: 1
VOMITING: 0
ANAL BLEEDING: 0
COUGH: 0
DIARRHEA: 0
ABDOMINAL PAIN: 0
VOICE CHANGE: 0
BLOOD IN STOOL: 0
CHOKING: 0
WHEEZING: 0
TROUBLE SWALLOWING: 0
ABDOMINAL DISTENTION: 0

## 2025-04-01 NOTE — PROGRESS NOTES
GI CLINIC FOLLOW UP    INTERVAL HISTORY:   No referring provider defined for this encounter.    Chief Complaint   Patient presents with    Follow-up     6 month follow up with complaints of nausea and rectal due to hemorrhoids.            HISTORY OF PRESENT ILLNESS: Ms.Melinda ISAAC Abdul is a 69 y.o. female , referred for evaluation of  chronic pancreatitis,pancreatic insufficiency, ? IPMN , elevated tumor markers.        Here for f/u  She reports that she is feeling \"fine\"  Still taking Creon with control of her diarrhea  Occasionally feels constipated  Still on protonix 20mg per day  No heartburn  No N/v   no abd pain   no melena/hematemsis/hematochezia  No dysphagia/odynophagia   no wt loss   no diarrhea /constipation    Last visit    Repeat ca19-9/CEA/CBC/CMP and MRI   Did not do the labs       MRI     IMPRESSION:  Cystic focus marginates the pancreas and lesser curvature of the stomach,  presumably pseudocyst.     Extensive calcifications are seen throughout the pancreas.  There is little  normal appearing pancreatic gland remaining.  Extensive pancreatic gland  calcification limits evaluation of the pancreatic duct.           Exam Ended: 10/23/24 14:18 EDT Last Resulted: 10/25/24 06:19 EDT         Past Medical,Family, and Social History reviewed and does contribute to the patient presentingcondition.    I did review all the labs results available for the labs which were ordered by the primary care physician, and the other consultants, we search on [employer] at [facility] and all the available care everywhere epic    I did review all the imaging studies of the abdomen available on EMR, ordered by the primary care physician and the other consultant    I did review all the pathology from the biopsies done on the previous endoscopies    Patient's PMH/PSH,SH,PSYCH Hx, MEDs, ALLERGIES, and ROS were all reviewed and updated in the appropriate sections.    PAST MEDICAL HISTORY:  Past Medical History:   Diagnosis Date

## 2025-04-02 ENCOUNTER — RESULTS FOLLOW-UP (OUTPATIENT)
Dept: GASTROENTEROLOGY | Age: 70
End: 2025-04-02

## 2025-04-02 ENCOUNTER — TELEPHONE (OUTPATIENT)
Dept: GASTROENTEROLOGY | Age: 70
End: 2025-04-02

## 2025-04-02 NOTE — TELEPHONE ENCOUNTER
Snow with [facility] called today and left a voicemail that they had a critical lab value but did not leave name of patient only phone number to call. Writer notified [facility] and spoke with Snow. Snow noted with writer Patient had a Critical Fasting Glucose Level of 465. Please advise. Writer thanked Snow and call ended.

## 2025-04-02 NOTE — TELEPHONE ENCOUNTER
Procedure scheduled/Dr Michaud  Procedure:EUS  Dx: Elevated CA 19-9 level; Elevated CEA; Abnormal MRI of abdomen    Referred by: Arianna Madsion MD  Date:04/03/2025  Time:1:30 PM/ Arrive: 12:00 pm  Hospital:[facility] phone call:pedro  Bowel Prep instructions given:EUS instructions  In office/via phone:office   Clearance needed:n/a  GLP-1: n/a

## 2025-04-03 ENCOUNTER — ANESTHESIA EVENT (OUTPATIENT)
Dept: OPERATING ROOM | Age: 70
End: 2025-04-03
Payer: MEDICARE

## 2025-04-03 ENCOUNTER — HOSPITAL ENCOUNTER (OUTPATIENT)
Age: 70
Setting detail: OUTPATIENT SURGERY
Discharge: HOME | End: 2025-04-03
Attending: INTERNAL MEDICINE | Admitting: INTERNAL MEDICINE
Payer: MEDICARE

## 2025-04-03 ENCOUNTER — ANESTHESIA (OUTPATIENT)
Dept: OPERATING ROOM | Age: 70
End: 2025-04-03
Payer: MEDICARE

## 2025-04-03 VITALS
TEMPERATURE: 97.3 F | DIASTOLIC BLOOD PRESSURE: 90 MMHG | BODY MASS INDEX: 13.81 KG/M2 | OXYGEN SATURATION: 100 % | RESPIRATION RATE: 15 BRPM | HEIGHT: 67 IN | SYSTOLIC BLOOD PRESSURE: 148 MMHG | WEIGHT: 88 LBS | HEART RATE: 87 BPM

## 2025-04-03 DIAGNOSIS — R93.5 ABNORMAL MRI OF ABDOMEN: ICD-10-CM

## 2025-04-03 DIAGNOSIS — R97.8 ELEVATED CA 19-9 LEVEL: ICD-10-CM

## 2025-04-03 DIAGNOSIS — R97.0 ELEVATED CEA: ICD-10-CM

## 2025-04-03 LAB
AMYLASE FLD-CCNC: 4753 U/L
GLUCOSE BLD-MCNC: 229 MG/DL (ref 65–105)
GLUCOSE BLD-MCNC: 319 MG/DL (ref 65–105)
SPECIMEN TYPE: NORMAL

## 2025-04-03 PROCEDURE — 3609020800 HC EGD W/EUS FNA: Performed by: INTERNAL MEDICINE

## 2025-04-03 PROCEDURE — 2709999900 HC NON-CHARGEABLE SUPPLY: Performed by: INTERNAL MEDICINE

## 2025-04-03 PROCEDURE — 2580000003 HC RX 258: Performed by: NURSE ANESTHETIST, CERTIFIED REGISTERED

## 2025-04-03 PROCEDURE — 3700000000 HC ANESTHESIA ATTENDED CARE: Performed by: INTERNAL MEDICINE

## 2025-04-03 PROCEDURE — 3609012400 HC EGD TRANSORAL BIOPSY SINGLE/MULTIPLE: Performed by: INTERNAL MEDICINE

## 2025-04-03 PROCEDURE — 7100000011 HC PHASE II RECOVERY - ADDTL 15 MIN: Performed by: INTERNAL MEDICINE

## 2025-04-03 PROCEDURE — 88342 IMHCHEM/IMCYTCHM 1ST ANTB: CPT

## 2025-04-03 PROCEDURE — 82947 ASSAY GLUCOSE BLOOD QUANT: CPT

## 2025-04-03 PROCEDURE — 3700000001 HC ADD 15 MINUTES (ANESTHESIA): Performed by: INTERNAL MEDICINE

## 2025-04-03 PROCEDURE — 88305 TISSUE EXAM BY PATHOLOGIST: CPT

## 2025-04-03 PROCEDURE — 88112 CYTOPATH CELL ENHANCE TECH: CPT

## 2025-04-03 PROCEDURE — 6360000002 HC RX W HCPCS: Performed by: NURSE ANESTHETIST, CERTIFIED REGISTERED

## 2025-04-03 PROCEDURE — 82150 ASSAY OF AMYLASE: CPT

## 2025-04-03 PROCEDURE — 7100000010 HC PHASE II RECOVERY - FIRST 15 MIN: Performed by: INTERNAL MEDICINE

## 2025-04-03 PROCEDURE — 2580000003 HC RX 258: Performed by: ANESTHESIOLOGY

## 2025-04-03 RX ORDER — CIPROFLOXACIN 250 MG/1
250 TABLET, FILM COATED ORAL 2 TIMES DAILY
Qty: 6 TABLET | Refills: 0 | Status: SHIPPED | OUTPATIENT
Start: 2025-04-03 | End: 2025-04-06

## 2025-04-03 RX ORDER — DIPHENHYDRAMINE HYDROCHLORIDE 50 MG/ML
12.5 INJECTION, SOLUTION INTRAMUSCULAR; INTRAVENOUS
Status: DISCONTINUED | OUTPATIENT
Start: 2025-04-03 | End: 2025-04-03 | Stop reason: HOSPADM

## 2025-04-03 RX ORDER — SODIUM CHLORIDE, SODIUM LACTATE, POTASSIUM CHLORIDE, CALCIUM CHLORIDE 600; 310; 30; 20 MG/100ML; MG/100ML; MG/100ML; MG/100ML
INJECTION, SOLUTION INTRAVENOUS
Status: DISCONTINUED | OUTPATIENT
Start: 2025-04-03 | End: 2025-04-03 | Stop reason: SDUPTHER

## 2025-04-03 RX ORDER — SODIUM CHLORIDE 0.9 % (FLUSH) 0.9 %
5-40 SYRINGE (ML) INJECTION EVERY 12 HOURS SCHEDULED
Status: DISCONTINUED | OUTPATIENT
Start: 2025-04-03 | End: 2025-04-03 | Stop reason: HOSPADM

## 2025-04-03 RX ORDER — SODIUM CHLORIDE 9 MG/ML
INJECTION, SOLUTION INTRAVENOUS PRN
Status: DISCONTINUED | OUTPATIENT
Start: 2025-04-03 | End: 2025-04-03 | Stop reason: HOSPADM

## 2025-04-03 RX ORDER — SODIUM CHLORIDE 9 MG/ML
INJECTION, SOLUTION INTRAVENOUS CONTINUOUS
Status: DISCONTINUED | OUTPATIENT
Start: 2025-04-03 | End: 2025-04-03 | Stop reason: HOSPADM

## 2025-04-03 RX ORDER — PROPOFOL 10 MG/ML
INJECTION, EMULSION INTRAVENOUS
Status: DISCONTINUED | OUTPATIENT
Start: 2025-04-03 | End: 2025-04-03 | Stop reason: SDUPTHER

## 2025-04-03 RX ORDER — PROCHLORPERAZINE EDISYLATE 5 MG/ML
5 INJECTION INTRAMUSCULAR; INTRAVENOUS
Status: DISCONTINUED | OUTPATIENT
Start: 2025-04-03 | End: 2025-04-03 | Stop reason: HOSPADM

## 2025-04-03 RX ORDER — LIDOCAINE HYDROCHLORIDE 20 MG/ML
INJECTION, SOLUTION INFILTRATION; PERINEURAL
Status: DISCONTINUED | OUTPATIENT
Start: 2025-04-03 | End: 2025-04-03 | Stop reason: SDUPTHER

## 2025-04-03 RX ORDER — LIDOCAINE HYDROCHLORIDE 10 MG/ML
1 INJECTION, SOLUTION EPIDURAL; INFILTRATION; INTRACAUDAL; PERINEURAL
Status: DISCONTINUED | OUTPATIENT
Start: 2025-04-03 | End: 2025-04-03 | Stop reason: HOSPADM

## 2025-04-03 RX ORDER — ONDANSETRON 2 MG/ML
4 INJECTION INTRAMUSCULAR; INTRAVENOUS
Status: DISCONTINUED | OUTPATIENT
Start: 2025-04-03 | End: 2025-04-03 | Stop reason: HOSPADM

## 2025-04-03 RX ORDER — SODIUM CHLORIDE 0.9 % (FLUSH) 0.9 %
5-40 SYRINGE (ML) INJECTION PRN
Status: DISCONTINUED | OUTPATIENT
Start: 2025-04-03 | End: 2025-04-03 | Stop reason: HOSPADM

## 2025-04-03 RX ORDER — SODIUM CHLORIDE, SODIUM LACTATE, POTASSIUM CHLORIDE, CALCIUM CHLORIDE 600; 310; 30; 20 MG/100ML; MG/100ML; MG/100ML; MG/100ML
INJECTION, SOLUTION INTRAVENOUS CONTINUOUS
Status: DISCONTINUED | OUTPATIENT
Start: 2025-04-03 | End: 2025-04-03 | Stop reason: HOSPADM

## 2025-04-03 RX ORDER — CEFAZOLIN SODIUM 1 G/3ML
INJECTION, POWDER, FOR SOLUTION INTRAMUSCULAR; INTRAVENOUS
Status: DISCONTINUED | OUTPATIENT
Start: 2025-04-03 | End: 2025-04-03 | Stop reason: SDUPTHER

## 2025-04-03 RX ADMIN — CEFAZOLIN 2 G: 1 INJECTION, POWDER, FOR SOLUTION INTRAMUSCULAR; INTRAVENOUS at 16:49

## 2025-04-03 RX ADMIN — LIDOCAINE HYDROCHLORIDE 40 MG: 20 INJECTION, SOLUTION INFILTRATION; PERINEURAL at 16:34

## 2025-04-03 RX ADMIN — SODIUM CHLORIDE, POTASSIUM CHLORIDE, SODIUM LACTATE AND CALCIUM CHLORIDE: 600; 310; 30; 20 INJECTION, SOLUTION INTRAVENOUS at 16:30

## 2025-04-03 RX ADMIN — SODIUM CHLORIDE, SODIUM LACTATE, POTASSIUM CHLORIDE, AND CALCIUM CHLORIDE: .6; .31; .03; .02 INJECTION, SOLUTION INTRAVENOUS at 13:21

## 2025-04-03 RX ADMIN — PROPOFOL 150 MCG/KG/MIN: 10 INJECTION, EMULSION INTRAVENOUS at 16:34

## 2025-04-03 ASSESSMENT — PAIN - FUNCTIONAL ASSESSMENT: PAIN_FUNCTIONAL_ASSESSMENT: 0-10

## 2025-04-03 ASSESSMENT — LIFESTYLE VARIABLES: SMOKING_STATUS: 1

## 2025-04-03 ASSESSMENT — ENCOUNTER SYMPTOMS: SHORTNESS OF BREATH: 0

## 2025-04-03 NOTE — ANESTHESIA POSTPROCEDURE EVALUATION
Department of Anesthesiology  Postprocedure Note    Patient: Gloria Abdul  MRN: [id number]  YOB: 1955  Date of evaluation: 4/3/2025    Procedure Summary       Date: 04/03/25 Room / Location: [address]    Anesthesia Start: 1630 Anesthesia Stop: 1710    Procedures:       ENDOSCOPIC ULTRASOUND WITH PATHOLOGY with biopsies      ESOPHAGOGASTRODUODENOSCOPY BIOPSY Diagnosis:       Elevated CA 19-9 level      Elevated CEA      Abnormal MRI of abdomen      (Elevated CA 19-9 level [R97.8])      (Elevated CEA [R97.0])      (Abnormal MRI of abdomen [R93.5])    Surgeons: Андрей Michaud MD Responsible Provider: Dale Alonso DO    Anesthesia Type: MAC ASA Status: 3            Anesthesia Type: No value filed.    Angelita Phase I: Angelita Score: 10    Angelita Phase II: Angelita Score: 8    Anesthesia Post Evaluation    Patient location during evaluation: PACU  Patient participation: complete - patient participated  Level of consciousness: awake and alert  Airway patency: patent  Nausea & Vomiting: no nausea and no vomiting  Cardiovascular status: hemodynamically stable  Respiratory status: acceptable  Hydration status: stable  Pain management: adequate    No notable events documented.

## 2025-04-03 NOTE — TELEPHONE ENCOUNTER
She needs to make sure to call her PCP and get clear directions on sliding scale insulin for when this occurs.

## 2025-04-03 NOTE — ANESTHESIA PRE PROCEDURE
Department of Anesthesiology  Preprocedure Note       Name:  Gloria Abdul   Age:  69 y.o.  :  1955                                          MRN:  [id number]         Date:  4/3/2025      Surgeon: Surgeon(s):  Андрей Michaud MD    Procedure: Procedure(s):  ENDOSCOPIC ULTRASOUND WITH PATHOLOGY  ESOPHAGOGASTRODUODENOSCOPY BIOPSY    Medications prior to admission:   Prior to Admission medications    Medication Sig Start Date End Date Taking? Authorizing Provider   lipase-protease-amylase (CREON) [id number] units CPEP delayed release capsule Take 2 capsules by mouth 3 times daily (with meals) 10/24/24  Yes Arianna Madison MD   insulin glargine (BASAGLAR KWIKPEN) 100 UNIT/ML injection pen Inject 6 Units into the skin daily  Patient taking differently: Inject 20 Units into the skin daily 10/7/24  Yes Marie Mcconnell MD   Nutritional Supplements (GLUCERNA ADVANCE SHAKE) LIQD Take 1 Bottle by mouth in the morning and at bedtime 3/25/24  Yes Nevaeh Neri APRN - CNP   umeclidinium bromide (INCRUSE ELLIPTA) 62.5 MCG/ACT inhaler Inhale 1 puff into the lungs daily 1/10/24  Yes Porter Francis DO   albuterol sulfate HFA (VENTOLIN HFA) 108 (90 Base) MCG/ACT inhaler Inhale 2 puffs into the lungs 4 times daily as needed for Wheezing 10/23/23  Yes Leodan Malloy DO   insulin aspart (NOVOLOG FLEXPEN) 100 UNIT/ML injection pen Inject 5 Units into the skin 3 times daily (before meals) Scale only 23  Yes Porter Francis DO   SYMBICORT 80-4.5 MCG/ACT AERO Inhale 2 puffs into the lungs 2 times daily 10/13/21 4/3/25 Yes Livia Neal APRN - CNP   glucose monitoring kit 1 kit by Does not apply route daily 3/25/24   Nevaeh Neri APRN - CNP   blood glucose monitor strips Test 4 times a day (before meals and bedtime) & as needed for symptoms of irregular blood glucose. Dispense sufficient amount for indicated testing frequency plus additional to accommodate PRN testing needs. 3/25/24   Nevaeh Neri

## 2025-04-03 NOTE — DISCHARGE INSTRUCTIONS
[facility]    POST-ENDOSCOPY INSTRUCTIONS    1. ACTIVITY   No driving, operating machinery, or making important decisions for 24 hours.    Resume normal activity after 24 hours.  You may return to work after 24 hours.    2. DIET        Resume your usual diet unless specified below.   ***    3. MEDICATIONS    Resume your usual medications.     Cipro to 250 mg twice daily for 3 days    Do not consume alcohol, tranquilizers, or sleeping medications for 24 hour unless advised by your physician.                 4. PHYSICIAN FOLLOW-UP / INSTRUCTIONS    Please call the office/clinic in 10 days for biopsy results:      [  ] GI office:  [phone] option #2          Follow up with your family physician as planned.    6. NORMAL CHANGES YOU MAY EXPERIENCE AFTER ENDOSCOPY:         EGD/EUS          Sore throat after EGD/EUS       A bloated feeling and belching from       air in stomach               You may feel fatigued for the next 24-48    hours due to the prep and sedation    7. CALL YOUR PHYSICIAN IF YOU EXPERIENCE ANY OF THE FOLLOWING      A.  Passing blood rectally or vomiting blood (color may be red or black)      B.  Severe abdominal pain or tenderness (that is not relieved by passing air)      C.  Fever, chills, or excessive sweating      D.  Persistent nausea or vomiting      E.  Redness or swelling at the IV site    If you have additional questions, PLEASE call your doctor or the [facility] GI Unit ([phone] option #2)

## 2025-04-03 NOTE — OP NOTE
Operative Note      Patient: Gloria Abdul  YOB: 1955  MRN: [id number]    Date of Procedure: 4/3/2025    Pre-Op Diagnosis Codes:      * Elevated CA 19-9 level [R97.8]     * Elevated CEA [R97.0]     * Abnormal MRI of abdomen [R93.5]    Post-Op Diagnosis:  Gastritis, severe calcific chronic pancreatitis, pancreatic cyst status post aspiration, sludge in the gallbladder       Procedure(s):  ENDOSCOPIC ULTRASOUND WITH PATHOLOGY with biopsies  ESOPHAGOGASTRODUODENOSCOPY BIOPSY    Surgeon(s):  Андрей Michaud MD    Assistant:   * No surgical staff found *    Anesthesia: Monitor Anesthesia Care    Estimated Blood Loss (mL): Minimal    Complications: None    Specimens:   ID Type Source Tests Collected by Time Destination   A : duodenal biopsies Tissue Duodenum SURGICAL PATHOLOGY Андрей Michaud MD 4/3/2025 1638    B : gastric biopsies Tissue Stomach SURGICAL PATHOLOGY Андрей Michaud MD 4/3/2025 1638    C : cyst of the pancreas fluid Body Fluid Pancreas CYTOLOGY, NON-GYN, AMYLASE, CEA Андрей Michaud MD 4/3/2025 1648        Implants:  * No implants in log *      Drains: * No LDAs found *    Findings:  Infection Present At Time Of Surgery (PATOS) (choose all levels that have infection present):  No infection present      Description of Procedure:  Informed consent was obtained from the patient after explanation of the procedure including indications, description of the procedure,  benefits and possible risks and complications of the procedure, and alternatives. Questions were answered.  The patient's history was reviewed and a directed physical examination was performed prior to the procedure.    Patient was monitored throughout the procedure with pulse oximetry and periodic assessment of vital signs. Patient was sedated as noted above. With the patient in the left lateral decubitus position, the Olympus videoendoscope followed by endoechoendoscope was placed in the patient's

## 2025-04-03 NOTE — H&P
(GLUCERNA ADVANCE SHAKE) LIQD, Take 1 Bottle by mouth in the morning and at bedtime, Disp: 60 each, Rfl: 2    umeclidinium bromide (INCRUSE ELLIPTA) 62.5 MCG/ACT inhaler, Inhale 1 puff into the lungs daily, Disp: 1 each, Rfl: 1    albuterol sulfate HFA (VENTOLIN HFA) 108 (90 Base) MCG/ACT inhaler, Inhale 2 puffs into the lungs 4 times daily as needed for Wheezing, Disp: 54 g, Rfl: 1    insulin aspart (NOVOLOG FLEXPEN) 100 UNIT/ML injection pen, Inject 5 Units into the skin 3 times daily (before meals) Scale only, Disp: 1 Adjustable Dose Pre-filled Pen Syringe, Rfl: 0    Blood Gluc Meter Disp-Strips (BLOOD GLUCOSE METER DISPOSABLE) OXANA, Daily and as needed, Disp: 1 each, Rfl: 0    Lancets MISC, 1 each by Does not apply route 2 times daily, Disp: 300 each, Rfl: 1    Alcohol Swabs (ALCOHOL PREP) 70 % PADS, Daily as needed, Disp: 200 each, Rfl: 3    Multiple Vitamin (MULTI-VITAMIN DAILY PO), Take by mouth, Disp: , Rfl:     AquaLance Lancets 30G MISC, Daily and as needed for hypoglycemia, Disp: 200 each, Rfl: 2    pantoprazole (PROTONIX) 20 MG tablet, Take 1 tablet by mouth every morning (before breakfast) (Patient not taking: Reported on 4/1/2025), Disp: , Rfl:     metoprolol succinate (TOPROL XL) 25 MG extended release tablet, Take 1 tablet by mouth daily (Patient not taking: Reported on 4/1/2025), Disp: 30 tablet, Rfl: 3    atorvastatin (LIPITOR) 40 MG tablet, Take 1 tablet by mouth daily (Patient not taking: Reported on 4/1/2025), Disp: 30 tablet, Rfl: 3    Insulin Pen Needle 32G X 4 MM MISC, 1 each by Does not apply route 4 times daily (after meals and at bedtime), Disp: 1000 each, Rfl: 3    sodium bicarbonate 650 MG tablet, TAKE 1 TABLET BY MOUTH THREE TIMES DAILY (Patient not taking: Reported on 4/1/2025), Disp: 90 tablet, Rfl: 0    SYMBICORT 80-4.5 MCG/ACT AERO, Inhale 2 puffs into the lungs 2 times daily, Disp: 3 each, Rfl: 2    ALLERGIES:   No Known Allergies    FAMILY HISTORY:       Problem Relation Age of  Onset    Brain Cancer Father     Diabetes Sister     Other Sister         epilepsy         SOCIAL HISTORY:   Social History     Socioeconomic History    Marital status:      Spouse name: Not on file    Number of children: Not on file    Years of education: Not on file    Highest education level: Not on file   Occupational History    Not on file   Tobacco Use    Smoking status: Every Day     Current packs/day: 0.25     Average packs/day: 0.3 packs/day for 40.0 years (10.0 ttl pk-yrs)     Types: Cigarettes     Passive exposure: Current    Smokeless tobacco: Former   Vaping Use    Vaping status: Never Used   Substance and Sexual Activity    Alcohol use: No    Drug use: Yes     Types: Marijuana (Weed)     Comment: stopped 10-    Sexual activity: Not on file     Comment: last use end 10/2014   Other Topics Concern    Not on file   Social History Narrative    Not on file     Social Drivers of Health     Financial Resource Strain: Low Risk  (5/31/2022)    Overall Financial Resource Strain (CARDIA)     Difficulty of Paying Living Expenses: Not hard at all   Food Insecurity: No Food Insecurity (3/24/2024)    Hunger Vital Sign     Worried About Running Out of Food in the Last Year: Never true     Ran Out of Food in the Last Year: Never true   Transportation Needs: No Transportation Needs (3/24/2024)    PRAPARE - Transportation     Lack of Transportation (Medical): No     Lack of Transportation (Non-Medical): No   Physical Activity: Not on file   Stress: Not on file   Social Connections: Not on file   Intimate Partner Violence: Unknown (2/22/2024)    [facility] from [facility], [facility]    [place] Safety & Environment     Fear of Current or Ex-Partner: Not on file     Emotionally Abused: Not on file     Physically Abused: Not on file     Sexually Abused: Not on file     Physically or Sexually Abused: Not on file   Housing Stability: Low Risk  (3/24/2024)    Housing Stability Vital Sign

## 2025-04-04 ENCOUNTER — HOSPITAL ENCOUNTER (OUTPATIENT)
Age: 70
Discharge: HOME | End: 2025-04-04
Payer: MEDICARE

## 2025-04-04 LAB
25(OH)D3 SERPL-MCNC: 34.3 NG/ML (ref 30–100)
ALBUMIN SERPL-MCNC: 4.3 G/DL (ref 3.5–5.2)
ALBUMIN/GLOB SERPL: 1.7 {RATIO} (ref 1–2.5)
ALP SERPL-CCNC: 70 U/L (ref 35–104)
ALT SERPL-CCNC: 11 U/L (ref 10–35)
ANION GAP SERPL CALCULATED.3IONS-SCNC: 16 MMOL/L (ref 9–16)
AST SERPL-CCNC: 22 U/L (ref 10–35)
BACTERIA URNS QL MICRO: NORMAL
BASOPHILS # BLD: 0.07 K/UL (ref 0–0.2)
BASOPHILS NFR BLD: 1 % (ref 0–2)
BILIRUB SERPL-MCNC: 0.3 MG/DL (ref 0–1.2)
BILIRUB UR QL STRIP: NEGATIVE
BUN SERPL-MCNC: 8 MG/DL (ref 8–23)
CALCIUM SERPL-MCNC: 9.9 MG/DL (ref 8.6–10.4)
CASE NUMBER:: NORMAL
CASTS #/AREA URNS LPF: NORMAL /LPF (ref 0–8)
CHLORIDE SERPL-SCNC: 103 MMOL/L (ref 98–107)
CHOLEST SERPL-MCNC: 127 MG/DL (ref 0–199)
CHOLESTEROL/HDL RATIO: 1.5
CLARITY UR: ABNORMAL
CO2 SERPL-SCNC: 23 MMOL/L (ref 20–31)
COLOR UR: YELLOW
CREAT SERPL-MCNC: 0.9 MG/DL (ref 0.6–0.9)
EOSINOPHIL # BLD: 0.21 K/UL (ref 0–0.44)
EOSINOPHILS RELATIVE PERCENT: 3 % (ref 1–4)
EPI CELLS #/AREA URNS HPF: NORMAL /HPF (ref 0–5)
ERYTHROCYTE [DISTWIDTH] IN BLOOD BY AUTOMATED COUNT: 13.2 % (ref 11.8–14.4)
GFR, ESTIMATED: 70 ML/MIN/1.73M2
GLUCOSE SERPL-MCNC: 91 MG/DL (ref 74–99)
GLUCOSE UR STRIP-MCNC: ABNORMAL MG/DL
HCT VFR BLD AUTO: 39.5 % (ref 36.3–47.1)
HDLC SERPL-MCNC: 82 MG/DL
HGB BLD-MCNC: 12.4 G/DL (ref 11.9–15.1)
HGB UR QL STRIP.AUTO: NEGATIVE
IMM GRANULOCYTES # BLD AUTO: <0.03 K/UL (ref 0–0.3)
IMM GRANULOCYTES NFR BLD: 0 %
KETONES UR STRIP-MCNC: NEGATIVE MG/DL
LDLC SERPL CALC-MCNC: 34 MG/DL (ref 0–100)
LEUKOCYTE ESTERASE UR QL STRIP: NEGATIVE
LYMPHOCYTES NFR BLD: 2.46 K/UL (ref 1.1–3.7)
LYMPHOCYTES RELATIVE PERCENT: 31 % (ref 24–43)
MCH RBC QN AUTO: 32.8 PG (ref 25.2–33.5)
MCHC RBC AUTO-ENTMCNC: 31.4 G/DL (ref 28.4–34.8)
MCV RBC AUTO: 104.5 FL (ref 82.6–102.9)
MONOCYTES NFR BLD: 0.72 K/UL (ref 0.1–1.2)
MONOCYTES NFR BLD: 9 % (ref 3–12)
NEUTROPHILS NFR BLD: 57 % (ref 36–65)
NEUTS SEG NFR BLD: 4.53 K/UL (ref 1.5–8.1)
NITRITE UR QL STRIP: NEGATIVE
NRBC BLD-RTO: 0 PER 100 WBC
PH UR STRIP: 5.5 [PH] (ref 5–8)
PLATELET # BLD AUTO: 225 K/UL (ref 138–453)
PMV BLD AUTO: 10.1 FL (ref 8.1–13.5)
POTASSIUM SERPL-SCNC: 3.5 MMOL/L (ref 3.7–5.3)
PROT SERPL-MCNC: 6.9 G/DL (ref 6.6–8.7)
PROT UR STRIP-MCNC: NEGATIVE MG/DL
RBC # BLD AUTO: 3.78 M/UL (ref 3.95–5.11)
RBC # BLD: ABNORMAL 10*6/UL
RBC #/AREA URNS HPF: NORMAL /HPF (ref 0–4)
SEND OUT REPORT: NORMAL
SODIUM SERPL-SCNC: 142 MMOL/L (ref 136–145)
SP GR UR STRIP: 1.01 (ref 1–1.03)
SPECIMEN DESCRIPTION: NORMAL
TEST NAME: NORMAL
TRIGL SERPL-MCNC: 53 MG/DL
TSH SERPL DL<=0.05 MIU/L-ACNC: 0.99 UIU/ML (ref 0.27–4.2)
UROBILINOGEN UR STRIP-ACNC: NORMAL EU/DL (ref 0–1)
VLDLC SERPL CALC-MCNC: 11 MG/DL (ref 1–30)
WBC #/AREA URNS HPF: NORMAL /HPF (ref 0–5)
WBC OTHER # BLD: 8 K/UL (ref 3.5–11.3)

## 2025-04-04 PROCEDURE — 81001 URINALYSIS AUTO W/SCOPE: CPT

## 2025-04-04 PROCEDURE — 80061 LIPID PANEL: CPT

## 2025-04-04 PROCEDURE — 85025 COMPLETE CBC W/AUTO DIFF WBC: CPT

## 2025-04-04 PROCEDURE — 82306 VITAMIN D 25 HYDROXY: CPT

## 2025-04-04 PROCEDURE — 84443 ASSAY THYROID STIM HORMONE: CPT

## 2025-04-04 PROCEDURE — 36415 COLL VENOUS BLD VENIPUNCTURE: CPT

## 2025-04-04 PROCEDURE — 80053 COMPREHEN METABOLIC PANEL: CPT

## 2025-04-07 LAB — SURGICAL PATHOLOGY REPORT: NORMAL

## 2025-04-08 LAB — SURGICAL PATHOLOGY REPORT: NORMAL

## 2025-04-22 ENCOUNTER — TELEPHONE (OUTPATIENT)
Dept: GASTROENTEROLOGY | Age: 70
End: 2025-04-22

## 2025-04-22 NOTE — TELEPHONE ENCOUNTER
----- Message from Alison THAO sent at 4/22/2025  3:13 PM EDT -----    ----- Message -----  From: Андрей Michaud MD  Sent: 4/8/2025   1:29 PM EDT  To: Indy Ortega; Danyelle Maria MA; #    Pathology of the cyst fluid taken out at the time of your endoscopic ultrasound is negative for cancer.  Biopsies from your stomach shows inflammation but no evidence of infection.  You should follow-up with Dr. Madison  and PCP for further follow-up

## 2025-05-04 DIAGNOSIS — K86.89 PANCREATIC INSUFFICIENCY: ICD-10-CM

## 2025-05-05 RX ORDER — PANCRELIPASE 36000; 180000; 114000 [USP'U]/1; [USP'U]/1; [USP'U]/1
CAPSULE, DELAYED RELEASE PELLETS ORAL
Qty: 200 CAPSULE | Refills: 9 | Status: SHIPPED | OUTPATIENT
Start: 2025-05-05

## 2025-05-13 ENCOUNTER — TELEPHONE (OUTPATIENT)
Dept: GASTROENTEROLOGY | Age: 70
End: 2025-05-13

## 2025-05-13 NOTE — TELEPHONE ENCOUNTER
Patient called in and cancelled today visit, [facility] not able to accommodate reschedule. Please call to discuss    Thank  you

## 2025-06-11 ENCOUNTER — HOSPITAL ENCOUNTER (INPATIENT)
Age: 70
LOS: 1 days | Discharge: HOME | DRG: 639 | End: 2025-06-12
Attending: EMERGENCY MEDICINE | Admitting: INTERNAL MEDICINE
Payer: MEDICARE

## 2025-06-11 ENCOUNTER — APPOINTMENT (OUTPATIENT)
Dept: GENERAL RADIOLOGY | Age: 70
DRG: 639 | End: 2025-06-11
Payer: MEDICARE

## 2025-06-11 DIAGNOSIS — E16.2 HYPOGLYCEMIA: Primary | ICD-10-CM

## 2025-06-11 DIAGNOSIS — E11.9 TYPE 2 DIABETES MELLITUS WITHOUT COMPLICATION, WITHOUT LONG-TERM CURRENT USE OF INSULIN (HCC): ICD-10-CM

## 2025-06-11 DIAGNOSIS — E11.42 TYPE 2 DIABETES MELLITUS WITH DIABETIC POLYNEUROPATHY, WITH LONG-TERM CURRENT USE OF INSULIN (HCC): ICD-10-CM

## 2025-06-11 DIAGNOSIS — Z79.4 TYPE 2 DIABETES MELLITUS WITH DIABETIC POLYNEUROPATHY, WITH LONG-TERM CURRENT USE OF INSULIN (HCC): ICD-10-CM

## 2025-06-11 LAB
ALBUMIN SERPL-MCNC: 4 G/DL (ref 3.5–5.2)
ALBUMIN/GLOB SERPL: 1.4 {RATIO} (ref 1–2.5)
ALP SERPL-CCNC: 90 U/L (ref 35–104)
ALT SERPL-CCNC: 20 U/L (ref 10–35)
ANION GAP SERPL CALCULATED.3IONS-SCNC: 12 MMOL/L (ref 9–16)
AST SERPL-CCNC: 39 U/L (ref 10–35)
BILIRUB SERPL-MCNC: 0.3 MG/DL (ref 0–1.2)
BILIRUB UR QL STRIP: NEGATIVE
BUN SERPL-MCNC: 12 MG/DL (ref 8–23)
C PEPTIDE SERPL-MCNC: <0.1 NG/ML (ref 1.1–4.4)
CALCIUM SERPL-MCNC: 9.3 MG/DL (ref 8.6–10.4)
CHLORIDE SERPL-SCNC: 96 MMOL/L (ref 98–107)
CLARITY UR: CLEAR
CO2 SERPL-SCNC: 29 MMOL/L (ref 20–31)
COLOR UR: YELLOW
COMMENT: ABNORMAL
CREAT SERPL-MCNC: 0.8 MG/DL (ref 0.6–0.9)
ERYTHROCYTE [DISTWIDTH] IN BLOOD BY AUTOMATED COUNT: 13 % (ref 11.8–14.4)
GFR, ESTIMATED: 80 ML/MIN/1.73M2
GLUCOSE BLD-MCNC: 146 MG/DL (ref 65–105)
GLUCOSE BLD-MCNC: 167 MG/DL (ref 65–105)
GLUCOSE BLD-MCNC: 202 MG/DL (ref 65–105)
GLUCOSE BLD-MCNC: 208 MG/DL (ref 65–105)
GLUCOSE BLD-MCNC: 234 MG/DL (ref 65–105)
GLUCOSE BLD-MCNC: 253 MG/DL (ref 65–105)
GLUCOSE BLD-MCNC: 255 MG/DL (ref 65–105)
GLUCOSE BLD-MCNC: 45 MG/DL (ref 65–105)
GLUCOSE BLD-MCNC: 58 MG/DL
GLUCOSE BLD-MCNC: 58 MG/DL (ref 65–105)
GLUCOSE BLD-MCNC: 99 MG/DL (ref 65–105)
GLUCOSE SERPL-MCNC: 49 MG/DL (ref 74–99)
GLUCOSE UR STRIP-MCNC: ABNORMAL MG/DL
HCT VFR BLD AUTO: 38.9 % (ref 36.3–47.1)
HGB BLD-MCNC: 13.6 G/DL (ref 11.9–15.1)
HGB UR QL STRIP.AUTO: NEGATIVE
INSULIN REFERENCE RANGE:: NORMAL
INSULIN: 0.9 MU/L
KETONES UR STRIP-MCNC: NEGATIVE MG/DL
LEUKOCYTE ESTERASE UR QL STRIP: NEGATIVE
MCH RBC QN AUTO: 33.6 PG (ref 25.2–33.5)
MCHC RBC AUTO-ENTMCNC: 35 G/DL (ref 28.4–34.8)
MCV RBC AUTO: 96 FL (ref 82.6–102.9)
NITRITE UR QL STRIP: NEGATIVE
NRBC BLD-RTO: 0 PER 100 WBC
PH UR STRIP: 6 [PH] (ref 5–8)
PLATELET # BLD AUTO: 262 K/UL (ref 138–453)
PMV BLD AUTO: 10.7 FL (ref 8.1–13.5)
POTASSIUM SERPL-SCNC: 4.2 MMOL/L (ref 3.7–5.3)
PROT SERPL-MCNC: 6.8 G/DL (ref 6.6–8.7)
PROT UR STRIP-MCNC: NEGATIVE MG/DL
RBC # BLD AUTO: 4.05 M/UL (ref 3.95–5.11)
SODIUM SERPL-SCNC: 137 MMOL/L (ref 136–145)
SP GR UR STRIP: 1.01 (ref 1–1.03)
UROBILINOGEN UR STRIP-ACNC: NORMAL EU/DL (ref 0–1)
WBC OTHER # BLD: 7.4 K/UL (ref 3.5–11.3)

## 2025-06-11 PROCEDURE — 2580000003 HC RX 258: Performed by: NURSE PRACTITIONER

## 2025-06-11 PROCEDURE — G0378 HOSPITAL OBSERVATION PER HR: HCPCS

## 2025-06-11 PROCEDURE — 71046 X-RAY EXAM CHEST 2 VIEWS: CPT

## 2025-06-11 PROCEDURE — 82947 ASSAY GLUCOSE BLOOD QUANT: CPT

## 2025-06-11 PROCEDURE — 96361 HYDRATE IV INFUSION ADD-ON: CPT

## 2025-06-11 PROCEDURE — 99223 1ST HOSP IP/OBS HIGH 75: CPT | Performed by: STUDENT IN AN ORGANIZED HEALTH CARE EDUCATION/TRAINING PROGRAM

## 2025-06-11 PROCEDURE — 80053 COMPREHEN METABOLIC PANEL: CPT

## 2025-06-11 PROCEDURE — 96360 HYDRATION IV INFUSION INIT: CPT

## 2025-06-11 PROCEDURE — 85027 COMPLETE CBC AUTOMATED: CPT

## 2025-06-11 PROCEDURE — 6360000002 HC RX W HCPCS: Performed by: STUDENT IN AN ORGANIZED HEALTH CARE EDUCATION/TRAINING PROGRAM

## 2025-06-11 PROCEDURE — 81003 URINALYSIS AUTO W/O SCOPE: CPT

## 2025-06-11 PROCEDURE — 83525 ASSAY OF INSULIN: CPT

## 2025-06-11 PROCEDURE — 94640 AIRWAY INHALATION TREATMENT: CPT

## 2025-06-11 PROCEDURE — 84681 ASSAY OF C-PEPTIDE: CPT

## 2025-06-11 PROCEDURE — 96372 THER/PROPH/DIAG INJ SC/IM: CPT

## 2025-06-11 PROCEDURE — 6370000000 HC RX 637 (ALT 250 FOR IP): Performed by: STUDENT IN AN ORGANIZED HEALTH CARE EDUCATION/TRAINING PROGRAM

## 2025-06-11 PROCEDURE — 99285 EMERGENCY DEPT VISIT HI MDM: CPT

## 2025-06-11 PROCEDURE — 2580000003 HC RX 258

## 2025-06-11 PROCEDURE — 1200000000 HC SEMI PRIVATE

## 2025-06-11 PROCEDURE — 2580000003 HC RX 258: Performed by: EMERGENCY MEDICINE

## 2025-06-11 PROCEDURE — 2580000003 HC RX 258: Performed by: STUDENT IN AN ORGANIZED HEALTH CARE EDUCATION/TRAINING PROGRAM

## 2025-06-11 RX ORDER — ALBUTEROL SULFATE 90 UG/1
2 INHALANT RESPIRATORY (INHALATION) 4 TIMES DAILY PRN
Status: DISCONTINUED | OUTPATIENT
Start: 2025-06-11 | End: 2025-06-12 | Stop reason: HOSPADM

## 2025-06-11 RX ORDER — ACETAMINOPHEN 325 MG/1
650 TABLET ORAL EVERY 6 HOURS PRN
Status: DISCONTINUED | OUTPATIENT
Start: 2025-06-11 | End: 2025-06-12 | Stop reason: HOSPADM

## 2025-06-11 RX ORDER — ENOXAPARIN SODIUM 100 MG/ML
30 INJECTION SUBCUTANEOUS DAILY
Status: DISCONTINUED | OUTPATIENT
Start: 2025-06-11 | End: 2025-06-12 | Stop reason: HOSPADM

## 2025-06-11 RX ORDER — ONDANSETRON 2 MG/ML
4 INJECTION INTRAMUSCULAR; INTRAVENOUS EVERY 6 HOURS PRN
Status: DISCONTINUED | OUTPATIENT
Start: 2025-06-11 | End: 2025-06-12 | Stop reason: HOSPADM

## 2025-06-11 RX ORDER — DEXTROSE MONOHYDRATE 25 G/50ML
INJECTION, SOLUTION INTRAVENOUS
Status: DISPENSED
Start: 2025-06-11 | End: 2025-06-11

## 2025-06-11 RX ORDER — ONDANSETRON 4 MG/1
4 TABLET, ORALLY DISINTEGRATING ORAL EVERY 8 HOURS PRN
Status: DISCONTINUED | OUTPATIENT
Start: 2025-06-11 | End: 2025-06-12 | Stop reason: HOSPADM

## 2025-06-11 RX ORDER — MAGNESIUM SULFATE 1 G/100ML
1000 INJECTION INTRAVENOUS PRN
Status: DISCONTINUED | OUTPATIENT
Start: 2025-06-11 | End: 2025-06-12 | Stop reason: HOSPADM

## 2025-06-11 RX ORDER — SODIUM CHLORIDE 9 MG/ML
INJECTION, SOLUTION INTRAVENOUS PRN
Status: DISCONTINUED | OUTPATIENT
Start: 2025-06-11 | End: 2025-06-12 | Stop reason: HOSPADM

## 2025-06-11 RX ORDER — DEXTROSE MONOHYDRATE 100 MG/ML
INJECTION, SOLUTION INTRAVENOUS
Status: COMPLETED
Start: 2025-06-11 | End: 2025-06-11

## 2025-06-11 RX ORDER — DEXTROSE MONOHYDRATE AND SODIUM CHLORIDE 5; .45 G/100ML; G/100ML
INJECTION, SOLUTION INTRAVENOUS CONTINUOUS
Status: DISCONTINUED | OUTPATIENT
Start: 2025-06-11 | End: 2025-06-12

## 2025-06-11 RX ORDER — DEXTROSE MONOHYDRATE 100 MG/ML
INJECTION, SOLUTION INTRAVENOUS CONTINUOUS PRN
Status: DISCONTINUED | OUTPATIENT
Start: 2025-06-11 | End: 2025-06-12 | Stop reason: HOSPADM

## 2025-06-11 RX ORDER — BUDESONIDE AND FORMOTEROL FUMARATE DIHYDRATE 80; 4.5 UG/1; UG/1
2 AEROSOL RESPIRATORY (INHALATION) 2 TIMES DAILY
Status: DISCONTINUED | OUTPATIENT
Start: 2025-06-11 | End: 2025-06-12 | Stop reason: HOSPADM

## 2025-06-11 RX ORDER — GLUCAGON 1 MG/ML
1 KIT INJECTION PRN
Status: DISCONTINUED | OUTPATIENT
Start: 2025-06-11 | End: 2025-06-12 | Stop reason: HOSPADM

## 2025-06-11 RX ORDER — ACETAMINOPHEN 650 MG/1
650 SUPPOSITORY RECTAL EVERY 6 HOURS PRN
Status: DISCONTINUED | OUTPATIENT
Start: 2025-06-11 | End: 2025-06-12 | Stop reason: HOSPADM

## 2025-06-11 RX ORDER — DEXTROSE MONOHYDRATE 100 MG/ML
INJECTION, SOLUTION INTRAVENOUS CONTINUOUS
Status: DISCONTINUED | OUTPATIENT
Start: 2025-06-11 | End: 2025-06-12

## 2025-06-11 RX ORDER — POTASSIUM CHLORIDE 1500 MG/1
40 TABLET, EXTENDED RELEASE ORAL PRN
Status: DISCONTINUED | OUTPATIENT
Start: 2025-06-11 | End: 2025-06-12 | Stop reason: HOSPADM

## 2025-06-11 RX ORDER — SODIUM CHLORIDE 0.9 % (FLUSH) 0.9 %
5-40 SYRINGE (ML) INJECTION EVERY 12 HOURS SCHEDULED
Status: DISCONTINUED | OUTPATIENT
Start: 2025-06-11 | End: 2025-06-12 | Stop reason: HOSPADM

## 2025-06-11 RX ORDER — SODIUM CHLORIDE 0.9 % (FLUSH) 0.9 %
10 SYRINGE (ML) INJECTION PRN
Status: DISCONTINUED | OUTPATIENT
Start: 2025-06-11 | End: 2025-06-12 | Stop reason: HOSPADM

## 2025-06-11 RX ORDER — POTASSIUM CHLORIDE 7.45 MG/ML
10 INJECTION INTRAVENOUS PRN
Status: DISCONTINUED | OUTPATIENT
Start: 2025-06-11 | End: 2025-06-12 | Stop reason: HOSPADM

## 2025-06-11 RX ORDER — POLYETHYLENE GLYCOL 3350 17 G/17G
17 POWDER, FOR SOLUTION ORAL DAILY PRN
Status: DISCONTINUED | OUTPATIENT
Start: 2025-06-11 | End: 2025-06-12 | Stop reason: HOSPADM

## 2025-06-11 RX ADMIN — ENOXAPARIN SODIUM 30 MG: 100 INJECTION SUBCUTANEOUS at 14:26

## 2025-06-11 RX ADMIN — DEXTROSE MONOHYDRATE: 100 INJECTION, SOLUTION INTRAVENOUS at 10:15

## 2025-06-11 RX ADMIN — DEXTROSE MONOHYDRATE: 100 INJECTION, SOLUTION INTRAVENOUS at 16:11

## 2025-06-11 RX ADMIN — DEXTROSE MONOHYDRATE: 100 INJECTION, SOLUTION INTRAVENOUS at 19:20

## 2025-06-11 RX ADMIN — DEXTROSE MONOHYDRATE: 100 INJECTION, SOLUTION INTRAVENOUS at 13:22

## 2025-06-11 RX ADMIN — PANCRELIPASE LIPASE, PANCRELIPASE PROTEASE, PANCRELIPASE AMYLASE 15000 UNITS: 15000; 47000; 63000 CAPSULE, DELAYED RELEASE ORAL at 14:26

## 2025-06-11 RX ADMIN — BUDESONIDE AND FORMOTEROL FUMARATE DIHYDRATE 2 PUFF: 80; 4.5 AEROSOL RESPIRATORY (INHALATION) at 19:21

## 2025-06-11 RX ADMIN — DEXTROSE AND SODIUM CHLORIDE: 5; .45 INJECTION, SOLUTION INTRAVENOUS at 22:34

## 2025-06-11 RX ADMIN — PANCRELIPASE LIPASE, PANCRELIPASE PROTEASE, PANCRELIPASE AMYLASE 20000 UNITS: 20000; 63000; 84000 CAPSULE, DELAYED RELEASE ORAL at 14:26

## 2025-06-11 ASSESSMENT — ENCOUNTER SYMPTOMS
SHORTNESS OF BREATH: 0
VOMITING: 0
RHINORRHEA: 0
WHEEZING: 0
NAUSEA: 0
EYE PAIN: 0
ABDOMINAL PAIN: 0
SORE THROAT: 0
PHOTOPHOBIA: 0
COUGH: 0

## 2025-06-11 ASSESSMENT — PAIN - FUNCTIONAL ASSESSMENT: PAIN_FUNCTIONAL_ASSESSMENT: NONE - DENIES PAIN

## 2025-06-11 NOTE — ED NOTES
Writer checked glucose for patient again. Glucose is now 234 post d50 and d10 continuous drip. Will continue with plan of care.

## 2025-06-11 NOTE — ED NOTES
Glucose now 202. D10 infusion continued. Pt has no complaints at this time. Will continue with plan of care.

## 2025-06-11 NOTE — ED NOTES
Pt up and ambulated to the bathroom without assistance. Steady gait observed. Pt to provide urine sample for testing.

## 2025-06-11 NOTE — ED NOTES
Pt presents to ED room 26 with c/o hypoglycemia. Pt glucose at home was 39 and patient was unresponsive to . EMS was called and D10 bolus was initiated. Pt glucose was 134 post d10 per EMS after roughly 100 mL's of d10. Glucose here was 45 via POC. D10 bolus continued. Pt is axo x4 at this time. No other complaints. Pt states she has an appointment with her endocrinologist on Friday. Will continue with plan of care.

## 2025-06-11 NOTE — ED NOTES
Pt has episode of incontinence. Bed linen changed, gown changed, and placed in new brief. No complaints at this time. Will continue with plan of care.

## 2025-06-11 NOTE — ED NOTES
Writer rechecked glucose post D50 administration. Glucose is now 99. Will continue with plan of care.

## 2025-06-11 NOTE — ED NOTES
Report received from JOSE A Ramachandran  All questions answered  Pt resting in bed, eating dinner, NAD noted  Denies needs at this time  Call light in reach

## 2025-06-11 NOTE — ED NOTES
ED to inpatient nurses report      Chief Complaint:  Chief Complaint   Patient presents with    Hypoglycemia     Present to ED from: Home     MOA:     LOC: alert and orientated to name, place, date  Mobility: Independent  Oxygen Baseline: % room air     Current needs required: Dextrose infusion    Pending ED orders: n/a  Present condition: Stable     Why did the patient come to the ED? Hypoglycemia. Pt has had recent issues with glucose control. Pt had rapid drop here in the ER. 99--> 26 in an hour. 2 amps of d50 and d10 infusion.   What is the plan? Admit to inpatient for glucose control.   Any procedures or intervention occur? N/a  Any safety concerns?? WATCH GLUCOSE CLOSELY    Mental Status:  Level of Consciousness: Alert (0)    Psych Assessment:   Psychosocial  Psychosocial (WDL): Within Defined Limits  Vital signs   Vitals:    06/11/25 0724 06/11/25 0736 06/11/25 0815 06/11/25 0830   BP: (!) 178/87  (!) 165/73 (!) 157/103   Pulse: 82  81 74   Resp: 15  17 12   Temp:  97.9 °F (36.6 °C)     TempSrc:  Oral     SpO2: 98%  92% 100%        Vitals:  Patient Vitals for the past 24 hrs:   BP Temp Temp src Pulse Resp SpO2   06/11/25 0830 (!) 157/103 -- -- 74 12 100 %   06/11/25 0815 (!) 165/73 -- -- 81 17 92 %   06/11/25 0736 -- 97.9 °F (36.6 °C) Oral -- -- --   06/11/25 0724 (!) 178/87 -- -- 82 15 98 %      Visit Vitals  BP (!) 157/103   Pulse 74   Temp 97.9 °F (36.6 °C) (Oral)   Resp 12   SpO2 100%        LDAs:   Peripheral IV 06/11/25 Right;Anterior Forearm (Active)       Ambulatory Status:  Presents to emergency department  because of falls (Syncope, seizure, or loss of consciousness): No, Age > 70: No, Altered Mental Status, Intoxication with alcohol or substance confusion (Disorientation, impaired judgment, poor safety awaremess, or inability to follow instructions): No, Impaired Mobility: Ambulates or transfers with assistive devices or assistance; Unable to ambulate or transer.: No, Nursing Judgement:  Medications    ACCU-CHEK SOFTCLIX LANCETS MISC    1 each by Does not apply route 4 times daily    ALBUTEROL SULFATE HFA (VENTOLIN HFA) 108 (90 BASE) MCG/ACT INHALER    Inhale 2 puffs into the lungs 4 times daily as needed for Wheezing    ALCOHOL SWABS (ALCOHOL PREP) 70 % PADS    Daily as needed    AQUALANCE LANCETS 30G MISC    Daily and as needed for hypoglycemia    ATORVASTATIN (LIPITOR) 40 MG TABLET    Take 1 tablet by mouth daily    BLOOD GLUC METER DISP-STRIPS (BLOOD GLUCOSE METER DISPOSABLE) OXANA    Daily and as needed    BLOOD GLUCOSE MONITOR STRIPS    Test 4 times a day (before meals and bedtime) & as needed for symptoms of irregular blood glucose. Dispense sufficient amount for indicated testing frequency plus additional to accommodate PRN testing needs.    CREON [id number] UNITS CPEP DELAYED RELEASE CAPSULE    TAKE 2 CAPSULES BY MOUTH 3 TIMES DAILY WITH MEALS    GLUCOSE MONITORING KIT    1 kit by Does not apply route daily    INSULIN ASPART (NOVOLOG FLEXPEN) 100 UNIT/ML INJECTION PEN    Inject 5 Units into the skin 3 times daily (before meals) Scale only    INSULIN GLARGINE (BASAGLAR KWIKPEN) 100 UNIT/ML INJECTION PEN    Inject 6 Units into the skin daily    INSULIN PEN NEEDLE 32G X 4 MM MISC    1 each by Does not apply route 4 times daily (after meals and at bedtime)    LANCETS MISC    1 each by Does not apply route 2 times daily    METOPROLOL SUCCINATE (TOPROL XL) 25 MG EXTENDED RELEASE TABLET    Take 1 tablet by mouth daily    NUTRITIONAL SUPPLEMENTS (GLUCERNA ADVANCE SHAKE) LIQD    Take 1 Bottle by mouth in the morning and at bedtime    PANTOPRAZOLE (PROTONIX) 20 MG TABLET    Take 1 tablet by mouth every morning (before breakfast)    SODIUM BICARBONATE 650 MG TABLET    TAKE 1 TABLET BY MOUTH THREE TIMES DAILY    SYMBICORT 80-4.5 MCG/ACT AERO    Inhale 2 puffs into the lungs 2 times daily    UMECLIDINIUM BROMIDE (INCRUSE ELLIPTA) 62.5 MCG/ACT INHALER    Inhale 1 puff into the lungs daily     Orders

## 2025-06-11 NOTE — ED NOTES
D10 bolus (250 mL) continued from EMS now complete. Pt remains axo x4.  now at bedside with patient.

## 2025-06-11 NOTE — H&P
[facility]  Office: [phone]  Tyler Logan DO, Mickey Garrett, DO, Abhijit Arnold DO, Porter Francis, DO, Melisa Caballero MD, Rebecca Andrews MD, Heena Valenzuela MD, Lin Hancock MD,  Edgar Lazo MD, Olimpia Cha MD, Cordelia Last MD,  Haley Taylor DO, Ragini Mandel MD, Austin Tolentino MD, Neo Logan DO, Anita Walker MD,  Drew Kirkland DO, Asha Medina MD, Amee Palacios MD, Lan Montes MD,  Kamari Mcclain MD, David Shah MD, Faith Garces MD, Efren Akers MD, Chad Herr MD, Marie Mcconnell MD, Leroy Marcelo, DO, Angeline Laguna MD, Maite Thomas DO, Derrell Ceballos MD, Haley Jensen MD, Mohsin Reza, MD, Galo Dickerson MD, Shirley Waterhouse, CNP,  Christina Scott, CNP, Leroy Lopez, CNP,  Louise Colindres, NICOLE, Kaylie Espinoza, CNP, Jocelyn Granda, CNP, Indy Guido, CNP, Tatyana Harman, CNP, Rosario Roblero, PA-C, Dolores Atkins, CNP, Lorraine Martel, CNP,  Iesha Parks, CNP, Gay Ramos, CNP, Jaret Gomes, PA-C, Constance Frye, CNP,  Abbi Gooden, CNS, Nevaeh Neri, CNP, Mica Sequeira, CNP,   Marisel Laura, CNP         [facility]   IN-PATIENT SERVICE   [facility]    HISTORY AND PHYSICAL EXAMINATION            Date:   6/11/2025  Patient name:  Gloria Abdul  Date of admission:  6/11/2025  7:20 AM  MRN:   [id number]  Account:  [id number]  YOB: 1955  PCP:    Griffin Aiken MD  Room:   26/26  Code Status:    Prior    Chief Complaint:     Chief Complaint   Patient presents with    Hypoglycemia     History Obtained From:     patient, electronic medical record    History of Present Illness:     Gloria Abdul is a 69 y.o. female with PMHx of uncontrolled DM2, pancreatic insufficiency, seizure disorder, HTN, and depression who presents with Hypoglycemia and is admitted to the hospital for the management of Hypoglycemia. Patient states she drank some orange juice before going to bed, was noted to be unresponsive by  Does not apply route 4 times daily 3/25/24   Nevaeh Neri APRN - CNP   Nutritional Supplements (GLUCERNA ADVANCE SHAKE) LIQD Take 1 Bottle by mouth in the morning and at bedtime 3/25/24   Nevaeh Neri APRN - CNP   pantoprazole (PROTONIX) 20 MG tablet Take 1 tablet by mouth every morning (before breakfast)  Patient not taking: Reported on 4/3/2025 3/6/24   Provider, MD Mike   metoprolol succinate (TOPROL XL) 25 MG extended release tablet Take 1 tablet by mouth daily  Patient not taking: Reported on 4/3/2025 1/10/24   Porter Francis DO   atorvastatin (LIPITOR) 40 MG tablet Take 1 tablet by mouth daily  Patient not taking: Reported on 4/3/2025 1/10/24   Porter Francis DO   umeclidinium bromide (INCRUSE ELLIPTA) 62.5 MCG/ACT inhaler Inhale 1 puff into the lungs daily 1/10/24   Porter Francis DO   albuterol sulfate HFA (VENTOLIN HFA) 108 (90 Base) MCG/ACT inhaler Inhale 2 puffs into the lungs 4 times daily as needed for Wheezing 10/23/23   Leodan Malloy DO   insulin aspart (NOVOLOG FLEXPEN) 100 UNIT/ML injection pen Inject 5 Units into the skin 3 times daily (before meals) Scale only 1/14/23   Porter Francis DO   Insulin Pen Needle 32G X 4 MM MISC 1 each by Does not apply route 4 times daily (after meals and at bedtime) 5/27/22 8/25/22  Livia Neal APRN - CNP   omeprazole (PRILOSEC) 20 MG delayed release capsule TAKE ONE (1) CAPSULE BY MOUTH TWICE DAILY  Patient not taking: Reported on 5/27/2022 3/14/22 7/21/22  Livia Neal APRN - CNP   Blood Gluc Meter Disp-Strips (BLOOD GLUCOSE METER DISPOSABLE) OXANA Daily and as needed 2/25/22   Livia Neal APRN - CNP   Lancets MISC 1 each by Does not apply route 2 times daily 2/25/22   Livia Neal APRN - CNP   sodium bicarbonate 650 MG tablet TAKE 1 TABLET BY MOUTH THREE TIMES DAILY  Patient not taking: Reported on 4/3/2025 2/11/22   Cordelia Last MD   glimepiride (AMARYL) 2 MG tablet Take 1 tablet by mouth 2 times daily

## 2025-06-11 NOTE — ED NOTES
Writer checked patients glucose. Glucose is now 208. D10 continued via IV. Pt resting in bed with family at bedside. No acute distress noted. No complaints at this time. Will continue with plan of care.

## 2025-06-11 NOTE — ED NOTES
Writer rechecked patients glucose. Glucose was 26 then rechecked at 22. Another amp of d50 given. Dr. Gaona at bedside. Pt still arousable to verbal stimulation but very lethargic.

## 2025-06-11 NOTE — ED NOTES
1 amp of d50 given per verbal order Dr. Gaona. Pt glucose upon returning from the bathroom was 58. Dr. Gaona notified and aware. Will continue with plan of care.

## 2025-06-11 NOTE — ED NOTES
Pt now more alert after d50. Chirag crackers and cranberry juice given. Per verbal order. D10 drip start at 100 mL/hr via IV. Will continue with plan of care.

## 2025-06-11 NOTE — PROGRESS NOTES
Pharmacy Note     Enoxaparin Dose Adjustment    Gloria Abdul is a 69 y.o. female. Pharmacist assessment of enoxaparin dose for VTE prophylaxis.    Recent Labs     06/11/25  0849   BUN 12       Recent Labs     06/11/25  0849   CREATININE 0.8       Estimated Creatinine Clearance: 42 mL/min (based on SCr of 0.8 mg/dL).    Height:   Ht Readings from Last 1 Encounters:   04/03/25 1.702 m (5' 7\")     Weight:  Wt Readings from Last 1 Encounters:   06/11/25 39.9 kg (87 lb 15.4 oz)       The following enoxaparin dose has been adjusted based upon renal function and/or patient weight per P&T Guidelines:             Lovenox 40 mg daily changed to 30 mg daily    Jordana Jimenez PharmD, [facility]  6/11/2025  12:26 PM

## 2025-06-11 NOTE — ED PROVIDER NOTES
[facility] EMERGENCY DEPARTMENT MEDICAL CENTER  eMERGENCY dEPARTMENT eNCOUnter      Pt Name: Gloria Abdul  MRN: [id number]  Birthdate 1955  Date of evaluation: 6/11/25    CHIEF COMPLAINT       Chief Complaint   Patient presents with    Hypoglycemia         HISTORY OF PRESENT ILLNESS   Gloria Abdul is a 69 y.o. female who presents with hypoglycemia.  Patient says that she takes insulin at home she says that recently she told her NP that she was becoming hypoglycemic and she has had to call EMS out multiple times for this in the last 2 weeks.  She reported that her nurse practitioner told her to take more insulin so she is been taking more insulin.  She received D 10 enroute with brief improvement.  Patient then received D50 bolus because she dropped again on arrival.  Patient had dropped again to 20 after she had had a D50 bolus here, patient placed on D10 drip here.  Patient noted to be diaphoretic and somewhat confused when she dropped a low.      REVIEW OF SYSTEMS       Review of Systems  Patient has no other issues, patient feels unwell and her blood sugar gets low.  No fever, chills, diarrhea.  Patient has no chest pain or abdominal pain.    PAST MEDICAL HISTORY    has a past medical history of Cataracts, bilateral, COPD (chronic obstructive pulmonary disease) (HCC), CTS (carpal tunnel syndrome), Fibromyalgia, Generalized abdominal pain, GERD (gastroesophageal reflux disease), Glaucoma, Hypertension, Osteoarthritis, Osteoporosis, Pancreatic calcification, Pancreatitis, Protein-calorie malnutrition, severe, Seizures (HCC), Tremor, Type II or unspecified type diabetes mellitus without mention of complication, not stated as uncontrolled, and Uncontrolled type 2 DM with hyperosmolar nonketotic hyperglycemia (HCC).    SURGICAL HISTORY      has a past surgical history that includes cyst removal (Left); Upper gastrointestinal endoscopy (08/30/2016); Neck surgery; pr colsc flx w/removal  limits   POC GLUCOSE FINGERSTICK - Abnormal; Notable for the following components:    POC Glucose 167 (*)     All other components within normal limits   INSULIN, TOTAL   URINALYSIS WITH REFLEX TO CULTURE   POC GLUCOSE FINGERSTICK       Reviewed      EMERGENCY DEPARTMENT COURSE:   Vitals:    Vitals:    06/11/25 1215 06/11/25 1345 06/11/25 1346 06/11/25 1430   BP:  (!) 141/118  (!) 149/85   Pulse:   100 (!) 102   Resp:  17 19 20   Temp:       TempSrc:       SpO2:  100% 96% 100%   Weight: 39.9 kg (87 lb 15.4 oz)        As above, review    CRITICAL CARE:  30 minutes given multiple episodes of hypoglycemia and requiring rescue medication of D50.    CONSULTS:  Medicine for admission    PROCEDURES:  None    FINAL IMPRESSION      1. Hypoglycemia          DISPOSITION/PLAN   Admit    PATIENT REFERRED TO:  No follow-up provider specified.    DISCHARGE MEDICATIONS:     New Prescriptions    No medications on file       (Please note that portions of this note were completed with a voice recognition program.  Efforts were made to edit thedictations but occasionally words are mis-transcribed.)    Lopez Gaona MD  Emergency Medicine              Lopez Gaona MD  06/11/25 3726

## 2025-06-12 VITALS
DIASTOLIC BLOOD PRESSURE: 86 MMHG | TEMPERATURE: 98.2 F | WEIGHT: 92.37 LBS | HEIGHT: 67 IN | HEART RATE: 96 BPM | OXYGEN SATURATION: 98 % | RESPIRATION RATE: 16 BRPM | BODY MASS INDEX: 14.5 KG/M2 | SYSTOLIC BLOOD PRESSURE: 138 MMHG

## 2025-06-12 LAB
ANION GAP SERPL CALCULATED.3IONS-SCNC: 11 MMOL/L (ref 9–16)
BASOPHILS # BLD: 0.03 K/UL (ref 0–0.2)
BASOPHILS NFR BLD: 1 % (ref 0–2)
BUN SERPL-MCNC: 9 MG/DL (ref 8–23)
CALCIUM SERPL-MCNC: 9 MG/DL (ref 8.6–10.4)
CHLORIDE SERPL-SCNC: 96 MMOL/L (ref 98–107)
CO2 SERPL-SCNC: 26 MMOL/L (ref 20–31)
CORTIS SERPL-MCNC: 14.3 UG/DL (ref 2.5–19.5)
CREAT SERPL-MCNC: 0.7 MG/DL (ref 0.6–0.9)
EOSINOPHIL # BLD: 0.2 K/UL (ref 0–0.44)
EOSINOPHILS RELATIVE PERCENT: 3 % (ref 1–4)
ERYTHROCYTE [DISTWIDTH] IN BLOOD BY AUTOMATED COUNT: 12.5 % (ref 11.8–14.4)
EST. AVERAGE GLUCOSE BLD GHB EST-MCNC: 171 MG/DL
GFR, ESTIMATED: >90 ML/MIN/1.73M2
GLUCOSE BLD-MCNC: 195 MG/DL (ref 65–105)
GLUCOSE BLD-MCNC: 205 MG/DL (ref 65–105)
GLUCOSE BLD-MCNC: 206 MG/DL (ref 65–105)
GLUCOSE BLD-MCNC: 22 MG/DL (ref 65–105)
GLUCOSE BLD-MCNC: 256 MG/DL (ref 65–105)
GLUCOSE BLD-MCNC: 26 MG/DL (ref 65–105)
GLUCOSE BLD-MCNC: 360 MG/DL (ref 65–105)
GLUCOSE SERPL-MCNC: 220 MG/DL (ref 74–99)
HBA1C MFR BLD: 7.6 % (ref 4–6)
HCT VFR BLD AUTO: 33.1 % (ref 36.3–47.1)
HGB BLD-MCNC: 11.3 G/DL (ref 11.9–15.1)
IMM GRANULOCYTES # BLD AUTO: <0.03 K/UL (ref 0–0.3)
IMM GRANULOCYTES NFR BLD: 0 %
LYMPHOCYTES NFR BLD: 1.3 K/UL (ref 1.1–3.7)
LYMPHOCYTES RELATIVE PERCENT: 21 % (ref 24–43)
MCH RBC QN AUTO: 32.9 PG (ref 25.2–33.5)
MCHC RBC AUTO-ENTMCNC: 34.1 G/DL (ref 28.4–34.8)
MCV RBC AUTO: 96.5 FL (ref 82.6–102.9)
MONOCYTES NFR BLD: 0.4 K/UL (ref 0.1–1.2)
MONOCYTES NFR BLD: 6 % (ref 3–12)
NEUTROPHILS NFR BLD: 69 % (ref 36–65)
NEUTS SEG NFR BLD: 4.33 K/UL (ref 1.5–8.1)
NRBC BLD-RTO: 0 PER 100 WBC
PLATELET # BLD AUTO: 210 K/UL (ref 138–453)
PMV BLD AUTO: 10.4 FL (ref 8.1–13.5)
POTASSIUM SERPL-SCNC: 4.5 MMOL/L (ref 3.7–5.3)
RBC # BLD AUTO: 3.43 M/UL (ref 3.95–5.11)
SODIUM SERPL-SCNC: 133 MMOL/L (ref 136–145)
WBC OTHER # BLD: 6.3 K/UL (ref 3.5–11.3)

## 2025-06-12 PROCEDURE — 6370000000 HC RX 637 (ALT 250 FOR IP): Performed by: STUDENT IN AN ORGANIZED HEALTH CARE EDUCATION/TRAINING PROGRAM

## 2025-06-12 PROCEDURE — 80048 BASIC METABOLIC PNL TOTAL CA: CPT

## 2025-06-12 PROCEDURE — 97162 PT EVAL MOD COMPLEX 30 MIN: CPT

## 2025-06-12 PROCEDURE — G0108 DIAB MANAGE TRN  PER INDIV: HCPCS

## 2025-06-12 PROCEDURE — 6370000000 HC RX 637 (ALT 250 FOR IP): Performed by: INTERNAL MEDICINE

## 2025-06-12 PROCEDURE — 96372 THER/PROPH/DIAG INJ SC/IM: CPT

## 2025-06-12 PROCEDURE — 82947 ASSAY GLUCOSE BLOOD QUANT: CPT

## 2025-06-12 PROCEDURE — 97530 THERAPEUTIC ACTIVITIES: CPT

## 2025-06-12 PROCEDURE — 94640 AIRWAY INHALATION TREATMENT: CPT

## 2025-06-12 PROCEDURE — 85025 COMPLETE CBC W/AUTO DIFF WBC: CPT

## 2025-06-12 PROCEDURE — 82533 TOTAL CORTISOL: CPT

## 2025-06-12 PROCEDURE — 6360000002 HC RX W HCPCS: Performed by: STUDENT IN AN ORGANIZED HEALTH CARE EDUCATION/TRAINING PROGRAM

## 2025-06-12 PROCEDURE — 94760 N-INVAS EAR/PLS OXIMETRY 1: CPT

## 2025-06-12 PROCEDURE — 99232 SBSQ HOSP IP/OBS MODERATE 35: CPT | Performed by: INTERNAL MEDICINE

## 2025-06-12 PROCEDURE — 83036 HEMOGLOBIN GLYCOSYLATED A1C: CPT

## 2025-06-12 PROCEDURE — 94664 DEMO&/EVAL PT USE INHALER: CPT

## 2025-06-12 PROCEDURE — 36415 COLL VENOUS BLD VENIPUNCTURE: CPT

## 2025-06-12 PROCEDURE — G0378 HOSPITAL OBSERVATION PER HR: HCPCS

## 2025-06-12 RX ORDER — INSULIN LISPRO 100 [IU]/ML
0-4 INJECTION, SOLUTION INTRAVENOUS; SUBCUTANEOUS
Status: DISCONTINUED | OUTPATIENT
Start: 2025-06-12 | End: 2025-06-12 | Stop reason: HOSPADM

## 2025-06-12 RX ORDER — BLOOD-GLUCOSE SENSOR
1 EACH MISCELLANEOUS WEEKLY
COMMUNITY

## 2025-06-12 RX ORDER — INSULIN GLARGINE 100 [IU]/ML
10 INJECTION, SOLUTION SUBCUTANEOUS DAILY
Qty: 5 ADJUSTABLE DOSE PRE-FILLED PEN SYRINGE | Refills: 0 | Status: SHIPPED | OUTPATIENT
Start: 2025-06-12

## 2025-06-12 RX ADMIN — ENOXAPARIN SODIUM 30 MG: 100 INJECTION SUBCUTANEOUS at 08:24

## 2025-06-12 RX ADMIN — PANCRELIPASE LIPASE, PANCRELIPASE PROTEASE, PANCRELIPASE AMYLASE 20000 UNITS: 20000; 63000; 84000 CAPSULE, DELAYED RELEASE ORAL at 08:24

## 2025-06-12 RX ADMIN — PANCRELIPASE LIPASE, PANCRELIPASE PROTEASE, PANCRELIPASE AMYLASE 15000 UNITS: 15000; 47000; 63000 CAPSULE, DELAYED RELEASE ORAL at 08:24

## 2025-06-12 RX ADMIN — PANCRELIPASE LIPASE, PANCRELIPASE PROTEASE, PANCRELIPASE AMYLASE 20000 UNITS: 20000; 63000; 84000 CAPSULE, DELAYED RELEASE ORAL at 11:57

## 2025-06-12 RX ADMIN — BUDESONIDE AND FORMOTEROL FUMARATE DIHYDRATE 2 PUFF: 80; 4.5 AEROSOL RESPIRATORY (INHALATION) at 08:18

## 2025-06-12 RX ADMIN — INSULIN LISPRO 4 UNITS: 100 INJECTION, SOLUTION INTRAVENOUS; SUBCUTANEOUS at 11:56

## 2025-06-12 RX ADMIN — TIOTROPIUM BROMIDE INHALATION SPRAY 2 PUFF: 3.12 SPRAY, METERED RESPIRATORY (INHALATION) at 08:18

## 2025-06-12 RX ADMIN — PANCRELIPASE LIPASE, PANCRELIPASE PROTEASE, PANCRELIPASE AMYLASE 15000 UNITS: 15000; 47000; 63000 CAPSULE, DELAYED RELEASE ORAL at 11:57

## 2025-06-12 NOTE — CARE COORDINATION
Case Management Assessment  Initial Evaluation    Date/Time of Evaluation: 6/12/2025 11:35 AM  Assessment Completed by: Ana Vale RN    If patient is discharged prior to next notation, then this note serves as note for discharge by case management.    Patient Name: Gloria Abdul                   YOB: 1955  Diagnosis: Hypoglycemia [E16.2]                   Date / Time: 6/11/2025  7:20 AM    Patient Admission Status: Inpatient   Readmission Risk (Low < 19, Mod (19-27), High > 27): Readmission Risk Score: 9.2    Current PCP: Griffin Aiken MD  PCP verified by CM? Yes    Chart Reviewed: Yes      History Provided by: Patient  Patient Orientation: Alert and Oriented, Person, Place, Situation, Self    Patient Cognition: Alert    Hospitalization in the last 30 days (Readmission):  No    If yes, Readmission Assessment in  Navigator will be completed.    Advance Directives:      Code Status: Full Code   Patient's Primary Decision Maker is: Legal Next of Kin    Primary Decision Maker: Santy Abdul - Spouse - [phone]    Discharge Planning:    Patient lives with: Spouse/Significant Other Type of Home: House  Primary Care Giver: Self  Patient Support Systems include: Spouse/Significant Other, Family Members, Friends/Neighbors   Current Financial resources: Medicare  Current community resources: None  Current services prior to admission: Durable Medical Equipment            Current DME: Shower Chair, Cane, Walker, Other (Comment) (Drew glucose monitor, grab bars in shower)            Type of Home Care services:  (P) None    ADLS  Prior functional level: Independent in ADLs/IADLs  Current functional level: Independent in ADLs/IADLs    PT AM-PAC:   /24  OT AM-PAC:   /24    Family can provide assistance at DC: Yes  Would you like Case Management to discuss the discharge plan with any other family members/significant others, and if so, who? No  Plans to Return to Present Housing: Yes  Other

## 2025-06-12 NOTE — PLAN OF CARE
Problem: Chronic Conditions and Co-morbidities  Goal: Patient's chronic conditions and co-morbidity symptoms are monitored and maintained or improved  6/12/2025 1638 by Oliva Bullock RN  Outcome: Progressing  6/12/2025 0434 by Kayy Jerez RN  Outcome: Progressing     Problem: Safety - Adult  Goal: Free from fall injury  6/12/2025 1638 by Oliva Bullock RN  Outcome: Progressing  6/12/2025 0434 by Kayy Jerez RN  Outcome: Progressing

## 2025-06-12 NOTE — PROGRESS NOTES
states she drank some orange juice before going to bed, was noted to be unresponsive by her  at night.  BG taken and noted to be 39.  EMS was called and D10 bolus was initiated on arrival.  Glucose improved to 134.  Patient states she recently increased her long-acting insulin to 20 units daily from 10 units daily per her PCP.  Patient is also on a sliding scale with meals.  States that since her increase in insulin, she has been having several episodes of hypoglycemia.     In the ED, BP was elevated 178/87.  BMP was largely unremarkable.  LFTs and CBC were unremarkable as well.  CXR showed no acute cardiopulmonary process, mild pulmonary hyperinflation.  Blood glucose on admission was 45.  D10 bolus was again ordered.  Patient alert and oriented x 4.  Patient's BG continue to fluctuate, reaching a low of 26. Patient needed to be given d50 bolus x2 with improvement of her BG to 234.  Patient denies any chest pain, shortness of breath, lightheadedness, dizziness, fever or chills.  Denies any nausea or vomiting.    Review of Systems:     Constitutional:  negative for chills, fevers, sweats  Respiratory:  negative for cough, dyspnea on exertion, shortness of breath, wheezing  Cardiovascular:  negative for chest pain, chest pressure/discomfort, lower extremity edema, palpitations  Gastrointestinal:  negative for abdominal pain, constipation, diarrhea, nausea, vomiting  Neurological:  negative for dizziness, headache    Medications:     Allergies:  No Known Allergies    Current Meds:   Scheduled Meds:    insulin lispro  0-4 Units SubCUTAneous 4x Daily AC & HS    lipase-protease-amylas  15,000 Units Oral TID WC    lipase-protease-amylase  20,000 Units Oral TID WC    budesonide-formoterol  2 puff Inhalation BID    tiotropium  2 puff Inhalation Daily RT    sodium chloride flush  5-40 mL IntraVENous 2 times per day    enoxaparin  30 mg SubCUTAneous Daily     Continuous Infusions:    sodium chloride      dextrose        PRN Meds: albuterol sulfate HFA, sodium chloride flush, sodium chloride, potassium chloride **OR** potassium alternative oral replacement **OR** potassium chloride, magnesium sulfate, ondansetron **OR** ondansetron, polyethylene glycol, acetaminophen **OR** acetaminophen, glucose, dextrose bolus **OR** dextrose bolus, glucagon (rDNA), dextrose    Data:     Past Medical History:   has a past medical history of Cataracts, bilateral, COPD (chronic obstructive pulmonary disease) ([facility]), CTS (carpal tunnel syndrome), Fibromyalgia, Generalized abdominal pain, GERD (gastroesophageal reflux disease), Glaucoma, Hypertension, Osteoarthritis, Osteoporosis, Pancreatic calcification, Pancreatitis, Protein-calorie malnutrition, severe, Seizures (HCC), Tremor, Type II or unspecified type diabetes mellitus without mention of complication, not stated as uncontrolled, and Uncontrolled type 2 DM with hyperosmolar nonketotic hyperglycemia ([facility]).    Social History:   reports that she has been smoking cigarettes. She has a 10 pack-year smoking history. She has been exposed to tobacco smoke. She has quit using smokeless tobacco. She reports current drug use. Drug: Marijuana (Weed). She reports that she does not drink alcohol.     Family History:   Family History   Problem Relation Age of Onset    Brain Cancer Father     Diabetes Sister     Other Sister         epilepsy       Vitals:  /86   Pulse 96   Temp 98.2 °F (36.8 °C) (Oral)   Resp 16   Ht 1.702 m (5' 7\")   Wt 41.9 kg (92 lb 6 oz)   SpO2 98%   BMI 14.47 kg/m²   Temp (24hrs), Av.2 °F (36.8 °C), Min:98.2 °F (36.8 °C), Max:98.2 °F (36.8 °C)    Recent Labs     25  2136 25  0028 25  0539 25  0801   POCGLU 253* 206* 195* 205*       I/O (24Hr):    Intake/Output Summary (Last 24 hours) at 2025 1156  Last data filed at 2025 0627  Gross per 24 hour   Intake 571.74 ml   Output --   Net 571.74 ml       Labs:  Hematology:  Recent Labs

## 2025-06-12 NOTE — PROGRESS NOTES
Inpatient Diabetes  Education     Type and Reason for Visit: Patient Education  Patient stated about 18 year history of diabetes.  She stated she follows with Griffin Aiken MD in community, she has both long and short insulin pens and takes metformin, she has a CGM - etienne 2 and is now wearing it. She expressed she struggles with both high and low BG and unsure what to and not to eat. Per chart review - patient has history of frequent lows and prior admission for lows, also indicated ETOH use.     Verbally reviewed following information with patient  Blood glucose targets, hypo and hyperglycemia, importance of home blood glucose monitoring, heathy eating  plate method for CHO control portions, be active as recommended by health care providers, take medications oral and or insulin as directed.    Additional emphasis on how to prevent low BG, be aware that both food and intake of ETOH effect BG. Also aviod taking too much insulin, need to space out insulin shots ( mealtime at least 3 - 4 hours apart) and follow direction for long acting insulin - may need lower dose, watch BG alarms for low or high, promptly treat low BG.  Explored support, patient stated she has  at home with her and he can help her.  No support person at bedside at time of rounding.    Education Folder provided with following support information:   __x_  Handouts - Care for diabetes - emergencies and CHO / meal planning sheet    x__ [facility] Diabetes Education brochure/ contact card  _x__ Handout How to give Insulin - BD Sheet Pen Method    Diabetes Education / HCP follow -up :   _x_ Would recommend follow -up education at outpatient diabetes education at [facility]. An ordered is needed for this service and can be placed via EHR. Discharge Navigator --- Med Reconciliation -- New orders for discharge tab -- search diabetic ed - REF20 -  [facility]  - review and sign - An order for outpatient Diabetes Education  after discharge can come from a Hospital Provider or the PCP.      KRISTIE TOLEDO RN [facility]

## 2025-06-12 NOTE — PLAN OF CARE
Problem: Chronic Conditions and Co-morbidities  Goal: Patient's chronic conditions and co-morbidity symptoms are monitored and maintained or improved  6/12/2025 1757 by Oliva Bullock RN  Outcome: Completed  6/12/2025 1638 by Oliva Bullock RN  Outcome: Progressing  6/12/2025 0434 by Kayy Jerez RN  Outcome: Progressing     Problem: Safety - Adult  Goal: Free from fall injury  6/12/2025 1757 by Oliva Bullock RN  Outcome: Completed  6/12/2025 1638 by Oliva Bullock RN  Outcome: Progressing  6/12/2025 0434 by Kayy Jerez RN  Outcome: Progressing

## 2025-06-12 NOTE — PROGRESS NOTES
SBA  Short Term Goal 2: amb 150 ft without a device x SBA  Short Term Goal 3: ascend/descend 4 steps with SBA  Short Term Goal 4: 20 min strengthening exercises x SBA    Minutes  PT Individual Minutes  Time In: 1120  Time Out: 1141  Minutes: 21    Electronically signed by Riccardo Theodore, PT on 6/12/25 at 12:57 PM EDT

## 2025-06-12 NOTE — CARE COORDINATION
Gloria Abdul  [id number]  meets criteria for diabetes education. The following resources and interventions were provided: Diabetes Education Consult and Diabetes Flyer education

## 2025-06-12 NOTE — PROGRESS NOTES
Inpatient Diabetes Education    Referral on Gloria Abdul received for      Lab Results   Component Value Date    LABA1C 7.6 (H) 06/12/2025    LABA1C 5.2 10/18/2023    LABA1C 7.2 (H) 07/21/2022     Per chart review, patient is known to have diabetes and was seen at our program last in 2022.    Chief complaint for admission is hypoglycemia. See screenshot below for a section of her admission note.       I tried to investigate documentation that supports the highlighted information above.    I phoned [facility] at [phone]. Staff relayed that last office visit with them was in December of 2022. They were also able to report that the last four visits that have been scheduled with them resulted in \"No Show\".    Staff from our department plans to round on patient and discuss diet as requested.     Thank you for the referral.    SANDI NY RN

## 2025-06-12 NOTE — DISCHARGE SUMMARY
Discharge order received. IV removed. Writer went over d/c instructions with pt, including medications and follow up appointments. Pt verbalized understanding. Pt was discharged with all documented belongings. Pt ambulated off unit with steady gait.

## 2025-06-13 NOTE — DISCHARGE SUMMARY
[facility]  Office: [phone]  Tyler Logan DO, Mickey Garrett, DO, Abhijit Arnold DO, Porter Francis, DO, Melisa Caballero MD, Rebecca Andrews MD, Heena Valenzuela MD, Lin Hancock MD,  Edgar Lazo MD, Olimpia Cha MD, Cordelia Last MD,  Haley Taylor DO, Ragini Mandel MD, Austin Tolentino MD, Neo Logan DO, Anita Walker MD,  Drew Kirkland DO, Asha Medina MD, Amee Palacios MD, Lan Montes MD,  Kamari Mcclain MD, David Shah MD, Faith Garces MD, Efren Akers MD, Chad Herr MD, Marie Mcconnell MD, Leroy Marcelo, DO, Angeline Laguna MD, Maite Thomas DO, Derrell Ceballos MD, Haley Jensen MD, Mohsin Reza, MD, Galo Dickerson MD, Shirley Waterhouse, CNP,  Christina Scott, CNP, Leroy Lopez, CNP,  Louise Colindres, DNP, Kaylie Espinoza, CNP, Jocelyn Granda, CNP, Indy Guido, CNP, Tatyana Harman, CNP, Rosario Roblero, PA-C, Dolores Atkins, CNP, Lorraine Martel, CNP,  Iesha Parks, CNP, Gay Ramos, CNP, Jaret Gomes, PA-C, Constance Frye, CNP,  Abbi Gooden, CNS, Nevaeh Neri, CNP, Mica Sequeira, CNP,   Marisel Laura, CNP         [facility]   IN-PATIENT SERVICE   [facility]    Discharge Summary     Patient ID: Gloria Abdul  :  1955   MRN: [id number]     ACCOUNT:  [id number]   Patient's PCP: Griffin Aiken MD  Admit Date: 2025   Discharge Date: 2025  Length of Stay: 1  Code Status:  Prior  Admitting Physician: Edgar Lazo MD  Discharge Physician: Edgar Lazo MD     Active Discharge Diagnoses:     Hospital Problem Lists:  Principal Problem:    Hypoglycemia  Active Problems:    Type 2 diabetes mellitus with diabetic polyneuropathy, with long-term current use of insulin (HCC)    Depression    Osteoporosis    Pancreatic insufficiency    Essential (primary) hypertension  Resolved Problems:    * No resolved hospital problems. *      Admission Condition:  fair     Discharged Condition: stable    Hospital Stay:  care.

## 2025-06-20 ENCOUNTER — OFFICE VISIT (OUTPATIENT)
Dept: GASTROENTEROLOGY | Age: 70
End: 2025-06-20
Payer: MEDICARE

## 2025-06-20 ENCOUNTER — TELEPHONE (OUTPATIENT)
Dept: GASTROENTEROLOGY | Age: 70
End: 2025-06-20

## 2025-06-20 VITALS
WEIGHT: 94 LBS | SYSTOLIC BLOOD PRESSURE: 109 MMHG | DIASTOLIC BLOOD PRESSURE: 68 MMHG | BODY MASS INDEX: 14.72 KG/M2 | HEART RATE: 92 BPM

## 2025-06-20 DIAGNOSIS — R97.8 ELEVATED CA 19-9 LEVEL: ICD-10-CM

## 2025-06-20 DIAGNOSIS — D49.0 IPMN (INTRADUCTAL PAPILLARY MUCINOUS NEOPLASM): Primary | ICD-10-CM

## 2025-06-20 DIAGNOSIS — K64.9 HEMORRHOIDS, UNSPECIFIED HEMORRHOID TYPE: ICD-10-CM

## 2025-06-20 DIAGNOSIS — E43 SEVERE MALNUTRITION: ICD-10-CM

## 2025-06-20 DIAGNOSIS — R97.0 ELEVATED CEA: ICD-10-CM

## 2025-06-20 DIAGNOSIS — K86.89 PANCREATIC INSUFFICIENCY: ICD-10-CM

## 2025-06-20 DIAGNOSIS — K86.89 PANCREATIC CALCIFICATION: ICD-10-CM

## 2025-06-20 PROCEDURE — 3074F SYST BP LT 130 MM HG: CPT | Performed by: PHYSICIAN ASSISTANT

## 2025-06-20 PROCEDURE — 99214 OFFICE O/P EST MOD 30 MIN: CPT | Performed by: PHYSICIAN ASSISTANT

## 2025-06-20 PROCEDURE — 1123F ACP DISCUSS/DSCN MKR DOCD: CPT | Performed by: PHYSICIAN ASSISTANT

## 2025-06-20 PROCEDURE — 1160F RVW MEDS BY RX/DR IN RCRD: CPT | Performed by: PHYSICIAN ASSISTANT

## 2025-06-20 PROCEDURE — 3078F DIAST BP <80 MM HG: CPT | Performed by: PHYSICIAN ASSISTANT

## 2025-06-20 PROCEDURE — 1159F MED LIST DOCD IN RCRD: CPT | Performed by: PHYSICIAN ASSISTANT

## 2025-06-20 PROCEDURE — 1125F AMNT PAIN NOTED PAIN PRSNT: CPT | Performed by: PHYSICIAN ASSISTANT

## 2025-06-20 RX ORDER — GABAPENTIN 100 MG/1
100 CAPSULE ORAL NIGHTLY
COMMUNITY
Start: 2025-06-17

## 2025-06-20 RX ORDER — PSYLLIUM HUSK/CALCIUM CARB 1 G-60 MG
2 CAPSULE ORAL DAILY
Qty: 120 CAPSULE | Refills: 11 | Status: SHIPPED | OUTPATIENT
Start: 2025-06-20

## 2025-06-20 RX ORDER — LOPERAMIDE HYDROCHLORIDE 2 MG/1
2 TABLET ORAL 3 TIMES DAILY PRN
Qty: 30 TABLET | Refills: 1 | Status: SHIPPED | OUTPATIENT
Start: 2025-06-20

## 2025-06-20 RX ORDER — HYDROCORTISONE 25 MG/G
CREAM TOPICAL
Qty: 28 G | Refills: 1 | Status: SHIPPED | OUTPATIENT
Start: 2025-06-20

## 2025-06-20 NOTE — PROGRESS NOTES
gauge ultrasound biopsy needle using a transgastric approach. A suction  was used.  8 mL of light brownish colored fluid was aspirated .Estimated blood loss was minimal. Verification of patient identification for the specimen was done by the physician and nurse using the patient's name and medical record number. The fluid was sent for CEA, amylase and cytology.   Extensive intraparenchymal calcification was seen throughout the examined pancreas.  Due to extensive shadowing from stones  c examination of the pancreatic parenchyma was limited.  PD with hyperechoic ductal as well.  PD in the head 2 mm and in the body 1.2 mm there was evidence of intraluminal shadowing stones in the body .  CBD: Normal, no stones, sludge. CBD diameter:3  mm.  Gallbladder: Sludge present with thickened gallbladder walls  Left adrenal: Normal.   Left lobe of liver: normal.   Lymphadenopathy: No pathologic lymphadenopathy seen in upper abdomen.      COMMENT: EUS ductal and parenchymal finding consistent with Severe Calcific Chronic Pancreatitis                      Cyst seen near the body/tail of the pancreas.  Differential includes pseudocyst /less likely IPMN.     Surgical Pathology Report Path Number: [id number]    INTERPRETATION  CYST OF PANCREAS FLUID:        NEGATIVE FOR MALIGNANCY.     **Electronically Signed Out**        Dwaine Rosales M.D.  tisha/4/7/2025     Surgical Pathology Report Path Number: [id number]    -- Diagnosis --  A.  Duodenum, biopsy:  - Small intestine mucosa with a normal villous architecture and no  features of celiac disease.    B.  Stomach, biopsy:  -Gastric antral mucosa with moderate chronic inactive gastritis.  - H. pylori stain is negative.  Control reacts as expected.    Dwaine Rosales M.D.  **Electronically Signed Out**        des/47/2025     Last colonoscopy:   The prep was fair.       Findings:  Terminal ileum: normal     Cecum/Ascending colon: normal     Transverse colon: normal     Descending/Sigmoid

## 2025-06-20 NOTE — TELEPHONE ENCOUNTER
Samreen from [facility] left a voicemail stating that they do not have the Metamucil Plus Calcium capsules in stock and cannot order then Pharmacy is wanting to know if the Metamucil Plain Capsules would safice and if so, a new order will need to be placed.    They may be reach at [phone].

## 2025-07-30 DIAGNOSIS — K86.89 PANCREATIC INSUFFICIENCY: ICD-10-CM

## 2025-07-30 RX ORDER — PANCRELIPASE 36000; 180000; 114000 [USP'U]/1; [USP'U]/1; [USP'U]/1
CAPSULE, DELAYED RELEASE PELLETS ORAL
Qty: 240 CAPSULE | Refills: 3 | Status: SHIPPED | OUTPATIENT
Start: 2025-07-30

## 2025-07-30 NOTE — TELEPHONE ENCOUNTER
Patient was called she was given her next appointment date and time. Patient was notified that [facility] has 9 refills on file so she would need to contact them. Patient stated that she do not use [facility] no more and wants the medication sent to [facility]

## 2025-08-13 ENCOUNTER — APPOINTMENT (OUTPATIENT)
Dept: CT IMAGING | Age: 70
DRG: 682 | End: 2025-08-13
Payer: MEDICARE

## 2025-08-13 ENCOUNTER — APPOINTMENT (OUTPATIENT)
Dept: VASCULAR LAB | Age: 70
DRG: 682 | End: 2025-08-13
Payer: MEDICARE

## 2025-08-13 ENCOUNTER — HOSPITAL ENCOUNTER (INPATIENT)
Age: 70
LOS: 5 days | Discharge: HOME | DRG: 682 | End: 2025-08-18
Attending: STUDENT IN AN ORGANIZED HEALTH CARE EDUCATION/TRAINING PROGRAM | Admitting: INTERNAL MEDICINE
Payer: MEDICARE

## 2025-08-13 ENCOUNTER — APPOINTMENT (OUTPATIENT)
Dept: ULTRASOUND IMAGING | Age: 70
DRG: 682 | End: 2025-08-13
Payer: MEDICARE

## 2025-08-13 DIAGNOSIS — E87.5 HYPERKALEMIA: ICD-10-CM

## 2025-08-13 DIAGNOSIS — N17.9 ACUTE KIDNEY INJURY: ICD-10-CM

## 2025-08-13 DIAGNOSIS — E11.65 TYPE 2 DIABETES MELLITUS WITH HYPERGLYCEMIA (HCC): ICD-10-CM

## 2025-08-13 DIAGNOSIS — M79.605 PAIN IN BOTH LOWER EXTREMITIES: Primary | ICD-10-CM

## 2025-08-13 DIAGNOSIS — T67.1XXA HEAT SYNCOPE, INITIAL ENCOUNTER: ICD-10-CM

## 2025-08-13 DIAGNOSIS — M79.604 PAIN IN BOTH LOWER EXTREMITIES: Primary | ICD-10-CM

## 2025-08-13 PROBLEM — J44.9 COPD (CHRONIC OBSTRUCTIVE PULMONARY DISEASE) (HCC): Status: ACTIVE | Noted: 2025-08-13

## 2025-08-13 PROBLEM — Z72.0 TOBACCO USE: Status: ACTIVE | Noted: 2025-08-13

## 2025-08-13 LAB
ALBUMIN SERPL-MCNC: 3.8 G/DL (ref 3.5–5.2)
ALBUMIN SERPL-MCNC: 3.9 G/DL (ref 3.5–5.2)
ALBUMIN/GLOB SERPL: 1.1 {RATIO} (ref 1–2.5)
ALBUMIN/GLOB SERPL: 1.1 {RATIO} (ref 1–2.5)
ALP SERPL-CCNC: 152 U/L (ref 35–104)
ALP SERPL-CCNC: 179 U/L (ref 35–104)
ALT SERPL-CCNC: 14 U/L (ref 10–35)
ALT SERPL-CCNC: 14 U/L (ref 10–35)
ANION GAP SERPL CALCULATED.3IONS-SCNC: 13 MMOL/L (ref 9–16)
ANION GAP SERPL CALCULATED.3IONS-SCNC: 16 MMOL/L (ref 9–16)
ANION GAP SERPL CALCULATED.3IONS-SCNC: 16 MMOL/L (ref 9–16)
AST SERPL-CCNC: 17 U/L (ref 10–35)
AST SERPL-CCNC: 21 U/L (ref 10–35)
BASOPHILS # BLD: 0.04 K/UL (ref 0–0.2)
BASOPHILS NFR BLD: 1 % (ref 0–2)
BILIRUB SERPL-MCNC: 0.3 MG/DL (ref 0–1.2)
BILIRUB SERPL-MCNC: 0.5 MG/DL (ref 0–1.2)
BILIRUB UR QL STRIP: NEGATIVE
BUN SERPL-MCNC: 23 MG/DL (ref 8–23)
BUN SERPL-MCNC: 26 MG/DL (ref 8–23)
BUN SERPL-MCNC: 27 MG/DL (ref 8–23)
CALCIUM SERPL-MCNC: 10 MG/DL (ref 8.6–10.4)
CALCIUM SERPL-MCNC: 9.4 MG/DL (ref 8.6–10.4)
CALCIUM SERPL-MCNC: 9.5 MG/DL (ref 8.6–10.4)
CHLORIDE SERPL-SCNC: 92 MMOL/L (ref 98–107)
CHLORIDE SERPL-SCNC: 95 MMOL/L (ref 98–107)
CHLORIDE SERPL-SCNC: 97 MMOL/L (ref 98–107)
CHLORIDE UR-SCNC: 100 MMOL/L
CLARITY UR: CLEAR
CO2 SERPL-SCNC: 17 MMOL/L (ref 20–31)
CO2 SERPL-SCNC: 21 MMOL/L (ref 20–31)
CO2 SERPL-SCNC: 24 MMOL/L (ref 20–31)
COLOR UR: YELLOW
COMMENT: ABNORMAL
CREAT SERPL-MCNC: 1.6 MG/DL (ref 0.6–0.9)
CREAT SERPL-MCNC: 1.7 MG/DL (ref 0.6–0.9)
CREAT SERPL-MCNC: 1.8 MG/DL (ref 0.6–0.9)
EOSINOPHIL # BLD: 0.18 K/UL (ref 0–0.44)
EOSINOPHILS RELATIVE PERCENT: 3 % (ref 1–4)
ERYTHROCYTE [DISTWIDTH] IN BLOOD BY AUTOMATED COUNT: 12.4 % (ref 11.8–14.4)
GFR, ESTIMATED: 30 ML/MIN/1.73M2
GFR, ESTIMATED: 32 ML/MIN/1.73M2
GFR, ESTIMATED: 35 ML/MIN/1.73M2
GLUCOSE BLD-MCNC: 118 MG/DL (ref 65–105)
GLUCOSE BLD-MCNC: 24 MG/DL (ref 65–105)
GLUCOSE BLD-MCNC: 246 MG/DL
GLUCOSE BLD-MCNC: 246 MG/DL (ref 65–105)
GLUCOSE BLD-MCNC: 291 MG/DL (ref 65–105)
GLUCOSE BLD-MCNC: 355 MG/DL (ref 65–105)
GLUCOSE BLD-MCNC: 360 MG/DL (ref 65–105)
GLUCOSE BLD-MCNC: 393 MG/DL (ref 65–105)
GLUCOSE BLD-MCNC: 446 MG/DL (ref 65–105)
GLUCOSE SERPL-MCNC: 262 MG/DL (ref 74–99)
GLUCOSE SERPL-MCNC: 280 MG/DL (ref 74–99)
GLUCOSE SERPL-MCNC: 41 MG/DL (ref 74–99)
GLUCOSE UR STRIP-MCNC: ABNORMAL MG/DL
HCT VFR BLD AUTO: 35.2 % (ref 36.3–47.1)
HGB BLD-MCNC: 12.2 G/DL (ref 11.9–15.1)
HGB UR QL STRIP.AUTO: NEGATIVE
IMM GRANULOCYTES # BLD AUTO: <0.03 K/UL (ref 0–0.3)
IMM GRANULOCYTES NFR BLD: 0 %
KETONES UR STRIP-MCNC: NEGATIVE MG/DL
LACTIC ACID, WHOLE BLOOD: 3.6 MMOL/L (ref 0.7–2.1)
LEUKOCYTE ESTERASE UR QL STRIP: NEGATIVE
LYMPHOCYTES NFR BLD: 2.03 K/UL (ref 1.1–3.7)
LYMPHOCYTES RELATIVE PERCENT: 28 % (ref 24–43)
MAGNESIUM SERPL-MCNC: 2 MG/DL (ref 1.6–2.4)
MCH RBC QN AUTO: 32.2 PG (ref 25.2–33.5)
MCHC RBC AUTO-ENTMCNC: 34.7 G/DL (ref 28.4–34.8)
MCV RBC AUTO: 92.9 FL (ref 82.6–102.9)
MONOCYTES NFR BLD: 0.46 K/UL (ref 0.1–1.2)
MONOCYTES NFR BLD: 6 % (ref 3–12)
NEUTROPHILS NFR BLD: 62 % (ref 36–65)
NEUTS SEG NFR BLD: 4.59 K/UL (ref 1.5–8.1)
NITRITE UR QL STRIP: NEGATIVE
NRBC BLD-RTO: 0 PER 100 WBC
OSMOLALITY UR: 330 MOSM/KG (ref 80–1300)
PH UR STRIP: 7 [PH] (ref 5–8)
PLATELET # BLD AUTO: 391 K/UL (ref 138–453)
PMV BLD AUTO: 9.8 FL (ref 8.1–13.5)
POTASSIUM SERPL-SCNC: 4 MMOL/L (ref 3.7–5.3)
POTASSIUM SERPL-SCNC: 4.1 MMOL/L (ref 3.7–5.3)
POTASSIUM SERPL-SCNC: 6 MMOL/L (ref 3.7–5.3)
PROT SERPL-MCNC: 7.2 G/DL (ref 6.6–8.7)
PROT SERPL-MCNC: 7.5 G/DL (ref 6.6–8.7)
PROT UR STRIP-MCNC: NEGATIVE MG/DL
RBC # BLD AUTO: 3.79 M/UL (ref 3.95–5.11)
SODIUM SERPL-SCNC: 128 MMOL/L (ref 136–145)
SODIUM SERPL-SCNC: 129 MMOL/L (ref 136–145)
SODIUM SERPL-SCNC: 134 MMOL/L (ref 136–145)
SODIUM UR-SCNC: 100 MMOL/L
SP GR UR STRIP: 1.01 (ref 1–1.03)
TSH SERPL DL<=0.05 MIU/L-ACNC: 2.3 UIU/ML (ref 0.27–4.2)
UROBILINOGEN UR STRIP-ACNC: NORMAL EU/DL (ref 0–1)
WBC OTHER # BLD: 7.3 K/UL (ref 3.5–11.3)

## 2025-08-13 PROCEDURE — 36415 COLL VENOUS BLD VENIPUNCTURE: CPT

## 2025-08-13 PROCEDURE — 2580000003 HC RX 258

## 2025-08-13 PROCEDURE — 6360000002 HC RX W HCPCS: Performed by: INTERNAL MEDICINE

## 2025-08-13 PROCEDURE — 81003 URINALYSIS AUTO W/O SCOPE: CPT

## 2025-08-13 PROCEDURE — 94640 AIRWAY INHALATION TREATMENT: CPT

## 2025-08-13 PROCEDURE — 87040 BLOOD CULTURE FOR BACTERIA: CPT

## 2025-08-13 PROCEDURE — 82436 ASSAY OF URINE CHLORIDE: CPT

## 2025-08-13 PROCEDURE — 84300 ASSAY OF URINE SODIUM: CPT

## 2025-08-13 PROCEDURE — 6370000000 HC RX 637 (ALT 250 FOR IP): Performed by: STUDENT IN AN ORGANIZED HEALTH CARE EDUCATION/TRAINING PROGRAM

## 2025-08-13 PROCEDURE — 2580000003 HC RX 258: Performed by: STUDENT IN AN ORGANIZED HEALTH CARE EDUCATION/TRAINING PROGRAM

## 2025-08-13 PROCEDURE — 6370000000 HC RX 637 (ALT 250 FOR IP)

## 2025-08-13 PROCEDURE — 72128 CT CHEST SPINE W/O DYE: CPT

## 2025-08-13 PROCEDURE — 2060000000 HC ICU INTERMEDIATE R&B

## 2025-08-13 PROCEDURE — 85025 COMPLETE CBC W/AUTO DIFF WBC: CPT

## 2025-08-13 PROCEDURE — 96365 THER/PROPH/DIAG IV INF INIT: CPT

## 2025-08-13 PROCEDURE — 96361 HYDRATE IV INFUSION ADD-ON: CPT

## 2025-08-13 PROCEDURE — 83735 ASSAY OF MAGNESIUM: CPT

## 2025-08-13 PROCEDURE — 76770 US EXAM ABDO BACK WALL COMP: CPT

## 2025-08-13 PROCEDURE — 87086 URINE CULTURE/COLONY COUNT: CPT

## 2025-08-13 PROCEDURE — 80048 BASIC METABOLIC PNL TOTAL CA: CPT

## 2025-08-13 PROCEDURE — 93005 ELECTROCARDIOGRAM TRACING: CPT | Performed by: INTERNAL MEDICINE

## 2025-08-13 PROCEDURE — 51798 US URINE CAPACITY MEASURE: CPT

## 2025-08-13 PROCEDURE — 84443 ASSAY THYROID STIM HORMONE: CPT

## 2025-08-13 PROCEDURE — 70450 CT HEAD/BRAIN W/O DYE: CPT

## 2025-08-13 PROCEDURE — 80053 COMPREHEN METABOLIC PANEL: CPT

## 2025-08-13 PROCEDURE — 93005 ELECTROCARDIOGRAM TRACING: CPT | Performed by: STUDENT IN AN ORGANIZED HEALTH CARE EDUCATION/TRAINING PROGRAM

## 2025-08-13 PROCEDURE — 96375 TX/PRO/DX INJ NEW DRUG ADDON: CPT

## 2025-08-13 PROCEDURE — 2500000003 HC RX 250 WO HCPCS

## 2025-08-13 PROCEDURE — 6360000002 HC RX W HCPCS: Performed by: STUDENT IN AN ORGANIZED HEALTH CARE EDUCATION/TRAINING PROGRAM

## 2025-08-13 PROCEDURE — 83935 ASSAY OF URINE OSMOLALITY: CPT

## 2025-08-13 PROCEDURE — 99223 1ST HOSP IP/OBS HIGH 75: CPT | Performed by: INTERNAL MEDICINE

## 2025-08-13 PROCEDURE — 99285 EMERGENCY DEPT VISIT HI MDM: CPT

## 2025-08-13 PROCEDURE — 83605 ASSAY OF LACTIC ACID: CPT

## 2025-08-13 PROCEDURE — 82947 ASSAY GLUCOSE BLOOD QUANT: CPT

## 2025-08-13 PROCEDURE — 72131 CT LUMBAR SPINE W/O DYE: CPT

## 2025-08-13 RX ORDER — SODIUM CHLORIDE, SODIUM LACTATE, POTASSIUM CHLORIDE, CALCIUM CHLORIDE 600; 310; 30; 20 MG/100ML; MG/100ML; MG/100ML; MG/100ML
INJECTION, SOLUTION INTRAVENOUS CONTINUOUS
Status: DISCONTINUED | OUTPATIENT
Start: 2025-08-13 | End: 2025-08-14

## 2025-08-13 RX ORDER — SODIUM CHLORIDE 0.9 % (FLUSH) 0.9 %
5-40 SYRINGE (ML) INJECTION PRN
Status: DISCONTINUED | OUTPATIENT
Start: 2025-08-13 | End: 2025-08-18 | Stop reason: HOSPADM

## 2025-08-13 RX ORDER — INSULIN GLARGINE 100 [IU]/ML
5 INJECTION, SOLUTION SUBCUTANEOUS 2 TIMES DAILY
Status: DISCONTINUED | OUTPATIENT
Start: 2025-08-13 | End: 2025-08-14

## 2025-08-13 RX ORDER — METOPROLOL TARTRATE 1 MG/ML
2.5 INJECTION, SOLUTION INTRAVENOUS EVERY 6 HOURS PRN
Status: DISCONTINUED | OUTPATIENT
Start: 2025-08-13 | End: 2025-08-18 | Stop reason: HOSPADM

## 2025-08-13 RX ORDER — OXYCODONE AND ACETAMINOPHEN 5; 325 MG/1; MG/1
2 TABLET ORAL EVERY 4 HOURS PRN
Refills: 0 | Status: DISCONTINUED | OUTPATIENT
Start: 2025-08-13 | End: 2025-08-18 | Stop reason: HOSPADM

## 2025-08-13 RX ORDER — POTASSIUM CHLORIDE 1500 MG/1
40 TABLET, EXTENDED RELEASE ORAL PRN
Status: DISCONTINUED | OUTPATIENT
Start: 2025-08-13 | End: 2025-08-18 | Stop reason: HOSPADM

## 2025-08-13 RX ORDER — HEPARIN SODIUM 5000 [USP'U]/ML
5000 INJECTION, SOLUTION INTRAVENOUS; SUBCUTANEOUS EVERY 8 HOURS SCHEDULED
Status: DISCONTINUED | OUTPATIENT
Start: 2025-08-13 | End: 2025-08-18 | Stop reason: HOSPADM

## 2025-08-13 RX ORDER — DEXTROSE MONOHYDRATE 25 G/50ML
INJECTION, SOLUTION INTRAVENOUS
Status: COMPLETED
Start: 2025-08-13 | End: 2025-08-13

## 2025-08-13 RX ORDER — ACETAMINOPHEN 325 MG/1
650 TABLET ORAL EVERY 6 HOURS PRN
Status: DISCONTINUED | OUTPATIENT
Start: 2025-08-13 | End: 2025-08-18 | Stop reason: HOSPADM

## 2025-08-13 RX ORDER — ATORVASTATIN CALCIUM 40 MG/1
40 TABLET, FILM COATED ORAL DAILY
Status: DISCONTINUED | OUTPATIENT
Start: 2025-08-13 | End: 2025-08-18 | Stop reason: HOSPADM

## 2025-08-13 RX ORDER — ALBUTEROL SULFATE 90 UG/1
2 INHALANT RESPIRATORY (INHALATION) EVERY 6 HOURS PRN
Status: DISCONTINUED | OUTPATIENT
Start: 2025-08-13 | End: 2025-08-18 | Stop reason: HOSPADM

## 2025-08-13 RX ORDER — DEXTROSE MONOHYDRATE 25 G/50ML
25 INJECTION, SOLUTION INTRAVENOUS ONCE
Status: COMPLETED | OUTPATIENT
Start: 2025-08-13 | End: 2025-08-13

## 2025-08-13 RX ORDER — METOPROLOL TARTRATE 25 MG/1
25 TABLET, FILM COATED ORAL 2 TIMES DAILY
Status: DISCONTINUED | OUTPATIENT
Start: 2025-08-13 | End: 2025-08-15

## 2025-08-13 RX ORDER — SODIUM CHLORIDE 0.9 % (FLUSH) 0.9 %
5-40 SYRINGE (ML) INJECTION EVERY 12 HOURS SCHEDULED
Status: DISCONTINUED | OUTPATIENT
Start: 2025-08-13 | End: 2025-08-18 | Stop reason: HOSPADM

## 2025-08-13 RX ORDER — CYCLOBENZAPRINE HCL 10 MG
5 TABLET ORAL NIGHTLY
Status: DISCONTINUED | OUTPATIENT
Start: 2025-08-13 | End: 2025-08-18 | Stop reason: HOSPADM

## 2025-08-13 RX ORDER — 0.9 % SODIUM CHLORIDE 0.9 %
500 INTRAVENOUS SOLUTION INTRAVENOUS ONCE
Status: COMPLETED | OUTPATIENT
Start: 2025-08-13 | End: 2025-08-13

## 2025-08-13 RX ORDER — FUROSEMIDE 20 MG/1
20 TABLET ORAL 2 TIMES DAILY
Status: CANCELLED | OUTPATIENT
Start: 2025-08-13

## 2025-08-13 RX ORDER — DEXTROSE MONOHYDRATE 100 MG/ML
INJECTION, SOLUTION INTRAVENOUS CONTINUOUS PRN
Status: DISCONTINUED | OUTPATIENT
Start: 2025-08-13 | End: 2025-08-18 | Stop reason: HOSPADM

## 2025-08-13 RX ORDER — ACETAMINOPHEN 650 MG/1
650 SUPPOSITORY RECTAL EVERY 6 HOURS PRN
Status: DISCONTINUED | OUTPATIENT
Start: 2025-08-13 | End: 2025-08-18 | Stop reason: HOSPADM

## 2025-08-13 RX ORDER — SODIUM CHLORIDE, SODIUM LACTATE, POTASSIUM CHLORIDE, CALCIUM CHLORIDE 600; 310; 30; 20 MG/100ML; MG/100ML; MG/100ML; MG/100ML
INJECTION, SOLUTION INTRAVENOUS CONTINUOUS
Status: DISCONTINUED | OUTPATIENT
Start: 2025-08-13 | End: 2025-08-13

## 2025-08-13 RX ORDER — GABAPENTIN 100 MG/1
100 CAPSULE ORAL NIGHTLY
Status: DISCONTINUED | OUTPATIENT
Start: 2025-08-13 | End: 2025-08-18 | Stop reason: HOSPADM

## 2025-08-13 RX ORDER — ONDANSETRON 2 MG/ML
4 INJECTION INTRAMUSCULAR; INTRAVENOUS EVERY 6 HOURS PRN
Status: DISCONTINUED | OUTPATIENT
Start: 2025-08-13 | End: 2025-08-18 | Stop reason: HOSPADM

## 2025-08-13 RX ORDER — ENOXAPARIN SODIUM 100 MG/ML
40 INJECTION SUBCUTANEOUS DAILY
Status: DISCONTINUED | OUTPATIENT
Start: 2025-08-13 | End: 2025-08-13

## 2025-08-13 RX ORDER — INSULIN LISPRO 100 [IU]/ML
0-4 INJECTION, SOLUTION INTRAVENOUS; SUBCUTANEOUS
Status: DISCONTINUED | OUTPATIENT
Start: 2025-08-13 | End: 2025-08-18 | Stop reason: HOSPADM

## 2025-08-13 RX ORDER — DULOXETIN HYDROCHLORIDE 30 MG/1
30 CAPSULE, DELAYED RELEASE ORAL
Status: DISCONTINUED | OUTPATIENT
Start: 2025-08-13 | End: 2025-08-18 | Stop reason: HOSPADM

## 2025-08-13 RX ORDER — SODIUM CHLORIDE 9 MG/ML
INJECTION, SOLUTION INTRAVENOUS PRN
Status: DISCONTINUED | OUTPATIENT
Start: 2025-08-13 | End: 2025-08-18 | Stop reason: HOSPADM

## 2025-08-13 RX ORDER — OXYCODONE AND ACETAMINOPHEN 5; 325 MG/1; MG/1
1 TABLET ORAL EVERY 4 HOURS PRN
Refills: 0 | Status: DISCONTINUED | OUTPATIENT
Start: 2025-08-13 | End: 2025-08-18 | Stop reason: HOSPADM

## 2025-08-13 RX ORDER — POTASSIUM CHLORIDE 7.45 MG/ML
10 INJECTION INTRAVENOUS PRN
Status: DISCONTINUED | OUTPATIENT
Start: 2025-08-13 | End: 2025-08-18 | Stop reason: HOSPADM

## 2025-08-13 RX ORDER — FUROSEMIDE 10 MG/ML
40 INJECTION INTRAMUSCULAR; INTRAVENOUS ONCE
Status: COMPLETED | OUTPATIENT
Start: 2025-08-13 | End: 2025-08-13

## 2025-08-13 RX ORDER — CALCIUM GLUCONATE 20 MG/ML
1000 INJECTION, SOLUTION INTRAVENOUS ONCE
Status: COMPLETED | OUTPATIENT
Start: 2025-08-13 | End: 2025-08-13

## 2025-08-13 RX ORDER — POLYETHYLENE GLYCOL 3350 17 G/17G
17 POWDER, FOR SOLUTION ORAL DAILY PRN
Status: DISCONTINUED | OUTPATIENT
Start: 2025-08-13 | End: 2025-08-18 | Stop reason: HOSPADM

## 2025-08-13 RX ORDER — MAGNESIUM SULFATE IN WATER 40 MG/ML
2000 INJECTION, SOLUTION INTRAVENOUS PRN
Status: DISCONTINUED | OUTPATIENT
Start: 2025-08-13 | End: 2025-08-18 | Stop reason: HOSPADM

## 2025-08-13 RX ORDER — ONDANSETRON 4 MG/1
4 TABLET, ORALLY DISINTEGRATING ORAL EVERY 8 HOURS PRN
Status: DISCONTINUED | OUTPATIENT
Start: 2025-08-13 | End: 2025-08-18 | Stop reason: HOSPADM

## 2025-08-13 RX ORDER — INSULIN LISPRO 100 [IU]/ML
4 INJECTION, SOLUTION INTRAVENOUS; SUBCUTANEOUS ONCE
Status: COMPLETED | OUTPATIENT
Start: 2025-08-13 | End: 2025-08-13

## 2025-08-13 RX ORDER — BUDESONIDE AND FORMOTEROL FUMARATE DIHYDRATE 80; 4.5 UG/1; UG/1
2 AEROSOL RESPIRATORY (INHALATION)
Status: DISCONTINUED | OUTPATIENT
Start: 2025-08-13 | End: 2025-08-18 | Stop reason: HOSPADM

## 2025-08-13 RX ORDER — GLUCAGON 1 MG/ML
1 KIT INJECTION PRN
Status: DISCONTINUED | OUTPATIENT
Start: 2025-08-13 | End: 2025-08-18 | Stop reason: HOSPADM

## 2025-08-13 RX ORDER — DEXTROSE MONOHYDRATE 100 MG/ML
INJECTION, SOLUTION INTRAVENOUS CONTINUOUS
Status: DISCONTINUED | OUTPATIENT
Start: 2025-08-13 | End: 2025-08-13

## 2025-08-13 RX ADMIN — DEXTROSE MONOHYDRATE 25 G: 25 INJECTION, SOLUTION INTRAVENOUS at 14:01

## 2025-08-13 RX ADMIN — DEXTROSE 250 ML: 10 SOLUTION INTRAVENOUS at 11:34

## 2025-08-13 RX ADMIN — CYCLOBENZAPRINE 5 MG: 10 TABLET, FILM COATED ORAL at 20:28

## 2025-08-13 RX ADMIN — INSULIN LISPRO 4 UNITS: 100 INJECTION, SOLUTION INTRAVENOUS; SUBCUTANEOUS at 20:30

## 2025-08-13 RX ADMIN — DEXTROSE 250 ML: 10 SOLUTION INTRAVENOUS at 14:02

## 2025-08-13 RX ADMIN — SODIUM CHLORIDE 500 ML: 0.9 INJECTION, SOLUTION INTRAVENOUS at 09:50

## 2025-08-13 RX ADMIN — BUDESONIDE AND FORMOTEROL FUMARATE DIHYDRATE 2 PUFF: 80; 4.5 AEROSOL RESPIRATORY (INHALATION) at 21:00

## 2025-08-13 RX ADMIN — INSULIN HUMAN 10 UNITS: 100 INJECTION, SOLUTION PARENTERAL at 11:33

## 2025-08-13 RX ADMIN — FUROSEMIDE 40 MG: 10 INJECTION, SOLUTION INTRAMUSCULAR; INTRAVENOUS at 11:33

## 2025-08-13 RX ADMIN — OXYCODONE HYDROCHLORIDE AND ACETAMINOPHEN 2 TABLET: 5; 325 TABLET ORAL at 23:28

## 2025-08-13 RX ADMIN — GABAPENTIN 100 MG: 100 CAPSULE ORAL at 21:49

## 2025-08-13 RX ADMIN — SODIUM ZIRCONIUM CYCLOSILICATE 10 G: 5 POWDER, FOR SUSPENSION ORAL at 11:31

## 2025-08-13 RX ADMIN — INSULIN GLARGINE 5 UNITS: 100 INJECTION, SOLUTION SUBCUTANEOUS at 21:53

## 2025-08-13 RX ADMIN — ATORVASTATIN CALCIUM 40 MG: 40 TABLET, FILM COATED ORAL at 21:49

## 2025-08-13 RX ADMIN — SODIUM CHLORIDE, SODIUM LACTATE, POTASSIUM CHLORIDE, AND CALCIUM CHLORIDE: .6; .31; .03; .02 INJECTION, SOLUTION INTRAVENOUS at 14:58

## 2025-08-13 RX ADMIN — HEPARIN SODIUM 5000 UNITS: 5000 INJECTION INTRAVENOUS; SUBCUTANEOUS at 20:29

## 2025-08-13 RX ADMIN — CALCIUM GLUCONATE 1000 MG: 20 INJECTION, SOLUTION INTRAVENOUS at 11:42

## 2025-08-13 RX ADMIN — HEPARIN SODIUM 5000 UNITS: 5000 INJECTION INTRAVENOUS; SUBCUTANEOUS at 14:59

## 2025-08-13 RX ADMIN — METOPROLOL TARTRATE 25 MG: 25 TABLET, FILM COATED ORAL at 20:29

## 2025-08-13 RX ADMIN — DULOXETINE 30 MG: 30 CAPSULE, DELAYED RELEASE ORAL at 21:49

## 2025-08-13 ASSESSMENT — PAIN - FUNCTIONAL ASSESSMENT
PAIN_FUNCTIONAL_ASSESSMENT: 0-10
PAIN_FUNCTIONAL_ASSESSMENT: 0-10

## 2025-08-13 ASSESSMENT — PAIN DESCRIPTION - DESCRIPTORS
DESCRIPTORS: DISCOMFORT
DESCRIPTORS: DISCOMFORT

## 2025-08-13 ASSESSMENT — PAIN DESCRIPTION - ORIENTATION
ORIENTATION: RIGHT;LEFT
ORIENTATION: RIGHT;LOWER

## 2025-08-13 ASSESSMENT — PAIN DESCRIPTION - PAIN TYPE
TYPE: ACUTE PAIN
TYPE: ACUTE PAIN

## 2025-08-13 ASSESSMENT — PAIN DESCRIPTION - LOCATION
LOCATION: FLANK
LOCATION: BACK

## 2025-08-13 ASSESSMENT — PAIN SCALES - GENERAL
PAINLEVEL_OUTOF10: 9
PAINLEVEL_OUTOF10: 9
PAINLEVEL_OUTOF10: 7

## 2025-08-14 PROBLEM — M79.604 PAIN IN BOTH LOWER EXTREMITIES: Status: ACTIVE | Noted: 2025-08-14

## 2025-08-14 PROBLEM — M79.605 PAIN IN BOTH LOWER EXTREMITIES: Status: ACTIVE | Noted: 2025-08-14

## 2025-08-14 LAB
ANION GAP SERPL CALCULATED.3IONS-SCNC: 11 MMOL/L (ref 9–16)
ANION GAP SERPL CALCULATED.3IONS-SCNC: 12 MMOL/L (ref 9–16)
BASOPHILS # BLD: 0.05 K/UL (ref 0–0.2)
BASOPHILS NFR BLD: 1 % (ref 0–2)
BUN SERPL-MCNC: 23 MG/DL (ref 8–23)
CALCIUM SERPL-MCNC: 9.5 MG/DL (ref 8.6–10.4)
CHLORIDE SERPL-SCNC: 98 MMOL/L (ref 98–107)
CHLORIDE SERPL-SCNC: 99 MMOL/L (ref 98–107)
CO2 SERPL-SCNC: 22 MMOL/L (ref 20–31)
CO2 SERPL-SCNC: 22 MMOL/L (ref 20–31)
CREAT SERPL-MCNC: 1.4 MG/DL (ref 0.6–0.9)
CREAT UR-MCNC: 56 MG/DL (ref 28–217)
EOSINOPHIL # BLD: 0.27 K/UL (ref 0–0.44)
EOSINOPHILS RELATIVE PERCENT: 4 % (ref 1–4)
ERYTHROCYTE [DISTWIDTH] IN BLOOD BY AUTOMATED COUNT: 11.9 % (ref 11.8–14.4)
GFR, ESTIMATED: 41 ML/MIN/1.73M2
GLUCOSE BLD-MCNC: 106 MG/DL (ref 65–105)
GLUCOSE BLD-MCNC: 163 MG/DL (ref 65–105)
GLUCOSE BLD-MCNC: 72 MG/DL (ref 65–105)
GLUCOSE BLD-MCNC: 91 MG/DL (ref 65–105)
GLUCOSE BLD-MCNC: 91 MG/DL (ref 65–105)
GLUCOSE SERPL-MCNC: 79 MG/DL (ref 74–99)
HCT VFR BLD AUTO: 29.2 % (ref 36.3–47.1)
HGB BLD-MCNC: 9.9 G/DL (ref 11.9–15.1)
IMM GRANULOCYTES # BLD AUTO: <0.03 K/UL (ref 0–0.3)
IMM GRANULOCYTES NFR BLD: 0 %
LACTIC ACID, WHOLE BLOOD: 2.6 MMOL/L (ref 0.7–2.1)
LYMPHOCYTES NFR BLD: 2.24 K/UL (ref 1.1–3.7)
LYMPHOCYTES RELATIVE PERCENT: 32 % (ref 24–43)
MCH RBC QN AUTO: 31.9 PG (ref 25.2–33.5)
MCHC RBC AUTO-ENTMCNC: 33.9 G/DL (ref 28.4–34.8)
MCV RBC AUTO: 94.2 FL (ref 82.6–102.9)
MICROALBUMIN UR-MCNC: <12 MG/L (ref 0–20)
MICROALBUMIN/CREAT UR-RTO: NORMAL MCG/MG CREAT (ref 0–25)
MICROORGANISM SPEC CULT: NORMAL
MONOCYTES NFR BLD: 0.57 K/UL (ref 0.1–1.2)
MONOCYTES NFR BLD: 8 % (ref 3–12)
NEUTROPHILS NFR BLD: 56 % (ref 36–65)
NEUTS SEG NFR BLD: 3.98 K/UL (ref 1.5–8.1)
NRBC BLD-RTO: 0 PER 100 WBC
PHOSPHATE SERPL-MCNC: 3.3 MG/DL (ref 2.5–4.5)
PLATELET # BLD AUTO: 379 K/UL (ref 138–453)
PMV BLD AUTO: 9.9 FL (ref 8.1–13.5)
POTASSIUM SERPL-SCNC: 4.9 MMOL/L (ref 3.7–5.3)
POTASSIUM SERPL-SCNC: 5.4 MMOL/L (ref 3.7–5.3)
POTASSIUM SERPL-SCNC: 5.6 MMOL/L (ref 3.7–5.3)
RBC # BLD AUTO: 3.1 M/UL (ref 3.95–5.11)
SODIUM SERPL-SCNC: 132 MMOL/L (ref 136–145)
SODIUM SERPL-SCNC: 132 MMOL/L (ref 136–145)
SPECIMEN DESCRIPTION: NORMAL
WBC OTHER # BLD: 7.1 K/UL (ref 3.5–11.3)

## 2025-08-14 PROCEDURE — 84100 ASSAY OF PHOSPHORUS: CPT

## 2025-08-14 PROCEDURE — 94761 N-INVAS EAR/PLS OXIMETRY MLT: CPT

## 2025-08-14 PROCEDURE — 99233 SBSQ HOSP IP/OBS HIGH 50: CPT | Performed by: INTERNAL MEDICINE

## 2025-08-14 PROCEDURE — 83605 ASSAY OF LACTIC ACID: CPT

## 2025-08-14 PROCEDURE — 2500000003 HC RX 250 WO HCPCS

## 2025-08-14 PROCEDURE — 80048 BASIC METABOLIC PNL TOTAL CA: CPT

## 2025-08-14 PROCEDURE — 84132 ASSAY OF SERUM POTASSIUM: CPT

## 2025-08-14 PROCEDURE — 82570 ASSAY OF URINE CREATININE: CPT

## 2025-08-14 PROCEDURE — 2580000003 HC RX 258

## 2025-08-14 PROCEDURE — 6360000002 HC RX W HCPCS

## 2025-08-14 PROCEDURE — 93005 ELECTROCARDIOGRAM TRACING: CPT

## 2025-08-14 PROCEDURE — 6370000000 HC RX 637 (ALT 250 FOR IP)

## 2025-08-14 PROCEDURE — 82947 ASSAY GLUCOSE BLOOD QUANT: CPT

## 2025-08-14 PROCEDURE — 82043 UR ALBUMIN QUANTITATIVE: CPT

## 2025-08-14 PROCEDURE — 51798 US URINE CAPACITY MEASURE: CPT

## 2025-08-14 PROCEDURE — 99222 1ST HOSP IP/OBS MODERATE 55: CPT | Performed by: STUDENT IN AN ORGANIZED HEALTH CARE EDUCATION/TRAINING PROGRAM

## 2025-08-14 PROCEDURE — 6360000002 HC RX W HCPCS: Performed by: INTERNAL MEDICINE

## 2025-08-14 PROCEDURE — 80051 ELECTROLYTE PANEL: CPT

## 2025-08-14 PROCEDURE — 36415 COLL VENOUS BLD VENIPUNCTURE: CPT

## 2025-08-14 PROCEDURE — G0108 DIAB MANAGE TRN  PER INDIV: HCPCS

## 2025-08-14 PROCEDURE — 85025 COMPLETE CBC W/AUTO DIFF WBC: CPT

## 2025-08-14 PROCEDURE — 2060000000 HC ICU INTERMEDIATE R&B

## 2025-08-14 PROCEDURE — 94640 AIRWAY INHALATION TREATMENT: CPT

## 2025-08-14 RX ORDER — SODIUM CHLORIDE 9 MG/ML
INJECTION, SOLUTION INTRAVENOUS CONTINUOUS
Status: DISCONTINUED | OUTPATIENT
Start: 2025-08-14 | End: 2025-08-14

## 2025-08-14 RX ORDER — METHYLPREDNISOLONE 4 MG/1
4 TABLET ORAL
Status: DISCONTINUED | OUTPATIENT
Start: 2025-08-15 | End: 2025-08-15

## 2025-08-14 RX ORDER — METHYLPREDNISOLONE 4 MG/1
8 TABLET ORAL NIGHTLY
Status: DISCONTINUED | OUTPATIENT
Start: 2025-08-15 | End: 2025-08-15

## 2025-08-14 RX ORDER — SODIUM CHLORIDE, SODIUM LACTATE, POTASSIUM CHLORIDE, CALCIUM CHLORIDE 600; 310; 30; 20 MG/100ML; MG/100ML; MG/100ML; MG/100ML
INJECTION, SOLUTION INTRAVENOUS CONTINUOUS
Status: DISCONTINUED | OUTPATIENT
Start: 2025-08-14 | End: 2025-08-15

## 2025-08-14 RX ORDER — METHYLPREDNISOLONE 4 MG/1
4 TABLET ORAL NIGHTLY
Status: DISCONTINUED | OUTPATIENT
Start: 2025-08-16 | End: 2025-08-15

## 2025-08-14 RX ORDER — INSULIN GLARGINE 100 [IU]/ML
5 INJECTION, SOLUTION SUBCUTANEOUS NIGHTLY
Status: DISCONTINUED | OUTPATIENT
Start: 2025-08-14 | End: 2025-08-14

## 2025-08-14 RX ADMIN — OXYCODONE HYDROCHLORIDE AND ACETAMINOPHEN 2 TABLET: 5; 325 TABLET ORAL at 09:17

## 2025-08-14 RX ADMIN — SODIUM CHLORIDE, SODIUM LACTATE, POTASSIUM CHLORIDE, AND CALCIUM CHLORIDE: .6; .31; .03; .02 INJECTION, SOLUTION INTRAVENOUS at 00:44

## 2025-08-14 RX ADMIN — GABAPENTIN 100 MG: 100 CAPSULE ORAL at 21:07

## 2025-08-14 RX ADMIN — DICLOFENAC SODIUM 4 G: 10 GEL TOPICAL at 21:11

## 2025-08-14 RX ADMIN — HEPARIN SODIUM 5000 UNITS: 5000 INJECTION INTRAVENOUS; SUBCUTANEOUS at 14:47

## 2025-08-14 RX ADMIN — DULOXETINE 30 MG: 30 CAPSULE, DELAYED RELEASE ORAL at 21:06

## 2025-08-14 RX ADMIN — ATORVASTATIN CALCIUM 40 MG: 40 TABLET, FILM COATED ORAL at 09:17

## 2025-08-14 RX ADMIN — HEPARIN SODIUM 5000 UNITS: 5000 INJECTION INTRAVENOUS; SUBCUTANEOUS at 21:06

## 2025-08-14 RX ADMIN — DICLOFENAC SODIUM 4 G: 10 GEL TOPICAL at 13:16

## 2025-08-14 RX ADMIN — SODIUM CHLORIDE, PRESERVATIVE FREE 10 ML: 5 INJECTION INTRAVENOUS at 21:13

## 2025-08-14 RX ADMIN — SODIUM CHLORIDE, SODIUM LACTATE, POTASSIUM CHLORIDE, AND CALCIUM CHLORIDE: .6; .31; .03; .02 INJECTION, SOLUTION INTRAVENOUS at 23:30

## 2025-08-14 RX ADMIN — PANCRELIPASE LIPASE, PANCRELIPASE PROTEASE, PANCRELIPASE AMYLASE 15000 UNITS: 15000; 47000; 63000 CAPSULE, DELAYED RELEASE ORAL at 10:41

## 2025-08-14 RX ADMIN — OXYCODONE HYDROCHLORIDE AND ACETAMINOPHEN 2 TABLET: 5; 325 TABLET ORAL at 13:16

## 2025-08-14 RX ADMIN — PANCRELIPASE LIPASE, PANCRELIPASE PROTEASE, PANCRELIPASE AMYLASE 15000 UNITS: 15000; 47000; 63000 CAPSULE, DELAYED RELEASE ORAL at 18:32

## 2025-08-14 RX ADMIN — SODIUM CHLORIDE, SODIUM LACTATE, POTASSIUM CHLORIDE, AND CALCIUM CHLORIDE: .6; .31; .03; .02 INJECTION, SOLUTION INTRAVENOUS at 13:25

## 2025-08-14 RX ADMIN — BUDESONIDE AND FORMOTEROL FUMARATE DIHYDRATE 2 PUFF: 80; 4.5 AEROSOL RESPIRATORY (INHALATION) at 20:29

## 2025-08-14 RX ADMIN — METHYLPREDNISOLONE 24 MG: 16 TABLET ORAL at 16:35

## 2025-08-14 RX ADMIN — HEPARIN SODIUM 5000 UNITS: 5000 INJECTION INTRAVENOUS; SUBCUTANEOUS at 05:43

## 2025-08-14 RX ADMIN — METOPROLOL TARTRATE 25 MG: 25 TABLET, FILM COATED ORAL at 09:17

## 2025-08-14 RX ADMIN — CYCLOBENZAPRINE 5 MG: 10 TABLET, FILM COATED ORAL at 21:06

## 2025-08-14 RX ADMIN — OXYCODONE HYDROCHLORIDE AND ACETAMINOPHEN 2 TABLET: 5; 325 TABLET ORAL at 21:06

## 2025-08-14 RX ADMIN — BUDESONIDE AND FORMOTEROL FUMARATE DIHYDRATE 2 PUFF: 80; 4.5 AEROSOL RESPIRATORY (INHALATION) at 09:05

## 2025-08-14 RX ADMIN — DICLOFENAC SODIUM 4 G: 10 GEL TOPICAL at 09:18

## 2025-08-14 ASSESSMENT — PAIN - FUNCTIONAL ASSESSMENT
PAIN_FUNCTIONAL_ASSESSMENT: 0-10

## 2025-08-14 ASSESSMENT — PAIN SCALES - GENERAL
PAINLEVEL_OUTOF10: 7
PAINLEVEL_OUTOF10: 8
PAINLEVEL_OUTOF10: 6
PAINLEVEL_OUTOF10: 8
PAINLEVEL_OUTOF10: 9
PAINLEVEL_OUTOF10: 9
PAINLEVEL_OUTOF10: 6

## 2025-08-14 ASSESSMENT — PAIN DESCRIPTION - DESCRIPTORS
DESCRIPTORS: DISCOMFORT
DESCRIPTORS: ACHING;SHARP

## 2025-08-14 ASSESSMENT — PAIN DESCRIPTION - ORIENTATION
ORIENTATION: RIGHT;LEFT;MID
ORIENTATION: RIGHT;LEFT

## 2025-08-14 ASSESSMENT — PAIN DESCRIPTION - LOCATION
LOCATION: BACK
LOCATION: BACK;FLANK
LOCATION: BACK

## 2025-08-14 ASSESSMENT — PAIN DESCRIPTION - PAIN TYPE: TYPE: CHRONIC PAIN;ACUTE PAIN

## 2025-08-15 LAB
ANION GAP SERPL CALCULATED.3IONS-SCNC: 12 MMOL/L (ref 9–16)
ANION GAP SERPL CALCULATED.3IONS-SCNC: 7 MMOL/L (ref 9–16)
BASOPHILS # BLD: <0.03 K/UL (ref 0–0.2)
BASOPHILS NFR BLD: 0 % (ref 0–2)
BUN SERPL-MCNC: 18 MG/DL (ref 8–23)
BUN SERPL-MCNC: 19 MG/DL (ref 8–23)
CALCIUM SERPL-MCNC: 10.1 MG/DL (ref 8.6–10.4)
CALCIUM SERPL-MCNC: 9.1 MG/DL (ref 8.6–10.4)
CHLORIDE SERPL-SCNC: 100 MMOL/L (ref 98–107)
CHLORIDE SERPL-SCNC: 100 MMOL/L (ref 98–107)
CO2 SERPL-SCNC: 20 MMOL/L (ref 20–31)
CO2 SERPL-SCNC: 21 MMOL/L (ref 20–31)
CREAT SERPL-MCNC: 1.3 MG/DL (ref 0.6–0.9)
CREAT SERPL-MCNC: 1.3 MG/DL (ref 0.6–0.9)
EKG ATRIAL RATE: 111 BPM
EKG ATRIAL RATE: 119 BPM
EKG ATRIAL RATE: 83 BPM
EKG P AXIS: 81 DEGREES
EKG P AXIS: 82 DEGREES
EKG P AXIS: 82 DEGREES
EKG P-R INTERVAL: 164 MS
EKG P-R INTERVAL: 174 MS
EKG P-R INTERVAL: 180 MS
EKG Q-T INTERVAL: 322 MS
EKG Q-T INTERVAL: 330 MS
EKG Q-T INTERVAL: 368 MS
EKG QRS DURATION: 70 MS
EKG QRS DURATION: 72 MS
EKG QRS DURATION: 72 MS
EKG QTC CALCULATION (BAZETT): 432 MS
EKG QTC CALCULATION (BAZETT): 437 MS
EKG QTC CALCULATION (BAZETT): 464 MS
EKG R AXIS: 69 DEGREES
EKG R AXIS: 72 DEGREES
EKG R AXIS: 73 DEGREES
EKG T AXIS: 76 DEGREES
EKG T AXIS: 80 DEGREES
EKG T AXIS: 81 DEGREES
EKG VENTRICULAR RATE: 111 BPM
EKG VENTRICULAR RATE: 119 BPM
EKG VENTRICULAR RATE: 83 BPM
EOSINOPHIL # BLD: <0.03 K/UL (ref 0–0.44)
EOSINOPHILS RELATIVE PERCENT: 0 % (ref 1–4)
ERYTHROCYTE [DISTWIDTH] IN BLOOD BY AUTOMATED COUNT: 11.7 % (ref 11.8–14.4)
EST. AVERAGE GLUCOSE BLD GHB EST-MCNC: 163 MG/DL
GFR, ESTIMATED: 45 ML/MIN/1.73M2
GFR, ESTIMATED: 45 ML/MIN/1.73M2
GLUCOSE BLD-MCNC: 119 MG/DL (ref 65–105)
GLUCOSE BLD-MCNC: 139 MG/DL (ref 65–105)
GLUCOSE BLD-MCNC: 171 MG/DL (ref 65–105)
GLUCOSE BLD-MCNC: 241 MG/DL (ref 65–105)
GLUCOSE BLD-MCNC: 241 MG/DL (ref 65–105)
GLUCOSE BLD-MCNC: 312 MG/DL (ref 65–105)
GLUCOSE BLD-MCNC: 405 MG/DL (ref 65–105)
GLUCOSE SERPL-MCNC: 376 MG/DL (ref 74–99)
GLUCOSE SERPL-MCNC: 73 MG/DL (ref 74–99)
HBA1C MFR BLD: 7.3 % (ref 4–6)
HCT VFR BLD AUTO: 25.3 % (ref 36.3–47.1)
HGB BLD-MCNC: 8.7 G/DL (ref 11.9–15.1)
IMM GRANULOCYTES # BLD AUTO: 0.03 K/UL (ref 0–0.3)
IMM GRANULOCYTES NFR BLD: 0 %
LYMPHOCYTES NFR BLD: 0.79 K/UL (ref 1.1–3.7)
LYMPHOCYTES RELATIVE PERCENT: 11 % (ref 24–43)
MCH RBC QN AUTO: 31.8 PG (ref 25.2–33.5)
MCHC RBC AUTO-ENTMCNC: 34.4 G/DL (ref 28.4–34.8)
MCV RBC AUTO: 92.3 FL (ref 82.6–102.9)
MONOCYTES NFR BLD: 0.08 K/UL (ref 0.1–1.2)
MONOCYTES NFR BLD: 1 % (ref 3–12)
NEUTROPHILS NFR BLD: 88 % (ref 36–65)
NEUTS SEG NFR BLD: 6.59 K/UL (ref 1.5–8.1)
NRBC BLD-RTO: 0 PER 100 WBC
PLATELET # BLD AUTO: 325 K/UL (ref 138–453)
PMV BLD AUTO: 9.6 FL (ref 8.1–13.5)
POTASSIUM SERPL-SCNC: 5.3 MMOL/L (ref 3.7–5.3)
POTASSIUM SERPL-SCNC: 6.4 MMOL/L (ref 3.7–5.3)
RBC # BLD AUTO: 2.74 M/UL (ref 3.95–5.11)
SODIUM SERPL-SCNC: 128 MMOL/L (ref 136–145)
SODIUM SERPL-SCNC: 132 MMOL/L (ref 136–145)
WBC OTHER # BLD: 7.5 K/UL (ref 3.5–11.3)

## 2025-08-15 PROCEDURE — 97162 PT EVAL MOD COMPLEX 30 MIN: CPT

## 2025-08-15 PROCEDURE — 51798 US URINE CAPACITY MEASURE: CPT

## 2025-08-15 PROCEDURE — 82947 ASSAY GLUCOSE BLOOD QUANT: CPT

## 2025-08-15 PROCEDURE — 93005 ELECTROCARDIOGRAM TRACING: CPT

## 2025-08-15 PROCEDURE — 97530 THERAPEUTIC ACTIVITIES: CPT

## 2025-08-15 PROCEDURE — 80048 BASIC METABOLIC PNL TOTAL CA: CPT

## 2025-08-15 PROCEDURE — 6360000002 HC RX W HCPCS

## 2025-08-15 PROCEDURE — 6370000000 HC RX 637 (ALT 250 FOR IP)

## 2025-08-15 PROCEDURE — 36415 COLL VENOUS BLD VENIPUNCTURE: CPT

## 2025-08-15 PROCEDURE — 6360000002 HC RX W HCPCS: Performed by: INTERNAL MEDICINE

## 2025-08-15 PROCEDURE — 6370000000 HC RX 637 (ALT 250 FOR IP): Performed by: STUDENT IN AN ORGANIZED HEALTH CARE EDUCATION/TRAINING PROGRAM

## 2025-08-15 PROCEDURE — 99232 SBSQ HOSP IP/OBS MODERATE 35: CPT | Performed by: STUDENT IN AN ORGANIZED HEALTH CARE EDUCATION/TRAINING PROGRAM

## 2025-08-15 PROCEDURE — 85025 COMPLETE CBC W/AUTO DIFF WBC: CPT

## 2025-08-15 PROCEDURE — 97166 OT EVAL MOD COMPLEX 45 MIN: CPT

## 2025-08-15 PROCEDURE — 2580000003 HC RX 258

## 2025-08-15 PROCEDURE — 2060000000 HC ICU INTERMEDIATE R&B

## 2025-08-15 PROCEDURE — G0108 DIAB MANAGE TRN  PER INDIV: HCPCS

## 2025-08-15 PROCEDURE — 99222 1ST HOSP IP/OBS MODERATE 55: CPT | Performed by: PSYCHIATRY & NEUROLOGY

## 2025-08-15 PROCEDURE — 83036 HEMOGLOBIN GLYCOSYLATED A1C: CPT

## 2025-08-15 PROCEDURE — 80171 DRUG SCREEN QUANT GABAPENTIN: CPT

## 2025-08-15 PROCEDURE — 99233 SBSQ HOSP IP/OBS HIGH 50: CPT | Performed by: INTERNAL MEDICINE

## 2025-08-15 PROCEDURE — 94640 AIRWAY INHALATION TREATMENT: CPT

## 2025-08-15 PROCEDURE — 97535 SELF CARE MNGMENT TRAINING: CPT

## 2025-08-15 RX ORDER — CALCIUM GLUCONATE 20 MG/ML
1000 INJECTION, SOLUTION INTRAVENOUS ONCE
Status: COMPLETED | OUTPATIENT
Start: 2025-08-15 | End: 2025-08-15

## 2025-08-15 RX ORDER — DEXTROSE MONOHYDRATE 100 MG/ML
INJECTION, SOLUTION INTRAVENOUS CONTINUOUS PRN
Status: DISCONTINUED | OUTPATIENT
Start: 2025-08-15 | End: 2025-08-18 | Stop reason: HOSPADM

## 2025-08-15 RX ORDER — SODIUM CHLORIDE 9 MG/ML
INJECTION, SOLUTION INTRAVENOUS CONTINUOUS
Status: DISCONTINUED | OUTPATIENT
Start: 2025-08-15 | End: 2025-08-16

## 2025-08-15 RX ORDER — PRIMIDONE 50 MG/1
25 TABLET ORAL NIGHTLY
Status: DISCONTINUED | OUTPATIENT
Start: 2025-08-15 | End: 2025-08-18 | Stop reason: HOSPADM

## 2025-08-15 RX ORDER — LIDOCAINE 4 G/G
1 PATCH TOPICAL DAILY
Status: COMPLETED | OUTPATIENT
Start: 2025-08-15 | End: 2025-08-16

## 2025-08-15 RX ORDER — GLUCAGON 1 MG/ML
1 KIT INJECTION PRN
Status: DISCONTINUED | OUTPATIENT
Start: 2025-08-15 | End: 2025-08-18 | Stop reason: HOSPADM

## 2025-08-15 RX ORDER — FUROSEMIDE 10 MG/ML
40 INJECTION INTRAMUSCULAR; INTRAVENOUS ONCE
Status: DISCONTINUED | OUTPATIENT
Start: 2025-08-15 | End: 2025-08-15

## 2025-08-15 RX ORDER — METOPROLOL TARTRATE 50 MG
50 TABLET ORAL 2 TIMES DAILY
Status: DISCONTINUED | OUTPATIENT
Start: 2025-08-15 | End: 2025-08-17

## 2025-08-15 RX ADMIN — HEPARIN SODIUM 5000 UNITS: 5000 INJECTION INTRAVENOUS; SUBCUTANEOUS at 06:44

## 2025-08-15 RX ADMIN — INSULIN HUMAN 10 UNITS: 100 INJECTION, SOLUTION PARENTERAL at 07:52

## 2025-08-15 RX ADMIN — OXYCODONE HYDROCHLORIDE AND ACETAMINOPHEN 2 TABLET: 5; 325 TABLET ORAL at 09:12

## 2025-08-15 RX ADMIN — SODIUM ZIRCONIUM CYCLOSILICATE 10 G: 10 POWDER, FOR SUSPENSION ORAL at 07:48

## 2025-08-15 RX ADMIN — DULOXETINE 30 MG: 30 CAPSULE, DELAYED RELEASE ORAL at 19:55

## 2025-08-15 RX ADMIN — CALCIUM GLUCONATE 1000 MG: 20 INJECTION, SOLUTION INTRAVENOUS at 07:48

## 2025-08-15 RX ADMIN — HEPARIN SODIUM 5000 UNITS: 5000 INJECTION INTRAVENOUS; SUBCUTANEOUS at 14:39

## 2025-08-15 RX ADMIN — PANCRELIPASE LIPASE, PANCRELIPASE PROTEASE, PANCRELIPASE AMYLASE 15000 UNITS: 15000; 47000; 63000 CAPSULE, DELAYED RELEASE ORAL at 12:42

## 2025-08-15 RX ADMIN — PANCRELIPASE LIPASE, PANCRELIPASE PROTEASE, PANCRELIPASE AMYLASE 15000 UNITS: 15000; 47000; 63000 CAPSULE, DELAYED RELEASE ORAL at 07:49

## 2025-08-15 RX ADMIN — BUDESONIDE AND FORMOTEROL FUMARATE DIHYDRATE 2 PUFF: 80; 4.5 AEROSOL RESPIRATORY (INHALATION) at 09:24

## 2025-08-15 RX ADMIN — BUDESONIDE AND FORMOTEROL FUMARATE DIHYDRATE 2 PUFF: 80; 4.5 AEROSOL RESPIRATORY (INHALATION) at 20:50

## 2025-08-15 RX ADMIN — METHYLPREDNISOLONE 4 MG: 4 TABLET ORAL at 07:48

## 2025-08-15 RX ADMIN — ATORVASTATIN CALCIUM 40 MG: 40 TABLET, FILM COATED ORAL at 07:49

## 2025-08-15 RX ADMIN — SODIUM CHLORIDE: 0.9 INJECTION, SOLUTION INTRAVENOUS at 08:04

## 2025-08-15 RX ADMIN — PANCRELIPASE LIPASE, PANCRELIPASE PROTEASE, PANCRELIPASE AMYLASE 15000 UNITS: 15000; 47000; 63000 CAPSULE, DELAYED RELEASE ORAL at 17:24

## 2025-08-15 RX ADMIN — GABAPENTIN 100 MG: 100 CAPSULE ORAL at 19:55

## 2025-08-15 RX ADMIN — OXYCODONE HYDROCHLORIDE AND ACETAMINOPHEN 2 TABLET: 5; 325 TABLET ORAL at 01:54

## 2025-08-15 RX ADMIN — INSULIN LISPRO 1 UNITS: 100 INJECTION, SOLUTION INTRAVENOUS; SUBCUTANEOUS at 09:11

## 2025-08-15 RX ADMIN — PRIMIDONE 25 MG: 50 TABLET ORAL at 20:43

## 2025-08-15 RX ADMIN — INSULIN LISPRO 1 UNITS: 100 INJECTION, SOLUTION INTRAVENOUS; SUBCUTANEOUS at 17:19

## 2025-08-15 RX ADMIN — METOPROLOL TARTRATE 25 MG: 25 TABLET, FILM COATED ORAL at 07:49

## 2025-08-15 RX ADMIN — DEXTROSE 250 ML: 10 SOLUTION INTRAVENOUS at 07:59

## 2025-08-15 RX ADMIN — CYCLOBENZAPRINE 5 MG: 10 TABLET, FILM COATED ORAL at 19:55

## 2025-08-15 RX ADMIN — HEPARIN SODIUM 5000 UNITS: 5000 INJECTION INTRAVENOUS; SUBCUTANEOUS at 20:43

## 2025-08-15 ASSESSMENT — PAIN DESCRIPTION - DESCRIPTORS
DESCRIPTORS: ACHING
DESCRIPTORS: ACHING;SHARP

## 2025-08-15 ASSESSMENT — PAIN SCALES - GENERAL
PAINLEVEL_OUTOF10: 0
PAINLEVEL_OUTOF10: 7
PAINLEVEL_OUTOF10: 9
PAINLEVEL_OUTOF10: 6
PAINLEVEL_OUTOF10: 5

## 2025-08-15 ASSESSMENT — PAIN DESCRIPTION - PAIN TYPE: TYPE: ACUTE PAIN;CHRONIC PAIN

## 2025-08-15 ASSESSMENT — PAIN - FUNCTIONAL ASSESSMENT
PAIN_FUNCTIONAL_ASSESSMENT: 0-10
PAIN_FUNCTIONAL_ASSESSMENT: 0-10
PAIN_FUNCTIONAL_ASSESSMENT: ACTIVITIES ARE NOT PREVENTED
PAIN_FUNCTIONAL_ASSESSMENT: 0-10

## 2025-08-15 ASSESSMENT — PAIN DESCRIPTION - ORIENTATION
ORIENTATION: LOWER
ORIENTATION: RIGHT;LEFT;MID

## 2025-08-15 ASSESSMENT — PAIN DESCRIPTION - LOCATION
LOCATION: BACK
LOCATION: BACK

## 2025-08-16 PROBLEM — R25.1 TREMOR: Status: ACTIVE | Noted: 2025-08-16

## 2025-08-16 LAB
ANION GAP SERPL CALCULATED.3IONS-SCNC: 10 MMOL/L (ref 9–16)
ANION GAP SERPL CALCULATED.3IONS-SCNC: 11 MMOL/L (ref 9–16)
ANION GAP SERPL CALCULATED.3IONS-SCNC: 13 MMOL/L (ref 9–16)
B-OH-BUTYR SERPL-MCNC: 0.07 MMOL/L (ref 0.02–0.27)
BASOPHILS # BLD: 0.03 K/UL (ref 0–0.2)
BASOPHILS NFR BLD: 0 % (ref 0–2)
BUN SERPL-MCNC: 16 MG/DL (ref 8–23)
BUN SERPL-MCNC: 17 MG/DL (ref 8–23)
BUN SERPL-MCNC: 18 MG/DL (ref 8–23)
CALCIUM SERPL-MCNC: 8.6 MG/DL (ref 8.6–10.4)
CALCIUM SERPL-MCNC: 8.8 MG/DL (ref 8.6–10.4)
CALCIUM SERPL-MCNC: 8.8 MG/DL (ref 8.6–10.4)
CHLORIDE SERPL-SCNC: 103 MMOL/L (ref 98–107)
CHLORIDE SERPL-SCNC: 105 MMOL/L (ref 98–107)
CHLORIDE SERPL-SCNC: 106 MMOL/L (ref 98–107)
CO2 SERPL-SCNC: 15 MMOL/L (ref 20–31)
CO2 SERPL-SCNC: 17 MMOL/L (ref 20–31)
CO2 SERPL-SCNC: 21 MMOL/L (ref 20–31)
CREAT SERPL-MCNC: 1.2 MG/DL (ref 0.6–0.9)
CREAT SERPL-MCNC: 1.2 MG/DL (ref 0.6–0.9)
CREAT SERPL-MCNC: 1.3 MG/DL (ref 0.6–0.9)
EOSINOPHIL # BLD: 0.09 K/UL (ref 0–0.44)
EOSINOPHILS RELATIVE PERCENT: 1 % (ref 1–4)
ERYTHROCYTE [DISTWIDTH] IN BLOOD BY AUTOMATED COUNT: 12.2 % (ref 11.8–14.4)
FERRITIN SERPL-MCNC: 200 NG/ML
GFR, ESTIMATED: 45 ML/MIN/1.73M2
GFR, ESTIMATED: 49 ML/MIN/1.73M2
GFR, ESTIMATED: 49 ML/MIN/1.73M2
GLUCOSE BLD-MCNC: 111 MG/DL (ref 65–105)
GLUCOSE BLD-MCNC: 179 MG/DL (ref 65–105)
GLUCOSE BLD-MCNC: 210 MG/DL (ref 65–105)
GLUCOSE BLD-MCNC: 308 MG/DL (ref 65–105)
GLUCOSE SERPL-MCNC: 246 MG/DL (ref 74–99)
GLUCOSE SERPL-MCNC: 314 MG/DL (ref 74–99)
GLUCOSE SERPL-MCNC: 314 MG/DL (ref 74–99)
HCT VFR BLD AUTO: 23.9 % (ref 36.3–47.1)
HCT VFR BLD AUTO: 24 % (ref 36.3–47.1)
HCT VFR BLD AUTO: 26.6 % (ref 36.3–47.1)
HGB BLD-MCNC: 7.9 G/DL (ref 11.9–15.1)
HGB BLD-MCNC: 8.2 G/DL (ref 11.9–15.1)
HGB BLD-MCNC: 8.8 G/DL (ref 11.9–15.1)
IMM GRANULOCYTES # BLD AUTO: 0.03 K/UL (ref 0–0.3)
IMM GRANULOCYTES NFR BLD: 0 %
IRON SATN MFR SERPL: 78 % (ref 20–55)
IRON SERPL-MCNC: 173 UG/DL (ref 37–145)
LYMPHOCYTES NFR BLD: 2.34 K/UL (ref 1.1–3.7)
LYMPHOCYTES RELATIVE PERCENT: 22 % (ref 24–43)
MCH RBC QN AUTO: 32.8 PG (ref 25.2–33.5)
MCHC RBC AUTO-ENTMCNC: 33.1 G/DL (ref 28.4–34.8)
MCV RBC AUTO: 99.3 FL (ref 82.6–102.9)
MONOCYTES NFR BLD: 0.59 K/UL (ref 0.1–1.2)
MONOCYTES NFR BLD: 6 % (ref 3–12)
NEUTROPHILS NFR BLD: 71 % (ref 36–65)
NEUTS SEG NFR BLD: 7.54 K/UL (ref 1.5–8.1)
NRBC BLD-RTO: 0 PER 100 WBC
PLATELET # BLD AUTO: 336 K/UL (ref 138–453)
PMV BLD AUTO: 10.1 FL (ref 8.1–13.5)
POTASSIUM SERPL-SCNC: 5.1 MMOL/L (ref 3.7–5.3)
POTASSIUM SERPL-SCNC: 5.3 MMOL/L (ref 3.7–5.3)
POTASSIUM SERPL-SCNC: 5.5 MMOL/L (ref 3.7–5.3)
RBC # BLD AUTO: 2.68 M/UL (ref 3.95–5.11)
SODIUM SERPL-SCNC: 133 MMOL/L (ref 136–145)
SODIUM SERPL-SCNC: 134 MMOL/L (ref 136–145)
SODIUM SERPL-SCNC: 134 MMOL/L (ref 136–145)
TIBC SERPL-MCNC: 221 UG/DL (ref 250–450)
UNSATURATED IRON BINDING CAPACITY: 48 UG/DL (ref 112–347)
WBC OTHER # BLD: 10.6 K/UL (ref 3.5–11.3)

## 2025-08-16 PROCEDURE — 2060000000 HC ICU INTERMEDIATE R&B

## 2025-08-16 PROCEDURE — 94760 N-INVAS EAR/PLS OXIMETRY 1: CPT

## 2025-08-16 PROCEDURE — 99233 SBSQ HOSP IP/OBS HIGH 50: CPT | Performed by: INTERNAL MEDICINE

## 2025-08-16 PROCEDURE — 99222 1ST HOSP IP/OBS MODERATE 55: CPT | Performed by: INTERNAL MEDICINE

## 2025-08-16 PROCEDURE — 6370000000 HC RX 637 (ALT 250 FOR IP)

## 2025-08-16 PROCEDURE — 94640 AIRWAY INHALATION TREATMENT: CPT

## 2025-08-16 PROCEDURE — 85025 COMPLETE CBC W/AUTO DIFF WBC: CPT

## 2025-08-16 PROCEDURE — 83540 ASSAY OF IRON: CPT

## 2025-08-16 PROCEDURE — 82010 KETONE BODYS QUAN: CPT

## 2025-08-16 PROCEDURE — 2580000003 HC RX 258

## 2025-08-16 PROCEDURE — 85018 HEMOGLOBIN: CPT

## 2025-08-16 PROCEDURE — 83550 IRON BINDING TEST: CPT

## 2025-08-16 PROCEDURE — 82947 ASSAY GLUCOSE BLOOD QUANT: CPT

## 2025-08-16 PROCEDURE — 2500000003 HC RX 250 WO HCPCS

## 2025-08-16 PROCEDURE — 85014 HEMATOCRIT: CPT

## 2025-08-16 PROCEDURE — 80048 BASIC METABOLIC PNL TOTAL CA: CPT

## 2025-08-16 PROCEDURE — 82728 ASSAY OF FERRITIN: CPT

## 2025-08-16 PROCEDURE — 6360000002 HC RX W HCPCS: Performed by: INTERNAL MEDICINE

## 2025-08-16 PROCEDURE — 51798 US URINE CAPACITY MEASURE: CPT

## 2025-08-16 PROCEDURE — 36415 COLL VENOUS BLD VENIPUNCTURE: CPT

## 2025-08-16 PROCEDURE — 82272 OCCULT BLD FECES 1-3 TESTS: CPT

## 2025-08-16 RX ORDER — SODIUM CHLORIDE, SODIUM LACTATE, POTASSIUM CHLORIDE, CALCIUM CHLORIDE 600; 310; 30; 20 MG/100ML; MG/100ML; MG/100ML; MG/100ML
INJECTION, SOLUTION INTRAVENOUS CONTINUOUS
Status: DISCONTINUED | OUTPATIENT
Start: 2025-08-16 | End: 2025-08-16

## 2025-08-16 RX ORDER — SODIUM BICARBONATE 650 MG/1
650 TABLET ORAL 2 TIMES DAILY
Status: COMPLETED | OUTPATIENT
Start: 2025-08-16 | End: 2025-08-17

## 2025-08-16 RX ADMIN — HEPARIN SODIUM 5000 UNITS: 5000 INJECTION INTRAVENOUS; SUBCUTANEOUS at 20:46

## 2025-08-16 RX ADMIN — BUDESONIDE AND FORMOTEROL FUMARATE DIHYDRATE 2 PUFF: 80; 4.5 AEROSOL RESPIRATORY (INHALATION) at 20:45

## 2025-08-16 RX ADMIN — PANCRELIPASE LIPASE, PANCRELIPASE PROTEASE, PANCRELIPASE AMYLASE 15000 UNITS: 15000; 47000; 63000 CAPSULE, DELAYED RELEASE ORAL at 13:05

## 2025-08-16 RX ADMIN — SODIUM BICARBONATE 650 MG: 650 TABLET ORAL at 14:06

## 2025-08-16 RX ADMIN — INSULIN LISPRO 3 UNITS: 100 INJECTION, SOLUTION INTRAVENOUS; SUBCUTANEOUS at 09:02

## 2025-08-16 RX ADMIN — METOPROLOL TARTRATE 50 MG: 50 TABLET, FILM COATED ORAL at 09:02

## 2025-08-16 RX ADMIN — BUDESONIDE AND FORMOTEROL FUMARATE DIHYDRATE 2 PUFF: 80; 4.5 AEROSOL RESPIRATORY (INHALATION) at 07:35

## 2025-08-16 RX ADMIN — SODIUM BICARBONATE 650 MG: 650 TABLET ORAL at 20:46

## 2025-08-16 RX ADMIN — PANCRELIPASE LIPASE, PANCRELIPASE PROTEASE, PANCRELIPASE AMYLASE 15000 UNITS: 15000; 47000; 63000 CAPSULE, DELAYED RELEASE ORAL at 09:02

## 2025-08-16 RX ADMIN — HEPARIN SODIUM 5000 UNITS: 5000 INJECTION INTRAVENOUS; SUBCUTANEOUS at 14:06

## 2025-08-16 RX ADMIN — SODIUM CHLORIDE, PRESERVATIVE FREE 10 ML: 5 INJECTION INTRAVENOUS at 20:46

## 2025-08-16 RX ADMIN — ATORVASTATIN CALCIUM 40 MG: 40 TABLET, FILM COATED ORAL at 09:02

## 2025-08-16 RX ADMIN — DULOXETINE 30 MG: 30 CAPSULE, DELAYED RELEASE ORAL at 20:46

## 2025-08-16 RX ADMIN — SODIUM CHLORIDE, PRESERVATIVE FREE 10 ML: 5 INJECTION INTRAVENOUS at 09:05

## 2025-08-16 RX ADMIN — PANCRELIPASE LIPASE, PANCRELIPASE PROTEASE, PANCRELIPASE AMYLASE 15000 UNITS: 15000; 47000; 63000 CAPSULE, DELAYED RELEASE ORAL at 17:35

## 2025-08-16 RX ADMIN — PRIMIDONE 25 MG: 50 TABLET ORAL at 20:46

## 2025-08-16 RX ADMIN — SODIUM CHLORIDE, SODIUM LACTATE, POTASSIUM CHLORIDE, AND CALCIUM CHLORIDE: .6; .31; .03; .02 INJECTION, SOLUTION INTRAVENOUS at 08:15

## 2025-08-16 RX ADMIN — CYCLOBENZAPRINE 5 MG: 10 TABLET, FILM COATED ORAL at 20:46

## 2025-08-16 RX ADMIN — HEPARIN SODIUM 5000 UNITS: 5000 INJECTION INTRAVENOUS; SUBCUTANEOUS at 05:41

## 2025-08-16 RX ADMIN — GABAPENTIN 100 MG: 100 CAPSULE ORAL at 20:46

## 2025-08-16 RX ADMIN — INSULIN LISPRO 1 UNITS: 100 INJECTION, SOLUTION INTRAVENOUS; SUBCUTANEOUS at 20:46

## 2025-08-16 ASSESSMENT — PAIN DESCRIPTION - DESCRIPTORS
DESCRIPTORS: ACHING

## 2025-08-16 ASSESSMENT — PAIN DESCRIPTION - LOCATION
LOCATION: BACK

## 2025-08-16 ASSESSMENT — PAIN DESCRIPTION - PAIN TYPE
TYPE: ACUTE PAIN;CHRONIC PAIN
TYPE: ACUTE PAIN;CHRONIC PAIN

## 2025-08-16 ASSESSMENT — PAIN - FUNCTIONAL ASSESSMENT
PAIN_FUNCTIONAL_ASSESSMENT: ACTIVITIES ARE NOT PREVENTED
PAIN_FUNCTIONAL_ASSESSMENT: ACTIVITIES ARE NOT PREVENTED

## 2025-08-16 ASSESSMENT — PAIN SCALES - GENERAL
PAINLEVEL_OUTOF10: 7
PAINLEVEL_OUTOF10: 0
PAINLEVEL_OUTOF10: 2

## 2025-08-16 ASSESSMENT — PAIN DESCRIPTION - ORIENTATION
ORIENTATION: MID
ORIENTATION: LOWER

## 2025-08-17 LAB
ANION GAP SERPL CALCULATED.3IONS-SCNC: 9 MMOL/L (ref 9–16)
BASOPHILS # BLD: 0.04 K/UL (ref 0–0.2)
BASOPHILS NFR BLD: 1 % (ref 0–2)
BUN SERPL-MCNC: 15 MG/DL (ref 8–23)
CA-I BLD-SCNC: 1.23 MMOL/L (ref 1.13–1.33)
CALCIUM SERPL-MCNC: 8.4 MG/DL (ref 8.6–10.4)
CHLORIDE SERPL-SCNC: 106 MMOL/L (ref 98–107)
CO2 SERPL-SCNC: 21 MMOL/L (ref 20–31)
CREAT SERPL-MCNC: 1.1 MG/DL (ref 0.6–0.9)
DATE, STOOL #1: NORMAL
EKG ATRIAL RATE: 102 BPM
EKG P AXIS: 78 DEGREES
EKG P-R INTERVAL: 172 MS
EKG Q-T INTERVAL: 326 MS
EKG QRS DURATION: 68 MS
EKG QTC CALCULATION (BAZETT): 424 MS
EKG R AXIS: 65 DEGREES
EKG T AXIS: 74 DEGREES
EKG VENTRICULAR RATE: 102 BPM
EOSINOPHIL # BLD: 0.24 K/UL (ref 0–0.44)
EOSINOPHILS RELATIVE PERCENT: 3 % (ref 1–4)
ERYTHROCYTE [DISTWIDTH] IN BLOOD BY AUTOMATED COUNT: 12.1 % (ref 11.8–14.4)
FOLATE SERPL-MCNC: 18.1 NG/ML (ref 4.8–24.2)
GFR, ESTIMATED: 55 ML/MIN/1.73M2
GLUCOSE BLD-MCNC: 146 MG/DL (ref 65–105)
GLUCOSE BLD-MCNC: 170 MG/DL (ref 65–105)
GLUCOSE BLD-MCNC: 225 MG/DL (ref 65–105)
GLUCOSE BLD-MCNC: 245 MG/DL (ref 65–105)
GLUCOSE SERPL-MCNC: 201 MG/DL (ref 74–99)
HCT VFR BLD AUTO: 23.4 % (ref 36.3–47.1)
HCT VFR BLD AUTO: 24.1 % (ref 36.3–47.1)
HCT VFR BLD AUTO: 25.6 % (ref 36.3–47.1)
HEMOCCULT SP1 STL QL: NEGATIVE
HEMOCCULT SP2 STL QL: NEGATIVE
HEMOCCULT SP3 STL QL: NEGATIVE
HGB BLD-MCNC: 7.8 G/DL (ref 11.9–15.1)
HGB BLD-MCNC: 8.2 G/DL (ref 11.9–15.1)
HGB BLD-MCNC: 8.7 G/DL (ref 11.9–15.1)
IMM GRANULOCYTES # BLD AUTO: 0.03 K/UL (ref 0–0.3)
IMM GRANULOCYTES NFR BLD: 0 %
IMM RETICS NFR: 11.8 % (ref 2.7–18.3)
LYMPHOCYTES NFR BLD: 2.14 K/UL (ref 1.1–3.7)
LYMPHOCYTES RELATIVE PERCENT: 29 % (ref 24–43)
MAGNESIUM SERPL-MCNC: 1.5 MG/DL (ref 1.6–2.4)
MCH RBC QN AUTO: 32.2 PG (ref 25.2–33.5)
MCHC RBC AUTO-ENTMCNC: 33.3 G/DL (ref 28.4–34.8)
MCV RBC AUTO: 96.7 FL (ref 82.6–102.9)
MONOCYTES NFR BLD: 0.59 K/UL (ref 0.1–1.2)
MONOCYTES NFR BLD: 8 % (ref 3–12)
NEUTROPHILS NFR BLD: 59 % (ref 36–65)
NEUTS SEG NFR BLD: 4.31 K/UL (ref 1.5–8.1)
NRBC BLD-RTO: 0 PER 100 WBC
PHOSPHATE SERPL-MCNC: 3.2 MG/DL (ref 2.5–4.5)
PLATELET # BLD AUTO: 313 K/UL (ref 138–453)
PMV BLD AUTO: 9.7 FL (ref 8.1–13.5)
POTASSIUM SERPL-SCNC: 5 MMOL/L (ref 3.7–5.3)
RBC # BLD AUTO: 2.42 M/UL (ref 3.95–5.11)
RETIC HEMOGLOBIN: 35.5 PG (ref 28.2–35.7)
RETICS # AUTO: 0.03 M/UL (ref 0.03–0.08)
RETICS/RBC NFR AUTO: 1 % (ref 0.5–1.9)
SODIUM SERPL-SCNC: 136 MMOL/L (ref 136–145)
TIME, STOOL #1: 1915
VIT B12 SERPL-MCNC: 585 PG/ML (ref 232–1245)
WBC OTHER # BLD: 7.4 K/UL (ref 3.5–11.3)

## 2025-08-17 PROCEDURE — 82947 ASSAY GLUCOSE BLOOD QUANT: CPT

## 2025-08-17 PROCEDURE — 6370000000 HC RX 637 (ALT 250 FOR IP)

## 2025-08-17 PROCEDURE — 82330 ASSAY OF CALCIUM: CPT

## 2025-08-17 PROCEDURE — 85018 HEMOGLOBIN: CPT

## 2025-08-17 PROCEDURE — 82607 VITAMIN B-12: CPT

## 2025-08-17 PROCEDURE — 2060000000 HC ICU INTERMEDIATE R&B

## 2025-08-17 PROCEDURE — 84100 ASSAY OF PHOSPHORUS: CPT

## 2025-08-17 PROCEDURE — 6360000002 HC RX W HCPCS: Performed by: INTERNAL MEDICINE

## 2025-08-17 PROCEDURE — 36415 COLL VENOUS BLD VENIPUNCTURE: CPT

## 2025-08-17 PROCEDURE — 80048 BASIC METABOLIC PNL TOTAL CA: CPT

## 2025-08-17 PROCEDURE — 99232 SBSQ HOSP IP/OBS MODERATE 35: CPT | Performed by: INTERNAL MEDICINE

## 2025-08-17 PROCEDURE — 94640 AIRWAY INHALATION TREATMENT: CPT

## 2025-08-17 PROCEDURE — 85025 COMPLETE CBC W/AUTO DIFF WBC: CPT

## 2025-08-17 PROCEDURE — 82746 ASSAY OF FOLIC ACID SERUM: CPT

## 2025-08-17 PROCEDURE — 94761 N-INVAS EAR/PLS OXIMETRY MLT: CPT

## 2025-08-17 PROCEDURE — 2500000003 HC RX 250 WO HCPCS

## 2025-08-17 PROCEDURE — 85014 HEMATOCRIT: CPT

## 2025-08-17 PROCEDURE — 83735 ASSAY OF MAGNESIUM: CPT

## 2025-08-17 PROCEDURE — 85045 AUTOMATED RETICULOCYTE COUNT: CPT

## 2025-08-17 RX ORDER — ALOGLIPTIN 12.5 MG/1
12.5 TABLET, FILM COATED ORAL DAILY
Status: DISCONTINUED | OUTPATIENT
Start: 2025-08-17 | End: 2025-08-18 | Stop reason: HOSPADM

## 2025-08-17 RX ORDER — METOPROLOL TARTRATE 25 MG/1
25 TABLET, FILM COATED ORAL 2 TIMES DAILY
Status: DISCONTINUED | OUTPATIENT
Start: 2025-08-17 | End: 2025-08-17

## 2025-08-17 RX ORDER — MIDODRINE HYDROCHLORIDE 5 MG/1
5 TABLET ORAL
Status: DISCONTINUED | OUTPATIENT
Start: 2025-08-17 | End: 2025-08-17

## 2025-08-17 RX ORDER — MIDODRINE HYDROCHLORIDE 5 MG/1
5 TABLET ORAL 3 TIMES DAILY PRN
Status: DISCONTINUED | OUTPATIENT
Start: 2025-08-17 | End: 2025-08-18 | Stop reason: HOSPADM

## 2025-08-17 RX ORDER — PANTOPRAZOLE SODIUM 40 MG/1
40 TABLET, DELAYED RELEASE ORAL
Status: DISCONTINUED | OUTPATIENT
Start: 2025-08-17 | End: 2025-08-18 | Stop reason: HOSPADM

## 2025-08-17 RX ORDER — METOPROLOL SUCCINATE 25 MG/1
25 TABLET, EXTENDED RELEASE ORAL DAILY
Status: DISCONTINUED | OUTPATIENT
Start: 2025-08-18 | End: 2025-08-18 | Stop reason: HOSPADM

## 2025-08-17 RX ADMIN — PRIMIDONE 25 MG: 50 TABLET ORAL at 19:34

## 2025-08-17 RX ADMIN — PANCRELIPASE LIPASE, PANCRELIPASE PROTEASE, PANCRELIPASE AMYLASE 15000 UNITS: 15000; 47000; 63000 CAPSULE, DELAYED RELEASE ORAL at 08:13

## 2025-08-17 RX ADMIN — SODIUM CHLORIDE, PRESERVATIVE FREE 10 ML: 5 INJECTION INTRAVENOUS at 09:49

## 2025-08-17 RX ADMIN — MIDODRINE HYDROCHLORIDE 5 MG: 5 TABLET ORAL at 08:15

## 2025-08-17 RX ADMIN — GABAPENTIN 100 MG: 100 CAPSULE ORAL at 19:34

## 2025-08-17 RX ADMIN — BUDESONIDE AND FORMOTEROL FUMARATE DIHYDRATE 2 PUFF: 80; 4.5 AEROSOL RESPIRATORY (INHALATION) at 08:23

## 2025-08-17 RX ADMIN — PANCRELIPASE LIPASE, PANCRELIPASE PROTEASE, PANCRELIPASE AMYLASE 15000 UNITS: 15000; 47000; 63000 CAPSULE, DELAYED RELEASE ORAL at 16:37

## 2025-08-17 RX ADMIN — PANTOPRAZOLE SODIUM 40 MG: 40 TABLET, DELAYED RELEASE ORAL at 09:49

## 2025-08-17 RX ADMIN — SODIUM CHLORIDE, PRESERVATIVE FREE 10 ML: 5 INJECTION INTRAVENOUS at 19:34

## 2025-08-17 RX ADMIN — DULOXETINE 30 MG: 30 CAPSULE, DELAYED RELEASE ORAL at 19:34

## 2025-08-17 RX ADMIN — ATORVASTATIN CALCIUM 40 MG: 40 TABLET, FILM COATED ORAL at 08:13

## 2025-08-17 RX ADMIN — BUDESONIDE AND FORMOTEROL FUMARATE DIHYDRATE 2 PUFF: 80; 4.5 AEROSOL RESPIRATORY (INHALATION) at 19:54

## 2025-08-17 RX ADMIN — SODIUM BICARBONATE 650 MG: 650 TABLET ORAL at 08:13

## 2025-08-17 RX ADMIN — ALOGLIPTIN 12.5 MG: 12.5 TABLET, FILM COATED ORAL at 12:16

## 2025-08-17 RX ADMIN — SODIUM BICARBONATE 650 MG: 650 TABLET ORAL at 19:34

## 2025-08-17 RX ADMIN — PANCRELIPASE LIPASE, PANCRELIPASE PROTEASE, PANCRELIPASE AMYLASE 15000 UNITS: 15000; 47000; 63000 CAPSULE, DELAYED RELEASE ORAL at 12:16

## 2025-08-17 RX ADMIN — CYCLOBENZAPRINE 5 MG: 10 TABLET, FILM COATED ORAL at 19:34

## 2025-08-17 RX ADMIN — POLYETHYLENE GLYCOL 3350 17 G: 17 POWDER, FOR SOLUTION ORAL at 08:12

## 2025-08-17 RX ADMIN — INSULIN LISPRO 1 UNITS: 100 INJECTION, SOLUTION INTRAVENOUS; SUBCUTANEOUS at 12:16

## 2025-08-17 RX ADMIN — METOPROLOL TARTRATE 25 MG: 25 TABLET, FILM COATED ORAL at 08:13

## 2025-08-17 RX ADMIN — HEPARIN SODIUM 5000 UNITS: 5000 INJECTION INTRAVENOUS; SUBCUTANEOUS at 04:54

## 2025-08-17 RX ADMIN — INSULIN LISPRO 1 UNITS: 100 INJECTION, SOLUTION INTRAVENOUS; SUBCUTANEOUS at 16:42

## 2025-08-17 ASSESSMENT — PAIN SCALES - GENERAL
PAINLEVEL_OUTOF10: 0

## 2025-08-18 VITALS
HEIGHT: 67 IN | TEMPERATURE: 97.9 F | HEART RATE: 105 BPM | SYSTOLIC BLOOD PRESSURE: 131 MMHG | WEIGHT: 90.17 LBS | DIASTOLIC BLOOD PRESSURE: 91 MMHG | OXYGEN SATURATION: 100 % | RESPIRATION RATE: 20 BRPM | BODY MASS INDEX: 14.15 KG/M2

## 2025-08-18 LAB
ANION GAP SERPL CALCULATED.3IONS-SCNC: 8 MMOL/L (ref 9–16)
BASOPHILS # BLD: 0.03 K/UL (ref 0–0.2)
BASOPHILS NFR BLD: 0 % (ref 0–2)
BUN SERPL-MCNC: 13 MG/DL (ref 8–23)
CALCIUM SERPL-MCNC: 8.6 MG/DL (ref 8.6–10.4)
CHLORIDE SERPL-SCNC: 102 MMOL/L (ref 98–107)
CO2 SERPL-SCNC: 22 MMOL/L (ref 20–31)
CREAT SERPL-MCNC: 0.9 MG/DL (ref 0.6–0.9)
EOSINOPHIL # BLD: 0.15 K/UL (ref 0–0.44)
EOSINOPHILS RELATIVE PERCENT: 2 % (ref 1–4)
ERYTHROCYTE [DISTWIDTH] IN BLOOD BY AUTOMATED COUNT: 12.2 % (ref 11.8–14.4)
GABAPENTIN SERPLBLD-MCNC: 2.6 UG/ML (ref 2–20)
GFR, ESTIMATED: 70 ML/MIN/1.73M2
GLUCOSE BLD-MCNC: 112 MG/DL (ref 65–105)
GLUCOSE BLD-MCNC: 203 MG/DL (ref 65–105)
GLUCOSE SERPL-MCNC: 247 MG/DL (ref 74–99)
HCT VFR BLD AUTO: 22.6 % (ref 36.3–47.1)
HCT VFR BLD AUTO: 23.2 % (ref 36.3–47.1)
HCT VFR BLD AUTO: 26 % (ref 36.3–47.1)
HGB BLD-MCNC: 7.8 G/DL (ref 11.9–15.1)
HGB BLD-MCNC: 7.9 G/DL (ref 11.9–15.1)
HGB BLD-MCNC: 8.7 G/DL (ref 11.9–15.1)
IMM GRANULOCYTES # BLD AUTO: <0.03 K/UL (ref 0–0.3)
IMM GRANULOCYTES NFR BLD: 0 %
LYMPHOCYTES NFR BLD: 2.02 K/UL (ref 1.1–3.7)
LYMPHOCYTES RELATIVE PERCENT: 27 % (ref 24–43)
MCH RBC QN AUTO: 32 PG (ref 25.2–33.5)
MCHC RBC AUTO-ENTMCNC: 33.6 G/DL (ref 28.4–34.8)
MCV RBC AUTO: 95.1 FL (ref 82.6–102.9)
MICROORGANISM SPEC CULT: NORMAL
MICROORGANISM SPEC CULT: NORMAL
MONOCYTES NFR BLD: 0.65 K/UL (ref 0.1–1.2)
MONOCYTES NFR BLD: 9 % (ref 3–12)
NEUTROPHILS NFR BLD: 62 % (ref 36–65)
NEUTS SEG NFR BLD: 4.66 K/UL (ref 1.5–8.1)
NRBC BLD-RTO: 0 PER 100 WBC
PATH REV BLD -IMP: NORMAL
PLATELET # BLD AUTO: 323 K/UL (ref 138–453)
PMV BLD AUTO: 9.8 FL (ref 8.1–13.5)
POTASSIUM SERPL-SCNC: 4.7 MMOL/L (ref 3.7–5.3)
RBC # BLD AUTO: 2.44 M/UL (ref 3.95–5.11)
SERVICE CMNT-IMP: NORMAL
SERVICE CMNT-IMP: NORMAL
SODIUM SERPL-SCNC: 132 MMOL/L (ref 136–145)
SPECIMEN DESCRIPTION: NORMAL
SPECIMEN DESCRIPTION: NORMAL
SURGICAL PATHOLOGY REPORT: NORMAL
WBC OTHER # BLD: 7.5 K/UL (ref 3.5–11.3)

## 2025-08-18 PROCEDURE — 82947 ASSAY GLUCOSE BLOOD QUANT: CPT

## 2025-08-18 PROCEDURE — 36415 COLL VENOUS BLD VENIPUNCTURE: CPT

## 2025-08-18 PROCEDURE — 2500000003 HC RX 250 WO HCPCS

## 2025-08-18 PROCEDURE — 6370000000 HC RX 637 (ALT 250 FOR IP)

## 2025-08-18 PROCEDURE — 94761 N-INVAS EAR/PLS OXIMETRY MLT: CPT

## 2025-08-18 PROCEDURE — 85025 COMPLETE CBC W/AUTO DIFF WBC: CPT

## 2025-08-18 PROCEDURE — 80048 BASIC METABOLIC PNL TOTAL CA: CPT

## 2025-08-18 PROCEDURE — 85018 HEMOGLOBIN: CPT

## 2025-08-18 PROCEDURE — 94640 AIRWAY INHALATION TREATMENT: CPT

## 2025-08-18 PROCEDURE — 85014 HEMATOCRIT: CPT

## 2025-08-18 PROCEDURE — 99233 SBSQ HOSP IP/OBS HIGH 50: CPT | Performed by: INTERNAL MEDICINE

## 2025-08-18 RX ORDER — PANTOPRAZOLE SODIUM 40 MG/1
40 TABLET, DELAYED RELEASE ORAL
Qty: 30 TABLET | Refills: 3 | Status: SHIPPED | OUTPATIENT
Start: 2025-08-19

## 2025-08-18 RX ORDER — SODIUM BICARBONATE 325 MG/1
325 TABLET ORAL 3 TIMES DAILY
Qty: 90 TABLET | Refills: 11 | Status: SHIPPED | OUTPATIENT
Start: 2025-08-18 | End: 2026-08-18

## 2025-08-18 RX ORDER — PRIMIDONE 50 MG/1
25 TABLET ORAL NIGHTLY
Qty: 90 TABLET | Refills: 3 | Status: SHIPPED | OUTPATIENT
Start: 2025-08-18

## 2025-08-18 RX ORDER — ALOGLIPTIN 12.5 MG/1
12.5 TABLET, FILM COATED ORAL DAILY
Qty: 60 TABLET | Refills: 1 | Status: SHIPPED | OUTPATIENT
Start: 2025-08-19

## 2025-08-18 RX ADMIN — ALOGLIPTIN 12.5 MG: 12.5 TABLET, FILM COATED ORAL at 08:25

## 2025-08-18 RX ADMIN — BUDESONIDE AND FORMOTEROL FUMARATE DIHYDRATE 2 PUFF: 80; 4.5 AEROSOL RESPIRATORY (INHALATION) at 08:46

## 2025-08-18 RX ADMIN — ATORVASTATIN CALCIUM 40 MG: 40 TABLET, FILM COATED ORAL at 08:25

## 2025-08-18 RX ADMIN — PANCRELIPASE LIPASE, PANCRELIPASE PROTEASE, PANCRELIPASE AMYLASE 15000 UNITS: 15000; 47000; 63000 CAPSULE, DELAYED RELEASE ORAL at 12:44

## 2025-08-18 RX ADMIN — PANTOPRAZOLE SODIUM 40 MG: 40 TABLET, DELAYED RELEASE ORAL at 08:25

## 2025-08-18 RX ADMIN — SODIUM CHLORIDE, PRESERVATIVE FREE 10 ML: 5 INJECTION INTRAVENOUS at 08:31

## 2025-08-18 RX ADMIN — PANCRELIPASE LIPASE, PANCRELIPASE PROTEASE, PANCRELIPASE AMYLASE 15000 UNITS: 15000; 47000; 63000 CAPSULE, DELAYED RELEASE ORAL at 08:25

## 2025-08-18 RX ADMIN — METOPROLOL SUCCINATE 25 MG: 25 TABLET, EXTENDED RELEASE ORAL at 08:25

## 2025-08-18 ASSESSMENT — PAIN SCALES - GENERAL
PAINLEVEL_OUTOF10: 0
PAINLEVEL_OUTOF10: 0

## 2025-08-18 ASSESSMENT — ENCOUNTER SYMPTOMS
VOMITING: 0
SHORTNESS OF BREATH: 0
COUGH: 0
NAUSEA: 0
ABDOMINAL PAIN: 0
EYES NEGATIVE: 1
ABDOMINAL DISTENTION: 0
CHEST TIGHTNESS: 0
GASTROINTESTINAL NEGATIVE: 1
RESPIRATORY NEGATIVE: 1
BACK PAIN: 0

## (undated) DEVICE — ADAPTER TBNG LUER STUB 15 GA INTMED

## (undated) DEVICE — SNARE ENDOSCP M L240CM LOOP W27MM SHTH DIA2.4MM OVL FLX

## (undated) DEVICE — JELLY,LUBE,STERILE,FLIP TOP,TUBE,2-OZ: Brand: MEDLINE

## (undated) DEVICE — MEDICINE CUP, GRADUATED, STER: Brand: MEDLINE

## (undated) DEVICE — Device: Brand: DEFENDO VALVE AND CONNECTOR KIT

## (undated) DEVICE — STAZ ENDO KIT: Brand: MEDLINE INDUSTRIES, INC.

## (undated) DEVICE — BLOCK BITE 60FR RUBBER ADLT DENTAL

## (undated) DEVICE — YANKAUER,FLEXIBLE HANDLE,REGLR CAPACITY: Brand: MEDLINE INDUSTRIES, INC.

## (undated) DEVICE — FORCEPS BX L240CM WRK CHN 2.8MM STD CAP W/ NDL MIC MESH

## (undated) DEVICE — TUBING, SUCTION, 1/4" X 12', STRAIGHT: Brand: MEDLINE

## (undated) DEVICE — TRAP POLYP ETRAP

## (undated) DEVICE — FORCEPS BX L240CM JAW DIA2.2MM RAD JAW 4 HOT DISP

## (undated) DEVICE — GAUZE,SPONGE,4"X4",16PLY,STRL,LF,10/TRAY: Brand: MEDLINE

## (undated) DEVICE — NO USE 18 MONTHS GOWN ISOL XL YEL LF

## (undated) DEVICE — BASIN EMSIS 700ML GRAPHITE PLAS KID SHP GRAD

## (undated) DEVICE — FORCEPS BX L240CM JAW DIA2.4MM ORNG L CAP W/ NDL DISP RAD

## (undated) DEVICE — CO2 CANNULA,SUPERSOFT, ADLT,7'O2,7'CO2: Brand: MEDLINE

## (undated) DEVICE — ELECTRODE PT RET AD L9FT HI MOIST COND ADH HYDRGEL CORDED

## (undated) DEVICE — FORCEPS BX L240CM JAW DIA22MM ORNG STD CAP W NDL RAD JAW 4

## (undated) DEVICE — TRAP SURG QUAD PARABOLA SLOT DSGN SFTY SCRN TRAPEASE

## (undated) DEVICE — CUP MED 1OZ CLR POLYPR FEED GRAD W/O LID

## (undated) DEVICE — SYRINGE MED 50ML LUERLOCK TIP

## (undated) DEVICE — BITE BLOCK ENDOSCP AD 60 FR W/ ADJ STRP PLAS GRN BLOX

## (undated) DEVICE — SYSTEM ENDOSCP US DEL 22GA W/ FNA NDL BEAC

## (undated) DEVICE — GOWN,AURORA,NONREINFORCED,LARGE: Brand: MEDLINE